# Patient Record
Sex: FEMALE | Race: WHITE | Employment: OTHER | URBAN - METROPOLITAN AREA
[De-identification: names, ages, dates, MRNs, and addresses within clinical notes are randomized per-mention and may not be internally consistent; named-entity substitution may affect disease eponyms.]

---

## 2017-03-22 ENCOUNTER — OFFICE VISIT (OUTPATIENT)
Dept: CARDIOLOGY CLINIC | Age: 77
End: 2017-03-22

## 2017-03-22 ENCOUNTER — PROCEDURE VISIT (OUTPATIENT)
Dept: CARDIOLOGY CLINIC | Age: 77
End: 2017-03-22

## 2017-03-22 VITALS
DIASTOLIC BLOOD PRESSURE: 64 MMHG | OXYGEN SATURATION: 97 % | HEIGHT: 68 IN | HEART RATE: 78 BPM | SYSTOLIC BLOOD PRESSURE: 122 MMHG | WEIGHT: 161 LBS | BODY MASS INDEX: 24.4 KG/M2

## 2017-03-22 DIAGNOSIS — I49.01 VENTRICULAR FIBRILLATION (HCC): ICD-10-CM

## 2017-03-22 DIAGNOSIS — I25.5 ISCHEMIC CARDIOMYOPATHY: Primary | ICD-10-CM

## 2017-03-22 DIAGNOSIS — I10 ESSENTIAL HYPERTENSION: ICD-10-CM

## 2017-03-22 DIAGNOSIS — I42.9 CARDIOMYOPATHY (HCC): ICD-10-CM

## 2017-03-22 DIAGNOSIS — Z95.810 AUTOMATIC IMPLANTABLE CARDIOVERTER-DEFIBRILLATOR IN SITU: ICD-10-CM

## 2017-03-22 DIAGNOSIS — Z95.810 AUTOMATIC IMPLANTABLE CARDIOVERTER-DEFIBRILLATOR IN SITU: Primary | ICD-10-CM

## 2017-03-22 PROCEDURE — 99214 OFFICE O/P EST MOD 30 MIN: CPT | Performed by: INTERNAL MEDICINE

## 2017-03-22 PROCEDURE — 93282 PRGRMG EVAL IMPLANTABLE DFB: CPT | Performed by: INTERNAL MEDICINE

## 2017-04-27 PROBLEM — E11.9 DM2 (DIABETES MELLITUS, TYPE 2) (HCC): Status: ACTIVE | Noted: 2017-04-27

## 2017-04-27 PROBLEM — N39.0 UTI (URINARY TRACT INFECTION): Status: ACTIVE | Noted: 2017-04-27

## 2017-04-27 PROBLEM — N28.89 RIGHT RENAL MASS: Status: ACTIVE | Noted: 2017-04-27

## 2017-06-27 ENCOUNTER — NURSE ONLY (OUTPATIENT)
Dept: CARDIOLOGY CLINIC | Age: 77
End: 2017-06-27

## 2017-06-27 DIAGNOSIS — I46.9 CARDIAC ARREST (HCC): ICD-10-CM

## 2017-06-27 DIAGNOSIS — Z95.810 AUTOMATIC IMPLANTABLE CARDIOVERTER-DEFIBRILLATOR IN SITU: ICD-10-CM

## 2017-06-27 PROCEDURE — 93296 REM INTERROG EVL PM/IDS: CPT | Performed by: INTERNAL MEDICINE

## 2017-06-27 PROCEDURE — 93295 DEV INTERROG REMOTE 1/2/MLT: CPT | Performed by: INTERNAL MEDICINE

## 2017-07-25 ENCOUNTER — OFFICE VISIT (OUTPATIENT)
Dept: CARDIOLOGY CLINIC | Age: 77
End: 2017-07-25

## 2017-07-25 VITALS
HEART RATE: 76 BPM | BODY MASS INDEX: 23.34 KG/M2 | OXYGEN SATURATION: 97 % | SYSTOLIC BLOOD PRESSURE: 106 MMHG | HEIGHT: 68 IN | DIASTOLIC BLOOD PRESSURE: 56 MMHG | WEIGHT: 154 LBS

## 2017-07-25 DIAGNOSIS — I42.9 CARDIOMYOPATHY (HCC): ICD-10-CM

## 2017-07-25 DIAGNOSIS — Z86.74 HX OF CARDIAC ARREST: ICD-10-CM

## 2017-07-25 DIAGNOSIS — Z95.810 AUTOMATIC IMPLANTABLE CARDIOVERTER-DEFIBRILLATOR IN SITU: ICD-10-CM

## 2017-07-25 DIAGNOSIS — Z01.810 PREOP CARDIOVASCULAR EXAM: Primary | ICD-10-CM

## 2017-07-25 PROCEDURE — 93000 ELECTROCARDIOGRAM COMPLETE: CPT | Performed by: INTERNAL MEDICINE

## 2017-07-25 PROCEDURE — 99214 OFFICE O/P EST MOD 30 MIN: CPT | Performed by: INTERNAL MEDICINE

## 2017-09-12 PROBLEM — N13.30 HYDRONEPHROSIS: Status: ACTIVE | Noted: 2017-06-07

## 2017-09-12 PROBLEM — C65.9 MALIGNANT NEOPLASM OF RENAL PELVIS (HCC): Status: ACTIVE | Noted: 2017-06-27

## 2017-09-12 PROBLEM — F32.9 DEPRESSION, REACTIVE: Status: ACTIVE | Noted: 2017-03-09

## 2017-09-12 PROBLEM — Z71.89 ADVANCE DIRECTIVE DISCUSSED WITH PATIENT: Status: ACTIVE | Noted: 2017-09-12

## 2017-09-12 PROBLEM — Z51.5 ENCOUNTER FOR PALLIATIVE CARE: Status: ACTIVE | Noted: 2017-09-12

## 2017-09-12 PROBLEM — F41.8 MIXED ANXIETY DEPRESSIVE DISORDER: Status: ACTIVE | Noted: 2017-09-12

## 2017-09-12 PROBLEM — K90.9 IRON MALABSORPTION: Status: ACTIVE | Noted: 2017-09-12

## 2017-09-12 PROBLEM — L90.5 SCAR OF SKIN: Status: ACTIVE | Noted: 2017-09-12

## 2017-09-12 PROBLEM — Z93.2 STATUS POST ILEOSTOMY (HCC): Status: ACTIVE | Noted: 2017-09-12

## 2017-09-12 PROBLEM — I50.23 ACUTE ON CHRONIC SYSTOLIC HEART FAILURE (HCC): Status: ACTIVE | Noted: 2017-08-28

## 2017-09-12 PROBLEM — M25.50 ARTHRALGIA: Status: ACTIVE | Noted: 2017-09-12

## 2017-09-12 PROBLEM — M15.9 GENERALIZED OA: Status: ACTIVE | Noted: 2017-09-12

## 2017-09-12 PROBLEM — F06.31 MOOD DISORDER WITH DEPRESSIVE FEATURES DUE TO GENERAL MEDICAL CONDITION: Status: ACTIVE | Noted: 2017-08-15

## 2017-09-12 PROBLEM — E83.42 HYPOMAGNESEMIA: Status: ACTIVE | Noted: 2017-08-29

## 2017-09-12 PROBLEM — E87.6 DECREASED POTASSIUM IN THE BLOOD: Status: ACTIVE | Noted: 2017-08-30

## 2017-09-12 PROBLEM — E46 PROTEIN-CALORIE MALNUTRITION (HCC): Status: ACTIVE | Noted: 2017-08-17

## 2017-09-12 PROBLEM — N17.9 ACUTE-ON-CHRONIC RENAL FAILURE (HCC): Status: ACTIVE | Noted: 2017-08-11

## 2017-09-12 PROBLEM — D50.9 IRON DEFICIENCY ANEMIA: Status: ACTIVE | Noted: 2017-09-12

## 2017-09-12 PROBLEM — N18.9 ACUTE-ON-CHRONIC RENAL FAILURE (HCC): Status: ACTIVE | Noted: 2017-08-11

## 2017-10-27 PROBLEM — E87.5 HYPERKALEMIA: Status: ACTIVE | Noted: 2017-10-27

## 2017-11-14 ENCOUNTER — TELEPHONE (OUTPATIENT)
Dept: CARDIOLOGY CLINIC | Age: 77
End: 2017-11-14

## 2017-11-22 ENCOUNTER — PROCEDURE VISIT (OUTPATIENT)
Dept: CARDIOLOGY CLINIC | Age: 77
End: 2017-11-22

## 2017-11-22 ENCOUNTER — OFFICE VISIT (OUTPATIENT)
Dept: CARDIOLOGY CLINIC | Age: 77
End: 2017-11-22

## 2017-11-22 VITALS
OXYGEN SATURATION: 98 % | HEIGHT: 68 IN | DIASTOLIC BLOOD PRESSURE: 62 MMHG | WEIGHT: 140 LBS | SYSTOLIC BLOOD PRESSURE: 134 MMHG | BODY MASS INDEX: 21.22 KG/M2

## 2017-11-22 DIAGNOSIS — I42.9 CARDIOMYOPATHY, UNSPECIFIED TYPE (HCC): ICD-10-CM

## 2017-11-22 DIAGNOSIS — I47.20 VENTRICULAR TACHYCARDIA: Primary | ICD-10-CM

## 2017-11-22 DIAGNOSIS — R00.2 PALPITATIONS: ICD-10-CM

## 2017-11-22 DIAGNOSIS — Z95.810 ICD (IMPLANTABLE CARDIOVERTER-DEFIBRILLATOR) IN PLACE: ICD-10-CM

## 2017-11-22 DIAGNOSIS — Z95.810 AUTOMATIC IMPLANTABLE CARDIOVERTER-DEFIBRILLATOR IN SITU: ICD-10-CM

## 2017-11-22 DIAGNOSIS — I47.20 VENTRICULAR TACHYCARDIA (HCC): ICD-10-CM

## 2017-11-22 DIAGNOSIS — I50.23 ACUTE ON CHRONIC SYSTOLIC HEART FAILURE (HCC): ICD-10-CM

## 2017-11-22 DIAGNOSIS — I50.23 ACUTE ON CHRONIC SYSTOLIC CONGESTIVE HEART FAILURE (HCC): ICD-10-CM

## 2017-11-22 LAB
ALBUMIN SERPL-MCNC: 4.4 G/DL (ref 3.4–5)
ALP BLD-CCNC: 59 U/L (ref 40–129)
ALT SERPL-CCNC: 7 U/L (ref 10–40)
ANION GAP SERPL CALCULATED.3IONS-SCNC: 18 MMOL/L (ref 3–16)
AST SERPL-CCNC: 13 U/L (ref 15–37)
BILIRUB SERPL-MCNC: 0.3 MG/DL (ref 0–1)
BILIRUBIN DIRECT: <0.2 MG/DL (ref 0–0.3)
BILIRUBIN, INDIRECT: ABNORMAL MG/DL (ref 0–1)
BUN BLDV-MCNC: 35 MG/DL (ref 7–20)
CALCIUM SERPL-MCNC: 10.1 MG/DL (ref 8.3–10.6)
CHLORIDE BLD-SCNC: 103 MMOL/L (ref 99–110)
CO2: 22 MMOL/L (ref 21–32)
CREAT SERPL-MCNC: 1.7 MG/DL (ref 0.6–1.2)
GFR AFRICAN AMERICAN: 35
GFR NON-AFRICAN AMERICAN: 29
GLUCOSE BLD-MCNC: 143 MG/DL (ref 70–99)
PHOSPHORUS: 4 MG/DL (ref 2.5–4.9)
POTASSIUM SERPL-SCNC: 5.8 MMOL/L (ref 3.5–5.1)
SODIUM BLD-SCNC: 143 MMOL/L (ref 136–145)
TOTAL PROTEIN: 7.5 G/DL (ref 6.4–8.2)
TSH SERPL DL<=0.05 MIU/L-ACNC: 0.6 UIU/ML (ref 0.27–4.2)

## 2017-11-22 PROCEDURE — 93290 INTERROG DEV EVAL ICPMS IP: CPT | Performed by: INTERNAL MEDICINE

## 2017-11-22 PROCEDURE — 93282 PRGRMG EVAL IMPLANTABLE DFB: CPT | Performed by: INTERNAL MEDICINE

## 2017-11-22 PROCEDURE — 99214 OFFICE O/P EST MOD 30 MIN: CPT | Performed by: NURSE PRACTITIONER

## 2017-11-22 RX ORDER — AMIODARONE HYDROCHLORIDE 200 MG/1
200 TABLET ORAL 2 TIMES DAILY
Qty: 60 TABLET | Refills: 3 | Status: SHIPPED | OUTPATIENT
Start: 2017-11-22 | End: 2017-12-04 | Stop reason: SDUPTHER

## 2017-11-22 NOTE — PROGRESS NOTES
[address]   Electrophysiology   Date: 11/22/2017  CC:    Chief Complaint   Patient presents with    Coronary Artery Disease     HTN,HLD/follow up     I had the privilege of visiting Nancy Foley in the office. She presents today for follow up for hx of VF arrest with subsequent ICD placement. She reports overall she has been feeling well. No cardiac complaints. Patient has had recent extensive bladder and renal surgery and feels like she has never fully \"bounced back\" post surgery. 76 y.o. female with a past medical history significant for CAD, CMP prior PCI to LAD and RCA. She was hospitalized 3/26-4/5 after suffering vfib cardiac arrest. Bystanders initiated CPR and when the EMS arrived she was in VFIB and subsequently shocked multiple times. Echo revealed EF 25%. LHC showed patent stents. Underwent hypothermic protocol and has had a significant neurological recovery. An ICD was placed on 4/1/16 for secondary prevention. HPI: Nancy Foley is a 76 y.o. Past Medical History:   Diagnosis Date    Anxiety     CAD (coronary artery disease)     [facility]     Cardiac arrest ([facility] [address] [facility] 3/27/2016    Chronic kidney disease     cancer of kidney and bladder    Diabetes mellitus (HCC)     IDDM    GERD (gastroesophageal reflux disease)     Hip pain, chronic     HYPERCHOLESTERAEMIA     Hypertension     Laceration of head 3/25/2016    fall with cardiac arrest    MI, old     Presence of combination internal cardiac defibrillator (ICD) and pacemaker     Staph infection     PT. STATES SHE HAS HAD SEVERAL BOILS WITH STAPH LAST ONE 20 YEARS AGO. Past Surgical History:   Procedure Laterality Date    ANGIOPLASTY      x3  2009    BLADDER REMOVAL      BLADDER SUSPENSION      CYSTOSCOPY  5/28/2013    with dilitation    HYSTERECTOMY      KIDNEY REMOVAL      PACEMAKER INSERTION         Allergies:   Allergies   Allergen Reactions    Flagyl [Metronidazole]      Took with Cipro and it made her vomit    Hydrocodone-Acetaminophen Other (See Comments)     Makes patient feel weird, dizzy, nauseous, and sleepy       Medication:  Current Outpatient Prescriptions   Medication Sig Dispense Refill    amiodarone (CORDARONE) 200 MG tablet Take 1 tablet by mouth 2 times daily 60 tablet 3    hydrocortisone (ANUSOL-HC) 2.5 % rectal cream Place rectally 2 times daily. 1 Tube 1    insulin glargine (LANTUS) 100 UNIT/ML injection vial Inject 25 Units into the skin nightly 1 vial 3    insulin lispro (HUMALOG) 100 UNIT/ML injection vial Inject 0-6 Units into the skin 3 times daily (with meals) 1 vial 3    carvedilol (COREG) 6.25 MG tablet Take 3.125 mg by mouth 2 times daily       lisinopril (PRINIVIL;ZESTRIL) 10 MG tablet Take 10 mg by mouth nightly       polyethylene glycol (MIRALAX) packet Take 17 g by mouth daily as needed for Constipation      ALPRAZolam (XANAX) 0.25 MG tablet Take 0.25 mg by mouth nightly as needed for Sleep      rosuvastatin (CRESTOR) 10 MG tablet Take 10 mg by mouth daily      Coenzyme Q10 (CO Q 10) 100 MG CAPS Take 1 capsule by mouth daily       oxyCODONE-acetaminophen (PERCOCET) 5-325 MG per tablet Take 1 tablet by mouth every 8 hours as needed for Pain      aspirin 81 MG EC tablet Take 1 tablet by mouth daily 30 tablet 3    ferrous sulfate 325 (65 FE) MG tablet Take 1 tablet by mouth on Monday, Wednesday, Friday      fluticasone (FLONASE) 50 MCG/ACT nasal spray 1 spray by Nasal route as needed for Rhinitis      therapeutic multivitamin-minerals (THERAGRAN-M) tablet Take 1 tablet by mouth daily. Centrum silver       vitamin D (CHOLECALCIFEROL) 1000 UNIT TABS tablet Take 1,000 Units by mouth daily. No current facility-administered medications for this visit. Social History:  Reviewed. reports that she has never smoked. She has never used smokeless tobacco. She reports that she does not drink alcohol or use drugs. Family History:  Reviewed. ventricular contractions)     Aspiration pneumonia of both lower lobes due to gastric secretions (HCC)     Acute anoxic encephalopathy (HCC)     Cardiac arrest (HCC)     Lactic acidosis     CATARINA (acute kidney injury) ([address]     High anion gap metabolic acidosis     Cardiomyopathy ([address]     Degenerative arthritis of hip 11/12/2015    Awareness of heartbeats 03/16/2015    Pain in shoulder 02/27/2015    Breathlessness on exertion 12/16/2014    Other specified counseling 07/28/2014    Anemia, normocytic normochromic 05/02/2014    Mixed stress and urge urinary incontinence 05/02/2014    Malignant neoplasm of urinary bladder ([address] 04/10/2014    Encounter for preprocedural cardiovascular examination 04/04/2014    Actinic keratosis 03/01/2013    Chronic coronary artery disease 02/13/2013    Tinnitus of vascular origin 11/09/2012    Carotid artery disease ([address] 08/10/2012    Hyperlipidemia 08/09/2012    Presence of coronary angioplasty implant and graft 08/09/2012    Anxiety, generalized 02/29/2012    Herpes zoster with nervous system complication 01/92/6126    Arterial disease ([address] 02/11/2011    Chronic ischemic heart disease 10/20/2009    Acute coronary syndrome ([address] 10/12/2009    Chest pain 10/11/2009    Acid reflux 10/11/2009    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 ICD in situ   Device interrogated and functioning appropriately  2. Hx of VF arrest/VT    ICD in situ   Frequent PVC and short runs of VT on device   On Coreg and Amiodarone BID    Unable to uptitrate beta-blocker therapy    Check renal panel   3. Cardiomyopathy   EF 40-45% on LHC   On beta-blocker, ACE    No spironolactone secondary to renal fx      4.  CHF   Mildly hypervolemic on exam    Check renal panel    If stable start low dose diuretic    Fluid restrictions         Thank you for allowing me to participate in the care of Deven Nuñez. Further evaluation will be based upon the patient's clinical course and testing results. All questions and concerns were addressed to the patient/family. Alternatives to my treatment were discussed. Jannette Parisi, [address]  Electrophysiology   11/27/2017  3:14 PM      Patient has atrial fibrillation: No       Patient is on anticoagulation therapy:  No     Patient has CAD:  No        Patient instructed on life style modifications   Tobacco use was discussed with the patient and patient educated on the negative effects. I have asked the patient to not utilize these agents.       FASTING LIPID PANEL:  Lab Results   Component Value Date    HDL 45 01/29/2012    LDLDIRECT 148 07/08/2010    LDLCALC 72 01/29/2012    TRIG 220 01/29/2012

## 2017-11-27 ENCOUNTER — TELEPHONE (OUTPATIENT)
Dept: CARDIOLOGY CLINIC | Age: 77
End: 2017-11-27

## 2017-11-27 NOTE — TELEPHONE ENCOUNTER
Emely Hayes CNP reviewed patient's BMP. K+ elevated at 6.1. Instructed patient to stop Lisinopril and proceed to the nearest ED for repeat BMP. Patient verbalized understanding and will got to [facility] ED.

## 2017-11-27 NOTE — TELEPHONE ENCOUNTER
Left message for patient  Her potassium from renal panel last week is elevated  She needs to stop her ACE and needs a repeat renal panel ASAP.     Please call patient back to confirm she received message

## 2017-11-28 DIAGNOSIS — E87.5 HYPERKALEMIA: ICD-10-CM

## 2017-11-28 DIAGNOSIS — E87.5 HYPERKALEMIA: Primary | ICD-10-CM

## 2017-11-28 LAB
ANION GAP SERPL CALCULATED.3IONS-SCNC: 10 MMOL/L (ref 3–16)
BUN BLDV-MCNC: 37 MG/DL (ref 7–20)
CALCIUM SERPL-MCNC: 10 MG/DL (ref 8.3–10.6)
CHLORIDE BLD-SCNC: 107 MMOL/L (ref 99–110)
CO2: 22 MMOL/L (ref 21–32)
CREAT SERPL-MCNC: 1.7 MG/DL (ref 0.6–1.2)
GFR AFRICAN AMERICAN: 35
GFR NON-AFRICAN AMERICAN: 29
GLUCOSE BLD-MCNC: 131 MG/DL (ref 70–99)
POTASSIUM SERPL-SCNC: 5.7 MMOL/L (ref 3.5–5.1)
SODIUM BLD-SCNC: 139 MMOL/L (ref 136–145)

## 2017-11-28 NOTE — TELEPHONE ENCOUNTER
Daysi Love with patient and she did present to [facility] ED for repeat labs. K 5.2  They instructed her to continue to hold lisinopril, work on a low K diet and repeat blood work today. I placed order for stat repeat blood work. She will be able to come over this afternoon.

## 2017-12-04 ENCOUNTER — TELEPHONE (OUTPATIENT)
Dept: CARDIOLOGY CLINIC | Age: 77
End: 2017-12-04

## 2017-12-04 DIAGNOSIS — R53.83 FATIGUE, UNSPECIFIED TYPE: Primary | ICD-10-CM

## 2017-12-04 RX ORDER — AMIODARONE HYDROCHLORIDE 200 MG/1
200 TABLET ORAL DAILY
Qty: 60 TABLET | Refills: 3
Start: 2017-12-04 | End: 2018-05-07 | Stop reason: SDUPTHER

## 2017-12-04 NOTE — TELEPHONE ENCOUNTER
Tried calling patient , phone did not , pt's last OV 11/22/17, pt was in the ED 11/27/17 latest BMP 11/28/17 , also had previous labs cbc and cmp 11/27/17 while in ED, most recent EKG showed some PVC's.  Please review and advise

## 2017-12-06 NOTE — TELEPHONE ENCOUNTER
Spoke with patient , per Yulia's instructions on 12/4/17, take Amiodarone 200 mg daily , pt expressed understanding. However, she would like to know if she should take Mirtazapine with the rest of her meds.  Please review and advise

## 2017-12-06 NOTE — TELEPHONE ENCOUNTER
Will need to use cautiously with her hx of VF/VT   Repeat ECG in one week after starting to assess Qtc and monitor for prolongation

## 2017-12-06 NOTE — TELEPHONE ENCOUNTER
[facility] per HIPAA, pt was not aware of what is going on and patient wasn't available

## 2017-12-06 NOTE — TELEPHONE ENCOUNTER
Perfecto Wells is calling stating she saw her PCP doctor and they put her on an anti depression medication named Mirtazapine and is it okay to take with all her other heart medications she is taking? ?     Ashley Rosenthal needs clarification on exactly how she is to be taking her Amiodarone 200 mg.    Perfecto Wells has another doctors appt today and if she does not answer her phone please leave a message @ [phone]

## 2017-12-13 ENCOUNTER — NURSE ONLY (OUTPATIENT)
Dept: CARDIOLOGY CLINIC | Age: 77
End: 2017-12-13

## 2017-12-13 DIAGNOSIS — I49.01 VENTRICULAR FIBRILLATION (HCC): Primary | ICD-10-CM

## 2017-12-13 DIAGNOSIS — I49.3 PVC'S (PREMATURE VENTRICULAR CONTRACTIONS): ICD-10-CM

## 2017-12-13 PROCEDURE — 93000 ELECTROCARDIOGRAM COMPLETE: CPT | Performed by: NURSE PRACTITIONER

## 2017-12-13 NOTE — PROGRESS NOTES
Lisa Espino NP was notified of pt's EKG today and QTC. Per Jean Vogt EKG is fine and she is okay to leave.

## 2017-12-19 ENCOUNTER — OFFICE VISIT (OUTPATIENT)
Dept: CARDIOLOGY CLINIC | Age: 77
End: 2017-12-19

## 2017-12-19 ENCOUNTER — PROCEDURE VISIT (OUTPATIENT)
Dept: CARDIOLOGY CLINIC | Age: 77
End: 2017-12-19

## 2017-12-19 VITALS
SYSTOLIC BLOOD PRESSURE: 92 MMHG | WEIGHT: 138.6 LBS | DIASTOLIC BLOOD PRESSURE: 42 MMHG | OXYGEN SATURATION: 98 % | HEART RATE: 60 BPM | BODY MASS INDEX: 21.01 KG/M2 | HEIGHT: 68 IN

## 2017-12-19 DIAGNOSIS — I50.22 CHRONIC SYSTOLIC HEART FAILURE (HCC): ICD-10-CM

## 2017-12-19 DIAGNOSIS — Z95.810 AUTOMATIC IMPLANTABLE CARDIOVERTER-DEFIBRILLATOR IN SITU: Primary | ICD-10-CM

## 2017-12-19 DIAGNOSIS — I50.23 ACUTE ON CHRONIC SYSTOLIC HEART FAILURE (HCC): ICD-10-CM

## 2017-12-19 DIAGNOSIS — I47.20 VENTRICULAR TACHYCARDIA: ICD-10-CM

## 2017-12-19 DIAGNOSIS — E87.5 HYPERKALEMIA: ICD-10-CM

## 2017-12-19 DIAGNOSIS — I42.9 CARDIOMYOPATHY, UNSPECIFIED TYPE (HCC): ICD-10-CM

## 2017-12-19 DIAGNOSIS — I49.01 VF (VENTRICULAR FIBRILLATION) (HCC): ICD-10-CM

## 2017-12-19 DIAGNOSIS — I49.01 VENTRICULAR FIBRILLATION (HCC): ICD-10-CM

## 2017-12-19 PROCEDURE — 93282 PRGRMG EVAL IMPLANTABLE DFB: CPT | Performed by: INTERNAL MEDICINE

## 2017-12-19 PROCEDURE — 93290 INTERROG DEV EVAL ICPMS IP: CPT | Performed by: NURSE PRACTITIONER

## 2017-12-19 PROCEDURE — 99214 OFFICE O/P EST MOD 30 MIN: CPT | Performed by: NURSE PRACTITIONER

## 2017-12-19 NOTE — PROGRESS NOTES
[address]   Electrophysiology   Date: 11/22/2017  CC:    Chief Complaint   Patient presents with    Cardiomyopathy     routine cardiac evaluation and PM check     I had the privilege of visiting Kiko Smith in the office. She presents today for follow up for hx of VF arrest with subsequent ICD placement. Patient reports poor energy level. At last OV patient had bloodwork which showed elevated K levels and worsening creatinine. She has a hx of removal of right kidney. She also c/o alopecia. She states she has very little energy however this is unchanged. 76 y.o. female with a past medical history significant for CAD, CMP prior PCI to LAD and RCA. She was hospitalized 3/26-4/5 after suffering vfib cardiac arrest. Bystanders initiated CPR and when the EMS arrived she was in VFIB and subsequently shocked multiple times. Echo revealed EF 25%. LHC showed patent stents. Underwent hypothermic protocol and has had a significant neurological recovery. An ICD was placed on 4/1/16 for secondary prevention. HPI: Kiko Smith is a 76 y.o. Past Medical History:   Diagnosis Date    Anxiety     CAD (coronary artery disease)     [facility] [address]     Cardiac arrest ([facility] [address] 3/27/2016    Chronic kidney disease     cancer of kidney and bladder    Diabetes mellitus (HCC)     IDDM    GERD (gastroesophageal reflux disease)     Hip pain, chronic     HYPERCHOLESTERAEMIA     Hypertension     Laceration of head 3/25/2016    fall with cardiac arrest    MI, old     Presence of combination internal cardiac defibrillator (ICD) and pacemaker     Staph infection     PT. STATES SHE HAS HAD SEVERAL BOILS WITH STAPH LAST ONE 20 YEARS AGO.         Past Surgical History:   Procedure Laterality Date    ANGIOPLASTY      x3  2009    BLADDER REMOVAL      BLADDER SUSPENSION      CYSTOSCOPY  5/28/2013    with dilitation    HYSTERECTOMY      KIDNEY REMOVAL      PACEMAKER INSERTION History:  Reviewed. reports that she has never smoked. She has never used smokeless tobacco. She reports that she does not drink alcohol or use drugs. Family History:  Reviewed. family history includes Cancer in her father; Diabetes in her mother; Heart Disease in her father. Review of System:    · General ROS: negative for chills, fever, change in weight   · Psychological ROS: negative for  anxiety or depression  · Ophthalmic ROS: negative for  eye pain or loss of vision  · ENT ROS: negative for  headaches, sore throat   · Allergy and Immunology ROS: negative for  hives  · Hematological and Lymphatic ROS: negative for  bleeding problems, blood clots, bruising or jaundice  · Endocrine ROS: negative for  skin changes, temperature intolerance or unexpected weight changes  · Respiratory ROS: negative for  cough, sputum, wheezing, shortness of breath   · Cardiovascular ROS: Per HPI. · Gastrointestinal ROS: negative for abdominal pain, diarrhea, nausea/vomiting, bleeding   · Musculoskeletal ROS: negative for  joint swelling, pain    · Neurological ROS: negative for  confusion, numbness/tingling, seizures, weakness  · Dermatological ROS: negative for rash, changes in skin color, lesions     Physical Examination:  Ht 5' 8\" (1.727 m)   Wt 138 lb 9.6 oz (62.9 kg)   BMI 21.07 kg/m²     · Constitutional: Oriented. No distress. · Head: Normocephalic and atraumatic. · Mouth/Throat: Oropharynx is clear and moist.   · Eyes: Conjunctivae normal. EOM are normal.   · Neck: Normal range of motion. Neck supple. No rigidity. No JVD present. · Cardiovascular: Normal rate, regular rhythm, S1&S2 and intact distal pulses. · Pulmonary/Chest: Bilateral respiratory sounds. No wheezes. No rhonchi. · Abdominal: Soft. Bowel sounds present. No distension, No tenderness. · Musculoskeletal: No tenderness. No edema    · Neurological: Alert and oriented.  Cranial nerve appears intact, No Gross deficit   · Skin: Skin is warm and dry. No rash noted. · Psychiatric: Has a normal mood, affect and behavior       Labs:    Echo: 3/26/16  Normal left ventricular size and wall thickness. Severely decreased left  ventricular systolic function with an estimated ejection fraction of 20-25%  Global hypokinesis. Mild MR/TR     Cath: 3/30/16  No obstructive coronary artery disease. Previously stented sites are widely patent in the mid LAD and osteal RCA. Improved LV function with EF of about 40% to 45% with posterior basal hypokinesis. Elevated left heart pressures.     Device:   12/19/17  Device interrogated and functioning appropriately  No additional episodes of VT      Assessment:   Patient Active Problem List    Diagnosis Date Noted    Hyperkalemia 10/27/2017     Priority: Low    Advance directive discussed with patient 09/12/2017     Priority: Low    Arthralgia 09/12/2017     Priority: Low    Mixed anxiety depressive disorder 09/12/2017     Priority: Low    Generalized OA 09/12/2017     Priority: Low    Status post ileostomy ([facility] [address] 09/12/2017     Priority: Low    Iron deficiency anemia 09/12/2017     Priority: Low    Iron malabsorption 09/12/2017     Priority: Low    Scar of skin 09/12/2017     Priority: Low    Encounter for palliative care 09/12/2017     Priority: Low    Decreased potassium in the blood 08/30/2017     Priority: Low    Hypomagnesemia 08/29/2017     Priority: Low    Acute on chronic systolic heart failure ([address] 08/28/2017     Priority: Low    Protein-calorie malnutrition ([address] 08/17/2017     Priority: Low    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 I have asked the patient to not utilize these agents.       FASTING LIPID PANEL:  Lab Results   Component Value Date    HDL 45 01/29/2012    LDLDIRECT 148 07/08/2010    LDLCALC 72 01/29/2012    TRIG 220 01/29/2012

## 2017-12-19 NOTE — PROGRESS NOTES
Patient comes in for programming evaluation for her defibrillator. All sensing and pacing parameters are within normal range. No changes need to be made at this time. Please see interrogation for more detail. No new arrhythmias. Thoracic impedance trend stable. Patient will see Christi today. Pt c/o of device migrating down in her chest. She states she feels pulling near and under the incision. She has recently lost 36 lbs-this was verbalized to Christi. Patient will follow up in 3 months in office or remotely. See PACEART report under Cardiology tab.

## 2018-01-16 ENCOUNTER — HOSPITAL ENCOUNTER (OUTPATIENT)
Dept: CT IMAGING | Age: 78
Discharge: HOME | End: 2018-01-16
Attending: INTERNAL MEDICINE | Admitting: INTERNAL MEDICINE

## 2018-01-16 DIAGNOSIS — C66.1 MALIGNANT NEOPLASM OF RIGHT URETER (HCC): ICD-10-CM

## 2018-01-18 ENCOUNTER — TELEPHONE (OUTPATIENT)
Dept: CARDIOLOGY CLINIC | Age: 78
End: 2018-01-18

## 2018-01-19 NOTE — TELEPHONE ENCOUNTER
Would level the Amiodarone alone at this time  The headache and constipation could be from multiple sources and if she is just now starting with it likely not from the Amio    I remember talking to her about stopping Amiodarone however with her hx of VF/VT not recommended.  At that time she was in agreement

## 2018-02-13 ENCOUNTER — TELEPHONE (OUTPATIENT)
Dept: CARDIOLOGY CLINIC | Age: 78
End: 2018-02-13

## 2018-02-13 NOTE — TELEPHONE ENCOUNTER
[employer] stopped into the office to drop off a note to Dr. Myra Álvarez. The note states:    \"Dr. Myra Álvarez, My left ear has been pounding with heart beat for years. It is getting real bad. I have been off of plavix since heart attack April 25, 2016 and I don't think I have had a carotid artery check since then. Had been on plavix since stents in 2009 and Carotid test twice a year (dr. Navarro). Just want to know if I should have test? Just let nurse call- don't want to bother you. \"    You can reach [employer] at #[phone]

## 2018-03-21 ENCOUNTER — TELEPHONE (OUTPATIENT)
Dept: CARDIOLOGY CLINIC | Age: 78
End: 2018-03-21

## 2018-03-21 DIAGNOSIS — E87.5 HYPERKALEMIA: Primary | ICD-10-CM

## 2018-03-21 NOTE — TELEPHONE ENCOUNTER
Everardo Vance called in stating that she is just not feeling very well recently. She has tingling in her feet/legs, dizziness, headaches, her balance is off, her hands are pulsing like her heart, and her left ear is bothering her. She isn't sure if this could be from one of the heart medications she is taking or what.     Everardo Vance can be reached at [phone]

## 2018-03-22 DIAGNOSIS — I13.10 HYPERTENSIVE HEART AND CHRONIC KIDNEY DISEASE STAGE 3 (HCC): ICD-10-CM

## 2018-03-22 DIAGNOSIS — N18.30 HYPERTENSIVE HEART AND CHRONIC KIDNEY DISEASE STAGE 3 (HCC): ICD-10-CM

## 2018-03-22 DIAGNOSIS — E87.5 HYPERKALEMIA: ICD-10-CM

## 2018-03-22 DIAGNOSIS — I10 ESSENTIAL HYPERTENSION: ICD-10-CM

## 2018-03-22 DIAGNOSIS — I25.10 CHRONIC CORONARY ARTERY DISEASE: ICD-10-CM

## 2018-03-22 DIAGNOSIS — I10 ESSENTIAL HYPERTENSION: Primary | ICD-10-CM

## 2018-03-22 LAB
ALBUMIN SERPL-MCNC: 4.4 G/DL (ref 3.4–5)
ANION GAP SERPL CALCULATED.3IONS-SCNC: 12 MMOL/L (ref 3–16)
BUN BLDV-MCNC: 42 MG/DL (ref 7–20)
CALCIUM SERPL-MCNC: 9.2 MG/DL (ref 8.3–10.6)
CHLORIDE BLD-SCNC: 106 MMOL/L (ref 99–110)
CO2: 22 MMOL/L (ref 21–32)
CREAT SERPL-MCNC: 1.7 MG/DL (ref 0.6–1.2)
GFR AFRICAN AMERICAN: 35
GFR NON-AFRICAN AMERICAN: 29
GLUCOSE BLD-MCNC: 194 MG/DL (ref 70–99)
PHOSPHORUS: 4.1 MG/DL (ref 2.5–4.9)
POTASSIUM SERPL-SCNC: 5.3 MMOL/L (ref 3.5–5.1)
SODIUM BLD-SCNC: 140 MMOL/L (ref 136–145)

## 2018-03-26 ENCOUNTER — PROCEDURE VISIT (OUTPATIENT)
Dept: CARDIOLOGY CLINIC | Age: 78
End: 2018-03-26

## 2018-03-26 ENCOUNTER — OFFICE VISIT (OUTPATIENT)
Dept: CARDIOLOGY CLINIC | Age: 78
End: 2018-03-26

## 2018-03-26 VITALS
HEIGHT: 68 IN | DIASTOLIC BLOOD PRESSURE: 50 MMHG | WEIGHT: 139 LBS | SYSTOLIC BLOOD PRESSURE: 110 MMHG | BODY MASS INDEX: 21.07 KG/M2 | OXYGEN SATURATION: 98 % | HEART RATE: 68 BPM

## 2018-03-26 DIAGNOSIS — E87.5 HYPERKALEMIA: Primary | ICD-10-CM

## 2018-03-26 DIAGNOSIS — I25.5 ISCHEMIC CARDIOMYOPATHY: ICD-10-CM

## 2018-03-26 DIAGNOSIS — I50.23 ACUTE ON CHRONIC SYSTOLIC HEART FAILURE (HCC): ICD-10-CM

## 2018-03-26 DIAGNOSIS — E87.5 HYPERKALEMIA: ICD-10-CM

## 2018-03-26 DIAGNOSIS — I42.0 DILATED CARDIOMYOPATHY (HCC): ICD-10-CM

## 2018-03-26 DIAGNOSIS — I49.01 VENTRICULAR FIBRILLATION (HCC): ICD-10-CM

## 2018-03-26 DIAGNOSIS — Z95.810 AUTOMATIC IMPLANTABLE CARDIOVERTER-DEFIBRILLATOR IN SITU: ICD-10-CM

## 2018-03-26 LAB
ALBUMIN SERPL-MCNC: 4.7 G/DL (ref 3.4–5)
ANION GAP SERPL CALCULATED.3IONS-SCNC: 18 MMOL/L (ref 3–16)
BUN BLDV-MCNC: 46 MG/DL (ref 7–20)
CALCIUM SERPL-MCNC: 9.4 MG/DL (ref 8.3–10.6)
CHLORIDE BLD-SCNC: 104 MMOL/L (ref 99–110)
CO2: 18 MMOL/L (ref 21–32)
CREAT SERPL-MCNC: 1.9 MG/DL (ref 0.6–1.2)
GFR AFRICAN AMERICAN: 31
GFR NON-AFRICAN AMERICAN: 25
GLUCOSE BLD-MCNC: 175 MG/DL (ref 70–99)
MAGNESIUM: 1.8 MG/DL (ref 1.8–2.4)
PHOSPHORUS: 3 MG/DL (ref 2.5–4.9)
POTASSIUM SERPL-SCNC: 5.2 MMOL/L (ref 3.5–5.1)
SODIUM BLD-SCNC: 140 MMOL/L (ref 136–145)

## 2018-03-26 PROCEDURE — 93290 INTERROG DEV EVAL ICPMS IP: CPT | Performed by: INTERNAL MEDICINE

## 2018-03-26 PROCEDURE — 99214 OFFICE O/P EST MOD 30 MIN: CPT | Performed by: NURSE PRACTITIONER

## 2018-03-26 PROCEDURE — 93282 PRGRMG EVAL IMPLANTABLE DFB: CPT | Performed by: INTERNAL MEDICINE

## 2018-03-26 NOTE — PROGRESS NOTES
[address]   Electrophysiology   Date: 11/22/2017  CC:    Chief Complaint   Patient presents with    Follow-Up from Hospital     patient was at the ED for severe diarrhea and severe bleeding    Other     AICD, cardiomyopathy    Tingling     feet and legs    Other     pt still has ongoing diarrhea but bleeding has stopped     I had the privilege of visiting Lenka Rodrigues in the office. She presents today for follow up. Patient with hx of VF arrest with subsequent ICD placement. Patient with hx of renal pelvis and urinary bladder    Patient reports she has had severe diarrhea for the past several days. She also reports bloody discharge. She has been to the ED and no acute issue found. She reports she has been feeling well from a CV standpoint however she is not eating much and is not able to keep much in her system. Afebrile     a past medical history significant for CAD, CMP prior PCI to LAD and RCA. She was hospitalized 3/26-4/5 after suffering vfib cardiac arrest. Bystanders initiated CPR and when the EMS arrived she was in VFIB and subsequently shocked multiple times. Echo revealed EF 25%. LHC showed patent stents. Underwent hypothermic protocol and has had a significant neurological recovery. An ICD was placed on 4/1/16 for secondary prevention. HPI: Lenka Rodrigues is a [de-identified] y.o.      Past Medical History:   Diagnosis Date    Anxiety     [facility]rial [facility] ([facility] [address]     CAD (coronary artery disease)     Cancer (HCC)     bladder right kidney     Cardiac arrest ([facility] [address] 3/27/2016    Carotid artery stenosis     CHF (congestive heart failure) (HCC)     Chronic kidney disease     cancer of kidney and bladder    Depression     Diabetes mellitus (HCC)     IDDM    GERD (gastroesophageal reflux disease)     Hip pain, chronic     HYPERCHOLESTERAEMIA     Hypertension     Irritable bowel syndrome     Laceration of head 3/25/2016    fall with cardiac arrest    MI, old    Renetta Wolf pulses. · Pulmonary/Chest: Bilateral respiratory sounds. No wheezes. No rhonchi. · Abdominal: Soft. Bowel sounds present. No distension, No tenderness. · Musculoskeletal: No tenderness. No edema    · Neurological: Alert and oriented. Cranial nerve appears intact, No Gross deficit   · Skin: Skin is warm and dry. No rash noted. · Psychiatric: Has a normal mood, affect and behavior       Labs:    Echo: 3/26/16  Normal left ventricular size and wall thickness. Severely decreased left  ventricular systolic function with an estimated ejection fraction of 20-25%  Global hypokinesis. Mild MR/TR     Cath: 3/30/16  No obstructive coronary artery disease. Previously stented sites are widely patent in the mid LAD and osteal RCA. Improved LV function with EF of about 40% to 45% with posterior basal hypokinesis. Elevated left heart pressures.     Device:   3/26/18  Device interrogated and functioning appropriately  No additional episodes of VT      Assessment:   Patient Active Problem List    Diagnosis Date Noted    Hyperkalemia 10/27/2017     Priority: Low    Advance directive discussed with patient 09/12/2017     Priority: Low    Arthralgia 09/12/2017     Priority: Low    Mixed anxiety depressive disorder 09/12/2017     Priority: Low    Generalized OA 09/12/2017     Priority: Low    Status post ileostomy ([facility] [address] 09/12/2017     Priority: Low    Iron deficiency anemia 09/12/2017     Priority: Low    Iron malabsorption 09/12/2017     Priority: Low    Scar of skin 09/12/2017     Priority: Low    Encounter for palliative care 09/12/2017     Priority: Low    Decreased potassium in the blood 08/30/2017     Priority: Low    Hypomagnesemia 08/29/2017     Priority: Low    Acute on chronic systolic heart failure ([address] 08/28/2017     Priority: Low    Protein-calorie malnutrition ([facility] [address] 08/17/2017     Priority: Low    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

## 2018-04-09 RX ORDER — AMIODARONE HYDROCHLORIDE 200 MG/1
200 TABLET ORAL DAILY
Qty: 30 TABLET | Refills: 3 | Status: SHIPPED | OUTPATIENT
Start: 2018-04-09 | End: 2018-08-06 | Stop reason: SDUPTHER

## 2018-04-19 ENCOUNTER — HOSPITAL ENCOUNTER (OUTPATIENT)
Dept: CT IMAGING | Age: 78
Discharge: HOME | End: 2018-04-19
Attending: INTERNAL MEDICINE | Admitting: INTERNAL MEDICINE

## 2018-04-19 DIAGNOSIS — C66.1 MALIGNANT NEOPLASM OF RIGHT URETER (HCC): ICD-10-CM

## 2018-04-19 DIAGNOSIS — C67.9 MALIGNANT NEOPLASM OF URINARY BLADDER, UNSPECIFIED SITE (HCC): ICD-10-CM

## 2018-05-07 PROBLEM — N12 PYELONEPHRITIS: Status: ACTIVE | Noted: 2018-05-07

## 2018-05-29 PROBLEM — N10 ACUTE PYELONEPHRITIS: Status: ACTIVE | Noted: 2018-05-29

## 2018-05-29 PROBLEM — N18.30 CHRONIC KIDNEY DISEASE, STAGE III (MODERATE) (HCC): Status: ACTIVE | Noted: 2018-05-29

## 2018-06-26 ENCOUNTER — PROCEDURE VISIT (OUTPATIENT)
Dept: CARDIOLOGY CLINIC | Age: 78
End: 2018-06-26

## 2018-06-26 ENCOUNTER — OFFICE VISIT (OUTPATIENT)
Dept: CARDIOLOGY CLINIC | Age: 78
End: 2018-06-26

## 2018-06-26 VITALS
WEIGHT: 144 LBS | HEIGHT: 68 IN | BODY MASS INDEX: 21.82 KG/M2 | HEART RATE: 52 BPM | SYSTOLIC BLOOD PRESSURE: 158 MMHG | DIASTOLIC BLOOD PRESSURE: 64 MMHG | OXYGEN SATURATION: 98 %

## 2018-06-26 DIAGNOSIS — I47.20 VENTRICULAR TACHYCARDIA: ICD-10-CM

## 2018-06-26 DIAGNOSIS — Z95.810 AUTOMATIC IMPLANTABLE CARDIOVERTER-DEFIBRILLATOR IN SITU: ICD-10-CM

## 2018-06-26 DIAGNOSIS — I50.22 CHRONIC SYSTOLIC HEART FAILURE (HCC): ICD-10-CM

## 2018-06-26 DIAGNOSIS — I25.5 ISCHEMIC CARDIOMYOPATHY: ICD-10-CM

## 2018-06-26 DIAGNOSIS — I49.01 VENTRICULAR FIBRILLATION (HCC): ICD-10-CM

## 2018-06-26 DIAGNOSIS — I10 ESSENTIAL HYPERTENSION: ICD-10-CM

## 2018-06-26 DIAGNOSIS — I50.23 ACUTE ON CHRONIC SYSTOLIC HEART FAILURE (HCC): ICD-10-CM

## 2018-06-26 DIAGNOSIS — I65.23 BILATERAL CAROTID ARTERY STENOSIS: Primary | ICD-10-CM

## 2018-06-26 PROCEDURE — 93282 PRGRMG EVAL IMPLANTABLE DFB: CPT | Performed by: INTERNAL MEDICINE

## 2018-06-26 PROCEDURE — 99214 OFFICE O/P EST MOD 30 MIN: CPT | Performed by: INTERNAL MEDICINE

## 2018-06-26 PROCEDURE — 93290 INTERROG DEV EVAL ICPMS IP: CPT | Performed by: INTERNAL MEDICINE

## 2018-06-26 RX ORDER — AMLODIPINE BESYLATE 5 MG/1
5 TABLET ORAL DAILY
Qty: 90 TABLET | Refills: 2 | Status: SHIPPED | OUTPATIENT
Start: 2018-06-26 | End: 2019-02-20 | Stop reason: SDUPTHER

## 2018-06-29 ENCOUNTER — HOSPITAL ENCOUNTER (OUTPATIENT)
Dept: VASCULAR LAB | Age: 78
Discharge: HOME | End: 2018-06-29
Attending: INTERNAL MEDICINE | Admitting: INTERNAL MEDICINE

## 2018-06-29 DIAGNOSIS — I65.23 OCCLUSION AND STENOSIS OF BILATERAL CAROTID ARTERIES: ICD-10-CM

## 2018-06-29 DIAGNOSIS — I65.23 BILATERAL CAROTID ARTERY STENOSIS: ICD-10-CM

## 2018-07-31 ENCOUNTER — HOSPITAL ENCOUNTER (OUTPATIENT)
Dept: WOUND CARE | Age: 78
Discharge: HOME | End: 2018-07-31
Attending: NURSE PRACTITIONER | Admitting: NURSE PRACTITIONER

## 2018-07-31 VITALS
SYSTOLIC BLOOD PRESSURE: 135 MMHG | DIASTOLIC BLOOD PRESSURE: 70 MMHG | TEMPERATURE: 97.8 F | BODY MASS INDEX: 21.92 KG/M2 | RESPIRATION RATE: 18 BRPM | HEART RATE: 54 BPM | HEIGHT: 68 IN | WEIGHT: 144.62 LBS

## 2018-07-31 DIAGNOSIS — Z71.89 ENCOUNTER FOR OSTOMY CARE EDUCATION: ICD-10-CM

## 2018-07-31 PROCEDURE — 99203 OFFICE O/P NEW LOW 30 MIN: CPT | Performed by: NURSE PRACTITIONER

## 2018-07-31 RX ORDER — OXYCODONE HYDROCHLORIDE AND ACETAMINOPHEN 5; 325 MG/1; MG/1
1 TABLET ORAL EVERY 4 HOURS PRN
COMMUNITY
End: 2019-07-08

## 2018-08-01 PROBLEM — Z93.6 STATUS POST ILEAL CONDUIT (HCC): Status: ACTIVE | Noted: 2017-09-12

## 2018-08-01 PROBLEM — Z71.89 ENCOUNTER FOR OSTOMY CARE EDUCATION: Status: ACTIVE | Noted: 2018-08-01

## 2018-08-01 NOTE — PROGRESS NOTES
daily       aspirin 81 MG EC tablet Take 1 tablet by mouth daily 30 tablet 3    ferrous sulfate 325 (65 FE) MG tablet Take 1 tablet by mouth on Monday, Wednesday, Friday      therapeutic multivitamin-minerals (THERAGRAN-M) tablet Take 1 tablet by mouth daily. Centrum silver       vitamin D (CHOLECALCIFEROL) 1000 UNIT TABS tablet Take 1,000 Units by mouth daily       ALPRAZolam (XANAX) 0.25 MG tablet Take 0.25 mg by mouth nightly as needed for Sleep.  fluticasone (FLONASE) 50 MCG/ACT nasal spray 1 spray by Nasal route as needed for Rhinitis       No current facility-administered medications on file prior to encounter. REVIEW OF SYSTEMS    Pertinent items are noted in HPI. Objective    /70   Pulse 54   Temp 97.8 °F (36.6 °C)   Resp 18   Ht 5' 8\" (1.727 m)   Wt 144 lb 10 oz (65.6 kg)   BMI 21.99 kg/m²     Wt Readings from Last 3 Encounters:   07/31/18 144 lb 10 oz (65.6 kg)   06/26/18 144 lb (65.3 kg)   05/29/18 140 lb 3.4 oz (63.6 kg)       PHYSICAL EXAM    General Appearance: alert and oriented to person, place and time, well-developed and well-nourished, in no acute distress  Skin: warm and dry, no rash or erythema  Head: normocephalic and atraumatic  Eyes: pupils equal, round, and reactive to light  Pulmonary/Chest: clear to auscultation bilaterally- no wheezes, rales or rhonchi, normal air movement, no respiratory distress  Cardiovascular: normal rate, regular rhythm, normal S1 and S2 and no carotid bruits  Extremities: no cyanosis and no clubbing  Musculoskeletal: normal range of motion, no joint swelling, deformity or tenderness  Neurologic: gait and coordination normal and speech normal      Ostomy Type: ileal conduit    Stoma Assessment: Stoma measures 7/8\" x 1\", stoma red, moist, nicely protruding with an area of discoloration right side of stoma.     Peristomal Skin Assessment: Intact, noting a fine rash under and outside of wafer perimeter, no breaks in Instructed pt that once fungal rash gone can stop using powder. 3.  Reviewed diet and fluid intake including adding cranberry juice or pills to increase acidity of urine which can decrease bacteria growth. Also will have pt wear wafer for 4 days and change outer pouch every other day to decrease bacterial growth in pouch. 4.  Discussed with pt and  about insurance coverage for ostomy products. Pt has both Medicare primary and secondary insurance which should cover pouches and supplies 100%. Instructed pt to have daughter get history of [employer] for past year and then call Medicare to see if they can reimburse. 5.  Reviewed how to order supplies through Health system and will reinforce with follow-up visit. Follow up: one week    Please see attached Discharge Instructions    Written patient dismissal instructions given to patient and signed by patient or POA. Discharge Instructions             [address]  [facility] [facility] [place] [id number],  [address]   [phone]      Name: Sondra Thompson  : 1940   AGE: [de-identified] y.o. MRN: [id number]  Today's Date: 2018    Ostomy skin care  Cleanse skin prior to applying a new pouch/wafer with:  [x] Water          Topical Treatments to skin around stoma:  Do not apply lotions, creams, or ointments to wound bed unless directed. [x] Apply antifungal powder to irritated skin surrounding stoma, seal in with barrier film; and repeat x2      Pouch/Wafer Application     [x] Change your pouch every 4 days or when leaking. [x] Appliance: 2 piece [place]    [] Product [facility]:[id number]  [] Other: Accessories:   [x] SFSSW:____[id number]________________________________    Application of Pouch  [x] Gather supplies needed to change pouch and wafer. Wafer every 4 days. Change pouch   Every 2 days. [x] Assemble new pouch system before removing old one.   [x] Using the measuring guide or pattern, trace size of

## 2018-08-06 DIAGNOSIS — Z79.899 ENCOUNTER FOR MONITORING AMIODARONE THERAPY: Primary | ICD-10-CM

## 2018-08-06 DIAGNOSIS — Z51.81 ENCOUNTER FOR MONITORING AMIODARONE THERAPY: Primary | ICD-10-CM

## 2018-08-06 RX ORDER — AMIODARONE HYDROCHLORIDE 200 MG/1
TABLET ORAL
Qty: 30 TABLET | Refills: 2 | Status: SHIPPED | OUTPATIENT
Start: 2018-08-06 | End: 2018-12-31 | Stop reason: SDUPTHER

## 2018-08-06 NOTE — TELEPHONE ENCOUNTER
Last O/V:  6/26/18  Next O/V:  X    Last Labs:  TSH:  12/4/17    I called and spoke with the patient and advised her she is due for Thyroid lab work. She stated she would have this done soon. Orders placed.

## 2018-08-07 ENCOUNTER — HOSPITAL ENCOUNTER (OUTPATIENT)
Dept: WOUND CARE | Age: 78
Discharge: HOME | End: 2018-08-07
Attending: NURSE PRACTITIONER | Admitting: NURSE PRACTITIONER

## 2018-08-07 DIAGNOSIS — Z51.81 ENCOUNTER FOR MONITORING AMIODARONE THERAPY: ICD-10-CM

## 2018-08-07 DIAGNOSIS — Z71.89 ENCOUNTER FOR OSTOMY CARE EDUCATION: Primary | ICD-10-CM

## 2018-08-07 DIAGNOSIS — Z79.899 ENCOUNTER FOR MONITORING AMIODARONE THERAPY: ICD-10-CM

## 2018-08-07 LAB
T4 FREE: 1.7 NG/DL (ref 0.9–1.8)
TSH REFLEX: 1.59 UIU/ML (ref 0.27–4.2)

## 2018-08-07 PROCEDURE — 99213 OFFICE O/P EST LOW 20 MIN: CPT | Performed by: NURSE PRACTITIONER

## 2018-08-08 NOTE — PROGRESS NOTES
[facility] Outpatient Ostomy Consult Note      NAME:  Ligia Mcmahan  MEDICAL RECORD NUMBER:  [id number]  AGE: [de-identified] y.o. GENDER:  female  :  1940  TODAY'S DATE:  2018    Subjective       Chief Complaint   Patient presents with    Wound Check     f/u ostomy/stoma eval         HISTORY of PRESENT ILLNESS HPI  Ligia Mcmahan is a [de-identified] y.o. female New patient referred by Ellen Wilkins, inpatient [facility] who presents today for ostomy/stoma follow-up. Pt has been having a lot of issues at home with her 's health, and her daughter who orders her supplies just had ankle surgery but is back home now. She has only left kidney remaining and in the past few months she has been hospitalized twice for UTI's. Pt has been compliant with pouch changes and is getting 4 day wear time from wafer but forgot that she needs to change the pouch every other day. Has not yet followed up about ordering supplies or looking into reimbursement for out of pocket expenses from 2017. Pouching/Skin Issues: bruise on stoma  Current Pouching System: Two piece [place] urostomy pouch  History of Ostomy:2017/Surgeon:  Dr. Michael Spencer.   Surgery at [facility]  Wound/Ulcer Pain Timing/Severity: none  Quality of pain: N/A  Severity:  0 / 10   Modifying Factors: None  Associated Signs/Symptoms: none    PAST MEDICAL HISTORY        Diagnosis Date    Anxiety     Atrial fibrillation (HCC)     CAD (coronary artery disease)     Cancer (HCC)     bladder right kidney     Cardiac arrest ([facility] [address] 3/27/2016    Carotid artery stenosis     CHF (congestive heart failure) (HCC)     Chronic kidney disease     cancer of kidney and bladder    Depression     Diabetes mellitus (HCC)     IDDM    GERD (gastroesophageal reflux disease)     Hip pain, chronic     HYPERCHOLESTERAEMIA     Hypertension     Irritable bowel syndrome     Laceration of head 3/25/2016    fall with cardiac arrest    MI, old     Neuropathy     Osteoarthritis     Presence of combination internal cardiac defibrillator (ICD) and pacemaker     Staph infection     PT. STATES SHE HAS HAD SEVERAL BOILS WITH STAPH LAST ONE 20 YEARS AGO.  Type 2 diabetes mellitus without complication ([address]     Urinary incontinence        PAST SURGICAL HISTORY    Past Surgical History:   Procedure Laterality Date    ANGIOPLASTY      x3  2009    BLADDER REMOVAL      BLADDER SUSPENSION      CYSTOSCOPY  5/28/2013    with dilitation    HYSTERECTOMY      KIDNEY REMOVAL      PACEMAKER INSERTION         FAMILY HISTORY    Family History   Problem Relation Age of Onset    Diabetes Mother     Heart Disease Father     Cancer Father        SOCIAL HISTORY    Social History   Substance Use Topics    Smoking status: Never Smoker    Smokeless tobacco: Never Used    Alcohol use No       ALLERGIES    Allergies   Allergen Reactions    Flagyl [Metronidazole]      Took with Cipro and it made her vomit    Venlafaxine Nausea Only    Hydrocodone-Acetaminophen Other (See Comments)     Makes patient feel weird, dizzy, nauseous, and sleepy. Patient states she is not allergic to this       MEDICATIONS    Current Outpatient Prescriptions on File Prior to Encounter   Medication Sig Dispense Refill    amiodarone (CORDARONE) 200 MG tablet TAKE ONE TABLET BY MOUTH DAILY 30 tablet 2    oxyCODONE-acetaminophen (PERCOCET) 5-325 MG per tablet Take 1 tablet by mouth every 4 hours as needed for Pain. [place] Seamen amLODIPine (NORVASC) 5 MG tablet Take 1 tablet by mouth daily 90 tablet 2    sodium bicarbonate 650 MG tablet Take 325 mg by mouth 2 times daily      ALPRAZolam (XANAX) 0.25 MG tablet Take 0.25 mg by mouth nightly as needed for Sleep.       insulin glargine (LANTUS) 100 UNIT/ML injection vial Inject 25 Units into the skin nightly (Patient taking differently: Inject 22 Units into the skin nightly ) 1 vial 3    carvedilol (COREG) 6.25 MG tablet Take 6.25 mg by mouth 2 times daily       rosuvastatin (CRESTOR) 10 MG tablet Take 10 mg by mouth every evening       Coenzyme Q10 (CO Q 10) 100 MG CAPS Take 1 capsule by mouth daily       aspirin 81 MG EC tablet Take 1 tablet by mouth daily 30 tablet 3    ferrous sulfate 325 (65 FE) MG tablet Take 1 tablet by mouth on Monday, Wednesday, Friday      fluticasone (FLONASE) 50 MCG/ACT nasal spray 1 spray by Nasal route as needed for Rhinitis      therapeutic multivitamin-minerals (THERAGRAN-M) tablet Take 1 tablet by mouth daily. Centrum silver       vitamin D (CHOLECALCIFEROL) 1000 UNIT TABS tablet Take 1,000 Units by mouth daily        No current facility-administered medications on file prior to encounter. REVIEW OF SYSTEMS    Pertinent items are noted in HPI. Objective    There were no vitals taken for this visit. Wt Readings from Last 3 Encounters:   07/31/18 144 lb 10 oz (65.6 kg)   06/26/18 144 lb (65.3 kg)   05/29/18 140 lb 3.4 oz (63.6 kg)       PHYSICAL EXAM    General Appearance: alert and oriented to person, place and time, well-developed and well-nourished, in no acute distress  Skin: warm and dry, no rash or erythema  Head: normocephalic and atraumatic  Eyes: pupils equal, round, and reactive to light  Pulmonary/Chest:  normal air movement, no respiratory distress  Cardiovascular: normal rate and regular rhythm    Ostomy Type: ileal conduit    Stoma Assessment: Red, moist and pink without bruising    Peristomal Skin Assessment:clear with only a few flat lesions remaining. No burning or pain        Urostomy RLQ (Active)   Number of days: 330       Urostomy Ileal conduit RUQ (Active)   Number of days: 286       Assessment     Pt is overwhelmed with issues regarding hr 's health and her daughters' surgery. But manaing her ostomy well without issues, some forgetfulness about directions but generally doing well. Plan     Plan for Ostomy Care:    Pt education per provider related to:  1.   Ordering of supplies, reviewed redness or irritation.      [x] Apply wafer/pouch system over stoma and press down firmly starting around stoma and working outward. [x] Remove control backing paper tape border if applicable and press to skin. [x] Attach new pouch to wafer. [x] Secure bottom of pouch.        Emptying Pouch  [x] Empty pouch when 1/3 full of urinel. Clean bottom edge with damp toilet paper or bathroom wipe and close pouch.        Other:  [x] Shower  Pat pouch dry and Use hair dryer on cool setting to dry back of pouch and border        [x] Other: See additional instructions ___DRINK CRANBERRY JUICE  OR TAKE TABLETS TO INCREASE ACID. _ORDER SUPPLIES THROUGH [employer]___________________________________      Contact Ostomy Care Nurse Practitioner  [x] leaking issues and skin irritation      RETURN IN 1 WEEK FOR A RE-CHECK.        Provider Signature/Date/Time:_____________________________________________     RIZWAN Peterson, MSN, RN, [facility]  [phone]          Electronically signed by RIZWAN Moulton - CNP on 8/8/2018 at 1:53 PM

## 2018-08-21 ENCOUNTER — HOSPITAL ENCOUNTER (OUTPATIENT)
Dept: WOUND CARE | Age: 78
Discharge: HOME | End: 2018-08-21
Attending: NURSE PRACTITIONER | Admitting: NURSE PRACTITIONER

## 2018-08-21 VITALS
SYSTOLIC BLOOD PRESSURE: 137 MMHG | TEMPERATURE: 97.7 F | HEART RATE: 65 BPM | DIASTOLIC BLOOD PRESSURE: 67 MMHG | RESPIRATION RATE: 18 BRPM

## 2018-08-21 DIAGNOSIS — Z71.89 ENCOUNTER FOR OSTOMY CARE EDUCATION: Primary | ICD-10-CM

## 2018-08-21 PROCEDURE — 99214 OFFICE O/P EST MOD 30 MIN: CPT | Performed by: NURSE PRACTITIONER

## 2018-08-21 NOTE — PROGRESS NOTES
[facility] Outpatient Ostomy Consult Note      NAME:  Lorena Art  MEDICAL RECORD NUMBER:  [id number]  AGE: [de-identified] y.o. GENDER:  female  :  1940  TODAY'S DATE:  2018    Subjective       Chief Complaint   Patient presents with    Other     Follow Up Ostomy Visit with Butch Cogan         HISTORY of PRESENT ILLNESS HPI  Ej Espitia is a [de-identified] y.o. female new patient referred by Alice Gibbons, inpatient [facility] presents today for ostomy/stoma follow-up. Pt has been having a lot of issues at home with her 's health, and her daughter who orders her supplies just had ankle surgery but is back home now. She has only left kidney remaining and in the past few months she has been hospitalized twice for UTI's. Pt states has been changing the whole system every two days instead of recommended 4 days for entire system and outer pouch to be changed every 2 days. Is now getting her supplies through [place] without difficulty.   Her daughter has not yet followed up  looking into reimbursement for out of pocket expenses from 2017.    Pouching/Skin Issues: bruise on stoma  Current Pouching System: Two piece [place] urostomy pouch  History of Ostomy: 2017/Surgeon: Dr. Layla Pérez.  Surgery at [facility]  Wound/Ulcer Pain Timing/Severity: none  Quality of pain: N/A  Severity:  0 / 10   Modifying Factors: None  Associated Signs/Symptoms: none  PAST MEDICAL HISTORY        Diagnosis Date    Anxiety     Atrial fibrillation (HCC)     CAD (coronary artery disease)     Cancer (HCC)     bladder right kidney     Cardiac arrest ([facility] [address] 3/27/2016    Carotid artery stenosis     CHF (congestive heart failure) (HCC)     Chronic kidney disease     cancer of kidney and bladder    Depression     Diabetes mellitus (HCC)     IDDM    GERD (gastroesophageal reflux disease)     Hip pain, chronic     HYPERCHOLESTERAEMIA     Hypertension     Irritable bowel syndrome     Laceration of head 3/25/2016 fall with cardiac arrest    MI, old     Neuropathy     Osteoarthritis     Presence of combination internal cardiac defibrillator (ICD) and pacemaker     Staph infection     PT. STATES SHE HAS HAD SEVERAL BOILS WITH STAPH LAST ONE 20 YEARS AGO.  Type 2 diabetes mellitus without complication ([address]     Urinary incontinence        PAST SURGICAL HISTORY    Past Surgical History:   Procedure Laterality Date    ANGIOPLASTY      x3  2009    BLADDER REMOVAL      BLADDER SUSPENSION      CYSTOSCOPY  5/28/2013    with dilitation    HYSTERECTOMY      KIDNEY REMOVAL      PACEMAKER INSERTION         FAMILY HISTORY    Family History   Problem Relation Age of Onset    Diabetes Mother     Heart Disease Father     Cancer Father        SOCIAL HISTORY    Social History   Substance Use Topics    Smoking status: Never Smoker    Smokeless tobacco: Never Used    Alcohol use No       ALLERGIES    Allergies   Allergen Reactions    Flagyl [Metronidazole]      Took with Cipro and it made her vomit    Venlafaxine Nausea Only    Hydrocodone-Acetaminophen Other (See Comments)     Makes patient feel weird, dizzy, nauseous, and sleepy. Patient states she is not allergic to this       MEDICATIONS    Current Outpatient Prescriptions on File Prior to Encounter   Medication Sig Dispense Refill    amiodarone (CORDARONE) 200 MG tablet TAKE ONE TABLET BY MOUTH DAILY 30 tablet 2    oxyCODONE-acetaminophen (PERCOCET) 5-325 MG per tablet Take 1 tablet by mouth every 4 hours as needed for Pain. Loly Glatter amLODIPine (NORVASC) 5 MG tablet Take 1 tablet by mouth daily 90 tablet 2    sodium bicarbonate 650 MG tablet Take 325 mg by mouth 2 times daily      ALPRAZolam (XANAX) 0.25 MG tablet Take 0.25 mg by mouth nightly as needed for Sleep.       insulin glargine (LANTUS) 100 UNIT/ML injection vial Inject 25 Units into the skin nightly (Patient taking differently: Inject 22 Units into the skin nightly ) 1 vial 3    carvedilol (COREG) 6.25 MG tablet Take 6.25 mg by mouth 2 times daily       rosuvastatin (CRESTOR) 10 MG tablet Take 10 mg by mouth every evening       Coenzyme Q10 (CO Q 10) 100 MG CAPS Take 1 capsule by mouth daily       aspirin 81 MG EC tablet Take 1 tablet by mouth daily 30 tablet 3    ferrous sulfate 325 (65 FE) MG tablet Take 1 tablet by mouth on Monday, Wednesday, Friday      fluticasone (FLONASE) 50 MCG/ACT nasal spray 1 spray by Nasal route as needed for Rhinitis      therapeutic multivitamin-minerals (THERAGRAN-M) tablet Take 1 tablet by mouth daily. Centrum silver       vitamin D (CHOLECALCIFEROL) 1000 UNIT TABS tablet Take 1,000 Units by mouth daily        No current facility-administered medications on file prior to encounter. REVIEW OF SYSTEMS    Pertinent items are noted in HPI. Objective    /67   Pulse 65   Temp 97.7 °F (36.5 °C) (Oral)   Resp 18     Wt Readings from Last 3 Encounters:   07/31/18 144 lb 10 oz (65.6 kg)   06/26/18 144 lb (65.3 kg)   05/29/18 140 lb 3.4 oz (63.6 kg)       PHYSICAL EXAM    General Appearance: alert and oriented to person, place and time, well-developed and well-nourished, in no acute distress  Skin: warm and dry, no rash or erythema  Head: normocephalic and atraumatic  Eyes: pupils equal, round, and reactive to light  Pulmonary/Chest: normal air movement, no respiratory distress  Cardiovascular: normal rate and regular rhythm    Ostomy Type: ileal conduit    Stoma Assessment: Stoma red, moist, protruding, oval in shape - 7/8\" x 1 1/8\". Still lightly purple bruised area right edge of stoma.     Peristomal Skin Assessment: Clear, intact with only scattered red, flat lesions from irritation      LABS       CBC:   Lab Results   Component Value Date    WBC 4.3 07/17/2018    HGB 11.3 07/17/2018    HCT 34.0 07/17/2018    MCV 96.4 07/17/2018     07/17/2018     BMP:   Lab Results   Component Value Date     07/17/2018    K 5.1 07/17/2018    K 4.9 05/30/2018

## 2018-09-04 ENCOUNTER — HOSPITAL ENCOUNTER (OUTPATIENT)
Dept: WOUND CARE | Age: 78
Discharge: HOME | End: 2018-09-04
Attending: NURSE PRACTITIONER | Admitting: NURSE PRACTITIONER

## 2018-09-04 VITALS
BODY MASS INDEX: 21.99 KG/M2 | SYSTOLIC BLOOD PRESSURE: 147 MMHG | HEART RATE: 55 BPM | TEMPERATURE: 98.1 F | WEIGHT: 145.06 LBS | RESPIRATION RATE: 20 BRPM | DIASTOLIC BLOOD PRESSURE: 69 MMHG | HEIGHT: 68 IN

## 2018-09-04 DIAGNOSIS — Z71.89 ENCOUNTER FOR OSTOMY CARE EDUCATION: Primary | ICD-10-CM

## 2018-09-04 PROCEDURE — 99212 OFFICE O/P EST SF 10 MIN: CPT | Performed by: NURSE PRACTITIONER

## 2018-09-04 NOTE — PROGRESS NOTES
STATES SHE HAS HAD SEVERAL BOILS WITH STAPH LAST ONE 20 YEARS AGO.  Type 2 diabetes mellitus without complication ([address]     Urinary incontinence        PAST SURGICAL HISTORY    Past Surgical History:   Procedure Laterality Date    ANGIOPLASTY      x3  2009    BLADDER REMOVAL      BLADDER SUSPENSION      CYSTOSCOPY  5/28/2013    with dilitation    HYSTERECTOMY      KIDNEY REMOVAL      PACEMAKER INSERTION         FAMILY HISTORY    Family History   Problem Relation Age of Onset    Diabetes Mother     Heart Disease Father     Cancer Father        SOCIAL HISTORY    Social History   Substance Use Topics    Smoking status: Never Smoker    Smokeless tobacco: Never Used    Alcohol use No       ALLERGIES    Allergies   Allergen Reactions    Flagyl [Metronidazole]      Took with Cipro and it made her vomit    Venlafaxine Nausea Only    Hydrocodone-Acetaminophen Other (See Comments)     Makes patient feel weird, dizzy, nauseous, and sleepy. Patient states she is not allergic to this       MEDICATIONS    Current Outpatient Prescriptions on File Prior to Encounter   Medication Sig Dispense Refill    amiodarone (CORDARONE) 200 MG tablet TAKE ONE TABLET BY MOUTH DAILY 30 tablet 2    oxyCODONE-acetaminophen (PERCOCET) 5-325 MG per tablet Take 1 tablet by mouth every 4 hours as needed for Pain. Conny Reges amLODIPine (NORVASC) 5 MG tablet Take 1 tablet by mouth daily 90 tablet 2    sodium bicarbonate 650 MG tablet Take 325 mg by mouth 2 times daily      ALPRAZolam (XANAX) 0.25 MG tablet Take 0.25 mg by mouth nightly as needed for Sleep.       insulin glargine (LANTUS) 100 UNIT/ML injection vial Inject 25 Units into the skin nightly (Patient taking differently: Inject 22 Units into the skin nightly ) 1 vial 3    carvedilol (COREG) 6.25 MG tablet Take 6.25 mg by mouth 2 times daily       rosuvastatin (CRESTOR) 10 MG tablet Take 10 mg by mouth every evening       Coenzyme Q10 (CO Q 10) 100 MG CAPS Take 1 capsule

## 2018-09-26 PROBLEM — N39.0 UTI (URINARY TRACT INFECTION): Status: RESOLVED | Noted: 2017-04-27 | Resolved: 2018-09-26

## 2018-10-02 ENCOUNTER — NURSE ONLY (OUTPATIENT)
Dept: CARDIOLOGY CLINIC | Age: 78
End: 2018-10-02
Payer: MEDICARE

## 2018-10-02 DIAGNOSIS — I50.22 CHRONIC SYSTOLIC HEART FAILURE (HCC): ICD-10-CM

## 2018-10-02 DIAGNOSIS — Z95.810 AUTOMATIC IMPLANTABLE CARDIOVERTER-DEFIBRILLATOR IN SITU: Primary | ICD-10-CM

## 2018-10-02 DIAGNOSIS — I46.9 CARDIAC ARREST (HCC): ICD-10-CM

## 2018-10-02 PROCEDURE — 93295 DEV INTERROG REMOTE 1/2/MLT: CPT | Performed by: INTERNAL MEDICINE

## 2018-10-02 PROCEDURE — 93296 REM INTERROG EVL PM/IDS: CPT | Performed by: INTERNAL MEDICINE

## 2018-10-02 PROCEDURE — 93297 REM INTERROG DEV EVAL ICPMS: CPT | Performed by: INTERNAL MEDICINE

## 2019-01-03 RX ORDER — AMIODARONE HYDROCHLORIDE 200 MG/1
TABLET ORAL
Qty: 30 TABLET | Refills: 5 | Status: SHIPPED | OUTPATIENT
Start: 2019-01-03 | End: 2019-06-12

## 2019-01-09 ENCOUNTER — TELEPHONE (OUTPATIENT)
Dept: CARDIOLOGY CLINIC | Age: 79
End: 2019-01-09

## 2019-01-15 ENCOUNTER — NURSE ONLY (OUTPATIENT)
Dept: CARDIOLOGY CLINIC | Age: 79
End: 2019-01-15
Payer: MEDICARE

## 2019-01-15 DIAGNOSIS — I50.23 ACUTE ON CHRONIC SYSTOLIC HEART FAILURE (HCC): ICD-10-CM

## 2019-01-15 DIAGNOSIS — I25.5 ISCHEMIC CARDIOMYOPATHY: ICD-10-CM

## 2019-01-15 DIAGNOSIS — Z95.810 AUTOMATIC IMPLANTABLE CARDIOVERTER-DEFIBRILLATOR IN SITU: ICD-10-CM

## 2019-01-15 PROCEDURE — 93295 DEV INTERROG REMOTE 1/2/MLT: CPT | Performed by: INTERNAL MEDICINE

## 2019-01-15 PROCEDURE — 93296 REM INTERROG EVL PM/IDS: CPT | Performed by: INTERNAL MEDICINE

## 2019-01-15 PROCEDURE — 93297 REM INTERROG DEV EVAL ICPMS: CPT | Performed by: INTERNAL MEDICINE

## 2019-02-20 RX ORDER — AMLODIPINE BESYLATE 5 MG/1
5 TABLET ORAL DAILY
Qty: 90 TABLET | Refills: 3 | Status: ON HOLD | OUTPATIENT
Start: 2019-02-20 | End: 2019-07-09 | Stop reason: SDUPTHER

## 2019-03-12 ENCOUNTER — OFFICE VISIT (OUTPATIENT)
Dept: CARDIOLOGY CLINIC | Age: 79
End: 2019-03-12
Payer: MEDICARE

## 2019-03-12 VITALS
WEIGHT: 145 LBS | OXYGEN SATURATION: 97 % | DIASTOLIC BLOOD PRESSURE: 62 MMHG | HEART RATE: 56 BPM | SYSTOLIC BLOOD PRESSURE: 136 MMHG | HEIGHT: 68 IN | BODY MASS INDEX: 21.98 KG/M2

## 2019-03-12 DIAGNOSIS — I25.10 CORONARY ARTERY DISEASE INVOLVING NATIVE CORONARY ARTERY OF NATIVE HEART WITHOUT ANGINA PECTORIS: ICD-10-CM

## 2019-03-12 DIAGNOSIS — I10 ESSENTIAL HYPERTENSION: Primary | ICD-10-CM

## 2019-03-12 DIAGNOSIS — R42 DIZZINESS: ICD-10-CM

## 2019-03-12 DIAGNOSIS — I65.29 STENOSIS OF CAROTID ARTERY, UNSPECIFIED LATERALITY: ICD-10-CM

## 2019-03-12 DIAGNOSIS — R42 LIGHTHEADED: ICD-10-CM

## 2019-03-12 DIAGNOSIS — E78.2 MIXED HYPERLIPIDEMIA: ICD-10-CM

## 2019-03-12 DIAGNOSIS — I50.22 CHRONIC SYSTOLIC CONGESTIVE HEART FAILURE (HCC): ICD-10-CM

## 2019-03-12 DIAGNOSIS — M25.552 PAIN OF LEFT HIP JOINT: ICD-10-CM

## 2019-03-12 PROCEDURE — 93000 ELECTROCARDIOGRAM COMPLETE: CPT | Performed by: INTERNAL MEDICINE

## 2019-03-12 PROCEDURE — 99214 OFFICE O/P EST MOD 30 MIN: CPT | Performed by: INTERNAL MEDICINE

## 2019-03-25 ENCOUNTER — HOSPITAL ENCOUNTER (OUTPATIENT)
Dept: NON INVASIVE DIAGNOSTICS | Age: 79
Discharge: HOME | End: 2019-03-25
Payer: MEDICARE

## 2019-03-25 DIAGNOSIS — I50.22 CHRONIC SYSTOLIC CONGESTIVE HEART FAILURE (HCC): ICD-10-CM

## 2019-03-25 DIAGNOSIS — R42 LIGHTHEADED: ICD-10-CM

## 2019-03-25 DIAGNOSIS — R42 DIZZINESS: ICD-10-CM

## 2019-03-25 LAB
LV EF: 53 %
LVEF MODALITY: NORMAL

## 2019-03-25 PROCEDURE — 93306 TTE W/DOPPLER COMPLETE: CPT

## 2019-03-27 ENCOUNTER — HOSPITAL ENCOUNTER (OUTPATIENT)
Dept: CT IMAGING | Age: 79
Discharge: HOME | End: 2019-03-27
Payer: MEDICARE

## 2019-03-27 DIAGNOSIS — C66.1 MALIGNANT NEOPLASM OF RIGHT URETER (HCC): ICD-10-CM

## 2019-03-27 PROCEDURE — 74176 CT ABD & PELVIS W/O CONTRAST: CPT

## 2019-04-10 ENCOUNTER — OFFICE VISIT (OUTPATIENT)
Dept: ORTHOPEDIC SURGERY | Age: 79
End: 2019-04-10
Payer: MEDICARE

## 2019-04-10 VITALS
WEIGHT: 145 LBS | SYSTOLIC BLOOD PRESSURE: 145 MMHG | HEART RATE: 55 BPM | DIASTOLIC BLOOD PRESSURE: 59 MMHG | BODY MASS INDEX: 21.98 KG/M2 | TEMPERATURE: 98.5 F | HEIGHT: 68 IN

## 2019-04-10 DIAGNOSIS — M16.12 ARTHRITIS OF LEFT HIP: Primary | ICD-10-CM

## 2019-04-10 PROCEDURE — 99203 OFFICE O/P NEW LOW 30 MIN: CPT | Performed by: PHYSICIAN ASSISTANT

## 2019-04-11 ENCOUNTER — TELEPHONE (OUTPATIENT)
Dept: CARDIOLOGY CLINIC | Age: 79
End: 2019-04-11

## 2019-04-11 PROBLEM — M16.12 ARTHRITIS OF LEFT HIP: Status: ACTIVE | Noted: 2019-04-11

## 2019-04-11 NOTE — TELEPHONE ENCOUNTER
Patient would like to know if it is ok for her to have a hip replacement. Pt does not know who will do the surgery or when, this is a future thought but wanted Dr. Courtney Reed opinion. You can reach the pt at [phone].

## 2019-04-11 NOTE — PROGRESS NOTES
nourished, developed and in no acute distress. BP (!) 145/59   Pulse 55   Temp 98.5 °F (36.9 °C) (Temporal)   Ht 5' 8\" (1.727 m)   Wt 145 lb (65.8 kg)   BMI 22.05 kg/m²      HIP EXAM:  Examination of the left hip shows: There is no deformity. There is not erythema. There is marked pain with internal and external rotation. There is marked pain with flexion and extension. There is marked pain with active SLR. ROM diminished range of motion. Trochanteric region is not tender to palpation. Sacral Iliac is not tender to palpation. There is marked pain with weight bearing. HIP EXAM:  Examination of the right hip shows: There is no deformity. There is not erythema. There is no pain with internal and external rotation. There is no pain with flexion and extension. There is no pain with active SLR. ROM full range of motion. Trochanteric region is not tender to palpation. Sacral Iliac is not tender to palpation. There is no pain with weight bearing. NEUROLOGICAL EXAM:  Examination of the lower extremities are intact with sensation to light touch. Motor testing 4+ o 5/5 in all major motor groups including hip abductors, hip adductors,  quadriceps, hamstring, dorsi flexors and EHL testing. Gait is antalgic. Negative Muniz's Sign. SLR negative. VASCULAR EXAM:  Examination of the lower extremities shows intact perfusion to all extremities. No cyanosis. Digits are warm to touch, capillary refill is less than 2 seconds. mild edema noted. SKIN:  Examination of the lower extremity skin reveals: The skin to be intact without lacerations or abrasions. No significant erythema. No rashes or skin lesions. X Rays: performed in the office today:   AP Pelvis, AP and Frog Leg Lateral  Left Hip:    The alignment is anatomic. There are radiographic findings to suggest severe degenerative changes of the left hip grade 4 osteoarthritic changes noted.  There are no radiographic findings to suggest fracture or dislocation. Assessment:       ICD-10-CM    1. Arthritis of left hip M16.12 XR HIP 2-3 VW W PELVIS LEFT        Plan:     Is an 33-year-old female who has severe osteoarthritis of the left hip. She has significant disability surrounding her left hip arthroplasty. The natural history of the patient's diagnosis as well as the treatment options were discussed in full and questions were answered. Risks and benefits of the treatment options also reviewed in detail. Possible Weight loss, activity modification, home exercise therapy program, dietary changes have been discussed as a means to help control the symptoms. Step approach discussed for treatment of hip arthritis including the use of cortisone injections as well as surgical procedures such as arthroplasty. She is interested in a surgical option at this time as the left hip arthritis is affecting her ADLs. She is going to discuss surgical option, left total hip arthroplasty with her cardiologist, Dr. Lianna Watkins as well as her primary care physician. Do to the severity of the arthritis, failed conservative treatments including Tylenol and difficulties with ADL's, joint arthoplasty is now indicated. I did spend 30 minutes in the office today with greater than 50% of this time reviewing diagnostic tests,  discussion concerning their diagnosis and treatment options both surgical and non surgical. The standard recovery time, need for probable physical therapy post op, risks and benefits of the proposed surgical procedure- left total hip arthroplasty. Risks of the proposed procedure were discussed including but not limited to , infection, decreased range of motion, continued pain, instability, fracture, dislocation, neurovascular injury, postop cognitive disorder, leg leg discrepancy, DVT, pulmonary embolism and need for further surgical procedures.     If she is deemed a nonsurgical candidate then we'll consider left hip intra-articular injection. Call or return to clinic prn if these symptoms worsen or fail to improve as anticipated.

## 2019-04-11 NOTE — TELEPHONE ENCOUNTER
Called patient and she said that she saw Orthopedist (She did not remember name) yesterday. Patient knows to discuss with them and request they send a form for cardiac clearance when appropriate. Last OV on 3/12/19. Assessment/Plan:   CAD  S. Continue Amiodarone,BB,statin, and Asa.      Chronic Systolic Heart Failure  EF 20-25% post arrest improved to 40-45% on LHC. Continue BB. She is currently not on ACE inhibitor/ARB therapy due to chronic kidney disease and hyperkalemia Will repeat echocardiogram prior to next visit. Consider discontinuing her Norvasc if her (ejection fraction is less than 40%     Hypertension  BP is stable today. Will continue current medications. Will check BMP soon.      Carotid stenosis  Last carotid doppler 6/29/18 is unchanged from previous 2016.      Hyperlipidemia  Last lipid profile 8/2017 was WNL. Will continue Crestor 10 mg daily. Will recheck lipid panel.      ICD  Implanted 4/1/16. She does report that she does get lightheaded and dizzy at times. She is complaining of hair loss. This could be due to Amiodarone therapy. Her last TSH 8/7/18 was WN L. Last interrogation on 1/15/19 showed normal functioning device. Thoracic impedance trend stable. I will repeat TSH and also obtain pulmonary function tests since she is on chronic amiodarone therapy      Hip pain  She is complaining of left hip pain today. I have suggested she see Dr Anjum Martin.      Chronic kidney disease  - she is being followed by nephrology on a regular basis.  Will have a renal profile with the next blood draw     Follow up in 3 months.

## 2019-04-16 ENCOUNTER — NURSE ONLY (OUTPATIENT)
Dept: CARDIOLOGY CLINIC | Age: 79
End: 2019-04-16
Payer: MEDICARE

## 2019-04-16 DIAGNOSIS — I25.5 ISCHEMIC CARDIOMYOPATHY: ICD-10-CM

## 2019-04-16 DIAGNOSIS — Z95.810 AUTOMATIC IMPLANTABLE CARDIOVERTER-DEFIBRILLATOR IN SITU: Primary | ICD-10-CM

## 2019-04-16 DIAGNOSIS — I46.9 CARDIAC ARREST (HCC): ICD-10-CM

## 2019-04-16 DIAGNOSIS — I50.23 ACUTE ON CHRONIC SYSTOLIC HEART FAILURE (HCC): ICD-10-CM

## 2019-04-16 PROCEDURE — 93295 DEV INTERROG REMOTE 1/2/MLT: CPT | Performed by: INTERNAL MEDICINE

## 2019-04-16 PROCEDURE — 93297 REM INTERROG DEV EVAL ICPMS: CPT | Performed by: INTERNAL MEDICINE

## 2019-04-16 PROCEDURE — 93296 REM INTERROG EVL PM/IDS: CPT | Performed by: INTERNAL MEDICINE

## 2019-04-16 NOTE — LETTER
[address] [facility] [phone]  [address] [facility] [phone]    Pacemaker/Defibrillator Clinic          04/19/19        Princess Mckeon  [address] [place] [id number]        Dear Princess Mckeon    This letter is to inform you that we received the transmission from your monitor at home that checks your pacemaker and/or defibrillator, or implanted heart monitor. The next date your monitor will automatically transmit will be 9/17/19. Your device and monitor are wireless and most transmit cellularly, but please periodically check your monitor is still plugged in to the electrical outlet. If you still use the telephone land line to send please ensure the connection to the phone rebeca is secure. This will help to ensure successful automatic transmissions in the future. Also, the monitor needs to be close to you while sleeping at night. Please be aware that the remote device transmission sites are periodically monitored only during regular business hours during which simultaneous in-office device clinics are being run. If your transmission requires attention, we will contact you as soon as possible. Thank you.             Annie [address]

## 2019-04-19 ENCOUNTER — TELEPHONE (OUTPATIENT)
Dept: CARDIOLOGY CLINIC | Age: 79
End: 2019-04-19

## 2019-04-19 NOTE — PROGRESS NOTES
Remote/Carelink interrogation shows normal device function. No new episodes/arrhythmias recorded. Thoracic impedance indicates a slight increase in fluids.      - msg sent to [facility] MA/RN pool    Dr. Kyara Durand to review interrogation. See interrogation/Paceart report for further details.

## 2019-04-19 NOTE — TELEPHONE ENCOUNTER
Remote/Carelink interrogation shows normal device function. Thoracic impedance indicates a possible increase in fluids since mid-March. Please contact pt for further evaluation. Thank you.

## 2019-05-02 NOTE — TELEPHONE ENCOUNTER
Patient called in this afternoon stating she is having swelling in her feet and she wanted to let the office know. You can reach this patient at [phone].

## 2019-05-02 NOTE — TELEPHONE ENCOUNTER
Called patient. Swelling in feet only. No weight. No SOB. No energy, which is nothing new. No CP or palpitations.   in 2019. Patient has been sitting with feet down every day for at least the last week in garage going thru all of his stuff. She states he worked on [employer] and there is a lot in there. Her son has been helping her. I requested that she keep her feet elevated when sitting down. Pace herself and not to be in the garage if it is either too hot, cold or humid. Also to watch her sodium intake. Patient verbalized and confirmed understanding.

## 2019-06-07 DIAGNOSIS — E78.2 MIXED HYPERLIPIDEMIA: ICD-10-CM

## 2019-06-07 DIAGNOSIS — I10 ESSENTIAL HYPERTENSION: ICD-10-CM

## 2019-06-07 LAB
ANION GAP SERPL CALCULATED.3IONS-SCNC: 15 MMOL/L (ref 3–16)
BUN BLDV-MCNC: 29 MG/DL (ref 7–20)
CALCIUM SERPL-MCNC: 9.8 MG/DL (ref 8.3–10.6)
CHLORIDE BLD-SCNC: 108 MMOL/L (ref 99–110)
CHOLESTEROL, FASTING: 137 MG/DL (ref 0–199)
CO2: 18 MMOL/L (ref 21–32)
CREAT SERPL-MCNC: 1.6 MG/DL (ref 0.6–1.2)
GFR AFRICAN AMERICAN: 37
GFR NON-AFRICAN AMERICAN: 31
GLUCOSE BLD-MCNC: 126 MG/DL (ref 70–99)
HDLC SERPL-MCNC: 55 MG/DL (ref 40–60)
LDL CHOLESTEROL CALCULATED: 59 MG/DL
POTASSIUM SERPL-SCNC: 4.6 MMOL/L (ref 3.5–5.1)
SODIUM BLD-SCNC: 141 MMOL/L (ref 136–145)
TRIGLYCERIDE, FASTING: 117 MG/DL (ref 0–150)
VLDLC SERPL CALC-MCNC: 23 MG/DL

## 2019-06-10 NOTE — PROGRESS NOTES
[facility]      Cardiology Consult    Nga   1940    Diane 10, 2019        CC: \"I still have tingling all up my legs. \"     HPI:  The patient is 78 y.o. female with a past medical history significant for CAD, cardiomyopathy, prior PCI to LAD and RCA. She was hospitalized 3/26/16-4/5/16 after suffering Vfib cardiac arrest. Bystanders initiated CPR and when the EMS arrived she was in Vfib and subsequently shocked multiple times. Echo revealed EF 25%. [facility] showed patient stents. Underwent hypothermia protocol and has had a significant neurological recovery. An ICD was placed on 4/1/16 for secondary prevention. Today, he is here for routine follow up for his CAD. She states that she has been having tingling in her feet when she stands up. She does report that she has been losing her hair lately. Patient denies exertional chest pain/pressure, dyspnea at rest, BENTLEY, PND, orthopnea, palpitations, lightheadedness, weight changes, changes in LE edema, and syncope. Past Medical History:   Diagnosis Date    Anxiety     Atrial fibrillation ([facility] [address]     CAD (coronary artery disease)     Cancer (HCC)     bladder right kidney     Cardiac arrest ([facility] [address] 3/27/2016    Carotid artery stenosis     CHF (congestive heart failure) (HCC)     Chronic kidney disease     cancer of kidney and bladder    Depression     Diabetes mellitus (HCC)     IDDM    GERD (gastroesophageal reflux disease)     Hip pain, chronic     HYPERCHOLESTERAEMIA     Hypertension     Irritable bowel syndrome     Laceration of head 3/25/2016    fall with cardiac arrest    MI, old     Neuropathy     Osteoarthritis     Presence of combination internal cardiac defibrillator (ICD) and pacemaker     Staph infection     PT. STATES SHE HAS HAD SEVERAL BOILS WITH STAPH LAST ONE 20 YEARS AGO.     Type 2 diabetes mellitus without complication (HCC)     Urinary incontinence      Past Surgical History:   Procedure Laterality Date    ANGIOPLASTY      x3  2009    BLADDER REMOVAL      BLADDER SUSPENSION      CYSTOSCOPY  5/28/2013    with dilitation    HYSTERECTOMY      KIDNEY REMOVAL      PACEMAKER INSERTION       Family History   Problem Relation Age of Onset    Diabetes Mother     Heart Disease Father     Cancer Father      Social History     Tobacco Use    Smoking status: Never Smoker    Smokeless tobacco: Never Used   Substance Use Topics    Alcohol use: No    Drug use: No       Allergies   Allergen Reactions    Flagyl [Metronidazole]      Took with Cipro and it made her vomit    Venlafaxine Nausea Only    Hydrocodone-Acetaminophen Other (See Comments)     Makes patient feel weird, dizzy, nauseous, and sleepy. Patient states she is not allergic to this     Current Outpatient Medications   Medication Sig Dispense Refill    amLODIPine (NORVASC) 5 MG tablet Take 1 tablet by mouth daily 90 tablet 3    amiodarone (CORDARONE) 200 MG tablet TAKE ONE TABLET BY MOUTH DAILY 30 tablet 5    oxyCODONE-acetaminophen (PERCOCET) 5-325 MG per tablet Take 1 tablet by mouth every 4 hours as needed for Pain. Sharyon Labrador sodium bicarbonate 650 MG tablet Take 325 mg by mouth 2 times daily      ALPRAZolam (XANAX) 0.25 MG tablet Take 0.125 mg by mouth nightly as needed for Sleep.        insulin glargine (LANTUS) 100 UNIT/ML injection vial Inject 25 Units into the skin nightly (Patient taking differently: Inject 20 Units into the skin nightly ) 1 vial 3    carvedilol (COREG) 6.25 MG tablet Take 6.25 mg by mouth 2 times daily       rosuvastatin (CRESTOR) 10 MG tablet Take 10 mg by mouth every evening       Coenzyme Q10 (CO Q 10) 100 MG CAPS Take 1 capsule by mouth daily       aspirin 81 MG EC tablet Take 1 tablet by mouth daily 30 tablet 3    ferrous sulfate 325 (65 FE) MG tablet Take 1 tablet by mouth on Monday, Wednesday, Friday      fluticasone (FLONASE) 50 MCG/ACT nasal spray 1 spray by Nasal route as needed for Rhinitis      therapeutic multivitamin-minerals (THERAGRAN-M) tablet Take 1 tablet by mouth daily. Centrum silver       vitamin D (CHOLECALCIFEROL) 1000 UNIT TABS tablet Take 1,000 Units by mouth daily        No current facility-administered medications for this visit. Review of Systems:  · Constitutional: no unanticipated weight loss. There's been no change in energy level, sleep pattern, or activity level. No fevers, chills. · Eyes: No visual changes or diplopia. No scleral icterus. · ENT: No Headaches, hearing loss or vertigo. No mouth sores or sore throat. · Cardiovascular: as reviewed in HPI  · Respiratory: No cough or wheezing, no sputum production. No hematemesis. · Gastrointestinal: No abdominal pain, appetite loss, blood in stools. No change in bowel or bladder habits. · Genitourinary: No dysuria, trouble voiding, or hematuria. · Musculoskeletal:  No gait disturbance, no joint complaints. · Integumentary: No rash or pruritis. · Neurological: No headache, diplopia, change in muscle strength, numbness or tingling. · Psychiatric: No anxiety or depression. · Endocrine: No temperature intolerance. No excessive thirst, fluid intake, or urination. No tremor. · Hematologic/Lymphatic: No abnormal bruising or bleeding, blood clots or swollen lymph nodes. · Allergic/Immunologic: No nasal congestion or hives. Physical Exam:   There were no vitals taken for this visit. Wt Readings from Last 3 Encounters:   04/10/19 145 lb (65.8 kg)   03/12/19 145 lb (65.8 kg)   09/04/18 145 lb 1 oz (65.8 kg)     Constitutional: She is oriented to person, place, and time. She appears well-developed and well-nourished. In no acute distress. Head: Normocephalic and atraumatic. Pupils equal and round. Neck: Neck supple. No JVP or carotid bruit appreciated. No mass and no thyromegaly present. No lymphadenopathy present. Cardiovascular: Normal rate. Normal heart sounds. Exam reveals no gallop and no friction rub.  No murmur heard.  Pulmonary/Chest: Effort normal and breath sounds normal. No respiratory distress. She has no wheezes, rhonchi or rales. Abdominal: Soft, non-tender. Bowel sounds are normal. She exhibits no organomegaly, mass or bruit. Extremities: No edema, cyanosis, or clubbing. Pulses are 2+ radial/dorsalis pedis/posterior tibial/carotid bilaterally. Neurological: No gross cranial nerve deficit. Coordination normal.   Skin: Skin is warm and dry. There is no rash or diaphoresis. Psychiatric: She has a normal mood and affect. Her speech is normal and behavior is normal.     Lab Review:   FLP:    Lab Results   Component Value Date    TRIG 220 01/29/2012    HDL 55 06/07/2019    HDL 45 01/29/2012    LDLCALC 59 06/07/2019    LDLDIRECT 148 07/08/2010    LABVLDL 23 06/07/2019     BUN/Creatinine:    Lab Results   Component Value Date    BUN 29 06/07/2019    CREATININE 1.6 06/07/2019       EKG Interpretation: 3/12/19 Sinus  Bradycardia Left bundle branch block    Image Review:   ECHO 3/26/16  Normal left ventricular size and wall thickness. Severely decreased left  ventricular systolic function with an estimated ejection fraction of 20-25%  Global hypokinesis. Mild MR/TR    Cardiac Cath 3/26/16  CORONARY ANATOMY:  1. The left main is free of disease. 2.  The LAD has a long stent in the mid segment which is widely patent. The  rest of the vessels are free of disease. 3.  The left circumflex artery has 4 to 5 obtuse marginal branches which are  free of disease. 4.  The right coronary artery is the dominant vessel and osteal stent which  is widely patent. The PD and the posterior lateral branches are also free of  disease. CONCLUSION:    1. No obstructive coronary artery disease. 2.  Previously stented sites are widely patent in the mid LAD and osteal RCA. 3.  Improved LV function with EF of about 40% to 45% with posterior basal  hypokinesis. 4.  Elevated left heart pressures.     Carotid Doppler 6/29/18  The right internal carotid artery appears to have a <50% diameter reducing    stenosis based on velocity criteria.    The right vertebral artery demonstrates normal antegrade flow.    The left internal carotid artery appears to have a 50-79% diameter reducing    stenosis based on velocity criteria.    The left vertebral artery demonstrates normal antegrade flow. Suboptimal image quality.   Left ventricular cavity size is normal.   There is mild concentric left ventricular hypertrophy.   Ejection fraction is visually estimated to be 50-55%.   Indeterminate diastolic function.   Mitral valve leaflets appear mildly thickened.   Mild mitral regurgitation.   The left atrium is mildly dilated.   Trivial aortic regurgitation is present.   Mild tricuspid regurgitation.   Pacemaker / ICD lead is visualized in the right atrium.   The right atrium is normal in size.     Assessment/Plan:     CAD  She denies any symptoms of angina today. Continue BB,statin, and Asa. Chronic Systolic Heart Failure  EF 20-25% post arrest improved to 40-45% on [facility] repeat echocardiogram 3/19 showed normal LVEF at 50-55%. Continue BB. She is currently not on ACE inhibitor/ARB therapy due to chronic kidney disease and hyperkalemia     Hypertension  BP is stable today. Will continue current medications. Will check BMP soon. Carotid stenosis  Last carotid doppler 6/29/18 is unchanged from previous 2016. Hyperlipidemia  Last lipid profile 6/2019 was WNL. Will continue Crestor 10 mg daily. ICD  Implanted 4/1/16. She is complaining of hair loss. This could be due to Amiodarone therapy. I will decrease Amiodarone to 100 mg daily. . She had device checked today. Her last TSH 8/7/18 was WN L. Thoracic impedance trend stable. Hip pain  She is complaining of left hip pain today. She is okay to proceed with a hip surgery if needed. Chronic kidney disease  She is being followed by nephrology on a regular basis. Follow up in 3 months. Thank you very much for allowing me to participate in the care of your patient. Please do not hesitate to contact me if you have any questions. This note was scribed in the presence of Dr Josselyn Marques, by Saintclair Pollock RN  Physician Attestation:  The scribes documentation has been prepared under my direction and personally reviewed by me in its entirety. I confirm that the note above accurately reflects all work, treatment, procedures, and medical decision making performed by me.     Sincerely,  Yaya Wilson MD      [facility] [address]  Ph: [phone]  Fax: [phone]

## 2019-06-12 ENCOUNTER — OFFICE VISIT (OUTPATIENT)
Dept: CARDIOLOGY CLINIC | Age: 79
End: 2019-06-12
Payer: MEDICARE

## 2019-06-12 ENCOUNTER — NURSE ONLY (OUTPATIENT)
Dept: CARDIOLOGY CLINIC | Age: 79
End: 2019-06-12
Payer: MEDICARE

## 2019-06-12 VITALS
BODY MASS INDEX: 22.28 KG/M2 | HEIGHT: 68 IN | WEIGHT: 147 LBS | DIASTOLIC BLOOD PRESSURE: 68 MMHG | HEART RATE: 62 BPM | OXYGEN SATURATION: 98 % | SYSTOLIC BLOOD PRESSURE: 134 MMHG

## 2019-06-12 DIAGNOSIS — I50.22 CHRONIC SYSTOLIC CONGESTIVE HEART FAILURE (HCC): ICD-10-CM

## 2019-06-12 DIAGNOSIS — I25.10 CORONARY ARTERY DISEASE INVOLVING NATIVE CORONARY ARTERY OF NATIVE HEART WITHOUT ANGINA PECTORIS: Primary | ICD-10-CM

## 2019-06-12 DIAGNOSIS — E78.2 MIXED HYPERLIPIDEMIA: ICD-10-CM

## 2019-06-12 DIAGNOSIS — N18.9 CHRONIC KIDNEY DISEASE, UNSPECIFIED CKD STAGE: ICD-10-CM

## 2019-06-12 DIAGNOSIS — I50.23 ACUTE ON CHRONIC SYSTOLIC HEART FAILURE (HCC): ICD-10-CM

## 2019-06-12 DIAGNOSIS — Z95.810 AUTOMATIC IMPLANTABLE CARDIOVERTER-DEFIBRILLATOR IN SITU: ICD-10-CM

## 2019-06-12 DIAGNOSIS — I25.5 ISCHEMIC CARDIOMYOPATHY: ICD-10-CM

## 2019-06-12 DIAGNOSIS — I65.29 STENOSIS OF CAROTID ARTERY, UNSPECIFIED LATERALITY: ICD-10-CM

## 2019-06-12 DIAGNOSIS — I10 ESSENTIAL HYPERTENSION: ICD-10-CM

## 2019-06-12 PROCEDURE — 93282 PRGRMG EVAL IMPLANTABLE DFB: CPT | Performed by: INTERNAL MEDICINE

## 2019-06-12 PROCEDURE — 99214 OFFICE O/P EST MOD 30 MIN: CPT | Performed by: INTERNAL MEDICINE

## 2019-06-12 PROCEDURE — 93290 INTERROG DEV EVAL ICPMS IP: CPT | Performed by: INTERNAL MEDICINE

## 2019-06-12 RX ORDER — AMIODARONE HYDROCHLORIDE 200 MG/1
100 TABLET ORAL DAILY
Qty: 30 TABLET | Refills: 5 | Status: SHIPPED
Start: 2019-06-12 | End: 2020-03-11 | Stop reason: ALTCHOICE

## 2019-06-12 NOTE — PROGRESS NOTES
Patient comes in for programming evaluation for her ICD. The device is functioning within normal parameters. No changes need to be made at this time. No new episodes/arrhythmias recorded. Thoracic impedance is stable at this time. Dr. Evonnie Siemens to review interrogation. See interrogation/Paceart report for further details. Patient is to see Dr. Harley Venegas in office today also. Patient will follow up in 3 months in office or remotely.

## 2019-06-12 NOTE — PATIENT INSTRUCTIONS
Patient Education        Heart-Healthy Diet: Care Instructions  Your Care Instructions    A heart-healthy diet has lots of vegetables, fruits, nuts, beans, and whole grains, and is low in salt. It limits foods that are high in saturated fat, such as meats, cheeses, and fried foods. It may be hard to change your diet, but even small changes can lower your risk of heart attack and heart disease. Follow-up care is a key part of your treatment and safety. Be sure to make and go to all appointments, and call your doctor if you are having problems. It's also a good idea to know your test results and keep a list of the medicines you take. How can you care for yourself at home? Watch your portions  · Learn what a serving is. A \"serving\" and a \"portion\" are not always the same thing. Make sure that you are not eating larger portions than are recommended. For example, a serving of pasta is ½ cup. A serving size of meat is 2 to 3 ounces. A 3-ounce serving is about the size of a deck of cards. Measure serving sizes until you are good at [place]" them. Keep in mind that restaurants often serve portions that are 2 or 3 times the size of one serving. · To keep your energy level up and keep you from feeling hungry, eat often but in smaller portions. · Eat only the number of calories you need to stay at a healthy weight. If you need to lose weight, eat fewer calories than your body burns (through exercise and other physical activity). Eat more fruits and vegetables  · Eat a variety of fruit and vegetables every day. Dark green, deep orange, red, or yellow fruits and vegetables are especially good for you. Examples include spinach, carrots, peaches, and berries. · Keep carrots, celery, and other veggies handy for snacks. Buy fruit that is in season and store it where you can see it so that you will be tempted to eat it. · Cook dishes that have a lot of veggies in them, such as stir-fries and soups.   Limit saturated and [employer]board, and be sure to contact your doctor if:    · You would like help planning heart-healthy meals. Where can you learn more? Go to [email or web address] org and sign in to your [employer] account. Enter V137 in the [employer] box to learn more about \"Heart-Healthy Diet: Care Instructions. \"     If you do not have an account, please click on the \"Sign Up Now\" link. Current as of: July 22, 2018  Content Version: 12.0  © 0639-0755 Healthwise, Incorporated. Care instructions adapted under license by [facility] ([facility]). If you have questions about a medical condition or this instruction, always ask your healthcare professional. [address] any warranty or liability for your use of this information.

## 2019-07-08 ENCOUNTER — APPOINTMENT (OUTPATIENT)
Dept: CT IMAGING | Age: 79
End: 2019-07-08
Payer: MEDICARE

## 2019-07-08 ENCOUNTER — HOSPITAL ENCOUNTER (OUTPATIENT)
Age: 79
Setting detail: OBSERVATION
Discharge: HOME | End: 2019-07-09
Attending: EMERGENCY MEDICINE | Admitting: HOSPITALIST
Payer: MEDICARE

## 2019-07-08 ENCOUNTER — APPOINTMENT (OUTPATIENT)
Dept: GENERAL RADIOLOGY | Age: 79
End: 2019-07-08
Payer: MEDICARE

## 2019-07-08 DIAGNOSIS — R55 SYNCOPE AND COLLAPSE: Primary | ICD-10-CM

## 2019-07-08 LAB
A/G RATIO: 1.4 (ref 1.1–2.2)
ALBUMIN SERPL-MCNC: 4 G/DL (ref 3.4–5)
ALP BLD-CCNC: 50 U/L (ref 40–129)
ALT SERPL-CCNC: 30 U/L (ref 10–40)
ANION GAP SERPL CALCULATED.3IONS-SCNC: 12 MMOL/L (ref 3–16)
AST SERPL-CCNC: 24 U/L (ref 15–37)
BASOPHILS ABSOLUTE: 0 K/UL (ref 0–0.2)
BASOPHILS RELATIVE PERCENT: 0.3 %
BILIRUB SERPL-MCNC: 0.4 MG/DL (ref 0–1)
BUN BLDV-MCNC: 28 MG/DL (ref 7–20)
CALCIUM SERPL-MCNC: 9.3 MG/DL (ref 8.3–10.6)
CHLORIDE BLD-SCNC: 109 MMOL/L (ref 99–110)
CO2: 18 MMOL/L (ref 21–32)
CREAT SERPL-MCNC: 1.5 MG/DL (ref 0.6–1.2)
EKG ATRIAL RATE: 68 BPM
EKG DIAGNOSIS: NORMAL
EKG P AXIS: 67 DEGREES
EKG P-R INTERVAL: 168 MS
EKG Q-T INTERVAL: 440 MS
EKG QRS DURATION: 130 MS
EKG QTC CALCULATION (BAZETT): 467 MS
EKG R AXIS: 76 DEGREES
EKG T AXIS: 70 DEGREES
EKG VENTRICULAR RATE: 68 BPM
EOSINOPHILS ABSOLUTE: 0 K/UL (ref 0–0.6)
EOSINOPHILS RELATIVE PERCENT: 0.9 %
ESTIMATED AVERAGE GLUCOSE: 151.3 MG/DL
GFR AFRICAN AMERICAN: 40
GFR NON-AFRICAN AMERICAN: 33
GLOBULIN: 2.9 G/DL
GLUCOSE BLD-MCNC: 118 MG/DL (ref 70–99)
GLUCOSE BLD-MCNC: 129 MG/DL (ref 70–99)
GLUCOSE BLD-MCNC: 170 MG/DL (ref 70–99)
GLUCOSE BLD-MCNC: 89 MG/DL (ref 70–99)
HBA1C MFR BLD: 6.9 %
HCT VFR BLD CALC: 37.5 % (ref 36–48)
HEMOGLOBIN: 12.4 G/DL (ref 12–16)
LYMPHOCYTES ABSOLUTE: 0.4 K/UL (ref 1–5.1)
LYMPHOCYTES RELATIVE PERCENT: 6.6 %
MCH RBC QN AUTO: 32.7 PG (ref 26–34)
MCHC RBC AUTO-ENTMCNC: 33.2 G/DL (ref 31–36)
MCV RBC AUTO: 98.7 FL (ref 80–100)
MONOCYTES ABSOLUTE: 0.4 K/UL (ref 0–1.3)
MONOCYTES RELATIVE PERCENT: 7.8 %
NEUTROPHILS ABSOLUTE: 4.6 K/UL (ref 1.7–7.7)
NEUTROPHILS RELATIVE PERCENT: 84.4 %
PDW BLD-RTO: 13.4 % (ref 12.4–15.4)
PERFORMED ON: ABNORMAL
PERFORMED ON: ABNORMAL
PERFORMED ON: NORMAL
PLATELET # BLD: 179 K/UL (ref 135–450)
PMV BLD AUTO: 7.3 FL (ref 5–10.5)
POTASSIUM SERPL-SCNC: 4.3 MMOL/L (ref 3.5–5.1)
RBC # BLD: 3.8 M/UL (ref 4–5.2)
SODIUM BLD-SCNC: 139 MMOL/L (ref 136–145)
TOTAL PROTEIN: 6.9 G/DL (ref 6.4–8.2)
TROPONIN: <0.01 NG/ML
TROPONIN: <0.01 NG/ML
WBC # BLD: 5.5 K/UL (ref 4–11)

## 2019-07-08 PROCEDURE — 71045 X-RAY EXAM CHEST 1 VIEW: CPT

## 2019-07-08 PROCEDURE — 6370000000 HC RX 637 (ALT 250 FOR IP): Performed by: HOSPITALIST

## 2019-07-08 PROCEDURE — 84484 ASSAY OF TROPONIN QUANT: CPT

## 2019-07-08 PROCEDURE — 80053 COMPREHEN METABOLIC PANEL: CPT

## 2019-07-08 PROCEDURE — 73502 X-RAY EXAM HIP UNI 2-3 VIEWS: CPT

## 2019-07-08 PROCEDURE — 83036 HEMOGLOBIN GLYCOSYLATED A1C: CPT

## 2019-07-08 PROCEDURE — 2580000003 HC RX 258: Performed by: HOSPITALIST

## 2019-07-08 PROCEDURE — 93010 ELECTROCARDIOGRAM REPORT: CPT | Performed by: INTERNAL MEDICINE

## 2019-07-08 PROCEDURE — G0378 HOSPITAL OBSERVATION PER HR: HCPCS

## 2019-07-08 PROCEDURE — 99285 EMERGENCY DEPT VISIT HI MDM: CPT

## 2019-07-08 PROCEDURE — 72131 CT LUMBAR SPINE W/O DYE: CPT

## 2019-07-08 PROCEDURE — 36415 COLL VENOUS BLD VENIPUNCTURE: CPT

## 2019-07-08 PROCEDURE — 85025 COMPLETE CBC W/AUTO DIFF WBC: CPT

## 2019-07-08 PROCEDURE — 70450 CT HEAD/BRAIN W/O DYE: CPT

## 2019-07-08 PROCEDURE — 93005 ELECTROCARDIOGRAM TRACING: CPT | Performed by: EMERGENCY MEDICINE

## 2019-07-08 RX ORDER — INSULIN GLARGINE 100 [IU]/ML
18 INJECTION, SOLUTION SUBCUTANEOUS NIGHTLY
COMMUNITY

## 2019-07-08 RX ORDER — ROSUVASTATIN CALCIUM 10 MG/1
10 TABLET, COATED ORAL NIGHTLY
Status: DISCONTINUED | OUTPATIENT
Start: 2019-07-08 | End: 2019-07-09 | Stop reason: HOSPADM

## 2019-07-08 RX ORDER — FLUTICASONE PROPIONATE 50 MCG
1 SPRAY, SUSPENSION (ML) NASAL PRN
Status: DISCONTINUED | OUTPATIENT
Start: 2019-07-08 | End: 2019-07-09 | Stop reason: HOSPADM

## 2019-07-08 RX ORDER — ONDANSETRON 2 MG/ML
4 INJECTION INTRAMUSCULAR; INTRAVENOUS EVERY 6 HOURS PRN
Status: DISCONTINUED | OUTPATIENT
Start: 2019-07-08 | End: 2019-07-09 | Stop reason: HOSPADM

## 2019-07-08 RX ORDER — DEXTROSE MONOHYDRATE 25 G/50ML
12.5 INJECTION, SOLUTION INTRAVENOUS PRN
Status: DISCONTINUED | OUTPATIENT
Start: 2019-07-08 | End: 2019-07-09 | Stop reason: HOSPADM

## 2019-07-08 RX ORDER — NICOTINE POLACRILEX 4 MG
15 LOZENGE BUCCAL PRN
Status: DISCONTINUED | OUTPATIENT
Start: 2019-07-08 | End: 2019-07-09 | Stop reason: HOSPADM

## 2019-07-08 RX ORDER — CARVEDILOL 6.25 MG/1
6.25 TABLET ORAL 2 TIMES DAILY WITH MEALS
Status: DISCONTINUED | OUTPATIENT
Start: 2019-07-08 | End: 2019-07-09 | Stop reason: HOSPADM

## 2019-07-08 RX ORDER — AMIODARONE HYDROCHLORIDE 200 MG/1
100 TABLET ORAL DAILY
Status: DISCONTINUED | OUTPATIENT
Start: 2019-07-08 | End: 2019-07-09 | Stop reason: HOSPADM

## 2019-07-08 RX ORDER — ASPIRIN 81 MG/1
81 TABLET ORAL DAILY
Status: DISCONTINUED | OUTPATIENT
Start: 2019-07-08 | End: 2019-07-09 | Stop reason: HOSPADM

## 2019-07-08 RX ORDER — INSULIN GLARGINE 100 [IU]/ML
20 INJECTION, SOLUTION SUBCUTANEOUS NIGHTLY
Status: DISCONTINUED | OUTPATIENT
Start: 2019-07-08 | End: 2019-07-08

## 2019-07-08 RX ORDER — SODIUM CHLORIDE 0.9 % (FLUSH) 0.9 %
10 SYRINGE (ML) INJECTION PRN
Status: DISCONTINUED | OUTPATIENT
Start: 2019-07-08 | End: 2019-07-09 | Stop reason: HOSPADM

## 2019-07-08 RX ORDER — ACETAMINOPHEN 500 MG
1000 TABLET ORAL DAILY PRN
COMMUNITY

## 2019-07-08 RX ORDER — ALPRAZOLAM 0.25 MG/1
0.12 TABLET ORAL NIGHTLY PRN
Status: DISCONTINUED | OUTPATIENT
Start: 2019-07-08 | End: 2019-07-09 | Stop reason: HOSPADM

## 2019-07-08 RX ORDER — DEXTROSE MONOHYDRATE 50 MG/ML
100 INJECTION, SOLUTION INTRAVENOUS PRN
Status: DISCONTINUED | OUTPATIENT
Start: 2019-07-08 | End: 2019-07-09 | Stop reason: HOSPADM

## 2019-07-08 RX ORDER — SODIUM BICARBONATE 650 MG/1
650 TABLET ORAL 2 TIMES DAILY
Status: DISCONTINUED | OUTPATIENT
Start: 2019-07-08 | End: 2019-07-09 | Stop reason: HOSPADM

## 2019-07-08 RX ORDER — SODIUM CHLORIDE 0.9 % (FLUSH) 0.9 %
10 SYRINGE (ML) INJECTION EVERY 12 HOURS SCHEDULED
Status: DISCONTINUED | OUTPATIENT
Start: 2019-07-08 | End: 2019-07-09 | Stop reason: HOSPADM

## 2019-07-08 RX ADMIN — CARVEDILOL 6.25 MG: 6.25 TABLET, FILM COATED ORAL at 18:01

## 2019-07-08 RX ADMIN — Medication 10 ML: at 21:23

## 2019-07-08 RX ADMIN — ROSUVASTATIN CALCIUM 10 MG: 10 TABLET, FILM COATED ORAL at 21:22

## 2019-07-08 RX ADMIN — ALPRAZOLAM 0.12 MG: 0.25 TABLET ORAL at 22:06

## 2019-07-08 RX ADMIN — SODIUM BICARBONATE 650 MG: 650 TABLET ORAL at 21:23

## 2019-07-08 RX ADMIN — ASPIRIN 81 MG: 81 TABLET, COATED ORAL at 13:24

## 2019-07-08 RX ADMIN — VITAMIN D, TAB 1000IU (100/BT) 1000 UNITS: 25 TAB at 21:22

## 2019-07-08 RX ADMIN — AMIODARONE HYDROCHLORIDE 100 MG: 200 TABLET ORAL at 13:24

## 2019-07-08 ASSESSMENT — PAIN SCALES - GENERAL
PAINLEVEL_OUTOF10: 2
PAINLEVEL_OUTOF10: 4
PAINLEVEL_OUTOF10: 3
PAINLEVEL_OUTOF10: 2
PAINLEVEL_OUTOF10: 2
PAINLEVEL_OUTOF10: 0

## 2019-07-08 ASSESSMENT — PAIN DESCRIPTION - PROGRESSION: CLINICAL_PROGRESSION: NOT CHANGED

## 2019-07-08 ASSESSMENT — PAIN DESCRIPTION - FREQUENCY: FREQUENCY: CONTINUOUS

## 2019-07-08 ASSESSMENT — PAIN - FUNCTIONAL ASSESSMENT: PAIN_FUNCTIONAL_ASSESSMENT: ACTIVITIES ARE NOT PREVENTED

## 2019-07-08 ASSESSMENT — PAIN DESCRIPTION - LOCATION: LOCATION: BACK

## 2019-07-08 ASSESSMENT — PAIN DESCRIPTION - ORIENTATION: ORIENTATION: LOWER

## 2019-07-08 ASSESSMENT — PAIN DESCRIPTION - PAIN TYPE: TYPE: ACUTE PAIN

## 2019-07-08 ASSESSMENT — PAIN DESCRIPTION - DESCRIPTORS: DESCRIPTORS: ACHING

## 2019-07-08 ASSESSMENT — PAIN DESCRIPTION - ONSET: ONSET: SUDDEN

## 2019-07-08 NOTE — H&P
PHART 7.387 03/27/2016    JAV8CSI 37.0 03/27/2016    PO2ART 102.0 03/27/2016    AVQ3ALR 22.9 03/27/2016           Active Hospital Problems    Diagnosis Date Noted    Syncope and collapse [R55] 07/08/2019         PHYSICIANS CERTIFICATION:    I certify that Consuelo Gee is expected to be hospitalized for less than 2 midnights based on the following assessment and plan:      ASSESSMENT/PLAN:    Syncope: After going to the bathroom. Vasovagal versus orthostatic hypotension. Check orthostatic vitals. Trend troponin. We will ask the nurse to call to interrogate her ICD  Continue telemetry    Type 2 diabetes mellitus  Continue Lantus and sliding scale    History of coronary artery disease  Continue with beta-blocker, statin, and aspirin    Chronic systolic heart failure:  Improved with normal ejection fraction in March 2019  Not on ACE or arms due to kidney disease and hyperkalemia  Look compensated    Hypertension  Hold amlodipine. Blood pressure is soft       Chronic kidney disease stage III  Creatinine is at baseline  Avoid nephrotoxic medications  Patient has one kidney    History of kidney cancer, and bladder cancer  Patient has urostomy bag    DVT Prophylaxis: Full   Diet: DIET CARDIAC;  Code Status: Full Code      Dispo -   Pending clinical improvement. Sakina Villar MD    Thank you [facility] for the opportunity to be involved in this patient's care.

## 2019-07-08 NOTE — ED PROVIDER NOTES
[facility]  eMERGENCY dEPARTMENT eNCOUnter      Pt Name: Jeannette Kitchen  MRN: [id number]  [place] 1940  Date of evaluation: 7/8/2019  Provider: Tripp Talavera MD    [address] [place] [address]       Chief Complaint   Patient presents with    Fall     Pt to ED via [email or web address] [facility] d/t fall. Pt states she was in her kitchen she got dizzy, she thinks she passed out and then she fell. Pt unsure if she hit her head. Denies any blood thinners. C/o lower back pain.  Loss of Consciousness         CRITICAL CARE TIME   Total Critical Care time was 0 minutes, excluding separately reportable procedures. There was a high probability of clinically significant/life threatening deterioration in the patient's condition which required my urgent intervention. HISTORY OF PRESENT ILLNESS  (Location/Symptom, Timing/Onset, Context/Setting, Quality, Duration, Modifying Factors, Severity.)   Jeannette Kitchen is a 78 y.o. female who presents to the emergency department since the emergency department via life squad after \"falling in my kitchen\". The patient admits that she felt dizzy/lightheaded. She states she passed out. She states she does not think she was out for more than a minute or 2. She woke up lying on the floor. She has some chronic hip and low back pain, but she states the pain seems somewhat worse. She does not know if she hit her head. She has no neck pain. No upper back pain. The patient does have a history of heart disease, she has a defibrillator, she does not think the defibrillator discharged. No previous history of syncopal episodes. No headache. No chest pain or shortness of breath. No abdominal pain. Nursing Notes were reviewed and I agree. REVIEW OF SYSTEMS    (2-9 systems for level 4, 10 or more for level 5)     General: No fever chills. HEENT: No head or facial injury. Cardiovascular: No chest pain or palpitation.   Pulmonary: No shortness of (*)     GFR  40 (*)     All other components within normal limits    Narrative:     Performed at:  [facility]  [address] [place], [place] [address]   Phone [phone]   TROPONIN    Narrative:     Performed at:  [facility]  [address] [place], [place] [address]   Phone [phone]       All other labs were within normal range or not returned as of this dictation. EMERGENCY DEPARTMENT COURSE and DIFFERENTIAL DIAGNOSIS/MDM:   Vitals:    Vitals:    07/08/19 0931 07/08/19 0947 07/08/19 1002 07/08/19 1017   BP: (!) 154/51 (!) 150/41 110/78 (!) 115/55   Pulse: 70 71 70 71   Resp: 20 18 17 26   Temp:       TempSrc:       SpO2: 99% 97% 97% 99%   Weight:       Height: This patient remembers being dizzy and then woke up on the floor. She had some type of syncopal episode. It was not witnessed. She does have a history of heart disease and has a defibrillator. She does not remember the defibrillator discharging. She did have a recent change in her amiodarone dose, they decreased it by half. She is been asymptomatic here. She has no acute intracranial injury. She had some chronic back and hip pain which was worse after the fall. There is no evidence of acute fracture on CT scan of the lumbar spine or x-ray of the left hip. Her hemoglobin is stable. Her electrolytes show no significant abnormalities. She is not dehydrated. The etiology of her syncopal episode is not clear at this point in time. She will need to be admitted for observation, monitoring. She may need to have her AICD interrogated. The hospitalist will be consulted for admission. CONSULTS:  IP CONSULT TO HOSPITALIST    PROCEDURES:  None    FINAL IMPRESSION      1. Syncope and collapse          DISPOSITION/PLAN   DISPOSITION Decision To Admit 07/08/2019 10:40:47 AM      PATIENT REFERRED TO:  No follow-up provider specified.     DISCHARGE

## 2019-07-08 NOTE — ED NOTES
Bed: B-08  Expected date:   Expected time:   Means of arrival: [address] [facility]  Comments:  79F Dizzy, fall     Cleo Ahmadi RN  07/08/19 2883

## 2019-07-08 NOTE — ED NOTES
ED SBAR report provider to [facility]. Patient to be transported to Room **4105 via stretcher by transport tech. Patient transported with bedside cardiac monitor and with IV medications infusing. IV site clean, dry, and intact. MEWS score and pain assessed and documented. Updated patient and family on plan of care.      Ada Ramirez RN  07/08/19 2490

## 2019-07-09 ENCOUNTER — TELEPHONE (OUTPATIENT)
Dept: CARDIOLOGY CLINIC | Age: 79
End: 2019-07-09

## 2019-07-09 VITALS
TEMPERATURE: 98 F | HEIGHT: 68 IN | DIASTOLIC BLOOD PRESSURE: 56 MMHG | OXYGEN SATURATION: 95 % | SYSTOLIC BLOOD PRESSURE: 135 MMHG | HEART RATE: 53 BPM | RESPIRATION RATE: 17 BRPM | BODY MASS INDEX: 21.72 KG/M2 | WEIGHT: 143.3 LBS

## 2019-07-09 PROBLEM — R55 SYNCOPE AND COLLAPSE: Status: RESOLVED | Noted: 2019-07-08 | Resolved: 2019-07-09

## 2019-07-09 LAB
ANION GAP SERPL CALCULATED.3IONS-SCNC: 10 MMOL/L (ref 3–16)
BASOPHILS ABSOLUTE: 0 K/UL (ref 0–0.2)
BASOPHILS RELATIVE PERCENT: 0.4 %
BUN BLDV-MCNC: 26 MG/DL (ref 7–20)
CALCIUM SERPL-MCNC: 9.6 MG/DL (ref 8.3–10.6)
CHLORIDE BLD-SCNC: 107 MMOL/L (ref 99–110)
CO2: 23 MMOL/L (ref 21–32)
CREAT SERPL-MCNC: 1.5 MG/DL (ref 0.6–1.2)
EOSINOPHILS ABSOLUTE: 0.1 K/UL (ref 0–0.6)
EOSINOPHILS RELATIVE PERCENT: 1.7 %
GFR AFRICAN AMERICAN: 40
GFR NON-AFRICAN AMERICAN: 33
GLUCOSE BLD-MCNC: 133 MG/DL (ref 70–99)
GLUCOSE BLD-MCNC: 140 MG/DL (ref 70–99)
GLUCOSE BLD-MCNC: 199 MG/DL (ref 70–99)
HCT VFR BLD CALC: 35.5 % (ref 36–48)
HEMOGLOBIN: 11.8 G/DL (ref 12–16)
LYMPHOCYTES ABSOLUTE: 0.7 K/UL (ref 1–5.1)
LYMPHOCYTES RELATIVE PERCENT: 17.1 %
MCH RBC QN AUTO: 31.8 PG (ref 26–34)
MCHC RBC AUTO-ENTMCNC: 33.1 G/DL (ref 31–36)
MCV RBC AUTO: 95.8 FL (ref 80–100)
MONOCYTES ABSOLUTE: 0.6 K/UL (ref 0–1.3)
MONOCYTES RELATIVE PERCENT: 13.4 %
NEUTROPHILS ABSOLUTE: 2.9 K/UL (ref 1.7–7.7)
NEUTROPHILS RELATIVE PERCENT: 67.4 %
PDW BLD-RTO: 13.4 % (ref 12.4–15.4)
PERFORMED ON: ABNORMAL
PERFORMED ON: ABNORMAL
PLATELET # BLD: 181 K/UL (ref 135–450)
PMV BLD AUTO: 7.6 FL (ref 5–10.5)
POTASSIUM REFLEX MAGNESIUM: 4.5 MMOL/L (ref 3.5–5.1)
RBC # BLD: 3.71 M/UL (ref 4–5.2)
SODIUM BLD-SCNC: 140 MMOL/L (ref 136–145)
TROPONIN: <0.01 NG/ML
TROPONIN: <0.01 NG/ML
WBC # BLD: 4.4 K/UL (ref 4–11)

## 2019-07-09 PROCEDURE — 97161 PT EVAL LOW COMPLEX 20 MIN: CPT

## 2019-07-09 PROCEDURE — 6360000002 HC RX W HCPCS: Performed by: HOSPITALIST

## 2019-07-09 PROCEDURE — 97116 GAIT TRAINING THERAPY: CPT

## 2019-07-09 PROCEDURE — 85025 COMPLETE CBC W/AUTO DIFF WBC: CPT

## 2019-07-09 PROCEDURE — 97530 THERAPEUTIC ACTIVITIES: CPT

## 2019-07-09 PROCEDURE — 84484 ASSAY OF TROPONIN QUANT: CPT

## 2019-07-09 PROCEDURE — 2580000003 HC RX 258: Performed by: HOSPITALIST

## 2019-07-09 PROCEDURE — 96372 THER/PROPH/DIAG INJ SC/IM: CPT

## 2019-07-09 PROCEDURE — 97165 OT EVAL LOW COMPLEX 30 MIN: CPT

## 2019-07-09 PROCEDURE — G0378 HOSPITAL OBSERVATION PER HR: HCPCS

## 2019-07-09 PROCEDURE — 94760 N-INVAS EAR/PLS OXIMETRY 1: CPT

## 2019-07-09 PROCEDURE — 80048 BASIC METABOLIC PNL TOTAL CA: CPT

## 2019-07-09 PROCEDURE — 36415 COLL VENOUS BLD VENIPUNCTURE: CPT

## 2019-07-09 PROCEDURE — 6370000000 HC RX 637 (ALT 250 FOR IP): Performed by: HOSPITALIST

## 2019-07-09 RX ORDER — ACETAMINOPHEN 325 MG/1
650 TABLET ORAL EVERY 4 HOURS PRN
Status: DISCONTINUED | OUTPATIENT
Start: 2019-07-09 | End: 2019-07-09 | Stop reason: HOSPADM

## 2019-07-09 RX ORDER — AMLODIPINE BESYLATE 5 MG/1
5 TABLET ORAL DAILY
Qty: 90 TABLET | Refills: 3 | Status: ON HOLD
Start: 2019-07-19 | End: 2019-11-06

## 2019-07-09 RX ADMIN — AMIODARONE HYDROCHLORIDE 100 MG: 200 TABLET ORAL at 08:32

## 2019-07-09 RX ADMIN — Medication 10 ML: at 08:33

## 2019-07-09 RX ADMIN — ACETAMINOPHEN 650 MG: 325 TABLET ORAL at 10:39

## 2019-07-09 RX ADMIN — CARVEDILOL 6.25 MG: 6.25 TABLET, FILM COATED ORAL at 08:32

## 2019-07-09 RX ADMIN — VITAMIN D, TAB 1000IU (100/BT) 1000 UNITS: 25 TAB at 08:32

## 2019-07-09 RX ADMIN — SODIUM BICARBONATE 650 MG: 650 TABLET ORAL at 08:32

## 2019-07-09 RX ADMIN — INSULIN LISPRO 1 UNITS: 100 INJECTION, SOLUTION INTRAVENOUS; SUBCUTANEOUS at 08:33

## 2019-07-09 RX ADMIN — ASPIRIN 81 MG: 81 TABLET, COATED ORAL at 08:32

## 2019-07-09 RX ADMIN — ENOXAPARIN SODIUM 30 MG: 30 INJECTION SUBCUTANEOUS at 08:39

## 2019-07-09 ASSESSMENT — PAIN SCALES - GENERAL
PAINLEVEL_OUTOF10: 2
PAINLEVEL_OUTOF10: 4
PAINLEVEL_OUTOF10: 6

## 2019-07-09 ASSESSMENT — PAIN DESCRIPTION - PAIN TYPE: TYPE: INTRACTABLE PAIN

## 2019-07-09 ASSESSMENT — PAIN DESCRIPTION - ORIENTATION: ORIENTATION: LEFT

## 2019-07-09 ASSESSMENT — PAIN DESCRIPTION - PROGRESSION
CLINICAL_PROGRESSION: NOT CHANGED

## 2019-07-09 ASSESSMENT — PAIN DESCRIPTION - ONSET: ONSET: ON-GOING

## 2019-07-09 ASSESSMENT — PAIN DESCRIPTION - DESCRIPTORS: DESCRIPTORS: THROBBING

## 2019-07-09 ASSESSMENT — PAIN DESCRIPTION - FREQUENCY: FREQUENCY: CONTINUOUS

## 2019-07-09 ASSESSMENT — PAIN DESCRIPTION - LOCATION: LOCATION: HIP

## 2019-07-09 ASSESSMENT — PAIN - FUNCTIONAL ASSESSMENT: PAIN_FUNCTIONAL_ASSESSMENT: PREVENTS OR INTERFERES SOME ACTIVE ACTIVITIES AND ADLS

## 2019-07-09 NOTE — PLAN OF CARE
Problem: Falls - Risk of:  Goal: Will remain free from falls  Description  Will remain free from falls  7/9/2019 0241 by Pierre Saldaña RN  Outcome: Ongoing  Note:   Patient free from falls this shift. Fall precautions in place at all times. Bed in lowest position with two side rails up and wheels locked. Call light within reach. Patient able and agreeable to contact for safety appropriately.     7/8/2019 1309 by Brandon Clark RN  Outcome: Ongoing  Goal: Absence of physical injury  Description  Absence of physical injury  7/9/2019 0241 by Pierre Saldaña RN  Outcome: Ongoing  7/8/2019 1309 by Brandon Clark RN  Outcome: Ongoing     Problem: Pain:  Goal: Pain level will decrease  Description  Pain level will decrease  7/9/2019 0241 by Pierre Saldaña RN  Outcome: Ongoing  7/8/2019 1309 by Brandon Clark RN  Outcome: Ongoing  Goal: Control of acute pain  Description  Control of acute pain  7/9/2019 0241 by Pierre Saldaña RN  Outcome: Ongoing  7/8/2019 1309 by Brandon Clark RN  Outcome: Ongoing  Goal: Control of chronic pain  Description  Control of chronic pain  7/9/2019 0241 by Pierre Saldaña RN  Outcome: Ongoing  7/8/2019 1309 by Brandon Clark RN  Outcome: Ongoing

## 2019-07-09 NOTE — PROGRESS NOTES
Clinical Pharmacy Note  Renal Dose Adjustment    Kait Sesay is receiving Lovenox. This medication is renally eliminated. Based on the patient's Estimated Creatinine Clearance: 30 mL/min (A) (based on SCr of 1.5 mg/dL (H)). and urine output, the dose has been adjusted to 30mg per protocol. Pharmacy will continue to monitor and adjust dose as needed for changes in renal function.
Occupational Therapy   Occupational Therapy Initial Assessment and Discharge  Date: 2019   Patient Name: Consuelo Gee  MRN: [id number]     : 1940    Date of Service: 2019    Discharge Recommendations:  Home with assist PRN  OT Equipment Recommendations  Equipment Needed: No     Consuelo Gee scored a 24/24 on the AM-PAC ADL Inpatient form. At this time, no further OT is recommended upon discharge due to pt functioning at baseline. Recommend patient returns to prior setting with prior services. Assessment   Assessment: Pt presents to be functioning at/near baseline, and does not require any additional acute OT. Pt completing ADLs and fxl mobility at modified independent level, and safe to return home with assist prn. Pt in agreement she is functioning at baseline, denies need for OT services. No acute OT goals identified, will discharge. Prognosis: Good  Decision Making: Low Complexity  History: see above  Exam: decreased sensation, decreased vision with h/o cataracts, decreased fxl activity tolerance d/t L hip pain, otherwise WFL performancee factors  Assistance / Modification: anticipate overall modified independent ADLs  Patient Education: Pt educated on the role of OT  Barriers to Learning: none  No Skilled OT: Independent with functional mobility; Independent with ADL's;At baseline function; Safe to return home; No OT goals identified  REQUIRES OT FOLLOW UP: No  Activity Tolerance  Activity Tolerance: Patient Tolerated treatment well  Safety Devices  Safety Devices in place: Yes  Type of devices: Call light within reach; Chair alarm in place; Left in chair;Nurse notified;Gait belt; All fall risk precautions in place(ASP[facility] Recio notified)           Patient Diagnosis(es): The encounter diagnosis was Syncope and collapse.     has a past medical history of Anxiety, Atrial fibrillation ([address] CAD (coronary artery disease), Cancer [facility]), Cardiac arrest [facility]), Carotid artery stenosis, CHF
Order received for pacemaker interrogation. Placed call to [employer]. Will continue to monitor.
on Main level with bedroom/bathroom, Performs ADL's on one level(full bath on main level. 9 CLARENCE to basement with R descending handrail)  Home Access: Stairs to enter without rails  Entrance Stairs - Number of Steps: 1  Bathroom Shower/Tub: Walk-in shower, Shower chair without back, Tub/Shower unit(walk-in shower basement, small tub shower on main level.  Pt uses tub shower)  Bathroom Toilet: Standard(sink near by)  Walker Electric: Shower chair, Grab bars around toilet, Grab bars in shower, Hand-held shower  Bathroom Accessibility: Walker accessible(with walker sideways)  Home Equipment: Rolling walker, Cane, Alert Button  ADL Assistance: Independent  Homemaking Assistance: Independent  Ambulation Assistance: Independent(with walker most of the time)  Transfer Assistance: Independent  Active : Yes  Mode of Transportation: [facility]  Occupation: Retired  Type of occupation:   Leisure & Hobbies: gardening, crafts  Cognition   Cognition  Overall Cognitive Status: WFL    Objective  AROM RLE (degrees)  RLE AROM: WFL  AROM LLE (degrees)  LLE General AROM: limited hip flexion and knee extension due to discomfort  Strength RLE  Strength RLE: WFL  Strength LLE  Comment: hip flexion and knee extension limited die to discomfort     Sensation  Overall Sensation Status: (the pt reports numbness and tingling in B hands and her legs possibly due to heart pill vs diabetes (per pt report))  Bed mobility  Supine to Sit: Independent(HOB flat, no rail)  Sit to Supine: Unable to assess(pt seated in recliner at end of session)  Scooting: Independent(to scoot to EOB, back in chair)  Transfers  Sit to Stand: Modified independent  Stand to sit: Modified independent  Ambulation  Ambulation?: Yes  Ambulation 1  Surface: level tile  Device: Rolling Walker  Assistance: Modified Independent  Quality of Gait: decreased step length on the L due to hip pain; no LOB, good management of the walker  Gait Deviations: Slow May;Decreased

## 2019-07-09 NOTE — DISCHARGE SUMMARY
Troponin    Collection Time: 07/08/19  5:09 PM   Result Value Ref Range    Troponin <0.01 <0.01 ng/mL   POCT Glucose    Collection Time: 07/08/19  8:39 PM   Result Value Ref Range    POC Glucose 170 (H) 70 - 99 mg/dl    Performed on ACCU-CHEK    Troponin    Collection Time: 07/09/19 12:24 AM   Result Value Ref Range    Troponin <0.01 <0.01 ng/mL   Basic Metabolic Panel w/ Reflex to MG    Collection Time: 07/09/19  6:18 AM   Result Value Ref Range    Sodium 140 136 - 145 mmol/L    Potassium reflex Magnesium 4.5 3.5 - 5.1 mmol/L    Chloride 107 99 - 110 mmol/L    CO2 23 21 - 32 mmol/L    Anion Gap 10 3 - 16    Glucose 133 (H) 70 - 99 mg/dL    BUN 26 (H) 7 - 20 mg/dL    CREATININE 1.5 (H) 0.6 - 1.2 mg/dL    GFR Non- 33 (A) >60    GFR  40 (A) >60    Calcium 9.6 8.3 - 10.6 mg/dL   CBC auto differential    Collection Time: 07/09/19  6:18 AM   Result Value Ref Range    WBC 4.4 4.0 - 11.0 K/uL    RBC 3.71 (L) 4.00 - 5.20 M/uL    Hemoglobin 11.8 (L) 12.0 - 16.0 g/dL    Hematocrit 35.5 (L) 36.0 - 48.0 %    MCV 95.8 80.0 - 100.0 fL    MCH 31.8 26.0 - 34.0 pg    MCHC 33.1 31.0 - 36.0 g/dL    RDW 13.4 12.4 - 15.4 %    Platelets 893 536 - 134 K/uL    MPV 7.6 5.0 - 10.5 fL    Neutrophils % 67.4 %    Lymphocytes % 17.1 %    Monocytes % 13.4 %    Eosinophils % 1.7 %    Basophils % 0.4 %    Neutrophils # 2.9 1.7 - 7.7 K/uL    Lymphocytes # 0.7 (L) 1.0 - 5.1 K/uL    Monocytes # 0.6 0.0 - 1.3 K/uL    Eosinophils # 0.1 0.0 - 0.6 K/uL    Basophils # 0.0 0.0 - 0.2 K/uL   POCT Glucose    Collection Time: 07/09/19  7:47 AM   Result Value Ref Range    POC Glucose 140 (H) 70 - 99 mg/dl    Performed on ACCU-CHEK    Troponin    Collection Time: 07/09/19  8:28 AM   Result Value Ref Range    Troponin <0.01 <0.01 ng/mL   POCT Glucose    Collection Time: 07/09/19 11:55 AM   Result Value Ref Range    POC Glucose 199 (H) 70 - 99 mg/dl    Performed on ACCU-CHEK          Follow up: with [facility]    Note that over 30 minutes was spent in preparing discharge papers, discussing discharge with patient, medication review, etc.    Thank you Linevaehcristina Kessler for the opportunity to be involved in this patient's care. If you have any questions or concerns please feel free to contact me at [phone].     Electronically signed by Juan Spencer MD on 7/9/2019 at 2:16 PM

## 2019-07-09 NOTE — TELEPHONE ENCOUNTER
Spoke with Delicia Currie and requested that she sent me a manual remote transmission. I walked her though how to send manually and she said she'd get it sent today.

## 2019-07-09 NOTE — CARE COORDINATION
INITIAL CASE MANAGEMENT ASSESSMENT    Reviewed chart, met with patient to assess possible discharge needs. Explained Case Management role/services. Living Situation: Patient lives alone in a house with 1 CLAR[address]. ADLs: Independent     DME: walker    PT/OT Recs:  PT Discharge Recommendations:  Home independently   Darlene Arevalo scored a 23/24 on the AM-PAC short mobility form. At this time, no further PT is recommended upon discharge. Geovanna Allen scored a 24/24 on the AM-PAC ADL Inpatient form. At this time, no further OT is recommended upon discharge due to pt functioning at baseline. Active Services: none     Transportation: Active /Son will transport to home     Medications: [employer]/affordable    PCP: Sachin Ramirez      HD/PD: n/a    PLAN/COMMENTS: Patient plans to return to home with family support. SW/CM provided contact information for patient or family to call with any questions. SW/CM will follow and assist as needed. Electronically signed by Kvng Duran RN on 7/9/2019 at 12:32 PM

## 2019-07-15 NOTE — TELEPHONE ENCOUNTER
I called pt today to f/u since we never received a remote transmission and she states that she ended up going to the hospital on 7/8.

## 2019-07-17 ENCOUNTER — OFFICE VISIT (OUTPATIENT)
Dept: ORTHOPEDIC SURGERY | Age: 79
End: 2019-07-17
Payer: MEDICARE

## 2019-07-17 VITALS
DIASTOLIC BLOOD PRESSURE: 67 MMHG | BODY MASS INDEX: 21.98 KG/M2 | SYSTOLIC BLOOD PRESSURE: 163 MMHG | WEIGHT: 145 LBS | TEMPERATURE: 98.1 F | HEIGHT: 68 IN | HEART RATE: 62 BPM

## 2019-07-17 DIAGNOSIS — M16.12 ARTHRITIS OF LEFT HIP: Primary | ICD-10-CM

## 2019-07-17 PROCEDURE — 99212 OFFICE O/P EST SF 10 MIN: CPT | Performed by: PHYSICIAN ASSISTANT

## 2019-07-17 NOTE — PROGRESS NOTES
range of motion. Trochanteric region is not tender to palpation. Sacral Iliac is not tender to palpation. There is marked pain with weight bearing. X Rays: not performed in the office today:     Diagnosis:        ICD-10-CM    1. Arthritis of left hip M16.12         Assessment/ Plan:      Severe left hip arthritis. Weight loss, activity modification, home exercise therapy program, NSAID'S, dietary changes have been discussed as a means to help control the symptoms. Surgical option, arthroplasty discussed. I have recommended intra articular injection of the left hip for both diagnostic and therapeutic purpose. This will be performed under ultrasound guidance to be sure the injection is indeed intra articular. This will be performed by Dr. Lizzie Lazar and scheduled in the near future. The risks and benefits were discussed in detail today. All questions were answered. She verbally consents to the procedure of cortisone hip injection(s). The natural history of the patient's diagnosis as well as the treatment options were discussed in full and questions were answered. Risks and benefits of the treatment options also reviewed in detail. Follow Up: 6 weeks with Dr Yaya Gage  Call or return to clinic prn if these symptoms worsen or fail to improve as anticipated.

## 2019-07-30 ENCOUNTER — OFFICE VISIT (OUTPATIENT)
Dept: ORTHOPEDIC SURGERY | Age: 79
End: 2019-07-30
Payer: MEDICARE

## 2019-07-30 VITALS
SYSTOLIC BLOOD PRESSURE: 156 MMHG | HEIGHT: 68 IN | WEIGHT: 145 LBS | DIASTOLIC BLOOD PRESSURE: 60 MMHG | BODY MASS INDEX: 21.98 KG/M2 | HEART RATE: 72 BPM

## 2019-07-30 DIAGNOSIS — M16.12 ARTHRITIS OF LEFT HIP: Primary | ICD-10-CM

## 2019-07-30 PROCEDURE — 20611 DRAIN/INJ JOINT/BURSA W/US: CPT | Performed by: ORTHOPAEDIC SURGERY

## 2019-07-30 RX ORDER — METHYLPREDNISOLONE ACETATE 40 MG/ML
80 INJECTION, SUSPENSION INTRA-ARTICULAR; INTRALESIONAL; INTRAMUSCULAR; SOFT TISSUE ONCE
Status: COMPLETED | OUTPATIENT
Start: 2019-07-30 | End: 2019-07-30

## 2019-07-30 RX ADMIN — METHYLPREDNISOLONE ACETATE 80 MG: 40 INJECTION, SUSPENSION INTRA-ARTICULAR; INTRALESIONAL; INTRAMUSCULAR; SOFT TISSUE at 10:11

## 2019-07-30 NOTE — PATIENT INSTRUCTIONS
Impression:  left hip osteoarthritis. Plan:  1. Injection with cortisone was recommended into  left   hip joint using ultrasound visualization. She understands the risks and benefits of the injection and describes no potential allergies. Time out was performed to verify correct person, correct procedure, and correct site. 2. Ultrasound visualization was first performed utilizing the [facility] Ultrasound unit using an 5/2 MHz Curved Probe. finding the femoral neck head junction. Next the femoral artery and vein were visualized with ultrasound medial to the femoral head. The location of the needle insertion was determined well lateral to the neurovascular structures and the site was anesthetized with 3 mL of 1% Lidocaine. The site was then prepped with ChloraPrep. A sterile cover was placed over the transducer head and the femoral head neck junction once again visualized. A 20-gauge spinal needle was then introduced under ultrasound control to the head neck junction. Once the needle was intracapsular the hip joint was injected with 2 mL  of 1% Lidocaine mixed with 2 ml of 40 mg Depo Medrol. Franci Tejeda tolerated the procedure well and  did report > 50% relief of  hip pain within 5 minutes of the injection. 3.  She is return to [facility] care.     Enriqeu Londono MD  7/30/2019

## 2019-08-30 ENCOUNTER — TELEPHONE (OUTPATIENT)
Dept: ORTHOPEDIC SURGERY | Age: 79
End: 2019-08-30

## 2019-09-05 ENCOUNTER — OFFICE VISIT (OUTPATIENT)
Dept: ORTHOPEDIC SURGERY | Age: 79
End: 2019-09-05
Payer: MEDICARE

## 2019-09-05 VITALS
SYSTOLIC BLOOD PRESSURE: 180 MMHG | BODY MASS INDEX: 21.98 KG/M2 | HEART RATE: 77 BPM | RESPIRATION RATE: 16 BRPM | WEIGHT: 145 LBS | HEIGHT: 68 IN | DIASTOLIC BLOOD PRESSURE: 87 MMHG | TEMPERATURE: 98.5 F

## 2019-09-05 DIAGNOSIS — M16.12 ARTHRITIS OF LEFT HIP: Primary | ICD-10-CM

## 2019-09-05 DIAGNOSIS — M16.12 ARTHRITIS OF LEFT HIP: ICD-10-CM

## 2019-09-05 LAB
ALBUMIN SERPL-MCNC: 4.9 G/DL (ref 3.4–5)
ANION GAP SERPL CALCULATED.3IONS-SCNC: 15 MMOL/L (ref 3–16)
APTT: 26.7 SEC (ref 26–36)
BACTERIA: ABNORMAL /HPF
BASOPHILS ABSOLUTE: 0.1 K/UL (ref 0–0.2)
BASOPHILS RELATIVE PERCENT: 0.9 %
BILIRUBIN URINE: NEGATIVE
BLOOD, URINE: NEGATIVE
BUN BLDV-MCNC: 22 MG/DL (ref 7–20)
CALCIUM SERPL-MCNC: 9.9 MG/DL (ref 8.3–10.6)
CHLORIDE BLD-SCNC: 104 MMOL/L (ref 99–110)
CLARITY: ABNORMAL
CO2: 26 MMOL/L (ref 21–32)
COLOR: YELLOW
CREAT SERPL-MCNC: 1.6 MG/DL (ref 0.6–1.2)
EOSINOPHILS ABSOLUTE: 0.1 K/UL (ref 0–0.6)
EOSINOPHILS RELATIVE PERCENT: 0.9 %
EPITHELIAL CELLS, UA: 0 /HPF (ref 0–5)
ESTIMATED AVERAGE GLUCOSE: 151.3 MG/DL
GFR AFRICAN AMERICAN: 37
GFR NON-AFRICAN AMERICAN: 31
GLUCOSE BLD-MCNC: 166 MG/DL (ref 70–99)
GLUCOSE URINE: NEGATIVE MG/DL
HBA1C MFR BLD: 6.9 %
HCT VFR BLD CALC: 40.2 % (ref 36–48)
HEMOGLOBIN: 13.3 G/DL (ref 12–16)
HYALINE CASTS: 4 /LPF (ref 0–8)
INR BLD: 0.93 (ref 0.86–1.14)
KETONES, URINE: NEGATIVE MG/DL
LEUKOCYTE ESTERASE, URINE: ABNORMAL
LYMPHOCYTES ABSOLUTE: 0.6 K/UL (ref 1–5.1)
LYMPHOCYTES RELATIVE PERCENT: 8.7 %
MCH RBC QN AUTO: 32.2 PG (ref 26–34)
MCHC RBC AUTO-ENTMCNC: 33 G/DL (ref 31–36)
MCV RBC AUTO: 97.5 FL (ref 80–100)
MICROSCOPIC EXAMINATION: YES
MONOCYTES ABSOLUTE: 0.5 K/UL (ref 0–1.3)
MONOCYTES RELATIVE PERCENT: 7.4 %
NEUTROPHILS ABSOLUTE: 5.7 K/UL (ref 1.7–7.7)
NEUTROPHILS RELATIVE PERCENT: 82.1 %
NITRITE, URINE: NEGATIVE
PDW BLD-RTO: 13.5 % (ref 12.4–15.4)
PH UA: 7.5 (ref 5–8)
PLATELET # BLD: 231 K/UL (ref 135–450)
PMV BLD AUTO: 7.8 FL (ref 5–10.5)
POTASSIUM SERPL-SCNC: 4.7 MMOL/L (ref 3.5–5.1)
PROTEIN UA: 30 MG/DL
PROTHROMBIN TIME: 10.6 SEC (ref 9.8–13)
RBC # BLD: 4.13 M/UL (ref 4–5.2)
RBC UA: 2 /HPF (ref 0–4)
SODIUM BLD-SCNC: 145 MMOL/L (ref 136–145)
SPECIFIC GRAVITY UA: 1.01 (ref 1–1.03)
TRANSFERRIN: 249 MG/DL (ref 200–360)
URINE REFLEX TO CULTURE: YES
URINE TYPE: ABNORMAL
UROBILINOGEN, URINE: 0.2 E.U./DL
VITAMIN D 25-HYDROXY: 51.2 NG/ML
WBC # BLD: 7 K/UL (ref 4–11)
WBC UA: 25 /HPF (ref 0–5)

## 2019-09-05 PROCEDURE — 99214 OFFICE O/P EST MOD 30 MIN: CPT | Performed by: ORTHOPAEDIC SURGERY

## 2019-09-05 NOTE — LETTER
ELMAG:   _________                     NEEDED:  __________                   JET DATE:   10-22      SURG DATE:   11-6

## 2019-09-06 LAB — URINE CULTURE, ROUTINE: NORMAL

## 2019-09-10 NOTE — PROGRESS NOTES
heard.  Pulmonary/Chest: Effort normal and breath sounds normal. No respiratory distress. She has no wheezes, rhonchi or rales. Abdominal: Soft, non-tender. Bowel sounds are normal. She exhibits no organomegaly, mass or bruit. Extremities: No edema, cyanosis, or clubbing. Pulses are 2+ radial/dorsalis pedis/posterior tibial/carotid bilaterally. Neurological: No gross cranial nerve deficit. Coordination normal.   Skin: Skin is warm and dry. There is no rash or diaphoresis. Psychiatric: She has a normal mood and affect. Her speech is normal and behavior is normal.     Lab Review:   FLP:    Lab Results   Component Value Date    TRIG 220 01/29/2012    HDL 55 06/07/2019    HDL 45 01/29/2012    LDLCALC 59 06/07/2019    LDLDIRECT 148 07/08/2010    LABVLDL 23 06/07/2019     BUN/Creatinine:    Lab Results   Component Value Date    BUN 22 09/05/2019    CREATININE 1.6 09/05/2019       EKG Interpretation: 3/12/19 Sinus  Bradycardia Left bundle branch block    Image Review:   ECHO 3/26/16  Normal left ventricular size and wall thickness. Severely decreased left  ventricular systolic function with an estimated ejection fraction of 20-25%  Global hypokinesis. Mild MR/TR    Cardiac Cath 3/26/16  CORONARY ANATOMY:  1. The left main is free of disease. 2.  The LAD has a long stent in the mid segment which is widely patent. The  rest of the vessels are free of disease. 3.  The left circumflex artery has 4 to 5 obtuse marginal branches which are  free of disease. 4.  The right coronary artery is the dominant vessel and osteal stent which  is widely patent. The PD and the posterior lateral branches are also free of  disease. CONCLUSION:    1. No obstructive coronary artery disease. 2.  Previously stented sites are widely patent in the mid LAD and osteal RCA. 3.  Improved LV function with EF of about 40% to 45% with posterior basal  hypokinesis. 4.  Elevated left heart pressures.     Carotid Doppler 6/29/18  The right internal carotid artery appears to have a <50% diameter reducing    stenosis based on velocity criteria.    The right vertebral artery demonstrates normal antegrade flow.    The left internal carotid artery appears to have a 50-79% diameter reducing    stenosis based on velocity criteria.    The left vertebral artery demonstrates normal antegrade flow. ECHO 3/25/19  Suboptimal image quality. Left ventricular cavity size is normal.  There is mild concentric left ventricular hypertrophy. Ejection fraction is visually estimated to be 50-55%. Indeterminate diastolic function. Mitral valve leaflets appear mildly thickened. Mild mitral regurgitation. The left atrium is mildly dilated. Trivial aortic regurgitation is present. Mild tricuspid regurgitation. Pacemaker / ICD lead is visualized in the right atrium. The right atrium is normal in size.     Assessment/Plan:   Preoperative cardiac assessment  She is planning to get left hip replacement on 11/6/19 with Dr Yasmeen Rizzo. She denies CP, pressure, tightness, edema, SOB, heart racing, palpitations, lightheaded, dizziness, PND or orthopnea. I have cleared her for her upcoming surgery from cardiac standpoint  CAD  She denies any symptoms of angina today. Continue BB,statin, and Asa. Chronic Systolic Heart Failure  EF 20-25% post arrest improved to 40-45% on [facility] repeat echocardiogram 3/19 showed normal LVEF at 50-55%. Continue BB. She is currently not on ACE inhibitor/ARB therapy due to chronic kidney disease and hyperkalemia. Hypertension  BP is stable today. Will continue current medications. Carotid stenosis  Last carotid doppler 6/29/18 is unchanged from previous 2016. Hyperlipidemia  Last lipid profile 6/2019 was WNL. Will continue Crestor 10 mg daily. ICD  Implanted 4/1/16. She is complaining of hair loss. This could be due to Amiodarone therapy. I will decrease Amiodarone to 100 mg daily. . She had device interrogation on 6/2019.

## 2019-09-11 ENCOUNTER — OFFICE VISIT (OUTPATIENT)
Dept: CARDIOLOGY CLINIC | Age: 79
End: 2019-09-11
Payer: MEDICARE

## 2019-09-11 VITALS
BODY MASS INDEX: 22.55 KG/M2 | SYSTOLIC BLOOD PRESSURE: 130 MMHG | HEIGHT: 68 IN | HEART RATE: 64 BPM | WEIGHT: 148.8 LBS | OXYGEN SATURATION: 96 % | DIASTOLIC BLOOD PRESSURE: 70 MMHG

## 2019-09-11 DIAGNOSIS — I50.23 ACUTE ON CHRONIC SYSTOLIC HEART FAILURE (HCC): ICD-10-CM

## 2019-09-11 DIAGNOSIS — I10 ESSENTIAL HYPERTENSION: ICD-10-CM

## 2019-09-11 DIAGNOSIS — Z95.810 AUTOMATIC IMPLANTABLE CARDIOVERTER-DEFIBRILLATOR IN SITU: ICD-10-CM

## 2019-09-11 DIAGNOSIS — E78.2 MIXED HYPERLIPIDEMIA: ICD-10-CM

## 2019-09-11 DIAGNOSIS — I25.10 CORONARY ARTERY DISEASE INVOLVING NATIVE CORONARY ARTERY OF NATIVE HEART WITHOUT ANGINA PECTORIS: Primary | ICD-10-CM

## 2019-09-11 DIAGNOSIS — N18.9 CHRONIC KIDNEY DISEASE, UNSPECIFIED CKD STAGE: ICD-10-CM

## 2019-09-11 DIAGNOSIS — I65.29 STENOSIS OF CAROTID ARTERY, UNSPECIFIED LATERALITY: ICD-10-CM

## 2019-09-11 DIAGNOSIS — I48.0 PAF (PAROXYSMAL ATRIAL FIBRILLATION) (HCC): ICD-10-CM

## 2019-09-11 PROCEDURE — 93000 ELECTROCARDIOGRAM COMPLETE: CPT | Performed by: INTERNAL MEDICINE

## 2019-09-11 PROCEDURE — 99214 OFFICE O/P EST MOD 30 MIN: CPT | Performed by: INTERNAL MEDICINE

## 2019-09-11 NOTE — PATIENT INSTRUCTIONS
[facility], and be sure to contact your doctor if:    · You would like help planning heart-healthy meals. Where can you learn more? Go to [email or web address] and sign in to your [employer] account. Enter V137 in the RuiYi box to learn more about \"Heart-Healthy Diet: Care Instructions. \"     If you do not have an account, please click on the \"Sign Up Now\" link. Current as of: July 22, 2018  Content Version: 12.1  © 2268-9585 Healthwise, Incorporated. Care instructions adapted under license by [facility] ([facility]). If you have questions about a medical condition or this instruction, always ask your healthcare professional. [address] any warranty or liability for your use of this information.

## 2019-10-17 ENCOUNTER — PREP FOR PROCEDURE (OUTPATIENT)
Dept: ORTHOPEDIC SURGERY | Age: 79
End: 2019-10-17

## 2019-10-17 DIAGNOSIS — M16.12 ARTHRITIS OF LEFT HIP: Primary | ICD-10-CM

## 2019-10-21 ENCOUNTER — PREP FOR PROCEDURE (OUTPATIENT)
Dept: ORTHOPEDICS UNIT | Age: 79
End: 2019-10-21

## 2019-10-22 ENCOUNTER — HOSPITAL ENCOUNTER (OUTPATIENT)
Dept: PREADMISSION TESTING | Age: 79
Discharge: HOME | End: 2019-10-26
Payer: MEDICARE

## 2019-10-22 DIAGNOSIS — I48.0 PAF (PAROXYSMAL ATRIAL FIBRILLATION) (HCC): ICD-10-CM

## 2019-10-22 DIAGNOSIS — M16.12 ARTHRITIS OF LEFT HIP: ICD-10-CM

## 2019-10-22 LAB
ABO/RH: NORMAL
ALBUMIN SERPL-MCNC: 4.4 G/DL (ref 3.4–5)
ANION GAP SERPL CALCULATED.3IONS-SCNC: 19 MMOL/L (ref 3–16)
ANTIBODY SCREEN: NORMAL
APTT: 26.2 SEC (ref 26–36)
BACTERIA: ABNORMAL /HPF
BASOPHILS ABSOLUTE: 0 K/UL (ref 0–0.2)
BASOPHILS RELATIVE PERCENT: 0.8 %
BILIRUBIN URINE: NEGATIVE
BLOOD, URINE: ABNORMAL
BUN BLDV-MCNC: 27 MG/DL (ref 7–20)
CALCIUM SERPL-MCNC: 9.3 MG/DL (ref 8.3–10.6)
CHLORIDE BLD-SCNC: 104 MMOL/L (ref 99–110)
CLARITY: ABNORMAL
CO2: 19 MMOL/L (ref 21–32)
COLOR: YELLOW
CREAT SERPL-MCNC: 1.7 MG/DL (ref 0.6–1.2)
EOSINOPHILS ABSOLUTE: 0.1 K/UL (ref 0–0.6)
EOSINOPHILS RELATIVE PERCENT: 1.6 %
EPITHELIAL CELLS, UA: 1 /HPF (ref 0–5)
ESTIMATED AVERAGE GLUCOSE: 151.3 MG/DL
GFR AFRICAN AMERICAN: 35
GFR NON-AFRICAN AMERICAN: 29
GLUCOSE BLD-MCNC: 157 MG/DL (ref 70–99)
GLUCOSE URINE: NEGATIVE MG/DL
HBA1C MFR BLD: 6.9 %
HCT VFR BLD CALC: 37.1 % (ref 36–48)
HEMOGLOBIN: 12.5 G/DL (ref 12–16)
HYALINE CASTS: 5 /LPF (ref 0–8)
INR BLD: 0.92 (ref 0.86–1.14)
KETONES, URINE: NEGATIVE MG/DL
LEUKOCYTE ESTERASE, URINE: ABNORMAL
LYMPHOCYTES ABSOLUTE: 0.7 K/UL (ref 1–5.1)
LYMPHOCYTES RELATIVE PERCENT: 13.5 %
MCH RBC QN AUTO: 32.2 PG (ref 26–34)
MCHC RBC AUTO-ENTMCNC: 33.7 G/DL (ref 31–36)
MCV RBC AUTO: 95.5 FL (ref 80–100)
MICROSCOPIC EXAMINATION: YES
MONOCYTES ABSOLUTE: 0.4 K/UL (ref 0–1.3)
MONOCYTES RELATIVE PERCENT: 7.1 %
NEUTROPHILS ABSOLUTE: 3.8 K/UL (ref 1.7–7.7)
NEUTROPHILS RELATIVE PERCENT: 77 %
NITRITE, URINE: NEGATIVE
PDW BLD-RTO: 13.2 % (ref 12.4–15.4)
PH UA: 8.5 (ref 5–8)
PLATELET # BLD: 172 K/UL (ref 135–450)
PMV BLD AUTO: 9.2 FL (ref 5–10.5)
POTASSIUM SERPL-SCNC: 4.7 MMOL/L (ref 3.5–5.1)
PREALBUMIN: 31.4 MG/DL (ref 20–40)
PROTEIN UA: ABNORMAL MG/DL
PROTHROMBIN TIME: 10.5 SEC (ref 9.8–13)
RBC # BLD: 3.88 M/UL (ref 4–5.2)
RBC UA: 3 /HPF (ref 0–4)
SEDIMENTATION RATE, ERYTHROCYTE: 20 MM/HR (ref 0–30)
SODIUM BLD-SCNC: 142 MMOL/L (ref 136–145)
SPECIFIC GRAVITY UA: 1.01 (ref 1–1.03)
TSH REFLEX: 0.95 UIU/ML (ref 0.27–4.2)
URINE REFLEX TO CULTURE: YES
URINE TYPE: ABNORMAL
UROBILINOGEN, URINE: 0.2 E.U./DL
VITAMIN D 25-HYDROXY: 54.2 NG/ML
WBC # BLD: 4.9 K/UL (ref 4–11)
WBC UA: 28 /HPF (ref 0–5)

## 2019-10-22 PROCEDURE — 86901 BLOOD TYPING SEROLOGIC RH(D): CPT

## 2019-10-22 PROCEDURE — 87641 MR-STAPH DNA AMP PROBE: CPT

## 2019-10-22 PROCEDURE — 84443 ASSAY THYROID STIM HORMONE: CPT

## 2019-10-22 PROCEDURE — 82306 VITAMIN D 25 HYDROXY: CPT

## 2019-10-22 PROCEDURE — 86850 RBC ANTIBODY SCREEN: CPT

## 2019-10-22 PROCEDURE — 80048 BASIC METABOLIC PNL TOTAL CA: CPT

## 2019-10-22 PROCEDURE — 87086 URINE CULTURE/COLONY COUNT: CPT

## 2019-10-22 PROCEDURE — 85610 PROTHROMBIN TIME: CPT

## 2019-10-22 PROCEDURE — 85025 COMPLETE CBC W/AUTO DIFF WBC: CPT

## 2019-10-22 PROCEDURE — 84134 ASSAY OF PREALBUMIN: CPT

## 2019-10-22 PROCEDURE — 85730 THROMBOPLASTIN TIME PARTIAL: CPT

## 2019-10-22 PROCEDURE — 85652 RBC SED RATE AUTOMATED: CPT

## 2019-10-22 PROCEDURE — 83036 HEMOGLOBIN GLYCOSYLATED A1C: CPT

## 2019-10-22 PROCEDURE — 86900 BLOOD TYPING SEROLOGIC ABO: CPT

## 2019-10-22 PROCEDURE — 81001 URINALYSIS AUTO W/SCOPE: CPT

## 2019-10-22 PROCEDURE — 82040 ASSAY OF SERUM ALBUMIN: CPT

## 2019-10-23 LAB
MRSA SCREEN RT-PCR: NORMAL
URINE CULTURE, ROUTINE: NORMAL

## 2019-10-23 RX ORDER — OXYCODONE HYDROCHLORIDE 5 MG/1
10 TABLET ORAL ONCE
Status: CANCELLED | OUTPATIENT
Start: 2019-10-23 | End: 2019-10-23

## 2019-10-24 ENCOUNTER — TELEPHONE (OUTPATIENT)
Dept: ORTHOPEDICS UNIT | Age: 79
End: 2019-10-24

## 2019-10-31 ENCOUNTER — OFFICE VISIT (OUTPATIENT)
Dept: ORTHOPEDIC SURGERY | Age: 79
End: 2019-10-31

## 2019-10-31 DIAGNOSIS — M16.12 ARTHRITIS OF LEFT HIP: Primary | ICD-10-CM

## 2019-10-31 PROCEDURE — 99999 PR OFFICE/OUTPT VISIT,PROCEDURE ONLY: CPT | Performed by: ORTHOPAEDIC SURGERY

## 2019-11-05 ENCOUNTER — ANESTHESIA EVENT (OUTPATIENT)
Dept: OPERATING ROOM | Age: 79
DRG: 470 | End: 2019-11-05
Payer: MEDICARE

## 2019-11-06 ENCOUNTER — APPOINTMENT (OUTPATIENT)
Dept: GENERAL RADIOLOGY | Age: 79
DRG: 470 | End: 2019-11-06
Attending: ORTHOPAEDIC SURGERY
Payer: MEDICARE

## 2019-11-06 ENCOUNTER — ANESTHESIA (OUTPATIENT)
Dept: OPERATING ROOM | Age: 79
DRG: 470 | End: 2019-11-06
Payer: MEDICARE

## 2019-11-06 ENCOUNTER — HOSPITAL ENCOUNTER (INPATIENT)
Age: 79
LOS: 1 days | Discharge: HOME | DRG: 470 | End: 2019-11-07
Attending: ORTHOPAEDIC SURGERY | Admitting: ORTHOPAEDIC SURGERY
Payer: MEDICARE

## 2019-11-06 VITALS
RESPIRATION RATE: 20 BRPM | TEMPERATURE: 97.2 F | DIASTOLIC BLOOD PRESSURE: 63 MMHG | OXYGEN SATURATION: 100 % | SYSTOLIC BLOOD PRESSURE: 138 MMHG

## 2019-11-06 DIAGNOSIS — M16.12 ARTHRITIS OF LEFT HIP: ICD-10-CM

## 2019-11-06 LAB
ABO/RH: NORMAL
ANTIBODY SCREEN: NORMAL
GLUCOSE BLD-MCNC: 131 MG/DL (ref 70–99)
GLUCOSE BLD-MCNC: 154 MG/DL (ref 70–99)
GLUCOSE BLD-MCNC: 284 MG/DL (ref 70–99)
PERFORMED ON: ABNORMAL

## 2019-11-06 PROCEDURE — 0SRB01A REPLACEMENT OF LEFT HIP JOINT WITH METAL SYNTHETIC SUBSTITUTE, UNCEMENTED, OPEN APPROACH: ICD-10-PCS | Performed by: ORTHOPAEDIC SURGERY

## 2019-11-06 PROCEDURE — 2580000003 HC RX 258: Performed by: ORTHOPAEDIC SURGERY

## 2019-11-06 PROCEDURE — 6360000002 HC RX W HCPCS: Performed by: ORTHOPAEDIC SURGERY

## 2019-11-06 PROCEDURE — 2580000003 HC RX 258: Performed by: ANESTHESIOLOGY

## 2019-11-06 PROCEDURE — 6360000002 HC RX W HCPCS: Performed by: NURSE ANESTHETIST, CERTIFIED REGISTERED

## 2019-11-06 PROCEDURE — 97530 THERAPEUTIC ACTIVITIES: CPT

## 2019-11-06 PROCEDURE — 88311 DECALCIFY TISSUE: CPT

## 2019-11-06 PROCEDURE — 36415 COLL VENOUS BLD VENIPUNCTURE: CPT

## 2019-11-06 PROCEDURE — 97535 SELF CARE MNGMENT TRAINING: CPT

## 2019-11-06 PROCEDURE — 7100000000 HC PACU RECOVERY - FIRST 15 MIN: Performed by: ORTHOPAEDIC SURGERY

## 2019-11-06 PROCEDURE — 73502 X-RAY EXAM HIP UNI 2-3 VIEWS: CPT

## 2019-11-06 PROCEDURE — 94761 N-INVAS EAR/PLS OXIMETRY MLT: CPT

## 2019-11-06 PROCEDURE — 3700000000 HC ANESTHESIA ATTENDED CARE: Performed by: ORTHOPAEDIC SURGERY

## 2019-11-06 PROCEDURE — 73501 X-RAY EXAM HIP UNI 1 VIEW: CPT

## 2019-11-06 PROCEDURE — 86850 RBC ANTIBODY SCREEN: CPT

## 2019-11-06 PROCEDURE — 6370000000 HC RX 637 (ALT 250 FOR IP): Performed by: ORTHOPAEDIC SURGERY

## 2019-11-06 PROCEDURE — 2700000000 HC OXYGEN THERAPY PER DAY

## 2019-11-06 PROCEDURE — 97116 GAIT TRAINING THERAPY: CPT

## 2019-11-06 PROCEDURE — 2709999900 HC NON-CHARGEABLE SUPPLY: Performed by: ORTHOPAEDIC SURGERY

## 2019-11-06 PROCEDURE — 2500000003 HC RX 250 WO HCPCS: Performed by: ORTHOPAEDIC SURGERY

## 2019-11-06 PROCEDURE — 3600000005 HC SURGERY LEVEL 5 BASE: Performed by: ORTHOPAEDIC SURGERY

## 2019-11-06 PROCEDURE — 6360000002 HC RX W HCPCS: Performed by: ANESTHESIOLOGY

## 2019-11-06 PROCEDURE — C1776 JOINT DEVICE (IMPLANTABLE): HCPCS | Performed by: ORTHOPAEDIC SURGERY

## 2019-11-06 PROCEDURE — 3700000001 HC ADD 15 MINUTES (ANESTHESIA): Performed by: ORTHOPAEDIC SURGERY

## 2019-11-06 PROCEDURE — 97166 OT EVAL MOD COMPLEX 45 MIN: CPT

## 2019-11-06 PROCEDURE — 2060000000 HC ICU INTERMEDIATE R&B

## 2019-11-06 PROCEDURE — 88304 TISSUE EXAM BY PATHOLOGIST: CPT

## 2019-11-06 PROCEDURE — 2720000010 HC SURG SUPPLY STERILE: Performed by: ORTHOPAEDIC SURGERY

## 2019-11-06 PROCEDURE — 94150 VITAL CAPACITY TEST: CPT

## 2019-11-06 PROCEDURE — 83036 HEMOGLOBIN GLYCOSYLATED A1C: CPT

## 2019-11-06 PROCEDURE — 97162 PT EVAL MOD COMPLEX 30 MIN: CPT

## 2019-11-06 PROCEDURE — 86900 BLOOD TYPING SEROLOGIC ABO: CPT

## 2019-11-06 PROCEDURE — 86901 BLOOD TYPING SEROLOGIC RH(D): CPT

## 2019-11-06 PROCEDURE — 2500000003 HC RX 250 WO HCPCS: Performed by: NURSE ANESTHETIST, CERTIFIED REGISTERED

## 2019-11-06 PROCEDURE — 2500000003 HC RX 250 WO HCPCS: Performed by: ANESTHESIOLOGY

## 2019-11-06 PROCEDURE — 7100000001 HC PACU RECOVERY - ADDTL 15 MIN: Performed by: ORTHOPAEDIC SURGERY

## 2019-11-06 PROCEDURE — 3600000015 HC SURGERY LEVEL 5 ADDTL 15MIN: Performed by: ORTHOPAEDIC SURGERY

## 2019-11-06 DEVICE — HEAD FEM DIA36MM +1.5MM OFFSET 12/14 TAPR HIP MTL M-SPEC: Type: IMPLANTABLE DEVICE | Site: HIP | Status: FUNCTIONAL

## 2019-11-06 DEVICE — CUP ACET DIA52MM HIP GRIPTION PRI CEMENTLESS FIX SECT SER: Type: IMPLANTABLE DEVICE | Site: HIP | Status: FUNCTIONAL

## 2019-11-06 DEVICE — LINER ACET OD52MM ID36MM +4MM OFFSET HIP POLYETH MTL ON: Type: IMPLANTABLE DEVICE | Site: HIP | Status: FUNCTIONAL

## 2019-11-06 DEVICE — SCREW BNE L25MM DIA6.5MM CANC HIP S STL GRIPTION FULL THRD: Type: IMPLANTABLE DEVICE | Site: HIP | Status: FUNCTIONAL

## 2019-11-06 DEVICE — STEM FEM SZ 5 L105MM 12/14 TAPR HI OFFSET HIP DUOFIX CLLRD: Type: IMPLANTABLE DEVICE | Site: HIP | Status: FUNCTIONAL

## 2019-11-06 RX ORDER — OXYCODONE HYDROCHLORIDE 10 MG/1
10 TABLET ORAL EVERY 4 HOURS PRN
Status: DISCONTINUED | OUTPATIENT
Start: 2019-11-06 | End: 2019-11-07 | Stop reason: HOSPADM

## 2019-11-06 RX ORDER — SODIUM CHLORIDE 0.9 % (FLUSH) 0.9 %
10 SYRINGE (ML) INJECTION EVERY 12 HOURS SCHEDULED
Status: DISCONTINUED | OUTPATIENT
Start: 2019-11-06 | End: 2019-11-07 | Stop reason: HOSPADM

## 2019-11-06 RX ORDER — CARVEDILOL 6.25 MG/1
6.25 TABLET ORAL 2 TIMES DAILY
Status: DISCONTINUED | OUTPATIENT
Start: 2019-11-06 | End: 2019-11-07 | Stop reason: HOSPADM

## 2019-11-06 RX ORDER — SODIUM CHLORIDE 0.9 % (FLUSH) 0.9 %
10 SYRINGE (ML) INJECTION PRN
Status: DISCONTINUED | OUTPATIENT
Start: 2019-11-06 | End: 2019-11-06 | Stop reason: HOSPADM

## 2019-11-06 RX ORDER — SODIUM BICARBONATE 650 MG/1
650 TABLET ORAL 2 TIMES DAILY
Status: DISCONTINUED | OUTPATIENT
Start: 2019-11-06 | End: 2019-11-07 | Stop reason: HOSPADM

## 2019-11-06 RX ORDER — LIDOCAINE HYDROCHLORIDE 20 MG/ML
INJECTION, SOLUTION EPIDURAL; INFILTRATION; INTRACAUDAL; PERINEURAL PRN
Status: DISCONTINUED | OUTPATIENT
Start: 2019-11-06 | End: 2019-11-06 | Stop reason: SDUPTHER

## 2019-11-06 RX ORDER — AMIODARONE HYDROCHLORIDE 200 MG/1
100 TABLET ORAL DAILY
Status: DISCONTINUED | OUTPATIENT
Start: 2019-11-07 | End: 2019-11-07 | Stop reason: HOSPADM

## 2019-11-06 RX ORDER — INSULIN GLARGINE 100 [IU]/ML
0.25 INJECTION, SOLUTION SUBCUTANEOUS NIGHTLY
Status: DISCONTINUED | OUTPATIENT
Start: 2019-11-06 | End: 2019-11-06

## 2019-11-06 RX ORDER — SODIUM CHLORIDE 0.9 % (FLUSH) 0.9 %
10 SYRINGE (ML) INJECTION EVERY 12 HOURS SCHEDULED
Status: DISCONTINUED | OUTPATIENT
Start: 2019-11-06 | End: 2019-11-06 | Stop reason: HOSPADM

## 2019-11-06 RX ORDER — PROPOFOL 10 MG/ML
INJECTION, EMULSION INTRAVENOUS PRN
Status: DISCONTINUED | OUTPATIENT
Start: 2019-11-06 | End: 2019-11-06 | Stop reason: SDUPTHER

## 2019-11-06 RX ORDER — FENTANYL CITRATE 50 UG/ML
25 INJECTION, SOLUTION INTRAMUSCULAR; INTRAVENOUS EVERY 5 MIN PRN
Status: DISCONTINUED | OUTPATIENT
Start: 2019-11-06 | End: 2019-11-06 | Stop reason: HOSPADM

## 2019-11-06 RX ORDER — ALPRAZOLAM 0.25 MG/1
0.12 TABLET ORAL NIGHTLY PRN
Status: DISCONTINUED | OUTPATIENT
Start: 2019-11-06 | End: 2019-11-07 | Stop reason: HOSPADM

## 2019-11-06 RX ORDER — KETAMINE HCL IN NACL, ISO-OSM 100MG/10ML
SYRINGE (ML) INJECTION PRN
Status: DISCONTINUED | OUTPATIENT
Start: 2019-11-06 | End: 2019-11-06 | Stop reason: SDUPTHER

## 2019-11-06 RX ORDER — VITAMIN B COMPLEX
1000 TABLET ORAL DAILY
Status: DISCONTINUED | OUTPATIENT
Start: 2019-11-07 | End: 2019-11-07 | Stop reason: HOSPADM

## 2019-11-06 RX ORDER — ONDANSETRON 2 MG/ML
4 INJECTION INTRAMUSCULAR; INTRAVENOUS EVERY 6 HOURS PRN
Status: DISCONTINUED | OUTPATIENT
Start: 2019-11-06 | End: 2019-11-07 | Stop reason: HOSPADM

## 2019-11-06 RX ORDER — MIDAZOLAM HYDROCHLORIDE 1 MG/ML
INJECTION INTRAMUSCULAR; INTRAVENOUS PRN
Status: DISCONTINUED | OUTPATIENT
Start: 2019-11-06 | End: 2019-11-06 | Stop reason: SDUPTHER

## 2019-11-06 RX ORDER — DEXTROSE MONOHYDRATE 25 G/50ML
12.5 INJECTION, SOLUTION INTRAVENOUS PRN
Status: DISCONTINUED | OUTPATIENT
Start: 2019-11-06 | End: 2019-11-07 | Stop reason: HOSPADM

## 2019-11-06 RX ORDER — OXYCODONE HYDROCHLORIDE 5 MG/1
5 TABLET ORAL EVERY 4 HOURS PRN
Status: DISCONTINUED | OUTPATIENT
Start: 2019-11-06 | End: 2019-11-07 | Stop reason: HOSPADM

## 2019-11-06 RX ORDER — DEXTROSE MONOHYDRATE 50 MG/ML
100 INJECTION, SOLUTION INTRAVENOUS PRN
Status: DISCONTINUED | OUTPATIENT
Start: 2019-11-06 | End: 2019-11-07 | Stop reason: HOSPADM

## 2019-11-06 RX ORDER — INSULIN GLARGINE 100 [IU]/ML
20 INJECTION, SOLUTION SUBCUTANEOUS NIGHTLY
Status: DISCONTINUED | OUTPATIENT
Start: 2019-11-06 | End: 2019-11-07 | Stop reason: HOSPADM

## 2019-11-06 RX ORDER — SODIUM CHLORIDE 9 MG/ML
INJECTION, SOLUTION INTRAVENOUS CONTINUOUS
Status: DISCONTINUED | OUTPATIENT
Start: 2019-11-06 | End: 2019-11-06

## 2019-11-06 RX ORDER — DOCUSATE SODIUM 100 MG/1
100 CAPSULE, LIQUID FILLED ORAL 2 TIMES DAILY
Status: DISCONTINUED | OUTPATIENT
Start: 2019-11-06 | End: 2019-11-07 | Stop reason: HOSPADM

## 2019-11-06 RX ORDER — GLYCOPYRROLATE 0.2 MG/ML
INJECTION INTRAMUSCULAR; INTRAVENOUS PRN
Status: DISCONTINUED | OUTPATIENT
Start: 2019-11-06 | End: 2019-11-06 | Stop reason: SDUPTHER

## 2019-11-06 RX ORDER — AMLODIPINE BESYLATE 5 MG/1
TABLET ORAL
Qty: 72 TABLET | Refills: 2 | Status: SHIPPED | OUTPATIENT
Start: 2019-11-06 | End: 2019-11-07

## 2019-11-06 RX ORDER — FERROUS SULFATE TAB EC 324 MG (65 MG FE EQUIVALENT) 324 (65 FE) MG
324 TABLET DELAYED RESPONSE ORAL
Status: DISCONTINUED | OUTPATIENT
Start: 2019-11-07 | End: 2019-11-07 | Stop reason: HOSPADM

## 2019-11-06 RX ORDER — OXYCODONE HYDROCHLORIDE 5 MG/1
5-10 TABLET ORAL EVERY 4 HOURS PRN
Qty: 40 TABLET | Refills: 0 | Status: SHIPPED | OUTPATIENT
Start: 2019-11-06 | End: 2019-11-13

## 2019-11-06 RX ORDER — ONDANSETRON 2 MG/ML
4 INJECTION INTRAMUSCULAR; INTRAVENOUS
Status: DISCONTINUED | OUTPATIENT
Start: 2019-11-06 | End: 2019-11-06 | Stop reason: HOSPADM

## 2019-11-06 RX ORDER — MORPHINE SULFATE 2 MG/ML
2 INJECTION, SOLUTION INTRAMUSCULAR; INTRAVENOUS
Status: DISCONTINUED | OUTPATIENT
Start: 2019-11-06 | End: 2019-11-07 | Stop reason: HOSPADM

## 2019-11-06 RX ORDER — OXYCODONE HYDROCHLORIDE AND ACETAMINOPHEN 5; 325 MG/1; MG/1
1 TABLET ORAL PRN
Status: DISCONTINUED | OUTPATIENT
Start: 2019-11-06 | End: 2019-11-06 | Stop reason: HOSPADM

## 2019-11-06 RX ORDER — SODIUM CHLORIDE 450 MG/100ML
INJECTION, SOLUTION INTRAVENOUS CONTINUOUS
Status: DISCONTINUED | OUTPATIENT
Start: 2019-11-06 | End: 2019-11-07 | Stop reason: HOSPADM

## 2019-11-06 RX ORDER — SODIUM CHLORIDE 0.9 % (FLUSH) 0.9 %
10 SYRINGE (ML) INJECTION PRN
Status: DISCONTINUED | OUTPATIENT
Start: 2019-11-06 | End: 2019-11-07 | Stop reason: HOSPADM

## 2019-11-06 RX ORDER — LABETALOL HYDROCHLORIDE 5 MG/ML
10 INJECTION, SOLUTION INTRAVENOUS ONCE
Status: COMPLETED | OUTPATIENT
Start: 2019-11-06 | End: 2019-11-06

## 2019-11-06 RX ORDER — PHENYLEPHRINE HCL IN 0.9% NACL 1 MG/10 ML
SYRINGE (ML) INTRAVENOUS PRN
Status: DISCONTINUED | OUTPATIENT
Start: 2019-11-06 | End: 2019-11-06 | Stop reason: SDUPTHER

## 2019-11-06 RX ORDER — OXYCODONE HYDROCHLORIDE AND ACETAMINOPHEN 5; 325 MG/1; MG/1
2 TABLET ORAL PRN
Status: DISCONTINUED | OUTPATIENT
Start: 2019-11-06 | End: 2019-11-06 | Stop reason: HOSPADM

## 2019-11-06 RX ORDER — M-VIT,TX,IRON,MINS/CALC/FOLIC 27MG-0.4MG
1 TABLET ORAL DAILY
Status: DISCONTINUED | OUTPATIENT
Start: 2019-11-07 | End: 2019-11-07 | Stop reason: HOSPADM

## 2019-11-06 RX ORDER — FENTANYL CITRATE 50 UG/ML
INJECTION, SOLUTION INTRAMUSCULAR; INTRAVENOUS PRN
Status: DISCONTINUED | OUTPATIENT
Start: 2019-11-06 | End: 2019-11-06 | Stop reason: SDUPTHER

## 2019-11-06 RX ORDER — AMLODIPINE BESYLATE 5 MG/1
5 TABLET ORAL DAILY
Status: DISCONTINUED | OUTPATIENT
Start: 2019-11-07 | End: 2019-11-07

## 2019-11-06 RX ORDER — FLUTICASONE PROPIONATE 50 MCG
1 SPRAY, SUSPENSION (ML) NASAL PRN
Status: DISCONTINUED | OUTPATIENT
Start: 2019-11-06 | End: 2019-11-07 | Stop reason: HOSPADM

## 2019-11-06 RX ORDER — OXYCODONE HYDROCHLORIDE 5 MG/1
10 TABLET ORAL ONCE
Status: COMPLETED | OUTPATIENT
Start: 2019-11-06 | End: 2019-11-06

## 2019-11-06 RX ORDER — ASPIRIN 81 MG/1
81 TABLET ORAL DAILY
Qty: 30 TABLET | Refills: 3 | Status: SHIPPED | OUTPATIENT
Start: 2019-11-06

## 2019-11-06 RX ORDER — SENNA AND DOCUSATE SODIUM 50; 8.6 MG/1; MG/1
1 TABLET, FILM COATED ORAL 2 TIMES DAILY
Status: DISCONTINUED | OUTPATIENT
Start: 2019-11-06 | End: 2019-11-07 | Stop reason: HOSPADM

## 2019-11-06 RX ORDER — NICOTINE POLACRILEX 4 MG
15 LOZENGE BUCCAL PRN
Status: DISCONTINUED | OUTPATIENT
Start: 2019-11-06 | End: 2019-11-07 | Stop reason: HOSPADM

## 2019-11-06 RX ORDER — ACETAMINOPHEN 325 MG/1
650 TABLET ORAL EVERY 6 HOURS
Status: DISCONTINUED | OUTPATIENT
Start: 2019-11-06 | End: 2019-11-07 | Stop reason: HOSPADM

## 2019-11-06 RX ORDER — ROCURONIUM BROMIDE 10 MG/ML
INJECTION, SOLUTION INTRAVENOUS PRN
Status: DISCONTINUED | OUTPATIENT
Start: 2019-11-06 | End: 2019-11-06 | Stop reason: SDUPTHER

## 2019-11-06 RX ORDER — ROSUVASTATIN CALCIUM 10 MG/1
10 TABLET, COATED ORAL EVERY EVENING
Status: DISCONTINUED | OUTPATIENT
Start: 2019-11-06 | End: 2019-11-07 | Stop reason: HOSPADM

## 2019-11-06 RX ADMIN — DOCUSATE SODIUM 100 MG: 100 CAPSULE, LIQUID FILLED ORAL at 20:39

## 2019-11-06 RX ADMIN — Medication 100 MCG: at 10:20

## 2019-11-06 RX ADMIN — SODIUM CHLORIDE: 4.5 INJECTION, SOLUTION INTRAVENOUS at 14:05

## 2019-11-06 RX ADMIN — SENNOSIDES AND DOCUSATE SODIUM 1 TABLET: 8.6; 5 TABLET ORAL at 14:16

## 2019-11-06 RX ADMIN — ACETAMINOPHEN 650 MG: 325 TABLET ORAL at 14:16

## 2019-11-06 RX ADMIN — Medication 100 MCG: at 11:18

## 2019-11-06 RX ADMIN — OXYCODONE HYDROCHLORIDE 10 MG: 10 TABLET ORAL at 14:17

## 2019-11-06 RX ADMIN — OXYCODONE HYDROCHLORIDE 10 MG: 5 TABLET ORAL at 09:28

## 2019-11-06 RX ADMIN — LABETALOL HYDROCHLORIDE 10 MG: 5 INJECTION INTRAVENOUS at 12:31

## 2019-11-06 RX ADMIN — PROPOFOL 50 MG: 10 INJECTION, EMULSION INTRAVENOUS at 10:36

## 2019-11-06 RX ADMIN — DOCUSATE SODIUM 100 MG: 100 CAPSULE, LIQUID FILLED ORAL at 14:16

## 2019-11-06 RX ADMIN — GLYCOPYRROLATE 0.2 MG: 0.2 INJECTION, SOLUTION INTRAMUSCULAR; INTRAVENOUS at 11:29

## 2019-11-06 RX ADMIN — SODIUM CHLORIDE, PRESERVATIVE FREE 10 ML: 5 INJECTION INTRAVENOUS at 20:41

## 2019-11-06 RX ADMIN — SENNOSIDES AND DOCUSATE SODIUM 1 TABLET: 8.6; 5 TABLET ORAL at 20:39

## 2019-11-06 RX ADMIN — INSULIN GLARGINE 20 UNITS: 100 INJECTION, SOLUTION SUBCUTANEOUS at 23:00

## 2019-11-06 RX ADMIN — Medication 100 MCG: at 11:04

## 2019-11-06 RX ADMIN — SODIUM CHLORIDE: 9 INJECTION, SOLUTION INTRAVENOUS at 09:30

## 2019-11-06 RX ADMIN — ROSUVASTATIN CALCIUM 10 MG: 10 TABLET, FILM COATED ORAL at 20:39

## 2019-11-06 RX ADMIN — PROPOFOL 100 MG: 10 INJECTION, EMULSION INTRAVENOUS at 10:06

## 2019-11-06 RX ADMIN — ACETAMINOPHEN 650 MG: 325 TABLET ORAL at 20:39

## 2019-11-06 RX ADMIN — FENTANYL CITRATE 100 MCG: 50 INJECTION INTRAMUSCULAR; INTRAVENOUS at 10:30

## 2019-11-06 RX ADMIN — PROPOFOL 50 MG: 10 INJECTION, EMULSION INTRAVENOUS at 10:29

## 2019-11-06 RX ADMIN — ROCURONIUM BROMIDE 40 MG: 10 INJECTION INTRAVENOUS at 10:06

## 2019-11-06 RX ADMIN — LIDOCAINE HYDROCHLORIDE 100 MG: 20 INJECTION, SOLUTION EPIDURAL; INFILTRATION; INTRACAUDAL; PERINEURAL at 10:06

## 2019-11-06 RX ADMIN — MIDAZOLAM 2 MG: 1 INJECTION INTRAMUSCULAR; INTRAVENOUS at 10:06

## 2019-11-06 RX ADMIN — Medication 2 G: at 09:45

## 2019-11-06 RX ADMIN — Medication 2 G: at 18:54

## 2019-11-06 RX ADMIN — SODIUM BICARBONATE 650 MG: 650 TABLET ORAL at 20:39

## 2019-11-06 RX ADMIN — SODIUM CHLORIDE: 9 INJECTION, SOLUTION INTRAVENOUS at 11:30

## 2019-11-06 RX ADMIN — SODIUM CHLORIDE: 4.5 INJECTION, SOLUTION INTRAVENOUS at 20:41

## 2019-11-06 RX ADMIN — Medication 100 MCG: at 10:44

## 2019-11-06 RX ADMIN — HYDROMORPHONE HYDROCHLORIDE 0.5 MG: 1 INJECTION, SOLUTION INTRAMUSCULAR; INTRAVENOUS; SUBCUTANEOUS at 11:54

## 2019-11-06 RX ADMIN — GLYCOPYRROLATE 0.2 MG: 0.2 INJECTION, SOLUTION INTRAMUSCULAR; INTRAVENOUS at 11:32

## 2019-11-06 RX ADMIN — CARVEDILOL 6.25 MG: 6.25 TABLET, FILM COATED ORAL at 20:39

## 2019-11-06 RX ADMIN — Medication 100 MCG: at 11:01

## 2019-11-06 RX ADMIN — Medication 30 MG: at 10:06

## 2019-11-06 RX ADMIN — HYDROMORPHONE HYDROCHLORIDE 0.5 MG: 1 INJECTION, SOLUTION INTRAMUSCULAR; INTRAVENOUS; SUBCUTANEOUS at 12:28

## 2019-11-06 ASSESSMENT — PAIN DESCRIPTION - LOCATION
LOCATION: HIP

## 2019-11-06 ASSESSMENT — PULMONARY FUNCTION TESTS
PIF_VALUE: 13
PIF_VALUE: 14
PIF_VALUE: 13
PIF_VALUE: 14
PIF_VALUE: 14
PIF_VALUE: 30
PIF_VALUE: 12
PIF_VALUE: 21
PIF_VALUE: 1
PIF_VALUE: 12
PIF_VALUE: 12
PIF_VALUE: 14
PIF_VALUE: 15
PIF_VALUE: 14
PIF_VALUE: 24
PIF_VALUE: 29
PIF_VALUE: 14
PIF_VALUE: 39
PIF_VALUE: 14
PIF_VALUE: 11
PIF_VALUE: 12
PIF_VALUE: 28
PIF_VALUE: 26
PIF_VALUE: 11
PIF_VALUE: 12
PIF_VALUE: 13
PIF_VALUE: 0
PIF_VALUE: 13
PIF_VALUE: 12
PIF_VALUE: 13
PIF_VALUE: 13
PIF_VALUE: 4
PIF_VALUE: 12
PIF_VALUE: 15
PIF_VALUE: 2
PIF_VALUE: 0
PIF_VALUE: 28
PIF_VALUE: 26
PIF_VALUE: 0
PIF_VALUE: 2
PIF_VALUE: 12
PIF_VALUE: 24
PIF_VALUE: 15
PIF_VALUE: 13
PIF_VALUE: 14
PIF_VALUE: 18
PIF_VALUE: 12
PIF_VALUE: 13
PIF_VALUE: 26
PIF_VALUE: 14
PIF_VALUE: 22
PIF_VALUE: 13
PIF_VALUE: 14
PIF_VALUE: 12
PIF_VALUE: 12
PIF_VALUE: 1
PIF_VALUE: 27
PIF_VALUE: 22
PIF_VALUE: 12
PIF_VALUE: 19
PIF_VALUE: 23
PIF_VALUE: 18
PIF_VALUE: 14
PIF_VALUE: 2
PIF_VALUE: 14
PIF_VALUE: 14
PIF_VALUE: 0
PIF_VALUE: 14
PIF_VALUE: 12
PIF_VALUE: 24
PIF_VALUE: 12
PIF_VALUE: 14
PIF_VALUE: 13
PIF_VALUE: 13
PIF_VALUE: 14
PIF_VALUE: 20
PIF_VALUE: 14
PIF_VALUE: 12
PIF_VALUE: 12
PIF_VALUE: 13
PIF_VALUE: 14
PIF_VALUE: 13
PIF_VALUE: 0
PIF_VALUE: 22
PIF_VALUE: 11
PIF_VALUE: 22
PIF_VALUE: 12
PIF_VALUE: 14
PIF_VALUE: 19
PIF_VALUE: 0
PIF_VALUE: 14
PIF_VALUE: 14
PIF_VALUE: 12
PIF_VALUE: 13
PIF_VALUE: 15
PIF_VALUE: 8
PIF_VALUE: 12
PIF_VALUE: 25
PIF_VALUE: 12

## 2019-11-06 ASSESSMENT — PAIN DESCRIPTION - FREQUENCY
FREQUENCY: CONTINUOUS

## 2019-11-06 ASSESSMENT — PAIN DESCRIPTION - ORIENTATION
ORIENTATION: LEFT

## 2019-11-06 ASSESSMENT — PAIN DESCRIPTION - DESCRIPTORS
DESCRIPTORS: ACHING;CONSTANT

## 2019-11-06 ASSESSMENT — PAIN DESCRIPTION - ONSET
ONSET: ON-GOING

## 2019-11-06 ASSESSMENT — PAIN SCALES - GENERAL
PAINLEVEL_OUTOF10: 7
PAINLEVEL_OUTOF10: 5
PAINLEVEL_OUTOF10: 7
PAINLEVEL_OUTOF10: 0
PAINLEVEL_OUTOF10: 0
PAINLEVEL_OUTOF10: 7
PAINLEVEL_OUTOF10: 5

## 2019-11-06 ASSESSMENT — PAIN DESCRIPTION - PAIN TYPE
TYPE: ACUTE PAIN;SURGICAL PAIN
TYPE: SURGICAL PAIN
TYPE: SURGICAL PAIN
TYPE: ACUTE PAIN;SURGICAL PAIN

## 2019-11-06 ASSESSMENT — PAIN DESCRIPTION - PROGRESSION
CLINICAL_PROGRESSION: NOT CHANGED
CLINICAL_PROGRESSION: GRADUALLY IMPROVING
CLINICAL_PROGRESSION: NOT CHANGED
CLINICAL_PROGRESSION: NOT CHANGED

## 2019-11-06 ASSESSMENT — PAIN - FUNCTIONAL ASSESSMENT
PAIN_FUNCTIONAL_ASSESSMENT: PREVENTS OR INTERFERES SOME ACTIVE ACTIVITIES AND ADLS
PAIN_FUNCTIONAL_ASSESSMENT: 0-10
PAIN_FUNCTIONAL_ASSESSMENT: ACTIVITIES ARE NOT PREVENTED
PAIN_FUNCTIONAL_ASSESSMENT: PREVENTS OR INTERFERES SOME ACTIVE ACTIVITIES AND ADLS

## 2019-11-06 ASSESSMENT — ENCOUNTER SYMPTOMS: SHORTNESS OF BREATH: 1

## 2019-11-07 VITALS
BODY MASS INDEX: 23.46 KG/M2 | HEIGHT: 68 IN | RESPIRATION RATE: 16 BRPM | HEART RATE: 71 BPM | SYSTOLIC BLOOD PRESSURE: 151 MMHG | OXYGEN SATURATION: 97 % | TEMPERATURE: 98.6 F | DIASTOLIC BLOOD PRESSURE: 61 MMHG | WEIGHT: 154.76 LBS

## 2019-11-07 LAB
ESTIMATED AVERAGE GLUCOSE: 148.5 MG/DL
GLUCOSE BLD-MCNC: 141 MG/DL (ref 70–99)
GLUCOSE BLD-MCNC: 240 MG/DL (ref 70–99)
HBA1C MFR BLD: 6.8 %
HCT VFR BLD CALC: 33.3 % (ref 36–48)
HEMOGLOBIN: 11.2 G/DL (ref 12–16)
PERFORMED ON: ABNORMAL
PERFORMED ON: ABNORMAL

## 2019-11-07 PROCEDURE — 36415 COLL VENOUS BLD VENIPUNCTURE: CPT

## 2019-11-07 PROCEDURE — 2580000003 HC RX 258: Performed by: ORTHOPAEDIC SURGERY

## 2019-11-07 PROCEDURE — 97535 SELF CARE MNGMENT TRAINING: CPT

## 2019-11-07 PROCEDURE — 97530 THERAPEUTIC ACTIVITIES: CPT

## 2019-11-07 PROCEDURE — 6370000000 HC RX 637 (ALT 250 FOR IP): Performed by: ORTHOPAEDIC SURGERY

## 2019-11-07 PROCEDURE — 85014 HEMATOCRIT: CPT

## 2019-11-07 PROCEDURE — 6360000002 HC RX W HCPCS: Performed by: ORTHOPAEDIC SURGERY

## 2019-11-07 PROCEDURE — 85018 HEMOGLOBIN: CPT

## 2019-11-07 PROCEDURE — 97110 THERAPEUTIC EXERCISES: CPT

## 2019-11-07 RX ADMIN — FERROUS SULFATE TAB EC 324 MG (65 MG FE EQUIVALENT) 324 MG: 324 (65 FE) TABLET DELAYED RESPONSE at 09:09

## 2019-11-07 RX ADMIN — ENOXAPARIN SODIUM 30 MG: 30 INJECTION SUBCUTANEOUS at 09:09

## 2019-11-07 RX ADMIN — ONDANSETRON 4 MG: 2 INJECTION INTRAMUSCULAR; INTRAVENOUS at 10:50

## 2019-11-07 RX ADMIN — AMIODARONE HYDROCHLORIDE 100 MG: 200 TABLET ORAL at 09:09

## 2019-11-07 RX ADMIN — CARVEDILOL 6.25 MG: 6.25 TABLET, FILM COATED ORAL at 09:09

## 2019-11-07 RX ADMIN — Medication 2 G: at 02:26

## 2019-11-07 RX ADMIN — DOCUSATE SODIUM 100 MG: 100 CAPSULE, LIQUID FILLED ORAL at 09:10

## 2019-11-07 RX ADMIN — SODIUM CHLORIDE: 4.5 INJECTION, SOLUTION INTRAVENOUS at 07:52

## 2019-11-07 RX ADMIN — SODIUM BICARBONATE 650 MG: 650 TABLET ORAL at 09:10

## 2019-11-07 RX ADMIN — SENNOSIDES AND DOCUSATE SODIUM 1 TABLET: 8.6; 5 TABLET ORAL at 09:09

## 2019-11-07 RX ADMIN — ACETAMINOPHEN 650 MG: 325 TABLET ORAL at 02:26

## 2019-11-07 RX ADMIN — ACETAMINOPHEN 650 MG: 325 TABLET ORAL at 16:10

## 2019-11-07 RX ADMIN — OXYCODONE HYDROCHLORIDE 10 MG: 10 TABLET ORAL at 09:09

## 2019-11-07 RX ADMIN — MULTIPLE VITAMINS W/ MINERALS TAB 1 TABLET: TAB at 09:10

## 2019-11-07 RX ADMIN — MELATONIN 1000 UNITS: at 09:10

## 2019-11-07 RX ADMIN — OXYCODONE HYDROCHLORIDE 10 MG: 10 TABLET ORAL at 02:26

## 2019-11-07 RX ADMIN — ACETAMINOPHEN 650 MG: 325 TABLET ORAL at 09:09

## 2019-11-07 ASSESSMENT — PAIN DESCRIPTION - ONSET
ONSET: ON-GOING

## 2019-11-07 ASSESSMENT — PAIN DESCRIPTION - PROGRESSION
CLINICAL_PROGRESSION: GRADUALLY WORSENING
CLINICAL_PROGRESSION: GRADUALLY WORSENING
CLINICAL_PROGRESSION: GRADUALLY IMPROVING

## 2019-11-07 ASSESSMENT — PAIN DESCRIPTION - ORIENTATION
ORIENTATION: LEFT

## 2019-11-07 ASSESSMENT — PAIN - FUNCTIONAL ASSESSMENT
PAIN_FUNCTIONAL_ASSESSMENT: PREVENTS OR INTERFERES SOME ACTIVE ACTIVITIES AND ADLS

## 2019-11-07 ASSESSMENT — PAIN SCALES - GENERAL
PAINLEVEL_OUTOF10: 0
PAINLEVEL_OUTOF10: 0
PAINLEVEL_OUTOF10: 9
PAINLEVEL_OUTOF10: 8
PAINLEVEL_OUTOF10: 3

## 2019-11-07 ASSESSMENT — PAIN DESCRIPTION - FREQUENCY
FREQUENCY: CONTINUOUS

## 2019-11-07 ASSESSMENT — PAIN DESCRIPTION - DESCRIPTORS
DESCRIPTORS: ACHING

## 2019-11-07 ASSESSMENT — PAIN DESCRIPTION - LOCATION
LOCATION: HIP

## 2019-11-07 ASSESSMENT — PAIN DESCRIPTION - PAIN TYPE
TYPE: ACUTE PAIN

## 2019-11-12 ENCOUNTER — TELEPHONE (OUTPATIENT)
Dept: ORTHOPEDICS UNIT | Age: 79
End: 2019-11-12

## 2019-11-18 ENCOUNTER — OFFICE VISIT (OUTPATIENT)
Dept: ORTHOPEDIC SURGERY | Age: 79
End: 2019-11-18

## 2019-11-18 VITALS — TEMPERATURE: 98.1 F | HEIGHT: 68 IN | BODY MASS INDEX: 23.46 KG/M2 | WEIGHT: 154.76 LBS

## 2019-11-18 DIAGNOSIS — Z96.642 HX OF TOTAL HIP ARTHROPLASTY, LEFT: ICD-10-CM

## 2019-11-18 DIAGNOSIS — Z96.642 HX OF TOTAL HIP ARTHROPLASTY, LEFT: Primary | ICD-10-CM

## 2019-11-18 PROCEDURE — 99024 POSTOP FOLLOW-UP VISIT: CPT | Performed by: PHYSICIAN ASSISTANT

## 2019-11-20 ENCOUNTER — TELEPHONE (OUTPATIENT)
Dept: ORTHOPEDIC SURGERY | Age: 79
End: 2019-11-20

## 2019-12-04 ENCOUNTER — TELEPHONE (OUTPATIENT)
Dept: CARDIOLOGY CLINIC | Age: 79
End: 2019-12-04

## 2019-12-27 ENCOUNTER — HOSPITAL ENCOUNTER (OUTPATIENT)
Dept: CT IMAGING | Age: 79
Discharge: HOME | End: 2019-12-27
Payer: MEDICARE

## 2019-12-27 DIAGNOSIS — C66.1 MALIGNANT NEOPLASM OF RIGHT URETER (HCC): ICD-10-CM

## 2019-12-27 LAB
GFR AFRICAN AMERICAN: 33
GFR NON-AFRICAN AMERICAN: 27
PERFORMED ON: ABNORMAL
POC CREATININE: 1.8 MG/DL (ref 0.6–1.2)
POC SAMPLE TYPE: ABNORMAL

## 2019-12-27 PROCEDURE — 6360000004 HC RX CONTRAST MEDICATION: Performed by: PREVENTIVE MEDICINE

## 2019-12-27 PROCEDURE — 74176 CT ABD & PELVIS W/O CONTRAST: CPT

## 2019-12-27 PROCEDURE — 82565 ASSAY OF CREATININE: CPT

## 2019-12-27 RX ADMIN — IOHEXOL 50 ML: 240 INJECTION, SOLUTION INTRATHECAL; INTRAVASCULAR; INTRAVENOUS; ORAL at 13:41

## 2019-12-27 RX ADMIN — IOPAMIDOL 75 ML: 755 INJECTION, SOLUTION INTRAVENOUS at 13:41

## 2020-01-07 ENCOUNTER — NURSE ONLY (OUTPATIENT)
Dept: CARDIOLOGY CLINIC | Age: 80
End: 2020-01-07
Payer: MEDICARE

## 2020-01-07 ENCOUNTER — TELEPHONE (OUTPATIENT)
Dept: CARDIOLOGY CLINIC | Age: 80
End: 2020-01-07

## 2020-01-07 PROCEDURE — 93295 DEV INTERROG REMOTE 1/2/MLT: CPT | Performed by: INTERNAL MEDICINE

## 2020-01-07 PROCEDURE — 93296 REM INTERROG EVL PM/IDS: CPT | Performed by: INTERNAL MEDICINE

## 2020-01-07 PROCEDURE — 93297 REM INTERROG DEV EVAL ICPMS: CPT | Performed by: INTERNAL MEDICINE

## 2020-01-07 NOTE — PROGRESS NOTES
Remote/[employer] ICD interrogation shows normal device function. Thoracic impedance indicates an increase in fluids since end of November. - will send msg to [facility] RN/MA  No new episodes/arrhythmias recorded. Dr. Lindsey Avalos to review interrogation. See interrogation/Paceart report for further details.

## 2020-01-07 NOTE — LETTER
[facility] [phone]  [address] [phone]    Pacemaker/Defibrillator Clinic          01/07/20        Kimberly Hunt  [address] [place] [id number]        Dear Kimberly Hunt    This letter is to inform you that we received the transmission from your monitor at home that checks your implanted heart device. The next date your monitor will automatically transmit will be 4/9/20. If your report needs attention we will notify you. Your device and monitor are wireless and most transmit cellularly, but please periodically check your monitor is still plugged in to the electrical outlet. If you still use the telephone land line to send please ensure the connection to the phone rebeca is secure. This will help to ensure successful automatic transmissions in the future. Also, the monitor needs to be close to you while sleeping at night. Please be aware that the remote device transmission sites are periodically monitored only during regular business hours during which simultaneous in-office device clinics are being run. If your transmission requires attention, we will contact you as soon as possible. Thank you.             [facility]

## 2020-01-15 ENCOUNTER — APPOINTMENT (OUTPATIENT)
Dept: CT IMAGING | Age: 80
DRG: 690 | End: 2020-01-15
Payer: MEDICARE

## 2020-01-15 ENCOUNTER — APPOINTMENT (OUTPATIENT)
Dept: GENERAL RADIOLOGY | Age: 80
DRG: 690 | End: 2020-01-15
Payer: MEDICARE

## 2020-01-15 ENCOUNTER — HOSPITAL ENCOUNTER (INPATIENT)
Age: 80
LOS: 2 days | Discharge: HOME | DRG: 690 | End: 2020-01-17
Attending: EMERGENCY MEDICINE | Admitting: HOSPITALIST
Payer: MEDICARE

## 2020-01-15 ENCOUNTER — APPOINTMENT (OUTPATIENT)
Dept: INTERVENTIONAL RADIOLOGY/VASCULAR | Age: 80
DRG: 690 | End: 2020-01-15
Payer: MEDICARE

## 2020-01-15 LAB
ALBUMIN SERPL-MCNC: 4.3 G/DL (ref 3.4–5)
ALP BLD-CCNC: 67 U/L (ref 40–129)
ALT SERPL-CCNC: 12 U/L (ref 10–40)
ANION GAP SERPL CALCULATED.3IONS-SCNC: 13 MMOL/L (ref 3–16)
AST SERPL-CCNC: 19 U/L (ref 15–37)
BACTERIA: ABNORMAL /HPF
BASOPHILS ABSOLUTE: 0 K/UL (ref 0–0.2)
BASOPHILS RELATIVE PERCENT: 0.5 %
BILIRUB SERPL-MCNC: 0.3 MG/DL (ref 0–1)
BILIRUBIN DIRECT: <0.2 MG/DL (ref 0–0.3)
BILIRUBIN URINE: NEGATIVE
BILIRUBIN, INDIRECT: NORMAL MG/DL (ref 0–1)
BLOOD, URINE: NEGATIVE
BUN BLDV-MCNC: 27 MG/DL (ref 7–20)
CALCIUM SERPL-MCNC: 9.4 MG/DL (ref 8.3–10.6)
CHLORIDE BLD-SCNC: 107 MMOL/L (ref 99–110)
CLARITY: ABNORMAL
CO2: 22 MMOL/L (ref 21–32)
COLOR: YELLOW
CREAT SERPL-MCNC: 1.5 MG/DL (ref 0.6–1.2)
CRYSTALS, UA: ABNORMAL /HPF
EKG ATRIAL RATE: 65 BPM
EKG DIAGNOSIS: NORMAL
EKG P AXIS: 50 DEGREES
EKG P-R INTERVAL: 158 MS
EKG Q-T INTERVAL: 438 MS
EKG QRS DURATION: 122 MS
EKG QTC CALCULATION (BAZETT): 455 MS
EKG R AXIS: 5 DEGREES
EKG T AXIS: 35 DEGREES
EKG VENTRICULAR RATE: 65 BPM
EOSINOPHILS ABSOLUTE: 0.1 K/UL (ref 0–0.6)
EOSINOPHILS RELATIVE PERCENT: 1.5 %
EPITHELIAL CELLS, UA: 4 /HPF (ref 0–5)
GFR AFRICAN AMERICAN: 40
GFR NON-AFRICAN AMERICAN: 33
GLUCOSE BLD-MCNC: 129 MG/DL (ref 70–99)
GLUCOSE BLD-MCNC: 186 MG/DL (ref 70–99)
GLUCOSE BLD-MCNC: 79 MG/DL (ref 70–99)
GLUCOSE BLD-MCNC: 99 MG/DL (ref 70–99)
GLUCOSE BLD-MCNC: 99 MG/DL (ref 70–99)
GLUCOSE URINE: NEGATIVE MG/DL
HCT VFR BLD CALC: 35.4 % (ref 36–48)
HEMOGLOBIN: 11.9 G/DL (ref 12–16)
HYALINE CASTS: 4 /LPF (ref 0–8)
INR BLD: 0.97 (ref 0.86–1.14)
KETONES, URINE: NEGATIVE MG/DL
LACTIC ACID: 1.3 MMOL/L (ref 0.4–2)
LEUKOCYTE ESTERASE, URINE: ABNORMAL
LIPASE: 41 U/L (ref 13–60)
LYMPHOCYTES ABSOLUTE: 0.8 K/UL (ref 1–5.1)
LYMPHOCYTES RELATIVE PERCENT: 14.7 %
MCH RBC QN AUTO: 32.1 PG (ref 26–34)
MCHC RBC AUTO-ENTMCNC: 33.5 G/DL (ref 31–36)
MCV RBC AUTO: 95.7 FL (ref 80–100)
MICROSCOPIC EXAMINATION: YES
MONOCYTES ABSOLUTE: 0.4 K/UL (ref 0–1.3)
MONOCYTES RELATIVE PERCENT: 7.6 %
NEUTROPHILS ABSOLUTE: 4 K/UL (ref 1.7–7.7)
NEUTROPHILS RELATIVE PERCENT: 75.7 %
NITRITE, URINE: POSITIVE
PDW BLD-RTO: 13.9 % (ref 12.4–15.4)
PERFORMED ON: ABNORMAL
PERFORMED ON: NORMAL
PH UA: 8 (ref 5–8)
PLATELET # BLD: 190 K/UL (ref 135–450)
PMV BLD AUTO: 8 FL (ref 5–10.5)
POTASSIUM REFLEX MAGNESIUM: 4.7 MMOL/L (ref 3.5–5.1)
PRO-BNP: 502 PG/ML (ref 0–449)
PROTEIN UA: ABNORMAL MG/DL
PROTHROMBIN TIME: 11.2 SEC (ref 10–13.2)
RAPID INFLUENZA  B AGN: NEGATIVE
RAPID INFLUENZA A AGN: NEGATIVE
RBC # BLD: 3.7 M/UL (ref 4–5.2)
RBC UA: 1 /HPF (ref 0–4)
SODIUM BLD-SCNC: 142 MMOL/L (ref 136–145)
SPECIFIC GRAVITY UA: 1.01 (ref 1–1.03)
TOTAL PROTEIN: 7.1 G/DL (ref 6.4–8.2)
TROPONIN: <0.01 NG/ML
URINE REFLEX TO CULTURE: YES
URINE TYPE: ABNORMAL
UROBILINOGEN, URINE: 0.2 E.U./DL
WBC # BLD: 5.2 K/UL (ref 4–11)
WBC UA: 15 /HPF (ref 0–5)

## 2020-01-15 PROCEDURE — 6360000002 HC RX W HCPCS: Performed by: EMERGENCY MEDICINE

## 2020-01-15 PROCEDURE — 6370000000 HC RX 637 (ALT 250 FOR IP): Performed by: EMERGENCY MEDICINE

## 2020-01-15 PROCEDURE — 2709999900 IR GUIDED NEPHROSTOGRAM/URETEROGRAM EXISTING ACCESS

## 2020-01-15 PROCEDURE — 93010 ELECTROCARDIOGRAM REPORT: CPT | Performed by: INTERNAL MEDICINE

## 2020-01-15 PROCEDURE — 2580000003 HC RX 258: Performed by: EMERGENCY MEDICINE

## 2020-01-15 PROCEDURE — 6360000002 HC RX W HCPCS: Performed by: HOSPITALIST

## 2020-01-15 PROCEDURE — 6370000000 HC RX 637 (ALT 250 FOR IP): Performed by: HOSPITALIST

## 2020-01-15 PROCEDURE — 87086 URINE CULTURE/COLONY COUNT: CPT

## 2020-01-15 PROCEDURE — 99285 EMERGENCY DEPT VISIT HI MDM: CPT

## 2020-01-15 PROCEDURE — 93005 ELECTROCARDIOGRAM TRACING: CPT | Performed by: EMERGENCY MEDICINE

## 2020-01-15 PROCEDURE — 2580000003 HC RX 258: Performed by: HOSPITALIST

## 2020-01-15 PROCEDURE — 74425 UROGRAPHY ANTEGRADE RS&I: CPT

## 2020-01-15 PROCEDURE — 85025 COMPLETE CBC W/AUTO DIFF WBC: CPT

## 2020-01-15 PROCEDURE — 83605 ASSAY OF LACTIC ACID: CPT

## 2020-01-15 PROCEDURE — 1200000000 HC SEMI PRIVATE

## 2020-01-15 PROCEDURE — 83880 ASSAY OF NATRIURETIC PEPTIDE: CPT

## 2020-01-15 PROCEDURE — 70450 CT HEAD/BRAIN W/O DYE: CPT

## 2020-01-15 PROCEDURE — 87804 INFLUENZA ASSAY W/OPTIC: CPT

## 2020-01-15 PROCEDURE — 36415 COLL VENOUS BLD VENIPUNCTURE: CPT

## 2020-01-15 PROCEDURE — 74176 CT ABD & PELVIS W/O CONTRAST: CPT

## 2020-01-15 PROCEDURE — 71046 X-RAY EXAM CHEST 2 VIEWS: CPT

## 2020-01-15 PROCEDURE — 6360000004 HC RX CONTRAST MEDICATION: Performed by: UROLOGY

## 2020-01-15 PROCEDURE — 50690 INJECTION FOR URETER X-RAY: CPT

## 2020-01-15 PROCEDURE — 85610 PROTHROMBIN TIME: CPT

## 2020-01-15 PROCEDURE — 80048 BASIC METABOLIC PNL TOTAL CA: CPT

## 2020-01-15 PROCEDURE — 83690 ASSAY OF LIPASE: CPT

## 2020-01-15 PROCEDURE — 84484 ASSAY OF TROPONIN QUANT: CPT

## 2020-01-15 PROCEDURE — BT1GYZZ FLUOROSCOPY OF ILEAL LOOP, URETERS AND KIDNEYS USING OTHER CONTRAST: ICD-10-PCS | Performed by: UROLOGY

## 2020-01-15 PROCEDURE — 81001 URINALYSIS AUTO W/SCOPE: CPT

## 2020-01-15 PROCEDURE — 87077 CULTURE AEROBIC IDENTIFY: CPT

## 2020-01-15 PROCEDURE — 96365 THER/PROPH/DIAG IV INF INIT: CPT

## 2020-01-15 PROCEDURE — 87186 SC STD MICRODIL/AGAR DIL: CPT

## 2020-01-15 PROCEDURE — 80076 HEPATIC FUNCTION PANEL: CPT

## 2020-01-15 RX ORDER — ALPRAZOLAM 0.25 MG/1
0.12 TABLET ORAL NIGHTLY PRN
Status: CANCELLED | OUTPATIENT
Start: 2020-01-15

## 2020-01-15 RX ORDER — OXYCODONE HYDROCHLORIDE 5 MG/1
5 CAPSULE ORAL EVERY 4 HOURS PRN
COMMUNITY
End: 2020-07-01 | Stop reason: ALTCHOICE

## 2020-01-15 RX ORDER — SODIUM CHLORIDE 0.9 % (FLUSH) 0.9 %
10 SYRINGE (ML) INJECTION PRN
Status: DISCONTINUED | OUTPATIENT
Start: 2020-01-15 | End: 2020-01-17 | Stop reason: HOSPADM

## 2020-01-15 RX ORDER — ROSUVASTATIN CALCIUM 10 MG/1
10 TABLET, COATED ORAL NIGHTLY
Status: DISCONTINUED | OUTPATIENT
Start: 2020-01-15 | End: 2020-01-17 | Stop reason: HOSPADM

## 2020-01-15 RX ORDER — 0.9 % SODIUM CHLORIDE 0.9 %
500 INTRAVENOUS SOLUTION INTRAVENOUS ONCE
Status: COMPLETED | OUTPATIENT
Start: 2020-01-15 | End: 2020-01-15

## 2020-01-15 RX ORDER — VITAMIN B COMPLEX
1000 TABLET ORAL DAILY
Status: DISCONTINUED | OUTPATIENT
Start: 2020-01-15 | End: 2020-01-17 | Stop reason: HOSPADM

## 2020-01-15 RX ORDER — M-VIT,TX,IRON,MINS/CALC/FOLIC 27MG-0.4MG
1 TABLET ORAL DAILY
Status: DISCONTINUED | OUTPATIENT
Start: 2020-01-15 | End: 2020-01-17 | Stop reason: HOSPADM

## 2020-01-15 RX ORDER — DEXTROSE MONOHYDRATE 25 G/50ML
12.5 INJECTION, SOLUTION INTRAVENOUS PRN
Status: DISCONTINUED | OUTPATIENT
Start: 2020-01-15 | End: 2020-01-17 | Stop reason: HOSPADM

## 2020-01-15 RX ORDER — BUPIVACAINE HYDROCHLORIDE 5 MG/ML
30 INJECTION, SOLUTION PERINEURAL ONCE
Status: DISCONTINUED | OUTPATIENT
Start: 2020-01-15 | End: 2020-01-15

## 2020-01-15 RX ORDER — FAMOTIDINE 20 MG/1
20 TABLET, FILM COATED ORAL DAILY
Status: DISCONTINUED | OUTPATIENT
Start: 2020-01-15 | End: 2020-01-17 | Stop reason: HOSPADM

## 2020-01-15 RX ORDER — POTASSIUM CHLORIDE 7.45 MG/ML
10 INJECTION INTRAVENOUS PRN
Status: DISCONTINUED | OUTPATIENT
Start: 2020-01-15 | End: 2020-01-17 | Stop reason: HOSPADM

## 2020-01-15 RX ORDER — SODIUM CHLORIDE 9 MG/ML
INJECTION, SOLUTION INTRAVENOUS CONTINUOUS
Status: DISCONTINUED | OUTPATIENT
Start: 2020-01-15 | End: 2020-01-17 | Stop reason: HOSPADM

## 2020-01-15 RX ORDER — ASPIRIN 81 MG/1
81 TABLET ORAL DAILY
Status: DISCONTINUED | OUTPATIENT
Start: 2020-01-15 | End: 2020-01-17 | Stop reason: HOSPADM

## 2020-01-15 RX ORDER — POTASSIUM CHLORIDE 20 MEQ/1
40 TABLET, EXTENDED RELEASE ORAL PRN
Status: DISCONTINUED | OUTPATIENT
Start: 2020-01-15 | End: 2020-01-17 | Stop reason: HOSPADM

## 2020-01-15 RX ORDER — ALPRAZOLAM 0.25 MG/1
0.25 TABLET ORAL NIGHTLY PRN
Status: DISCONTINUED | OUTPATIENT
Start: 2020-01-15 | End: 2020-01-17 | Stop reason: HOSPADM

## 2020-01-15 RX ORDER — FAMOTIDINE 20 MG/1
20 TABLET, FILM COATED ORAL 2 TIMES DAILY
Status: DISCONTINUED | OUTPATIENT
Start: 2020-01-15 | End: 2020-01-15 | Stop reason: DRUGHIGH

## 2020-01-15 RX ORDER — CARVEDILOL 6.25 MG/1
6.25 TABLET ORAL ONCE
Status: COMPLETED | OUTPATIENT
Start: 2020-01-15 | End: 2020-01-15

## 2020-01-15 RX ORDER — CARVEDILOL 6.25 MG/1
6.25 TABLET ORAL 2 TIMES DAILY WITH MEALS
Status: DISCONTINUED | OUTPATIENT
Start: 2020-01-15 | End: 2020-01-17 | Stop reason: HOSPADM

## 2020-01-15 RX ORDER — SODIUM CHLORIDE 0.9 % (FLUSH) 0.9 %
10 SYRINGE (ML) INJECTION EVERY 12 HOURS SCHEDULED
Status: DISCONTINUED | OUTPATIENT
Start: 2020-01-15 | End: 2020-01-17 | Stop reason: HOSPADM

## 2020-01-15 RX ORDER — SODIUM BICARBONATE 650 MG/1
650 TABLET ORAL 2 TIMES DAILY
Status: DISCONTINUED | OUTPATIENT
Start: 2020-01-15 | End: 2020-01-17 | Stop reason: HOSPADM

## 2020-01-15 RX ORDER — IODIXANOL 320 MG/ML
100 INJECTION, SOLUTION INTRAVASCULAR
Status: COMPLETED | OUTPATIENT
Start: 2020-01-15 | End: 2020-01-15

## 2020-01-15 RX ORDER — ONDANSETRON 2 MG/ML
4 INJECTION INTRAMUSCULAR; INTRAVENOUS EVERY 6 HOURS PRN
Status: DISCONTINUED | OUTPATIENT
Start: 2020-01-15 | End: 2020-01-17 | Stop reason: HOSPADM

## 2020-01-15 RX ORDER — INSULIN GLARGINE 100 [IU]/ML
20 INJECTION, SOLUTION SUBCUTANEOUS NIGHTLY
Status: DISCONTINUED | OUTPATIENT
Start: 2020-01-15 | End: 2020-01-16

## 2020-01-15 RX ORDER — FERROUS SULFATE TAB EC 324 MG (65 MG FE EQUIVALENT) 324 (65 FE) MG
324 TABLET DELAYED RESPONSE ORAL 2 TIMES DAILY WITH MEALS
Status: DISCONTINUED | OUTPATIENT
Start: 2020-01-15 | End: 2020-01-17 | Stop reason: HOSPADM

## 2020-01-15 RX ORDER — ACETAMINOPHEN 500 MG
1000 TABLET ORAL DAILY PRN
Status: DISCONTINUED | OUTPATIENT
Start: 2020-01-15 | End: 2020-01-16

## 2020-01-15 RX ORDER — DEXTROSE MONOHYDRATE 50 MG/ML
100 INJECTION, SOLUTION INTRAVENOUS PRN
Status: DISCONTINUED | OUTPATIENT
Start: 2020-01-15 | End: 2020-01-17 | Stop reason: HOSPADM

## 2020-01-15 RX ORDER — FLUTICASONE PROPIONATE 50 MCG
1 SPRAY, SUSPENSION (ML) NASAL PRN
Status: DISCONTINUED | OUTPATIENT
Start: 2020-01-15 | End: 2020-01-17 | Stop reason: HOSPADM

## 2020-01-15 RX ORDER — AMIODARONE HYDROCHLORIDE 200 MG/1
100 TABLET ORAL DAILY
Status: DISCONTINUED | OUTPATIENT
Start: 2020-01-16 | End: 2020-01-17 | Stop reason: HOSPADM

## 2020-01-15 RX ORDER — NICOTINE POLACRILEX 4 MG
15 LOZENGE BUCCAL PRN
Status: DISCONTINUED | OUTPATIENT
Start: 2020-01-15 | End: 2020-01-17 | Stop reason: HOSPADM

## 2020-01-15 RX ADMIN — VITAMIN D, TAB 1000IU (100/BT) 1000 UNITS: 25 TAB at 15:42

## 2020-01-15 RX ADMIN — INSULIN GLARGINE 20 UNITS: 100 INJECTION, SOLUTION SUBCUTANEOUS at 23:29

## 2020-01-15 RX ADMIN — ACETAMINOPHEN 1000 MG: 325 TABLET ORAL at 15:42

## 2020-01-15 RX ADMIN — SODIUM CHLORIDE: 9 INJECTION, SOLUTION INTRAVENOUS at 15:43

## 2020-01-15 RX ADMIN — ENOXAPARIN SODIUM 40 MG: 40 INJECTION SUBCUTANEOUS at 22:31

## 2020-01-15 RX ADMIN — SODIUM CHLORIDE 500 ML: 9 INJECTION, SOLUTION INTRAVENOUS at 10:33

## 2020-01-15 RX ADMIN — CARVEDILOL 6.25 MG: 6.25 TABLET, FILM COATED ORAL at 15:42

## 2020-01-15 RX ADMIN — INSULIN LISPRO 2 UNITS: 100 INJECTION, SOLUTION INTRAVENOUS; SUBCUTANEOUS at 18:27

## 2020-01-15 RX ADMIN — CEFTRIAXONE 1 G: 1 INJECTION, POWDER, FOR SOLUTION INTRAMUSCULAR; INTRAVENOUS at 10:33

## 2020-01-15 RX ADMIN — FAMOTIDINE 20 MG: 20 TABLET, FILM COATED ORAL at 15:42

## 2020-01-15 RX ADMIN — FERROUS SULFATE TAB EC 324 MG (65 MG FE EQUIVALENT) 324 MG: 324 (65 FE) TABLET DELAYED RESPONSE at 17:59

## 2020-01-15 RX ADMIN — SODIUM BICARBONATE 650 MG: 650 TABLET ORAL at 22:31

## 2020-01-15 RX ADMIN — ASPIRIN 81 MG: 81 TABLET, COATED ORAL at 15:43

## 2020-01-15 RX ADMIN — ALPRAZOLAM 0.25 MG: 0.25 TABLET ORAL at 22:31

## 2020-01-15 RX ADMIN — MULTIPLE VITAMINS W/ MINERALS TAB 1 TABLET: TAB at 15:42

## 2020-01-15 RX ADMIN — ROSUVASTATIN CALCIUM 10 MG: 10 TABLET, FILM COATED ORAL at 22:31

## 2020-01-15 RX ADMIN — IODIXANOL 25 ML: 320 INJECTION, SOLUTION INTRAVASCULAR at 13:43

## 2020-01-15 RX ADMIN — CARVEDILOL 6.25 MG: 6.25 TABLET, FILM COATED ORAL at 10:31

## 2020-01-15 ASSESSMENT — ENCOUNTER SYMPTOMS
EYE PAIN: 0
PHOTOPHOBIA: 0
ABDOMINAL DISTENTION: 0
CHOKING: 0
STRIDOR: 0
VOMITING: 1
SHORTNESS OF BREATH: 0
WHEEZING: 0
APNEA: 0
EYE REDNESS: 0
CHEST TIGHTNESS: 0
ABDOMINAL PAIN: 1
EYE ITCHING: 0
COUGH: 1
NAUSEA: 1
BACK PAIN: 1
EYE DISCHARGE: 0

## 2020-01-15 ASSESSMENT — PAIN DESCRIPTION - LOCATION: LOCATION: BACK

## 2020-01-15 ASSESSMENT — PAIN SCALES - GENERAL
PAINLEVEL_OUTOF10: 0
PAINLEVEL_OUTOF10: 7
PAINLEVEL_OUTOF10: 0
PAINLEVEL_OUTOF10: 4

## 2020-01-15 ASSESSMENT — PAIN DESCRIPTION - FREQUENCY: FREQUENCY: CONTINUOUS

## 2020-01-15 ASSESSMENT — PAIN DESCRIPTION - PAIN TYPE: TYPE: ACUTE PAIN

## 2020-01-15 ASSESSMENT — PAIN - FUNCTIONAL ASSESSMENT: PAIN_FUNCTIONAL_ASSESSMENT: ACTIVITIES ARE NOT PREVENTED

## 2020-01-15 ASSESSMENT — PAIN DESCRIPTION - ONSET: ONSET: ON-GOING

## 2020-01-15 ASSESSMENT — PAIN DESCRIPTION - ORIENTATION: ORIENTATION: LEFT

## 2020-01-15 ASSESSMENT — PAIN DESCRIPTION - DESCRIPTORS: DESCRIPTORS: ACHING

## 2020-01-15 ASSESSMENT — PAIN DESCRIPTION - PROGRESSION: CLINICAL_PROGRESSION: NOT CHANGED

## 2020-01-15 NOTE — ED TRIAGE NOTES
Pt report that she woke up and \"just didn't feel right\" complains of tingling all over and high BP. Pt also complains of left low back pain.

## 2020-01-15 NOTE — H&P
Hospitalist  History and Physical    Patient:  Gale Curling  MRN: [id number]  PCP: Melida De León    CHIEF COMPLAINT:  Generalized aches and pains      HISTORY OF PRESENT ILLNESS:   The patient Gale Curling is a 78 y. o.female with medical history significant for anxiety depression, coronary artery disease, atrial fibrillation, history of cardiac arrest, carotid artery stenosis, CHF, chronic kidney disease,diabetes mellitus,  Patient has a history of cystectomy right nephrectomy and ileostomy  Patient presented to the emergency room with generalized aches and pains and generalized weakness. Patient complains of lower back pain she also complains of cough and congestion. Urology evaluated the patient and rule out obstruction after patient underwent a loopogram.  Patient was noted to have hydronephrosis which is likely due to reflux. Past Medical History:        Diagnosis Date    Anxiety     Atrial fibrillation ([address]     CAD (coronary artery disease)     Cancer (HCC)     bladder right kidney     Cardiac arrest ([facility] 3/27/2016    Carotid artery stenosis     CHF (congestive heart failure) (HCC)     Chronic kidney disease     cancer of kidney and bladder    Depression     Diabetes mellitus (HCC)     IDDM    GERD (gastroesophageal reflux disease)     Hip pain, chronic     HYPERCHOLESTERAEMIA     Hypertension     Irritable bowel syndrome     Laceration of head 3/25/2016    fall with cardiac arrest    MI, old     Neuropathy     Osteoarthritis     Presence of combination internal cardiac defibrillator (ICD) and pacemaker     Staph infection     PT. STATES SHE HAS HAD SEVERAL BOILS WITH STAPH LAST ONE 20 YEARS AGO.     Type 2 diabetes mellitus without complication ([address]     Urinary incontinence        Past Surgical History:        Procedure Laterality Date    ANGIOPLASTY      x3  2009    BLADDER REMOVAL      BLADDER SUSPENSION      CYSTOSCOPY  5/28/2013    with dilitation    HYSTERECTOMY      KIDNEY REMOVAL      right    PACEMAKER INSERTION      TOTAL HIP ARTHROPLASTY Left 11/6/2019    LEFT ANTERIOR TOTAL HIP REPLACEMENT WITH C-ARM performed by Sadaf Velazquez MD at [place] [id number]       Medications Prior to Admission:    Prior to Admission medications    Medication Sig Start Date End Date Taking? Authorizing Provider   oxyCODONE 5 MG capsule Take 5 mg by mouth every 4 hours as needed for Pain. Yes Historical Provider, MD   aspirin 81 MG EC tablet Take 1 tablet by mouth daily HOLD for 30 days after hip surgery. See Eliquis order 11/6/19  Yes Pily Chase APRN - CNP   acetaminophen (TYLENOL) 500 MG tablet Take 1,000 mg by mouth daily as needed for Pain    Yes Historical Provider, MD   insulin glargine (LANTUS) 100 UNIT/ML injection vial Inject 18 Units into the skin nightly    Yes Historical Provider, MD   amiodarone (CORDARONE) 200 MG tablet Take 0.5 tablets by mouth daily 6/12/19  Yes Chang Divers, MD   sodium bicarbonate 650 MG tablet Take 650 mg by mouth 2 times daily    Yes Historical Provider, MD   ALPRAZolam Lynette Ministerio) 0.25 MG tablet Take 0.25 mg by mouth nightly as needed for Sleep. Yes Historical Provider, MD   carvedilol (COREG) 6.25 MG tablet Take 6.25 mg by mouth 2 times daily  10/2/17  Yes Historical Provider, MD   rosuvastatin (CRESTOR) 10 MG tablet Take 10 mg by mouth every evening    Yes Historical Provider, MD   Coenzyme Q10 (CO Q 10) 100 MG CAPS Take 1 capsule by mouth daily    Yes Historical Provider, MD   ferrous sulfate 325 (65 FE) MG tablet Take 1 tablet by mouth on Monday, Wednesday, Friday   Yes Historical Provider, MD   fluticasone (FLONASE) 50 MCG/ACT nasal spray 1 spray by Nasal route as needed for Rhinitis   Yes Historical Provider, MD   therapeutic multivitamin-minerals (THERAGRAN-M) tablet Take 1 tablet by mouth daily.  Centrum silver    Yes Historical Provider, MD   vitamin D (CHOLECALCIFEROL) 1000 UNIT TABS tablet Take 1,000 Units by mouth daily    Yes Historical Provider, MD       Allergies:  Amlodipine besylate; Calcium channel blockers; Citalopram hydrobromide; Flagyl [metronidazole]; Losartan; Pneumococcal vaccine; Simvastatin; Valsartan-hydrochlorothiazide; Venlafaxine; Hydrocodone-acetaminophen; and Nitrofurantoin      Social History:   TOBACCO:   reports that she has never smoked. She has never used smokeless tobacco.  ETOH:   reports no history of alcohol use. Family History:       Problem Relation Age of Onset    Diabetes Mother     Heart Disease Father     Cancer Father            REVIEW OF SYSTEMS:     patients reported symptoms are in BOLD all other symptoms are negative. CONSTITUTIONAL:      fatigue, fever, chills or night sweats, recent weight gain, recent wt loss, insomnia,  General weakness, poor appetite, muscle aches and pains    HEAD: headache, dizziness    EYES:      blurriness,  double vision, dryness,  discharge, irritation,diplopia    EARS:      hearing loss, vertigo, ear discharge,  Earache. Ringing in the ears. NOSE:      Rhinorrhea, sneezing, epistaxis. Discharge, sinusitis,     MOUTH/THROAT:         sore throat, mouth ulcers, Hoarseness    RESPIRATORY:        Shortness of breath, wheezing,  cough, sputum, hemoptysis, obstructive sleep apnea,    CARDIOVASCULAR :      chest pain, palpitations, dyspnea on exercise, Lower extrimity edema (swelling),     GASTROINTESTINAL:       Dysphagia, Poor appetite,  Nausea, Vomiting, diarrhea, heartburn, abdominal pain. Blood in the stools, hematemesis. Pain with swallowing, constipation    GENITOURINARY:       Urinary frequency, hesitancy,  urgency, Dysuria, hematuria,  Urinary Incontinence. Urinary Retention. GYNECOLOGICAL: vaginal bleeding , vaginal discharge, menopause    MUSCULOSKELETAL:       joint swelling or stiffness, joint pain, muscle pain, balance problems, low back pain. NEUROLOGICAL:      Gait problems. Tremor. Dizziness.  Pain and paresthesias, weakness in extremities. Seizures, memory loss    PSYCHLOGICAL:        Anxiety, depression    SKIN :      Rashes ulcers, skin color changes, easy bruisability, lymphadenopathy      Physical Exam:      Vitals: BP (!) 183/78   Pulse 77   Temp 97.6 °F (36.4 °C) (Oral)   Resp 18   Ht 5' 8\" (1.727 m)   Wt 146 lb (66.2 kg)   SpO2 99%   BMI 22.20 kg/m²     Gen:          Alert and oriented x3  Eyes: PERRL. No sclera icterus. No conjunctival injection. ENT: No discharge. Pharynx clear. External appearance of ears and nose normal.  Neck: Trachea midline. No obvious mass. Resp: No accessory muscle use. No crackles. No wheezes. No rhonchi. CV: Regular rate. Regular rhythm. No murmur or rub. No edema. GI: abdomen is soft right-sided ileostomy bag is in place  Skin: Warm, dry, normal texture and turgor. Lymph: No cervical LAD. No supraclavicular LAD. M/S: / Ext. No cyanosis. No clubbing. No joint deformity. Neuro: Moves all four extremities. CN 2-12 tested, no deficits noted. Peripheral pulses and capillary refill is intact. CBC:   Recent Labs     01/15/20  0938   WBC 5.2   HGB 11.9*        BMP:    Recent Labs     01/15/20  0938      K 4.7      CO2 22   BUN 27*   CREATININE 1.5*   GLUCOSE 129*     Hepatic:   Recent Labs     01/15/20  0938   AST 19   ALT 12   BILITOT 0.3   ALKPHOS 67     Troponin:   Recent Labs     01/15/20  0938   TROPONINI <0.01     BNP: No results for input(s): BNP in the last 72 hours. INR:   Recent Labs     01/15/20  1118   INR 0.97     Lab Results   Component Value Date    LABA1C 6.8 11/06/2019           No results for input(s): CKTOTAL in the last 72 hours.   -----------------------------------------------------------------    Retrograde loopogram  No stricture or filling defect noted      Assessment / Plan     Urinary tract infection  Started patient on ceftriaxone      Coronary artery disease  Continue on aspirin and beta-blocker and statin      Type 2 diabetes mellitus E11.9  Continue Lantus insulin  prandial insulin. Insulin sliding scale        DVT and GI prophylaxis      Full Code      Roma Spatz M.D    This note was transcribed using [id number] [employer]. Please disregard any translational errors.

## 2020-01-15 NOTE — ED PROVIDER NOTES
[facility]  eMERGENCY dEPARTMENT eNCOUnter      Pt Name: Ney Viera  MRN: [id number]  [place] 1940  Date of evaluation: 1/15/2020  Provider: Juan Cortes MD    CHIEF COMPLAINT     Body aches     HISTORY OF PRESENT ILLNESS    Ney Viera is a 78 y.o. female who presents to the emergency department with high blood pressure and body aches. Patient states she has been feeling \"bad\" for the last few days. Took her BP this AM and it was \"high\" so decided to come to the ER. + for muscle aches. Also, endorses cough, congestion, and lower abdominal pain and lower back pain. States everything hurts. Denies chest pain or SOB. No leg swelling. No other associated symptoms. No trauma. No saddle numbness or loss of bowel or bladder function . Nursing Notes were reviewed. REVIEW OF SYSTEMS       Review of Systems   Constitutional: Positive for activity change, chills and fatigue. Negative for appetite change, diaphoresis, fever and unexpected weight change. HENT: Positive for congestion. Negative for dental problem, drooling and ear discharge. Eyes: Negative for photophobia, pain, discharge, redness and itching. Respiratory: Positive for cough. Negative for apnea, choking, chest tightness, shortness of breath, wheezing and stridor. Cardiovascular: Negative for chest pain and leg swelling. Gastrointestinal: Positive for abdominal pain, nausea and vomiting. Negative for abdominal distention. Endocrine: Negative for polyphagia and polyuria. Genitourinary: Positive for flank pain. Negative for vaginal bleeding, vaginal discharge and vaginal pain. Musculoskeletal: Positive for back pain and myalgias. Negative for arthralgias. Neurological: Negative for dizziness, facial asymmetry and headaches. Hematological: Negative for adenopathy. Does not bruise/bleed easily.    Psychiatric/Behavioral: Negative for agitation, behavioral problems, confusion, decreased concentration, dysphoric mood, hallucinations, self-injury, sleep disturbance and suicidal ideas. The patient is not nervous/anxious and is not hyperactive. Except as noted above the remainder of the review of systems was reviewed and negative. PAST MEDICAL HISTORY     Past Medical History:   Diagnosis Date    Anxiety     Atrial fibrillation ([facility]     CAD (coronary artery disease)     Cancer ([facility]     bladder right kidney     Cardiac arrest ([facility] 3/27/2016    Carotid artery stenosis     CHF (congestive heart failure) (HCC)     Chronic kidney disease     cancer of kidney and bladder    Depression     Diabetes mellitus (HCC)     IDDM    GERD (gastroesophageal reflux disease)     Hip pain, chronic     HYPERCHOLESTERAEMIA     Hypertension     Irritable bowel syndrome     Laceration of head 3/25/2016    fall with cardiac arrest    MI, old     Neuropathy     Osteoarthritis     Presence of combination internal cardiac defibrillator (ICD) and pacemaker     Staph infection     PT. STATES SHE HAS HAD SEVERAL BOILS WITH STAPH LAST ONE 20 YEARS AGO.  Type 2 diabetes mellitus without complication ([facility]     Urinary incontinence        SURGICAL HISTORY       Past Surgical History:   Procedure Laterality Date    ANGIOPLASTY      x3  2009    BLADDER REMOVAL      BLADDER SUSPENSION      CYSTOSCOPY  5/28/2013    with dilitation    HYSTERECTOMY      KIDNEY REMOVAL      right    PACEMAKER INSERTION      TOTAL HIP ARTHROPLASTY Left 11/6/2019    LEFT ANTERIOR TOTAL HIP REPLACEMENT WITH C-ARM performed by Schuyler Xie MD at [facility]       Previous Medications    ACETAMINOPHEN (TYLENOL) 500 MG TABLET    Take 1,000 mg by mouth daily as needed for Pain     ALPRAZOLAM (XANAX) 0.25 MG TABLET    Take 0.125 mg by mouth nightly as needed for Sleep.      AMIODARONE (CORDARONE) 200 MG TABLET    Take 0.5 tablets by mouth daily    APIXABAN (ELIQUIS) 2.5 MG TABS TABLET    Take 1 tablet by mouth 2 times daily Begin day after discharge from hospital and continue for 30 total days    ASPIRIN 81 MG EC TABLET    Take 1 tablet by mouth daily HOLD for 30 days after hip surgery. See Eliquis order    CARVEDILOL (COREG) 6.25 MG TABLET    Take 6.25 mg by mouth 2 times daily     COENZYME Q10 (CO Q 10) 100 MG CAPS    Take 1 capsule by mouth daily     FERROUS SULFATE 325 (65 FE) MG TABLET    Take 1 tablet by mouth on Monday, Wednesday, Friday    FLUTICASONE (FLONASE) 50 MCG/ACT NASAL SPRAY    1 spray by Nasal route as needed for Rhinitis    INSULIN GLARGINE (LANTUS) 100 UNIT/ML INJECTION VIAL    Inject 20 Units into the skin nightly    ROSUVASTATIN (CRESTOR) 10 MG TABLET    Take 10 mg by mouth every evening     SODIUM BICARBONATE 650 MG TABLET    Take 650 mg by mouth 2 times daily     THERAPEUTIC MULTIVITAMIN-MINERALS (THERAGRAN-M) TABLET    Take 1 tablet by mouth daily. Centrum silver     VITAMIN D (CHOLECALCIFEROL) 1000 UNIT TABS TABLET    Take 1,000 Units by mouth daily        ALLERGIES     Amlodipine besylate; Calcium channel blockers; Citalopram hydrobromide; Flagyl [metronidazole]; Losartan; Pneumococcal vaccine; Simvastatin; Valsartan-hydrochlorothiazide; Venlafaxine; Hydrocodone-acetaminophen; and Nitrofurantoin    FAMILY HISTORY        Family History   Problem Relation Age of Onset    Diabetes Mother     Heart Disease Father     Cancer Father        SOCIAL HISTORY       Social History     Socioeconomic History    Marital status:       Spouse name: Not on file    Number of children: Not on file    Years of education: Not on file    Highest education level: Not on file   Occupational History    Not on file   Social Needs    Financial resource strain: Not on file    Food insecurity:     Worry: Not on file     Inability: Not on file    Transportation needs:     Medical: Not on file     Non-medical: Not on file   Tobacco Use    Smoking status: Never Smoker    Smokeless tobacco: Performed at:  [facility]  [address]   Phone [phone]   BASIC METABOLIC PANEL W/ REFLEX TO MG FOR LOW K - Abnormal; Notable for the following components:    Glucose 129 (*)     BUN 27 (*)     CREATININE 1.5 (*)     GFR Non- 33 (*)     GFR  40 (*)     All other components within normal limits    Narrative:     Performed at:  [facility]  [address] [address]   Phone [phone]   BRAIN NATRIURETIC PEPTIDE - Abnormal; Notable for the following components:    Pro- (*)     All other components within normal limits    Narrative:     Performed at:  [facility]  [address]   Phone [phone]   URINE RT REFLEX TO CULTURE - Abnormal; Notable for the following components:    Clarity, UA CLOUDY (*)     Protein, UA TRACE (*)     Nitrite, Urine POSITIVE (*)     Leukocyte Esterase, Urine SMALL (*)     All other components within normal limits    Narrative:     Performed at:  [facility]  [address]   Phone [phone]   MICROSCOPIC URINALYSIS - Abnormal; Notable for the following components:    Bacteria, UA 2+ (*)     Crystals Few Cystine (*)     WBC, UA 15 (*)     All other components within normal limits    Narrative:     Performed at:  [facility]  [address]   Phone [phone]   RAPID INFLUENZA A/B ANTIGENS    Narrative:     Performed at:  [facility]  [address]   Phone [phone]   URINE CULTURE   TROPONIN    Narrative:     Performed at:  [address] [employer]   Phone [phone]   HEPATIC FUNCTION PANEL    Narrative: Performed at:  [facility]  [address] [facility] [address]   Phone [phone]   LIPASE    Narrative:     Performed at:  [facility]  [address] [facility] [address]   Phone [phone]       All other labs were withinnormal range or not returned as of this dictation. EMERGENCY DEPARTMENT COURSE and DIFFERENTIAL DIAGNOSIS/MDM:     78 yr old hx bladder and kidney CA x right kidney removal and bladder removal with ileoconduit presents with muscle aches and lower back pain found to have evidence of infected urine and mild hydronephrosis and hydroureter on CT. Rocephin started. Urology Angeles Hebert contacted. Will get Loopogram as stone suspected. Kidney function seems to be around baseline. Admission to hospitalist.       CRITICAL CARE TIME   Total Critical Caretime was 0 minutes, excluding separately reportable procedures. There was a high probability of clinically significant/life threatening deterioration in the patient's condition which required my urgent intervention. PROCEDURES:  Unlessotherwise noted below, none    FINAL IMPRESSION      1. Acute cystitis without hematuria    2.  Other hydronephrosis          DISPOSITION/PLAN   DISPOSITION      Admission    (Please note that portions ofthis note were completed with a voice recognition program.  Efforts were made to edit the dictations but occasionally words are mis-transcribed.)    Kenyatta Saleh MD(electronically signed)  Attending Emergency Physician          Kenyatta Saleh MD  01/15/20 0314

## 2020-01-15 NOTE — PLAN OF CARE
Problem: Falls - Risk of:  Goal: Will remain free from falls  Description  Will remain free from falls  Outcome: Ongoing     Problem: Falls - Risk of:  Goal: Absence of physical injury  Description  Absence of physical injury  Outcome: Ongoing     Problem: Safety:  Goal: Free from accidental physical injury  Description  Free from accidental physical injury  Outcome: Ongoing     Problem: Safety:  Goal: Free from intentional harm  Description  Free from intentional harm  Outcome: Ongoing     Problem: Pain:  Goal: Patient's pain/discomfort is manageable  Description  Patient's pain/discomfort is manageable  Outcome: Ongoing     Problem: Skin Integrity:  Goal: Skin integrity will stabilize  Description  Skin integrity will stabilize  Outcome: Ongoing

## 2020-01-15 NOTE — CONSULTS
there is mild left-sided hydronephrosis and   hydroureter, new from prior. Marval Britain left ureter is dilated to the level of the   ileo conduit.  No obstructing stone seen.       Scattered colonic diverticula. There is mild wall thickening of the   descending colon, likely due to the partially contracted state of the colon   in the absence of clinical signs of colitis       Cholelithiasis     Impression/Plan:   - 79y.o. female with generalized fatigue, body aches. Consulted for left hydronephrosis  - Will get loopogram today to evaluate if there is an area of obstruction/kidney stone.   - Rocephin received in ED for possible UTI.      Duncan Luis, APRN - CNP 1/60/160549:55 PM

## 2020-01-16 LAB
ANION GAP SERPL CALCULATED.3IONS-SCNC: 13 MMOL/L (ref 3–16)
BUN BLDV-MCNC: 22 MG/DL (ref 7–20)
CALCIUM SERPL-MCNC: 8.9 MG/DL (ref 8.3–10.6)
CHLORIDE BLD-SCNC: 109 MMOL/L (ref 99–110)
CO2: 23 MMOL/L (ref 21–32)
CREAT SERPL-MCNC: 1.4 MG/DL (ref 0.6–1.2)
GFR AFRICAN AMERICAN: 44
GFR NON-AFRICAN AMERICAN: 36
GLUCOSE BLD-MCNC: 108 MG/DL (ref 70–99)
GLUCOSE BLD-MCNC: 126 MG/DL (ref 70–99)
GLUCOSE BLD-MCNC: 127 MG/DL (ref 70–99)
GLUCOSE BLD-MCNC: 68 MG/DL (ref 70–99)
GLUCOSE BLD-MCNC: 80 MG/DL (ref 70–99)
GLUCOSE BLD-MCNC: 93 MG/DL (ref 70–99)
HCT VFR BLD CALC: 34.4 % (ref 36–48)
HEMOGLOBIN: 11.5 G/DL (ref 12–16)
MCH RBC QN AUTO: 32.2 PG (ref 26–34)
MCHC RBC AUTO-ENTMCNC: 33.6 G/DL (ref 31–36)
MCV RBC AUTO: 96 FL (ref 80–100)
ORGANISM: ABNORMAL
PDW BLD-RTO: 13.3 % (ref 12.4–15.4)
PERFORMED ON: ABNORMAL
PERFORMED ON: NORMAL
PERFORMED ON: NORMAL
PLATELET # BLD: 184 K/UL (ref 135–450)
PMV BLD AUTO: 8.1 FL (ref 5–10.5)
POTASSIUM REFLEX MAGNESIUM: 4.3 MMOL/L (ref 3.5–5.1)
RBC # BLD: 3.58 M/UL (ref 4–5.2)
SODIUM BLD-SCNC: 145 MMOL/L (ref 136–145)
URINE CULTURE, ROUTINE: ABNORMAL
WBC # BLD: 4.4 K/UL (ref 4–11)

## 2020-01-16 PROCEDURE — 36415 COLL VENOUS BLD VENIPUNCTURE: CPT

## 2020-01-16 PROCEDURE — 6360000002 HC RX W HCPCS: Performed by: HOSPITALIST

## 2020-01-16 PROCEDURE — 80048 BASIC METABOLIC PNL TOTAL CA: CPT

## 2020-01-16 PROCEDURE — 2580000003 HC RX 258: Performed by: HOSPITALIST

## 2020-01-16 PROCEDURE — 1200000000 HC SEMI PRIVATE

## 2020-01-16 PROCEDURE — 93005 ELECTROCARDIOGRAM TRACING: CPT

## 2020-01-16 PROCEDURE — 85027 COMPLETE CBC AUTOMATED: CPT

## 2020-01-16 PROCEDURE — 6370000000 HC RX 637 (ALT 250 FOR IP): Performed by: HOSPITALIST

## 2020-01-16 PROCEDURE — 94760 N-INVAS EAR/PLS OXIMETRY 1: CPT

## 2020-01-16 RX ORDER — ACETAMINOPHEN 500 MG
1000 TABLET ORAL EVERY 6 HOURS PRN
Status: DISCONTINUED | OUTPATIENT
Start: 2020-01-16 | End: 2020-01-17 | Stop reason: HOSPADM

## 2020-01-16 RX ORDER — INSULIN GLARGINE 100 [IU]/ML
15 INJECTION, SOLUTION SUBCUTANEOUS NIGHTLY
Status: DISCONTINUED | OUTPATIENT
Start: 2020-01-16 | End: 2020-01-17 | Stop reason: HOSPADM

## 2020-01-16 RX ORDER — OXYCODONE HYDROCHLORIDE AND ACETAMINOPHEN 5; 325 MG/1; MG/1
1 TABLET ORAL EVERY 4 HOURS PRN
Status: DISCONTINUED | OUTPATIENT
Start: 2020-01-16 | End: 2020-01-17 | Stop reason: HOSPADM

## 2020-01-16 RX ORDER — HYDRALAZINE HYDROCHLORIDE 20 MG/ML
10 INJECTION INTRAMUSCULAR; INTRAVENOUS EVERY 4 HOURS PRN
Status: DISCONTINUED | OUTPATIENT
Start: 2020-01-16 | End: 2020-01-17 | Stop reason: HOSPADM

## 2020-01-16 RX ADMIN — AMIODARONE HYDROCHLORIDE 100 MG: 200 TABLET ORAL at 09:54

## 2020-01-16 RX ADMIN — HYDRALAZINE HYDROCHLORIDE 10 MG: 20 INJECTION INTRAMUSCULAR; INTRAVENOUS at 17:26

## 2020-01-16 RX ADMIN — CEFTRIAXONE 1 G: 1 INJECTION, POWDER, FOR SOLUTION INTRAMUSCULAR; INTRAVENOUS at 09:59

## 2020-01-16 RX ADMIN — ROSUVASTATIN CALCIUM 10 MG: 10 TABLET, FILM COATED ORAL at 21:40

## 2020-01-16 RX ADMIN — FLUTICASONE PROPIONATE 1 SPRAY: 50 SPRAY, METERED NASAL at 21:40

## 2020-01-16 RX ADMIN — FERROUS SULFATE TAB EC 324 MG (65 MG FE EQUIVALENT) 324 MG: 324 (65 FE) TABLET DELAYED RESPONSE at 16:31

## 2020-01-16 RX ADMIN — ALPRAZOLAM 0.25 MG: 0.25 TABLET ORAL at 21:40

## 2020-01-16 RX ADMIN — FAMOTIDINE 20 MG: 20 TABLET, FILM COATED ORAL at 09:53

## 2020-01-16 RX ADMIN — ENOXAPARIN SODIUM 40 MG: 40 INJECTION SUBCUTANEOUS at 21:40

## 2020-01-16 RX ADMIN — INSULIN GLARGINE 15 UNITS: 100 INJECTION, SOLUTION SUBCUTANEOUS at 21:42

## 2020-01-16 RX ADMIN — HYDRALAZINE HYDROCHLORIDE 10 MG: 20 INJECTION INTRAMUSCULAR; INTRAVENOUS at 21:40

## 2020-01-16 RX ADMIN — MULTIPLE VITAMINS W/ MINERALS TAB 1 TABLET: TAB at 09:55

## 2020-01-16 RX ADMIN — FERROUS SULFATE TAB EC 324 MG (65 MG FE EQUIVALENT) 324 MG: 324 (65 FE) TABLET DELAYED RESPONSE at 09:55

## 2020-01-16 RX ADMIN — ACETAMINOPHEN 1000 MG: 325 TABLET ORAL at 09:53

## 2020-01-16 RX ADMIN — CARVEDILOL 6.25 MG: 6.25 TABLET, FILM COATED ORAL at 16:31

## 2020-01-16 RX ADMIN — SODIUM CHLORIDE: 9 INJECTION, SOLUTION INTRAVENOUS at 21:40

## 2020-01-16 RX ADMIN — SODIUM BICARBONATE 650 MG: 650 TABLET ORAL at 09:55

## 2020-01-16 RX ADMIN — ACETAMINOPHEN 1000 MG: 500 TABLET ORAL at 17:25

## 2020-01-16 RX ADMIN — CARVEDILOL 6.25 MG: 6.25 TABLET, FILM COATED ORAL at 09:53

## 2020-01-16 RX ADMIN — VITAMIN D, TAB 1000IU (100/BT) 1000 UNITS: 25 TAB at 09:53

## 2020-01-16 RX ADMIN — ASPIRIN 81 MG: 81 TABLET, COATED ORAL at 09:53

## 2020-01-16 RX ADMIN — SODIUM BICARBONATE 650 MG: 650 TABLET ORAL at 21:40

## 2020-01-16 RX ADMIN — SODIUM CHLORIDE: 9 INJECTION, SOLUTION INTRAVENOUS at 13:49

## 2020-01-16 ASSESSMENT — PAIN SCALES - GENERAL
PAINLEVEL_OUTOF10: 6
PAINLEVEL_OUTOF10: 0
PAINLEVEL_OUTOF10: 4
PAINLEVEL_OUTOF10: 5
PAINLEVEL_OUTOF10: 0

## 2020-01-16 ASSESSMENT — PAIN DESCRIPTION - FREQUENCY
FREQUENCY: INTERMITTENT
FREQUENCY: INTERMITTENT

## 2020-01-16 ASSESSMENT — PAIN DESCRIPTION - LOCATION
LOCATION: HEAD
LOCATION: CHEST;BACK;HEAD;NECK

## 2020-01-16 ASSESSMENT — PAIN DESCRIPTION - ONSET
ONSET: ON-GOING
ONSET: SUDDEN

## 2020-01-16 ASSESSMENT — PAIN DESCRIPTION - PAIN TYPE
TYPE: ACUTE PAIN
TYPE: ACUTE PAIN

## 2020-01-16 ASSESSMENT — PAIN DESCRIPTION - ORIENTATION
ORIENTATION: ANTERIOR
ORIENTATION: LEFT;LOWER;ANTERIOR

## 2020-01-16 ASSESSMENT — PAIN DESCRIPTION - DESCRIPTORS
DESCRIPTORS: HEADACHE
DESCRIPTORS: HEADACHE;ACHING;DISCOMFORT

## 2020-01-16 NOTE — PROGRESS NOTES
Hospitalist Progress Note  1/16/2020 7:55 AM    PCP: Chase Kessler    [phone]     Date of Admission: 1/15/2020                                                                                                                     HOSPITAL COURSE    Patient demographics:  The patient  Nidia Leon is a 78 y.o. female     Significant past medical history:   Patient Active Problem List   Diagnosis    Aspiration pneumonia of both lower lobes due to gastric secretions (HCC)    Acute anoxic encephalopathy ([address]    Cardiac arrest ([address]    Lactic acidosis    CATARINA (acute kidney injury) ([address]    High anion gap metabolic acidosis    Cardiomyopathy ([address]    PVC's (premature ventricular contractions)    NSTEMI (non-ST elevated myocardial infarction) ([address]    Ventricular fibrillation ([address]    Ischemic cardiomyopathy    Essential hypertension    Automatic implantable cardioverter-defibrillator in situ    Right renal mass    DM2 (diabetes mellitus, type 2) ([address]    Acute coronary syndrome ([address]    Actinic keratosis    Acute-on-chronic renal failure ([address]    Advance directive discussed with patient    Arthralgia    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 ([address]    Chronic coronary artery disease    Carotid artery disease ([address]    Arterial disease ([address]    Chest pain    Chronic ischemic heart disease    Chronic systolic heart failure (HCC)    Breathlessness on exertion    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    Mixed anxiety depressive disorder    Anxiety, generalized    Gastro-esophageal reflux disease without esophagitis    Generalized OA    Acid reflux    Other specified counseling    Herpes zoster with nervous system complication    Hydronephrosis    Hyperlipidemia    Decreased potassium in the blood    Hypomagnesemia    Ventricular tachycardia (HCC)    Status post ileal conduit (HCC)    Iron deficiency anemia    Iron implantable cardioverter-defibrillator in situ    Right renal mass    DM2 (diabetes mellitus, type 2) (HCC)    Acute [facility]    Actinic keratosis    Acute-on-chronic renal failure ([facility]    Advance directive discussed with patient    Arthralgia    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)    Chronic coronary artery disease    Carotid artery disease (HCC)    Arterial disease (HCC)    Chest pain    Chronic ischemic heart disease    Chronic systolic heart failure (HCC)    Breathlessness on exertion    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    Mixed anxiety depressive disorder    Anxiety, generalized    Gastro-esophageal reflux disease without esophagitis    Generalized OA    Acid reflux    Other specified counseling    Herpes zoster with nervous system complication    Hydronephrosis    Hyperlipidemia    Decreased potassium in the blood    Hypomagnesemia    Ventricular tachycardia (HCC)    Status post ileal conduit (HCC)    Iron deficiency anemia    Iron malabsorption    Malignant neoplasm of renal pelvis (HCC)    Malignant neoplasm of urinary bladder (HCC)    Combined fat and carbohydrate induced hyperlipemia    Anemia, normocytic normochromic    Degenerative arthritis of hip    Scar of skin    Encounter for palliative care    Awareness of heartbeats    Protein-calorie malnutrition ([facility]    Tinnitus of vascular origin    Pain in shoulder    Presence of coronary angioplasty implant and graft    Depression, reactive    Acute on chronic systolic heart failure (HCC)    Mixed stress and urge urinary incontinence    Hyperkalemia    Pyelonephritis    Acute pyelonephritis    Chronic kidney disease, stage III (moderate) ([address]    Encounter for ostomy care education    Arthritis of left hip       Allergies  Amlodipine besylate; Calcium channel blockers; Citalopram hydrobromide; Flagyl [metronidazole];  Losartan;

## 2020-01-16 NOTE — PROGRESS NOTES
Pt Name: Matthew Watts  Medical Record Number: [id number]  Date of Birth 1940   Today's Date: 1/16/2020      Subjective:  Awake in bed, body aches improved, only c/o headache this morning. No acute events. ROS: Constitutional: No fever    Vitals:  Vitals:    01/15/20 1824 01/15/20 2013 01/16/20 0057 01/16/20 0533   BP: (!) 173/69 (!) 162/72 (!) 154/63 (!) 165/67   Pulse: 66 61 61 62   Resp: 18 18 18 18   Temp: 98.5 °F (36.9 °C) 98.1 °F (36.7 °C) 97.8 °F (36.6 °C) 97.7 °F (36.5 °C)   TempSrc: Oral Oral Oral Oral   SpO2: 95% 98% 98% 97%   Weight:    151 lb 7.3 oz (68.7 kg)   Height:         I/O last 3 completed shifts:   In: 550 [IV Piggyback:550]  Out: -     Exam:  General: Awake, oriented, no acute distress  Respiratory: Nonlabored breathing  Abdomen: Soft, non-tender, non-distended, no masses  : Ileal conduit intact with clear/yellow output  Skin: Skin color, texture, turgor normal, no rashes or lesions  Neurologic: no gross deficits    CURRENT MEDICATIONS   Scheduled Meds:   amiodarone  100 mg Oral Daily    aspirin  81 mg Oral Daily    carvedilol  6.25 mg Oral BID WC    ferrous sulfate  324 mg Oral BID WC    insulin glargine  20 Units Subcutaneous Nightly    rosuvastatin  10 mg Oral Nightly    sodium bicarbonate  650 mg Oral BID    therapeutic multivitamin-minerals  1 tablet Oral Daily    Vitamin D  1,000 Units Oral Daily    sodium chloride flush  10 mL Intravenous 2 times per day    enoxaparin  40 mg Subcutaneous Nightly    cefTRIAXone (ROCEPHIN) IV  1 g Intravenous Daily    famotidine  20 mg Oral Daily    insulin lispro  0-12 Units Subcutaneous TID WC    insulin lispro  0-6 Units Subcutaneous Nightly     Continuous Infusions:   sodium chloride 100 mL/hr at 01/15/20 1543    dextrose       PRN Meds:.acetaminophen, fluticasone, sodium chloride flush, potassium chloride **OR** potassium alternative oral replacement **OR** potassium chloride, magnesium hydroxide, ondansetron, ALPRAZolam, glucose, dextrose, glucagon (rDNA), dextrose    LABS     Recent Labs     01/15/20  0938 01/15/20  1118 01/16/20  0537   WBC 5.2  --  4.4   HGB 11.9*  --  11.5*   HCT 35.4*  --  34.4*     --  184     --  145   K 4.7  --  4.3     --  109   CO2 22  --  23   BUN 27*  --  22*   CREATININE 1.5*  --  1.4*   CALCIUM 9.4  --  8.9   INR  --  0.97  --    AST 19  --   --    ALT 12  --   --    BILITOT 0.3  --   --    BILIDIR <0.2  --   --        ASSESSMENT   1. Hospital day # 1   2. 79y.o. female with generalized fatigue, body aches. Consulted for left hydronephrosis  3. Loopogram without obstruction - no need for intervention   PLAN   1. Urine culture- E. Coli- on Rocephin    Will sign off, call with questions.      Sarah Walters, RIZWAN - CNP 1/16/2020 8:07 AM

## 2020-01-16 NOTE — CARE COORDINATION
[facility]  Hypoglycemia Event and Prevention Plan      NAME: Salas Logan  MEDICAL RECORD NUMBER:  [id number]  AGE: 78 y.o. GENDER: female  : 1940  EPISODE DATE:  2020     Data     Recent Labs     01/15/20  1324 01/15/20  1818 01/15/20  2151 01/15/20  2218 20  0216 20  0739   POCGLU 99 186* 79 99 93 80     Results for Zachery Garcia (MRN [id number]) as of 2020 09:22   Ref. Range 2020 05:37   Glucose Latest Ref Range: 70 - 99 mg/dL 68 (L)         Medications  Scheduled Medications:   amiodarone  100 mg Oral Daily    aspirin  81 mg Oral Daily    carvedilol  6.25 mg Oral BID WC    ferrous sulfate  324 mg Oral BID WC    insulin glargine  20 Units Subcutaneous Nightly    rosuvastatin  10 mg Oral Nightly    sodium bicarbonate  650 mg Oral BID    therapeutic multivitamin-minerals  1 tablet Oral Daily    Vitamin D  1,000 Units Oral Daily    sodium chloride flush  10 mL Intravenous 2 times per day    enoxaparin  40 mg Subcutaneous Nightly    cefTRIAXone (ROCEPHIN) IV  1 g Intravenous Daily    famotidine  20 mg Oral Daily    insulin lispro  0-12 Units Subcutaneous TID WC    insulin lispro  0-6 Units Subcutaneous Nightly       Diet  Current diet/supplement order: DIET GENERAL;     Recorded PO:   last meal in flowsheets      Action        Physician Notified of event: Yes   Morris Castro MD    Recommend reducing Lantus dose.       Responce     Achieved POCT Blood Glucose greater than 70 mg/dl: Yes      Medication plan updated: Yes        Electronically signed by Dexter Perez RN on 2020 at 9:25 AM

## 2020-01-17 VITALS
DIASTOLIC BLOOD PRESSURE: 63 MMHG | HEART RATE: 76 BPM | TEMPERATURE: 98.3 F | BODY MASS INDEX: 23.12 KG/M2 | HEIGHT: 68 IN | WEIGHT: 152.56 LBS | OXYGEN SATURATION: 97 % | SYSTOLIC BLOOD PRESSURE: 136 MMHG | RESPIRATION RATE: 16 BRPM

## 2020-01-17 LAB
ANION GAP SERPL CALCULATED.3IONS-SCNC: 14 MMOL/L (ref 3–16)
BUN BLDV-MCNC: 21 MG/DL (ref 7–20)
CALCIUM SERPL-MCNC: 9 MG/DL (ref 8.3–10.6)
CHLORIDE BLD-SCNC: 110 MMOL/L (ref 99–110)
CO2: 19 MMOL/L (ref 21–32)
CREAT SERPL-MCNC: 1.3 MG/DL (ref 0.6–1.2)
EKG ATRIAL RATE: 74 BPM
EKG DIAGNOSIS: NORMAL
EKG P AXIS: 71 DEGREES
EKG P-R INTERVAL: 168 MS
EKG Q-T INTERVAL: 430 MS
EKG QRS DURATION: 118 MS
EKG QTC CALCULATION (BAZETT): 477 MS
EKG R AXIS: 54 DEGREES
EKG T AXIS: 61 DEGREES
EKG VENTRICULAR RATE: 74 BPM
GFR AFRICAN AMERICAN: 47
GFR NON-AFRICAN AMERICAN: 39
GLUCOSE BLD-MCNC: 205 MG/DL (ref 70–99)
GLUCOSE BLD-MCNC: 72 MG/DL (ref 70–99)
GLUCOSE BLD-MCNC: 85 MG/DL (ref 70–99)
HCT VFR BLD CALC: 37.5 % (ref 36–48)
HEMOGLOBIN: 12.5 G/DL (ref 12–16)
MCH RBC QN AUTO: 31.7 PG (ref 26–34)
MCHC RBC AUTO-ENTMCNC: 33.3 G/DL (ref 31–36)
MCV RBC AUTO: 95.4 FL (ref 80–100)
PDW BLD-RTO: 13.6 % (ref 12.4–15.4)
PERFORMED ON: ABNORMAL
PERFORMED ON: NORMAL
PLATELET # BLD: 206 K/UL (ref 135–450)
PMV BLD AUTO: 7.8 FL (ref 5–10.5)
POTASSIUM SERPL-SCNC: 4 MMOL/L (ref 3.5–5.1)
RBC # BLD: 3.93 M/UL (ref 4–5.2)
SODIUM BLD-SCNC: 143 MMOL/L (ref 136–145)
WBC # BLD: 5.8 K/UL (ref 4–11)

## 2020-01-17 PROCEDURE — 36415 COLL VENOUS BLD VENIPUNCTURE: CPT

## 2020-01-17 PROCEDURE — 85027 COMPLETE CBC AUTOMATED: CPT

## 2020-01-17 PROCEDURE — 80048 BASIC METABOLIC PNL TOTAL CA: CPT

## 2020-01-17 PROCEDURE — 6370000000 HC RX 637 (ALT 250 FOR IP): Performed by: HOSPITALIST

## 2020-01-17 PROCEDURE — 93010 ELECTROCARDIOGRAM REPORT: CPT | Performed by: INTERNAL MEDICINE

## 2020-01-17 PROCEDURE — 6360000002 HC RX W HCPCS: Performed by: HOSPITALIST

## 2020-01-17 PROCEDURE — 2580000003 HC RX 258: Performed by: HOSPITALIST

## 2020-01-17 RX ORDER — CIPROFLOXACIN 500 MG/1
500 TABLET, FILM COATED ORAL 2 TIMES DAILY
Qty: 10 TABLET | Refills: 0 | Status: SHIPPED | OUTPATIENT
Start: 2020-01-17 | End: 2020-01-22

## 2020-01-17 RX ADMIN — CEFTRIAXONE 1 G: 1 INJECTION, POWDER, FOR SOLUTION INTRAMUSCULAR; INTRAVENOUS at 08:45

## 2020-01-17 RX ADMIN — SODIUM CHLORIDE: 9 INJECTION, SOLUTION INTRAVENOUS at 12:00

## 2020-01-17 RX ADMIN — SODIUM BICARBONATE 650 MG: 650 TABLET ORAL at 08:44

## 2020-01-17 RX ADMIN — ASPIRIN 81 MG: 81 TABLET, COATED ORAL at 08:45

## 2020-01-17 RX ADMIN — ACETAMINOPHEN 1000 MG: 500 TABLET ORAL at 00:43

## 2020-01-17 RX ADMIN — FERROUS SULFATE TAB EC 324 MG (65 MG FE EQUIVALENT) 324 MG: 324 (65 FE) TABLET DELAYED RESPONSE at 08:44

## 2020-01-17 RX ADMIN — AMIODARONE HYDROCHLORIDE 100 MG: 200 TABLET ORAL at 08:44

## 2020-01-17 RX ADMIN — FAMOTIDINE 20 MG: 20 TABLET, FILM COATED ORAL at 08:44

## 2020-01-17 RX ADMIN — CARVEDILOL 6.25 MG: 6.25 TABLET, FILM COATED ORAL at 08:44

## 2020-01-17 RX ADMIN — VITAMIN D, TAB 1000IU (100/BT) 1000 UNITS: 25 TAB at 08:45

## 2020-01-17 RX ADMIN — MULTIPLE VITAMINS W/ MINERALS TAB 1 TABLET: TAB at 08:44

## 2020-01-17 RX ADMIN — SODIUM CHLORIDE, PRESERVATIVE FREE 10 ML: 5 INJECTION INTRAVENOUS at 08:48

## 2020-01-17 ASSESSMENT — PAIN SCALES - GENERAL
PAINLEVEL_OUTOF10: 0
PAINLEVEL_OUTOF10: 0
PAINLEVEL_OUTOF10: 6

## 2020-01-17 NOTE — PROGRESS NOTES
Patient apologized to this nurse, stated that she was scared and afraid to die alone. Patient stated that she often reads about her medication and side effects and was scared to take both xanax and percocet. Patient stated that her  recently passed away in February and passed away next to her and usually when either of them would come to the hospital, the other one was with them so they were not alone. Reassured patient that she is ok and that she has a telemetry monitor on and if anything were to happen, it would go directly to my phone and/or Kim Asif would notify me of changes. Patient tearful crying and apologizing multiple times. Suggested to patient to turn off lights, take off her glasses and prop her feet up and try to relax and bring her BP down, that she has her BP elevated from being all worked up which is not helping her headache.

## 2020-01-17 NOTE — PROGRESS NOTES
Orders to d/c patient. IV removed- pt tolerated well. AVS information reviewed and signed. Pt acknowledged understanding of information on AVS including: Activity, Diet, New medications, Continued medications, medications to stop taking, follow up appointments, and education on diagnosis. Pt being D/C'd to home with son.

## 2020-01-17 NOTE — PLAN OF CARE
Problem: Falls - Risk of:  Goal: Will remain free from falls  Description  Will remain free from falls  1/17/2020 1243 by Ken Soto RN  Outcome: Ongoing  Pt will remain free from falls by using appropriate safety awareness/judgement and alert the RN/PCA when help is needed. Problem: Falls - Risk of:  Goal: Absence of physical injury  Description  Absence of physical injury  1/17/2020 1243 by Ken Soto RN  Outcome: Ongoing  Pt will be free from injury by using appropriate safety awareness and judgement. Pt will call for help when needed. RN will continue to do safety checks and ask if pt needs help to the bathroom in advance to prevent falls. Problem: Falls - Risk of:  Goal: Absence of physical injury  Description  Absence of physical injury  1/17/2020 1243 by Ken Soto RN  Outcome: Ongoing  Pt will remain free from falls by using appropriate safety awareness/judgement and alert the RN/PCA when help is needed. Problem: Safety:  Goal: Free from accidental physical injury  Description  Free from accidental physical injury  1/17/2020 1243 by Ken Soto RN  Outcome: Ongoing  Pt will be free from injury by using appropriate safety awareness and judgement. Pt will call for help when needed. RN will continue to do safety checks and ask if pt needs help to the bathroom in advance to prevent falls. Problem: Safety:  Goal: Free from intentional harm  Description  Free from intentional harm  1/17/2020 1243 by Ken Soto RN  Outcome: Ongoing  Pt will be free from injury by using appropriate safety awareness and judgement. Pt will call for help when needed. RN will continue to do safety checks and ask if pt needs help to the bathroom in advance to prevent falls.            Problem: Pain:  Goal: Patient's pain/discomfort is manageable  Description  Patient's pain/discomfort is manageable  1/17/2020 1243 by Ken Soto RN  Outcome: Ongoing  Will continue to use the pain scale (0-10) to measure pt's pain and monitor their needs. Will assess patients pain with assessments and interactions. Pt will notify RN of any changes in pain and where. Will continue to perform therapeutic comfort measures and give pain medications as prescribed/needed. Problem: Skin Integrity:  Goal: Skin integrity will stabilize  Description  Skin integrity will stabilize  1/17/2020 1243 by Jemima May RN  Outcome: Ongoing  Pt will continue daily activities as tolerated and alert RN to any pain and skin changes. RN will continue to assess skin condition, note and changes, and take preventative measures to prevent skin breakdown such as movement, foam/silicone dressings on bony prominences, and making sure pt has adequate nutrition. Problem: OXYGENATION/RESPIRATORY FUNCTION  Goal: Patient will maintain patent airway  Outcome: Ongoing     Problem: OXYGENATION/RESPIRATORY FUNCTION  Goal: Patient will achieve/maintain normal respiratory rate/effort  Description  Respiratory rate and effort will be within normal limits for the patient  Outcome: Ongoing     Problem: HEMODYNAMIC STATUS  Goal: Patient has stable vital signs and fluid balance  Outcome: Ongoing     Problem: FLUID AND ELECTROLYTE IMBALANCE  Goal: Fluid and electrolyte balance are achieved/maintained  Outcome: Ongoing     Problem: ACTIVITY INTOLERANCE/IMPAIRED MOBILITY  Goal: Mobility/activity is maintained at optimum level for patient  Outcome: Ongoing  Pt will continue to assist with personal hygiene and needs as tolerated. Pt will state when they needs help and RN/PCA will continue to help perform personal hygiene. RN will assess skin care needs.

## 2020-01-20 NOTE — DISCHARGE SUMMARY
Hospital Medicine Discharge Summary      Patient ID: Sachin Agustin [id number]     Patient's PCP: Torie Rhodes    Admit Date: 1/15/2020     Discharge Date: 1/17/2020      Admitting Physician: Rodney Estrella MD    Discharge Physician: Allyson Glaser MD     Discharge Diagnoses: Active Hospital Problems    Diagnosis Date Noted    Hydronephrosis [N13.30] 06/07/2017         The patient was seen and examined on the day of discharge and this discharge summary is in conjunction with any daily progress note from day of discharge.     Hospital Course:         Patient demographics:  The patient  Deanna Brunner is a 78 y.o. female      Significant past medical history:       Patient Active Problem List   Diagnosis    Aspiration pneumonia of both lower lobes due to gastric secretions (HCC)    Acute anoxic encephalopathy ([address]    Cardiac arrest ([address]    Lactic acidosis    CATARINA (acute kidney injury) ([address]    High anion gap metabolic acidosis    Cardiomyopathy ([address]    PVC's (premature ventricular contractions)    NSTEMI (non-ST elevated myocardial infarction) ([address]    Ventricular fibrillation ([address]    Ischemic cardiomyopathy    Essential hypertension    Automatic implantable cardioverter-defibrillator in situ    Right renal mass    DM2 (diabetes mellitus, type 2) ([address]    Acute coronary syndrome ([address]    Actinic keratosis    Acute-on-chronic renal failure ([address]    Advance directive discussed with patient    Arthralgia    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 ([address]    Chronic coronary artery disease    Carotid artery disease ([address]    Arterial disease ([address]    Chest pain    Chronic ischemic heart disease    Chronic systolic heart failure (HCC)    Breathlessness on exertion    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    Mixed anxiety depressive disorder    Anxiety, generalized    Gastro-esophageal reflux disease without esophagitis    Generalized OA    Acid reflux    Other specified counseling    Herpes zoster with nervous system complication    Hydronephrosis    Hyperlipidemia    Decreased potassium in the blood    Hypomagnesemia    Ventricular tachycardia (HCC)    Status post ileal conduit (HCC)    Iron deficiency anemia    Iron malabsorption    Malignant neoplasm of renal pelvis (HCC)    Malignant neoplasm of urinary bladder (HCC)    Combined fat and carbohydrate induced hyperlipemia    Anemia, normocytic normochromic    Degenerative arthritis of hip    Scar of skin    Encounter for palliative care    Awareness of heartbeats    Protein-calorie malnutrition [facility]    Tinnitus of vascular origin    Pain in shoulder    Presence of coronary angioplasty implant and graft    Depression, reactive    Acute on chronic systolic heart failure ([facility])    Mixed stress and urge urinary incontinence    Hyperkalemia    Pyelonephritis    Acute pyelonephritis    Chronic kidney disease, stage III (moderate) ([facility])    Encounter for ostomy care education    Arthritis of left hip            Presenting symptoms:  Generalized aches and pains     Diagnostic workup:  CT CHEST ABDOMEN PELVIS WO CONTRAST   CT Head WO Contrast               CONSULTS DURING ADMISSION :   IP CONSULT TO UROLOGY        Patient was diagnosed with:  Urinary tract infection  Coronary artery disease     Treatment while inpatient:  Patient presented to the emergency room with generalized aches and pains and generalized weakness. Patient was diagnosed with urinary tract infection and uncontrolled hypertension. Patient's antihypertensive medications were adjusted. Urine culture was positive for E. Coli which is sensitive to ceftriaxone and ciprofloxacin. Patient will be discharged home on ciprofloxacin. Patient was also evaluated by urology.   Patient has a history of cystectomy right nephrectomy and ileostomy  Patient underwent a loopogram which did not show any obstruction .                        Discharge Condition:  stable      Discharged to:  Home      Activity:   as tolerated:     Follow Up: Follow-up with PCP in 1-2 weeks                  Labs: For convenience and continuity at follow-up the following most recent labs are provided:      CBC:   Lab Results   Component Value Date    WBC 5.8 01/17/2020    HGB 12.5 01/17/2020    HCT 37.5 01/17/2020     01/17/2020       RENAL:   Lab Results   Component Value Date     01/17/2020    K 4.0 01/17/2020    K 4.3 01/16/2020     01/17/2020    CO2 19 01/17/2020    BUN 21 01/17/2020    CREATININE 1.3 01/17/2020           Discharge Medications:    [facility]   Home Medication Instructions BIM:[id number]    Printed on:01/20/20 1315   Medication Information                      acetaminophen (TYLENOL) 500 MG tablet  Take 1,000 mg by mouth daily as needed for Pain              ALPRAZolam (XANAX) 0.25 MG tablet  Take 0.25 mg by mouth nightly as needed for Sleep.              amiodarone (CORDARONE) 200 MG tablet  Take 0.5 tablets by mouth daily             aspirin 81 MG EC tablet  Take 1 tablet by mouth daily HOLD for 30 days after hip surgery. See Eliquis order             carvedilol (COREG) 6.25 MG tablet  Take 6.25 mg by mouth 2 times daily              ciprofloxacin (CIPRO) 500 MG tablet  Take 1 tablet by mouth 2 times daily for 5 days             Coenzyme Q10 (CO Q 10) 100 MG CAPS  Take 1 capsule by mouth daily              ferrous sulfate 325 (65 FE) MG tablet  Take 1 tablet by mouth on Monday, Wednesday, Friday             fluticasone (FLONASE) 50 MCG/ACT nasal spray  1 spray by Nasal route as needed for Rhinitis             insulin glargine (LANTUS) 100 UNIT/ML injection vial  Inject 18 Units into the skin nightly              oxyCODONE 5 MG capsule  Take 5 mg by mouth every 4 hours as needed for Pain.              rosuvastatin (CRESTOR) 10 MG tablet  Take 10 mg by mouth every evening

## 2020-02-05 ENCOUNTER — APPOINTMENT (OUTPATIENT)
Dept: GENERAL RADIOLOGY | Age: 80
End: 2020-02-05
Payer: MEDICARE

## 2020-02-05 ENCOUNTER — HOSPITAL ENCOUNTER (EMERGENCY)
Age: 80
Discharge: HOME | End: 2020-02-06
Attending: EMERGENCY MEDICINE
Payer: MEDICARE

## 2020-02-05 VITALS
DIASTOLIC BLOOD PRESSURE: 63 MMHG | TEMPERATURE: 98.7 F | OXYGEN SATURATION: 97 % | RESPIRATION RATE: 12 BRPM | BODY MASS INDEX: 23.05 KG/M2 | WEIGHT: 152.12 LBS | SYSTOLIC BLOOD PRESSURE: 143 MMHG | HEIGHT: 68 IN | HEART RATE: 63 BPM

## 2020-02-05 LAB
ANION GAP SERPL CALCULATED.3IONS-SCNC: 10 MMOL/L (ref 3–16)
BASOPHILS ABSOLUTE: 0 K/UL (ref 0–0.2)
BASOPHILS RELATIVE PERCENT: 0.6 %
BILIRUBIN URINE: NEGATIVE
BLOOD, URINE: NEGATIVE
BUN BLDV-MCNC: 31 MG/DL (ref 7–20)
CALCIUM SERPL-MCNC: 9.6 MG/DL (ref 8.3–10.6)
CHLORIDE BLD-SCNC: 105 MMOL/L (ref 99–110)
CLARITY: ABNORMAL
CO2: 26 MMOL/L (ref 21–32)
COLOR: YELLOW
CREAT SERPL-MCNC: 1.6 MG/DL (ref 0.6–1.2)
EOSINOPHILS ABSOLUTE: 0.1 K/UL (ref 0–0.6)
EOSINOPHILS RELATIVE PERCENT: 2 %
EPITHELIAL CELLS, UA: 2 /HPF (ref 0–5)
GFR AFRICAN AMERICAN: 37
GFR NON-AFRICAN AMERICAN: 31
GLUCOSE BLD-MCNC: 127 MG/DL (ref 70–99)
GLUCOSE URINE: NEGATIVE MG/DL
HCT VFR BLD CALC: 33.6 % (ref 36–48)
HEMOGLOBIN: 11.4 G/DL (ref 12–16)
HYALINE CASTS: 2 /LPF (ref 0–8)
KETONES, URINE: NEGATIVE MG/DL
LEUKOCYTE ESTERASE, URINE: NEGATIVE
LYMPHOCYTES ABSOLUTE: 1 K/UL (ref 1–5.1)
LYMPHOCYTES RELATIVE PERCENT: 24.9 %
MCH RBC QN AUTO: 32.1 PG (ref 26–34)
MCHC RBC AUTO-ENTMCNC: 33.9 G/DL (ref 31–36)
MCV RBC AUTO: 94.8 FL (ref 80–100)
MICROSCOPIC EXAMINATION: YES
MONOCYTES ABSOLUTE: 0.6 K/UL (ref 0–1.3)
MONOCYTES RELATIVE PERCENT: 13.2 %
NEUTROPHILS ABSOLUTE: 2.5 K/UL (ref 1.7–7.7)
NEUTROPHILS RELATIVE PERCENT: 59.3 %
NITRITE, URINE: NEGATIVE
PDW BLD-RTO: 13.4 % (ref 12.4–15.4)
PH UA: 7 (ref 5–8)
PLATELET # BLD: 183 K/UL (ref 135–450)
PMV BLD AUTO: 8.2 FL (ref 5–10.5)
POTASSIUM REFLEX MAGNESIUM: 4.7 MMOL/L (ref 3.5–5.1)
PROTEIN UA: NEGATIVE MG/DL
RBC # BLD: 3.55 M/UL (ref 4–5.2)
RBC UA: 0 /HPF (ref 0–4)
SODIUM BLD-SCNC: 141 MMOL/L (ref 136–145)
SPECIFIC GRAVITY UA: 1.01 (ref 1–1.03)
TROPONIN: <0.01 NG/ML
URINE REFLEX TO CULTURE: YES
URINE TYPE: ABNORMAL
UROBILINOGEN, URINE: 0.2 E.U./DL
WBC # BLD: 4.2 K/UL (ref 4–11)
WBC UA: 9 /HPF (ref 0–5)

## 2020-02-05 PROCEDURE — 85025 COMPLETE CBC W/AUTO DIFF WBC: CPT

## 2020-02-05 PROCEDURE — 93005 ELECTROCARDIOGRAM TRACING: CPT

## 2020-02-05 PROCEDURE — 87086 URINE CULTURE/COLONY COUNT: CPT

## 2020-02-05 PROCEDURE — 87077 CULTURE AEROBIC IDENTIFY: CPT

## 2020-02-05 PROCEDURE — 71046 X-RAY EXAM CHEST 2 VIEWS: CPT

## 2020-02-05 PROCEDURE — 84484 ASSAY OF TROPONIN QUANT: CPT

## 2020-02-05 PROCEDURE — 81001 URINALYSIS AUTO W/SCOPE: CPT

## 2020-02-05 PROCEDURE — 80048 BASIC METABOLIC PNL TOTAL CA: CPT

## 2020-02-05 PROCEDURE — 87186 SC STD MICRODIL/AGAR DIL: CPT

## 2020-02-05 PROCEDURE — 6370000000 HC RX 637 (ALT 250 FOR IP): Performed by: EMERGENCY MEDICINE

## 2020-02-05 PROCEDURE — 99284 EMERGENCY DEPT VISIT MOD MDM: CPT

## 2020-02-05 RX ORDER — CARVEDILOL 6.25 MG/1
6.25 TABLET ORAL 2 TIMES DAILY WITH MEALS
Status: DISCONTINUED | OUTPATIENT
Start: 2020-02-06 | End: 2020-02-05

## 2020-02-05 RX ORDER — CARVEDILOL 6.25 MG/1
6.25 TABLET ORAL ONCE
Status: COMPLETED | OUTPATIENT
Start: 2020-02-05 | End: 2020-02-05

## 2020-02-05 RX ADMIN — CARVEDILOL 6.25 MG: 6.25 TABLET, FILM COATED ORAL at 22:55

## 2020-02-05 ASSESSMENT — PAIN DESCRIPTION - DESCRIPTORS: DESCRIPTORS: TINGLING

## 2020-02-05 ASSESSMENT — PAIN SCALES - GENERAL: PAINLEVEL_OUTOF10: 8

## 2020-02-05 ASSESSMENT — PAIN DESCRIPTION - PAIN TYPE: TYPE: ACUTE PAIN

## 2020-02-05 ASSESSMENT — PAIN DESCRIPTION - LOCATION: LOCATION: NECK;SHOULDER

## 2020-02-05 ASSESSMENT — PAIN DESCRIPTION - ORIENTATION: ORIENTATION: LEFT

## 2020-02-06 LAB
EKG ATRIAL RATE: 63 BPM
EKG DIAGNOSIS: NORMAL
EKG P AXIS: 53 DEGREES
EKG P-R INTERVAL: 168 MS
EKG Q-T INTERVAL: 442 MS
EKG QRS DURATION: 128 MS
EKG QTC CALCULATION (BAZETT): 452 MS
EKG R AXIS: -3 DEGREES
EKG T AXIS: 37 DEGREES
EKG VENTRICULAR RATE: 63 BPM

## 2020-02-07 LAB
ORGANISM: ABNORMAL
URINE CULTURE, ROUTINE: ABNORMAL

## 2020-02-08 ENCOUNTER — HOSPITAL ENCOUNTER (EMERGENCY)
Age: 80
Discharge: HOME | End: 2020-02-09
Attending: EMERGENCY MEDICINE
Payer: MEDICARE

## 2020-02-08 VITALS
TEMPERATURE: 98 F | RESPIRATION RATE: 27 BRPM | HEIGHT: 68 IN | OXYGEN SATURATION: 97 % | SYSTOLIC BLOOD PRESSURE: 179 MMHG | BODY MASS INDEX: 22.92 KG/M2 | DIASTOLIC BLOOD PRESSURE: 53 MMHG | WEIGHT: 151.24 LBS | HEART RATE: 64 BPM

## 2020-02-08 LAB
A/G RATIO: 1.5 (ref 1.1–2.2)
ALBUMIN SERPL-MCNC: 4.1 G/DL (ref 3.4–5)
ALP BLD-CCNC: 56 U/L (ref 40–129)
ALT SERPL-CCNC: 18 U/L (ref 10–40)
ANION GAP SERPL CALCULATED.3IONS-SCNC: 14 MMOL/L (ref 3–16)
AST SERPL-CCNC: 30 U/L (ref 15–37)
BASOPHILS ABSOLUTE: 0 K/UL (ref 0–0.2)
BASOPHILS RELATIVE PERCENT: 0.6 %
BILIRUB SERPL-MCNC: <0.2 MG/DL (ref 0–1)
BUN BLDV-MCNC: 34 MG/DL (ref 7–20)
CALCIUM SERPL-MCNC: 9.3 MG/DL (ref 8.3–10.6)
CHLORIDE BLD-SCNC: 104 MMOL/L (ref 99–110)
CO2: 23 MMOL/L (ref 21–32)
CREAT SERPL-MCNC: 1.6 MG/DL (ref 0.6–1.2)
EOSINOPHILS ABSOLUTE: 0.1 K/UL (ref 0–0.6)
EOSINOPHILS RELATIVE PERCENT: 2.1 %
GFR AFRICAN AMERICAN: 37
GFR NON-AFRICAN AMERICAN: 31
GLOBULIN: 2.7 G/DL
GLUCOSE BLD-MCNC: 144 MG/DL (ref 70–99)
HCT VFR BLD CALC: 34.5 % (ref 36–48)
HEMOGLOBIN: 11.5 G/DL (ref 12–16)
LYMPHOCYTES ABSOLUTE: 1.2 K/UL (ref 1–5.1)
LYMPHOCYTES RELATIVE PERCENT: 25.5 %
MCH RBC QN AUTO: 31.9 PG (ref 26–34)
MCHC RBC AUTO-ENTMCNC: 33.5 G/DL (ref 31–36)
MCV RBC AUTO: 95.2 FL (ref 80–100)
MONOCYTES ABSOLUTE: 0.6 K/UL (ref 0–1.3)
MONOCYTES RELATIVE PERCENT: 11.6 %
NEUTROPHILS ABSOLUTE: 2.8 K/UL (ref 1.7–7.7)
NEUTROPHILS RELATIVE PERCENT: 60.2 %
PDW BLD-RTO: 13.7 % (ref 12.4–15.4)
PLATELET # BLD: 183 K/UL (ref 135–450)
PMV BLD AUTO: 8.1 FL (ref 5–10.5)
POTASSIUM REFLEX MAGNESIUM: 4.3 MMOL/L (ref 3.5–5.1)
RBC # BLD: 3.62 M/UL (ref 4–5.2)
SODIUM BLD-SCNC: 141 MMOL/L (ref 136–145)
TOTAL PROTEIN: 6.8 G/DL (ref 6.4–8.2)
TROPONIN: <0.01 NG/ML
WBC # BLD: 4.7 K/UL (ref 4–11)

## 2020-02-08 PROCEDURE — 84484 ASSAY OF TROPONIN QUANT: CPT

## 2020-02-08 PROCEDURE — 36415 COLL VENOUS BLD VENIPUNCTURE: CPT

## 2020-02-08 PROCEDURE — 99283 EMERGENCY DEPT VISIT LOW MDM: CPT

## 2020-02-08 PROCEDURE — 85025 COMPLETE CBC W/AUTO DIFF WBC: CPT

## 2020-02-08 PROCEDURE — 80053 COMPREHEN METABOLIC PANEL: CPT

## 2020-02-08 PROCEDURE — 93005 ELECTROCARDIOGRAM TRACING: CPT | Performed by: EMERGENCY MEDICINE

## 2020-02-08 ASSESSMENT — PAIN DESCRIPTION - PAIN TYPE: TYPE: ACUTE PAIN

## 2020-02-08 ASSESSMENT — ENCOUNTER SYMPTOMS
ABDOMINAL PAIN: 0
PHOTOPHOBIA: 0
VOMITING: 0
COLOR CHANGE: 0
NAUSEA: 0
SHORTNESS OF BREATH: 0
TROUBLE SWALLOWING: 0
COUGH: 0

## 2020-02-08 ASSESSMENT — PAIN DESCRIPTION - DESCRIPTORS: DESCRIPTORS: STABBING

## 2020-02-08 ASSESSMENT — PAIN DESCRIPTION - ORIENTATION: ORIENTATION: LEFT

## 2020-02-08 ASSESSMENT — PAIN DESCRIPTION - FREQUENCY: FREQUENCY: INTERMITTENT

## 2020-02-08 ASSESSMENT — PAIN DESCRIPTION - LOCATION: LOCATION: ARM

## 2020-02-08 ASSESSMENT — PAIN SCALES - GENERAL: PAINLEVEL_OUTOF10: 6

## 2020-02-08 NOTE — ED PROVIDER NOTES
Spoke with Jenny in IR.  Procedure to be canceled and pt to be admitted.  [facility] notified by IR.  Waiting for admit orders.   Historical Med      amiodarone (CORDARONE) 200 MG tablet Take 0.5 tablets by mouth daily, Disp-30 tablet, R-5Normal      sodium bicarbonate 650 MG tablet Take 650 mg by mouth 2 times daily Historical Med      ALPRAZolam (XANAX) 0.25 MG tablet Take 0.25 mg by mouth nightly as needed for Sleep. Historical Med      carvedilol (COREG) 6.25 MG tablet Take 6.25 mg by mouth 2 times daily Historical Med      rosuvastatin (CRESTOR) 10 MG tablet Take 10 mg by mouth every evening Historical Med      Coenzyme Q10 (CO Q 10) 100 MG CAPS Take 1 capsule by mouth daily Historical Med      ferrous sulfate 325 (65 FE) MG tablet Take 1 tablet by mouth on Monday, Wednesday, FridayHistorical Med      fluticasone (FLONASE) 50 MCG/ACT nasal spray 1 spray by Nasal route as needed for Rhinitis      therapeutic multivitamin-minerals (THERAGRAN-M) tablet Take 1 tablet by mouth daily. Centrum silver       vitamin D (CHOLECALCIFEROL) 1000 UNIT TABS tablet Take 1,000 Units by mouth daily Historical Med             ALLERGIES     Amlodipine besylate; Calcium channel blockers; Citalopram hydrobromide; Flagyl [metronidazole]; Losartan; Pneumococcal vaccine; Simvastatin; Valsartan-hydrochlorothiazide; Venlafaxine; Hydrocodone-acetaminophen; and Nitrofurantoin    FAMILY HISTORY       Family History   Problem Relation Age of Onset    Diabetes Mother     Heart Disease Father     Cancer Father           SOCIAL HISTORY       Social History     Socioeconomic History    Marital status:       Spouse name: Not on file    Number of children: Not on file    Years of education: Not on file    Highest education level: Not on file   Occupational History    Not on file   Social Needs    Financial resource strain: Not on file    Food insecurity:     Worry: Not on file     Inability: Not on file    Transportation needs:     Medical: Not on file     Non-medical: Not on file   Tobacco Use    Smoking status: Never Smoker    Smokeless tobacco: Never Used   Substance and Sexual Activity    Alcohol use: No    Drug use: No    Sexual activity: Not Currently   Lifestyle    Physical activity:     Days per week: Not on file     Minutes per session: Not on file    Stress: Not on file   Relationships    Social connections:     Talks on phone: Not on file     Gets together: Not on file     Attends Anabaptist service: Not on file     Active member of club or organization: Not on file     Attends meetings of clubs or organizations: Not on file     Relationship status: Not on file    Intimate partner violence:     Fear of current or ex partner: Not on file     Emotionally abused: Not on file     Physically abused: Not on file     Forced sexual activity: Not on file   Other Topics Concern    Not on file   Social History Narrative    Not on file       SCREENINGS      @AQDF([id number])@      PHYSICAL EXAM    (up to 7 for level 4, 8 or more for level 5)     ED Triage Vitals [02/05/20 2125]   BP Temp Temp Source Pulse Resp SpO2 Height Weight   (!) 197/107 98.7 °F (37.1 °C) Oral 65 18 100 % 5' 8\" (1.727 m) 152 lb 1.9 oz (69 kg)       Physical Exam  Constitutional:       Appearance: She is well-developed. HENT:      Head: Normocephalic and atraumatic. Mouth/Throat:      Mouth: Mucous membranes are moist.      Pharynx: Oropharynx is clear. Eyes:      Extraocular Movements: Extraocular movements intact. Conjunctiva/sclera: Conjunctivae normal.      Pupils: Pupils are equal, round, and reactive to light. Neck:      Musculoskeletal: Normal range of motion. Trachea: No tracheal deviation. Cardiovascular:      Rate and Rhythm: Normal rate and regular rhythm. Heart sounds: Normal heart sounds. Pulmonary:      Effort: Pulmonary effort is normal.      Breath sounds: Normal breath sounds. Abdominal:      General: There is no distension. Palpations: Abdomen is soft. Tenderness: There is no abdominal tenderness.    Musculoskeletal: Normal range of motion. Skin:     General: Skin is warm and dry. Capillary Refill: Capillary refill takes less than 2 seconds. Neurological:      General: No focal deficit present. Mental Status: She is alert and oriented to person, place, and time. Cranial Nerves: No cranial nerve deficit. Sensory: No sensory deficit. Motor: No weakness. Coordination: Coordination normal.      Gait: Gait normal.         DIAGNOSTIC RESULTS     EKG: All EKG's are interpreted by the Emergency Department Physician who either signs or Co-signsthis chart in the absence of a cardiologist.    EKG shows a sinus rhythm with a ventricular of 63 bpm.  Patient is a prolonged [place] interval.  QTc interval within acceptable range. Patient has normal axis. There are no significant ST elevations or depressions EKG is nondiagnostic for ACS. Paired EKG from 1/15/2020 there are nonspecific ST changes to the anterior lateral leads.      RADIOLOGY:   Non-plain filmimages such as CT, Ultrasound and MRI are read by the radiologist. Plain radiographic images are visualized and preliminarily interpreted by the emergency physician with the below findings:      Interpretation per the Radiologist below, if available at the time ofthis note:    XR CHEST STANDARD (2 VW)   Final Result   No evidence of acute cardiopulmonary disease               ED BEDSIDE ULTRASOUND:   Performed by ED Physician - none    LABS:  Labs Reviewed   URINE CULTURE - Abnormal; Notable for the following components:       Result Value    Organism Enterococcus gallinarum (*)     All other components within normal limits    Narrative:     ORDER#: [id number]                          ORDERED BY: Valdo Draper  SOURCE: Urine Clean Catch                  COLLECTED:  02/05/20 23:41  ANTIBIOTICS AT [place].:                      RECEIVED :  02/05/20 23:41  Performed at:  [address] [address]   Phone [phone] CBC WITH AUTO DIFFERENTIAL - Abnormal; Notable for the following components:    RBC 3.55 (*)     Hemoglobin 11.4 (*)     Hematocrit 33.6 (*)     All other components within normal limits    Narrative:     Performed at:  [facility]  [address]"[facility] [place] [address]   Phone [phone]   BASIC METABOLIC PANEL W/ REFLEX TO MG FOR LOW K - Abnormal; Notable for the following components:    Glucose 127 (*)     BUN 31 (*)     CREATININE 1.6 (*)     GFR Non- 31 (*)     GFR  37 (*)     All other components within normal limits    Narrative:     Performed at:  [facility]  [address]"[facility] [place] [address]   Phone [phone]   URINE RT REFLEX TO CULTURE - Abnormal; Notable for the following components:    Clarity, UA CLOUDY (*)     All other components within normal limits    Narrative:     Performed at:  [facility]  [address] [place] [address]   Phone [phone]   MICROSCOPIC URINALYSIS - Abnormal; Notable for the following components:    WBC, UA 9 (*)     All other components within normal limits    Narrative:     Performed at:  [facility]  [address]"[facility] [place] [address]   Phone [phone]   TROPONIN    Narrative:     Performed at:  [facility]  [address] [place] [address] "[employer]"[facility] [place] [address]   Phone [phone]       All other labs were within normal range or not returned as of this dictation.     EMERGENCY DEPARTMENT COURSE and DIFFERENTIAL DIAGNOSIS/MDM:   Vitals:    Vitals:    02/05/20 2216 02/05/20 2217 02/05/20 2218 02/05/20 2331   BP:  (!) 158/53  (!) 143/63   Pulse: 59 60 59 63   Resp: 14 13 11 12   Temp:       TempSrc:       SpO2:  97%  97%   Weight:       Height:           MDM  Number of Diagnoses or Management Options  Hypertension, unspecified type:   Diagnosis management comments: 78-year-old female presents the emergency room for evaluation of elevated blood pressure. Initially patient was hypertensive in the emergency department but blood pressure improved without intervention. Patient was given on oral dose of her home medications as well. Patient states that she did feel anxious when she did check her blood pressure and then seeing the elevated blood pressure made her more anxious. Laboratory work-up obtained from triage did not show any emergent normalities. Urine did show pyuria without leukocyte esterase nitrates or bacteria. Patient currently being treated for UTI.  chest x-ray did not demonstrate cardiopulmonary disease. As the patient is currently symptom-free, normotensive and there are no emergent laboratory findings believe she is safe for discharge home. REASSESSMENT      Results were discussed with the patient and why it was of the opinion that the patient was suitable for discharge. Patient is amenable to discharge home. Return indications discussed with the patient. Patient demonstrates understanding of when to return for reevaluation for persistent or worsening symptoms. CRITICAL CARE TIME   Total Critical Care time was 0 minutes, excluding separatelyreportable procedures. There was a high probability ofclinically significant/life threatening deterioration in the patient's condition which required my urgent intervention. CONSULTS:  None    PROCEDURES:  Unless otherwise noted below, none     Procedures    FINAL IMPRESSION      1.  Hypertension, unspecified type          DISPOSITION/PLAN   DISPOSITION Decision To Discharge 02/05/2020 11:41:27 PM      PATIENT REFERREDTO:  Maury Gutierrez    Schedule an appointment as soon as possible for a visit   As needed      DISCHARGEMEDICATIONS:  Discharge Medication List as of 2/5/2020 11:49 PM             (Please note that portions of this note were completed with a voice recognition program.  Efforts were made to edit the dictations but occasionally words are mis-transcribed.)    Tariq Luo MD (electronically signed)  Attending Emergency Physician          Tariq Luo MD  02/08/20 [id number]

## 2020-02-09 LAB
EKG ATRIAL RATE: 66 BPM
EKG DIAGNOSIS: NORMAL
EKG P AXIS: 47 DEGREES
EKG P-R INTERVAL: 164 MS
EKG Q-T INTERVAL: 440 MS
EKG QRS DURATION: 122 MS
EKG QTC CALCULATION (BAZETT): 461 MS
EKG R AXIS: -6 DEGREES
EKG T AXIS: 45 DEGREES
EKG VENTRICULAR RATE: 66 BPM

## 2020-02-09 PROCEDURE — 93010 ELECTROCARDIOGRAM REPORT: CPT | Performed by: INTERNAL MEDICINE

## 2020-02-09 NOTE — ED PROVIDER NOTES
with dilitation    HYSTERECTOMY      KIDNEY REMOVAL      right    PACEMAKER INSERTION      TOTAL HIP ARTHROPLASTY Left 11/6/2019    LEFT ANTERIOR TOTAL HIP REPLACEMENT WITH C-ARM performed by Lalo Sin MD at [facility]    Current Outpatient Rx   Medication Sig Dispense Refill    oxyCODONE 5 MG capsule Take 5 mg by mouth every 4 hours as needed for Pain.  aspirin 81 MG EC tablet Take 1 tablet by mouth daily HOLD for 30 days after hip surgery. See Eliquis order 30 tablet 3    acetaminophen (TYLENOL) 500 MG tablet Take 1,000 mg by mouth daily as needed for Pain       insulin glargine (LANTUS) 100 UNIT/ML injection vial Inject 18 Units into the skin nightly       amiodarone (CORDARONE) 200 MG tablet Take 0.5 tablets by mouth daily 30 tablet 5    sodium bicarbonate 650 MG tablet Take 650 mg by mouth 2 times daily       ALPRAZolam (XANAX) 0.25 MG tablet Take 0.25 mg by mouth nightly as needed for Sleep.  carvedilol (COREG) 6.25 MG tablet Take 6.25 mg by mouth 2 times daily       rosuvastatin (CRESTOR) 10 MG tablet Take 10 mg by mouth every evening       Coenzyme Q10 (CO Q 10) 100 MG CAPS Take 1 capsule by mouth daily       ferrous sulfate 325 (65 FE) MG tablet Take 1 tablet by mouth on Monday, Wednesday, Friday      fluticasone (FLONASE) 50 MCG/ACT nasal spray 1 spray by Nasal route as needed for Rhinitis      therapeutic multivitamin-minerals (THERAGRAN-M) tablet Take 1 tablet by mouth daily.  Centrum silver       vitamin D (CHOLECALCIFEROL) 1000 UNIT TABS tablet Take 1,000 Units by mouth daily          ALLERGIES    Allergies   Allergen Reactions    Amlodipine Besylate      Other reaction(s): Unknown    Calcium Channel Blockers      Other reaction(s): Unknown    Citalopram Hydrobromide      Other reaction(s): Unknown    Flagyl [Metronidazole]      Took with Cipro and it made her vomit    Losartan      Muscle aches  Muscle aches      Pneumococcal Vaccine Swelling     Swelling at injection site    Simvastatin      myalgias  ?  Valsartan-Hydrochlorothiazide      Does not remember    Venlafaxine Nausea Only    Hydrocodone-Acetaminophen Other (See Comments)     Makes patient feel weird, dizzy, nauseous, and sleepy. Patient states she is not allergic to this    Nitrofurantoin Nausea And Vomiting and Other (See Comments)     Other reaction(s): Dizziness, GI Upset  Unsure if Macrobid caused it  Unsure if Macrobid caused it         FAMILY HISTORY    Family History   Problem Relation Age of Onset    Diabetes Mother     Heart Disease Father     Cancer Father        SOCIAL HISTORY    Social History     Socioeconomic History    Marital status:       Spouse name: None    Number of children: None    Years of education: None    Highest education level: None   Occupational History    None   Social Needs    Financial resource strain: None    Food insecurity:     Worry: None     Inability: None    Transportation needs:     Medical: None     Non-medical: None   Tobacco Use    Smoking status: Never Smoker    Smokeless tobacco: Never Used   Substance and Sexual Activity    Alcohol use: No    Drug use: No    Sexual activity: Not Currently   Lifestyle    Physical activity:     Days per week: None     Minutes per session: None    Stress: None   Relationships    Social connections:     Talks on phone: None     Gets together: None     Attends [employer] service: None     Active member of club or organization: None     Attends meetings of clubs or organizations: None     Relationship status: None    Intimate partner violence:     Fear of current or ex partner: None     Emotionally abused: None     Physically abused: None     Forced sexual activity: None   Other Topics Concern    None   Social History Narrative    None       REVIEW OF SYSTEMS    Constitutional:  Denies fever, chills, weight loss or weakness   Eyes:  Denies photophobia or discharge   HENT: Denies sore throat or ear pain   Respiratory:  Denies cough or shortness of breath   Cardiovascular:  Denies chest pain, palpitations or swelling   GI:  Denies abdominal pain, nausea, vomiting, or diarrhea   Musculoskeletal:  Denies back pain   Skin:  Denies rash   Neurologic:  Denies headache, focal weakness or sensory changes   Endocrine:  Denies polyuria or polydypsia   Lymphatic:  Denies swollen glands   Psychiatric:  Denies depression, suicidal ideation or homicidal ideation   All systems negative except as marked. PHYSICAL EXAM    VITAL SIGNS: BP (!) 216/73   Pulse 68   Temp 98 °F (36.7 °C) (Oral)   Resp 18   Ht 5' 8\" (1.727 m)   Wt 151 lb 3.8 oz (68.6 kg)   SpO2 97%   BMI 23.00 kg/m²    Constitutional:  Well developed, Well nourished, No acute distress, Non-toxic appearance. HENT:  Normocephalic, Atraumatic, Bilateral external ears normal, Oropharynx moist, No oral exudates, Nose normal. Neck- Normal range of motion, No tenderness, Supple, No stridor. Eyes:  PERRL, EOMI, Conjunctiva normal, No discharge. Respiratory:  Normal breath sounds, No respiratory distress, No wheezing, No chest tenderness. Cardiovascular:  Normal heart rate, Normal rhythm, No murmurs, No rubs, No gallops. GI:  Bowel sounds normal, Soft, No tenderness, No masses, No pulsatile masses. Musculoskeletal:  Intact distal pulses, No edema, No tenderness, No cyanosis, No clubbing. Good range of motion in all major joints. No tenderness to palpation or major deformities noted. Back- No tenderness. Integument:  Warm, Dry, No erythema, No rash. Lymphatic:  No lymphadenopathy noted. Neurologic:  Alert & oriented x 3, Normal motor function, Normal sensory function, No focal deficits noted. Psychiatric:  Affect normal, Judgment normal, Mood normal.     EKG        RADIOLOGY        PROCEDURES        ED COURSE & MEDICAL DECISION MAKING    Pertinent Labs & Imaging studies reviewed.  (See chart for details)  I will find any hard neurologic findings on physical examination. She had already taken her hydralazine so I just let that work and then repeated her pressure. She is completely asymptomatic. I put out a call to her primary care physician because she has been leaving phone messages for him over the past few days and I want him to know that she was being seen in the emergency department and wanted some guidance. As of this dictation, I never received a call back for the primary care physician. I went back and reevaluated the patient and her blood pressure was 745 systolic and she continued to be asymptomatic. I did not repeat the work-up from 3 nights ago as it all seemed pretty normal.  She does not have sign of endorgan damage on examination and I think the she is just particularly anxious about her blood pressure which I feel is increasing as well. I am encouraging her to talk to her primary care physician about its control. It seems as though she did a good job taking the hydralazine tonight but she did not give it time to work before presenting to the emergency department. I sat down and spoke to her at length about measuring her blood pressure at home and expectations. She seemed pleased with the plan and is discharged home. FINAL IMPRESSION    1.  Uncontrolled hypertension             Nikita Castañeda MD  02/08/20 [id number]

## 2020-02-13 ENCOUNTER — TELEPHONE (OUTPATIENT)
Dept: CARDIOLOGY CLINIC | Age: 80
End: 2020-02-13

## 2020-02-13 NOTE — TELEPHONE ENCOUNTER
Pt called this morning stating she has been in the hospital 2 times in the past 2 weeks on 2/5 and 2/8. She is concerned and would like to speak to someone about this.     Please call Caro Maloney back at [phone]

## 2020-03-04 ENCOUNTER — TELEPHONE (OUTPATIENT)
Dept: CARDIOLOGY CLINIC | Age: 80
End: 2020-03-04

## 2020-03-04 ENCOUNTER — NURSE ONLY (OUTPATIENT)
Dept: CARDIOLOGY CLINIC | Age: 80
End: 2020-03-04

## 2020-03-04 VITALS — DIASTOLIC BLOOD PRESSURE: 86 MMHG | SYSTOLIC BLOOD PRESSURE: 204 MMHG | HEART RATE: 63 BPM

## 2020-03-04 NOTE — TELEPHONE ENCOUNTER
Patient states she has a headaches, weakness and ear pounding sensation. She went to see her PCP already and he gave her hydralazine 10 mg PRN > 160/100, 140/90. Patient states she has been using them if the readings were high. BP does not go down after taking the medications. No other medication changes.     Today 184/80 57 pulse  180/84 60 pulse  Yesterday 200/75 61 pulse  187/88 62 pulse  187/75 59 pulse

## 2020-03-04 NOTE — TELEPHONE ENCOUNTER
Patient stopped in for manual BP check as instructed. Brings in home monitor and were checked against one another.

## 2020-03-11 ENCOUNTER — OFFICE VISIT (OUTPATIENT)
Dept: CARDIOLOGY CLINIC | Age: 80
End: 2020-03-11
Payer: MEDICARE

## 2020-03-11 ENCOUNTER — TELEPHONE (OUTPATIENT)
Dept: CARDIOLOGY CLINIC | Age: 80
End: 2020-03-11

## 2020-03-11 VITALS
OXYGEN SATURATION: 97 % | SYSTOLIC BLOOD PRESSURE: 176 MMHG | HEIGHT: 68 IN | DIASTOLIC BLOOD PRESSURE: 78 MMHG | BODY MASS INDEX: 22.73 KG/M2 | HEART RATE: 56 BPM | WEIGHT: 150 LBS

## 2020-03-11 PROCEDURE — 99214 OFFICE O/P EST MOD 30 MIN: CPT | Performed by: INTERNAL MEDICINE

## 2020-03-11 RX ORDER — HYDRALAZINE HYDROCHLORIDE 25 MG/1
25 TABLET, FILM COATED ORAL 3 TIMES DAILY
Qty: 90 TABLET | Refills: 3 | Status: SHIPPED | OUTPATIENT
Start: 2020-03-11 | End: 2020-08-05

## 2020-03-11 RX ORDER — CARVEDILOL 12.5 MG/1
12.5 TABLET ORAL 2 TIMES DAILY
Qty: 60 TABLET | Refills: 3 | Status: SHIPPED | OUTPATIENT
Start: 2020-03-11 | End: 2020-08-20

## 2020-03-11 RX ORDER — AMLODIPINE BESYLATE 5 MG/1
5 TABLET ORAL DAILY
Qty: 90 TABLET | Refills: 1 | Status: SHIPPED | OUTPATIENT
Start: 2020-03-11 | End: 2020-07-01

## 2020-03-11 NOTE — PROGRESS NOTES
[facility]      Cardiology Consult    Mercedes Malave  1940 March 11, 2020        CC: \"I am terrible. \"     HPI:  The patient is 78 y.o. female with a past medical history significant for CAD, cardiomyopathy, prior PCI to LAD and RCA. She was hospitalized 3/26/16-4/5/16 after suffering Vfib cardiac arrest. Bystanders initiated CPR and when the EMS arrived she was in Vfib and subsequently shocked multiple times. Echo revealed EF 25%. [facility] showed patient stents. Underwent hypothermia protocol and has had a significant neurological recovery. An ICD was placed on 4/1/16 for secondary prevention. Today, she is here for follow up for hypertension. She says that she feels a pounding in her head. She does have shortness of breath if she goes up stairs. She does see nephrology on a regular basis. She is concerned about all of her medical problems especially high blood pressure. Patient reports compliance to her medications. Past Medical History:   Diagnosis Date    Anxiety     Atrial fibrillation ([facility]     CAD (coronary artery disease)     Cancer (HCC)     bladder right kidney     Cardiac arrest ([facility] 3/27/2016    Carotid artery stenosis     CHF (congestive heart failure) (HCC)     Chronic kidney disease     cancer of kidney and bladder    Depression     Diabetes mellitus (HCC)     IDDM    GERD (gastroesophageal reflux disease)     Hip pain, chronic     HYPERCHOLESTERAEMIA     Hypertension     Irritable bowel syndrome     Laceration of head 3/25/2016    fall with cardiac arrest    MI, old     Neuropathy     Osteoarthritis     Presence of combination internal cardiac defibrillator (ICD) and pacemaker     Staph infection     PT. STATES SHE HAS HAD SEVERAL BOILS WITH STAPH LAST ONE 20 YEARS AGO.     Type 2 diabetes mellitus without complication ([facility]     Urinary incontinence      Past Surgical History:   Procedure Laterality Date    ANGIOPLASTY      x3  2009    BLADDER REMOVAL  BLADDER SUSPENSION      CYSTOSCOPY  5/28/2013    with dilitation    HYSTERECTOMY      KIDNEY REMOVAL      right    PACEMAKER INSERTION      TOTAL HIP ARTHROPLASTY Left 11/6/2019    LEFT ANTERIOR TOTAL HIP REPLACEMENT WITH C-ARM performed by Lalo Sin MD at [facility] History   Problem Relation Age of Onset    Diabetes Mother     Heart Disease Father     Cancer Father      Social History     Tobacco Use    Smoking status: Never Smoker    Smokeless tobacco: Never Used   Substance Use Topics    Alcohol use: No    Drug use: No       Allergies   Allergen Reactions    Amlodipine Besylate      Other reaction(s): Unknown    Calcium Channel Blockers      Other reaction(s): Unknown    Citalopram Hydrobromide      Other reaction(s): Unknown    Flagyl [Metronidazole]      Took with Cipro and it made her vomit    Losartan      Muscle aches  Muscle aches      Pneumococcal Vaccine Swelling     Swelling at injection site    Simvastatin      myalgias  ?  Valsartan-Hydrochlorothiazide      Does not remember    Venlafaxine Nausea Only    Hydrocodone-Acetaminophen Other (See Comments)     Makes patient feel weird, dizzy, nauseous, and sleepy. Patient states she is not allergic to this    Nitrofurantoin Nausea And Vomiting and Other (See Comments)     Other reaction(s): Dizziness, GI Upset  Unsure if Macrobid caused it  Unsure if Macrobid caused it       Current Outpatient Medications   Medication Sig Dispense Refill    oxyCODONE 5 MG capsule Take 5 mg by mouth every 4 hours as needed for Pain.  aspirin 81 MG EC tablet Take 1 tablet by mouth daily HOLD for 30 days after hip surgery.  See Eliquis order 30 tablet 3    acetaminophen (TYLENOL) 500 MG tablet Take 1,000 mg by mouth daily as needed for Pain       insulin glargine (LANTUS) 100 UNIT/ML injection vial Inject 18 Units into the skin nightly       amiodarone (CORDARONE) 200 MG tablet Take 0.5 tablets by mouth daily 30 tablet 5  sodium bicarbonate 650 MG tablet Take 650 mg by mouth 2 times daily       ALPRAZolam (XANAX) 0.25 MG tablet Take 0.25 mg by mouth nightly as needed for Sleep.  carvedilol (COREG) 6.25 MG tablet Take 6.25 mg by mouth 2 times daily       rosuvastatin (CRESTOR) 10 MG tablet Take 10 mg by mouth every evening       Coenzyme Q10 (CO Q 10) 100 MG CAPS Take 1 capsule by mouth daily       ferrous sulfate 325 (65 FE) MG tablet Take 1 tablet by mouth on Monday, Wednesday, Friday      fluticasone (FLONASE) 50 MCG/ACT nasal spray 1 spray by Nasal route as needed for Rhinitis      therapeutic multivitamin-minerals (THERAGRAN-M) tablet Take 1 tablet by mouth daily. Centrum silver       vitamin D (CHOLECALCIFEROL) 1000 UNIT TABS tablet Take 1,000 Units by mouth daily        No current facility-administered medications for this visit. Review of Systems:  · Constitutional: no unanticipated weight loss. There's been no change in energy level, sleep pattern, or activity level. No fevers, chills. · Eyes: No visual changes or diplopia. No scleral icterus. · ENT: No Headaches, hearing loss or vertigo. No mouth sores or sore throat. · Cardiovascular: as reviewed in HPI  · Respiratory: No cough or wheezing, no sputum production. No hematemesis. · Gastrointestinal: No abdominal pain, appetite loss, blood in stools. No change in bowel or bladder habits. · Genitourinary: No dysuria, trouble voiding, or hematuria. · Musculoskeletal:  No gait disturbance, no joint complaints. · Integumentary: No rash or pruritis. · Neurological: No headache, diplopia, change in muscle strength, numbness or tingling. · Psychiatric: No anxiety or depression. · Endocrine: No temperature intolerance. No excessive thirst, fluid intake, or urination. No tremor. · Hematologic/Lymphatic: No abnormal bruising or bleeding, blood clots or swollen lymph nodes. · Allergic/Immunologic: No nasal congestion or hives.     Physical Exam: BP (!) 176/78 Comment: recheck  Pulse 56   Ht 5' 8\" (1.727 m)   Wt 150 lb (68 kg) Comment: with shoes  SpO2 97%   BMI 22.81 kg/m²   Wt Readings from Last 3 Encounters:   03/11/20 150 lb (68 kg)   02/08/20 151 lb 3.8 oz (68.6 kg)   02/05/20 152 lb 1.9 oz (69 kg)     Constitutional: She is oriented to person, place, and time. She appears well-developed and well-nourished. In no acute distress. Head: Normocephalic and atraumatic. Pupils equal and round. Neck: Neck supple. No JVP or carotid bruit appreciated. No mass and no thyromegaly present. No lymphadenopathy present. Cardiovascular: Normal rate. Normal heart sounds. Exam reveals no gallop and no friction rub. No murmur heard. Pulmonary/Chest: Effort normal and breath sounds normal. No respiratory distress. She has no wheezes, rhonchi or rales. Abdominal: Soft, non-tender. Bowel sounds are normal. She exhibits no organomegaly, mass or bruit. Extremities: No edema, cyanosis, or clubbing. Pulses are 2+ radial/dorsalis pedis/posterior tibial/carotid bilaterally. Neurological: No gross cranial nerve deficit. Coordination normal.   Skin: Skin is warm and dry. There is no rash or diaphoresis. Psychiatric: She has a normal mood and affect. Her speech is normal and behavior is normal.     Lab Review:   FLP:    Lab Results   Component Value Date    TRIG 220 01/29/2012    HDL 55 06/07/2019    HDL 45 01/29/2012    LDLCALC 59 06/07/2019    LDLDIRECT 148 07/08/2010    LABVLDL 23 06/07/2019     BUN/Creatinine:    Lab Results   Component Value Date    BUN 33 02/10/2020    CREATININE 1.5 02/10/2020       EKG Interpretation: 3/12/19 Sinus  Bradycardia Left bundle branch block    Image Review:   ECHO 3/26/16  Normal left ventricular size and wall thickness. Severely decreased left  ventricular systolic function with an estimated ejection fraction of 20-25%  Global hypokinesis. Mild MR/TR    Cardiac Cath 3/26/16  CORONARY ANATOMY:  1.   The left main is free of disease. 2.  The LAD has a long stent in the mid segment which is widely patent. The  rest of the vessels are free of disease. 3.  The left circumflex artery has 4 to 5 obtuse marginal branches which are  free of disease. 4.  The right coronary artery is the dominant vessel and osteal stent which  is widely patent. The PD and the posterior lateral branches are also free of  disease. CONCLUSION:    1. No obstructive coronary artery disease. 2.  Previously stented sites are widely patent in the mid LAD and osteal RCA. 3.  Improved LV function with EF of about 40% to 45% with posterior basal  hypokinesis. 4.  Elevated left heart pressures. Carotid Doppler 6/29/18  The right internal carotid artery appears to have a <50% diameter reducing    stenosis based on velocity criteria.    The right vertebral artery demonstrates normal antegrade flow.    The left internal carotid artery appears to have a 50-79% diameter reducing    stenosis based on velocity criteria.    The left vertebral artery demonstrates normal antegrade flow. ECHO 3/25/19  Suboptimal image quality. Left ventricular cavity size is normal.  There is mild concentric left ventricular hypertrophy. Ejection fraction is visually estimated to be 50-55%. Indeterminate diastolic function. Mitral valve leaflets appear mildly thickened. Mild mitral regurgitation. The left atrium is mildly dilated. Trivial aortic regurgitation is present. Mild tricuspid regurgitation. Pacemaker / ICD lead is visualized in the right atrium. The right atrium is normal in size.     Carotid doppler 2/5/2020  Impression:       o < 50% stenosis of the right internal carotid artery based on velocity       criteria.     o 50-69% stenosis of the left internal carotid artery based on velocity       criteria.     o There is antegrade flow demonstrated in the right and left vertebral       arteries. Assessment/Plan:     CAD  She denies any symptoms of angina. Continue BB,statin, and Asa. Chronic Systolic Heart Failure  She denies any dyspnea on exertion orthopnea or PND she appears compensated on exam. EF 20-25% post arrest improved to 40-45% on [facility] repeat echocardiogram 3/19 showed normal LVEF at 50-55%. Continue BB. She is currently not on ACE inhibitor/ARB therapy due to chronic kidney disease and hyperkalemia. Even though her ICD shows reduced thoracic impedance, I will continue to follow her clinically    Hypertension  Blood pressure is elevated today. Will increase her Coreg to 12.5 mg BID and will start Norvasc 5 mg daily. I have asked her to space her medications throughout the day and adhere to a low sodium diet. Carotid stenosis  Last carotid doppler 2/5/2020 is unchanged from previous 2016. Hyperlipidemia  Last lipid profile 6/2019 was WNL. Will continue Crestor 10 mg daily. ICD  Implanted 4/1/16. She had device interrogation today showed no significant cardiac arrhythmias but did indicate increased thoracic impedance. I will stop her Amiodarone. I have asked her to call the office if she feels her heart racing. Chronic kidney disease  She is being followed by nephrology on a regular basis. Follow up in 3 months. Thank you very much for allowing me to participate in the care of your patient. Please do not hesitate to contact me if you have any questions.     This note was scribed in the presence of Dr Ayse Mabry, by Micah Benitez RN    Sincerely,  Kemal Blanco MD      [address], [address] [place] [address]   [place] [address]  Ph: [phone]  Fax: [phone]

## 2020-03-12 NOTE — TELEPHONE ENCOUNTER
Returned call to patient. She said in her book at home she said that she was taken off amlodipine in 2006 for swelling in her feet. Patient does not remember how long she took amlodipine or how much swelling she had in her feet. She did not start amlodipine yet. She wants to know what to take to help with her blood pressure.

## 2020-03-13 ENCOUNTER — TELEPHONE (OUTPATIENT)
Dept: CARDIOLOGY CLINIC | Age: 80
End: 2020-03-13

## 2020-03-13 NOTE — TELEPHONE ENCOUNTER
141/69/52 yesterday afternoon  127/49/68 yesterday afternoon  188/74/71 before bed yesterday   175/71/66 this morning  141/62/52 1015a  She states she has been \"vibrating\" and her feet are swelling. PT denies chest pains, tingling, numbing, left arm is aching every now and than but not a lot. No fever. Denies dizzyness but cant walk straight. Please advise.

## 2020-04-17 ENCOUNTER — TELEPHONE (OUTPATIENT)
Dept: CARDIOLOGY CLINIC | Age: 80
End: 2020-04-17

## 2020-05-18 ENCOUNTER — TELEPHONE (OUTPATIENT)
Dept: CARDIOLOGY CLINIC | Age: 80
End: 2020-05-18

## 2020-05-20 NOTE — TELEPHONE ENCOUNTER
Pt called office this morning. Pt states she woke at 2:30am feeling numb and weak. Pt called EMT, they checked her over, her BP was ok, and asked her if she wanted to go to the ER. Pt declined saying she does not want to go unless she needs to. She says her ankles are stiff and a little swollen, her hands \"throb\", and she has loss of appetite. Her BP was 120/58 HR 75.    Please review and advise

## 2020-06-09 ENCOUNTER — HOSPITAL ENCOUNTER (EMERGENCY)
Age: 80
Discharge: HOME | End: 2020-06-09
Payer: MEDICARE

## 2020-06-09 ENCOUNTER — APPOINTMENT (OUTPATIENT)
Dept: CT IMAGING | Age: 80
End: 2020-06-09
Payer: MEDICARE

## 2020-06-09 ENCOUNTER — APPOINTMENT (OUTPATIENT)
Dept: GENERAL RADIOLOGY | Age: 80
End: 2020-06-09
Payer: MEDICARE

## 2020-06-09 VITALS
WEIGHT: 152.34 LBS | RESPIRATION RATE: 15 BRPM | HEART RATE: 64 BPM | HEIGHT: 68 IN | SYSTOLIC BLOOD PRESSURE: 139 MMHG | TEMPERATURE: 97.9 F | DIASTOLIC BLOOD PRESSURE: 52 MMHG | OXYGEN SATURATION: 99 % | BODY MASS INDEX: 23.09 KG/M2

## 2020-06-09 LAB
A/G RATIO: 1.3 (ref 1.1–2.2)
ALBUMIN SERPL-MCNC: 4 G/DL (ref 3.4–5)
ALP BLD-CCNC: 52 U/L (ref 40–129)
ALT SERPL-CCNC: 10 U/L (ref 10–40)
AMORPHOUS: ABNORMAL /HPF
ANION GAP SERPL CALCULATED.3IONS-SCNC: 11 MMOL/L (ref 3–16)
AST SERPL-CCNC: 14 U/L (ref 15–37)
BACTERIA: ABNORMAL /HPF
BASOPHILS ABSOLUTE: 0 K/UL (ref 0–0.2)
BASOPHILS RELATIVE PERCENT: 0.4 %
BILIRUB SERPL-MCNC: 0.4 MG/DL (ref 0–1)
BILIRUBIN URINE: NEGATIVE
BLOOD, URINE: NEGATIVE
BUN BLDV-MCNC: 45 MG/DL (ref 7–20)
CALCIUM SERPL-MCNC: 9.2 MG/DL (ref 8.3–10.6)
CHLORIDE BLD-SCNC: 101 MMOL/L (ref 99–110)
CLARITY: ABNORMAL
CO2: 25 MMOL/L (ref 21–32)
COLOR: YELLOW
COMMENT UA: ABNORMAL
CREAT SERPL-MCNC: 2 MG/DL (ref 0.6–1.2)
CRYSTALS, UA: ABNORMAL /HPF
EKG ATRIAL RATE: 67 BPM
EKG DIAGNOSIS: NORMAL
EKG P AXIS: 32 DEGREES
EKG P-R INTERVAL: 144 MS
EKG Q-T INTERVAL: 426 MS
EKG QRS DURATION: 108 MS
EKG QTC CALCULATION (BAZETT): 450 MS
EKG R AXIS: -7 DEGREES
EKG T AXIS: 20 DEGREES
EKG VENTRICULAR RATE: 67 BPM
EOSINOPHILS ABSOLUTE: 0.1 K/UL (ref 0–0.6)
EOSINOPHILS RELATIVE PERCENT: 1.2 %
EPITHELIAL CELLS, UA: 1 /HPF (ref 0–5)
GFR AFRICAN AMERICAN: 29
GFR NON-AFRICAN AMERICAN: 24
GLOBULIN: 3 G/DL
GLUCOSE BLD-MCNC: 205 MG/DL (ref 70–99)
GLUCOSE URINE: NEGATIVE MG/DL
HCT VFR BLD CALC: 33.4 % (ref 36–48)
HEMOGLOBIN: 11.2 G/DL (ref 12–16)
KETONES, URINE: NEGATIVE MG/DL
LEUKOCYTE ESTERASE, URINE: ABNORMAL
LIPASE: 35 U/L (ref 13–60)
LYMPHOCYTES ABSOLUTE: 0.5 K/UL (ref 1–5.1)
LYMPHOCYTES RELATIVE PERCENT: 8.7 %
MCH RBC QN AUTO: 32.1 PG (ref 26–34)
MCHC RBC AUTO-ENTMCNC: 33.4 G/DL (ref 31–36)
MCV RBC AUTO: 96 FL (ref 80–100)
MICROSCOPIC EXAMINATION: YES
MONOCYTES ABSOLUTE: 0.8 K/UL (ref 0–1.3)
MONOCYTES RELATIVE PERCENT: 13.4 %
NEUTROPHILS ABSOLUTE: 4.5 K/UL (ref 1.7–7.7)
NEUTROPHILS RELATIVE PERCENT: 76.3 %
NITRITE, URINE: NEGATIVE
PDW BLD-RTO: 12.5 % (ref 12.4–15.4)
PH UA: 8 (ref 5–8)
PLATELET # BLD: 189 K/UL (ref 135–450)
PMV BLD AUTO: 8.5 FL (ref 5–10.5)
POTASSIUM REFLEX MAGNESIUM: 4.9 MMOL/L (ref 3.5–5.1)
PROTEIN UA: 30 MG/DL
RBC # BLD: 3.47 M/UL (ref 4–5.2)
RBC UA: 1 /HPF (ref 0–4)
SODIUM BLD-SCNC: 137 MMOL/L (ref 136–145)
SPECIFIC GRAVITY UA: 1.01 (ref 1–1.03)
TOTAL PROTEIN: 7 G/DL (ref 6.4–8.2)
TROPONIN: <0.01 NG/ML
URINE TYPE: ABNORMAL
UROBILINOGEN, URINE: 0.2 E.U./DL
WBC # BLD: 5.9 K/UL (ref 4–11)
WBC UA: 52 /HPF (ref 0–5)

## 2020-06-09 PROCEDURE — 99285 EMERGENCY DEPT VISIT HI MDM: CPT

## 2020-06-09 PROCEDURE — 81001 URINALYSIS AUTO W/SCOPE: CPT

## 2020-06-09 PROCEDURE — 80053 COMPREHEN METABOLIC PANEL: CPT

## 2020-06-09 PROCEDURE — 6360000002 HC RX W HCPCS: Performed by: NURSE PRACTITIONER

## 2020-06-09 PROCEDURE — 2580000003 HC RX 258: Performed by: NURSE PRACTITIONER

## 2020-06-09 PROCEDURE — 84484 ASSAY OF TROPONIN QUANT: CPT

## 2020-06-09 PROCEDURE — 6370000000 HC RX 637 (ALT 250 FOR IP): Performed by: NURSE PRACTITIONER

## 2020-06-09 PROCEDURE — 83690 ASSAY OF LIPASE: CPT

## 2020-06-09 PROCEDURE — 96365 THER/PROPH/DIAG IV INF INIT: CPT

## 2020-06-09 PROCEDURE — 85025 COMPLETE CBC W/AUTO DIFF WBC: CPT

## 2020-06-09 PROCEDURE — 87086 URINE CULTURE/COLONY COUNT: CPT

## 2020-06-09 PROCEDURE — 70450 CT HEAD/BRAIN W/O DYE: CPT

## 2020-06-09 PROCEDURE — 93010 ELECTROCARDIOGRAM REPORT: CPT | Performed by: INTERNAL MEDICINE

## 2020-06-09 PROCEDURE — 93005 ELECTROCARDIOGRAM TRACING: CPT | Performed by: NURSE PRACTITIONER

## 2020-06-09 PROCEDURE — 71046 X-RAY EXAM CHEST 2 VIEWS: CPT

## 2020-06-09 RX ORDER — 0.9 % SODIUM CHLORIDE 0.9 %
1000 INTRAVENOUS SOLUTION INTRAVENOUS ONCE
Status: COMPLETED | OUTPATIENT
Start: 2020-06-09 | End: 2020-06-09

## 2020-06-09 RX ORDER — ACETAMINOPHEN 500 MG
1000 TABLET ORAL ONCE
Status: COMPLETED | OUTPATIENT
Start: 2020-06-09 | End: 2020-06-09

## 2020-06-09 RX ORDER — CIPROFLOXACIN 500 MG/1
500 TABLET, FILM COATED ORAL 2 TIMES DAILY
Qty: 14 TABLET | Refills: 0 | Status: SHIPPED | OUTPATIENT
Start: 2020-06-09 | End: 2020-06-16

## 2020-06-09 RX ADMIN — SODIUM CHLORIDE 1000 ML: 9 INJECTION, SOLUTION INTRAVENOUS at 11:53

## 2020-06-09 RX ADMIN — CEFTRIAXONE 1 G: 1 INJECTION, POWDER, FOR SOLUTION INTRAMUSCULAR; INTRAVENOUS at 11:53

## 2020-06-09 RX ADMIN — ACETAMINOPHEN 1000 MG: 500 TABLET, FILM COATED ORAL at 10:29

## 2020-06-09 ASSESSMENT — PAIN SCALES - GENERAL
PAINLEVEL_OUTOF10: 4
PAINLEVEL_OUTOF10: 2

## 2020-06-09 ASSESSMENT — PAIN DESCRIPTION - PROGRESSION: CLINICAL_PROGRESSION: NOT CHANGED

## 2020-06-09 ASSESSMENT — PAIN DESCRIPTION - PAIN TYPE: TYPE: ACUTE PAIN

## 2020-06-09 ASSESSMENT — ENCOUNTER SYMPTOMS
NAUSEA: 1
DIARRHEA: 0
ABDOMINAL PAIN: 0
COLOR CHANGE: 0
WHEEZING: 0
BACK PAIN: 0
COUGH: 0
VOMITING: 0
SHORTNESS OF BREATH: 0
PHOTOPHOBIA: 0

## 2020-06-09 ASSESSMENT — PAIN DESCRIPTION - ORIENTATION: ORIENTATION: MID

## 2020-06-09 ASSESSMENT — PAIN DESCRIPTION - DESCRIPTORS: DESCRIPTORS: ACHING

## 2020-06-09 ASSESSMENT — PAIN DESCRIPTION - FREQUENCY: FREQUENCY: CONTINUOUS

## 2020-06-09 ASSESSMENT — PAIN DESCRIPTION - LOCATION
LOCATION: HEAD
LOCATION: HEAD

## 2020-06-09 ASSESSMENT — PAIN - FUNCTIONAL ASSESSMENT: PAIN_FUNCTIONAL_ASSESSMENT: PREVENTS OR INTERFERES SOME ACTIVE ACTIVITIES AND ADLS

## 2020-06-09 ASSESSMENT — PAIN DESCRIPTION - ONSET: ONSET: ON-GOING

## 2020-06-09 NOTE — ED PROVIDER NOTES
EKG: Sinus rhythm, rate of 67, PVCs, low voltage QRS, age-indeterminate inferior infarct, age indeterminate anterior infarct. Rhythm strip shows a sinus rhythm with a rate of 67, SD interval of 144 ms, QRS of 108 ms with unifocal PVCs, no other ectopy as interpreted by me. This is compared to an EKG dated 2/8/2020, no significant change other than the unifocal PVC.       Kali Constantino MD  06/09/20 2350

## 2020-06-09 NOTE — ED PROVIDER NOTES
why she had the slight headache. She denies taking any medicine for symptoms, she is on Lantus insulin. She does have a history of atrial fibrillation and has a pacemaker defibrillator. She denies any additional complaints, no additional aggravating relieving factors. The patient presents awake, alert and in no acute respiratory distress or toxic appearance. PastMedical/Surgical History:      Diagnosis Date    Anxiety     [facility] ([facility]     CAD (coronary artery disease)     Cancer (HCC)     bladder right kidney     Cardiac arrest ([facility] 3/27/2016    Carotid artery stenosis     CHF (congestive heart failure) (HCC)     Chronic kidney disease     cancer of kidney and bladder    Depression     Diabetes mellitus (HCC)     IDDM    GERD (gastroesophageal reflux disease)     Hip pain, chronic     HYPERCHOLESTERAEMIA     Hypertension     Irritable bowel syndrome     Laceration of head 3/25/2016    fall with cardiac arrest    MI, old     Neuropathy     Osteoarthritis     Presence of combination internal cardiac defibrillator (ICD) and pacemaker     Staph infection     PT. STATES SHE HAS HAD SEVERAL BOILS WITH STAPH LAST ONE 20 YEARS AGO.  Type 2 diabetes mellitus without complication ([facility]     Urinary incontinence          Procedure Laterality Date    ANGIOPLASTY      x3  2009    BLADDER REMOVAL      BLADDER SUSPENSION      CYSTOSCOPY  5/28/2013    with dilitation    HYSTERECTOMY      KIDNEY REMOVAL      right    PACEMAKER INSERTION      TOTAL HIP ARTHROPLASTY Left 11/6/2019    LEFT ANTERIOR TOTAL HIP REPLACEMENT WITH C-ARM performed by Ariane Marshall MD at [place] [address]       Medications:  Previous Medications    ACETAMINOPHEN (TYLENOL) 500 MG TABLET    Take 1,000 mg by mouth daily as needed for Pain     ALPRAZOLAM (XANAX) 0.25 MG TABLET    Take 0.25 mg by mouth nightly as needed for Sleep.      AMLODIPINE (NORVASC) 5 MG TABLET    Take 1 tablet by mouth daily    ASPIRIN 81 MG EC TABLET    Take 1 tablet by mouth daily HOLD for 30 days after hip surgery. See Eliquis order    CARVEDILOL (COREG) 12.5 MG TABLET    Take 1 tablet by mouth 2 times daily    COENZYME Q10 (CO Q 10) 100 MG CAPS    Take 1 capsule by mouth daily     FERROUS SULFATE 325 (65 FE) MG TABLET    Take 1 tablet by mouth on Monday, Wednesday, Friday    FLUTICASONE (FLONASE) 50 MCG/ACT NASAL SPRAY    1 spray by Nasal route as needed for Rhinitis    HYDRALAZINE (APRESOLINE) 25 MG TABLET    Take 1 tablet by mouth 3 times daily    INSULIN GLARGINE (LANTUS) 100 UNIT/ML INJECTION VIAL    Inject 18 Units into the skin nightly     OXYCODONE 5 MG CAPSULE    Take 5 mg by mouth every 4 hours as needed for Pain. ROSUVASTATIN (CRESTOR) 10 MG TABLET    Take 10 mg by mouth every evening     SODIUM BICARBONATE 650 MG TABLET    Take 650 mg by mouth 2 times daily     THERAPEUTIC MULTIVITAMIN-MINERALS (THERAGRAN-M) TABLET    Take 1 tablet by mouth daily. Centrum silver     VITAMIN D (CHOLECALCIFEROL) 1000 UNIT TABS TABLET    Take 1,000 Units by mouth daily          Review of Systems:  Review of Systems   Constitutional: Negative for chills and fever. HENT: Negative for congestion. Eyes: Negative for photophobia and visual disturbance. Respiratory: Negative for cough, shortness of breath and wheezing. Cardiovascular: Negative for chest pain. Gastrointestinal: Positive for nausea. Negative for abdominal pain, diarrhea and vomiting. She has a history of bladder cancer, has a urostomy bag, she denies any abdominal pain vomiting or diarrhea but has felt nauseous since her blood sugar has been elevated. Genitourinary: Negative for frequency and hematuria. Patient has history of bladder cancer, has a urostomy bag intact   Musculoskeletal: Negative for back pain. Skin: Negative for color change. Neurological: Positive for headaches. Negative for weakness and numbness.         She complains of a headache on the top of her time.      GCS: GCS eye subscore is 4. GCS verbal subscore is 5. GCS motor subscore is 6. Cranial Nerves: Cranial nerves are intact. Sensory: Sensation is intact. Motor: Motor function is intact. Comments: Patient is awake, alert following commands correctly, neurologically intact no focal deficits. No numbness tingling or paresthesias. No facial asymmetry. Tongue is midline.    Psychiatric:         Behavior: Behavior normal.         MEDICAL DECISION MAKING    Vitals:    Vitals:    06/09/20 1041 06/09/20 1149 06/09/20 1203 06/09/20 1217   BP:  108/60 (!) 128/46 (!) 139/52   Pulse:  60 61 64   Resp:  16 11 15   Temp:  97.9 °F (36.6 °C)     TempSrc:  Oral     SpO2:  98% 99% 99%   Weight: 152 lb 5.4 oz (69.1 kg)      Height: 5' 8\" (1.727 m)          LABS:  Labs Reviewed   CBC WITH AUTO DIFFERENTIAL - Abnormal; Notable for the following components:       Result Value    RBC 3.47 (*)     Hemoglobin 11.2 (*)     Hematocrit 33.4 (*)     Lymphocytes Absolute 0.5 (*)     All other components within normal limits    Narrative:     Performed at:  [facility]  [address] [facility] [place] [address]   Phone [phone]   COMPREHENSIVE METABOLIC PANEL W/ REFLEX TO MG FOR LOW K - Abnormal; Notable for the following components:    Glucose 205 (*)     BUN 45 (*)     CREATININE 2.0 (*)     GFR Non- 24 (*)     GFR  29 (*)     AST 14 (*)     All other components within normal limits    Narrative:     Performed at:  [facility]  [address], [place] [address]   Phone [phone]   URINALYSIS - Abnormal; Notable for the following components:    Clarity, UA CLOUDY (*)     Protein, UA 30 (*)     Leukocyte Esterase, Urine LARGE (*)     All other components within normal limits    Narrative:     Performed at:  [facility]  [address] [place] [address] [facility] [place] [address]   Phone [phone]   MICROSCOPIC URINALYSIS - Abnormal; Notable for the following components:    Bacteria, UA 1+ (*)     Crystals, UA Few Triple Phos (*)     WBC, UA 52 (*)     All other components within normal limits    Narrative:     Performed at:  [facility]  [address] [place], [place] [address]   Phone [phone]   CULTURE, URINE   LIPASE    Narrative:     Performed at:  [facility]  [address] [place], [place] [address]   Phone [phone]   TROPONIN    Narrative:     Performed at:  [facility]  [address] [place], [place] [address]   Phone [phone] of labs reviewed and werenegative at this time or not returned at the time of this note. RADIOLOGY:   Non-plain film images such as CT, Ultrasound and MRI are read by the radiologist. Alda CELAYA APRN - CNP have directly visualized the radiologic plain film image(s) with the below findings:        Interpretation per the Radiologist below, if available at the time of this note:    XR CHEST STANDARD (2 VW)   Final Result   No evidence of acute cardiopulmonary disease. CT HEAD WO CONTRAST   Final Result   No acute intracranial abnormality. MEDICAL DECISION MAKING / ED COURSE:      PROCEDURES:   Procedures    None    Patient was given:  Medications   0.9 % sodium chloride bolus (1,000 mLs Intravenous New Bag 6/9/20 1153)   acetaminophen (TYLENOL) tablet 1,000 mg (1,000 mg Oral Given 6/9/20 1029)   cefTRIAXone (ROCEPHIN) 1 g in sterile water 10 mL IV syringe (0 g Intravenous Stopped 6/9/20 1158)       Patient complains of waking up at 1:00 this morning sweating, checked her blood sugar and it was 300, she went to get up and started to feel dizzy. She does have a slight headache on the top of her head, denies worst headache of her life, no blurred or lost vision.   She is also had some nausea but no vomiting, diarrhea or abdominal pain. She does have a history of atrial fibrillation, pacemaker defibrillator, bladder cancer with a urostomy bag. After evaluation and examination the patient IV access, blood work, chest x-ray, EKG, Tylenol for the headache and a CT scan of the head were ordered. CBC shows no significant sepsis or anemia requiring transfusion. Metabolic panel shows no significant electrolyte disturbances however her creatinine is 2, GFR of 24 and BUN of 45, I did trend her labs and this appears to be consistent in the past however her creatinine is never been above 2. I did give her fluids. Liver functions are normal.  Urinalysis shows large amount of leukoesterase, bacteria, I ordered her Rocephin. Troponin is negative. Lipase is normal.  EKG shows sinus rhythm rate of 67 bpm, no acute ST elevation, please see Dr. Gregory Mcmanus EKG interpretation note. Chest x-ray shows no acute cardiopulmonary findings. CT the head shows no acute intracranial abnormality. However because of the patient's creatinine level and UTI I wanted to speak with her family physician on call to discuss plan of care. At 1238 I spoke with Dr. Gutierrez Bailey, her PCP on call we discussed the patient's case at length, he agreed that the patient could be managed out patient with her nephrologist as her kidney function has been getting worse. After conversation we agreed she could go home. At her chart it appears that her last urine culture was positive for enterococcus which was sensitive to Cipro, I will treat accordingly. Therefore, it was agreed the patient will go home, shared medical decision was made to the patient, her PCP and myself and we agreed she could be discharged home with outpatient follow-up. Patient was discharged home with prescription for Cipro, educated call her PCP today make an appointment to be seen later this week, return to the ER immediately for worsening symptoms. The patient tolerated their visit well.

## 2020-06-10 ENCOUNTER — CARE COORDINATION (OUTPATIENT)
Dept: CARE COORDINATION | Age: 80
End: 2020-06-10

## 2020-06-10 LAB — URINE CULTURE, ROUTINE: NORMAL

## 2020-06-23 ENCOUNTER — CARE COORDINATION (OUTPATIENT)
Dept: CARE COORDINATION | Age: 80
End: 2020-06-23

## 2020-06-23 NOTE — CARE COORDINATION
or if you are sick. For more information on steps you can take to protect yourself, see CDC's How to Protect Yourself    Pt verbalized understanding of above and agreement with the following  Plan:  Pt will continue following w/PCP, nephrologist & urologist as planned. This encounter concludes further planned outreach for the current Care Transition episode.

## 2020-06-28 ENCOUNTER — APPOINTMENT (OUTPATIENT)
Dept: GENERAL RADIOLOGY | Age: 80
End: 2020-06-28
Payer: MEDICARE

## 2020-06-28 ENCOUNTER — HOSPITAL ENCOUNTER (EMERGENCY)
Age: 80
Discharge: HOME | End: 2020-06-28
Attending: EMERGENCY MEDICINE
Payer: MEDICARE

## 2020-06-28 VITALS
HEART RATE: 76 BPM | TEMPERATURE: 98.2 F | WEIGHT: 158.73 LBS | HEIGHT: 68 IN | RESPIRATION RATE: 12 BRPM | OXYGEN SATURATION: 99 % | DIASTOLIC BLOOD PRESSURE: 81 MMHG | BODY MASS INDEX: 24.06 KG/M2 | SYSTOLIC BLOOD PRESSURE: 168 MMHG

## 2020-06-28 LAB
A/G RATIO: 1.5 (ref 1.1–2.2)
ALBUMIN SERPL-MCNC: 4.1 G/DL (ref 3.4–5)
ALP BLD-CCNC: 52 U/L (ref 40–129)
ALT SERPL-CCNC: 11 U/L (ref 10–40)
ANION GAP SERPL CALCULATED.3IONS-SCNC: 19 MMOL/L (ref 3–16)
AST SERPL-CCNC: 20 U/L (ref 15–37)
BASOPHILS ABSOLUTE: 0.1 K/UL (ref 0–0.2)
BASOPHILS RELATIVE PERCENT: 0.9 %
BILIRUB SERPL-MCNC: 0.3 MG/DL (ref 0–1)
BILIRUBIN URINE: NEGATIVE
BLOOD, URINE: NEGATIVE
BUN BLDV-MCNC: 34 MG/DL (ref 7–20)
CALCIUM SERPL-MCNC: 9.1 MG/DL (ref 8.3–10.6)
CHLORIDE BLD-SCNC: 105 MMOL/L (ref 99–110)
CLARITY: CLEAR
CO2: 16 MMOL/L (ref 21–32)
COLOR: YELLOW
CREAT SERPL-MCNC: 1.7 MG/DL (ref 0.6–1.2)
EOSINOPHILS ABSOLUTE: 0.2 K/UL (ref 0–0.6)
EOSINOPHILS RELATIVE PERCENT: 2.7 %
GFR AFRICAN AMERICAN: 35
GFR NON-AFRICAN AMERICAN: 29
GLOBULIN: 2.7 G/DL
GLUCOSE BLD-MCNC: 104 MG/DL (ref 70–99)
GLUCOSE URINE: NEGATIVE MG/DL
HCT VFR BLD CALC: 35.1 % (ref 36–48)
HEMOGLOBIN: 11.7 G/DL (ref 12–16)
KETONES, URINE: NEGATIVE MG/DL
LEUKOCYTE ESTERASE, URINE: NEGATIVE
LYMPHOCYTES ABSOLUTE: 1.2 K/UL (ref 1–5.1)
LYMPHOCYTES RELATIVE PERCENT: 18 %
MCH RBC QN AUTO: 32.4 PG (ref 26–34)
MCHC RBC AUTO-ENTMCNC: 33.3 G/DL (ref 31–36)
MCV RBC AUTO: 97.4 FL (ref 80–100)
MICROSCOPIC EXAMINATION: NORMAL
MONOCYTES ABSOLUTE: 0.6 K/UL (ref 0–1.3)
MONOCYTES RELATIVE PERCENT: 9.5 %
NEUTROPHILS ABSOLUTE: 4.6 K/UL (ref 1.7–7.7)
NEUTROPHILS RELATIVE PERCENT: 68.9 %
NITRITE, URINE: NEGATIVE
PDW BLD-RTO: 12.7 % (ref 12.4–15.4)
PH UA: 7 (ref 5–8)
PLATELET # BLD: 148 K/UL (ref 135–450)
PLATELET SLIDE REVIEW: ABNORMAL
PMV BLD AUTO: 8.5 FL (ref 5–10.5)
POTASSIUM REFLEX MAGNESIUM: 4.5 MMOL/L (ref 3.5–5.1)
PRO-BNP: 735 PG/ML (ref 0–449)
PROTEIN UA: NEGATIVE MG/DL
RBC # BLD: 3.6 M/UL (ref 4–5.2)
SLIDE REVIEW: ABNORMAL
SODIUM BLD-SCNC: 140 MMOL/L (ref 136–145)
SPECIFIC GRAVITY UA: 1.01 (ref 1–1.03)
TOTAL PROTEIN: 6.8 G/DL (ref 6.4–8.2)
TROPONIN: <0.01 NG/ML
URINE REFLEX TO CULTURE: NORMAL
URINE TYPE: NORMAL
UROBILINOGEN, URINE: 0.2 E.U./DL
WBC # BLD: 6.8 K/UL (ref 4–11)

## 2020-06-28 PROCEDURE — 84484 ASSAY OF TROPONIN QUANT: CPT

## 2020-06-28 PROCEDURE — 85025 COMPLETE CBC W/AUTO DIFF WBC: CPT

## 2020-06-28 PROCEDURE — 99284 EMERGENCY DEPT VISIT MOD MDM: CPT

## 2020-06-28 PROCEDURE — 83880 ASSAY OF NATRIURETIC PEPTIDE: CPT

## 2020-06-28 PROCEDURE — 81003 URINALYSIS AUTO W/O SCOPE: CPT

## 2020-06-28 PROCEDURE — 93005 ELECTROCARDIOGRAM TRACING: CPT | Performed by: EMERGENCY MEDICINE

## 2020-06-28 PROCEDURE — 71045 X-RAY EXAM CHEST 1 VIEW: CPT

## 2020-06-28 PROCEDURE — 80053 COMPREHEN METABOLIC PANEL: CPT

## 2020-06-28 ASSESSMENT — PAIN SCALES - GENERAL
PAINLEVEL_OUTOF10: 0
PAINLEVEL_OUTOF10: 0

## 2020-06-28 NOTE — ED NOTES
Introduced self to patient. Patient resting in the bed with side rails up, call light within reach. No other needs at this time, updated on plan of care.        Randy Cohn RN  06/28/20 1228

## 2020-06-28 NOTE — ED PROVIDER NOTES
eMERGENCY dEPARTMENT eNCOUnter      CHIEF COMPLAINT    Chief Complaint   Patient presents with    Other     reports woke up legs vibrating, recently had UTI, intermittent headache. HPI    Marika Peters is a 78 y.o. female who presents with a complaint that her legs were tingling and that her head was pounding as well. She states that the tingling in her legs radiates all the way up her body to the top of her head. She states that now that she is here, her head is no longer hurting and that she still has some tingling in her legs. She says that she has a history of diabetes and peripheral neuropathy and this feels similar. She denies any swelling there is no redness. No chest pain or shortness of breath. No exacerbating or relieving factors. She does have history of having one kidney. She states that she has had high potassium in the past as well. PAST MEDICAL HISTORY    Past Medical History:   Diagnosis Date    Anxiety     Atrial fibrillation ([address]     CAD (coronary artery disease)     Cancer ([facility]     bladder right kidney     Cardiac arrest ([facility] [address] 3/27/2016    Carotid artery stenosis     CHF (congestive heart failure) (HCC)     Chronic kidney disease     cancer of kidney and bladder    Depression     Diabetes mellitus (HCC)     IDDM    GERD (gastroesophageal reflux disease)     Hip pain, chronic     HYPERCHOLESTERAEMIA     Hypertension     Irritable bowel syndrome     Laceration of head 3/25/2016    fall with cardiac arrest    MI, old     Neuropathy     Osteoarthritis     Presence of combination internal cardiac defibrillator (ICD) and pacemaker     Staph infection     PT. STATES SHE HAS HAD SEVERAL BOILS WITH STAPH LAST ONE 20 YEARS AGO.     Type 2 diabetes mellitus without complication ([facility] [facility]     Urinary incontinence        SURGICAL HISTORY    Past Surgical History:   Procedure Laterality Date    ANGIOPLASTY      x3  2009    BLADDER REMOVAL      BLADDER SUSPENSION      Took with Cipro and it made her vomit    Losartan      Muscle aches  Muscle aches      Pneumococcal Vaccine Swelling     Swelling at injection site    Simvastatin      myalgias  ?  Valsartan-Hydrochlorothiazide      Does not remember    Venlafaxine Nausea Only    Hydrocodone-Acetaminophen Other (See Comments)     Makes patient feel weird, dizzy, nauseous, and sleepy. Patient states she is not allergic to this    Nitrofurantoin Nausea And Vomiting and Other (See Comments)     Other reaction(s): Dizziness, GI Upset  Unsure if Macrobid caused it  Unsure if Macrobid caused it         FAMILY HISTORY    Family History   Problem Relation Age of Onset    Diabetes Mother     Heart Disease Father     Cancer Father        SOCIAL HISTORY    Social History     Socioeconomic History    Marital status:       Spouse name: None    Number of children: None    Years of education: None    Highest education level: None   Occupational History    None   Social Needs    Financial resource strain: None    Food insecurity     Worry: None     Inability: None    Transportation needs     Medical: None     Non-medical: None   Tobacco Use    Smoking status: Never Smoker    Smokeless tobacco: Never Used   Substance and Sexual Activity    Alcohol use: No    Drug use: No    Sexual activity: Not Currently   Lifestyle    Physical activity     Days per week: None     Minutes per session: None    Stress: None   Relationships    Social connections     Talks on phone: None     Gets together: None     Attends [employer] service: None     Active member of club or organization: None     Attends meetings of clubs or organizations: None     Relationship status: None    Intimate partner violence     Fear of current or ex partner: None     Emotionally abused: None     Physically abused: None     Forced sexual activity: None   Other Topics Concern    None   Social History Narrative    None       REVIEW OF SYSTEMS

## 2020-06-29 LAB
EKG ATRIAL RATE: 75 BPM
EKG DIAGNOSIS: NORMAL
EKG P AXIS: 45 DEGREES
EKG P-R INTERVAL: 160 MS
EKG Q-T INTERVAL: 408 MS
EKG QRS DURATION: 114 MS
EKG QTC CALCULATION (BAZETT): 455 MS
EKG R AXIS: 0 DEGREES
EKG T AXIS: 68 DEGREES
EKG VENTRICULAR RATE: 75 BPM

## 2020-06-29 PROCEDURE — 93010 ELECTROCARDIOGRAM REPORT: CPT | Performed by: INTERNAL MEDICINE

## 2020-06-30 NOTE — PROGRESS NOTES
Pulse 68   Temp 98.8 °F (37.1 °C)   Ht 5' 8\" (1.727 m)   Wt 152 lb 12.8 oz (69.3 kg)   SpO2 96%   BMI 23.23 kg/m²   Wt Readings from Last 3 Encounters:   07/01/20 152 lb 12.8 oz (69.3 kg)   06/28/20 158 lb 11.7 oz (72 kg)   06/09/20 152 lb 5.4 oz (69.1 kg)       Physical Exam:  GEN: Appears well, no acute distress  SKIN: Pink, warm, dry. Nails without clubbing. HEENT: PERRLA. Normocephalic, atraumatic. Neck supple. No adenopathy. LUNG: AP diameter normal. Clear bilateral. No wheeze, rales, or ronchi. Respiratory effort normal.  HEART: S1S2 A/R. No JVD. No carotid bruit. No murmur, rub or gallop. ABD: Soft, nontender. +BS X 4 quads. No hepatomegaly. EXT: Radial and pedal pulses 2+ and symmetric. Without varicosities. No edema. MUSCSKEL: Good ROM X4 extremities. No deformity. NEURO: A/O X3. Calm and cooperative. Past Medical History:   has a past medical history of Anxiety, Atrial fibrillation ([address] CAD (coronary artery disease), Cancer ([address] Cardiac arrest ([address] Carotid artery stenosis, CHF (congestive heart failure) ([address] Chronic kidney disease, Depression, Diabetes mellitus ([address] GERD (gastroesophageal reflux disease), Hip pain, chronic, HYPERCHOLESTERAEMIA, Hypertension, Irritable bowel syndrome, Laceration of head, MI, old, Neuropathy, Osteoarthritis, Presence of combination internal cardiac defibrillator (ICD) and pacemaker, Staph infection, Type 2 diabetes mellitus without complication ([address] and Urinary incontinence. Surgical History:   has a past surgical history that includes angioplasty; Hysterectomy; bladder suspension; Cystoscopy (5/28/2013); Pacemaker insertion; Kidney removal; Bladder removal; Total hip arthroplasty (Left, 11/6/2019); and Skin cancer excision. Social History:   reports that she has never smoked. She has never used smokeless tobacco. She reports that she does not drink alcohol or use drugs.    Family History:   Family History   Problem Relation Age of Onset    Nitrofurantoin       LABS: Results reviewed with patient today. CBC:   Lab Results   Component Value Date    WBC 6.8 06/28/2020    WBC 5.9 06/09/2020    WBC 4.9 02/10/2020    RBC 3.60 06/28/2020    RBC 3.47 06/09/2020    RBC 3.75 02/10/2020    HGB 11.7 06/28/2020    HGB 11.2 06/09/2020    HGB 11.9 02/10/2020    HCT 35.1 06/28/2020    HCT 33.4 06/09/2020    HCT 35.9 02/10/2020    MCV 97.4 06/28/2020    MCV 96.0 06/09/2020    MCV 95.8 02/10/2020    RDW 12.7 06/28/2020    RDW 12.5 06/09/2020    RDW 13.8 02/10/2020     06/28/2020     06/09/2020     02/10/2020     BMP:  Lab Results   Component Value Date     06/28/2020     06/09/2020     02/10/2020    K 4.5 06/28/2020    K 4.9 06/09/2020    K 4.9 02/10/2020    K 4.3 02/08/2020     06/28/2020     06/09/2020     02/10/2020    CO2 16 06/28/2020    CO2 25 06/09/2020    CO2 19 02/10/2020    PHOS 4.7 02/10/2020    PHOS 4.1 10/24/2019    PHOS 3.9 06/20/2019    BUN 34 06/28/2020    BUN 45 06/09/2020    BUN 33 02/10/2020    CREATININE 1.7 06/28/2020    CREATININE 2.0 06/09/2020    CREATININE 1.5 02/10/2020     BNP:   Lab Results   Component Value Date    PROBNP 735 06/28/2020    PROBNP 502 01/15/2020    PROBNP 407 03/28/2018     Iron Studies:    Lab Results   Component Value Date    TIBC 275 02/10/2020    TIBC 247 02/11/2019    TIBC 252 07/17/2018    FERRITIN 319.7 02/10/2020    FERRITIN 579.0 02/11/2019    FERRITIN 425.2 07/17/2018     GLUCOSE:   No results for input(s): GLUCOSE in the last 72 hours.   LIVER PROFILE:   Lab Results   Component Value Date    AST 20 06/28/2020    ALT 11 06/28/2020    LIPASE 35.0 06/09/2020    AMYLASE 48 06/17/2010    LABALBU 4.1 06/28/2020    BILIDIR <0.2 01/15/2020    BILITOT 0.3 06/28/2020    ALKPHOS 52 06/28/2020     PT/INR:   Lab Results   Component Value Date    PROTIME 11.2 01/15/2020    INR 0.97 01/15/2020     Cardiac Enzymes:  Lab Results   Component Value Date    CKMB <0.2 06/06/2010    TROPONINI <0.01 06/28/2020     FASTING LIPID PANEL:  Lab Results   Component Value Date    CHOL 161 01/29/2012    HDL 55 06/07/2019    HDL 45 01/29/2012    LDLCALC 59 06/07/2019    TRIG 220 01/29/2012       Cardiac Imaging: Reports reviewed with patient today. EKG:    ECHO:   ECHO 3/26/16  Normal left ventricular size and wall thickness. Severely decreased left  ventricular systolic function with an estimated ejection fraction of 20-25%  Global hypokinesis. Mild MR/TR    STRESS:    CATH:  Cardiac Cath 3/26/16  CORONARY ANATOMY:  1.  The left main is free of disease. 2.  The LAD has a long stent in the mid segment which is widely patent.  The  rest of the vessels are free of disease. 3.  The left circumflex artery has 4 to 5 obtuse marginal branches which are  free of disease. 4.  The right coronary artery is the dominant vessel and osteal stent which  is widely patent.  The PD and the posterior lateral branches are also free of  disease.     CONCLUSION:    1.  No obstructive coronary artery disease. 2.  Previously stented sites are widely patent in the mid LAD and osteal RCA. 3.  Improved LV function with EF of about 40% to 45% with posterior basal  hypokinesis. 4.  Elevated left heart pressures    Carotid Doppler 6/29/18  The right internal carotid artery appears to have a <50% diameter reducing    stenosis based on velocity criteria.    The right vertebral artery demonstrates normal antegrade flow.    The left internal carotid artery appears to have a 50-79% diameter reducing    stenosis based on velocity criteria.    The left vertebral artery demonstrates normal antegrade flow.      ECHO 3/25/19  Suboptimal image quality. Left ventricular cavity size is normal.  There is mild concentric left ventricular hypertrophy. Ejection fraction is visually estimated to be 50-55%. Indeterminate diastolic function. Mitral valve leaflets appear mildly thickened. Mild mitral regurgitation.   The left atrium is mildly dilated. Trivial aortic regurgitation is present. Mild tricuspid regurgitation. Pacemaker / ICD lead is visualized in the right atrium. The right atrium is normal in size.     Carotid doppler 2/5/2020  Impression:       o < 50% stenosis of the right internal carotid artery based on velocity       criteria.     o 50-69% stenosis of the left internal carotid artery based on velocity       criteria.     o There is antegrade flow demonstrated in the right and left vertebral       arteries.         Assessment/Plan:      1.) Chronic systolic heart failure 54-92% improved to 50-55%: Stable. Continue current GDMT. NYHA Class II  Diuretic: none  Beta Blocker: coreg  ACEi/ARB/ARNI: none  Aldosterone Antagonist:  Cardiac Rehab: Not indicated for EF>35%  2gm Na diet, daily weight, 64 oz fluid restriction  Avoid NSAIDS  ICD: Not indicated for EF>35%     2.) CAD s/p PCI to LAD and RCA: Stable without evidence of angina/ischemia. Cont GDMT. DAPT: aspirin  Beta Blocker: none  ACEi/ARB: none  Anti anginal: none  Lipid management/high intensity statin: crestor  Risk factor management: high blood pressure, high cholesterol, Diabetes, smoking, obesity, family hx  Lifestyle modification: Heart healthy diet, regular exercise, weight loss, smoking cessation, stress reduction    3.) Hypertension: norvasc, hydralazine  Goal BP <130/80. Net,  Non pharmacologic interventions include:   -weight loss  -heart healthy low sodium and low fat diet that consist of mostly fruits, vegetables and grains (Dash diet)  -limited amount of alcohol (no more than 1 drink/day for women, 2 drinks/day for men)  -regular physical activity  -no smoking  -stress reduction    Instructions:   1. Medications: Continue current medications  2. Labs:none  3. Follow up: 6 months  4. Daily weight: Call for increase 3 lbs/day or 5 lbs/week. 5. 2 gm sodium diet:  6. Fluid Restriction: 64 oz.       I appreciate the opportunity of cooperating in the care of this individual.    LINDA Marcelino, NATALIE, 7/1/2020,3:21 PM

## 2020-07-01 ENCOUNTER — OFFICE VISIT (OUTPATIENT)
Dept: CARDIOLOGY CLINIC | Age: 80
End: 2020-07-01
Payer: MEDICARE

## 2020-07-01 ENCOUNTER — NURSE ONLY (OUTPATIENT)
Dept: CARDIOLOGY CLINIC | Age: 80
End: 2020-07-01
Payer: MEDICARE

## 2020-07-01 VITALS
OXYGEN SATURATION: 96 % | WEIGHT: 152.8 LBS | DIASTOLIC BLOOD PRESSURE: 62 MMHG | TEMPERATURE: 98.8 F | HEART RATE: 68 BPM | HEIGHT: 68 IN | SYSTOLIC BLOOD PRESSURE: 120 MMHG | BODY MASS INDEX: 23.16 KG/M2

## 2020-07-01 PROCEDURE — 93290 INTERROG DEV EVAL ICPMS IP: CPT | Performed by: NURSE PRACTITIONER

## 2020-07-01 PROCEDURE — 99214 OFFICE O/P EST MOD 30 MIN: CPT | Performed by: NURSE PRACTITIONER

## 2020-07-01 PROCEDURE — 93282 PRGRMG EVAL IMPLANTABLE DFB: CPT | Performed by: INTERNAL MEDICINE

## 2020-07-01 NOTE — PROGRESS NOTES
Pt seen in clinic today for cardiac device interrogation. Their device is a  MDT 1 chamber ICD. Based on threshold, impedance, and intrinsic sensing tests run today, the device appears to be functioning normally. Remaining battery life is 8y4m   0.01%      Episodes: none    Thoracic impedance is normal.      Pt was informed of findings today and general questions have been answered with regard to device. Home monitoring hardware is transmitting on schedule. Pt to see WILLIAM Park in clinic today following their device check.

## 2020-07-01 NOTE — PATIENT INSTRUCTIONS
Instructions:   1. Medications: Continue current medications  2. Labs:none  3. Follow up: 6 months  4. Daily weight: Call for increase 3 lbs/day or 5 lbs/week. 5. 2 gm sodium diet:  6. Fluid Restriction: 64 oz.

## 2020-07-29 ENCOUNTER — APPOINTMENT (OUTPATIENT)
Dept: GENERAL RADIOLOGY | Age: 80
End: 2020-07-29
Payer: MEDICARE

## 2020-07-29 ENCOUNTER — HOSPITAL ENCOUNTER (OUTPATIENT)
Age: 80
Setting detail: OBSERVATION
Discharge: HOME | End: 2020-07-30
Attending: EMERGENCY MEDICINE | Admitting: HOSPITALIST
Payer: MEDICARE

## 2020-07-29 LAB
A/G RATIO: 1.5 (ref 1.1–2.2)
ALBUMIN SERPL-MCNC: 4.2 G/DL (ref 3.4–5)
ALP BLD-CCNC: 47 U/L (ref 40–129)
ALT SERPL-CCNC: 8 U/L (ref 10–40)
ANION GAP SERPL CALCULATED.3IONS-SCNC: 14 MMOL/L (ref 3–16)
AST SERPL-CCNC: 15 U/L (ref 15–37)
BASOPHILS ABSOLUTE: 0 K/UL (ref 0–0.2)
BASOPHILS RELATIVE PERCENT: 0.7 %
BILIRUB SERPL-MCNC: 0.4 MG/DL (ref 0–1)
BUN BLDV-MCNC: 27 MG/DL (ref 7–20)
CALCIUM SERPL-MCNC: 9.4 MG/DL (ref 8.3–10.6)
CHLORIDE BLD-SCNC: 105 MMOL/L (ref 99–110)
CO2: 23 MMOL/L (ref 21–32)
CREAT SERPL-MCNC: 1.5 MG/DL (ref 0.6–1.2)
EKG ATRIAL RATE: 72 BPM
EKG DIAGNOSIS: NORMAL
EKG P AXIS: 57 DEGREES
EKG P-R INTERVAL: 146 MS
EKG Q-T INTERVAL: 416 MS
EKG QRS DURATION: 110 MS
EKG QTC CALCULATION (BAZETT): 455 MS
EKG R AXIS: 18 DEGREES
EKG T AXIS: 65 DEGREES
EKG VENTRICULAR RATE: 72 BPM
EOSINOPHILS ABSOLUTE: 0.1 K/UL (ref 0–0.6)
EOSINOPHILS RELATIVE PERCENT: 1.5 %
GFR AFRICAN AMERICAN: 40
GFR NON-AFRICAN AMERICAN: 33
GLOBULIN: 2.8 G/DL
GLUCOSE BLD-MCNC: 139 MG/DL (ref 70–99)
GLUCOSE BLD-MCNC: 163 MG/DL (ref 70–99)
GLUCOSE BLD-MCNC: 206 MG/DL (ref 70–99)
HCT VFR BLD CALC: 36.7 % (ref 36–48)
HEMOGLOBIN: 12.2 G/DL (ref 12–16)
LYMPHOCYTES ABSOLUTE: 0.8 K/UL (ref 1–5.1)
LYMPHOCYTES RELATIVE PERCENT: 15.1 %
MCH RBC QN AUTO: 31.8 PG (ref 26–34)
MCHC RBC AUTO-ENTMCNC: 33.2 G/DL (ref 31–36)
MCV RBC AUTO: 95.8 FL (ref 80–100)
MONOCYTES ABSOLUTE: 0.5 K/UL (ref 0–1.3)
MONOCYTES RELATIVE PERCENT: 8.7 %
NEUTROPHILS ABSOLUTE: 4 K/UL (ref 1.7–7.7)
NEUTROPHILS RELATIVE PERCENT: 74 %
PDW BLD-RTO: 13.1 % (ref 12.4–15.4)
PERFORMED ON: ABNORMAL
PERFORMED ON: ABNORMAL
PLATELET # BLD: 204 K/UL (ref 135–450)
PMV BLD AUTO: 7.9 FL (ref 5–10.5)
POTASSIUM REFLEX MAGNESIUM: 4.4 MMOL/L (ref 3.5–5.1)
PRO-BNP: 2855 PG/ML (ref 0–449)
RBC # BLD: 3.83 M/UL (ref 4–5.2)
SODIUM BLD-SCNC: 142 MMOL/L (ref 136–145)
TOTAL PROTEIN: 7 G/DL (ref 6.4–8.2)
TROPONIN: <0.01 NG/ML
WBC # BLD: 5.4 K/UL (ref 4–11)

## 2020-07-29 PROCEDURE — 6370000000 HC RX 637 (ALT 250 FOR IP): Performed by: EMERGENCY MEDICINE

## 2020-07-29 PROCEDURE — G0378 HOSPITAL OBSERVATION PER HR: HCPCS

## 2020-07-29 PROCEDURE — 36415 COLL VENOUS BLD VENIPUNCTURE: CPT

## 2020-07-29 PROCEDURE — 83880 ASSAY OF NATRIURETIC PEPTIDE: CPT

## 2020-07-29 PROCEDURE — 6360000002 HC RX W HCPCS: Performed by: HOSPITALIST

## 2020-07-29 PROCEDURE — 71046 X-RAY EXAM CHEST 2 VIEWS: CPT

## 2020-07-29 PROCEDURE — 99285 EMERGENCY DEPT VISIT HI MDM: CPT

## 2020-07-29 PROCEDURE — 6370000000 HC RX 637 (ALT 250 FOR IP): Performed by: HOSPITALIST

## 2020-07-29 PROCEDURE — 96372 THER/PROPH/DIAG INJ SC/IM: CPT

## 2020-07-29 PROCEDURE — 2580000003 HC RX 258: Performed by: HOSPITALIST

## 2020-07-29 PROCEDURE — 80053 COMPREHEN METABOLIC PANEL: CPT

## 2020-07-29 PROCEDURE — 84484 ASSAY OF TROPONIN QUANT: CPT

## 2020-07-29 PROCEDURE — 93010 ELECTROCARDIOGRAM REPORT: CPT | Performed by: INTERNAL MEDICINE

## 2020-07-29 PROCEDURE — 85025 COMPLETE CBC W/AUTO DIFF WBC: CPT

## 2020-07-29 PROCEDURE — 93005 ELECTROCARDIOGRAM TRACING: CPT | Performed by: EMERGENCY MEDICINE

## 2020-07-29 RX ORDER — FAMOTIDINE 20 MG/1
20 TABLET, FILM COATED ORAL 2 TIMES DAILY
Status: DISCONTINUED | OUTPATIENT
Start: 2020-07-29 | End: 2020-07-29 | Stop reason: DRUGHIGH

## 2020-07-29 RX ORDER — POLYETHYLENE GLYCOL 3350 17 G/17G
17 POWDER, FOR SOLUTION ORAL DAILY PRN
Status: DISCONTINUED | OUTPATIENT
Start: 2020-07-29 | End: 2020-07-30 | Stop reason: HOSPADM

## 2020-07-29 RX ORDER — VITAMIN B COMPLEX
1000 TABLET ORAL DAILY
Status: DISCONTINUED | OUTPATIENT
Start: 2020-07-30 | End: 2020-07-30 | Stop reason: HOSPADM

## 2020-07-29 RX ORDER — DEXTROSE MONOHYDRATE 25 G/50ML
12.5 INJECTION, SOLUTION INTRAVENOUS PRN
Status: DISCONTINUED | OUTPATIENT
Start: 2020-07-29 | End: 2020-07-30 | Stop reason: HOSPADM

## 2020-07-29 RX ORDER — ONDANSETRON 2 MG/ML
4 INJECTION INTRAMUSCULAR; INTRAVENOUS EVERY 6 HOURS PRN
Status: DISCONTINUED | OUTPATIENT
Start: 2020-07-29 | End: 2020-07-30 | Stop reason: HOSPADM

## 2020-07-29 RX ORDER — DEXTROSE MONOHYDRATE 50 MG/ML
100 INJECTION, SOLUTION INTRAVENOUS PRN
Status: DISCONTINUED | OUTPATIENT
Start: 2020-07-29 | End: 2020-07-30 | Stop reason: HOSPADM

## 2020-07-29 RX ORDER — ACETAMINOPHEN 650 MG/1
650 SUPPOSITORY RECTAL EVERY 6 HOURS PRN
Status: DISCONTINUED | OUTPATIENT
Start: 2020-07-29 | End: 2020-07-30 | Stop reason: HOSPADM

## 2020-07-29 RX ORDER — SODIUM CHLORIDE 0.9 % (FLUSH) 0.9 %
10 SYRINGE (ML) INJECTION EVERY 12 HOURS SCHEDULED
Status: DISCONTINUED | OUTPATIENT
Start: 2020-07-29 | End: 2020-07-30 | Stop reason: HOSPADM

## 2020-07-29 RX ORDER — ASPIRIN 81 MG/1
324 TABLET, CHEWABLE ORAL ONCE
Status: COMPLETED | OUTPATIENT
Start: 2020-07-29 | End: 2020-07-29

## 2020-07-29 RX ORDER — ASPIRIN 81 MG/1
81 TABLET ORAL DAILY
Status: DISCONTINUED | OUTPATIENT
Start: 2020-07-30 | End: 2020-07-30 | Stop reason: HOSPADM

## 2020-07-29 RX ORDER — FLUTICASONE PROPIONATE 50 MCG
1 SPRAY, SUSPENSION (ML) NASAL PRN
Status: DISCONTINUED | OUTPATIENT
Start: 2020-07-29 | End: 2020-07-30 | Stop reason: HOSPADM

## 2020-07-29 RX ORDER — FAMOTIDINE 20 MG/1
20 TABLET, FILM COATED ORAL DAILY
Status: DISCONTINUED | OUTPATIENT
Start: 2020-07-29 | End: 2020-07-30 | Stop reason: HOSPADM

## 2020-07-29 RX ORDER — ATORVASTATIN CALCIUM 20 MG/1
20 TABLET, FILM COATED ORAL NIGHTLY
Status: DISCONTINUED | OUTPATIENT
Start: 2020-07-29 | End: 2020-07-29

## 2020-07-29 RX ORDER — SODIUM CHLORIDE 0.9 % (FLUSH) 0.9 %
10 SYRINGE (ML) INJECTION PRN
Status: DISCONTINUED | OUTPATIENT
Start: 2020-07-29 | End: 2020-07-30 | Stop reason: HOSPADM

## 2020-07-29 RX ORDER — M-VIT,TX,IRON,MINS/CALC/FOLIC 27MG-0.4MG
1 TABLET ORAL DAILY
Status: DISCONTINUED | OUTPATIENT
Start: 2020-07-30 | End: 2020-07-30 | Stop reason: HOSPADM

## 2020-07-29 RX ORDER — ALPRAZOLAM 0.25 MG/1
0.25 TABLET ORAL NIGHTLY PRN
Status: DISCONTINUED | OUTPATIENT
Start: 2020-07-29 | End: 2020-07-30 | Stop reason: HOSPADM

## 2020-07-29 RX ORDER — ACETAMINOPHEN 325 MG/1
650 TABLET ORAL EVERY 6 HOURS PRN
Status: DISCONTINUED | OUTPATIENT
Start: 2020-07-29 | End: 2020-07-30 | Stop reason: HOSPADM

## 2020-07-29 RX ORDER — PROMETHAZINE HYDROCHLORIDE 25 MG/1
12.5 TABLET ORAL EVERY 6 HOURS PRN
Status: DISCONTINUED | OUTPATIENT
Start: 2020-07-29 | End: 2020-07-30 | Stop reason: HOSPADM

## 2020-07-29 RX ORDER — SODIUM CHLORIDE 9 MG/ML
INJECTION, SOLUTION INTRAVENOUS CONTINUOUS
Status: DISCONTINUED | OUTPATIENT
Start: 2020-07-29 | End: 2020-07-29

## 2020-07-29 RX ORDER — ROSUVASTATIN CALCIUM 10 MG/1
10 TABLET, COATED ORAL EVERY EVENING
Status: DISCONTINUED | OUTPATIENT
Start: 2020-07-29 | End: 2020-07-30 | Stop reason: HOSPADM

## 2020-07-29 RX ORDER — ACETAMINOPHEN 500 MG
1000 TABLET ORAL DAILY PRN
Status: DISCONTINUED | OUTPATIENT
Start: 2020-07-29 | End: 2020-07-30 | Stop reason: HOSPADM

## 2020-07-29 RX ORDER — ASPIRIN 81 MG/1
324 TABLET, CHEWABLE ORAL ONCE
Status: DISCONTINUED | OUTPATIENT
Start: 2020-07-29 | End: 2020-07-29

## 2020-07-29 RX ORDER — NICOTINE POLACRILEX 4 MG
15 LOZENGE BUCCAL PRN
Status: DISCONTINUED | OUTPATIENT
Start: 2020-07-29 | End: 2020-07-30 | Stop reason: HOSPADM

## 2020-07-29 RX ORDER — HYDRALAZINE HYDROCHLORIDE 25 MG/1
25 TABLET, FILM COATED ORAL 3 TIMES DAILY
Status: DISCONTINUED | OUTPATIENT
Start: 2020-07-29 | End: 2020-07-30 | Stop reason: HOSPADM

## 2020-07-29 RX ORDER — CARVEDILOL 12.5 MG/1
12.5 TABLET ORAL 2 TIMES DAILY
Status: DISCONTINUED | OUTPATIENT
Start: 2020-07-29 | End: 2020-07-30 | Stop reason: HOSPADM

## 2020-07-29 RX ORDER — NITROGLYCERIN 0.4 MG/1
0.4 TABLET SUBLINGUAL EVERY 5 MIN PRN
Status: DISCONTINUED | OUTPATIENT
Start: 2020-07-29 | End: 2020-07-30 | Stop reason: HOSPADM

## 2020-07-29 RX ORDER — INSULIN GLARGINE 100 [IU]/ML
18 INJECTION, SOLUTION SUBCUTANEOUS NIGHTLY
Status: DISCONTINUED | OUTPATIENT
Start: 2020-07-29 | End: 2020-07-30 | Stop reason: HOSPADM

## 2020-07-29 RX ORDER — SODIUM BICARBONATE 650 MG/1
650 TABLET ORAL 2 TIMES DAILY
Status: DISCONTINUED | OUTPATIENT
Start: 2020-07-29 | End: 2020-07-30 | Stop reason: HOSPADM

## 2020-07-29 RX ADMIN — HYDRALAZINE HYDROCHLORIDE 25 MG: 25 TABLET, FILM COATED ORAL at 22:15

## 2020-07-29 RX ADMIN — ASPIRIN 81 MG 324 MG: 81 TABLET ORAL at 09:45

## 2020-07-29 RX ADMIN — ENOXAPARIN SODIUM 30 MG: 30 INJECTION SUBCUTANEOUS at 22:16

## 2020-07-29 RX ADMIN — FAMOTIDINE 20 MG: 20 TABLET ORAL at 16:17

## 2020-07-29 RX ADMIN — CARVEDILOL 12.5 MG: 12.5 TABLET, FILM COATED ORAL at 22:15

## 2020-07-29 RX ADMIN — SODIUM BICARBONATE 650 MG: 650 TABLET ORAL at 22:16

## 2020-07-29 RX ADMIN — HYDRALAZINE HYDROCHLORIDE 25 MG: 25 TABLET, FILM COATED ORAL at 16:17

## 2020-07-29 RX ADMIN — ROSUVASTATIN CALCIUM 10 MG: 10 TABLET, FILM COATED ORAL at 22:15

## 2020-07-29 RX ADMIN — SODIUM CHLORIDE, PRESERVATIVE FREE 10 ML: 5 INJECTION INTRAVENOUS at 22:15

## 2020-07-29 RX ADMIN — INSULIN GLARGINE 18 UNITS: 100 INJECTION, SOLUTION SUBCUTANEOUS at 22:16

## 2020-07-29 ASSESSMENT — PAIN DESCRIPTION - ORIENTATION
ORIENTATION: LEFT
ORIENTATION: LEFT

## 2020-07-29 ASSESSMENT — PAIN SCALES - GENERAL
PAINLEVEL_OUTOF10: 5
PAINLEVEL_OUTOF10: 2
PAINLEVEL_OUTOF10: 2

## 2020-07-29 ASSESSMENT — PAIN DESCRIPTION - DESCRIPTORS
DESCRIPTORS: PINS AND NEEDLES
DESCRIPTORS: PINS AND NEEDLES

## 2020-07-29 ASSESSMENT — PAIN DESCRIPTION - PROGRESSION: CLINICAL_PROGRESSION: NOT CHANGED

## 2020-07-29 ASSESSMENT — PAIN DESCRIPTION - FREQUENCY
FREQUENCY: CONTINUOUS
FREQUENCY: CONTINUOUS

## 2020-07-29 ASSESSMENT — PAIN DESCRIPTION - LOCATION
LOCATION: ARM
LOCATION: ARM

## 2020-07-29 ASSESSMENT — PAIN DESCRIPTION - PAIN TYPE
TYPE: ACUTE PAIN
TYPE: ACUTE PAIN

## 2020-07-29 ASSESSMENT — PAIN DESCRIPTION - ONSET
ONSET: ON-GOING
ONSET: ON-GOING

## 2020-07-29 ASSESSMENT — PAIN - FUNCTIONAL ASSESSMENT: PAIN_FUNCTIONAL_ASSESSMENT: ACTIVITIES ARE NOT PREVENTED

## 2020-07-29 NOTE — CARE COORDINATION
INITIAL CASE MANAGEMENT ASSESSMENT    Reviewed chart, met with patient to assess possible discharge needs. Explained Case Management role/services. Living Situation: confirmed address, lives alone. Has one step to enter    ADLs: independent     DME: has a cane and walker from previous joint replacement, doesn't use    PT/OT Recs: up ad amelia in her room     Active Services: none, she is interested in COA services. States that she often feels afraid at home alone since her   last year     Transportation: she drives     Medications: confirmed [employer] medicare as health coverage, rx are obtained at [facility]    PCP: confirmed Cherry Maloney      HD/PD: n/a    PLAN/COMMENTS: only has a home phone so will wait until she dc's to do COA referral    SW/CM provided contact information for patient or family to call with any questions. SW/CM will follow and assist as needed.   Electronically signed by Sandy Waddell RN on 2020 at 3:20 PM

## 2020-07-29 NOTE — PROGRESS NOTES
Clinical Pharmacy Note  Renal Dose Adjustment    Raissa Carcamo is receiving the following renally eliminated medications: Enoxaparin, Famotidine. Based on the patient's estimated creatinine clearance of Estimated Creatinine Clearance: 29 mL/min (A) (based on SCr of 1.5 mg/dL (H)). and urine output, the doses have been adjusted. Pharmacy will continue to monitor and adjust dose as needed for changes in renal function.       Taylor Cobian 7/29/2020 3:22 PM

## 2020-07-29 NOTE — H&P
Hospitalist  History and Physical    Patient:  Katrin Alvarez  MRN: [id number]  PCP: Henrietta Castro MD    CHIEF COMPLAINT: Left shoulder pain      HISTORY OF PRESENT ILLNESS:   The patient Katrin Alvarez is a 78 y. o.female with medical history significant for atrial fibrillation, coronary artery disease,  Patient has history of cardiac arrest, cardiac pacemaker in place, carotid artery stenosis CHF chronic kidney disease, with solitary kidney [id number] anxiety depression diabetes mellitus hypertension and hyperlipidemia  Patient presented to the emergency room with a left shoulder pain that radiates to the left side of the neck and down the arm. Patient also describes tingling sensation in the chest and all over her body  Patient denies chest pain shortness of breath or palpitation        Past Medical History:        Diagnosis Date    Anxiety     Atrial fibrillation ([address]     CAD (coronary artery disease)     Cancer (HCC)     bladder right kidney     Cardiac arrest ([facility] 3/27/2016    Carotid artery stenosis     CHF (congestive heart failure) (HCC)     Chronic kidney disease     cancer of kidney and bladder    Depression     Diabetes mellitus (HCC)     IDDM    GERD (gastroesophageal reflux disease)     Hip pain, chronic     HYPERCHOLESTERAEMIA     Hypertension     Irritable bowel syndrome     Laceration of head 3/25/2016    fall with cardiac arrest    MI, old     Neuropathy     Osteoarthritis     Presence of combination internal cardiac defibrillator (ICD) and pacemaker     Staph infection     PT. STATES SHE HAS HAD SEVERAL BOILS WITH STAPH LAST ONE 20 YEARS AGO.     Type 2 diabetes mellitus without complication ([address]     Urinary incontinence        Past Surgical History:        Procedure Laterality Date    ANGIOPLASTY      x3  2009    BLADDER REMOVAL      BLADDER SUSPENSION      CYSTOSCOPY  5/28/2013    with dilitation    HYSTERECTOMY      KIDNEY REMOVAL      right    PACEMAKER INSERTION      SKIN CANCER EXCISION      TOTAL HIP ARTHROPLASTY Left 11/6/2019    LEFT ANTERIOR TOTAL HIP REPLACEMENT WITH C-ARM performed by Ok Sutton MD at [facility] [place] [id number]       Medications Prior to Admission:    Prior to Admission medications    Medication Sig Start Date End Date Taking? Authorizing Provider   hydrALAZINE (APRESOLINE) 25 MG tablet Take 1 tablet by mouth 3 times daily 3/11/20  Yes Marta Ziegler MD   carvedilol (COREG) 12.5 MG tablet Take 1 tablet by mouth 2 times daily 3/11/20  Yes Marta Ziegler MD   aspirin 81 MG EC tablet Take 1 tablet by mouth daily HOLD for 30 days after hip surgery. See Eliquis order 11/6/19  Yes RIZWAN Tabor CNP   acetaminophen (TYLENOL) 500 MG tablet Take 1,000 mg by mouth daily as needed for Pain    Yes Historical Provider, MD   insulin glargine (LANTUS) 100 UNIT/ML injection vial Inject 18 Units into the skin nightly    Yes Historical Provider, MD   sodium bicarbonate 650 MG tablet Take 650 mg by mouth 2 times daily    Yes Historical Provider, MD   ALPRAZolam (XANAX) 0.25 MG tablet Take 0.25 mg by mouth nightly as needed for Sleep. Yes Historical Provider, MD   rosuvastatin (CRESTOR) 10 MG tablet Take 10 mg by mouth every evening    Yes Historical Provider, MD   Coenzyme Q10 (CO Q 10) 100 MG CAPS Take 1 capsule by mouth daily    Yes Historical Provider, MD   ferrous sulfate 325 (65 FE) MG tablet Take 1 tablet by mouth on Monday, Wednesday, Friday   Yes Historical Provider, MD   fluticasone (FLONASE) 50 MCG/ACT nasal spray 1 spray by Nasal route as needed for Rhinitis   Yes Historical Provider, MD   therapeutic multivitamin-minerals (THERAGRAN-M) tablet Take 1 tablet by mouth daily.  Centrum silver    Yes Historical Provider, MD   vitamin D (CHOLECALCIFEROL) 1000 UNIT TABS tablet Take 1,000 Units by mouth daily    Yes Historical Provider, MD       Allergies:  Amlodipine besylate; Calcium channel blockers; Citalopram hydrobromide; Flagyl [metronidazole]; Losartan; Pneumococcal vaccine; Simvastatin; Valsartan-hydrochlorothiazide; Venlafaxine; Hydrocodone-acetaminophen; and Nitrofurantoin      Social History:   TOBACCO:   reports that she has never smoked. She has never used smokeless tobacco.  ETOH:   reports no history of alcohol use. Family History:       Problem Relation Age of Onset    Diabetes Mother     Heart Disease Father     Cancer Father            REVIEW OF SYSTEMS:     patients reported symptoms are in BOLD all other symptoms are negative. CONSTITUTIONAL:      fatigue, fever, chills or night sweats, recent weight gain, recent wt loss, insomnia,  General weakness, poor appetite, muscle aches and pains    HEAD: headache, dizziness    EYES:      blurriness,  double vision, dryness,  discharge, irritation,diplopia    EARS:      hearing loss, vertigo, ear discharge,  Earache. Ringing in the ears. NOSE:      Rhinorrhea, sneezing, epistaxis. Discharge, sinusitis,     MOUTH/THROAT:         sore throat, mouth ulcers, Hoarseness    RESPIRATORY:        Shortness of breath, wheezing,  cough, sputum, hemoptysis, obstructive sleep apnea,    CARDIOVASCULAR :      chest pain, palpitations, dyspnea on exercise, Lower extrimity edema (swelling),     GASTROINTESTINAL:       Dysphagia, Poor appetite,  Nausea, Vomiting, diarrhea, heartburn, abdominal pain. Blood in the stools, hematemesis. Pain with swallowing, constipation    GENITOURINARY:       Urinary frequency, hesitancy,  urgency, Dysuria, hematuria,  Urinary Incontinence. Urinary Retention. GYNECOLOGICAL: vaginal bleeding , vaginal discharge, menopause    MUSCULOSKELETAL: Left shoulder and arm pain      joint swelling or stiffness, joint pain, muscle pain, balance problems, low back pain. NEUROLOGICAL:      Gait problems. Tremor. Dizziness. Pain and paresthesias, weakness in extremities.  Seizures, memory loss    PSYCHLOGICAL:        Anxiety, depression    SKIN :      Rashes ulcers, skin color changes, easy bruisability, lymphadenopathy      Physical Exam:      Vitals: BP (!) 139/98   Pulse 68   Temp 97 °F (36.1 °C) (Oral)   Resp 17   SpO2 99%   Breastfeeding No     Gen:          Alert and oriented x 3  Eyes: PERRL. No sclera icterus. No conjunctival injection. ENT: No discharge. Pharynx clear. External appearance of ears and nose normal.  Neck: Trachea midline. No obvious mass. Resp: No accessory muscle use. No crackles. No wheezes. No rhonchi. CV: Regular rate. Regular rhythm. No murmur or rub. No edema. GI: Non-tender. Non-distended. No hernia. Skin: Warm, dry, normal texture and turgor. Lymph: No cervical LAD. No supraclavicular LAD. M/S: / Ext. Left shoulder tenderness, complain of pain on range of motion testing  Neuro: Moves all four extremities. CN 2-12 tested, no deficits noted. Peripheral pulses and capillary refill is intact. CBC:   Recent Labs     07/29/20  0856   WBC 5.4   HGB 12.2        BMP:    Recent Labs     07/29/20  0856      K 4.4      CO2 23   BUN 27*   CREATININE 1.5*   GLUCOSE 139*     Hepatic:   Recent Labs     07/29/20  0856   AST 15   ALT 8*   BILITOT 0.4   ALKPHOS 47     Troponin:   Recent Labs     07/29/20  0856   TROPONINI <0.01     BNP: No results for input(s): BNP in the last 72 hours. INR: No results for input(s): INR in the last 72 hours. Lab Results   Component Value Date    LABA1C 6.7 02/10/2020           No results for input(s): CKTOTAL in the last 72 hours. -----------------------------------------------------------------  XR CHEST (2 VW)   Chronic pulmonary change with left basilar atelectasis. Ekg  Sinus rhythm with frequent Premature ventricular complexes  Otherwise normal    Echo  Ejection fraction is visually estimated to be 50-55%.    Indeterminate diastolic [id number].(9/69/2630)    Assessment / Plan     Chest pain, unspecified R07.9  Atypical chest pain  Admit patient to telemetry  Trend cardiac

## 2020-07-29 NOTE — ED PROVIDER NOTES
[address] [facility]      Pt Name: Michelle Hayes  MRN: [id number]  [place] 1940  Date of evaluation: 7/29/2020  Provider: Aggie Blanca MD    CHIEF COMPLAINT       Chief Complaint   Patient presents with    Other     pins and needles sensation to whole body, increased pain to left arm         HISTORY OF PRESENT ILLNESS   (Location/Symptom, Timing/Onset,Context/Setting, Quality, Duration, Modifying Factors, Severity)  Note limiting factors. Michelle Hayes is a 78 y.o. female who presents to the emergency department complaining of \"vibrations\" throughout her entire body, as well as left shoulder pain. Patient reports that symptoms started around 7 PM last night, she states that she was unable to sleep all night secondary to this. She describes the shoulder pain as a deep ache which radiates into her left neck and down her left arm. She reports a \"twinge\" in the center of her chest, but no significant chest pain. She denies shortness of breath or recent cough. She denies any focal numbness or weakness but reports that she is felt somewhat off balance with walking and lightheaded with standing for the past few days. She denies any dizziness currently. She states she has had similar symptoms in the past, and no diagnosis for the vibrations that she feels has been reached, it was thought to be possibly secondary to anxiety. No other complaints at the time of my evaluation. NursingNotes were reviewed. REVIEW OF SYSTEMS    (2-9 systems for level 4, 10 or more for level 5)       Constitutional: No fever or chills. Eye: No visual disturbances. No eye pain. Ear/Nose/Mouth/Throat: No nasal congestion. No sore throat. Respiratory: No cough, No shortness of breath, No sputum production. Cardiovascular: Mild midsternal chest pain. No palpitations. Gastrointestinal: No abdominal pain. No nausea or vomiting  Genitourinary: No dysuria.  No hematuria. Hematology/Lymphatics: No bleeding or bruising tendency. Immunologic: No malaise. No swollen glands. Musculoskeletal: No back pain. Left shoulder pain. Integumentary: No rash. No abrasions. Neurologic: No headache. No focal numbness or weakness. PAST MEDICAL HISTORY     Past Medical History:   Diagnosis Date    Anxiety     Atrial fibrillation ([facility]     CAD (coronary artery disease)     Cancer ([facility]     bladder right kidney     Cardiac arrest ([facility] 3/27/2016    Carotid artery stenosis     CHF (congestive heart failure) (HCC)     Chronic kidney disease     cancer of kidney and bladder    Depression     Diabetes mellitus (HCC)     IDDM    GERD (gastroesophageal reflux disease)     Hip pain, chronic     HYPERCHOLESTERAEMIA     Hypertension     Irritable bowel syndrome     Laceration of head 3/25/2016    fall with cardiac arrest    MI, old     Neuropathy     Osteoarthritis     Presence of combination internal cardiac defibrillator (ICD) and pacemaker     Staph infection     PT. STATES SHE HAS HAD SEVERAL BOILS WITH STAPH LAST ONE 20 YEARS AGO.     Type 2 diabetes mellitus without complication ([facility]     Urinary incontinence          SURGICALHISTORY       Past Surgical History:   Procedure Laterality Date    ANGIOPLASTY      x3  2009    BLADDER REMOVAL      BLADDER SUSPENSION      CYSTOSCOPY  5/28/2013    with dilitation    HYSTERECTOMY      KIDNEY REMOVAL      right    PACEMAKER INSERTION      SKIN CANCER EXCISION      TOTAL HIP ARTHROPLASTY Left 11/6/2019    LEFT ANTERIOR TOTAL HIP REPLACEMENT WITH C-ARM performed by Jag Bentley MD at [facility]       Current Discharge Medication List      CONTINUE these medications which have NOT CHANGED    Details   hydrALAZINE (APRESOLINE) 25 MG tablet Take 1 tablet by mouth 3 times daily  Qty: 90 tablet, Refills: 3      carvedilol (COREG) 12.5 MG tablet Take 1 tablet by mouth 2 times daily  Qty: 60 tablet, Refills: 3      aspirin 81 MG EC tablet Take 1 tablet by mouth daily HOLD for 30 days after hip surgery. See Eliquis order  Qty: 30 tablet, Refills: 3      acetaminophen (TYLENOL) 500 MG tablet Take 1,000 mg by mouth daily as needed for Pain       insulin glargine (LANTUS) 100 UNIT/ML injection vial Inject 18 Units into the skin nightly       sodium bicarbonate 650 MG tablet Take 650 mg by mouth 2 times daily       ALPRAZolam (XANAX) 0.25 MG tablet Take 0.25 mg by mouth nightly as needed for Sleep. rosuvastatin (CRESTOR) 10 MG tablet Take 10 mg by mouth every evening       Coenzyme Q10 (CO Q 10) 100 MG CAPS Take 1 capsule by mouth daily       ferrous sulfate 325 (65 FE) MG tablet Take 1 tablet by mouth on Monday, Wednesday, Friday      fluticasone (FLONASE) 50 MCG/ACT nasal spray 1 spray by Nasal route as needed for Rhinitis      therapeutic multivitamin-minerals (THERAGRAN-M) tablet Take 1 tablet by mouth daily. Centrum silver       vitamin D (CHOLECALCIFEROL) 1000 UNIT TABS tablet Take 1,000 Units by mouth daily              ALLERGIES     Amlodipine besylate; Calcium channel blockers; Citalopram hydrobromide; Flagyl [metronidazole]; Losartan; Pneumococcal vaccine; Simvastatin; Valsartan-hydrochlorothiazide; Venlafaxine; Hydrocodone-acetaminophen; and Nitrofurantoin    FAMILY HISTORY       Family History   Problem Relation Age of Onset    Diabetes Mother     Heart Disease Father     Cancer Father           SOCIAL HISTORY       Social History     Socioeconomic History    Marital status:       Spouse name: None    Number of children: None    Years of education: None    Highest education level: None   Occupational History    None   Social Needs    Financial resource strain: None    Food insecurity     Worry: None     Inability: None    Transportation needs     Medical: None     Non-medical: None   Tobacco Use    Smoking status: Never Smoker    Smokeless tobacco: Never Used   Substance and Sexual Activity    Alcohol use: No    Drug use: No    Sexual activity: Not Currently   Lifestyle    Physical activity     Days per week: None     Minutes per session: None    Stress: None   Relationships    Social connections     Talks on phone: None     Gets together: None     Attends [employer] service: None     Active member of club or organization: None     Attends meetings of clubs or organizations: None     Relationship status: None    Intimate partner violence     Fear of current or ex partner: None     Emotionally abused: None     Physically abused: None     Forced sexual activity: None   Other Topics Concern    None   Social History Narrative    None       SCREENINGS             PHYSICAL EXAM    (up to 7 for level 4, 8 or more for level 5)     ED Triage Vitals [07/29/20 0815]   BP Temp Temp Source Pulse Resp SpO2 Height Weight   (!) 168/70 97 °F (36.1 °C) Oral 78 14 99 % -- --       General: Alert and oriented, No acute distress. Eye: Normal conjunctiva. Pupils equal and reactive. HENT: Oral mucosa is moist.  Respiratory: Lungs are clear to auscultation, Respirations are non-labored. Cardiovascular: Normal rate, Regular rhythm. Gastrointestinal: Soft, Non-tender, Non-distended. Musculoskeletal: No swelling, deformity, or tenderness over the left shoulder, no significant pain with range of motion. Integumentary: Warm, Dry. Neurologic: Alert, Oriented, No focal defects. Normal cranial nerve exam and speech. Motor and sensation intact in all extremities. Psychiatric: Cooperative.     DIAGNOSTIC RESULTS     EKG: All EKG's are interpreted by the Emergency Department Physician who either signs or Co-signsthis chart in the absence of a cardiologist.    Per my interpretation:    Electrocardiogram (ECG) 7/29/2020  RATE: normal  RHYTHM: normal sinus with frequent PVCs  AXIS: normal  INTERVALS: normal  ST-T WAVE CHANGES: No evidence of ST segment elevation or T-wave inversion  Prior for comparison 6/28/2020    RADIOLOGY:   Non-plain filmimages such as CT, Ultrasound and MRI are read by the radiologist. Plain radiographic images are visualized and preliminarily interpreted by the emergency physician with the below findings:      Interpretation per the Radiologist below, if available at the time ofthis note:    XR CHEST (2 VW)   Final Result   Chronic pulmonary change with left basilar atelectasis. ED BEDSIDE ULTRASOUND:   Performed by ED Physician - none    LABS:  Labs Reviewed   CBC WITH AUTO DIFFERENTIAL - Abnormal; Notable for the following components:       Result Value    RBC 3.83 (*)     Lymphocytes Absolute 0.8 (*)     All other components within normal limits    Narrative:     Performed at:  [place] [address], [facility] [place] [address]   Phone [phone]   COMPREHENSIVE METABOLIC PANEL W/ REFLEX TO MG FOR LOW K - Abnormal; Notable for the following components:    Glucose 139 (*)     BUN 27 (*)     CREATININE 1.5 (*)     GFR Non- 33 (*)     GFR  40 (*)     ALT 8 (*)     All other components within normal limits    Narrative:     Performed at:  [facility]  [address], [place] [employer] [place] [address]   Phone [phone]   TROPONIN    Narrative:     Performed at:  [place] [address] [facility] [place] [address]   Phone [phone]   TROPONIN   TROPONIN   BRAIN NATRIURETIC PEPTIDE       All other labs were within normal range or not returned as of this dictation. EMERGENCY DEPARTMENT COURSE and DIFFERENTIAL DIAGNOSIS/MDM:   Vitals:    Vitals:    07/29/20 1230 07/29/20 1245 07/29/20 1300 07/29/20 1315   BP: (!) 134/91 (!) 130/55 (!) 162/97 (!) 139/98   Pulse: 69 75 73 68   Resp: 14 16 19 17   Temp:       TempSrc:       SpO2:  98%  99%         Medical decision making:     This is an 17-year-old female with history of diabetes, CAD, prior stents and ICD who presents for evaluation of chest pain and left shoulder pain rating to the left arm. On exam here she is well-appearing, afebrile, with normal vital signs. Physical exam is unremarkable. EKG shows no evidence of acute ischemia, however 3 and 24 mg of aspirin is ordered. Initial troponin is negative. BMP and CBC are unremarkable, creatinine is 1.5 which is patient's baseline. Chest x-ray shows chronic pulmonary changes as well as a left basilar atelectasis, but no acute findings. HEART score is 5. Patient reports she was supposed to have a outpatient stress test several months ago but this was canceled due to the [place] pandemic. Therefore, recommended admission for serial troponins and probable stress test and patient is agreeable. Admitted in stable condition. CRITICAL CARE TIME   Total Critical Care time was 0 minutes, excluding separately reportable procedures. There was a high probability of clinically significant/life threatening deterioration in the patient's condition which required my urgent intervention. CONSULTS:  None    PROCEDURES:  Unless otherwise noted below, none         FINAL IMPRESSION      1. Chest pain, unspecified type          DISPOSITION/PLAN   DISPOSITION Admitted 07/29/2020 11:36:44 AM      PATIENT REFERRED TO:  Shirley Jean MD  [address] [facility] [address]  [phone]            DISCHARGE MEDICATIONS:  Current Discharge Medication List             (Please note that portions of this note were completed with a voice recognition program.Efforts were made to edit the dictations but occasionally words are mis-transcribed.)    Jose Tavarez MD (electronically signed)  Attending Emergency Physician        Jose Tavarez MD  07/29/20

## 2020-07-29 NOTE — ED NOTES
Report to admission RN, patient to be transported to room 4254 via transport.   Patient alert and oriented at time of transport, updated and agreeable to admission plan     Maeve Alfaro RN  07/29/20 4037

## 2020-07-29 NOTE — ED NOTES
Patient presents to the ED via EMS for pins and needles sensation to body and left arm pain. Patient denies chest pain, shortness of breath, specific sided weakness. No facial droop or slurred speech noted. Patient alert and oriented, respirations even and easy. Patient to room 17 on arrival, placed into gown, continuous telemetry and pulse oximetry.      Rosalinda Gage RN  07/29/20 6819

## 2020-07-29 NOTE — PROGRESS NOTES
4 Eyes Skin Assessment     The patient is being assess for  Admission    I agree that 2 RN's have performed a thorough Head to Toe Skin Assessment on the patient. ALL assessment sites listed below have been assessed. Areas assessed by both nurses: yes  [x]   Head, Face, and Ears   [x]   Shoulders, Back, and Chest  [x]   Arms, Elbows, and Hands   [x]   Coccyx, Sacrum, and IschIum  [x]   Legs, Feet, and Heels        Does the Patient have Skin Breakdown?   No         Mark Prevention initiated:  Yes   Wound Care Orders initiated:  NA      [facility] nurse consulted for Pressure Injury (Stage 3,4, Unstageable, DTI, NWPT, and Complex wounds), New and Established Ostomies:  NA      Nurse 1 eSignature: Electronically signed by Gaviota Shah RN on 7/29/20 at 3:10 PM EDT    **SHARE this note so that the co-signing nurse is able to place an eSignature**    Nurse 2 eSignature: Electronically signed by Massiel Brandt RN on 7/29/20 at 5:30 PM EDT

## 2020-07-29 NOTE — PRE-PROCEDURE INSTRUCTIONS
Prep for stress test on 7/30/2020. No caffeine or decaffeinated products after 20:00 this evening. Hold solid food after 05:00 on 7/30/2020; may have water as desired / ordered.   Electronically signed by Christine Keenan RN on 7/29/2020 at 4:22 PM

## 2020-07-29 NOTE — PROGRESS NOTES
Department of Pharmacy  Herbal Policy    [facility] recognizes that herbal/dietary supplements are used by the public and health care professionals. Herbal/dietary supplements fall under the definition of \"approaches to medical diagnosis and therapy that have not been developed by use of generally accepted methods of validating their effectiveness. \"  [facility] further recognizes that these products are not regulated by the Food and Drug Administration (FDA) as medications but instead viewed as dietary supplements. The Department of Pharmacy does not provide herbal/dietary supplements for use. Herbal/dietary supplements are not included on the formulary due to lack of safety and efficacy data. Due to limited and conflicting literature on herbal/dietary supplements, pharmacy is not able to review herbal/natural products for drug-natural product interactions or disease-natural product interactions. Due to the lack of safety and efficacy data, [facility] believes the risk of adverse medication events outweighs the benefit of allowing the patient to use their home supply of herbal/natural products. Per hospital policy, the following herbal/dietary supplement(s) will be held during this hospitalization: CoEnzyme Q. If there are any questions or concerns related to the use of herbal/dietary supplements, please do not hesitate to contact the pharmacy.

## 2020-07-29 NOTE — PROGRESS NOTES
Pt arrived to room 4254 from the ER. Pt A/O x4. Vitals are stable. Pt steady on her feet. Bed alarm kept off. Oriented Pt to room and call light. Call light within reach. Bed in lowest position with wheels locked. Tele monitor placed on Pt. Pt denies any chest pain. Pt just states pins and needles sensation to L arm on a scale of 2/10. Will monitor.

## 2020-07-29 NOTE — ED NOTES
Bed: A-17  Expected date: 7/29/20  Expected time: 8:05 AM  Means of arrival: [address] [facility]  Comments:  Hypertension, arm pain      Marilee Dumont RN  07/29/20 8430

## 2020-07-30 VITALS
TEMPERATURE: 97.9 F | HEART RATE: 66 BPM | HEIGHT: 68 IN | OXYGEN SATURATION: 96 % | BODY MASS INDEX: 23.52 KG/M2 | DIASTOLIC BLOOD PRESSURE: 62 MMHG | SYSTOLIC BLOOD PRESSURE: 109 MMHG | RESPIRATION RATE: 16 BRPM | WEIGHT: 155.2 LBS

## 2020-07-30 LAB
ANION GAP SERPL CALCULATED.3IONS-SCNC: 15 MMOL/L (ref 3–16)
BUN BLDV-MCNC: 29 MG/DL (ref 7–20)
CALCIUM SERPL-MCNC: 9.3 MG/DL (ref 8.3–10.6)
CHLORIDE BLD-SCNC: 104 MMOL/L (ref 99–110)
CHOLESTEROL, TOTAL: 152 MG/DL (ref 0–199)
CO2: 23 MMOL/L (ref 21–32)
CREAT SERPL-MCNC: 1.6 MG/DL (ref 0.6–1.2)
D DIMER: 275 NG/ML DDU (ref 0–229)
EKG ATRIAL RATE: 64 BPM
EKG DIAGNOSIS: NORMAL
EKG P AXIS: 47 DEGREES
EKG P-R INTERVAL: 140 MS
EKG Q-T INTERVAL: 440 MS
EKG QRS DURATION: 108 MS
EKG QTC CALCULATION (BAZETT): 488 MS
EKG R AXIS: -3 DEGREES
EKG T AXIS: 52 DEGREES
EKG VENTRICULAR RATE: 74 BPM
GFR AFRICAN AMERICAN: 37
GFR NON-AFRICAN AMERICAN: 31
GLUCOSE BLD-MCNC: 156 MG/DL (ref 70–99)
GLUCOSE BLD-MCNC: 158 MG/DL (ref 70–99)
GLUCOSE BLD-MCNC: 89 MG/DL (ref 70–99)
HCT VFR BLD CALC: 37.2 % (ref 36–48)
HDLC SERPL-MCNC: 51 MG/DL (ref 40–60)
HEMOGLOBIN: 12.5 G/DL (ref 12–16)
LDL CHOLESTEROL CALCULATED: 68 MG/DL
LV EF: 50 %
LV EF: 53 %
LVEF MODALITY: NORMAL
LVEF MODALITY: NORMAL
MCH RBC QN AUTO: 31.9 PG (ref 26–34)
MCHC RBC AUTO-ENTMCNC: 33.5 G/DL (ref 31–36)
MCV RBC AUTO: 95.4 FL (ref 80–100)
PDW BLD-RTO: 13 % (ref 12.4–15.4)
PERFORMED ON: ABNORMAL
PERFORMED ON: ABNORMAL
PLATELET # BLD: 205 K/UL (ref 135–450)
PMV BLD AUTO: 7.9 FL (ref 5–10.5)
POTASSIUM REFLEX MAGNESIUM: 4.4 MMOL/L (ref 3.5–5.1)
RBC # BLD: 3.9 M/UL (ref 4–5.2)
SODIUM BLD-SCNC: 142 MMOL/L (ref 136–145)
TRIGL SERPL-MCNC: 163 MG/DL (ref 0–150)
TSH REFLEX FT4: 1.65 UIU/ML (ref 0.27–4.2)
VLDLC SERPL CALC-MCNC: 33 MG/DL
WBC # BLD: 4.9 K/UL (ref 4–11)

## 2020-07-30 PROCEDURE — 84443 ASSAY THYROID STIM HORMONE: CPT

## 2020-07-30 PROCEDURE — 85379 FIBRIN DEGRADATION QUANT: CPT

## 2020-07-30 PROCEDURE — 3430000000 HC RX DIAGNOSTIC RADIOPHARMACEUTICAL: Performed by: HOSPITALIST

## 2020-07-30 PROCEDURE — 2580000003 HC RX 258: Performed by: HOSPITALIST

## 2020-07-30 PROCEDURE — G0378 HOSPITAL OBSERVATION PER HR: HCPCS

## 2020-07-30 PROCEDURE — 3430000000 HC RX DIAGNOSTIC RADIOPHARMACEUTICAL: Performed by: INTERNAL MEDICINE

## 2020-07-30 PROCEDURE — 80061 LIPID PANEL: CPT

## 2020-07-30 PROCEDURE — 93010 ELECTROCARDIOGRAM REPORT: CPT | Performed by: INTERNAL MEDICINE

## 2020-07-30 PROCEDURE — 6370000000 HC RX 637 (ALT 250 FOR IP): Performed by: HOSPITALIST

## 2020-07-30 PROCEDURE — 93306 TTE W/DOPPLER COMPLETE: CPT

## 2020-07-30 PROCEDURE — 6360000002 HC RX W HCPCS: Performed by: FAMILY MEDICINE

## 2020-07-30 PROCEDURE — A9502 TC99M TETROFOSMIN: HCPCS | Performed by: INTERNAL MEDICINE

## 2020-07-30 PROCEDURE — 85027 COMPLETE CBC AUTOMATED: CPT

## 2020-07-30 PROCEDURE — 36415 COLL VENOUS BLD VENIPUNCTURE: CPT

## 2020-07-30 PROCEDURE — 99215 OFFICE O/P EST HI 40 MIN: CPT | Performed by: INTERNAL MEDICINE

## 2020-07-30 PROCEDURE — 78452 HT MUSCLE IMAGE SPECT MULT: CPT

## 2020-07-30 PROCEDURE — 93017 CV STRESS TEST TRACING ONLY: CPT

## 2020-07-30 PROCEDURE — 93005 ELECTROCARDIOGRAM TRACING: CPT | Performed by: HOSPITALIST

## 2020-07-30 PROCEDURE — 80048 BASIC METABOLIC PNL TOTAL CA: CPT

## 2020-07-30 PROCEDURE — 83036 HEMOGLOBIN GLYCOSYLATED A1C: CPT

## 2020-07-30 PROCEDURE — A9502 TC99M TETROFOSMIN: HCPCS | Performed by: HOSPITALIST

## 2020-07-30 RX ADMIN — VITAMIN D, TAB 1000IU (100/BT) 1000 UNITS: 25 TAB at 10:33

## 2020-07-30 RX ADMIN — HYDRALAZINE HYDROCHLORIDE 25 MG: 25 TABLET, FILM COATED ORAL at 10:33

## 2020-07-30 RX ADMIN — HYDRALAZINE HYDROCHLORIDE 25 MG: 25 TABLET, FILM COATED ORAL at 15:32

## 2020-07-30 RX ADMIN — TETROFOSMIN 30 MILLICURIE: 1.38 INJECTION, POWDER, LYOPHILIZED, FOR SOLUTION INTRAVENOUS at 08:39

## 2020-07-30 RX ADMIN — SODIUM CHLORIDE, PRESERVATIVE FREE 10 ML: 5 INJECTION INTRAVENOUS at 10:46

## 2020-07-30 RX ADMIN — MULTIPLE VITAMINS W/ MINERALS TAB 1 TABLET: TAB at 10:33

## 2020-07-30 RX ADMIN — FAMOTIDINE 20 MG: 20 TABLET ORAL at 10:33

## 2020-07-30 RX ADMIN — ASPIRIN 81 MG: 81 TABLET, COATED ORAL at 10:33

## 2020-07-30 RX ADMIN — REGADENOSON 0.4 MG: 0.08 INJECTION, SOLUTION INTRAVENOUS at 08:34

## 2020-07-30 RX ADMIN — CARVEDILOL 12.5 MG: 12.5 TABLET, FILM COATED ORAL at 10:33

## 2020-07-30 RX ADMIN — SODIUM BICARBONATE 650 MG: 650 TABLET ORAL at 10:33

## 2020-07-30 RX ADMIN — TETROFOSMIN 10 MILLICURIE: 1.38 INJECTION, POWDER, LYOPHILIZED, FOR SOLUTION INTRAVENOUS at 07:33

## 2020-07-30 NOTE — DISCHARGE INSTR - DIET

## 2020-07-30 NOTE — PROGRESS NOTES
Hospitalist Progress Note      PCP: Denisse Mccarty MD    Date of Admission: 7/29/2020    Chief Complaint: Pins/Needling to Whole Body, Increased L. 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 Pain started about 7pm previous night and kept her up all night. Shoulder pain described as deep ache radiating into left neck and down arm. Vibrations are reported strongest in LEs and make it difficult for ambulation. Reports \"twinge\" in center of chest but denies additional chest pain, SOB, or recent cough. Endorses feeling lightheaded and off balance with ambulation last few days but denies any focal weakness, numbness or dizziness. Pt reports the vibrations have happened before without a diagnosis but believes it might be due to anxiety. Anxiety started about 1 yr ago with death of , does not like to be home alone at night. In ED, EKG revealed no acute ischemia, initial troponin  (-)  and 324mg ASA was given. Pts creatinine was 1.5 which is baseline. Pt reports stress test was scheduled for months ago but cancelled due to COVID-19. HEART score of 5. Pt was admitted for serial troponin and probable stress test.     Pt was D/Markie from ER on 6/28/2020 for similar symptoms pt noted were similar to her peripheral neuropathy symptoms. Subjective:     Pt has stress test this morning, noting it made vibration sensation worse. Pt denied dx of peripheral neurpathy during interview this AM, but chart review shows endorsement of peripheral neuropathy from 6/28/2020 and 6/9/2020 ER visit with similar symptoms.  Pt     Medications: Reviewed    Infusion Medications    dextrose       Scheduled Medications    sodium chloride flush  10 mL Intravenous 2 times per day    aspirin  81 mg Oral Daily    carvedilol  12.5 mg Oral BID    hydrALAZINE  25 mg Oral TID    insulin glargine  18 Units Subcutaneous Nightly    rosuvastatin  10 mg Oral QPM    sodium bicarbonate  650 mg Oral BID    therapeutic multivitamin-minerals  1 tablet Oral Daily    Vitamin D  1,000 Units Oral Daily    famotidine  20 mg Oral Daily    enoxaparin  30 mg Subcutaneous Nightly     PRN Meds: sodium chloride flush, acetaminophen **OR** acetaminophen, polyethylene glycol, promethazine **OR** ondansetron, nitroGLYCERIN, acetaminophen, ALPRAZolam, fluticasone, glucose, dextrose, glucagon (rDNA), dextrose    No intake or output data in the 24 hours ending 07/30/20 1446    Exam:    /69   Pulse 90   Temp 98.2 °F (36.8 °C) (Oral)   Resp 16   Ht 5' 8\" (1.727 m)   Wt 155 lb 3.3 oz (70.4 kg)   SpO2 97%   Breastfeeding No   BMI 23.60 kg/m²     General appearance: Pt is seated in chair in no apparent distress, appears stated age and cooperative but appears slightly anxious/quick to respond  HEENT: Pupils equal, round, and reactive to light. Conjunctivae/corneas clear. Neck: Supple, with full range of motion. No jugular venous distention. Trachea midline. Respiratory:  Normal respiratory effort. Clear to auscultation, bilaterally without Rales/Wheezes/Rhonchi. Cardiovascular: Regular rate and rhythm with normal S1/S2 with discernable murmur  Abdomen: Soft abdomen without tenderness, distention, guarding, or rebounding. Normal bowel sounds. Musculoskeletal: No clubbing, cyanosis or edema bilaterally. Full range of motion without deformity. Skin: Skin color, texture, turgor normal.  No rashes or lesions. Neurologic:  Neurovascularly intact without any focal sensory/motor deficits.  Cranial nerves: II-XII intact, grossly non-focal.   Psychiatric: Alert and oriented, creatinine 1.5    HTN  - Continue home medication - norvasc and hydralazine    HLD  - Continue statin therapy    Diabetes Mellitus  - 2/11/2020 A1c 6.7  - Continue lantus, pt does not use humalog  - Denies visit to podiatry despite neuropathy symptoms in legs/feet, will refer outpatient      Carotid Artery Stenosis  - 2/5/2020 Doppler - <50% stenosis of R. ICA and 50-69% L. ICA based on velocity crtieria    Anxiety/Depression  - continue home medication    DVT Prophylaxis: Lovenox  Diet: DIET CARDIAC; Low Sodium (2 GM);  Daily Fluid Restriction: 2000 ml; No Caffeine  Code Status: Full Code      Dispo - Observation    RIZWAN Mcpherson - CNP

## 2020-07-30 NOTE — PROGRESS NOTES
Dr. Beth West covering for Dr. Tonya Wei called this nurse and said that Dr. Tonya Wei said it was ok for her to be discharged with D-dimer being 275.  Related this message to [facility] NP.

## 2020-07-30 NOTE — DISCHARGE SUMMARY
noted were similar to her peripheral neuropathy symptoms. Pt has stress test this morning, noting it made vibration sensation worse. Pt denied dx of peripheral neurpathy during interview this AM, but chart review shows endorsement of peripheral neuropathy from 6/28/2020 and 6/9/2020 ER visit with similar symptoms. Chest Pain  - Atypical Chest Pain - vibration  - Phx of cardiac arrest, stents, and ICD  - Troponin trend (-)  - 7/30/2020 Cardiac Stress Test  - Continue ASA, BB, and Statin  - Pain medication PRN  Cariology Consulted- d-dimer 275 ok to dc     CAD   - S/P PCI to LAD and RCA  - S/P ICD after CM, SHF, VF cardiac arrest 2016  - 3/25/2019 Echo -  EF 50-55%, LV wall hypertrophy, indeterminate diastolic dysfunction, mitral valve leaflets appear mildly thickened, mild mitral and tricuspid regurg, left atrium mildly dilated, trivial aortic regurg, Pacemaker/ICD visualized     Chronic Heart Failure - Chronic, Stable  - EF improved 50-55% per 3/25/2019 Echo  - continue home medication - Coreg  - Cardiac diet  - Avoid NSAIDS     CKD  - Baseline creatinine 1.5     HTN  - Continue home medication - norvasc and hydralazine     HLD  - Continue statin therapy     Diabetes Mellitus  - 2/11/2020 A1c 6.7  - Continue lantus, pt does not use humalog  - Denies visit to podiatry despite neuropathy symptoms in legs/feet, will refer outpatient        Carotid Artery Stenosis  - 2/5/2020 Doppler - <50% stenosis of R. ICA and 50-69% L. ICA based on velocity crtieria     Anxiety/Depression  - continue home medication     Exam:     /62   Pulse 66   Temp 97.9 °F (36.6 °C) (Oral)   Resp 16   Ht 5' 8\" (1.727 m)   Wt 155 lb 3.3 oz (70.4 kg)   SpO2 96%   Breastfeeding No   BMI 23.60 kg/m²     General appearance: No apparent distress, appears stated age and cooperative. HEENT: Pupils equal, round, and reactive to light. Conjunctivae/corneas clear. Neck: Supple, with full range of motion. No jugular venous distention. Trachea midline. Respiratory:  Normal respiratory effort. Clear to auscultation, bilaterally without Rales/Wheezes/Rhonchi. Cardiovascular: Regular rate and rhythm with normal S1/S2 without murmurs, rubs or gallops. Abdomen: Soft, non-tender, non-distended with normal bowel sounds. Musculoskeletal: No clubbing, cyanosis or edema bilaterally. Full range of motion without deformity. Skin: Skin color, texture, turgor normal.  No rashes or lesions. Neurologic:  Neurovascularly intact without any focal sensory/motor deficits. Cranial nerves: II-XII intact, grossly non-focal.  Psychiatric: Alert and oriented, thought content appropriate, normal insight      Consults:     IP CONSULT TO CARDIOLOGY    Significant Diagnostic Studies:     NM MYOCARDIAL SPECT REST EXERCISE OR RX   Final Result      XR CHEST (2 VW)   Final Result   Chronic pulmonary change with left basilar atelectasis. Disposition:  home     Condition at Discharge: Stable    Discharge Instructions/Follow-up:  See dr Jaci Chatman in 2 weeks    Code Status:  Full Code     Activity: activity as tolerated    Diet: cardiac diet      Labs:  For convenience and continuity at follow-up the following most recent labs are provided:      CBC:    Lab Results   Component Value Date    WBC 4.9 07/30/2020    HGB 12.5 07/30/2020    HCT 37.2 07/30/2020     07/30/2020       Renal:    Lab Results   Component Value Date     07/30/2020    K 4.4 07/30/2020     07/30/2020    CO2 23 07/30/2020    BUN 29 07/30/2020    CREATININE 1.6 07/30/2020    CALCIUM 9.3 07/30/2020    PHOS 4.7 02/10/2020       Discharge Medications:     Current Discharge Medication List           Details   hydrALAZINE (APRESOLINE) 25 MG tablet Take 1 tablet by mouth 3 times daily  Qty: 90 tablet, Refills: 3      carvedilol (COREG) 12.5 MG tablet Take 1 tablet by mouth 2 times daily  Qty: 60 tablet, Refills: 3      aspirin 81 MG EC tablet Take 1 tablet by mouth daily HOLD for 30 days after hip surgery. See Eliquis order  Qty: 30 tablet, Refills: 3      acetaminophen (TYLENOL) 500 MG tablet Take 1,000 mg by mouth daily as needed for Pain       insulin glargine (LANTUS) 100 UNIT/ML injection vial Inject 18 Units into the skin nightly       sodium bicarbonate 650 MG tablet Take 650 mg by mouth 2 times daily       ALPRAZolam (XANAX) 0.25 MG tablet Take 0.25 mg by mouth nightly as needed for Sleep. rosuvastatin (CRESTOR) 10 MG tablet Take 10 mg by mouth every evening       Coenzyme Q10 (CO Q 10) 100 MG CAPS Take 1 capsule by mouth daily       ferrous sulfate 325 (65 FE) MG tablet Take 1 tablet by mouth on Monday, Wednesday, Friday      fluticasone (FLONASE) 50 MCG/ACT nasal spray 1 spray by Nasal route as needed for Rhinitis      therapeutic multivitamin-minerals (THERAGRAN-M) tablet Take 1 tablet by mouth daily. Centrum silver       vitamin D (CHOLECALCIFEROL) 1000 UNIT TABS tablet Take 1,000 Units by mouth daily              Future Appointments   Date Time Provider Chelsi Marmolejo   10/28/2020  8:55 AM SCHEDULE, [facility] REMOTE TRANSMISSION [facility]   12/28/2020  2:00 PM RIZWAN Camejo - [facility]       Time Spent on discharge is more than 45 minutes in the examination, evaluation, counseling and review of medications and discharge plan. Signed:    RIZWAN Hoffman CNP   7/30/2020      Thank you Gato Jefferson MD for the opportunity to be involved in this patient's care. If you have any questions or concerns please feel free to contact me at [phone].

## 2020-07-30 NOTE — PROGRESS NOTES
Hospitalist Progress Note      PCP: González Montero MD    Date of Admission: 7/29/2020    Chief Complaint: Pins/Needling to Whole Body, Increased L. Arm Pain    Hospital Course:     Lorri Kahn is an 79 y/o female with PMH of CAD s/p PCI to LAD and RCA, CM, SHF, VF cardiac arrest 2016 s/p ICD for secondary prevention, carotid artery sentosis, CHF, CKD with solitary kidney, HTN, HLD, DM, and anxiety/depression that presented to [facility] ER via EMS on 7/29/2020 with chief concern of \"vibrations\" throughout entire body described as machine/motor like with left shoulder pain. Pain started about 7pm previous night and kept her up all night. Shoulder pain described as deep ache radiating into left neck and down arm. Vibrations are reported strongest in LEs and make it difficult for ambulation. Reports \"twinge\" in center of chest but denies additional chest pain, SOB, or recent cough. Endorses feeling lightheaded and off balance with ambulation last few days but denies any focal weakness, numbness or dizziness. Pt reports the vibrations have happened before without a diagnosis but believes it might be due to anxiety. Anxiety started about 1 yr ago with death of , does not like to be home alone at night. In ED, EKG revealed no acute ischemia, initial troponin  (-)  and 324mg ASA was given. Pts creatinine was 1.5 which is baseline. Pt reports stress test was scheduled for months ago but cancelled due to COVID-19. HEART score of 5. Pt was admitted for serial troponin and probable stress test.     Pt was D/Markie from ER on 6/28/2020 for similar symptoms pt noted were similar to her peripheral neuropathy symptoms. Subjective:     Pt has stress test this morning, noting it made vibration sensation worse. Pt denied dx of peripheral neurpathy during interview this AM, but chart review shows endorsement of peripheral neuropathy from 6/28/2020 and 6/9/2020 ER visit with similar symptoms.  Pt     Medications: non-focal.   Psychiatric: Alert and oriented, thought content appropriate, normal insight. Pt is slightly anxious and quick to respond  Capillary Refill: Brisk,< 3 seconds   Peripheral Pulses: +2 palpable, equal bilaterally       Labs:   Recent Labs     07/29/20  0856 07/30/20  0722   WBC 5.4 4.9   HGB 12.2 12.5   HCT 36.7 37.2    205     Recent Labs     07/29/20  0856 07/30/20  0722    142   K 4.4 4.4    104   CO2 23 23   BUN 27* 29*   CREATININE 1.5* 1.6*   CALCIUM 9.4 9.3     Recent Labs     07/29/20  0856   AST 15   ALT 8*   BILITOT 0.4   ALKPHOS 47     No results for input(s): INR in the last 72 hours. Recent Labs     07/29/20  0856 07/29/20  1517 07/29/20  2044   TROPONINI <0.01 <0.01 <0.01       Urinalysis:      Lab Results   Component Value Date    NITRU Negative 06/28/2020    WBCUA 52 06/09/2020    BACTERIA 1+ 06/09/2020    RBCUA 1 06/09/2020    BLOODU Negative 06/28/2020    SPECGRAV 1.007 06/28/2020    GLUCOSEU Negative 06/28/2020    GLUCOSEU NEGATIVE 12/23/2011       Radiology:  XR CHEST (2 VW)   Final Result   Chronic pulmonary change with left basilar atelectasis.          NM MYOCARDIAL SPECT REST EXERCISE OR RX    (Results Pending)           Assessment/Plan:    Active Hospital Problems    Diagnosis Date Noted    Chest pain [R07.9] 10/11/2009     Chest Pain  - Atypical Chest Pain - vibration  - Phx of cardiac arrest, stents, and ICD  - Troponin trend (-)  - 7/30/2020 Cardiac Stress Test  - Continue ASA, BB, and Statin  - Pain medication PRN    CAD   - S/P PCI to LAD and RCA  - S/P ICD after CM, SHF, VF cardiac arrest 2016  - 3/25/2019 Echo -  EF 50-55%, LV wall hypertrophy, indeterminate diastolic dysfunction, mitral valve leaflets appear mildly thickened, mild mitral and tricuspid regurg, left atrium mildly dilated, trivial aortic regurg, Pacemaker/ICD visualized    Chronic Heart Failure - Chronic, Stable  - EF improved 50-55% per 3/25/2019 Echo  - continue home medication - Coreg  - Cardiac diet  - Avoid NSAIDS    CKD  - Baseline creatinine 1.5    HTN  - Continue home medication - norvasc and hydralazine    HLD  - Continue statin therapy    Diabetes Mellitus  - 2/11/2020 A1c 6.7  - Continue lantus, pt does not use humalog  - Denies visit to podiatry despite neuropathy symptoms in legs/feet, will refer outpatient      Carotid Artery Stenosis  - 2/5/2020 Doppler - <50% stenosis of R. ICA and 50-69% L. ICA based on velocity crtieria    Anxiety/Depression  - continue home medication    DVT Prophylaxis: Lovenox  Diet: DIET CARDIAC; Low Sodium (2 GM);  Daily Fluid Restriction: 2000 ml; No Caffeine  Code Status: Full Code      Dispo - Observation    Kayli Jacobs

## 2020-07-30 NOTE — PLAN OF CARE
Problem: Pain:  Goal: Pain level will decrease  Description: Pain level will decrease  Outcome: Ongoing     Problem: Safety:  Goal: Free from accidental physical injury  Description: Free from accidental physical injury  Outcome: Ongoing     Problem: Daily Care:  Goal: Daily care needs are met  Description: Daily care needs are met  Outcome: Ongoing     Problem: FLUID AND ELECTROLYTE IMBALANCE  Goal: Fluid and electrolyte balance are achieved/maintained  Outcome: Ongoing

## 2020-07-30 NOTE — CONSULTS
[address]                                  CONSULTATION    PATIENT NAME: Hai Harmon                     :        1940  MED REC NO:   [id number]                          ROOM:       4254  ACCOUNT NO:   [de-identified]                           ADMIT DATE: 2020  PROVIDER:     Rebeca Narvaez MD    CARDIOLOGY CONSULTATION    CONSULT DATE:  2020    HISTORY OF PRESENT ILLNESS:  This is an 56-year-old female known to me  from before with a history of coronary artery disease, status post LAD  and RCA PCI, hypertension, heart failure, status post ICD, carotid  stenosis, and chronic kidney disease with solitary kidney presented to  the hospital with symptoms of vibration all across her body and she had  some left arm pain and some left upper chest pain. She was admitted to  the hospital for further evaluation. Her troponin and EKGs have been  negative and she has undergone a nuclear stress test this morning. She  denies having similar symptoms with physical exertion. She said her  blood pressure was significantly elevated at home. She has no fever,  chill or rigors. No cough or sputum production. She did complain of  some burning in her lower extremities. She had no syncopal spell at  home. REVIEW OF SYSTEMS:  Please see HPI. All other systems are reviewed and  they are negative. PAST MEDICAL HISTORY:  1. History of coronary artery disease. She is status post stents in  the LAD and the RCA. 2.  History of cardiomyopathy and cardiac arrest status post ICD. 3.  History of anxiety. 4.  History of hyperlipidemia. 5.  Hypertension. 6.  Diabetes mellitus. 7.  Osteoarthritis. 8.  History of _____. 9.  History of urinary incontinence. PAST SURGICAL HISTORY:  As mentioned,  1. She had stents in her LAD and RCA. 2.  Status post ICD. 3.  Bladder removal.  4.  Cystoscopy.   5. Hysterectomy. 6.  Total hip replacement on the left side. SOCIAL HISTORY:  Denies any smoking or alcohol abuse. She drinks about  two cups of coffee a day. FAMILY HISTORY:  Negative for any early, premature atherosclerosis. Father had a heart disease and mother had diabetes. MEDICINES AND ALLERGIES:  Have been reviewed. PHYSICAL EXAMINATION:  VITAL SIGNS:  Pulse is 66 and regular, blood pressure 109/62,  respirations are 16, oxygen saturation 96%. CONSTITUTIONAL:  She is alert and oriented. HEENT EXAMINATION:  Neck is supple. No JVD. No thyromegaly. No  carotid bruits appreciated. Eyes show no pale conjunctivae or icterus. CARDIAC EXAMINATION:  Reveals normal S1 and S2. I could not appreciate  any gallop, murmur, or rub. LUNGS:  Largely clear to auscultation and palpation. ABDOMEN:  Soft, nontender. Bowel sounds are present. EXTREMITIES:  Show no edema. NEUROLOGICAL EXAMINATION:  Alert, oriented. Cranial nerves II through  XII intact. No focal deficits. SKIN:  Shows no rashes. LABORATORY DATA:  Sodium 142, potassium 4.4, chloride 104, bicarb 23,  BUN is 29, creatinine 1.6. ProBNP is elevated at 2385. Cholesterol  158, LDL is 68, triglycerides 163. White count 4.9, hemoglobin 12.5,  hematocrit is 37.2. Chest x-ray is unremarkable. EKG shows no acute changes. The nuclear  stress test shows no reversible ischemia. IMPRESSION:  1. This is an 78-year-old female who has presented to the hospital with  atypical arm pain. Her pain appears atypical for angina and her nuclear  stress test again showed no evidence of reversible ischemia. 2.  Hypertension. Blood pressure is better currently. 3.  Coronary artery disease, status post remote PCI. RECOMMENDATIONS:  1. We will space the patient's blood pressure medications to have  better _____ blood pressure control. 2.  The patient can walk around in the hallway to make sure she has no  further symptoms.   The patient can be discharged. 3.  We will obtain a D-dimer since the patient's BNP is significantly  elevated to make sure there is no evidence of PE though clinical  suspicion of PE is quite low. I appreciate the opportunity to participate in the care of this pleasant  but anxious female.         Romana Bias, MD    D: 07/30/2020 15:40:41       T: 07/30/2020 18:43:29     [email or web address]  Job#: [id number]     Doc#: [id number]    CC:

## 2020-07-31 LAB
ESTIMATED AVERAGE GLUCOSE: 154.2 MG/DL
HBA1C MFR BLD: 7 %

## 2020-08-05 RX ORDER — HYDRALAZINE HYDROCHLORIDE 25 MG/1
TABLET, FILM COATED ORAL
Qty: 42 TABLET | Refills: 2 | Status: SHIPPED | OUTPATIENT
Start: 2020-08-05 | End: 2020-09-23

## 2020-08-17 NOTE — PROGRESS NOTES
23.60 kg/m²   Wt Readings from Last 2 Encounters:   08/20/20 155 lb 3.2 oz (70.4 kg)   07/30/20 155 lb 3.3 oz (70.4 kg)     Constitutional: She is oriented to person, place, and time. She appears well-developed and well-nourished. In no acute distress. HEENT: Normocephalic and atraumatic. Sclerae anicteric. No xanthelasmas. EOM's intact. Neck: Supple. No JVD present. Carotids without bruits. No  thyromegaly present. No lymphadenopathy present. Cardiovascular: RRR, normal S1 and S2; soft systolic murmur   Pulmonary/Chest: Effort normal.  Lungs clear to auscultation. Chest wall nontender  Abdominal: soft, nontender, nondistended. + bowel sounds; no hepatomegaly   Extremities: No edema, cyanosis, or clubbing. Pulses are 2+ radial/DP/PT  bilaterally. Cap refill brisk. Neurological: No focal deficit. Skin: Skin is warm and dry. Psychiatric: She has a normal mood and affect. Her speech is normal and behavior is normal.     Lab Review:   Lab Results   Component Value Date    TRIG 163 07/30/2020    HDL 51 07/30/2020    HDL 45 01/29/2012    LDLCALC 68 07/30/2020    LDLDIRECT 148 07/08/2010    LABVLDL 33 07/30/2020     Lab Results   Component Value Date     08/19/2020    K 5.3 08/19/2020    K 4.4 07/30/2020     08/19/2020    CO2 26 08/19/2020    BUN 32 08/19/2020    CREATININE 1.7 08/19/2020    GLUCOSE 220 08/19/2020    CALCIUM 9.3 08/19/2020      Lab Results   Component Value Date    WBC 4.7 08/19/2020    HGB 12.0 08/19/2020    HCT 35.8 (L) 08/19/2020    MCV 96.9 08/19/2020     08/19/2020 7/30/2020 Lexiscan-Myoview:  Normal myocardial perfusion study.     Normal LV size and systolic function. 7/30/2020 Echo:   Left ventricular cavity size is mildly dilated. There is asymmetric hypertrophy of the basal septum. Ejection fraction is visually estimated to be 50-55%. There is mild diffuse hypokinesis. Diastolic filling parameters suggest grade II diastolic dysfunction.    Yumiko Oddi / ICD wire is visualized in the right ventricle. The right ventricle is mildly enlarged. Right ventricular systolic function is normal.   Mild to moderate mitral regurgitation is present. 02/2020 Carotid US: [facility]  <03% KARLOS  58-38% LICA      4/52/5674 Carotid US:      The right internal carotid artery appears to have a <50% diameter reducing     stenosis based on velocity criteria.     The right vertebral artery demonstrates normal antegrade flow.     The left internal carotid artery appears to have a 50-79% diameter reducing     stenosis based on velocity criteria.     The left vertebral artery demonstrates normal antegrade flow. 3/2016 Cardiac cath:  CORONARY ANATOMY:  1. The left main is free of disease. 2.  The LAD has a long stent in the mid segment which is widely patent. The rest of the vessels are free of disease. 3.  The left circumflex artery has 4 to 5 obtuse marginal branches which are free of disease. 4.  The right coronary artery is the dominant vessel and osteal stent which is widely patent. The PD and the posterior lateral branches are also free of disease. CONCLUSION:    1. No obstructive coronary artery disease. 2.  Previously stented sites are widely patent in the mid LAD and osteal RCA. 3.  Improved LV function with EF of about 40% to 45% with posterior basal hypokinesis. 4.  Elevated left heart pressures. Assessment:    1. Coronary artery disease involving native coronary artery of native heart without angina pectoris  -prior stents to mid LAD and ostial RCA  -7/30/2020: Lexiscan-Myoview: negative for ischemia  -no recurrent angina  -continue ASA, carvedilol, statin    2. Chronic systolic congestive heart failure ([address]  -compensated  -initially with decreased LVEF; last LVEF 55% on echo in 7/2020  -continue carvedilol, hydralazine    3.  ICD (implantable cardioverter-defibrillator), single, in situ  -followed by EP here  -last device check on 7/1/2020 and functioning normally    4. Bilateral carotid artery stenosis  -last carotid US in 2/2020 with bilateral stenosis  -continue ASA and statin    5. Neck pain  -musculoskeletal  -atypical for angina  -worse with movement and position change      Plan:  Continue ASA, carvedilol, Hydralazine, statin  Emphasized and discussed low-fat/low sodium diet, monitoring of daily weights, fluid restriction, worsening signs and symptoms of heart failure and when to call, and the importance of regular exercise and activity. Labs from 8/2020 reviewed  Approximately 25-30 minutes spent with patient and > 50% of time spent on pt education, answering questions and coordinating care. Follow up with Dr. Suleman Hood in 6 months or sooner if needed    Return in about 6 months (around 2/20/2021) for with Dr. Suleman Hood or sooner if needed. Thanks for allowing me to participate in the care of this patient.       NELLY Gutierrez  [facility], [address] [place] [address] [address] [place] [address], [address] [place] [address]  Office: [phone]  Fax: [phone]      Electronically signed by RIZWAN Landeros CNP on 8/20/2020 at 5:01 PM

## 2020-08-20 ENCOUNTER — OFFICE VISIT (OUTPATIENT)
Dept: CARDIOLOGY CLINIC | Age: 80
End: 2020-08-20
Payer: MEDICARE

## 2020-08-20 VITALS
HEART RATE: 71 BPM | BODY MASS INDEX: 23.52 KG/M2 | HEIGHT: 68 IN | DIASTOLIC BLOOD PRESSURE: 78 MMHG | TEMPERATURE: 98.5 F | WEIGHT: 155.2 LBS | OXYGEN SATURATION: 95 % | SYSTOLIC BLOOD PRESSURE: 138 MMHG

## 2020-08-20 PROCEDURE — 99214 OFFICE O/P EST MOD 30 MIN: CPT | Performed by: NURSE PRACTITIONER

## 2020-08-20 RX ORDER — CARVEDILOL 12.5 MG/1
TABLET ORAL
Qty: 90 TABLET | Refills: 3 | Status: SHIPPED | OUTPATIENT
Start: 2020-08-20 | End: 2021-02-08

## 2020-09-04 RX ORDER — AMLODIPINE BESYLATE 5 MG/1
TABLET ORAL
Qty: 90 TABLET | Refills: 0 | OUTPATIENT
Start: 2020-09-04

## 2020-09-04 NOTE — TELEPHONE ENCOUNTER
Last OV 8/20/20. Next OV not scheduled. BMP 8/19/20. Attempted to call patient to confirm whether or not she is taking amlodipine. Amlodipine is listed on her allergic list.    Called patient. She said she thinks that is the medication that makes her lose her hair. I will decline refill.

## 2020-09-08 RX ORDER — AMLODIPINE BESYLATE 5 MG/1
TABLET ORAL
Qty: 90 TABLET | Refills: 0 | OUTPATIENT
Start: 2020-09-08

## 2020-09-24 RX ORDER — HYDRALAZINE HYDROCHLORIDE 25 MG/1
TABLET, FILM COATED ORAL
Qty: 90 TABLET | Refills: 12 | Status: SHIPPED | OUTPATIENT
Start: 2020-09-24 | End: 2021-05-03

## 2020-10-22 ENCOUNTER — HOSPITAL ENCOUNTER (OUTPATIENT)
Dept: CT IMAGING | Age: 80
Discharge: HOME | End: 2020-10-22
Payer: MEDICARE

## 2020-10-22 PROCEDURE — 74176 CT ABD & PELVIS W/O CONTRAST: CPT

## 2020-10-28 ENCOUNTER — NURSE ONLY (OUTPATIENT)
Dept: CARDIOLOGY CLINIC | Age: 80
End: 2020-10-28
Payer: MEDICARE

## 2020-10-28 PROCEDURE — 93295 DEV INTERROG REMOTE 1/2/MLT: CPT | Performed by: INTERNAL MEDICINE

## 2020-10-28 PROCEDURE — 93296 REM INTERROG EVL PM/IDS: CPT | Performed by: INTERNAL MEDICINE

## 2020-10-28 PROCEDURE — 93297 REM INTERROG DEV EVAL ICPMS: CPT | Performed by: INTERNAL MEDICINE

## 2020-10-28 NOTE — LETTER
[address] [facility] [phone] [address] [facility],[address] Floor [phone]    Pacemaker/Defibrillator Clinic          10/28/20        Manisha Rocha  [address] [place] [id number]        Dear Manisha Rocha    This letter is to inform you that we received the transmission from your monitor at home that checks your implanted heart device. The next date your monitor will automatically transmit will be 2-1-21. If your report needs attention we will notify you. Your device and monitor are wireless and most transmit cellularly, but please periodically check your monitor is still plugged in to the electrical outlet. If you still use the telephone land line to send please ensure the connection to the phone rebeca is secure. This will help to ensure successful automatic transmissions in the future. Also, the monitor needs to be close to you while sleeping at night. Please be aware that the remote device transmission sites are periodically monitored only during regular business hours during which simultaneous in-office device clinics are being run. If your transmission requires attention, we will contact you as soon as possible. Thank you.             Annie 79

## 2020-11-07 ENCOUNTER — APPOINTMENT (OUTPATIENT)
Dept: GENERAL RADIOLOGY | Age: 80
End: 2020-11-07
Payer: MEDICARE

## 2020-11-07 ENCOUNTER — APPOINTMENT (OUTPATIENT)
Dept: CT IMAGING | Age: 80
End: 2020-11-07
Payer: MEDICARE

## 2020-11-07 ENCOUNTER — HOSPITAL ENCOUNTER (EMERGENCY)
Age: 80
Discharge: HOME | End: 2020-11-07
Attending: EMERGENCY MEDICINE
Payer: MEDICARE

## 2020-11-07 VITALS
HEIGHT: 68 IN | TEMPERATURE: 99 F | WEIGHT: 155 LBS | HEART RATE: 75 BPM | RESPIRATION RATE: 12 BRPM | OXYGEN SATURATION: 100 % | DIASTOLIC BLOOD PRESSURE: 98 MMHG | BODY MASS INDEX: 23.49 KG/M2 | SYSTOLIC BLOOD PRESSURE: 153 MMHG

## 2020-11-07 LAB
A/G RATIO: 1.8 (ref 1.1–2.2)
ALBUMIN SERPL-MCNC: 4.4 G/DL (ref 3.4–5)
ALP BLD-CCNC: 49 U/L (ref 40–129)
ALT SERPL-CCNC: 10 U/L (ref 10–40)
ANION GAP SERPL CALCULATED.3IONS-SCNC: 14 MMOL/L (ref 3–16)
AST SERPL-CCNC: 15 U/L (ref 15–37)
BASOPHILS ABSOLUTE: 0 K/UL (ref 0–0.2)
BASOPHILS RELATIVE PERCENT: 0.4 %
BILIRUB SERPL-MCNC: 0.4 MG/DL (ref 0–1)
BUN BLDV-MCNC: 32 MG/DL (ref 7–20)
CALCIUM SERPL-MCNC: 9.4 MG/DL (ref 8.3–10.6)
CHLORIDE BLD-SCNC: 105 MMOL/L (ref 99–110)
CO2: 21 MMOL/L (ref 21–32)
CREAT SERPL-MCNC: 1.5 MG/DL (ref 0.6–1.2)
EOSINOPHILS ABSOLUTE: 0.1 K/UL (ref 0–0.6)
EOSINOPHILS RELATIVE PERCENT: 1.1 %
GFR AFRICAN AMERICAN: 40
GFR NON-AFRICAN AMERICAN: 33
GLOBULIN: 2.5 G/DL
GLUCOSE BLD-MCNC: 165 MG/DL (ref 70–99)
HCT VFR BLD CALC: 37.5 % (ref 36–48)
HEMOGLOBIN: 12.7 G/DL (ref 12–16)
LYMPHOCYTES ABSOLUTE: 0.8 K/UL (ref 1–5.1)
LYMPHOCYTES RELATIVE PERCENT: 14.6 %
MCH RBC QN AUTO: 32 PG (ref 26–34)
MCHC RBC AUTO-ENTMCNC: 33.7 G/DL (ref 31–36)
MCV RBC AUTO: 95 FL (ref 80–100)
MONOCYTES ABSOLUTE: 0.4 K/UL (ref 0–1.3)
MONOCYTES RELATIVE PERCENT: 7.1 %
NEUTROPHILS ABSOLUTE: 4.3 K/UL (ref 1.7–7.7)
NEUTROPHILS RELATIVE PERCENT: 76.8 %
PDW BLD-RTO: 12.4 % (ref 12.4–15.4)
PLATELET # BLD: 177 K/UL (ref 135–450)
PMV BLD AUTO: 8.1 FL (ref 5–10.5)
POTASSIUM SERPL-SCNC: 4.8 MMOL/L (ref 3.5–5.1)
RBC # BLD: 3.95 M/UL (ref 4–5.2)
SODIUM BLD-SCNC: 140 MMOL/L (ref 136–145)
TOTAL PROTEIN: 6.9 G/DL (ref 6.4–8.2)
TROPONIN: <0.01 NG/ML
WBC # BLD: 5.5 K/UL (ref 4–11)

## 2020-11-07 PROCEDURE — 71046 X-RAY EXAM CHEST 2 VIEWS: CPT

## 2020-11-07 PROCEDURE — 84484 ASSAY OF TROPONIN QUANT: CPT

## 2020-11-07 PROCEDURE — 96374 THER/PROPH/DIAG INJ IV PUSH: CPT

## 2020-11-07 PROCEDURE — 2500000003 HC RX 250 WO HCPCS: Performed by: EMERGENCY MEDICINE

## 2020-11-07 PROCEDURE — 80053 COMPREHEN METABOLIC PANEL: CPT

## 2020-11-07 PROCEDURE — 99284 EMERGENCY DEPT VISIT MOD MDM: CPT

## 2020-11-07 PROCEDURE — 70450 CT HEAD/BRAIN W/O DYE: CPT

## 2020-11-07 PROCEDURE — 6360000004 HC RX CONTRAST MEDICATION: Performed by: NURSE PRACTITIONER

## 2020-11-07 PROCEDURE — 93005 ELECTROCARDIOGRAM TRACING: CPT | Performed by: NURSE PRACTITIONER

## 2020-11-07 PROCEDURE — 85025 COMPLETE CBC W/AUTO DIFF WBC: CPT

## 2020-11-07 PROCEDURE — 70498 CT ANGIOGRAPHY NECK: CPT

## 2020-11-07 RX ORDER — LABETALOL HYDROCHLORIDE 5 MG/ML
5 INJECTION, SOLUTION INTRAVENOUS ONCE
Status: COMPLETED | OUTPATIENT
Start: 2020-11-07 | End: 2020-11-07

## 2020-11-07 RX ADMIN — LABETALOL HYDROCHLORIDE 5 MG: 5 INJECTION INTRAVENOUS at 12:13

## 2020-11-07 RX ADMIN — IOPAMIDOL 75 ML: 755 INJECTION, SOLUTION INTRAVENOUS at 10:06

## 2020-11-07 ASSESSMENT — PAIN DESCRIPTION - LOCATION
LOCATION: HEAD

## 2020-11-07 ASSESSMENT — PAIN DESCRIPTION - PROGRESSION
CLINICAL_PROGRESSION: NOT CHANGED
CLINICAL_PROGRESSION: GRADUALLY IMPROVING

## 2020-11-07 ASSESSMENT — PAIN SCALES - GENERAL
PAINLEVEL_OUTOF10: 7
PAINLEVEL_OUTOF10: 5
PAINLEVEL_OUTOF10: 5
PAINLEVEL_OUTOF10: 7

## 2020-11-07 ASSESSMENT — PAIN - FUNCTIONAL ASSESSMENT
PAIN_FUNCTIONAL_ASSESSMENT: ACTIVITIES ARE NOT PREVENTED
PAIN_FUNCTIONAL_ASSESSMENT: ACTIVITIES ARE NOT PREVENTED
PAIN_FUNCTIONAL_ASSESSMENT: 0-10
PAIN_FUNCTIONAL_ASSESSMENT: ACTIVITIES ARE NOT PREVENTED

## 2020-11-07 ASSESSMENT — PAIN DESCRIPTION - ONSET
ONSET: ON-GOING

## 2020-11-07 ASSESSMENT — PAIN DESCRIPTION - FREQUENCY
FREQUENCY: CONTINUOUS

## 2020-11-07 ASSESSMENT — PAIN DESCRIPTION - PAIN TYPE
TYPE: ACUTE PAIN

## 2020-11-07 ASSESSMENT — PAIN DESCRIPTION - ORIENTATION
ORIENTATION: ANTERIOR;UPPER
ORIENTATION: ANTERIOR
ORIENTATION: ANTERIOR

## 2020-11-07 NOTE — ED NOTES
Bed: B-34  Expected date: 11/7/20  Expected time: 8:59 AM  Means of arrival: [facility]  Comments:  80F HTN was 295 systolic, headache, took BP meds this morning now 184/80     Kami Mi RN  11/07/20 2000

## 2020-11-07 NOTE — ED PROVIDER NOTES
[address] [address]  Dept: [phone]  Loc: [address] [facility] COMPLAINT    Chief Complaint   Patient presents with    Headache     r/t HTN, describes as pounding with ear fullness as well       HPI    Rashida Parker is a 78 y.o. female who presents the emergency department via EMS with complaints of elevated blood pressure and left-sided headache and left ear fullness. The patient states that she has had left-sided head pain in which she can hear every heartbeat in her left ear and she has had this for \"years and years. \"  She states after she got up this morning she noticed that it was back so she checked her blood pressure and it was 200/199. She called EMS. On arrival she is sitting up and talkative. She is a little bit anxious but she denies lightheadedness, dizziness, shortness of breath or chest pain. She does have a history of hypertension. She is on a couple of different hypertensive medications and she did take the ones that she is supposed to take this morning. But there is one but she cannot recall the name of that she takes at bedtime. He had pain that she is describing has been gradual in onset and she has had this intermittently for years. It is not changed in any way. This is not the worst headache of her life. There is no thunderclap or abrupt association. She denies photophobia, nausea or vomiting. REVIEW OF SYSTEMS    Neurologic: see HPI, no LOC, no neck stiffness, no dizziness, no double vision  Cardiac: No Chest Pain, No syncope  Respiratory: No cough or difficulty breathing  GI: No Bloody Stool or Diarrhea  : No Dysuria or Hematuria  General: No Fever  All other systems reviewed and are negative.     PAST MEDICAL & SURGICAL HISTORY    Past Medical History:   Diagnosis Date    Anxiety     Atrial fibrillation ([facility] [address]     CAD (coronary artery disease)     Cancer ([facility] [address]     bladder right kidney     Cardiac arrest ([facility] [address] 3/27/2016    Carotid artery stenosis     CHF (congestive heart failure) (HCC)     Chronic kidney disease     cancer of kidney and bladder    Depression     Diabetes mellitus (HCC)     IDDM    GERD (gastroesophageal reflux disease)     Hip pain, chronic     HYPERCHOLESTERAEMIA     Hypertension     Irritable bowel syndrome     Laceration of head 3/25/2016    fall with cardiac arrest    MI, old     Neuropathy     Osteoarthritis     Presence of combination internal cardiac defibrillator (ICD) and pacemaker     Staph infection     PT. STATES SHE HAS HAD SEVERAL BOILS WITH STAPH LAST ONE 20 YEARS AGO.  Type 2 diabetes mellitus without complication ([facility] [address]     Urinary incontinence      Past Surgical History:   Procedure Laterality Date    ANGIOPLASTY      x3  2009    BLADDER REMOVAL      BLADDER SUSPENSION      CYSTOSCOPY  5/28/2013    with dilitation    HYSTERECTOMY      KIDNEY REMOVAL      right    PACEMAKER INSERTION      SKIN CANCER EXCISION      TOTAL HIP ARTHROPLASTY Left 11/6/2019    LEFT ANTERIOR TOTAL HIP REPLACEMENT WITH C-ARM performed by Danial Renteria MD at [facility]    Current Outpatient Rx   Medication Sig Dispense Refill    hydrALAZINE (APRESOLINE) 25 MG tablet TAKE ONE TABLET BY MOUTH THREE TIMES A DAY 90 tablet 12    carvedilol (COREG) 12.5 MG tablet TAKE ONE TABLET BY MOUTH TWICE A DAY 90 tablet 3    aspirin 81 MG EC tablet Take 1 tablet by mouth daily HOLD for 30 days after hip surgery. See Eliquis order 30 tablet 3    acetaminophen (TYLENOL) 500 MG tablet Take 1,000 mg by mouth daily as needed for Pain       insulin glargine (LANTUS) 100 UNIT/ML injection vial Inject 18 Units into the skin nightly       sodium bicarbonate 650 MG tablet Take 650 mg by mouth 2 times daily       ALPRAZolam (XANAX) 0.25 MG tablet Take 0.25 mg by mouth nightly as needed for Sleep.  rosuvastatin (CRESTOR) 10 MG tablet Take 10 mg by mouth every evening       Coenzyme Q10 (CO Q 10) 100 MG CAPS Take 1 capsule by mouth daily       ferrous sulfate 325 (65 FE) MG tablet Take 1 tablet by mouth on Monday, Wednesday, Friday      fluticasone (FLONASE) 50 MCG/ACT nasal spray 1 spray by Nasal route as needed for Rhinitis      therapeutic multivitamin-minerals (THERAGRAN-M) tablet Take 1 tablet by mouth daily. Centrum silver       vitamin D (CHOLECALCIFEROL) 1000 UNIT TABS tablet Take 1,000 Units by mouth daily          ALLERGIES    Allergies   Allergen Reactions    Amlodipine Besylate      Other reaction(s): Unknown    Calcium Channel Blockers      Other reaction(s): Unknown    Citalopram Hydrobromide      Other reaction(s): Unknown    Flagyl [Metronidazole]      Took with Cipro and it made her vomit    Losartan      Muscle aches  Muscle aches      Pneumococcal Vaccine Swelling     Swelling at injection site    Simvastatin      myalgias  ?  Valsartan-Hydrochlorothiazide      Does not remember    Venlafaxine Nausea Only    Hydrocodone-Acetaminophen Other (See Comments)     Makes patient feel weird, dizzy, nauseous, and sleepy. Patient states she is not allergic to this    Nitrofurantoin Nausea And Vomiting and Other (See Comments)     Other reaction(s): Dizziness, GI Upset  Unsure if Macrobid caused it  Unsure if Macrobid caused it         SOCIAL & FAMILY HISTORY    Social History     Socioeconomic History    Marital status:       Spouse name: Not on file    Number of children: Not on file    Years of education: Not on file    Highest education level: Not on file   Occupational History    Not on file   Social Needs    Financial resource strain: Not on file    Food insecurity     Worry: Not on file     Inability: Not on file    Transportation needs     Medical: Not on file     Non-medical: Not on file   Tobacco Use    Smoking status: Never Smoker    Smokeless tobacco: Never Used   Substance and Sexual Activity    Alcohol use: No    Drug use: No    Sexual activity: Not Currently   Lifestyle    Physical activity     Days per week: Not on file     Minutes per session: Not on file    Stress: Not on file   Relationships    Social connections     Talks on phone: Not on file     Gets together: Not on file     Attends Gnosticism service: Not on file     Active member of club or organization: Not on file     Attends meetings of clubs or organizations: Not on file     Relationship status: Not on file    Intimate partner violence     Fear of current or ex partner: Not on file     Emotionally abused: Not on file     Physically abused: Not on file     Forced sexual activity: Not on file   Other Topics Concern    Not on file   Social History Narrative    Not on file     Family History   Problem Relation Age of Onset    Diabetes Mother     Heart Disease Father     Cancer Father        PHYSICAL EXAM    VITAL SIGNS: BP (!) 153/98   Pulse 75   Temp 99 °F (37.2 °C) (Oral)   Resp 12   Ht 5' 8\" (1.727 m)   Wt 155 lb (70.3 kg)   SpO2 100%   BMI 23.57 kg/m²    Constitutional:  Well developed, well nourished, no acute distress   Eyes: Pupils equally round and react to light, extraocular movement are intact  Vascular: No temporal artery tenderness, Radial and DP pulses 2+ and equal bilaterally  HENT:  Atraumatic, moist mucus membranes, airway patent  Neck: supple, no JVD, no posterior tenderness, no nuchal rigidity  Respiratory:  Lungs clear to auscultation bilaterally, no retractions   Cardiovascular: Regular rhythm and rate, S1-S2. No murmur. GI:  Soft, nontender, nondistended abdomen without rebound or guarding. Normal active bowel sounds throughout auscultation. Ostomy bag and intact.   Ostomy stoma pink and moist.  Musculoskeletal:  No edema, no acute deformities  Integument:  Well hydrated, no petechiae   Neurologic:  Awake, alert, oriented x4, no aphasia, no slurred speech, CN II-XII intact, normal finger to nose test bilaterally, 5/5 strength in all 4 extremities, patellar reflexes 2+ and equal bilaterally  Psych: Pleasant affect, no hallucinations    RADIOLOGY    CT HEAD WO CONTRAST   Final Result   Atrophy and mild small vessel ischemic disease. Suspected meningiomas along the tentorium, similar to prior. CTA HEAD NECK W CONTRAST   Preliminary Result   75% stenosis in the proximal left internal carotid artery. Otherwise, no acute abnormality or flow-limiting stenosis in the remainder of   the major arteries of the head and neck. 2.7 mm saccular aneurysm at the lateral aspect of the proximal cavernous   segment of the right internal carotid artery. 1.7 mm prominent infundibulum versus saccular aneurysm at the expected origin   of the left posterior communicating artery         XR CHEST (2 VW)   Final Result   Clear lungs. No acute abnormality. Slight to mild bullous changes. No   significant change from the prior study.            Labs Reviewed   CBC WITH AUTO DIFFERENTIAL - Abnormal; Notable for the following components:       Result Value    RBC 3.95 (*)     Lymphocytes Absolute 0.8 (*)     All other components within normal limits    Narrative:     Performed at:  [address] [facility] [address]   Phone [phone]   COMPREHENSIVE METABOLIC PANEL - Abnormal; Notable for the following components:    Glucose 165 (*)     BUN 32 (*)     CREATININE 1.5 (*)     GFR Non- 33 (*)     GFR  40 (*)     All other components within normal limits    Narrative:     Performed at:  [facility]  [address] [address]   Phone [phone]   TROPONIN    Narrative:     Performed at:  [address] [facility] [address]   Phone [phone]     EKG    12 Lead EKG reviewed by myself and interpreted by my attending physician Dr. Vazquez Lindo.  Ventricular rate 83 bpm, WI interval 160 ms, QRS duration 110 ms,  ms  There is no ST elevation or depression noted. Interpretation is a sinus rhythm with PVCs. Today's tracing was compared to the one on July 30, 2020 with no significant changes noted. ED COURSE & MEDICAL DECISION MAKING    See chart for details of medications given during the ED stay. Reevaluation: after medications, the patients symptoms are much improved. Vitals:    11/07/20 1236 11/07/20 1255 11/07/20 1302 11/07/20 1311   BP: (!) 152/89 (!) 133/101 (!) 153/98 (!) 153/98   Pulse: 78 82 73 75   Resp: 21 22 12 12   Temp:       TempSrc:       SpO2: 98% 99% 97% 100%   Weight:    155 lb (70.3 kg)   Height:    5' 8\" (1.727 m)     Medications   iopamidol (ISOVUE-370) 76 % injection 75 mL (75 mLs Intravenous Given 11/7/20 1006)   labetalol (NORMODYNE;TRANDATE) injection 5 mg (5 mg Intravenous Given 11/7/20 1213)     This patient was seen evaluated by myself and my attending physician Dr. Vazquez Lindo.  Differential diagnosis includes but is not limited to hypertensive urgency versus emergency, subarachnoid hemorrhage, mass, migraine, vertebral artery dissection, stroke, other. She is nontoxic in appearance. She is afebrile. She is coming in with a blood pressure initially of 177/81. She took a couple of her hypertensive medications this morning but not all of them because some of them she takes at night. She has had this ear throbbing left-sided head \"whooshing\"/pain ongoing for years. She became concerned about it today when she checked her blood pressure and reported home blood pressure of 200/199. She denies lightheadedness or dizziness. She has no chest pain, blurred vision, double vision or seeing spots. She denies shortness of breath. She is little anxious on arrival.    We did work her up and her CBC reveals no concerns.   Her CMP electrolytes are unremarkable. She has a glucose of 165 with a BUN of 32 and a creatinine of 1.5 with a GFR of 33. I did send her for CT head without and a CTA of the head and neck with based on her complaints. We did not give her IV fluids and we encouraged her to drink fluids which she is doing in the emergency department. She has a troponin of less than 0.01. Imaging as interpreted by radiology shows atrophy and mild small vessel ischemic disease. Suspected meningiomas along the tentorium similar to prior. She has a 75% stenosis in the proximal left internal carotid artery. Otherwise no acute abnormality or flow-limiting stenosis in the remainder of the major arteries of the head and neck. 2.7 millimeters saccular aneurysm at the lateral aspect of the proximal cavernous segment of the right internal carotid artery. 1.7 mm prominent infundibulum versus a saccular aneurysm at the expected origin of the left posterior communicating artery. Findings were discussed with the patient and she will follow up with her cardiologist Dr. Carlos Chawla for this. Patient was ultimately given a low dose of labetalol and her blood pressure did come down to 153/98. She was ambulating in her room and she did not feel lightheaded or dizzy so we feel that it is safe at this time to discharge her back into the care of her PCP and Dr. Carlos Chawla. She was instructed to always return to the emergency department for worsening symptoms and to never missed her hypertensive medications. The patient verbalized understanding of the discharge instructions. FINAL IMPRESSION    1. Hypertension, unspecified type    2. Chronic nonintractable headache, unspecified headache type    3.  Aneurysm (arteriovenous) of coronary vessels        PLAN  Discharge with outpatient follow-up (see EMR)      (Please note that this note was completed with a voice recognition program.  Every attempt was made to edit the dictations, but inevitably there remain words that are mis-transcribed.)       Musa Williamson, APRN - CNP  11/07/20 4857

## 2020-11-07 NOTE — ED PROVIDER NOTES
I independently evaluated and obtained a history and physical on Susie Villegas. All diagnostic, treatment, and disposition assistants were made to myself in conjunction the advanced practice provider. For further details of this patient's emergency department encounter, please see the advanced practice provider's documentation. History: Patient is a 75-year-old pleasant female presents with elevated blood pressure and left-sided headache and fullness. Patient states she has been having issues with this pulsating in her left head for a while. But her blood pressure not elevated. Her doctors been trying to adjust her blood pressure medication. There has been no head injury no fever. She took her blood pressure today and it was elevated at 200 will 99 and she called 911. Patient on arrival was very talkative and in no distress. No blurry vision. No fever no neck pain. Patient seen in conjunction with GILBERTO. EKG interpretation    Sinus rhythm ventricular of 83 premature ventricular complexes noted    Physician Exam: Patient is a pleasant 75-year-old white female no distress vital signs mild elevated hypertension at about 160/98. Patient states she did take her blood pressure medication. Lungs were clear neck is supple fundi exam was normal extraocular movements are intact.   Patient is moving all extremities nothing focal neurological exam.    Labs Reviewed   CBC WITH AUTO DIFFERENTIAL - Abnormal; Notable for the following components:       Result Value    RBC 3.95 (*)     Lymphocytes Absolute 0.8 (*)     All other components within normal limits    Narrative:     Performed at:  [address] [address]   Phone [phone]   COMPREHENSIVE METABOLIC PANEL - Abnormal; Notable for the following components:    Glucose 165 (*)     BUN 32 (*)     CREATININE 1.5 (*)     GFR Non- 33 (*)     GFR  40 (*) All other components within normal limits    Narrative:     Performed at:  [facility]  [address] [place] [facility] [address]   Phone [phone]   TROPONIN    Narrative:     Performed at:  [facility]  [address] [facility] [address]   Phone [phone]     [address]   Final Result   Atrophy and mild small vessel ischemic disease. Suspected meningiomas along the tentorium, similar to prior. CTA HEAD NECK W CONTRAST   Preliminary Result   75% stenosis in the proximal left internal carotid artery. Otherwise, no acute abnormality or flow-limiting stenosis in the remainder of   the major arteries of the head and neck. 2.7 mm saccular aneurysm at the lateral aspect of the proximal cavernous   segment of the right internal carotid artery. 1.7 mm prominent infundibulum versus saccular aneurysm at the expected origin   of the left posterior communicating artery         XR CHEST (2 VW)   Final Result   Clear lungs. No acute abnormality. Slight to mild bullous changes. No   significant change from the prior study. Patient given labetalol her CT scan that showed bilateral internal carotid type of aneurysm. I after discussion with patient about these findings we elected not to do any surgical intervention at this time medical treatment only patient given labetalol IV blood pressure is coming down the heart rate has come down. Patient will follow up with her cardiologist and family doctor if further issues at this time. It was more of a incidental finding. Patient discharged in good condition.           Kenyetta Gordon MD  11/07/20 1461

## 2020-11-08 LAB
EKG ATRIAL RATE: 83 BPM
EKG DIAGNOSIS: NORMAL
EKG P AXIS: 35 DEGREES
EKG P-R INTERVAL: 160 MS
EKG Q-T INTERVAL: 374 MS
EKG QRS DURATION: 110 MS
EKG QTC CALCULATION (BAZETT): 439 MS
EKG R AXIS: -10 DEGREES
EKG T AXIS: 45 DEGREES
EKG VENTRICULAR RATE: 83 BPM

## 2020-11-08 PROCEDURE — 93010 ELECTROCARDIOGRAM REPORT: CPT | Performed by: INTERNAL MEDICINE

## 2020-11-10 NOTE — PROGRESS NOTES
[facility]  Cardiology Progress Note    Marnie Porter  1940    November 10, 2020        CC: \"I had headache and really high blood pressure and went to the ED. \"    HPI:  The patient is 78 y.o. female with a past medical history significant for CAD, cardiomyopathy/systolic heart failure with prior PCI to LAD and RCA. She was hospitalized 3/26/16-4/5/16 after suffering Vfib cardiac arrest. Bystanders initiated CPR and when the EMS arrived she was in Vfib and subsequently shocked multiple times. Echo revealed EF 25%. LHC showed patient stents. Underwent hypothermia protocol and has had a significant neurological recovery. An ICD was placed on 4/1/16 for secondary prevention. Today, she went to the ED on 11/7/20 due to headache and extremely high BP. She underwent further workup, given a dose of labetalol with improvement in her BP. She also states she had CTA neck to check her carotid arteries, CXR wnl. She denies any BENTLEY, SOB at rest. She ahs been working out in the yard. She states that her BP has been normal since she has been home. She reports medical therapy compliance and feels she is tolerating. Patient denies exertional chest pain/pressure, dyspnea at rest, BENTLEY, PND, orthopnea, palpitations, lightheadedness, weight changes, changes in LE edema, and syncope.       Past Medical History:   Diagnosis Date    Anxiety     Atrial fibrillation ([facility]     CAD (coronary artery disease)     Cancer (HCC)     bladder right kidney     Cardiac arrest ([facility] [address] 3/27/2016    Carotid artery stenosis     CHF (congestive heart failure) (HCC)     Chronic kidney disease     cancer of kidney and bladder    Depression     Diabetes mellitus (HCC)     IDDM    GERD (gastroesophageal reflux disease)     Hip pain, chronic     HYPERCHOLESTERAEMIA     Hypertension     Irritable bowel syndrome     Laceration of head 3/25/2016    fall with cardiac arrest    MI, old     Neuropathy     Osteoarthritis     Presence of combination internal cardiac defibrillator (ICD) and pacemaker     Staph infection     PT. STATES SHE HAS HAD SEVERAL BOILS WITH STAPH LAST ONE 20 YEARS AGO.  Type 2 diabetes mellitus without complication ([address]     Urinary incontinence      Past Surgical History:   Procedure Laterality Date    ANGIOPLASTY      x3  2009    BLADDER REMOVAL      BLADDER SUSPENSION      CYSTOSCOPY  5/28/2013    with dilitation    HYSTERECTOMY      KIDNEY REMOVAL      right    PACEMAKER INSERTION      SKIN CANCER EXCISION      TOTAL HIP ARTHROPLASTY Left 11/6/2019    LEFT ANTERIOR TOTAL HIP REPLACEMENT WITH C-ARM performed by Danial Renteria MD at [facility] History   Problem Relation Age of Onset    Diabetes Mother     Heart Disease Father     Cancer Father      Social History     Tobacco Use    Smoking status: Never Smoker    Smokeless tobacco: Never Used   Substance Use Topics    Alcohol use: No    Drug use: No       Allergies   Allergen Reactions    Amlodipine Besylate      Other reaction(s): Unknown    Calcium Channel Blockers      Other reaction(s): Unknown    Citalopram Hydrobromide      Other reaction(s): Unknown    Flagyl [Metronidazole]      Took with Cipro and it made her vomit    Losartan      Muscle aches  Muscle aches      Pneumococcal Vaccine Swelling     Swelling at injection site    Simvastatin      myalgias  ?  Valsartan-Hydrochlorothiazide      Does not remember    Venlafaxine Nausea Only    Hydrocodone-Acetaminophen Other (See Comments)     Makes patient feel weird, dizzy, nauseous, and sleepy.   Patient states she is not allergic to this    Nitrofurantoin Nausea And Vomiting and Other (See Comments)     Other reaction(s): Dizziness, GI Upset  Unsure if Macrobid caused it  Unsure if Macrobid caused it       Current Outpatient Medications   Medication Sig Dispense Refill    hydrALAZINE (APRESOLINE) 25 MG tablet TAKE ONE TABLET BY MOUTH THREE TIMES A DAY 90 numbness or tingling. · Psychiatric: No anxiety or depression. · Endocrine: No temperature intolerance. No excessive thirst, fluid intake, or urination. No tremor. · Hematologic/Lymphatic: No abnormal bruising or bleeding, blood clots or swollen lymph nodes. · Allergic/Immunologic: No nasal congestion or hives. Physical Exam:   /66   Pulse 64   Temp 99.5 °F (37.5 °C)   Ht 5' 8\" (1.727 m)   Wt 155 lb (70.3 kg) Comment: with shoes  SpO2 98%   BMI 23.57 kg/m²   Wt Readings from Last 3 Encounters:   11/07/20 155 lb (70.3 kg)   08/20/20 155 lb 3.2 oz (70.4 kg)   07/30/20 155 lb 3.3 oz (70.4 kg)     Constitutional: She is oriented to person, place, and time. She appears well-developed and well-nourished. In no acute distress. Head: Normocephalic and atraumatic. Pupils equal and round. Neck: Neck supple. No JVP or carotid bruit appreciated. No mass and no thyromegaly present. No lymphadenopathy present. Cardiovascular: Normal rate. Normal heart sounds. Exam reveals no gallop and no friction rub. No murmur heard. Pulmonary/Chest: Effort normal and breath sounds normal. No respiratory distress. She has no wheezes, rhonchi or rales. Abdominal: Soft, non-tender. Bowel sounds are normal. She exhibits no organomegaly, mass or bruit. Extremities: No edema, cyanosis, or clubbing. Pulses are 2+ radial/dorsalis pedis/posterior tibial/carotid bilaterally. Neurological: No gross cranial nerve deficit. Coordination normal.   Skin: Skin is warm and dry. There is no rash or diaphoresis. Psychiatric: She has a normal mood and affect.  Her speech is normal and behavior is normal.     Lab Review:   Lab Results   Component Value Date    TRIG 163 07/30/2020    HDL 51 07/30/2020    HDL 45 01/29/2012    LDLCALC 68 07/30/2020    LDLDIRECT 148 07/08/2010    LABVLDL 33 07/30/2020      Lab Results   Component Value Date    BUN 32 11/07/2020    CREATININE 1.5 11/07/2020       EKG Interpretation:   3/12/19 Sinus Bradycardia Left bundle branch block  11/10/20: not completed today     Image Review:     ECHO 3/26/16  Normal left ventricular size and wall thickness. Severely decreased left  ventricular systolic function with an estimated ejection fraction of 20-25%  Global hypokinesis. Mild MR/TR    Cardiac Cath 3/26/16  CORONARY ANATOMY:  1. The left main is free of disease. 2.  The LAD has a long stent in the mid segment which is widely patent. The  rest of the vessels are free of disease. 3.  The left circumflex artery has 4 to 5 obtuse marginal branches which are  free of disease. 4.  The right coronary artery is the dominant vessel and osteal stent which  is widely patent. The PD and the posterior lateral branches are also free of  disease. CONCLUSION:    1. No obstructive coronary artery disease. 2.  Previously stented sites are widely patent in the mid LAD and osteal RCA. 3.  Improved LV function with EF of about 40% to 45% with posterior basal  hypokinesis. 4.  Elevated left heart pressures. Carotid Doppler 6/29/18  The right internal carotid artery appears to have a <50% diameter reducing    stenosis based on velocity criteria.    The right vertebral artery demonstrates normal antegrade flow.    The left internal carotid artery appears to have a 50-79% diameter reducing    stenosis based on velocity criteria.    The left vertebral artery demonstrates normal antegrade flow. ECHO 3/25/19  Suboptimal image quality. Left ventricular cavity size is normal.  There is mild concentric left ventricular hypertrophy. Ejection fraction is visually estimated to be 50-55%. Indeterminate diastolic function. Mitral valve leaflets appear mildly thickened. Mild mitral regurgitation. The left atrium is mildly dilated. Trivial aortic regurgitation is present. Mild tricuspid regurgitation. Pacemaker / ICD lead is visualized in the right atrium.   The right atrium is normal in size.     Carotid doppler 2/5/2020  Impression:     o < 50% stenosis of the right internal carotid artery based on velocity       criteria.     o 50-69% stenosis of the left internal carotid artery based on velocity       criteria.     o There is antegrade flow demonstrated in the right and left vertebral       arteries. Stress study:7/30/20  Normal myocardial perfusion study.     Normal LV size and systolic function.          Breast and diaphragmatic attenuation present.     Non-diagnostic EKG response due to failure to reach target heart rate.     Frequent uniform premature ventricular contractions with infusion.     Overall findings represent a low risk study.           Echo:7/30/20  Left ventricular cavity size is mildly dilated. There is asymmetric hypertrophy of the basal septum. Ejection fraction is visually estimated to be 50-55%. There is mild diffuse hypokinesis. Diastolic filling parameters suggest grade II diastolic dysfunction. Pacer / ICD wire is visualized in the right ventricle. The right ventricle is mildly enlarged. Right ventricular systolic function is normal.   Mild to moderate mitral regurgitation is present. Assessment/Plan:     Hypertension, essential   Blood pressure is controlled today today on Coreg, Hydralazine but she went to the ED on 11/7/20 for elevated BP. They gave her single dose of Labetalol with improvement. They did not adjust her medical therapy at this time. I have asked her to space her medications throughout the day and adhere to a low sodium diet as she reports high sodium diet. Continue walking program.     Carotid stenosis, bilateral   Last carotid doppler 2/5/2020 is unchanged from previous 2016. CTA head/neck 11/7/20 revealing 22% proximal LICA. I will discuss with my partner Dr. Royce Prater to see if she qualifies for a diagnostic carotid and cerebral angiography to further assess severity. We will call her with a plan. CAD  She denies any symptoms of angina today . Continue BB,statin, and Asa. Stress negative completed 7/2020. Chronic Systolic Heart Failure  She denies any dyspnea on exertion orthopnea or PND she appears compensated on exam. EF 20-25% post arrest improved to 40-45% on [facility] repeat echocardiogram 3/19 showed normal LVEF at 50-55%. Repeat echo 7/30/20 50-55% with grade II diastolic dysfunction. Continue BB. She is currently not on ACE inhibitor/ARB therapy due to chronic kidney disease and hyperkalemia. Even though her ICD shows reduced thoracic impedance, I will continue to follow her clinically     Hyperlipidemia, unspecified   Last lipid profile 7/30/20 was WNL except . Will continue Crestor 10 mg daily. ICD  Implanted 4/1/16 and is managed per our EP team/device clinic. Amiodarone has been formerly discontinued. She had device interrogation 10/28/20 showed normal sensing and pacing function. optivol wnl. Chronic kidney disease  She is being followed by nephrology on a regular basis. DM  She is on Lantus and is managed per her PCP. Her last lipid profile 7/30/20 wnl except . I would like to start her on Jardiance 10 mg daily but her creatinine clearance will not allow. We then discussed Vascepa 2 G BID. She is agreeable and will start PA process. We will call with plan moving forward after I further discuss her carotid disease with Dr. Angella Slater. Instructed to obtain flu vaccine this season and annually. Thank you very much for allowing me to participate in the care of your patient. Please do not hesitate to contact me if you have any questions. Sincerely,  Nga Campbell MD      [address] [place] [address]  Ph: [phone]  Fax: [phone]    This note was scribed in the presence of Dr. Cristobal Gongora MD by Martinez Buchanan, RN.     Physician Attestation:  The scribes documentation has been prepared under my direction and personally reviewed by me in its entirety. I confirm that the note above accurately reflects all work, treatment, procedures, and medical decision making performed by me.

## 2020-11-11 ENCOUNTER — OFFICE VISIT (OUTPATIENT)
Dept: CARDIOLOGY CLINIC | Age: 80
End: 2020-11-11
Payer: MEDICARE

## 2020-11-11 VITALS
DIASTOLIC BLOOD PRESSURE: 66 MMHG | BODY MASS INDEX: 23.49 KG/M2 | TEMPERATURE: 99.5 F | OXYGEN SATURATION: 98 % | WEIGHT: 155 LBS | HEART RATE: 64 BPM | HEIGHT: 68 IN | SYSTOLIC BLOOD PRESSURE: 136 MMHG

## 2020-11-11 PROCEDURE — 99214 OFFICE O/P EST MOD 30 MIN: CPT | Performed by: INTERNAL MEDICINE

## 2020-11-11 RX ORDER — ICOSAPENT ETHYL 1000 MG/1
2 CAPSULE ORAL 2 TIMES DAILY
Qty: 60 CAPSULE | Refills: 6 | Status: SHIPPED
Start: 2020-11-11 | End: 2021-02-25

## 2020-11-11 NOTE — PATIENT INSTRUCTIONS
Patient Education        Learning About Low-Sodium Foods  What foods are low in sodium? The foods you eat contain nutrients, such as vitamins and minerals. Sodium is a nutrient. Your body needs the right amount to stay healthy and work as it should. You can use the list below to help you make choices about which foods to eat. Fruits  · Fresh, frozen, canned, or dried fruit  Vegetables  · Fresh or frozen vegetables, with no added salt  · Canned vegetables, low-sodium or with no added salt  Grains  · Bagels without salted tops  · Cereal with no added salt  · Corn tortillas  · Crackers with no added salt  · Oatmeal, cooked without salt  · Popcorn with no salt  · Pasta and noodles, cooked without salt  · Rice, cooked without salt  · Unsalted pretzels  Dairy and dairy alternatives  · Butter, unsalted  · Cream cheese  · Ice cream  · Milk  · Soy milk  Meats and other protein foods  · Beans and peas, canned with no salt  · Eggs  · Fresh fish (not smoked)  · Fresh meats (not smoked or cured)  · Nuts and nut butter, prepared without salt  · Poultry, not packaged with sodium solution  · Tofu, unseasoned  · Tuna, canned without salt  Seasonings  · Garlic  · Herbs and spices  · Lemon juice  · Mustard  · Olive oil  · Salt-free seasoning mixes  · Vinegar  Work with your doctor to find out how much of this nutrient you need. Depending on your health, you may need more or less of it in your diet. Where can you learn more? Go to https://EndorphinpejaymieHopster TV.healthDiversion. org and sign in to your [employer] account. Enter W451 in the [facility] box to learn more about \"Learning About Low-Sodium Foods. \"     If you do not have an account, please click on the \"Sign Up Now\" link. Current as of: August 22, 2019               Content Version: 12.6  © 5640-8937 [employer]. Care instructions adapted under license by [facility] ([facility]).  If you have questions about a medical condition or this instruction, always ask your healthcare professional. [address] any warranty or liability for your use of this information. Patient Education        Heart-Healthy Diet: Care Instructions  Your Care Instructions     A heart-healthy diet has lots of vegetables, fruits, nuts, beans, and whole grains, and is low in salt. It limits foods that are high in saturated fat, such as meats, cheeses, and fried foods. It may be hard to change your diet, but even small changes can lower your risk of heart attack and heart disease. Follow-up care is a key part of your treatment and safety. Be sure to make and go to all appointments, and call your doctor if you are having problems. It's also a good idea to know your test results and keep a list of the medicines you take. How can you care for yourself at home? Watch your portions  · Learn what a serving is. A \"serving\" and a \"portion\" are not always the same thing. Make sure that you are not eating larger portions than are recommended. For example, a serving of pasta is ½ cup. A serving size of meat is 2 to 3 ounces. A 3-ounce serving is about the size of a deck of cards. Measure serving sizes until you are good at [place]" them. Keep in mind that restaurants often serve portions that are 2 or 3 times the size of one serving. · To keep your energy level up and keep you from feeling hungry, eat often but in smaller portions. · Eat only the number of calories you need to stay at a healthy weight. If you need to lose weight, eat fewer calories than your body burns (through exercise and other physical activity). Eat more fruits and vegetables  · Eat a variety of fruit and vegetables every day. Dark green, deep orange, red, or yellow fruits and vegetables are especially good for you. Examples include spinach, carrots, peaches, and berries. · Keep carrots, celery, and other veggies handy for snacks.  Buy fruit that is in season and store it where you can see it so that you in to your [employer] account. Enter V137 in the [facility] box to learn more about \"Heart-Healthy Diet: Care Instructions. \"     If you do not have an account, please click on the \"Sign Up Now\" link. Current as of: August 22, 2019               Content Version: 12.6  © 9997-3402 [employer]. Care instructions adapted under license by [facility] ([facility]). If you have questions about a medical condition or this instruction, always ask your healthcare professional. [address] any warranty or liability for your use of this information. Patient Education        Walking for Exercise: Care Instructions  Your Care Instructions     Walking is one of the easiest ways to get the exercise you need for good health. A brisk, 30-minute walk each day can help you feel better and have more energy. It can help you lower your risk of disease. Walking can help you keep your bones strong and your heart healthy. Check with your doctor before you start a walking plan if you have heart problems, other health issues, or you have not been active in a long time. Follow your doctor's instructions for safe levels of exercise. Follow-up care is a key part of your treatment and safety. Be sure to make and go to all appointments, and call your doctor if you are having problems. It's also a good idea to know your test results and keep a list of the medicines you take. How can you care for yourself at home? Getting started  · Start slowly and set a short-term goal. For example, walk for 5 or 10 minutes every day. · Bit by bit, increase the amount you walk every day. Try for at least 30 minutes on most days of the week. You also may want to swim, bike, or do other activities. · If finding enough time is a problem, it is fine to be active in blocks of 10 minutes or more throughout your day and week.   · To get the heart-healthy benefits of walking, you need to walk briskly enough to increase your heart rate and breathing, but not so fast that you cannot talk comfortably. · Wear comfortable shoes that fit well and provide good support for your feet and ankles. Staying with your plan  · After you've made walking a habit, set a longer-term goal. You may want to set a goal of walking briskly for longer or walking farther. Experts say to do 2½ hours of moderate activity a week. A faster heartbeat is what defines moderate-level activity. · To stay motivated, walk with friends, coworkers, or pets. · Use a phone marc or pedometer to track your steps each day. Set a goal to increase your steps. Once you get there, set a higher goal. Aim for 10,000 steps a day. · If the weather keeps you from walking outside, go for walks at the mall with a friend. Local schools and churches may have indoor gyms where you can walk. Fitting a walk into your workday  · Park several blocks away from work, or get off the bus a few stops early. · Use the stairs instead of the elevator, at least for a few floors. · Suggest holding meetings with colleagues during a walk inside or outside the building. · Use the restroom that is the farthest from your desk or workstation. · Use your morning and afternoon breaks to take quick 15-minute walks. Staying safe  · Know your surroundings. Walk in a well-lighted, safe place. If it is dark, walk with a partner. Wear light-colored clothing. If you can, buy a vest or jacket that reflects light. · Carry a cell phone for emergencies. · Drink plenty of water. Take a water bottle with you when you walk. This is very important if it is hot out. · Be careful not to slip on wet or icy ground. You can buy \"grippers\" for your shoes to help keep you from slipping. · Pay attention to your walking surface. Use sidewalks and paths. · If you have breathing problems like asthma or COPD, ask your doctor when it is safe for you to walk outdoors.  Cold, dry air, smog, pollen, or other things in the air could cause breathing problems. Where can you learn more? Go to https://chpepiceweb.healthHealth Plan One. org and sign in to your [employer] account. Enter R159 in the [employer] box to learn more about \"Walking for Exercise: Care Instructions. \"     If you do not have an account, please click on the \"Sign Up Now\" link. Current as of: January 16, 2020               Content Version: 12.6  © 2006-2020 [employer]. Care instructions adapted under license by [facility] ([facility]). If you have questions about a medical condition or this instruction, always ask your healthcare professional. [address] any warranty or liability for your use of this information.

## 2020-11-17 NOTE — TELEPHONE ENCOUNTER
Patient is coming in for BP check. Render Note In Bullet Format When Appropriate: No Post-Care Instructions: I reviewed with the patient in detail post-care instructions. Patient is to wear sunprotection, and avoid picking at any of the treated lesions. Pt may apply Vaseline to crusted or scabbing areas. Render Post-Care Instructions In Note?: yes Detail Level: Simple Duration Of Freeze Thaw-Cycle (Seconds): 0 Consent: The patient's consent was obtained including but not limited to risks of crusting, scabbing, blistering, scarring, darker or lighter pigmentary change, recurrence, incomplete removal and infection. Number Of Freeze-Thaw Cycles: 1 freeze-thaw cycle

## 2020-11-19 ENCOUNTER — TELEPHONE (OUTPATIENT)
Dept: CARDIOLOGY CLINIC | Age: 80
End: 2020-11-19

## 2020-11-19 NOTE — TELEPHONE ENCOUNTER
She can stop the Vascepa to see if she feels better without it. She should call her PCP if not feeling better.

## 2020-11-19 NOTE — TELEPHONE ENCOUNTER
Mely Sports called in this morning stating that she has not been feeling well since yesterday 11/19. She states she has a headache, stomach ache, and her legs are tightening up. Denies chest pain, SOB, or swelling. States she started the vascepa on 11/16 and wants to know if the medication is causing her symptoms.        Mely Sports can be reached at [phone]

## 2021-02-01 ENCOUNTER — NURSE ONLY (OUTPATIENT)
Dept: CARDIOLOGY CLINIC | Age: 81
End: 2021-02-01
Payer: MEDICARE

## 2021-02-01 DIAGNOSIS — I25.5 ISCHEMIC CARDIOMYOPATHY: ICD-10-CM

## 2021-02-01 DIAGNOSIS — I50.23 ACUTE ON CHRONIC SYSTOLIC HEART FAILURE (HCC): ICD-10-CM

## 2021-02-01 DIAGNOSIS — Z95.810 AUTOMATIC IMPLANTABLE CARDIOVERTER-DEFIBRILLATOR IN SITU: ICD-10-CM

## 2021-02-01 DIAGNOSIS — I46.9 CARDIAC ARREST (HCC): ICD-10-CM

## 2021-02-01 PROCEDURE — 93295 DEV INTERROG REMOTE 1/2/MLT: CPT | Performed by: INTERNAL MEDICINE

## 2021-02-01 PROCEDURE — 93296 REM INTERROG EVL PM/IDS: CPT | Performed by: INTERNAL MEDICINE

## 2021-02-01 PROCEDURE — 93297 REM INTERROG DEV EVAL ICPMS: CPT | Performed by: INTERNAL MEDICINE

## 2021-02-01 NOTE — LETTER
[address] [facility] [phone] [address] [facility],[address] [phone]    Pacemaker/Defibrillator Clinic          02/01/21        Bhavna Mccallum  [address] [place] [id number]        Dear Bhavna Mccallum    This letter is to inform you that we received the transmission from your monitor at home that checks your implanted heart device. The next date your monitor will automatically transmit will be 5-3-21. If your report needs attention we will notify you. Your device and monitor are wireless and most transmit cellularly, but please periodically check your monitor is still plugged in to the electrical outlet. If you still use the telephone land line to send please ensure the connection to the phone rebeca is secure. This will help to ensure successful automatic transmissions in the future. Also, the monitor needs to be close to you while sleeping at night. Please be aware that the remote device transmission sites are periodically monitored only during regular business hours during which simultaneous in-office device clinics are being run. If your transmission requires attention, we will contact you as soon as possible. Thank you.             Annie 79

## 2021-02-08 RX ORDER — CARVEDILOL 12.5 MG/1
TABLET ORAL
Qty: 180 TABLET | Refills: 2 | Status: SHIPPED | OUTPATIENT
Start: 2021-02-08

## 2021-02-24 NOTE — PROGRESS NOTES
[address]  Cardiology Progress Note    Cathy Goetz  1940 February 25, 2021      CC: \"I have good days and bad days. \"     HPI:  The patient is 78 y.o. female with a past medical history significant for CAD, cardiomyopathy/systolic heart failure with prior PCI to LAD and RCA. She was hospitalized 3/26/16-4/5/16 after suffering Vfib cardiac arrest. Bystanders initiated CPR and when the EMS arrived she was in Vfib and subsequently shocked multiple times. Echo revealed EF 25%. [facility] showed patient stents. Underwent hypothermia protocol and has had a significant neurological recovery. An ICD was placed on 4/1/16 for secondary prevention. We referred her to Dr. Ryan Olvera for carotid stenosis but never followed up and now managed per Dr. Rodolfo Ashley. Today, she offer that she \"never feels good. \" she does not offer anything specific. She offers that she has no new cardiac symptoms. She reports that her PCP sent her to a vascular surgeon at [address] [place] [address] Dr. Asya Bauman with repeat carotid duplex 1/15/21. She also reports that she had nausea and constipation on Vascepa. She reports medical therapy compliance and tolerating otherwise. Patient denies exertional chest pain/pressure, dyspnea at rest, BENTLEY, PND, orthopnea, palpitations, lightheadedness, weight changes, changes in LE edema, and syncope.       Past Medical History:   Diagnosis Date    Anxiety     Atrial fibrillation ([facility]     CAD (coronary artery disease)     Cancer (HCC)     bladder right kidney     Cardiac arrest ([facility] 3/27/2016    Carotid artery stenosis     CHF (congestive heart failure) (HCC)     Chronic kidney disease     cancer of kidney and bladder    Depression     Diabetes mellitus (HCC)     IDDM    GERD (gastroesophageal reflux disease)     Hip pain, chronic     HYPERCHOLESTERAEMIA     Hypertension     Irritable bowel syndrome     Laceration of head 3/25/2016    fall with cardiac arrest    MI, old     Neuropathy     Osteoarthritis  Presence of combination internal cardiac defibrillator (ICD) and pacemaker     Staph infection     PT. STATES SHE HAS HAD SEVERAL BOILS WITH STAPH LAST ONE 20 YEARS AGO.  Type 2 diabetes mellitus without complication ([address]     Urinary incontinence      Past Surgical History:   Procedure Laterality Date    ANGIOPLASTY      x3  2009    BLADDER REMOVAL      BLADDER SUSPENSION      CYSTOSCOPY  5/28/2013    with dilitation    HYSTERECTOMY      KIDNEY REMOVAL      right    PACEMAKER INSERTION      SKIN CANCER EXCISION      TOTAL HIP ARTHROPLASTY Left 11/6/2019    LEFT ANTERIOR TOTAL HIP REPLACEMENT WITH C-ARM performed by Pako Redd MD at [facility] History   Problem Relation Age of Onset    Diabetes Mother     Heart Disease Father     Cancer Father      Social History     Tobacco Use    Smoking status: Never Smoker    Smokeless tobacco: Never Used   Substance Use Topics    Alcohol use: No    Drug use: No       Allergies   Allergen Reactions    Amlodipine Besylate      Other reaction(s): Unknown    Calcium Channel Blockers      Other reaction(s): Unknown    Citalopram Hydrobromide      Other reaction(s): Unknown    Flagyl [Metronidazole]      Took with Cipro and it made her vomit    Losartan      Muscle aches  Muscle aches      Pneumococcal Vaccine Swelling     Swelling at injection site    Simvastatin      myalgias  ?  Valsartan-Hydrochlorothiazide      Does not remember    Venlafaxine Nausea Only    Hydrocodone-Acetaminophen Other (See Comments)     Makes patient feel weird, dizzy, nauseous, and sleepy.   Patient states she is not allergic to this    Nitrofurantoin Nausea And Vomiting and Other (See Comments)     Other reaction(s): Dizziness, GI Upset  Unsure if Macrobid caused it  Unsure if Macrobid caused it       Current Outpatient Medications   Medication Sig Dispense Refill    carvedilol (COREG) 12.5 MG tablet TAKE ONE TABLET BY MOUTH TWICE A  tablet 2  hydrALAZINE (APRESOLINE) 25 MG tablet TAKE ONE TABLET BY MOUTH THREE TIMES A DAY 90 tablet 12    aspirin 81 MG EC tablet Take 1 tablet by mouth daily HOLD for 30 days after hip surgery. See Eliquis order 30 tablet 3    acetaminophen (TYLENOL) 500 MG tablet Take 1,000 mg by mouth daily as needed for Pain       insulin glargine (LANTUS) 100 UNIT/ML injection vial Inject 18 Units into the skin nightly       sodium bicarbonate 650 MG tablet Take 650 mg by mouth 2 times daily       ALPRAZolam (XANAX) 0.25 MG tablet Take 0.25 mg by mouth nightly as needed for Sleep (patient taking during the day to calm herself down).  rosuvastatin (CRESTOR) 10 MG tablet Take 10 mg by mouth every evening       Coenzyme Q10 (CO Q 10) 100 MG CAPS Take 1 capsule by mouth daily       ferrous sulfate 325 (65 FE) MG tablet Take 1 tablet by mouth on Monday, Wednesday, Friday      fluticasone (FLONASE) 50 MCG/ACT nasal spray 1 spray by Nasal route as needed for Rhinitis      therapeutic multivitamin-minerals (THERAGRAN-M) tablet Take 1 tablet by mouth daily. Centrum silver       vitamin D (CHOLECALCIFEROL) 1000 UNIT TABS tablet Take 1,000 Units by mouth daily        No current facility-administered medications for this visit. Review of Systems:  · Constitutional: no unanticipated weight loss. There's been no change in energy level, sleep pattern, or activity level. No fevers, chills. · Eyes: No visual changes or diplopia. No scleral icterus. · ENT: No Headaches, hearing loss or vertigo. No mouth sores or sore throat. · Cardiovascular: as reviewed in HPI  · Respiratory: No cough or wheezing, no sputum production. No hematemesis. · Gastrointestinal: No abdominal pain, appetite loss, blood in stools. No change in bowel or bladder habits. · Genitourinary: No dysuria, trouble voiding, or hematuria. · Musculoskeletal:  No gait disturbance, no joint complaints. · Integumentary: No rash or pruritis.   · Neurological: No headache, diplopia, change in muscle strength, numbness or tingling. · Psychiatric: No anxiety or depression. · Endocrine: No temperature intolerance. No excessive thirst, fluid intake, or urination. No tremor. · Hematologic/Lymphatic: No abnormal bruising or bleeding, blood clots or swollen lymph nodes. · Allergic/Immunologic: No nasal congestion or hives. Physical Exam:   /62   Pulse 62   Temp 96.8 °F (36 °C)   Ht 5' 8\" (1.727 m)   Wt 155 lb (70.3 kg) Comment: with shoes  SpO2 97%   BMI 23.57 kg/m²   Wt Readings from Last 3 Encounters:   02/25/21 155 lb (70.3 kg)   11/11/20 155 lb (70.3 kg)   11/07/20 155 lb (70.3 kg)     Constitutional: She is oriented to person, place, and time. She appears well-developed and well-nourished. In no acute distress. Head: Normocephalic and atraumatic. Pupils equal and round. Neck: Neck supple. No JVP or carotid bruit appreciated. No mass and no thyromegaly present. No lymphadenopathy present. Cardiovascular: Normal rate. Normal heart sounds. Exam reveals no gallop and no friction rub. No murmur heard. Pulmonary/Chest: Effort normal and breath sounds normal. No respiratory distress. She has no wheezes, rhonchi or rales. Abdominal: Soft, non-tender. Bowel sounds are normal. She exhibits no organomegaly, mass or bruit. Extremities: No edema, cyanosis, or clubbing. Pulses are 2+ radial/dorsalis pedis/posterior tibial/carotid bilaterally. Neurological: No gross cranial nerve deficit. Coordination normal.   Skin: Skin is warm and dry. There is no rash or diaphoresis. Psychiatric: She has a normal mood and affect.  Her speech is normal and behavior is normal.     Lab Review:   Lab Results   Component Value Date    TRIG 163 07/30/2020    HDL 51 07/30/2020    HDL 45 01/29/2012    LDLCALC 68 07/30/2020    LDLDIRECT 148 07/08/2010    LABVLDL 33 07/30/2020      Lab Results   Component Value Date    BUN 37 01/07/2021    CREATININE 1.6 01/07/2021       EKG Interpretation:   3/12/19 Sinus  Bradycardia Left bundle branch block  2/25/21: device interrogation today     Image Review:     ECHO 3/26/16  Normal left ventricular size and wall thickness. Severely decreased left  ventricular systolic function with an estimated ejection fraction of 20-25%  Global hypokinesis. Mild MR/TR    Cardiac Cath 3/26/16  CORONARY ANATOMY:  1. The left main is free of disease. 2.  The LAD has a long stent in the mid segment which is widely patent. The  rest of the vessels are free of disease. 3.  The left circumflex artery has 4 to 5 obtuse marginal branches which are  free of disease. 4.  The right coronary artery is the dominant vessel and osteal stent which  is widely patent. The PD and the posterior lateral branches are also free of  disease. CONCLUSION:    1. No obstructive coronary artery disease. 2.  Previously stented sites are widely patent in the mid LAD and osteal RCA. 3.  Improved LV function with EF of about 40% to 45% with posterior basal  hypokinesis. 4.  Elevated left heart pressures. Carotid Doppler 6/29/18  The right internal carotid artery appears to have a <50% diameter reducing    stenosis based on velocity criteria.    The right vertebral artery demonstrates normal antegrade flow.    The left internal carotid artery appears to have a 50-79% diameter reducing    stenosis based on velocity criteria.    The left vertebral artery demonstrates normal antegrade flow. ECHO 3/25/19  Suboptimal image quality. Left ventricular cavity size is normal.  There is mild concentric left ventricular hypertrophy. Ejection fraction is visually estimated to be 50-55%. Indeterminate diastolic function. Mitral valve leaflets appear mildly thickened. Mild mitral regurgitation. The left atrium is mildly dilated. Trivial aortic regurgitation is present. Mild tricuspid regurgitation. Pacemaker / ICD lead is visualized in the right atrium.   The right atrium is normal in size.     Carotid doppler 2/5/2020  Impression:     o < 50% stenosis of the right internal carotid artery based on velocity       criteria.     o 50-69% stenosis of the left internal carotid artery based on velocity       criteria.     o There is antegrade flow demonstrated in the right and left vertebral       arteries. Stress study:7/30/20  Normal myocardial perfusion study.     Normal LV size and systolic function.          Breast and diaphragmatic attenuation present.     Non-diagnostic EKG response due to failure to reach target heart rate.     Frequent uniform premature ventricular contractions with infusion.     Overall findings represent a low risk study.           Echo:7/30/20  Left ventricular cavity size is mildly dilated. There is asymmetric hypertrophy of the basal septum. Ejection fraction is visually estimated to be 50-55%. There is mild diffuse hypokinesis. Diastolic filling parameters suggest grade II diastolic dysfunction. Pacer / ICD wire is visualized in the right ventricle. The right ventricle is mildly enlarged. Right ventricular systolic function is normal.   Mild to moderate mitral regurgitation is present. Carotid duplex: 1/15/21 [facility]   Impression:       o 50-69% stenosis of the right internal carotid artery based on velocity       criteria.     o 50-69% stenosis of the left internal carotid artery based on velocity       criteria.     o There is antegrade flow demonstrated in the right and left vertebral       arteries. Assessment/Plan:     Hypertension, essential   Blood pressure is controlled today today on Coreg, Hydralazine. I have asked her to space her medications throughout the day and adhere to a low sodium diet as she reports high sodium diet. Continue walking program.     CAD  She denies any symptoms of angina today and remains stable. Continue BB,statin, and Asa. Stress negative completed 7/2020.     Chronic Systolic Heart Failure  EF 20-25% post arrest improved to 40-45% on [facility] repeat echocardiogram 3/19 showed normal LVEF at 50-55%. Repeat echo 7/30/20 50-55% with grade II diastolic dysfunction. Today, she denies any dyspnea on exertion orthopnea or PND she appears compensated on exam.  She is currently not on ACE inhibitor/ARB,ARNI and Aldactone therapy due to chronic kidney disease and hyperkalemia. Even though her ICD interrogation today was wnl. Hyperlipidemia, unspecified   Last lipid profile 7/30/20 was WNL except . Will continue Crestor 10 mg daily. She did not tolerate Vascepa. Will repeat lipid/liver profile prior to next visit. ICD  Implanted 4/1/16 and is managed per our EP team/device clinic. Amiodarone has been formerly discontinued. She had device interrogation 10/28/20 showed normal sensing and pacing function. optivol wnl. Carotid stenosis, bilateral   Last carotid doppler 2/5/2020 is unchanged from previous 2016. CTA head/neck 11/7/20 revealing 24% proximal LICA. We had referred her to Dr. Dg Dutton but she is now managed per Dr. Bert Sosa. Repeat carotid duplex 1/15/21 50-69% bilateral.     Chronic kidney disease  She is being followed by nephrology-Dr. Dereck Sotelo a regular basis. DM  She is on Lantus and is managed per her PCP. Her last lipid profile 7/30/20 wnl except . I would like to start her on Jardiance 10 mg daily but her creatinine clearance will not allow. We initiated her on Vascepa 2 G BID but she did not tolerate with nausea and consitpation. We will see her back in follow up in 6 months     Thank you very much for allowing me to participate in the care of your patient. Please do not hesitate to contact me if you have any questions.     Sincerely,  Creed MD Annie Ferrera [address] [place] [address], [address] [place] [address]  Ph: [phone]  Fax: [phone]    This note was scribed in the presence of Dr. Yasmine Vargas MD by Glenice Buerger Schmutte, RN. Physician Attestation:  The scribes documentation has been prepared under my direction and personally reviewed by me in its entirety. I confirm that the note above accurately reflects all work, treatment, procedures, and medical decision making performed by me.

## 2021-02-25 ENCOUNTER — OFFICE VISIT (OUTPATIENT)
Dept: CARDIOLOGY CLINIC | Age: 81
End: 2021-02-25
Payer: MEDICARE

## 2021-02-25 ENCOUNTER — NURSE ONLY (OUTPATIENT)
Dept: CARDIOLOGY CLINIC | Age: 81
End: 2021-02-25

## 2021-02-25 VITALS
OXYGEN SATURATION: 97 % | WEIGHT: 155 LBS | TEMPERATURE: 96.8 F | HEART RATE: 62 BPM | BODY MASS INDEX: 23.49 KG/M2 | SYSTOLIC BLOOD PRESSURE: 132 MMHG | HEIGHT: 68 IN | DIASTOLIC BLOOD PRESSURE: 62 MMHG

## 2021-02-25 DIAGNOSIS — I25.10 CORONARY ARTERY DISEASE INVOLVING NATIVE CORONARY ARTERY OF NATIVE HEART WITHOUT ANGINA PECTORIS: ICD-10-CM

## 2021-02-25 DIAGNOSIS — I50.22 CHRONIC SYSTOLIC HEART FAILURE (HCC): ICD-10-CM

## 2021-02-25 DIAGNOSIS — I25.5 ISCHEMIC CARDIOMYOPATHY: ICD-10-CM

## 2021-02-25 DIAGNOSIS — I65.23 BILATERAL CAROTID ARTERY STENOSIS: ICD-10-CM

## 2021-02-25 DIAGNOSIS — I50.23 ACUTE ON CHRONIC SYSTOLIC HEART FAILURE (HCC): ICD-10-CM

## 2021-02-25 DIAGNOSIS — E78.5 HYPERLIPIDEMIA, UNSPECIFIED HYPERLIPIDEMIA TYPE: ICD-10-CM

## 2021-02-25 DIAGNOSIS — Z95.810 ICD (IMPLANTABLE CARDIOVERTER-DEFIBRILLATOR) IN PLACE: ICD-10-CM

## 2021-02-25 DIAGNOSIS — Z95.810 AUTOMATIC IMPLANTABLE CARDIOVERTER-DEFIBRILLATOR IN SITU: ICD-10-CM

## 2021-02-25 DIAGNOSIS — I10 ESSENTIAL HYPERTENSION: Primary | ICD-10-CM

## 2021-02-25 PROCEDURE — 99214 OFFICE O/P EST MOD 30 MIN: CPT | Performed by: INTERNAL MEDICINE

## 2021-02-25 NOTE — PROGRESS NOTES
Pt seen in clinic today for cardiac device interrogation. Their device is a  MDT 1 chamber ICD. Based on threshold, impedance, and intrinsic sensing tests run today, the device appears to be functioning normally. Remaining battery life is 7y1m   0.01%      Episodes: none    Thoracic impedance is stable     Pt was informed of findings today and general questions have been answered with regard to device. Home monitoring hardware is transmitting on schedule. Pt to see Dr. Adelia Connell in clinic today following their device check.

## 2021-03-03 DIAGNOSIS — I25.10 CORONARY ARTERY DISEASE INVOLVING NATIVE CORONARY ARTERY OF NATIVE HEART WITHOUT ANGINA PECTORIS: ICD-10-CM

## 2021-03-03 DIAGNOSIS — I65.23 BILATERAL CAROTID ARTERY STENOSIS: ICD-10-CM

## 2021-03-03 DIAGNOSIS — E78.5 HYPERLIPIDEMIA, UNSPECIFIED HYPERLIPIDEMIA TYPE: ICD-10-CM

## 2021-03-03 LAB
ALBUMIN SERPL-MCNC: 4.4 G/DL (ref 3.4–5)
ALP BLD-CCNC: 46 U/L (ref 40–129)
ALT SERPL-CCNC: 7 U/L (ref 10–40)
AST SERPL-CCNC: 11 U/L (ref 15–37)
BILIRUB SERPL-MCNC: 0.4 MG/DL (ref 0–1)
BILIRUBIN DIRECT: <0.2 MG/DL (ref 0–0.3)
BILIRUBIN, INDIRECT: ABNORMAL MG/DL (ref 0–1)
CHOLESTEROL, FASTING: 143 MG/DL (ref 0–199)
HDLC SERPL-MCNC: 43 MG/DL (ref 40–60)
LDL CHOLESTEROL CALCULATED: 68 MG/DL
TOTAL PROTEIN: 7 G/DL (ref 6.4–8.2)
TRIGLYCERIDE, FASTING: 159 MG/DL (ref 0–150)
VLDLC SERPL CALC-MCNC: 32 MG/DL

## 2021-03-12 ENCOUNTER — TELEPHONE (OUTPATIENT)
Dept: CARDIOLOGY CLINIC | Age: 81
End: 2021-03-12

## 2021-03-12 NOTE — TELEPHONE ENCOUNTER
Called and spoke with patient advised of lipid and hepatic panel results. Advised triglycerides are elevated. Instructed to decrease intake of processed sugar and eating heart healthy diet. Continue  Rosuvastatin.

## 2021-03-12 NOTE — TELEPHONE ENCOUNTER
TEST/LAB RESULTS      WHICH RESULTS ARE YOU CALLING ABOUT:  Hepatic Function Panel  Lipid    WHEN WAS THIS DONE:  3/3/21    WHERE DID YOU HAVE THIS DONE:  [facility]    CALL BACK NUMBER:    [phone]

## 2021-03-15 ENCOUNTER — HOSPITAL ENCOUNTER (EMERGENCY)
Age: 81
Discharge: HOME | End: 2021-03-15
Payer: MEDICARE

## 2021-03-15 ENCOUNTER — APPOINTMENT (OUTPATIENT)
Dept: GENERAL RADIOLOGY | Age: 81
End: 2021-03-15
Payer: MEDICARE

## 2021-03-15 VITALS
TEMPERATURE: 98.9 F | SYSTOLIC BLOOD PRESSURE: 172 MMHG | OXYGEN SATURATION: 97 % | DIASTOLIC BLOOD PRESSURE: 68 MMHG | RESPIRATION RATE: 16 BRPM | HEART RATE: 69 BPM

## 2021-03-15 DIAGNOSIS — M54.31 SCIATICA OF RIGHT SIDE: Primary | ICD-10-CM

## 2021-03-15 DIAGNOSIS — R52 PAIN: ICD-10-CM

## 2021-03-15 DIAGNOSIS — M16.11 OSTEOARTHRITIS OF RIGHT HIP, UNSPECIFIED OSTEOARTHRITIS TYPE: ICD-10-CM

## 2021-03-15 DIAGNOSIS — M51.36 LUMBAR DEGENERATIVE DISC DISEASE: ICD-10-CM

## 2021-03-15 PROCEDURE — 99284 EMERGENCY DEPT VISIT MOD MDM: CPT

## 2021-03-15 PROCEDURE — 73502 X-RAY EXAM HIP UNI 2-3 VIEWS: CPT

## 2021-03-15 PROCEDURE — 6370000000 HC RX 637 (ALT 250 FOR IP): Performed by: PHYSICIAN ASSISTANT

## 2021-03-15 PROCEDURE — 72100 X-RAY EXAM L-S SPINE 2/3 VWS: CPT

## 2021-03-15 PROCEDURE — 72110 X-RAY EXAM L-2 SPINE 4/>VWS: CPT

## 2021-03-15 RX ORDER — PREDNISONE 20 MG/1
60 TABLET ORAL ONCE
Status: COMPLETED | OUTPATIENT
Start: 2021-03-15 | End: 2021-03-15

## 2021-03-15 RX ORDER — PREDNISONE 20 MG/1
40 TABLET ORAL DAILY
Qty: 8 TABLET | Refills: 0 | Status: SHIPPED | OUTPATIENT
Start: 2021-03-15 | End: 2021-03-19

## 2021-03-15 RX ORDER — ACETAMINOPHEN 500 MG
1000 TABLET ORAL ONCE
Status: COMPLETED | OUTPATIENT
Start: 2021-03-15 | End: 2021-03-15

## 2021-03-15 RX ADMIN — ACETAMINOPHEN 1000 MG: 500 TABLET ORAL at 12:33

## 2021-03-15 RX ADMIN — PREDNISONE 60 MG: 20 TABLET ORAL at 12:33

## 2021-03-15 ASSESSMENT — PAIN DESCRIPTION - ONSET: ONSET: PROGRESSIVE

## 2021-03-15 ASSESSMENT — ENCOUNTER SYMPTOMS
BACK PAIN: 1
VOMITING: 0
ABDOMINAL PAIN: 0
NAUSEA: 0
SHORTNESS OF BREATH: 0

## 2021-03-15 ASSESSMENT — PAIN SCALES - GENERAL
PAINLEVEL_OUTOF10: 9
PAINLEVEL_OUTOF10: 9
PAINLEVEL_OUTOF10: 5

## 2021-03-15 ASSESSMENT — PAIN DESCRIPTION - FREQUENCY: FREQUENCY: CONTINUOUS

## 2021-03-15 ASSESSMENT — PAIN DESCRIPTION - PAIN TYPE: TYPE: ACUTE PAIN

## 2021-03-15 NOTE — ED NOTES

## 2021-03-15 NOTE — CARE COORDINATION
PAUL consult:  PAUL met with patient and discussed that patient is lonely during covid and her family is busy with their lives, patient misses her  who passed away in 2019. Provided information on [employer] and information on [employer] and [employer].

## 2021-03-15 NOTE — ED NOTES
Patient up and able to ambulate with rolling walker she brought from home without difficulty      Awais Skelton, ASPEN  03/15/21 [id number]

## 2021-03-15 NOTE — ED NOTES
Discharge and education instructions reviewed. Patient verbalized understanding, teach-back successful. Patient denied questions at this time. No acute distress noted. Patient instructed to follow-up as noted - return to emergency department if symptoms worsen. Patient verbalized understanding. Discharged per EDMD with discharge instructions.           Rosemary Perkins RN  03/15/21 [id number]

## 2021-03-15 NOTE — ED PROVIDER NOTES
[address] [facility]        Pt Name: Russell Elizabeth  MRN: [id number]  [place] 1940  Date of evaluation: 3/15/2021  Provider: NIKKI Castro    This patient was not seen and evaluated by the attending physician No att. providers found. CHIEF COMPLAINT       Chief Complaint   Patient presents with    Leg Pain     NKI. pain for a couple weeks and worsening. right leg. states unable to ambulate now due to pain. HISTORY OF PRESENT ILLNESS  (Location/Symptom, Timing/Onset, Context/Setting, Quality,Duration, Modifying Factors, Severity.)   Russell Elizabeth is a 78 y.o. female who presents tot emergency department for right hip pain and low back pain that has been going on for the past few days. Reports pain radiates down her right leg. She has chronically numbness in both lower legs that she has been told could be related to her diabetes or anxiety. She then reports her children got her Facebook recently because she \"has nothing to do\". She was reading on there about Multiple Myeloma and reviewed the list of symptoms and reports she has all of them so is now concerned about Multiple Myeloma. She has been using a walker the past few days. Denies injuries. Denies fever chills chest pain shortness of breath. Denies bowel incontinence, saddle anesthesia, or IVDU. She has had her bladder removed so urinary retention or incontinence not an applicable question to ask. She takes ASA 81 mg. Denies nausea vomiting abdominal pain. She is tearful. Reports she lives alone which she says \"is the problem\". Has a cat.  passed in 2019. Nursing Notes were reviewed and I agree. REVIEW OF SYSTEMS    (2-9 systems for level 4, 10 or more for level 5)     Review of Systems   Constitutional: Negative for chills and fever. Respiratory: Negative for shortness of breath. Cardiovascular: Negative for chest pain. Gastrointestinal: Negative for abdominal pain, nausea and vomiting. Musculoskeletal: Positive for back pain. Neurological:        Negative for bowel incontinence, saddle anesthesia     Except as noted above the remainder of the review of systems was reviewed and negative. PAST MEDICAL HISTORY         Diagnosis Date    Anxiety     [facility] fibrillation ([facility]     CAD (coronary artery disease)     Cancer (HCC)     bladder right kidney     Cardiac arrest ([facility] 3/27/2016    Carotid artery stenosis     CHF (congestive heart failure) (HCC)     Chronic kidney disease     cancer of kidney and bladder    Depression     Diabetes mellitus (HCC)     IDDM    GERD (gastroesophageal reflux disease)     Hip pain, chronic     HYPERCHOLESTERAEMIA     Hypertension     Irritable bowel syndrome     Laceration of head 3/25/2016    fall with cardiac arrest    MI, old     Neuropathy     Osteoarthritis     Presence of combination internal cardiac defibrillator (ICD) and pacemaker     Staph infection     PT. STATES SHE HAS HAD SEVERAL BOILS WITH STAPH LAST ONE 20 YEARS AGO.     Type 2 diabetes mellitus without complication ([facility]     Urinary incontinence        SURGICAL HISTORY           Procedure Laterality Date    ANGIOPLASTY      x3  2009    BLADDER REMOVAL      BLADDER SUSPENSION      CYSTOSCOPY  5/28/2013    with dilitation    HYSTERECTOMY      KIDNEY REMOVAL      right    PACEMAKER INSERTION      SKIN CANCER EXCISION      TOTAL HIP ARTHROPLASTY Left 11/6/2019    LEFT ANTERIOR TOTAL HIP REPLACEMENT WITH C-ARM performed by Alonzo Bardales MD at [address]       Discharge Medication List as of 3/15/2021  3:47 PM      CONTINUE these medications which have NOT CHANGED    Details   carvedilol (COREG) 12.5 MG tablet TAKE ONE TABLET BY MOUTH TWICE A DAY, Disp-180 tablet, R-2Normal      hydrALAZINE (APRESOLINE) 25 MG tablet TAKE ONE TABLET BY MOUTH THREE TIMES A DAY, Disp-90 tablet,R-12Normal      aspirin 81 MG EC tablet Take 1 tablet by mouth daily HOLD for 30 days after hip surgery. See Eliquis order, Disp-30 tablet, R-3Print      acetaminophen (TYLENOL) 500 MG tablet Take 1,000 mg by mouth daily as needed for Pain Historical Med      insulin glargine (LANTUS) 100 UNIT/ML injection vial Inject 18 Units into the skin nightly Historical Med      sodium bicarbonate 650 MG tablet Take 650 mg by mouth 2 times daily Historical Med      ALPRAZolam (XANAX) 0.25 MG tablet Take 0.25 mg by mouth nightly as needed for Sleep (patient taking during the day to calm herself down). Historical Med      rosuvastatin (CRESTOR) 10 MG tablet Take 10 mg by mouth every evening Historical Med      Coenzyme Q10 (CO Q 10) 100 MG CAPS Take 1 capsule by mouth daily Historical Med      ferrous sulfate 325 (65 FE) MG tablet Take 1 tablet by mouth on Monday, Wednesday, FridayHistorical Med      fluticasone (FLONASE) 50 MCG/ACT nasal spray 1 spray by Nasal route as needed for Rhinitis      therapeutic multivitamin-minerals (THERAGRAN-M) tablet Take 1 tablet by mouth daily. Centrum silver       vitamin D (CHOLECALCIFEROL) 1000 UNIT TABS tablet Take 1,000 Units by mouth daily Historical Med             ALLERGIES     Amlodipine besylate, Calcium channel blockers, Citalopram hydrobromide, Flagyl [metronidazole], Losartan, Pneumococcal vaccine, Simvastatin, Valsartan-hydrochlorothiazide, Venlafaxine, Hydrocodone-acetaminophen, and Nitrofurantoin    FAMILY HISTORY           Problem Relation Age of Onset    Diabetes Mother     Heart Disease Father     Cancer Father      Family Status   Relation Name Status    Mother      Father          SOCIAL HISTORY      reports that she has never smoked. She has never used smokeless tobacco. She reports that she does not drink alcohol or use drugs.     PHYSICAL EXAM    (up to 7 for level 4, 8 or more for level 5)     ED Triage Vitals [03/15/21 1102]   BP Temp Temp Source Pulse Resp SpO2 Height Weight   (!) 154/72 98.9 °F (37.2 °C) Temporal 89 18 97 % -- --       Physical Exam  Constitutional:       General: She is not in acute distress. Appearance: Normal appearance. She is not ill-appearing, toxic-appearing or diaphoretic. HENT:      Head: Normocephalic and atraumatic. Neck:      Musculoskeletal: Normal range of motion and neck supple. Cardiovascular:      Rate and Rhythm: Normal rate and regular rhythm. Heart sounds: Normal heart sounds. Pulmonary:      Effort: Pulmonary effort is normal. No respiratory distress. Breath sounds: Normal breath sounds. Abdominal:      General: There is no distension. Palpations: Abdomen is soft. There is no mass. Tenderness: There is no abdominal tenderness. There is no guarding or rebound. Hernia: No hernia is present. Musculoskeletal:      Comments: No TTP thoracic or lumbar midline. Mild TTP of the right SI area. No rash    Not TTP of the right hip with no erythema edema or ecchymosis. Knee nontender. Compartments of the leg are soft. DP pulse 2+    Able to scoot self back in bed. Able to lift right leg in air   Able to lift pelvis off the bed   Skin:     General: Skin is warm. Neurological:      Mental Status: She is alert. Comments: DP pulses 2+ bilaterally  Achilles DTRs 2+ bilaterally  Chronic numbness in bilateral feet  5/5 strength inversion eversion plantar flexion dorsiflexion bilateral feet    Psychiatric:         Behavior: Behavior normal.         Thought Content:  Thought content normal.         Judgment: Judgment normal.      Comments: Anxious, flat affect         DIFFERENTIAL DIAGNOSIS   Cauda equina, spinal stenosis, strain, disk herniation, epidural abscess, pylonephritis,Kidney stone, epidural hematoma      DIAGNOSTIC RESULTS       RADIOLOGY:   Non-plain film images such as CT, Ultrasound and MRI are read by the radiologist.Plain radiographic images are visualized and preliminarily interpreted by NIKKI Tucker with the below findings:      Interpretation per theRadiologist below, if available at the time of this note:    XR HIP RIGHT (2-3 VIEWS)   Final Result   No acute osseous abnormality of the pelvis or right hip. Moderate   osteoarthritic changes of the right hip with progression. XR LUMBAR SPINE (MIN 4 VIEWS)   Final Result   Lumbar degenerative disease. No acute bony abnormality               LABS:  No results found for this visit on 03/15/21. All other labs were within normal range or not returned as of thisdictation. EMERGENCYDEPARTMENT COURSE and DIFFERENTIAL DIAGNOSIS/MDM:   Vitals:    Vitals:    03/15/21 1330 03/15/21 1345 03/15/21 1445 03/15/21 1500   BP: (!) 147/92 (!) 146/56 (!) 185/54 (!) 172/68   Pulse: 82 76 71 69   Resp: 19 14 14 16   Temp:       TempSrc:       SpO2: 98% 97% 99% 97%       Patient was nontoxic, well appearing, afebrile with normal vital signs with the exception fo HTN. Saturating well on room air. No red flag signs on exam.  Low suspicion for cauda equina or cord compression. Do not believe emergent MRI is indicated. X-ray of the lumbar spine shows degenerative disc disease. No acute bony abnormality. X-ray right hip shows no acute osseous or normality the pelvis or right hip. Moderate osteoarthritic changes of the right hip with progression. I do suspect she may also has some sciatica. Her diabetes is well controlled and reports glucose is usually 130s in the morning. She Lantus only at night. Was given prednisone here and Tylenol. I am trying to avoid narcotics due to her age and concern for falls. She lives alone. She was tearful and has a flat affect. I suspected she may be a little lonely at home. I consulted our social work Vivian who gave her information on [place] on Aging and also found a painting class for seniors in patient's neighborhood since patient likes to patient.   Upon reevaluation, patient is sitting in bed in no distress. She seems happier and mood is more upbeat. Believe she is appropriate for outpatient management. Will dc with prednisone. Discussed to call PCP for sugars greater than 300. FU with PCP in next few days for reeval and return for worsening. She agreed and understood. I estimate there is LOW risk for ABDOMINAL AORTIC ANEURYSM, CAUDA EQUINA SYNDROME, EPIDURAL MASS LESION, OR CORD COMPRESSION, thus I consider the discharge disposition reasonable. PROCEDURES:  None    FINAL IMPRESSION      1. Sciatica of right side    2. Osteoarthritis of right hip, unspecified osteoarthritis type    3. Lumbar degenerative disc disease          DISPOSITION/PLAN   DISPOSITIONDecision To Discharge 03/15/2021 03:50:14 PM      PATIENT REFERRED TO:  Norris Allen MD  [address] [facility] [address]   [facility] [address]  [phone]    Schedule an appointment as soon as possible for a visit in 2 days  for reevaluation    Neftali Mares MD  [place] [address]  [phone]    Schedule an appointment as soon as possible for a visit   for reevaluation    [facility] Emergency Department  [address] [place] [address]  [phone]    As needed, If symptoms worsen, for worsening pain, or if numbness tingling loss of bowel or bladder control or unable to urinate or for any other concerns      DISCHARGE MEDICATIONS:  Discharge Medication List as of 3/15/2021  3:47 PM      START taking these medications    Details   predniSONE (DELTASONE) 20 MG tablet Take 2 tablets by mouth daily for 4 days, Disp-8 tablet, R-0Normal             (Please note that portions of this note were completed with a voice recognition program.  Efforts were [facility] edit the dictations but occasionally words are mis-transcribed.)    Jannette Tyler, [address] [place] [address]  03/15/21 2023

## 2021-03-22 ENCOUNTER — APPOINTMENT (OUTPATIENT)
Dept: CT IMAGING | Age: 81
End: 2021-03-22
Payer: MEDICARE

## 2021-03-22 ENCOUNTER — HOSPITAL ENCOUNTER (EMERGENCY)
Age: 81
Discharge: HOME | End: 2021-03-22
Attending: EMERGENCY MEDICINE
Payer: MEDICARE

## 2021-03-22 VITALS
WEIGHT: 154.1 LBS | HEIGHT: 68 IN | OXYGEN SATURATION: 99 % | TEMPERATURE: 98.4 F | SYSTOLIC BLOOD PRESSURE: 159 MMHG | DIASTOLIC BLOOD PRESSURE: 111 MMHG | HEART RATE: 82 BPM | BODY MASS INDEX: 23.36 KG/M2 | RESPIRATION RATE: 16 BRPM

## 2021-03-22 DIAGNOSIS — M54.31 SCIATICA OF RIGHT SIDE: Primary | ICD-10-CM

## 2021-03-22 DIAGNOSIS — M25.551 RIGHT HIP PAIN: ICD-10-CM

## 2021-03-22 DIAGNOSIS — M47.816 OSTEOARTHRITIS OF LUMBAR SPINE, UNSPECIFIED SPINAL OSTEOARTHRITIS COMPLICATION STATUS: ICD-10-CM

## 2021-03-22 PROCEDURE — 72131 CT LUMBAR SPINE W/O DYE: CPT

## 2021-03-22 PROCEDURE — 99284 EMERGENCY DEPT VISIT MOD MDM: CPT

## 2021-03-22 PROCEDURE — 6370000000 HC RX 637 (ALT 250 FOR IP): Performed by: PHYSICIAN ASSISTANT

## 2021-03-22 RX ORDER — HYDROCODONE BITARTRATE AND ACETAMINOPHEN 5; 325 MG/1; MG/1
1 TABLET ORAL ONCE
Status: COMPLETED | OUTPATIENT
Start: 2021-03-22 | End: 2021-03-22

## 2021-03-22 RX ORDER — HYDROCODONE BITARTRATE AND ACETAMINOPHEN 5; 325 MG/1; MG/1
1 TABLET ORAL EVERY 6 HOURS PRN
Qty: 10 TABLET | Refills: 0 | Status: SHIPPED | OUTPATIENT
Start: 2021-03-22 | End: 2021-03-25

## 2021-03-22 RX ORDER — LIDOCAINE 4 G/G
1 PATCH TOPICAL DAILY
Status: DISCONTINUED | OUTPATIENT
Start: 2021-03-22 | End: 2021-03-22 | Stop reason: HOSPADM

## 2021-03-22 RX ADMIN — HYDROCODONE BITARTRATE AND ACETAMINOPHEN 1 TABLET: 5; 325 TABLET ORAL at 08:55

## 2021-03-22 ASSESSMENT — PAIN DESCRIPTION - ORIENTATION: ORIENTATION: RIGHT

## 2021-03-22 ASSESSMENT — PAIN SCALES - GENERAL: PAINLEVEL_OUTOF10: 0

## 2021-03-22 ASSESSMENT — PAIN DESCRIPTION - LOCATION: LOCATION: HIP

## 2021-03-22 ASSESSMENT — PAIN DESCRIPTION - FREQUENCY: FREQUENCY: CONTINUOUS

## 2021-03-22 ASSESSMENT — ENCOUNTER SYMPTOMS
ABDOMINAL PAIN: 0
BACK PAIN: 1
VOMITING: 0
SHORTNESS OF BREATH: 0
NAUSEA: 0

## 2021-03-22 ASSESSMENT — PAIN DESCRIPTION - DESCRIPTORS: DESCRIPTORS: SHARP;SHOOTING

## 2021-03-22 ASSESSMENT — PAIN DESCRIPTION - ONSET: ONSET: PROGRESSIVE

## 2021-03-22 NOTE — ED PROVIDER NOTES
Attending Supervisory Note/Shared Visit   I have personally performed a face to face diagnostic evaluation on this patient. I have reviewed the mid-levels findings and agree. History and Exam by me shows alert elderly female no acute distress. Initially it had some pain in her right leg just below her knee. She subsequently developed pain in her right buttock and inguinal area radiating down her leg to the same area. She was seen here on 3/15/2021 for the same. An x-ray of her lumbar spine and right hip done at that time. She was put on a course of prednisone with possibly some minimal improvement. Is worse today. She states she has numbness and tingling in her legs from her neuropathy. This is not new. She states it does feel like her right leg gives out on her sometimes. No incontinence of urine or stool. Heart: Regular rate and rhythm. No murmurs or gallops noted. Lungs: Breath sounds equal bilaterally and clear. Men: Soft, nondistended, nontender. Ureterostomy in the right abdomen. Musculoskeletal: Positive straight leg raise on the right. Full range of motion of her hips knees ankles. No appreciable muscle weakness. 2+ left patella, 1+ right patellar tendon reflex. 1+ symmetrical Achilles tendon reflexes. Metrical sensation to touch. CT lumbar spine: Moderate degenerative changes with disc bulge, disc and ligamentum flavum calcification with hypertrophy resulting in overall moderate canal stenosis greatest at L2-3 and L3-4. A consult was placed with neurosurgery. They will come down to evaluate the patient and we will determine treatment options at that time. Patient will be discharged home on short-term pain control with follow-up with neurosurgery as well as orthopedics to evaluate and see if her hip is the potential source of the pain versus her possible lumbar radiculopathy.     (Please note that portions of this note were completed with a voice recognition program. Efforts were made to edit the dictations but occasionally words are mis-transcribed.)    Thais Keller MD  Attending Emergency Physician        Juancarlos Bell MD  03/22/21 7753

## 2021-03-22 NOTE — PROGRESS NOTES
[address] Day: 1  Diagnosis: rt hip, groin > and lateral leg pain  BP (!) 154/53   Pulse 82   Temp 98.4 °F (36.9 °C) (Oral)   Resp 16   Ht 5' 8\" (1.727 m)   Wt 154 lb 1.6 oz (69.9 kg)   SpO2 98%   BMI 23.43 kg/m²     Patient was seen in the ED at the request of NIKKI Farrell. Patient was brought her by EMS for worsening rt hip and leg pain. This is the second ER visit in a week for the same issue. Patient has a pacemaker. Seen at rest in bed. In no distress. Moves to her side/repositions herself with ease, but has right buttock and groin pain, rt lateral pain and tenderness to palpation. She has also experienced tightness in the rt lateral lower leg and tightness in the rt anterior ankle, and left groin pain. Groin pain with straight leg raise. Numbness in both feet (diabetic). No saddle anesthesia. Has been using a walker at home. Voiding w/o difficulty. Normally physically active and was looking forward to working in her garden. She is a . She was started on a prednisone taper at her last visit. Pain controlled on current medications. received Norco 5/325 mg at 9 AM. Lidoderm patch on the rt buttock. Neurological Exam:   Motor and sensory exam intact. IP limited by groin pain with straight leg raise. Imaging Studies:  CT lumbar spine  Moderate degenerative changes with disc bulge, disc and ligamentum flavum   calcification with hypertrophy, resulting in overall moderate canal stenosis   greatest at L2-3 and L3-4. Xray rt hip   No acute osseous abnormality of the pelvis or right hip.  Moderate   osteoarthritic changes of the right hip with progression. No new labs    A/P:  Patient has no acute findings. Films were reviewed by Dr. Waleska Cole. Will see her in follow up in the office. Referred to pain management to help isolate the source of her pain, spine vs rt hip degeneration. Hx of left hip replacement in 2019.   Noreen Healy RN

## 2021-03-22 NOTE — ED NOTES
Bed: B-10  Expected date: 3/22/21  Expected time: 7:16 AM  Means of arrival: [address] [facility]  Comments:  81F hip pain/ [address] M Stuart Botello RN  03/22/21 9389

## 2021-03-22 NOTE — ED PROVIDER NOTES
[address] [facility]        Pt Name: Moi Cox  MRN: [id number]  [place] 1940  Date of evaluation: 3/22/2021  Provider: NIKKI Marquez    This patient was seen and evaluated by the attending physician Dr. Carmine Lpoez. CHIEF COMPLAINT       Chief Complaint   Patient presents with    Hip Pain     nki, same problem from Monday, PCP appt tomorrow         HISTORY OF PRESENT ILLNESS  (Location/Symptom, Timing/Onset, Context/Setting, Quality,Duration, Modifying Factors, Severity.)   Moi Cox is a 78 y.o. female who presents tot emergency department for continued right hip pain and low back pain. I saw patient 1 week ago for the same complaints. Concern for sciatica so was started on prednisone and continue Tylenol. She reports Tylenol does help a bit over the past week. Pain is been constant. However pain worsened overnight. No injuries. Pain is located in the right lower back and radiates down the lateral aspect of the right hip and down the leg. She denies fever, chills, chest pain, shortness of breath, nausea, vomiting, abdominal pain. Denies bowel incontinence. She has an ileal conduit so questions regarding urinary incontinence or retention are not applicable. She denies saddle anesthesia. She reports chronic numbness and tingling in her legs but nothing new. Denies IV drug use. She takes aspirin 81 mg. No other anticoagulants. Has been walking with a walker at home. Made an appointment with PCP for follow up and it is tomorrow. Nursing Notes were reviewed and I agree. REVIEW OF SYSTEMS    (2-9 systems for level 4, 10 or more for level 5)     Review of Systems   Constitutional: Negative for fever. Respiratory: Negative for shortness of breath. Cardiovascular: Negative for chest pain. Gastrointestinal: Negative for abdominal pain, nausea and vomiting.    Musculoskeletal: Positive for arthralgias and back pain. Neurological:        Negative for bowel incontinence, saddle anesthesia, IV drug use, new numbness or tingling     Except as noted above the remainder of the review of systems was reviewed and negative. PAST MEDICAL HISTORY         Diagnosis Date    Anxiety     Atrial fibrillation ([facility]     CAD (coronary artery disease)     Cancer (HCC)     bladder right kidney     Cardiac arrest ([facility] 3/27/2016    Carotid artery stenosis     CHF (congestive heart failure) (HCC)     Chronic kidney disease     cancer of kidney and bladder    Depression     Diabetes mellitus (HCC)     IDDM    GERD (gastroesophageal reflux disease)     Hip pain, chronic     HYPERCHOLESTERAEMIA     Hypertension     Irritable bowel syndrome     Laceration of head 3/25/2016    fall with cardiac arrest    MI, old     Neuropathy     Osteoarthritis     Presence of combination internal cardiac defibrillator (ICD) and pacemaker     Staph infection     PT. STATES SHE HAS HAD SEVERAL BOILS WITH STAPH LAST ONE 20 YEARS AGO.     Type 2 diabetes mellitus without complication ([facility]     Urinary incontinence        SURGICAL HISTORY           Procedure Laterality Date    ANGIOPLASTY      x3  2009    BLADDER REMOVAL      BLADDER SUSPENSION      CYSTOSCOPY  5/28/2013    with dilitation    HYSTERECTOMY      KIDNEY REMOVAL      right    PACEMAKER INSERTION      SKIN CANCER EXCISION      TOTAL HIP ARTHROPLASTY Left 11/6/2019    LEFT ANTERIOR TOTAL HIP REPLACEMENT WITH C-ARM performed by Mau Montoya MD at [address] [facility]       Discharge Medication List as of 3/22/2021 12:08 PM      CONTINUE these medications which have NOT CHANGED    Details   carvedilol (COREG) 12.5 MG tablet TAKE ONE TABLET BY MOUTH TWICE A DAY, Disp-180 tablet, R-2Normal      hydrALAZINE (APRESOLINE) 25 MG tablet TAKE ONE TABLET BY MOUTH THREE TIMES A DAY, Disp-90 tablet,R-12Normal      aspirin 81 MG EC tablet Take 1 tablet by mouth 82 16 97 % 5' 8\" (1.727 m) 154 lb 1.6 oz (69.9 kg)       Physical Exam  Constitutional:       General: She is not in acute distress. Appearance: Normal appearance. She is not ill-appearing, toxic-appearing or diaphoretic. HENT:      Head: Normocephalic and atraumatic. Neck:      Musculoskeletal: Normal range of motion and neck supple. Cardiovascular:      Rate and Rhythm: Normal rate and regular rhythm. Heart sounds: Normal heart sounds. Pulmonary:      Effort: Pulmonary effort is normal. No respiratory distress. Breath sounds: Normal breath sounds. Abdominal:      General: There is no distension. Palpations: Abdomen is soft. There is no mass. Tenderness: There is no abdominal tenderness. There is no guarding or rebound. Hernia: No hernia is present. Comments: Ostomy with clear, yellow urine in bag   Musculoskeletal: Normal range of motion. Comments: No TTP thoracic or lumbar midline. Mild TTP of the right SI area. No rash    Mild TTP of the lateral aspect of the right hip with no erythema edema or ecchymosis. Thigh and knee are nontender. Pedal pulse 2+. Compartments of the leg are soft. Skin:     General: Skin is warm. Neurological:      Mental Status: She is alert. Comments: Pedal pulses 2+ bilaterally  Achilles DTRs 2+ bilaterally  5/5 strength inversion eversion plantar flexion dorsiflexion bilateral feet  Normal sensation in all 3 dermatomes of the feet bilaterally  Able to lift pelvis off bed and move self up in bed. Psychiatric:         Mood and Affect: Mood normal.         Behavior: Behavior normal.         Thought Content:  Thought content normal.         Judgment: Judgment normal.         DIFFERENTIAL DIAGNOSIS   Cauda equina, spinal stenosis, strain, disk herniation, epidural abscess, pylonephritis,Kidney stone, epidural hematoma      DIAGNOSTIC RESULTS       RADIOLOGY:   Non-plain film images such as CT, Ultrasound and MRI are read by the also recommended referral to Dr. Vishal Webb for injections. In addition, the hip OA could be source of pain so recommended FU regarding that. Upon reevaluation, patient is sitting up in stretcher in no distress. Appears well. Has ambulated in the department with walker without difficulty. She tolerated Norco well. Will send home with a few. Cautioned about sedation with it. Instructed to FU with both Dr. Jasmin Llanes and Dr. Vishal Webb in next few days for reeval.  Also instructed to FU with ortho doctor for the hip OA. She already has a ortho doctor she sees. Return for worsening. She agreed and understood. I estimate there is LOW risk for ABDOMINAL AORTIC ANEURYSM, CAUDA EQUINA SYNDROME, EPIDURAL MASS LESION, OR CORD COMPRESSION, thus I consider the discharge disposition reasonable. PROCEDURES:  None    FINAL IMPRESSION      1. Sciatica of right side    2. Right hip pain    3. Osteoarthritis of lumbar spine, unspecified spinal osteoarthritis complication status          DISPOSITION/PLAN   DISPOSITIONDecision To Discharge 03/22/2021 11:59:24 AM      PATIENT REFERRED TO:  MD Jake Mireles. Belinda Stanford [address]  [phone]    Schedule an appointment as soon as possible for a visit in 2 days  for reevaluation    Migel Cary, [address] [place] [address]  [phone]    Schedule an appointment as soon as possible for a visit in 2 days  for reevaluation    [facility] Emergency Department  [address] [facility]  [phone]    As needed, If symptoms worsen      DISCHARGE MEDICATIONS:  Discharge Medication List as of 3/22/2021 12:08 PM      START taking these medications    Details   HYDROcodone-acetaminophen (NORCO) 5-325 MG per tablet Take 1 tablet by mouth every 6 hours as needed for Pain for up to 3 days. , Disp-10 tablet, R-0Normal             (Please note that portions of this note were completed with a voice recognition program.  Efforts were [facility] edit the dictations but occasionally words are mis-transcribed.)    Anabel Wolff, [facility] [place] [place], [place]  03/22/21 1958

## 2021-04-17 ENCOUNTER — HOSPITAL ENCOUNTER (EMERGENCY)
Age: 81
Discharge: HOME | End: 2021-04-17
Attending: EMERGENCY MEDICINE
Payer: MEDICARE

## 2021-04-17 ENCOUNTER — APPOINTMENT (OUTPATIENT)
Dept: GENERAL RADIOLOGY | Age: 81
End: 2021-04-17
Payer: MEDICARE

## 2021-04-17 VITALS
RESPIRATION RATE: 24 BRPM | HEART RATE: 99 BPM | TEMPERATURE: 98.5 F | OXYGEN SATURATION: 98 % | BODY MASS INDEX: 23.57 KG/M2 | WEIGHT: 154.98 LBS | DIASTOLIC BLOOD PRESSURE: 79 MMHG | SYSTOLIC BLOOD PRESSURE: 159 MMHG

## 2021-04-17 DIAGNOSIS — Z79.899 MEDICATION MANAGEMENT: ICD-10-CM

## 2021-04-17 DIAGNOSIS — R53.83 OTHER FATIGUE: Primary | ICD-10-CM

## 2021-04-17 DIAGNOSIS — I10 ELEVATED BLOOD PRESSURE READING IN OFFICE WITH DIAGNOSIS OF HYPERTENSION: ICD-10-CM

## 2021-04-17 LAB
A/G RATIO: 1.5 (ref 1.1–2.2)
ALBUMIN SERPL-MCNC: 4.3 G/DL (ref 3.4–5)
ALP BLD-CCNC: 52 U/L (ref 40–129)
ALT SERPL-CCNC: 7 U/L (ref 10–40)
ANION GAP SERPL CALCULATED.3IONS-SCNC: 11 MMOL/L (ref 3–16)
AST SERPL-CCNC: 11 U/L (ref 15–37)
BASOPHILS ABSOLUTE: 0 K/UL (ref 0–0.2)
BASOPHILS RELATIVE PERCENT: 0.2 %
BILIRUB SERPL-MCNC: 0.4 MG/DL (ref 0–1)
BILIRUBIN URINE: NEGATIVE
BLOOD, URINE: ABNORMAL
BUN BLDV-MCNC: 30 MG/DL (ref 7–20)
CALCIUM SERPL-MCNC: 9.7 MG/DL (ref 8.3–10.6)
CHLORIDE BLD-SCNC: 105 MMOL/L (ref 99–110)
CLARITY: CLEAR
CO2: 24 MMOL/L (ref 21–32)
COLOR: YELLOW
CREAT SERPL-MCNC: 1.5 MG/DL (ref 0.6–1.2)
EKG ATRIAL RATE: 78 BPM
EKG DIAGNOSIS: NORMAL
EKG P AXIS: 39 DEGREES
EKG P-R INTERVAL: 150 MS
EKG Q-T INTERVAL: 404 MS
EKG QRS DURATION: 106 MS
EKG QTC CALCULATION (BAZETT): 460 MS
EKG R AXIS: 6 DEGREES
EKG T AXIS: 24 DEGREES
EKG VENTRICULAR RATE: 78 BPM
EOSINOPHILS ABSOLUTE: 0 K/UL (ref 0–0.6)
EOSINOPHILS RELATIVE PERCENT: 0.6 %
EPITHELIAL CELLS, UA: 2 /HPF (ref 0–5)
GFR AFRICAN AMERICAN: 40
GFR NON-AFRICAN AMERICAN: 33
GLOBULIN: 2.9 G/DL
GLUCOSE BLD-MCNC: 160 MG/DL (ref 70–99)
GLUCOSE BLD-MCNC: 169 MG/DL (ref 70–99)
GLUCOSE URINE: NEGATIVE MG/DL
HCT VFR BLD CALC: 37.6 % (ref 36–48)
HEMOGLOBIN: 12.7 G/DL (ref 12–16)
HYALINE CASTS: 3 /LPF (ref 0–8)
KETONES, URINE: NEGATIVE MG/DL
LEUKOCYTE ESTERASE, URINE: NEGATIVE
LYMPHOCYTES ABSOLUTE: 0.8 K/UL (ref 1–5.1)
LYMPHOCYTES RELATIVE PERCENT: 11 %
MCH RBC QN AUTO: 32.2 PG (ref 26–34)
MCHC RBC AUTO-ENTMCNC: 33.9 G/DL (ref 31–36)
MCV RBC AUTO: 95.1 FL (ref 80–100)
MICROSCOPIC EXAMINATION: YES
MONOCYTES ABSOLUTE: 0.4 K/UL (ref 0–1.3)
MONOCYTES RELATIVE PERCENT: 5.8 %
NEUTROPHILS ABSOLUTE: 6.2 K/UL (ref 1.7–7.7)
NEUTROPHILS RELATIVE PERCENT: 82.4 %
NITRITE, URINE: NEGATIVE
PDW BLD-RTO: 12.6 % (ref 12.4–15.4)
PERFORMED ON: ABNORMAL
PH UA: 7.5 (ref 5–8)
PLATELET # BLD: 182 K/UL (ref 135–450)
PMV BLD AUTO: 7.8 FL (ref 5–10.5)
POTASSIUM REFLEX MAGNESIUM: 4.3 MMOL/L (ref 3.5–5.1)
PRO-BNP: 832 PG/ML (ref 0–449)
PROTEIN UA: NEGATIVE MG/DL
RBC # BLD: 3.95 M/UL (ref 4–5.2)
RBC UA: 1 /HPF (ref 0–4)
SODIUM BLD-SCNC: 140 MMOL/L (ref 136–145)
SPECIFIC GRAVITY UA: 1.01 (ref 1–1.03)
TOTAL PROTEIN: 7.2 G/DL (ref 6.4–8.2)
TROPONIN: <0.01 NG/ML
TROPONIN: <0.01 NG/ML
URINE REFLEX TO CULTURE: ABNORMAL
URINE TYPE: ABNORMAL
UROBILINOGEN, URINE: 0.2 E.U./DL
WBC # BLD: 7.6 K/UL (ref 4–11)
WBC UA: 7 /HPF (ref 0–5)

## 2021-04-17 PROCEDURE — 81001 URINALYSIS AUTO W/SCOPE: CPT

## 2021-04-17 PROCEDURE — 93010 ELECTROCARDIOGRAM REPORT: CPT | Performed by: INTERNAL MEDICINE

## 2021-04-17 PROCEDURE — 99284 EMERGENCY DEPT VISIT MOD MDM: CPT

## 2021-04-17 PROCEDURE — 85025 COMPLETE CBC W/AUTO DIFF WBC: CPT

## 2021-04-17 PROCEDURE — 71046 X-RAY EXAM CHEST 2 VIEWS: CPT

## 2021-04-17 PROCEDURE — 83880 ASSAY OF NATRIURETIC PEPTIDE: CPT

## 2021-04-17 PROCEDURE — 84484 ASSAY OF TROPONIN QUANT: CPT

## 2021-04-17 PROCEDURE — 36415 COLL VENOUS BLD VENIPUNCTURE: CPT

## 2021-04-17 PROCEDURE — 80053 COMPREHEN METABOLIC PANEL: CPT

## 2021-04-17 PROCEDURE — 93005 ELECTROCARDIOGRAM TRACING: CPT | Performed by: PHYSICIAN ASSISTANT

## 2021-04-17 RX ORDER — AMITRIPTYLINE HYDROCHLORIDE 10 MG/1
10 TABLET, FILM COATED ORAL NIGHTLY
Qty: 30 TABLET | Refills: 0 | Status: SHIPPED | OUTPATIENT
Start: 2021-04-17 | End: 2021-08-25 | Stop reason: ALTCHOICE

## 2021-04-17 ASSESSMENT — ENCOUNTER SYMPTOMS
EYE PAIN: 0
BACK PAIN: 1
COUGH: 0
CONSTIPATION: 0

## 2021-04-17 ASSESSMENT — PAIN DESCRIPTION - DESCRIPTORS
DESCRIPTORS: ACHING
DESCRIPTORS: ACHING

## 2021-04-17 ASSESSMENT — PAIN DESCRIPTION - PAIN TYPE: TYPE: ACUTE PAIN

## 2021-04-17 ASSESSMENT — PAIN SCALES - GENERAL: PAINLEVEL_OUTOF10: 4

## 2021-04-17 NOTE — ED PROVIDER NOTES
Aside from what is stated above denies any other symptoms or modifying factors. Nursing Notes reviewed. REVIEW OF SYSTEMS  (2-9 systems for level 4, 10 or more for level 5)   Review of Systems   Constitutional: Negative for appetite change. HENT: Negative for congestion. Eyes: Negative for pain. Respiratory: Negative for cough. Cardiovascular: Negative for leg swelling. Gastrointestinal: Negative for constipation. Genitourinary: Negative for flank pain. Musculoskeletal: Positive for back pain (Right lower) and gait problem (Due to pain in right lower back radiating down leg). Skin: Negative for rash. Neurological: Negative for dizziness. PAST MEDICAL HISTORY     Past Medical History:   Diagnosis Date    Anxiety     Atrial fibrillation ([address]     CAD (coronary artery disease)     Cancer ([facility]     bladder right kidney     Cardiac arrest ([facility] 3/27/2016    Carotid artery stenosis     CHF (congestive heart failure) (HCC)     Chronic kidney disease     cancer of kidney and bladder    Depression     Diabetes mellitus (HCC)     IDDM    GERD (gastroesophageal reflux disease)     Hip pain, chronic     HYPERCHOLESTERAEMIA     Hypertension     Irritable bowel syndrome     Laceration of head 3/25/2016    fall with cardiac arrest    MI, old     Neuropathy     Osteoarthritis     Presence of combination internal cardiac defibrillator (ICD) and pacemaker     Staph infection     PT. STATES SHE HAS HAD SEVERAL BOILS WITH STAPH LAST ONE 20 YEARS AGO.     Type 2 diabetes mellitus without complication ([facility]     Urinary incontinence        SURGICALHISTORY       Past Surgical History:   Procedure Laterality Date    ANGIOPLASTY      x3  2009    BLADDER REMOVAL      BLADDER SUSPENSION      CYSTOSCOPY  5/28/2013    with dilitation    HYSTERECTOMY      KIDNEY REMOVAL      right    PACEMAKER INSERTION      SKIN CANCER EXCISION      TOTAL HIP ARTHROPLASTY Left 11/6/2019    LEFT ANTERIOR TOTAL HIP REPLACEMENT WITH C-ARM performed by Toya Rachel MD at [place] [address]       Previous Medications    ACETAMINOPHEN (TYLENOL) 500 MG TABLET    Take 1,000 mg by mouth daily as needed for Pain     ALPRAZOLAM (XANAX) 0.25 MG TABLET    Take 0.25 mg by mouth nightly as needed for Sleep (patient taking during the day to calm herself down). ASPIRIN 81 MG EC TABLET    Take 1 tablet by mouth daily HOLD for 30 days after hip surgery. See Eliquis order    CARVEDILOL (COREG) 12.5 MG TABLET    TAKE ONE TABLET BY MOUTH TWICE A DAY    COENZYME Q10 (CO Q 10) 100 MG CAPS    Take 1 capsule by mouth daily     FERROUS SULFATE 325 (65 FE) MG TABLET    Take 1 tablet by mouth on Monday, Wednesday, Friday    FLUTICASONE (FLONASE) 50 MCG/ACT NASAL SPRAY    1 spray by Nasal route as needed for Rhinitis    HYDRALAZINE (APRESOLINE) 25 MG TABLET    TAKE ONE TABLET BY MOUTH THREE TIMES A DAY    INSULIN GLARGINE (LANTUS) 100 UNIT/ML INJECTION VIAL    Inject 18 Units into the skin nightly     ROSUVASTATIN (CRESTOR) 10 MG TABLET    Take 10 mg by mouth every evening     SODIUM BICARBONATE 650 MG TABLET    Take 650 mg by mouth 2 times daily     THERAPEUTIC MULTIVITAMIN-MINERALS (THERAGRAN-M) TABLET    Take 1 tablet by mouth daily. Centrum silver     VITAMIN D (CHOLECALCIFEROL) 1000 UNIT TABS TABLET    Take 1,000 Units by mouth daily       ALLERGIES     Amlodipine besylate, Calcium channel blockers, Citalopram hydrobromide, Flagyl [metronidazole], Losartan, Pneumococcal vaccine, Simvastatin, Valsartan-hydrochlorothiazide, Venlafaxine, Hydrocodone-acetaminophen, and Nitrofurantoin  FAMILY HISTORY       Family History   Problem Relation Age of Onset    Diabetes Mother     Heart Disease Father     Cancer Father      SOCIAL HISTORY       Social History     Socioeconomic History    Marital status:       Spouse name: None    Number of children: None    Years of education: None    Highest education level: None   Occupational History    None   Social Needs    Financial resource strain: None    Food insecurity     Worry: None     Inability: None    Transportation needs     Medical: None     Non-medical: None   Tobacco Use    Smoking status: Never Smoker    Smokeless tobacco: Never Used   Substance and Sexual Activity    Alcohol use: No    Drug use: No    Sexual activity: Not Currently   Lifestyle    Physical activity     Days per week: None     Minutes per session: None    Stress: None   Relationships    Social connections     Talks on phone: None     Gets together: None     Attends [employer] service: None     Active member of club or organization: None     Attends meetings of clubs or organizations: None     Relationship status: None    Intimate partner violence     Fear of current or ex partner: None     Emotionally abused: None     Physically abused: None     Forced sexual activity: None   Other Topics Concern    None   Social History Narrative    None     SCREENINGS         PHYSICAL EXAM  (up to 7 for level 4, 8 or more for level 5)   INITIAL VITALS: BP: (!) 158/101, Temp: 98.5 °F (36.9 °C), Pulse: 89, Resp: 15, SpO2: 98 %   Physical Exam  Vitals signs reviewed. Constitutional:       General: She is not in acute distress. Appearance: She is not ill-appearing or toxic-appearing. HENT:      Head: Normocephalic and atraumatic. Right Ear: External ear normal.      Left Ear: External ear normal.      Nose: Nose normal.   Eyes:      General: No scleral icterus. Right eye: No discharge. Left eye: No discharge. Extraocular Movements: Extraocular movements intact. Conjunctiva/sclera: Conjunctivae normal.      Pupils: Pupils are equal, round, and reactive to light. Neck:      Musculoskeletal: Normal range of motion. Trachea: No tracheal deviation. Cardiovascular:      Rate and Rhythm: Normal rate.    Pulmonary:      Effort: Pulmonary effort is normal. No CONSULTS:  IP CONSULT TO PRIMARY CARE PROVIDER    INITIAL VITALS: BP: (!) 158/101, Temp: 98.5 °F (36.9 °C), Pulse: 89, Resp: 15, SpO2: 98 %   RECENT VITALS:  BP: (!) 151/107,Temp: 98.5 °F (36.9 °C), Pulse: 84, Resp: 23, SpO2: 98 %     Angelica Mayes is a 78 y.o. female who presents to the emergency department secondary to multiple concerns. On arrival she is awake, alert, oriented. Vitals notable for elevation blood pressure 158/101 otherwise hemodynamically stable and within normal limits. On exam she appears moderately frail though not systemically ill or toxic. Her physical exam shows no signs of fluid overload, no peripheral edema, lungs are clear, abdomen is benign. She answers questions appropriately though does seem to have a lot of difficulties regarding help at home and currently lives alone. Given the vagueness of her symptoms she did have a cardiac work-up done, A peripheral IV was placed, labs were ordered along with EKG, chest x-ray, and interrogation of her pacemaker. EKG without signs of acute ischemic change. Chest x-ray notable for mild cardiomegaly and otherwise stable right perihilar bulky calcified lymph nodes related to chronic granulomatous disease. Labs with a creatinine of 1.5 which is similar to her baseline,  though within normal limits for age, troponin negative x2. Pacemaker interrogation unremarkable. Review of medical record shows she had an echo done last year which showed ejection fraction 50 to 55% with a grade 2 diastolic dysfunction. Spoke with PCP, agree with follow up with ortho, recommendation for follow up PCP home PT/OT, coty Anne. Discussed this with patient who was in agreement with plan. We also discussed at length how she would know when it was no longer safe for her to be living home alone. I do have concerns regarding her medication management as well as ability to follow-up outpatient with specialists and physical therapy.   She states she previously was in a rehab facility after she hurt her hip and she enjoyed her time there. We discussed potential of assisted living and she expressed concern regarding being able to bring her cat with her, I did let her know there are assisted living's which allow for pets. She expressed understanding of importance of following up with primary care as well as return precautions prior to discharge. FINAL IMPRESSION      1. Other fatigue    2. Medication management    3. Elevated blood pressure reading in office with diagnosis of hypertension        DISPOSITION/PLAN   DISPOSITION        PATIENT REFERRED TO:  Jl Hinkle MD  [address] [facility] [address]   [facility]. [facility] [address]  [phone]    Schedule an appointment as soon as possible for a visit   For follow up appointment to set up physical/occupational therapy    [place] [phone]  Schedule an appointment as soon as possible for a visit   For follow up appointment with orthopaedics      DISCHARGE MEDICATIONS:  New Prescriptions    AMITRIPTYLINE (ELAVIL) 10 MG TABLET    Take 1 tablet by mouth nightly            (Please note that portions of this note were completed with a voice recognition program. Efforts were made to edit the dictations but occasionally words are mis-transcribed.)    Kb Romero MD (electronically signed)  Attending Emergency Physician        Kb Romero MD  04/17/21 [id number]

## 2021-04-17 NOTE — ED TRIAGE NOTES
Patient came in from home via EMS complaining of weakness x1 day. States yesterday her right leg felt like it was giving out and then woke up today drenched in sweat. Denies known fever. A&O x4.

## 2021-05-03 RX ORDER — HYDRALAZINE HYDROCHLORIDE 25 MG/1
TABLET, FILM COATED ORAL
Qty: 90 TABLET | Refills: 11 | Status: SHIPPED | OUTPATIENT
Start: 2021-05-03

## 2021-05-03 NOTE — TELEPHONE ENCOUNTER
Last OV: 02/25/2021  Next OV: 08/25/2021  Labs: bnp, cmp, cbc 04/17/2021  Last EKG (if needed):04/17/2021

## 2021-05-10 ENCOUNTER — NURSE ONLY (OUTPATIENT)
Dept: CARDIOLOGY CLINIC | Age: 81
End: 2021-05-10
Payer: MEDICARE

## 2021-05-10 DIAGNOSIS — I46.9 CARDIAC ARREST (HCC): ICD-10-CM

## 2021-05-10 DIAGNOSIS — I50.23 ACUTE ON CHRONIC SYSTOLIC HEART FAILURE (HCC): ICD-10-CM

## 2021-05-10 DIAGNOSIS — Z95.810 AUTOMATIC IMPLANTABLE CARDIOVERTER-DEFIBRILLATOR IN SITU: ICD-10-CM

## 2021-05-10 DIAGNOSIS — I25.5 ISCHEMIC CARDIOMYOPATHY: ICD-10-CM

## 2021-05-10 NOTE — PROGRESS NOTES
We received remote transmission from patient's monitor at home. Transmission shows normal sensing and pacing function. EP physician will review. See interrogation under cardiology tab in the [address] [facility] [address] field for more details. Optivol is within normal range.

## 2021-05-18 PROCEDURE — 93296 REM INTERROG EVL PM/IDS: CPT | Performed by: INTERNAL MEDICINE

## 2021-05-18 PROCEDURE — 93297 REM INTERROG DEV EVAL ICPMS: CPT | Performed by: INTERNAL MEDICINE

## 2021-05-18 PROCEDURE — 93295 DEV INTERROG REMOTE 1/2/MLT: CPT | Performed by: INTERNAL MEDICINE

## 2021-07-11 ENCOUNTER — APPOINTMENT (OUTPATIENT)
Dept: GENERAL RADIOLOGY | Age: 81
End: 2021-07-11
Payer: MEDICARE

## 2021-07-11 ENCOUNTER — APPOINTMENT (OUTPATIENT)
Dept: CT IMAGING | Age: 81
End: 2021-07-11
Payer: MEDICARE

## 2021-07-11 ENCOUNTER — HOSPITAL ENCOUNTER (EMERGENCY)
Age: 81
Discharge: HOME | End: 2021-07-11
Attending: STUDENT IN AN ORGANIZED HEALTH CARE EDUCATION/TRAINING PROGRAM
Payer: MEDICARE

## 2021-07-11 VITALS
HEIGHT: 68 IN | DIASTOLIC BLOOD PRESSURE: 57 MMHG | BODY MASS INDEX: 22.13 KG/M2 | SYSTOLIC BLOOD PRESSURE: 146 MMHG | WEIGHT: 146 LBS | HEART RATE: 74 BPM | OXYGEN SATURATION: 95 % | TEMPERATURE: 99.8 F | RESPIRATION RATE: 18 BRPM

## 2021-07-11 DIAGNOSIS — K80.81 BILIARY CALCULUS OF OTHER SITE WITH OBSTRUCTION: ICD-10-CM

## 2021-07-11 DIAGNOSIS — K57.90 DIVERTICULOSIS: ICD-10-CM

## 2021-07-11 DIAGNOSIS — N39.0 URINARY TRACT INFECTION WITHOUT HEMATURIA, SITE UNSPECIFIED: Primary | ICD-10-CM

## 2021-07-11 DIAGNOSIS — R63.0 POOR APPETITE: ICD-10-CM

## 2021-07-11 LAB
A/G RATIO: 1.1 (ref 1.1–2.2)
ALBUMIN SERPL-MCNC: 3.6 G/DL (ref 3.4–5)
ALP BLD-CCNC: 50 U/L (ref 40–129)
ALT SERPL-CCNC: 7 U/L (ref 10–40)
ANION GAP SERPL CALCULATED.3IONS-SCNC: 13 MMOL/L (ref 3–16)
AST SERPL-CCNC: 11 U/L (ref 15–37)
BACTERIA: ABNORMAL /HPF
BASOPHILS ABSOLUTE: 0 K/UL (ref 0–0.2)
BASOPHILS RELATIVE PERCENT: 0.2 %
BILIRUB SERPL-MCNC: 0.4 MG/DL (ref 0–1)
BILIRUBIN URINE: NEGATIVE
BLOOD, URINE: NEGATIVE
BUN BLDV-MCNC: 34 MG/DL (ref 7–20)
CALCIUM SERPL-MCNC: 8.9 MG/DL (ref 8.3–10.6)
CHLORIDE BLD-SCNC: 101 MMOL/L (ref 99–110)
CLARITY: ABNORMAL
CO2: 21 MMOL/L (ref 21–32)
COLOR: YELLOW
CREAT SERPL-MCNC: 1.7 MG/DL (ref 0.6–1.2)
EOSINOPHILS ABSOLUTE: 0 K/UL (ref 0–0.6)
EOSINOPHILS RELATIVE PERCENT: 0.1 %
EPITHELIAL CELLS, UA: 0 /HPF (ref 0–5)
GFR AFRICAN AMERICAN: 35
GFR NON-AFRICAN AMERICAN: 29
GLOBULIN: 3.4 G/DL
GLUCOSE BLD-MCNC: 133 MG/DL (ref 70–99)
GLUCOSE URINE: NEGATIVE MG/DL
HCT VFR BLD CALC: 33.1 % (ref 36–48)
HEMOGLOBIN: 10.9 G/DL (ref 12–16)
HYALINE CASTS: 2 /LPF (ref 0–8)
KETONES, URINE: NEGATIVE MG/DL
LEUKOCYTE ESTERASE, URINE: ABNORMAL
LIPASE: 30 U/L (ref 13–60)
LYMPHOCYTES ABSOLUTE: 0.6 K/UL (ref 1–5.1)
LYMPHOCYTES RELATIVE PERCENT: 5.5 %
MCH RBC QN AUTO: 31.1 PG (ref 26–34)
MCHC RBC AUTO-ENTMCNC: 32.8 G/DL (ref 31–36)
MCV RBC AUTO: 94.8 FL (ref 80–100)
MICROSCOPIC EXAMINATION: YES
MONOCYTES ABSOLUTE: 1.2 K/UL (ref 0–1.3)
MONOCYTES RELATIVE PERCENT: 10.6 %
NEUTROPHILS ABSOLUTE: 9.7 K/UL (ref 1.7–7.7)
NEUTROPHILS RELATIVE PERCENT: 83.6 %
NITRITE, URINE: NEGATIVE
PDW BLD-RTO: 12.3 % (ref 12.4–15.4)
PH UA: 7 (ref 5–8)
PLATELET # BLD: 274 K/UL (ref 135–450)
PMV BLD AUTO: 7.4 FL (ref 5–10.5)
POTASSIUM SERPL-SCNC: 4.7 MMOL/L (ref 3.5–5.1)
PRO-BNP: 2881 PG/ML (ref 0–449)
PROTEIN UA: ABNORMAL MG/DL
RBC # BLD: 3.49 M/UL (ref 4–5.2)
RBC UA: 2 /HPF (ref 0–4)
SODIUM BLD-SCNC: 135 MMOL/L (ref 136–145)
SPECIFIC GRAVITY UA: 1.01 (ref 1–1.03)
TOTAL PROTEIN: 7 G/DL (ref 6.4–8.2)
TROPONIN: 0.01 NG/ML
URINE REFLEX TO CULTURE: YES
URINE TYPE: ABNORMAL
UROBILINOGEN, URINE: 0.2 E.U./DL
WBC # BLD: 11.5 K/UL (ref 4–11)
WBC UA: 90 /HPF (ref 0–5)

## 2021-07-11 PROCEDURE — 2580000003 HC RX 258: Performed by: PHYSICIAN ASSISTANT

## 2021-07-11 PROCEDURE — 84484 ASSAY OF TROPONIN QUANT: CPT

## 2021-07-11 PROCEDURE — 96374 THER/PROPH/DIAG INJ IV PUSH: CPT

## 2021-07-11 PROCEDURE — 93005 ELECTROCARDIOGRAM TRACING: CPT | Performed by: STUDENT IN AN ORGANIZED HEALTH CARE EDUCATION/TRAINING PROGRAM

## 2021-07-11 PROCEDURE — 87086 URINE CULTURE/COLONY COUNT: CPT

## 2021-07-11 PROCEDURE — 83880 ASSAY OF NATRIURETIC PEPTIDE: CPT

## 2021-07-11 PROCEDURE — 6360000002 HC RX W HCPCS: Performed by: PHYSICIAN ASSISTANT

## 2021-07-11 PROCEDURE — 74176 CT ABD & PELVIS W/O CONTRAST: CPT

## 2021-07-11 PROCEDURE — 81001 URINALYSIS AUTO W/SCOPE: CPT

## 2021-07-11 PROCEDURE — 85025 COMPLETE CBC W/AUTO DIFF WBC: CPT

## 2021-07-11 PROCEDURE — 83690 ASSAY OF LIPASE: CPT

## 2021-07-11 PROCEDURE — 71045 X-RAY EXAM CHEST 1 VIEW: CPT

## 2021-07-11 PROCEDURE — 99283 EMERGENCY DEPT VISIT LOW MDM: CPT

## 2021-07-11 PROCEDURE — 80053 COMPREHEN METABOLIC PANEL: CPT

## 2021-07-11 RX ORDER — CEFUROXIME AXETIL 250 MG/1
250 TABLET ORAL 2 TIMES DAILY
Qty: 20 TABLET | Refills: 0 | Status: SHIPPED | OUTPATIENT
Start: 2021-07-11 | End: 2021-07-21

## 2021-07-11 RX ORDER — ONDANSETRON 4 MG/1
4-8 TABLET, ORALLY DISINTEGRATING ORAL EVERY 12 HOURS PRN
Qty: 12 TABLET | Refills: 0 | Status: SHIPPED | OUTPATIENT
Start: 2021-07-11

## 2021-07-11 RX ORDER — CEFUROXIME AXETIL 250 MG/1
250 TABLET ORAL 2 TIMES DAILY
Qty: 20 TABLET | Refills: 0 | Status: SHIPPED | OUTPATIENT
Start: 2021-07-11 | End: 2021-07-11 | Stop reason: SDUPTHER

## 2021-07-11 RX ORDER — 0.9 % SODIUM CHLORIDE 0.9 %
1000 INTRAVENOUS SOLUTION INTRAVENOUS ONCE
Status: COMPLETED | OUTPATIENT
Start: 2021-07-11 | End: 2021-07-11

## 2021-07-11 RX ORDER — ONDANSETRON 4 MG/1
4-8 TABLET, ORALLY DISINTEGRATING ORAL EVERY 12 HOURS PRN
Qty: 12 TABLET | Refills: 0 | Status: SHIPPED | OUTPATIENT
Start: 2021-07-11 | End: 2021-07-11 | Stop reason: SDUPTHER

## 2021-07-11 RX ORDER — ONDANSETRON 2 MG/ML
4 INJECTION INTRAMUSCULAR; INTRAVENOUS ONCE
Status: COMPLETED | OUTPATIENT
Start: 2021-07-11 | End: 2021-07-11

## 2021-07-11 RX ADMIN — SODIUM CHLORIDE 1000 ML: 9 INJECTION, SOLUTION INTRAVENOUS at 11:18

## 2021-07-11 RX ADMIN — ONDANSETRON 4 MG: 2 INJECTION INTRAMUSCULAR; INTRAVENOUS at 11:19

## 2021-07-11 ASSESSMENT — ENCOUNTER SYMPTOMS
NAUSEA: 1
ABDOMINAL DISTENTION: 0
RECTAL PAIN: 0
CONSTIPATION: 0
ABDOMINAL PAIN: 1
COUGH: 0
STRIDOR: 0
DIARRHEA: 0
ANAL BLEEDING: 0
BACK PAIN: 0
VOMITING: 0
SHORTNESS OF BREATH: 0
COLOR CHANGE: 0
BLOOD IN STOOL: 0
WHEEZING: 0

## 2021-07-11 ASSESSMENT — PAIN SCALES - GENERAL: PAINLEVEL_OUTOF10: 5

## 2021-07-11 ASSESSMENT — PAIN DESCRIPTION - PAIN TYPE: TYPE: CHRONIC PAIN

## 2021-07-11 ASSESSMENT — PAIN DESCRIPTION - LOCATION: LOCATION: ABDOMEN

## 2021-07-11 NOTE — ED NOTES
Bed: 23  Expected date:   Expected time:   Means of arrival:   Comments:  Medic 86-abd pain     Arely Costello RN  07/11/21 [id number]

## 2021-07-11 NOTE — ED PROVIDER NOTES
I independently performed a history and physical on [address] All diagnostic, treatment, and disposition decisions were made by myself in conjunction with the advanced practice provider. Briefly, this is a 78 y.o. female here for diffuse abdominal pain, poor PO intake from [facility] home. No syncope or chest pain. Hx of UTIs, feels like she has one today. Symptoms have been present for several weeks. No associated fever or emesis. On exam pt is resting comfortably  Cardiac RRR, no murmur  Lungs clear bilaterally, no increased work of breathing  Abdomen soft nontender  No calf edema or tenderness  Neuro no drift in extremities       EKG  The Ekg interpreted by me in the absence of a cardiologist shows. normal sinus rhythm with a rate of 75  Axis is   Normal  QTc is  normal  Intervals and Durations are unremarkable. No specific ST-T wave changes appreciated. No evidence of acute ischemia. No significant change from prior EKG dated 4/17/21    XR CHEST PORTABLE   Final Result   No acute process. CT ABDOMEN PELVIS WO CONTRAST Additional Contrast? None   Final Result   1. Cholelithiasis. 2. Diverticulosis. 3. Small stable fat containing right lower quadrant parastomal hernia.            Labs Reviewed   CBC WITH AUTO DIFFERENTIAL - Abnormal; Notable for the following components:       Result Value    WBC 11.5 (*)     RBC 3.49 (*)     Hemoglobin 10.9 (*)     Hematocrit 33.1 (*)     RDW 12.3 (*)     Neutrophils Absolute 9.7 (*)     Lymphocytes Absolute 0.6 (*)     All other components within normal limits    Narrative:     Performed at:  [facility] [address] [place] [place]   Phone [phone]   COMPREHENSIVE METABOLIC PANEL - Abnormal; Notable for the following components:    Sodium 135 (*)     Glucose 133 (*)     BUN 34 (*)     CREATININE 1.7 (*)     GFR Non- 29 (*)     GFR  35 (*)     ALT 7 elevation or CATAR[place]. CT abdomen pelvis is showing diverticulosis without diverticulitis. No intra-abdominal abscess. Cholelithiasis without signs of cholecystitis. No bowel obstruction or perforation. Stable parastomal hernia also noted. She was informed of incidental findings today. Work-up consistent with UTI. Pan-sensitive of E. coli from prior culture. She should return here for any new or worsening pain, fever or inability to keep down fluids. Otherwise stable for PCP follow-up in 1 week. She is agreeable to this. Patient Referrals:  Akira Berman MD  [address] [facility]    In 3 days      [facility] Emergency Department  [address] [facility]  [phone]    If symptoms worsen      Discharge Medications:  Current Discharge Medication List      START taking these medications    Details   cefUROXime (CEFTIN) 250 MG tablet Take 1 tablet by mouth 2 times daily for 10 days  Qty: 20 tablet, Refills: 0      ondansetron (ZOFRAN ODT) 4 MG disintegrating tablet Take 1-2 tablets by mouth every 12 hours as needed for Nausea May Sub regular tablet (non-ODT) if insurance does not cover ODT. Qty: 12 tablet, Refills: 0             FINAL IMPRESSION  1. Urinary tract infection without hematuria, site unspecified    2. Diverticulosis    3. Biliary calculus of other site with obstruction    4. Poor appetite        Blood pressure (!) 146/57, pulse 74, temperature 99.8 °F (37.7 °C), temperature source Oral, resp. rate 18, height 5' 8\" (1.727 m), weight 146 lb (66.2 kg), SpO2 95 %, not currently breastfeeding.      For further details of [address] [facility] emergency department encounter, please see documentation by advanced practice provider        Glen Miramontes MD  07/11/21 1342

## 2021-07-11 NOTE — ED NOTES
Report called back to Narcisa Dudley at [facility].   D/c instructions and treatment discussed     Dc Nation RN  07/11/21 0777

## 2021-07-11 NOTE — ED PROVIDER NOTES
[facility]        Pt Name: Jimmy Vincent  MRN: [id number]  [place] 1940  Date of evaluation: 7/11/2021  Provider: Ria Hong PA-C  PCP: Andrew Mcfarlane MD  Note Started: 10:57 AM EDT        I have seen and evaluated this patient with my supervising physician pM Penn MD.    [address] [place] [address]       Chief Complaint   Patient presents with    Abdominal Pain     pt brought in by [facility] from [facility] c/o chronic abd pain, hx of bladder and kidney CA. States this morning she has been feeling sick to her stomach       HISTORY OF PRESENT ILLNESS   (Location, Timing/Onset, Context/Setting, Quality, Duration, Modifying Factors, Severity, Associated Signs and Symptoms)  Note limiting factors. Chief Complaint: Nausea, poor appetite, weight loss    Jimmy Vincent is a 78 y.o. female who presents to the emergency department stating that since the end of June, when she was placed in nursing home [facility], she states that she has had poor appetite. She has lost approximately 10 pounds since she started living there. She reports intermittent nausea. She reports chronic low abdominal discomfort ever since her urostomy procedure. She has history of bladder cancer and right kidney cancer. She has had right-sided nephrectomy. She states that nothing tastes good. Nursing Notes were all reviewed and agreed with or any disagreements were addressed in the HPI. REVIEW OF SYSTEMS    (2-9 systems for level 4, 10 or more for level 5)     Review of Systems   Constitutional: Positive for appetite change, fatigue and unexpected weight change. Negative for chills and fever. HENT: Negative. Eyes: Negative for visual disturbance. Respiratory: Negative for cough, shortness of breath, wheezing and stridor. Cardiovascular: Negative for chest pain, palpitations and leg swelling.    Gastrointestinal: Positive for abdominal pain and nausea. Negative for abdominal distention, anal bleeding, blood in stool, constipation, diarrhea, rectal pain and vomiting. Endocrine: Negative. Genitourinary: Negative for dysuria, flank pain, frequency, pelvic pain, urgency, vaginal bleeding and vaginal discharge. Musculoskeletal: Negative for back pain, neck pain and neck stiffness. Skin: Negative for color change, pallor, rash and wound. Neurological: Negative for dizziness, tremors, seizures, syncope, facial asymmetry, speech difficulty, weakness, light-headedness, numbness and headaches. Psychiatric/Behavioral: Negative for confusion. All other systems reviewed and are negative. Positives and Pertinent negatives as per HPI. Except as noted above in the ROS, all other systems were reviewed and negative. PAST MEDICAL HISTORY     Past Medical History:   Diagnosis Date    Anxiety     Atrial fibrillation ([facility]     CAD (coronary artery disease)     Cancer ([facility]     bladder right kidney     Cardiac arrest ([facility] 3/27/2016    Carotid artery stenosis     CHF (congestive heart failure) (HCC)     Chronic kidney disease     cancer of kidney and bladder    Depression     Diabetes mellitus (HCC)     IDDM    GERD (gastroesophageal reflux disease)     Hip pain, chronic     HYPERCHOLESTERAEMIA     Hypertension     Irritable bowel syndrome     Laceration of head 3/25/2016    fall with cardiac arrest    MI, old     Neuropathy     Osteoarthritis     Presence of combination internal cardiac defibrillator (ICD) and pacemaker     Staph infection     PT. STATES SHE HAS HAD SEVERAL BOILS WITH STAPH LAST ONE 20 YEARS AGO.     Type 2 diabetes mellitus without complication ([facility]     Urinary incontinence          SURGICAL HISTORY     Past Surgical History:   Procedure Laterality Date    ANGIOPLASTY      x3  2009    BLADDER REMOVAL      BLADDER SUSPENSION      CYSTOSCOPY  5/28/2013    with dilitation    HYSTERECTOMY  KIDNEY REMOVAL      right    PACEMAKER INSERTION      SKIN CANCER EXCISION      TOTAL HIP ARTHROPLASTY Left 11/6/2019    LEFT ANTERIOR TOTAL HIP REPLACEMENT WITH C-ARM performed by Karen Harman MD at [address]       Previous Medications    ACETAMINOPHEN (TYLENOL) 500 MG TABLET    Take 1,000 mg by mouth daily as needed for Pain     ALPRAZOLAM (XANAX) 0.25 MG TABLET    Take 0.25 mg by mouth nightly as needed for Sleep (patient taking during the day to calm herself down). AMITRIPTYLINE (ELAVIL) 10 MG TABLET    Take 1 tablet by mouth nightly    ASPIRIN 81 MG EC TABLET    Take 1 tablet by mouth daily HOLD for 30 days after hip surgery. See Eliquis order    CARVEDILOL (COREG) 12.5 MG TABLET    TAKE ONE TABLET BY MOUTH TWICE A DAY    COENZYME Q10 (CO Q 10) 100 MG CAPS    Take 1 capsule by mouth daily     FERROUS SULFATE 325 (65 FE) MG TABLET    Take 1 tablet by mouth on Monday, Wednesday, Friday    FLUTICASONE (FLONASE) 50 MCG/ACT NASAL SPRAY    1 spray by Nasal route as needed for Rhinitis    HYDRALAZINE (APRESOLINE) 25 MG TABLET    TAKE ONE TABLET BY MOUTH THREE TIMES A DAY    INSULIN GLARGINE (LANTUS) 100 UNIT/ML INJECTION VIAL    Inject 18 Units into the skin nightly     ROSUVASTATIN (CRESTOR) 10 MG TABLET    Take 10 mg by mouth every evening     SODIUM BICARBONATE 650 MG TABLET    Take 650 mg by mouth 2 times daily     THERAPEUTIC MULTIVITAMIN-MINERALS (THERAGRAN-M) TABLET    Take 1 tablet by mouth daily.  Centrum silver     VITAMIN D (CHOLECALCIFEROL) 1000 UNIT TABS TABLET    Take 1,000 Units by mouth daily          ALLERGIES     Amlodipine besylate, Calcium channel blockers, Citalopram hydrobromide, Flagyl [metronidazole], Losartan, Pneumococcal vaccine, Simvastatin, Valsartan-hydrochlorothiazide, Venlafaxine, Hydrocodone-acetaminophen, and Nitrofurantoin    FAMILYHISTORY       Family History   Problem Relation Age of Onset    Diabetes Mother     Heart Disease Father    José Manuel [place] Cancer Father           SOCIAL HISTORY       Social History     Tobacco Use    Smoking status: Never Smoker    Smokeless tobacco: Never Used   Vaping Use    Vaping Use: Never used   Substance Use Topics    Alcohol use: No    Drug use: No       SCREENINGS             PHYSICAL EXAM    (up to 7 for level 4, 8 or more for level 5)     ED Triage Vitals [07/11/21 1045]   BP Temp Temp Source Pulse Resp SpO2 Height Weight   (!) 134/102 99.8 °F (37.7 °C) Oral 74 18 97 % 5' 8\" (1.727 m) 146 lb (66.2 kg)       Physical Exam  Vitals and nursing note reviewed. Constitutional:       Appearance: Normal appearance. She is not toxic-appearing or diaphoretic. HENT:      Head: Normocephalic and atraumatic. Right Ear: External ear normal.      Left Ear: External ear normal.      Nose: Nose normal.      Mouth/Throat:      Mouth: Mucous membranes are dry. Pharynx: Oropharynx is clear. Eyes:      General:         Right eye: No discharge. Left eye: No discharge. Extraocular Movements: Extraocular movements intact. Conjunctiva/sclera: Conjunctivae normal.      Pupils: Pupils are equal, round, and reactive to light. Cardiovascular:      Rate and Rhythm: Normal rate. Pulmonary:      Effort: Pulmonary effort is normal.      Breath sounds: Normal breath sounds. Abdominal:      General: Bowel sounds are normal. There is no distension. Palpations: Abdomen is soft. Tenderness: There is no abdominal tenderness. There is no right CVA tenderness or left CVA tenderness. Comments: Urostomy present. Site appears clean with a good seal.   Musculoskeletal:         General: Normal range of motion. Cervical back: Normal range of motion. Skin:     General: Skin is warm and dry. Capillary Refill: Capillary refill takes less than 2 seconds. Coloration: Skin is not jaundiced or pale. Findings: No bruising, erythema, lesion or rash.    Neurological:      General: No focal deficit present. Mental Status: She is alert and oriented to person, place, and time.    Psychiatric:         Mood and Affect: Mood normal.         Behavior: Behavior normal.         DIAGNOSTIC RESULTS   LABS:    Labs Reviewed   CBC WITH AUTO DIFFERENTIAL - Abnormal; Notable for the following components:       Result Value    WBC 11.5 (*)     RBC 3.49 (*)     Hemoglobin 10.9 (*)     Hematocrit 33.1 (*)     RDW 12.3 (*)     Neutrophils Absolute 9.7 (*)     Lymphocytes Absolute 0.6 (*)     All other components within normal limits    Narrative:     Performed at:  [facility] [address]   Phone [phone]   COMPREHENSIVE METABOLIC PANEL - Abnormal; Notable for the following components:    Sodium 135 (*)     Glucose 133 (*)     BUN 34 (*)     CREATININE 1.7 (*)     GFR Non- 29 (*)     GFR  35 (*)     ALT 7 (*)     AST 11 (*)     All other components within normal limits    Narrative:     Performed at:  [facility] [address]   Phone [phone]   URINE RT REFLEX TO CULTURE - Abnormal; Notable for the following components:    Clarity, UA CLOUDY (*)     Protein, UA TRACE (*)     Leukocyte Esterase, Urine LARGE (*)     All other components within normal limits    Narrative:     Performed at:  [facility] [address]   [address] 21  - Abnormal; Notable for the following components:    Pro-BNP 2,881 (*)     All other components within normal limits    Narrative:     Performed at:  [facility] [address] [place]   Phone [phone]   MICROSCOPIC URINALYSIS - Abnormal; Notable for the following components:    Bacteria, UA 2+ (*)     WBC, UA 90 (*)     All other components within normal limits    Narrative:     Performed at:  [facility]  [address] [facility],  [place], [address]   Phone [phone]   CULTURE, URINE   LIPASE    Narrative:     Performed at:  [facility]  [address] [facility],  [place], [address]   Phone [phone]   TROPONIN    Narrative:     Performed at:  [facility]  [address] [facility]  [place], [address]   Phone [phone]       When ordered only abnormal lab results are displayed. All other labs were within normal range or not returned as of this dictation. EKG: When ordered, EKG's are interpreted by the Emergency Department Physician in the absence of a cardiologist.  Please see their note for interpretation of EKG. RADIOLOGY:   Non-plain film images such as CT, Ultrasound and MRI are read by the radiologist. Plain radiographic images are visualized and preliminarily interpreted by the ED Provider with the below findings:        Interpretation per the Radiologist below, if available at the time of this note:    XR CHEST PORTABLE   Final Result   No acute process. CT ABDOMEN PELVIS WO CONTRAST Additional Contrast? None   Final Result   1. Cholelithiasis. 2. Diverticulosis. 3. Small stable fat containing right lower quadrant parastomal hernia.                PROCEDURES   Unless otherwise noted below, none     Procedures    CRITICAL CARE TIME   N/A    CONSULTS:  None      EMERGENCY DEPARTMENT COURSE and DIFFERENTIAL DIAGNOSIS/MDM:   Vitals:    Vitals:    07/11/21 1045 07/11/21 1230   BP: (!) 134/102 (!) 146/57   Pulse: 74    Resp: 18    Temp: 99.8 °F (37.7 °C)    TempSrc: Oral    SpO2: 97% 95%   Weight: 146 lb (66.2 kg)    Height: 5' 8\" (1.727 m)        Patient was given the following medications:  Medications   ondansetron (ZOFRAN) injection 4 mg (4 mg Intravenous Given 7/11/21 1119)   0.9 % sodium chloride bolus (0 mLs Intravenous Stopped 7/11/21 1323)         This patient presents to the emergency department complaining of occasional nausea and poor appetite for several weeks. She has chronic low abdominal discomfort. Abdomen is soft and nontender in all 4 quadrants without pulsatile mass or CVA tenderness. CT scan shows some incidental findings including gallstones, diverticulosis and hernia. Otherwise, unremarkable. Urinalysis suggest infection. Blood work is stable at this time. I do not suspect sepsis and we do feel that this patient is stable for discharge. She received IV fluids and antiemetic and is feeling better. Will be sent home with antibiotic to treat UTI and antiemetic as needed. My suspicion is low for acute surgical abdomen, obstruction, perforation, abscess, mesenteric ischemia, AAA, dissection, cholecystitis, cholangitis, pancreatitis, appendicitis, C. diff colitis, diverticulitis, volvulus, incarcerated hernia, necrotizing fasciitis, TOA, ovarian torsion, PID, ectopic pregnancy, misty michael Lamberto syndrome,  incarcerated hernia, Marga gangrene, pyelonephritis, perinephric abscess, kidney stone, urosepsis, fistula , intussusception, ACS, PE, myocarditis, pericarditis, endocarditis, acute pulmonary edema, pleural effusion, pericardial effusion, cardiac tamponade, cardiomyopathy, CHF exacerbation, thoracic aortic dissection, esophageal rupture, other life-threatening arrhythmia, hypertensive urgency or emergency, hemothorax, pulmonary contusion, subcutaneous emphysema, flail chest, pneumo mediastinum, rib fracture or other concerning pathology. FINAL IMPRESSION      1. Urinary tract infection without hematuria, site unspecified    2. Diverticulosis    3. Biliary calculus of other site with obstruction    4. Poor appetite          DISPOSITION/PLAN   DISPOSITION Decision To Discharge 07/11/2021 01:30:06 PM      PATIENT REFERRED TO:  Juni Alvarenga MD  [address] [facility] [address]  [phone]    In 3 days      [facility] [address] [facility]  [phone]    If symptoms worsen      DISCHARGE MEDICATIONS:  New Prescriptions    CEFUROXIME (CEFTIN) 250 MG TABLET    Take 1 tablet by mouth 2 times daily for 10 days    ONDANSETRON (ZOFRAN ODT) 4 MG DISINTEGRATING TABLET    Take 1-2 tablets by mouth every 12 hours as needed for Nausea May Sub regular tablet (non-ODT) if insurance does not cover ODT.        DISCONTINUED MEDICATIONS:  Discontinued Medications    No medications on file              (Please note that portions of this note were completed with a voice recognition program.  Efforts were made to edit the dictations but occasionally words are mis-transcribed.)    Delroy Ibrahim PA-C (electronically signed)           Delroy Ibrahim PA-C  07/11/21 8227

## 2021-07-12 LAB
EKG ATRIAL RATE: 75 BPM
EKG DIAGNOSIS: NORMAL
EKG P AXIS: 52 DEGREES
EKG P-R INTERVAL: 140 MS
EKG Q-T INTERVAL: 398 MS
EKG QRS DURATION: 110 MS
EKG QTC CALCULATION (BAZETT): 444 MS
EKG R AXIS: 5 DEGREES
EKG T AXIS: 44 DEGREES
EKG VENTRICULAR RATE: 75 BPM
URINE CULTURE, ROUTINE: NORMAL

## 2021-07-12 PROCEDURE — 93010 ELECTROCARDIOGRAM REPORT: CPT | Performed by: INTERNAL MEDICINE

## 2021-08-17 ENCOUNTER — APPOINTMENT (OUTPATIENT)
Dept: CT IMAGING | Age: 81
End: 2021-08-17
Payer: MEDICARE

## 2021-08-17 ENCOUNTER — APPOINTMENT (OUTPATIENT)
Dept: GENERAL RADIOLOGY | Age: 81
End: 2021-08-17
Payer: MEDICARE

## 2021-08-17 ENCOUNTER — HOSPITAL ENCOUNTER (EMERGENCY)
Age: 81
Discharge: HOME | End: 2021-08-17
Attending: EMERGENCY MEDICINE
Payer: MEDICARE

## 2021-08-17 VITALS
RESPIRATION RATE: 16 BRPM | OXYGEN SATURATION: 98 % | TEMPERATURE: 98.2 F | BODY MASS INDEX: 21.82 KG/M2 | WEIGHT: 144 LBS | HEIGHT: 68 IN | HEART RATE: 65 BPM | SYSTOLIC BLOOD PRESSURE: 149 MMHG | DIASTOLIC BLOOD PRESSURE: 75 MMHG

## 2021-08-17 DIAGNOSIS — N30.00 ACUTE CYSTITIS WITHOUT HEMATURIA: Primary | ICD-10-CM

## 2021-08-17 LAB
A/G RATIO: 0.9 (ref 1.1–2.2)
ALBUMIN SERPL-MCNC: 3.3 G/DL (ref 3.4–5)
ALP BLD-CCNC: 61 U/L (ref 40–129)
ALT SERPL-CCNC: 24 U/L (ref 10–40)
ANION GAP SERPL CALCULATED.3IONS-SCNC: 13 MMOL/L (ref 3–16)
AST SERPL-CCNC: 13 U/L (ref 15–37)
BACTERIA: ABNORMAL /HPF
BASOPHILS ABSOLUTE: 0 K/UL (ref 0–0.2)
BASOPHILS RELATIVE PERCENT: 0.3 %
BILIRUB SERPL-MCNC: 0.3 MG/DL (ref 0–1)
BILIRUBIN URINE: NEGATIVE
BLOOD, URINE: ABNORMAL
BUN BLDV-MCNC: 29 MG/DL (ref 7–20)
CALCIUM SERPL-MCNC: 9.6 MG/DL (ref 8.3–10.6)
CHLORIDE BLD-SCNC: 104 MMOL/L (ref 99–110)
CLARITY: ABNORMAL
CO2: 23 MMOL/L (ref 21–32)
COLOR: YELLOW
COMMENT UA: ABNORMAL
CREAT SERPL-MCNC: 1.5 MG/DL (ref 0.6–1.2)
EKG ATRIAL RATE: 71 BPM
EKG DIAGNOSIS: NORMAL
EKG P AXIS: 57 DEGREES
EKG P-R INTERVAL: 144 MS
EKG Q-T INTERVAL: 414 MS
EKG QRS DURATION: 110 MS
EKG QTC CALCULATION (BAZETT): 449 MS
EKG R AXIS: 30 DEGREES
EKG T AXIS: 59 DEGREES
EKG VENTRICULAR RATE: 71 BPM
EOSINOPHILS ABSOLUTE: 0.1 K/UL (ref 0–0.6)
EOSINOPHILS RELATIVE PERCENT: 0.7 %
EPITHELIAL CELLS, UA: 2 /HPF (ref 0–5)
GFR AFRICAN AMERICAN: 40
GFR NON-AFRICAN AMERICAN: 33
GLOBULIN: 3.8 G/DL
GLUCOSE BLD-MCNC: 104 MG/DL (ref 70–99)
GLUCOSE URINE: NEGATIVE MG/DL
HCT VFR BLD CALC: 31.8 % (ref 36–48)
HEMOGLOBIN: 10.7 G/DL (ref 12–16)
HYALINE CASTS: 2 /LPF (ref 0–8)
KETONES, URINE: NEGATIVE MG/DL
LEUKOCYTE ESTERASE, URINE: ABNORMAL
LYMPHOCYTES ABSOLUTE: 0.6 K/UL (ref 1–5.1)
LYMPHOCYTES RELATIVE PERCENT: 6.6 %
MCH RBC QN AUTO: 31 PG (ref 26–34)
MCHC RBC AUTO-ENTMCNC: 33.5 G/DL (ref 31–36)
MCV RBC AUTO: 92.6 FL (ref 80–100)
MICROSCOPIC EXAMINATION: YES
MONOCYTES ABSOLUTE: 0.7 K/UL (ref 0–1.3)
MONOCYTES RELATIVE PERCENT: 8.5 %
NEUTROPHILS ABSOLUTE: 7.2 K/UL (ref 1.7–7.7)
NEUTROPHILS RELATIVE PERCENT: 83.9 %
NITRITE, URINE: POSITIVE
PDW BLD-RTO: 12.9 % (ref 12.4–15.4)
PH UA: 7 (ref 5–8)
PLATELET # BLD: 369 K/UL (ref 135–450)
PMV BLD AUTO: 7.2 FL (ref 5–10.5)
POTASSIUM REFLEX MAGNESIUM: 4.2 MMOL/L (ref 3.5–5.1)
PROTEIN UA: NEGATIVE MG/DL
RBC # BLD: 3.44 M/UL (ref 4–5.2)
RBC UA: 1 /HPF (ref 0–4)
SODIUM BLD-SCNC: 140 MMOL/L (ref 136–145)
SPECIFIC GRAVITY UA: 1.01 (ref 1–1.03)
TOTAL PROTEIN: 7.1 G/DL (ref 6.4–8.2)
URINE REFLEX TO CULTURE: YES
URINE TYPE: ABNORMAL
UROBILINOGEN, URINE: 0.2 E.U./DL
WBC # BLD: 8.6 K/UL (ref 4–11)
WBC UA: 15 /HPF (ref 0–5)

## 2021-08-17 PROCEDURE — 81001 URINALYSIS AUTO W/SCOPE: CPT

## 2021-08-17 PROCEDURE — 85025 COMPLETE CBC W/AUTO DIFF WBC: CPT

## 2021-08-17 PROCEDURE — 93010 ELECTROCARDIOGRAM REPORT: CPT | Performed by: INTERNAL MEDICINE

## 2021-08-17 PROCEDURE — 80053 COMPREHEN METABOLIC PANEL: CPT

## 2021-08-17 PROCEDURE — 93005 ELECTROCARDIOGRAM TRACING: CPT | Performed by: EMERGENCY MEDICINE

## 2021-08-17 PROCEDURE — 74176 CT ABD & PELVIS W/O CONTRAST: CPT

## 2021-08-17 PROCEDURE — 87086 URINE CULTURE/COLONY COUNT: CPT

## 2021-08-17 PROCEDURE — 71045 X-RAY EXAM CHEST 1 VIEW: CPT

## 2021-08-17 PROCEDURE — 6370000000 HC RX 637 (ALT 250 FOR IP): Performed by: EMERGENCY MEDICINE

## 2021-08-17 PROCEDURE — 99283 EMERGENCY DEPT VISIT LOW MDM: CPT

## 2021-08-17 RX ORDER — CEFUROXIME AXETIL 250 MG/1
500 TABLET ORAL ONCE
Status: COMPLETED | OUTPATIENT
Start: 2021-08-17 | End: 2021-08-17

## 2021-08-17 RX ORDER — CEFUROXIME AXETIL 500 MG/1
500 TABLET ORAL 2 TIMES DAILY
Qty: 14 TABLET | Refills: 0 | Status: SHIPPED | OUTPATIENT
Start: 2021-08-17 | End: 2021-08-24

## 2021-08-17 RX ORDER — DULOXETIN HYDROCHLORIDE 30 MG/1
30 CAPSULE, DELAYED RELEASE ORAL DAILY
COMMUNITY
End: 2021-08-25 | Stop reason: ALTCHOICE

## 2021-08-17 RX ADMIN — CEFUROXIME AXETIL 500 MG: 250 TABLET, FILM COATED ORAL at 12:38

## 2021-08-17 NOTE — ED PROVIDER NOTES
[address] [facility]  EMERGENCY DEPARTMENTENCOUNTER      Pt Name: Karlie Bunch  MRN: [id number]  [place] 1940  Date ofevaluation: 8/17/2021  Provider: Jess Cuevas MD    CHIEF COMPLAINT       Chief Complaint   Patient presents with    Illness     Pt. in [facility] with report of general illness X 2 weeks c/o weak, nauseas and not able to eat. HPI    HISTORY OF PRESENT ILLNESS   (Location/Symptom, Timing/Onset,Context/Setting, Quality, Duration, Modifying Factors, Severity)  Note limiting factors. Karlie Bunch is a 78 y.o. female who presents to the emergency department with multiple complaints. This is a 78-year-old female who presents with some nausea, occasional abdominal pain weakness and not eating for the last several weeks. She denies any fevers or chills. She denies a cough or sputum production. NursingNotes were reviewed. Review of Systems    REVIEW OF SYSTEMS    (2-9 systems for level 4, 10 or more for level 5)     Review of Systems   Constitutional: Negative for fever. As above. HENT: Negative for rhinorrhea and sore throat. Eyes: Negative for redness. Respiratory: Negative for shortness of breath. Cardiovascular: Negative for chest pain. Gastrointestinal: Positive for  occasional abdominal pain. Occasional nausea. .  Genitourinary: Negative for flank pain. Neurological: Negative for headaches. Hematological: Negative for adenopathy. Psychiatric/Behavioral: Negative for confusion. Except as noted above the remainder of the review of systems was reviewed and negative.        PAST MEDICAL HISTORY     Past Medical History:   Diagnosis Date    Anxiety     Atrial fibrillation ([facility]     CAD (coronary artery disease)     Cancer ([facility]     bladder right kidney     Cardiac arrest ([facility] 3/27/2016    Carotid artery stenosis     CHF (congestive heart failure) (HCC)     Chronic kidney disease     cancer of kidney and bladder    Depression     Diabetes mellitus (HCC)     IDDM    GERD (gastroesophageal reflux disease)     Hip pain, chronic     HYPERCHOLESTERAEMIA     Hypertension     Irritable bowel syndrome     Laceration of head 3/25/2016    fall with cardiac arrest    MI, old     Neuropathy     Osteoarthritis     Presence of combination internal cardiac defibrillator (ICD) and pacemaker     Staph infection     PT. STATES SHE HAS HAD SEVERAL BOILS WITH STAPH LAST ONE 20 YEARS AGO.  Type 2 diabetes mellitus without complication ([address]     Urinary incontinence          SURGICALHISTORY       Past Surgical History:   Procedure Laterality Date    ANGIOPLASTY      x3  2009    BLADDER REMOVAL      BLADDER SUSPENSION      CYSTOSCOPY  5/28/2013    with dilitation    HYSTERECTOMY      KIDNEY REMOVAL      right    PACEMAKER INSERTION      SKIN CANCER EXCISION      TOTAL HIP ARTHROPLASTY Left 11/6/2019    LEFT ANTERIOR TOTAL HIP REPLACEMENT WITH C-ARM performed by Taina Gunn MD at [facility]       Previous Medications    ACETAMINOPHEN (TYLENOL) 500 MG TABLET    Take 1,000 mg by mouth daily as needed for Pain     ALPRAZOLAM (XANAX) 0.25 MG TABLET    Take 0.25 mg by mouth nightly as needed for Sleep (patient taking during the day to calm herself down). AMITRIPTYLINE (ELAVIL) 10 MG TABLET    Take 1 tablet by mouth nightly    ASPIRIN 81 MG EC TABLET    Take 1 tablet by mouth daily HOLD for 30 days after hip surgery.  See Eliquis order    CARVEDILOL (COREG) 12.5 MG TABLET    TAKE ONE TABLET BY MOUTH TWICE A DAY    COENZYME Q10 (CO Q 10) 100 MG CAPS    Take 1 capsule by mouth daily     DULOXETINE (CYMBALTA) 30 MG EXTENDED RELEASE CAPSULE    Take 30 mg by mouth daily    FERROUS SULFATE 325 (65 FE) MG TABLET    Take 1 tablet by mouth on Monday, Wednesday, Friday    FLUTICASONE (FLONASE) 50 MCG/ACT NASAL SPRAY    1 spray by Nasal route as needed for Rhinitis    HYDRALAZINE (APRESOLINE) 25 MG TABLET    TAKE ONE TABLET BY MOUTH THREE TIMES A DAY    INSULIN GLARGINE (LANTUS) 100 UNIT/ML INJECTION VIAL    Inject 18 Units into the skin nightly     ONDANSETRON (ZOFRAN ODT) 4 MG DISINTEGRATING TABLET    Take 1-2 tablets by mouth every 12 hours as needed for Nausea May Sub regular tablet (non-ODT) if insurance does not cover ODT. ROSUVASTATIN (CRESTOR) 10 MG TABLET    Take 10 mg by mouth every evening     SODIUM BICARBONATE 650 MG TABLET    Take 650 mg by mouth 2 times daily     THERAPEUTIC MULTIVITAMIN-MINERALS (THERAGRAN-M) TABLET    Take 1 tablet by mouth daily. Centrum silver     VITAMIN D (CHOLECALCIFEROL) 1000 UNIT TABS TABLET    Take 1,000 Units by mouth daily        ALLERGIES     Amlodipine besylate, Calcium channel blockers, Citalopram hydrobromide, Flagyl [metronidazole], Losartan, Pneumococcal vaccine, Simvastatin, Valsartan-hydrochlorothiazide, Venlafaxine, Hydrocodone-acetaminophen, and Nitrofurantoin    FAMILY HISTORY       Family History   Problem Relation Age of Onset    Diabetes Mother     Heart Disease Father     Cancer Father           SOCIAL HISTORY       Social History     Socioeconomic History    Marital status:      Spouse name: None    Number of children: None    Years of education: None    Highest education level: None   Occupational History    None   Tobacco Use    Smoking status: Never Smoker    Smokeless tobacco: Never Used   Vaping Use    Vaping Use: Never used   Substance and Sexual Activity    Alcohol use: No    Drug use: No    Sexual activity: Not Currently   Other Topics Concern    None   Social History Narrative    None     Social Determinants of Health     Financial Resource Strain:     Difficulty of Paying Living Expenses:    Food Insecurity:     Worried About Running Out of Food in the Last Year:     Ran Out of Food in the Last Year:    Transportation Needs:     Lack of Transportation (Medical):      Lack of Transportation filmimages such as CT, Ultrasound and MRI are read by the radiologist. Plain radiographic images are visualized and preliminarily interpreted by the emergency physician with the below findings:    See below    Interpretation per the Radiologist below, if available at the time ofthis note: All incidental findings were discussed with the patient. CT ABDOMEN PELVIS WO CONTRAST Additional Contrast? None   Final Result   No acute intra-abdominal or intrapelvic abnormality on noncontrast exam.      Status post right nephrectomy and cystectomy with a left ileal conduit with   ostomy in the right lower abdomen. Stable appearance of a fat containing   peristomal hernia. Chronic mild left perinephric stranding and mild hydronephrosis and   hydroureter extending to the ileal conduit, similar in appearance to priors. Cholelithiasis. Diverticulosis. XR CHEST PORTABLE   Final Result   No acute process.                ED BEDSIDE ULTRASOUND:   Performed by ED Physician - none    LABS:  Labs Reviewed   CBC WITH AUTO DIFFERENTIAL - Abnormal; Notable for the following components:       Result Value    RBC 3.44 (*)     Hemoglobin 10.7 (*)     Hematocrit 31.8 (*)     Lymphocytes Absolute 0.6 (*)     All other components within normal limits    Narrative:     Performed at:  [facility] [address], [place], [address] [employer]   Phone [phone]   COMPREHENSIVE METABOLIC PANEL W/ REFLEX TO MG FOR LOW K - Abnormal; Notable for the following components:    Glucose 104 (*)     BUN 29 (*)     CREATININE 1.5 (*)     GFR Non- 33 (*)     GFR  40 (*)     Albumin 3.3 (*)     Albumin/Globulin Ratio 0.9 (*)     AST 13 (*)     All other components within normal limits    Narrative:     Performed at:  [facility]  [address] [facility], [facility] [employer]   Phone [phone]   URINE RT REFLEX TO CULTURE - Abnormal; Notable for the following components:    Clarity, UA CLOUDY (*)     Blood, Urine SMALL (*)     Nitrite, Urine POSITIVE (*)     Leukocyte Esterase, Urine MODERATE (*)     All other components within normal limits    Narrative:     Performed at:  [facility]  [address] [facility]  [place], [facility]   Phone [phone]   MICROSCOPIC URINALYSIS - Abnormal; Notable for the following components:    Bacteria, UA 3+ (*)     WBC, UA 15 (*)     All other components within normal limits    Narrative:     Performed at:  [facility]  [address] [facility]  [place], [address] [facility]   Phone [phone]   CULTURE, URINE       All other labs were within normal range or not returned as of this dictation. EMERGENCY DEPARTMENT COURSE and DIFFERENTIAL DIAGNOSIS/MDM:   Vitals:    Vitals:    08/17/21 0834   BP: (!) 149/75   Pulse: 65   Resp: 16   Temp: 98.2 °F (36.8 °C)   TempSrc: Oral   SpO2: 98%   Weight: 144 lb (65.3 kg)   Height: 5' 8\" (1.727 m)           MDM    Patient has remained stable throughout her hospital course. Her work-up was unremarkable including a CT of the abdomen pelvis. Her work-up appeared to show a urinary tract infection. There are no other acute intra-abdominal findings noted. On reexamination, the patient does not have an acute abdomen. My differential include acute cystitis versus depression versus viral syndrome. The patient be started on Ceftin for a probable urinary tract infection and was given appropriate follow-up and instructed to return if worse. The patient feels comfortable with this approach. REASSESSMENT              CONSULTS:  None    PROCEDURES:  Unless otherwise noted below, none     Procedures    FINAL IMPRESSION      1. Acute cystitis without hematuria          DISPOSITION/PLAN   DISPOSITION Decision To Discharge 08/17/2021 12:11:13 PM      PATIENT REFERREDTO:  Nigel Hairston MD  [address]   [address] [place] [address]

## 2021-08-18 LAB — URINE CULTURE, ROUTINE: NORMAL

## 2021-08-23 NOTE — PROGRESS NOTES
[facility]  Cardiology Progress Note    Rosina Harrington  1940 August 25, 2021      CC: \"I have no heart symptoms. \"     HPI:  The patient is 78 y.o. female with a past medical history significant for CAD, cardiomyopathy/systolic heart failure with prior PCI to LAD and RCA. She was hospitalized 3/26/16-4/5/16 after suffering Vfib cardiac arrest. Bystanders initiated CPR and when the EMS arrived she was in Vfib and subsequently shocked multiple times. Echo revealed EF 25%. [facility] showed patient stents. Underwent hypothermia protocol and has had a significant neurological recovery. An ICD was placed on 4/1/16 for secondary prevention. We referred her to Dr. Yohan Valle for carotid stenosis but never followed up and now managed per Dr. Marycruz Godoy.      vascular surgeon at [address] [place] [address] Dr. Letty Bullard with repeat carotid duplex 1/15/21. She also reports that she had nausea and constipation on Vascepa. Today, recent UTI 7/2021 followed by acute cystitis without hematuria 8/2021. She denies any cardiac sounding complaints. She is using a walker for aid. She feels the occasional palpitations. Her last device check 5/2021 transmission shows normal sensing and pacing function with normal Optivol range. Patient denies exertional chest pain/pressure, dyspnea at rest, BENTLEY, PND, orthopnea, palpitations, lightheadedness, weight changes, changes in LE edema, and syncope. She reports medical therapy compliance and continues to tolerate. She is now living in assisted living.      Past Medical History:   Diagnosis Date    Anxiety     Atrial fibrillation ([facility]     CAD (coronary artery disease)     Cancer (HCC)     bladder right kidney     Cardiac arrest ([facility] 3/27/2016    Carotid artery stenosis     CHF (congestive heart failure) (HCC)     Chronic kidney disease     cancer of kidney and bladder    Depression     Diabetes mellitus (HCC)     IDDM    GERD (gastroesophageal reflux disease)     Hip pain, chronic     HYPERCHOLESTERAEMIA     Hypertension     Irritable bowel syndrome     Laceration of head 3/25/2016    fall with cardiac arrest    MI, old     Neuropathy     Osteoarthritis     Presence of combination internal cardiac defibrillator (ICD) and pacemaker     Staph infection     PT. STATES SHE HAS HAD SEVERAL BOILS WITH STAPH LAST ONE 20 YEARS AGO.  Type 2 diabetes mellitus without complication ([facility] [address]     Urinary incontinence      Past Surgical History:   Procedure Laterality Date    ANGIOPLASTY      x3  2009    BLADDER REMOVAL      BLADDER SUSPENSION      CYSTOSCOPY  5/28/2013    with dilitation    HYSTERECTOMY      KIDNEY REMOVAL      right    PACEMAKER INSERTION      SKIN CANCER EXCISION      TOTAL HIP ARTHROPLASTY Left 11/6/2019    LEFT ANTERIOR TOTAL HIP REPLACEMENT WITH C-ARM performed by Taina Gunn MD at [facility] History   Problem Relation Age of Onset    Diabetes Mother     Heart Disease Father     Cancer Father      Social History     Tobacco Use    Smoking status: Never Smoker    Smokeless tobacco: Never Used   Vaping Use    Vaping Use: Never used   Substance Use Topics    Alcohol use: No    Drug use: No       Allergies   Allergen Reactions    Amlodipine Besylate      Other reaction(s): Unknown    Calcium Channel Blockers      Other reaction(s): Unknown    Citalopram Hydrobromide      Other reaction(s): Unknown    Flagyl [Metronidazole]      Took with Cipro and it made her vomit    Losartan      Muscle aches  Muscle aches      Pneumococcal Vaccine Swelling     Swelling at injection site    Simvastatin      myalgias  ?  Valsartan-Hydrochlorothiazide      Does not remember    Venlafaxine Nausea Only    Hydrocodone-Acetaminophen Other (See Comments)     Makes patient feel weird, dizzy, nauseous, and sleepy.   Patient states she is not allergic to this    Nitrofurantoin Nausea And Vomiting and Other (See Comments)     Other reaction(s): Dizziness, GI Upset  Unsure if Macrobid caused it  Unsure if Macrobid caused it       Current Outpatient Medications   Medication Sig Dispense Refill    ondansetron (ZOFRAN ODT) 4 MG disintegrating tablet Take 1-2 tablets by mouth every 12 hours as needed for Nausea May Sub regular tablet (non-ODT) if insurance does not cover ODT. 12 tablet 0    hydrALAZINE (APRESOLINE) 25 MG tablet TAKE ONE TABLET BY MOUTH THREE TIMES A DAY 90 tablet 11    carvedilol (COREG) 12.5 MG tablet TAKE ONE TABLET BY MOUTH TWICE A  tablet 2    aspirin 81 MG EC tablet Take 1 tablet by mouth daily HOLD for 30 days after hip surgery. See Eliquis order 30 tablet 3    acetaminophen (TYLENOL) 500 MG tablet Take 1,000 mg by mouth daily as needed for Pain       insulin glargine (LANTUS) 100 UNIT/ML injection vial Inject 18 Units into the skin nightly       sodium bicarbonate 650 MG tablet Take 650 mg by mouth 2 times daily       ALPRAZolam (XANAX) 0.25 MG tablet Take 0.25 mg by mouth nightly as needed for Sleep (patient taking during the day to calm herself down).  rosuvastatin (CRESTOR) 10 MG tablet Take 10 mg by mouth every evening       Coenzyme Q10 (CO Q 10) 100 MG CAPS Take 1 capsule by mouth daily       fluticasone (FLONASE) 50 MCG/ACT nasal spray 1 spray by Nasal route as needed for Rhinitis      therapeutic multivitamin-minerals (THERAGRAN-M) tablet Take 1 tablet by mouth daily. Centrum silver       vitamin D (CHOLECALCIFEROL) 1000 UNIT TABS tablet Take 1,000 Units by mouth daily       ferrous sulfate 325 (65 FE) MG tablet Take 1 tablet by mouth on Monday, Wednesday, Friday (Patient not taking: Reported on 8/25/2021)       No current facility-administered medications for this visit. Review of Systems:  · Constitutional: no unanticipated weight loss. There's been no change in energy level, sleep pattern, or activity level. No fevers, chills. · Eyes: No visual changes or diplopia. No scleral icterus.   · ENT: No Headaches, hearing loss or vertigo. No mouth sores or sore throat. · Cardiovascular: as reviewed in HPI  · Respiratory: No cough or wheezing, no sputum production. No hematemesis. · Gastrointestinal: No abdominal pain, appetite loss, blood in stools. No change in bowel or bladder habits. · Genitourinary: No dysuria, trouble voiding, or hematuria. · Musculoskeletal:  No gait disturbance, no joint complaints. · Integumentary: No rash or pruritis. · Neurological: No headache, diplopia, change in muscle strength, numbness or tingling. · Psychiatric: No anxiety or depression. · Endocrine: No temperature intolerance. No excessive thirst, fluid intake, or urination. No tremor. · Hematologic/Lymphatic: No abnormal bruising or bleeding, blood clots or swollen lymph nodes. · Allergic/Immunologic: No nasal congestion or hives. Physical Exam:   /74   Pulse 62   Ht 5' 8\" (1.727 m)   Wt 144 lb (65.3 kg)   SpO2 97%   BMI 21.90 kg/m²   Wt Readings from Last 3 Encounters:   08/25/21 144 lb (65.3 kg)   08/17/21 144 lb (65.3 kg)   07/11/21 146 lb (66.2 kg)     Constitutional: She is oriented to person, place, and time. She appears well-developed and well-nourished. In no acute distress. Head: Normocephalic and atraumatic. Pupils equal and round. Neck: Neck supple. No JVP or carotid bruit appreciated. No mass and no thyromegaly present. No lymphadenopathy present. Cardiovascular: Normal rate. Normal heart sounds. Exam reveals no gallop and no friction rub. No murmur heard. Pulmonary/Chest: Effort normal and breath sounds normal. No respiratory distress. She has no wheezes, rhonchi or rales. Abdominal: Soft, non-tender. Bowel sounds are normal. She exhibits no organomegaly, mass or bruit. Extremities: No edema, cyanosis, or clubbing. Pulses are 2+ radial/dorsalis pedis/posterior tibial/carotid bilaterally. Neurological: No gross cranial nerve deficit. Coordination normal.   Skin: Skin is warm and dry.  There is no rash or diaphoresis. Psychiatric: She has a normal mood and affect. Her speech is normal and behavior is normal.     Lab Review:   Lab Results   Component Value Date    TRIG 163 07/30/2020    HDL 43 03/03/2021    HDL 45 01/29/2012    LDLCALC 68 03/03/2021    LDLDIRECT 148 07/08/2010    LABVLDL 32 03/03/2021      Lab Results   Component Value Date    BUN 29 08/17/2021    CREATININE 1.5 08/17/2021       EKG Interpretation:   3/12/19 Sinus  Bradycardia Left bundle branch block  2/25/21: device interrogation today   8/25/21: not completed, reviewed last device check 5/2021. Image Review:     ECHO 3/26/16  Normal left ventricular size and wall thickness. Severely decreased left  ventricular systolic function with an estimated ejection fraction of 20-25%  Global hypokinesis. Mild MR/TR    Cardiac Cath 3/26/16  CORONARY ANATOMY:  1. The left main is free of disease. 2.  The LAD has a long stent in the mid segment which is widely patent. The  rest of the vessels are free of disease. 3.  The left circumflex artery has 4 to 5 obtuse marginal branches which are  free of disease. 4.  The right coronary artery is the dominant vessel and osteal stent which  is widely patent. The PD and the posterior lateral branches are also free of  disease. CONCLUSION:    1. No obstructive coronary artery disease. 2.  Previously stented sites are widely patent in the mid LAD and osteal RCA. 3.  Improved LV function with EF of about 40% to 45% with posterior basal  hypokinesis. 4.  Elevated left heart pressures. Carotid Doppler 6/29/18  The right internal carotid artery appears to have a <50% diameter reducing    stenosis based on velocity criteria.    The right vertebral artery demonstrates normal antegrade flow.    The left internal carotid artery appears to have a 50-79% diameter reducing    stenosis based on velocity criteria.    The left vertebral artery demonstrates normal antegrade flow.      ECHO 3/25/19  Suboptimal image quality. Left ventricular cavity size is normal.  There is mild concentric left ventricular hypertrophy. Ejection fraction is visually estimated to be 50-55%. Indeterminate diastolic function. Mitral valve leaflets appear mildly thickened. Mild mitral regurgitation. The left atrium is mildly dilated. Trivial aortic regurgitation is present. Mild tricuspid regurgitation. Pacemaker / ICD lead is visualized in the right atrium. The right atrium is normal in size.     Carotid doppler 2/5/2020  Impression:     o < 50% stenosis of the right internal carotid artery based on velocity       criteria.     o 50-69% stenosis of the left internal carotid artery based on velocity       criteria.     o There is antegrade flow demonstrated in the right and left vertebral       arteries. Stress study:7/30/20  Normal myocardial perfusion study.     Normal LV size and systolic function.          Breast and diaphragmatic attenuation present.     Non-diagnostic EKG response due to failure to reach target heart rate.     Frequent uniform premature ventricular contractions with infusion.     Overall findings represent a low risk study.           Echo:7/30/20  Left ventricular cavity size is mildly dilated. There is asymmetric hypertrophy of the basal septum. Ejection fraction is visually estimated to be 50-55%. There is mild diffuse hypokinesis. Diastolic filling parameters suggest grade II diastolic dysfunction. Pacer / ICD wire is visualized in the right ventricle. The right ventricle is mildly enlarged. Right ventricular systolic function is normal.   Mild to moderate mitral regurgitation is present.      Carotid duplex: 1/15/21 [facility]   Impression:       o 50-69% stenosis of the right internal carotid artery based on velocity       criteria.     o 50-69% stenosis of the left internal carotid artery based on velocity       criteria.     o There is antegrade flow demonstrated in the right and left vertebral       arteries. Assessment/Plan:     Hypertension, essential   Blood pressure is controlled today today on Coreg, Hydralazine. BMP remains abnormal 8/17/21 but showed improvement. I have previously asked her to space her medications throughout the day and adhere to a low sodium diet as she reports high sodium diet. Continue walking program.      CAD  She denies any symptoms of angina today and remains stable since our last visit. Continue BB,statin, and Asa. Stress negative completed 7/2020. Chronic Systolic Heart Failure  EF 20-25% post arrest improved to 40-45% on [facility] repeat echocardiogram 3/19 showed normal LVEF at 50-55%. Repeat echo 7/30/20 50-55% with grade II diastolic dysfunction. Today, she denies any dyspnea on exertion, orthopnea or PND. She continues to appear compensated on exam.  She is currently not on ACE inhibitor/ARB,ARNI and Aldactone therapy due to chronic kidney disease and hyperkalemia. Her last ICD interrogation 5/2021 was wnl. She has obtained COVID vaccine. She lives in assisted living. Hyperlipidemia, unspecified   Last lipid profile 3/3/21 LDLc 68,  otherwise WNL. LFT abnormal 8/17/21. Will continue Crestor 10 mg daily. She did not tolerate Vascepa. Will repeat lipid/liver profile yearly. ICD  Implanted 4/1/16 and is managed per our EP team/device clinic. Amiodarone has been formerly discontinued. She had device interrogation 5/10/21 remote transmission shows normal sensing and pacing function with next interrogation 11/2021. She feels the occasional palpitations. Carotid stenosis, bilateral   Last carotid doppler 2/5/2020 is unchanged from previous 2016. CTA head/neck 11/7/20 revealing 62% proximal LICA. We had referred her to Dr. Mandi Zuluaga but she is now managed per Dr. Isabella Ling.  Repeat carotid duplex 1/15/21 50-69% bilateral.     Chronic kidney disease  She is being followed by nephrology-Dr. Rui Todd a regular basis.     DM  She is on Lantus and is managed per her PCP. Last visit, start her on Jardiance 10 mg daily but her creatinine clearance will not allow. We initiated her on Vascepa 2 G BID but she did not tolerate with nausea and consitpation. We will see her back in follow up in 6 months. Instructed to obtain flu vaccine this season, annually. Thank you very much for allowing me to participate in the care of your patient. Please do not hesitate to contact me if you have any questions. Sincerely,  Arlette Escamilla MD      [facility], [address] [place] [address]  Ph: [phone]  Fax: [phone]    This note was scribed in the presence of Dr. Ryan Crenshaw MD by Salud Escamilla RN.

## 2021-08-25 ENCOUNTER — OFFICE VISIT (OUTPATIENT)
Dept: CARDIOLOGY CLINIC | Age: 81
End: 2021-08-25
Payer: MEDICARE

## 2021-08-25 VITALS
DIASTOLIC BLOOD PRESSURE: 74 MMHG | SYSTOLIC BLOOD PRESSURE: 128 MMHG | OXYGEN SATURATION: 97 % | BODY MASS INDEX: 21.82 KG/M2 | HEART RATE: 62 BPM | HEIGHT: 68 IN | WEIGHT: 144 LBS

## 2021-08-25 DIAGNOSIS — Z95.810 ICD (IMPLANTABLE CARDIOVERTER-DEFIBRILLATOR) IN PLACE: ICD-10-CM

## 2021-08-25 DIAGNOSIS — I65.23 BILATERAL CAROTID ARTERY STENOSIS: ICD-10-CM

## 2021-08-25 DIAGNOSIS — I25.10 CORONARY ARTERY DISEASE INVOLVING NATIVE CORONARY ARTERY OF NATIVE HEART WITHOUT ANGINA PECTORIS: ICD-10-CM

## 2021-08-25 DIAGNOSIS — E78.5 HYPERLIPIDEMIA, UNSPECIFIED HYPERLIPIDEMIA TYPE: ICD-10-CM

## 2021-08-25 DIAGNOSIS — I50.22 CHRONIC SYSTOLIC HEART FAILURE (HCC): ICD-10-CM

## 2021-08-25 DIAGNOSIS — I10 ESSENTIAL HYPERTENSION: Primary | ICD-10-CM

## 2021-08-25 PROCEDURE — 99214 OFFICE O/P EST MOD 30 MIN: CPT | Performed by: INTERNAL MEDICINE

## 2021-08-26 ENCOUNTER — NURSE ONLY (OUTPATIENT)
Dept: CARDIOLOGY CLINIC | Age: 81
End: 2021-08-26
Payer: MEDICARE

## 2021-08-26 DIAGNOSIS — Z95.810 AUTOMATIC IMPLANTABLE CARDIOVERTER-DEFIBRILLATOR IN SITU: ICD-10-CM

## 2021-08-26 DIAGNOSIS — I50.22 CHRONIC SYSTOLIC HEART FAILURE (HCC): ICD-10-CM

## 2021-08-26 DIAGNOSIS — I25.5 ISCHEMIC CARDIOMYOPATHY: ICD-10-CM

## 2021-08-26 PROCEDURE — 93295 DEV INTERROG REMOTE 1/2/MLT: CPT | Performed by: INTERNAL MEDICINE

## 2021-08-26 PROCEDURE — 93296 REM INTERROG EVL PM/IDS: CPT | Performed by: INTERNAL MEDICINE

## 2021-08-26 PROCEDURE — 93297 REM INTERROG DEV EVAL ICPMS: CPT | Performed by: INTERNAL MEDICINE

## 2021-08-26 NOTE — LETTER
[address] [facility] [phone]  [address] [phone]    Pacemaker/Defibrillator Clinic    08/27/21      Rosetta Chow  [address]  [place] [address]      Dear Rosetta Chow    This letter is to inform you that we received the transmission from your monitor at home that checks your implanted heart device. The next date your monitor will automatically transmit will be 12/1. If your report needs attention we will notify you. Your device and monitor are wireless and most transmit cellularly, but please periodically check your monitor is still plugged in to the electrical outlet. If you still use the telephone land line to send please ensure the connection to the phone rebeca is secure. This will help to ensure successful automatic transmissions in the future. Also, the monitor needs to be close to you while sleeping at night. Please be aware that the remote device transmission sites are periodically monitored only during regular business hours during which simultaneous in-office device clinics are being run. If your transmission requires attention, we will contact you as soon as possible. **PLEASE NOTE** that our [facility] policy and processes are changing to ensure a more seamless approach for all parties involved, allowing more time for our nurses to address patient issues and concerns. We will no longer be sending letters for NORMAL remote transmissions. You will be contacted by phone if your transmission requires attention (as previously done), and letters will only be sent regarding monitor disconnections or missed transmissions if you are unable to be reached by phone. Please do not be alarmed by this new process, as we will continue to contact you if your transmission report requires attention. This will be your final \"remote received\" letter.   From this point forward, the [facility] will be utilizing the no news is good news approach. As always, please feel free to contact your nurse with any questions or concerns. Thank you.     [facility]

## 2021-08-27 NOTE — PROGRESS NOTES
We received remote transmission from patient's single chamber ICD monitor at home. Transmission shows normal sensing and pacing function. No new arrhythmias/events recorded. Optivol is within normal range. EP physician will review. See interrogation under cardiology tab in the [address] [place] [address] [address] field for more details.

## 2021-10-13 ENCOUNTER — APPOINTMENT (OUTPATIENT)
Dept: CT IMAGING | Age: 81
DRG: 394 | End: 2021-10-13
Payer: MEDICARE

## 2021-10-13 ENCOUNTER — HOSPITAL ENCOUNTER (INPATIENT)
Age: 81
LOS: 4 days | Discharge: HOME | DRG: 394 | End: 2021-10-18
Attending: INTERNAL MEDICINE | Admitting: INTERNAL MEDICINE
Payer: MEDICARE

## 2021-10-13 DIAGNOSIS — N13.30 HYDROURETERONEPHROSIS: Primary | ICD-10-CM

## 2021-10-13 DIAGNOSIS — N39.0 URINARY TRACT INFECTION IN FEMALE: ICD-10-CM

## 2021-10-13 LAB
A/G RATIO: 1 (ref 1.1–2.2)
ALBUMIN SERPL-MCNC: 3.7 G/DL (ref 3.4–5)
ALP BLD-CCNC: 54 U/L (ref 40–129)
ALT SERPL-CCNC: 7 U/L (ref 10–40)
ANION GAP SERPL CALCULATED.3IONS-SCNC: 13 MMOL/L (ref 3–16)
AST SERPL-CCNC: 12 U/L (ref 15–37)
BACTERIA: ABNORMAL /HPF
BASOPHILS ABSOLUTE: 0 K/UL (ref 0–0.2)
BASOPHILS RELATIVE PERCENT: 0.5 %
BILIRUB SERPL-MCNC: <0.2 MG/DL (ref 0–1)
BILIRUBIN URINE: NEGATIVE
BLOOD, URINE: ABNORMAL
BUN BLDV-MCNC: 33 MG/DL (ref 7–20)
CALCIUM SERPL-MCNC: 9.5 MG/DL (ref 8.3–10.6)
CHLORIDE BLD-SCNC: 102 MMOL/L (ref 99–110)
CLARITY: ABNORMAL
CO2: 23 MMOL/L (ref 21–32)
COLOR: YELLOW
CREAT SERPL-MCNC: 2 MG/DL (ref 0.6–1.2)
EOSINOPHILS ABSOLUTE: 0.1 K/UL (ref 0–0.6)
EOSINOPHILS RELATIVE PERCENT: 1.7 %
EPITHELIAL CELLS, UA: 1 /HPF (ref 0–5)
GFR AFRICAN AMERICAN: 29
GFR NON-AFRICAN AMERICAN: 24
GLOBULIN: 3.6 G/DL
GLUCOSE BLD-MCNC: 280 MG/DL (ref 70–99)
GLUCOSE URINE: NEGATIVE MG/DL
HCT VFR BLD CALC: 33.4 % (ref 36–48)
HEMOGLOBIN: 11.1 G/DL (ref 12–16)
HYALINE CASTS: 6 /LPF (ref 0–8)
KETONES, URINE: NEGATIVE MG/DL
LACTIC ACID, SEPSIS: 1.4 MMOL/L (ref 0.4–1.9)
LEUKOCYTE ESTERASE, URINE: ABNORMAL
LIPASE: 41 U/L (ref 13–60)
LYMPHOCYTES ABSOLUTE: 0.9 K/UL (ref 1–5.1)
LYMPHOCYTES RELATIVE PERCENT: 13.9 %
MCH RBC QN AUTO: 30.9 PG (ref 26–34)
MCHC RBC AUTO-ENTMCNC: 33.1 G/DL (ref 31–36)
MCV RBC AUTO: 93.5 FL (ref 80–100)
MICROSCOPIC EXAMINATION: YES
MONOCYTES ABSOLUTE: 0.6 K/UL (ref 0–1.3)
MONOCYTES RELATIVE PERCENT: 9 %
NEUTROPHILS ABSOLUTE: 4.9 K/UL (ref 1.7–7.7)
NEUTROPHILS RELATIVE PERCENT: 74.9 %
NITRITE, URINE: NEGATIVE
PDW BLD-RTO: 15.4 % (ref 12.4–15.4)
PH UA: 7.5 (ref 5–8)
PLATELET # BLD: 220 K/UL (ref 135–450)
PMV BLD AUTO: 8.1 FL (ref 5–10.5)
POTASSIUM SERPL-SCNC: 4.8 MMOL/L (ref 3.5–5.1)
PROTEIN UA: 100 MG/DL
RBC # BLD: 3.57 M/UL (ref 4–5.2)
RBC UA: 8 /HPF (ref 0–4)
SODIUM BLD-SCNC: 138 MMOL/L (ref 136–145)
SPECIFIC GRAVITY UA: 1.01 (ref 1–1.03)
TOTAL PROTEIN: 7.3 G/DL (ref 6.4–8.2)
URINE REFLEX TO CULTURE: YES
URINE TYPE: ABNORMAL
UROBILINOGEN, URINE: 0.2 E.U./DL
WBC # BLD: 6.6 K/UL (ref 4–11)
WBC UA: 265 /HPF (ref 0–5)

## 2021-10-13 PROCEDURE — 85025 COMPLETE CBC W/AUTO DIFF WBC: CPT

## 2021-10-13 PROCEDURE — 6360000002 HC RX W HCPCS: Performed by: NURSE PRACTITIONER

## 2021-10-13 PROCEDURE — 96374 THER/PROPH/DIAG INJ IV PUSH: CPT

## 2021-10-13 PROCEDURE — 81001 URINALYSIS AUTO W/SCOPE: CPT

## 2021-10-13 PROCEDURE — 96375 TX/PRO/DX INJ NEW DRUG ADDON: CPT

## 2021-10-13 PROCEDURE — 83605 ASSAY OF LACTIC ACID: CPT

## 2021-10-13 PROCEDURE — 87086 URINE CULTURE/COLONY COUNT: CPT

## 2021-10-13 PROCEDURE — 80053 COMPREHEN METABOLIC PANEL: CPT

## 2021-10-13 PROCEDURE — 83690 ASSAY OF LIPASE: CPT

## 2021-10-13 PROCEDURE — 87077 CULTURE AEROBIC IDENTIFY: CPT

## 2021-10-13 PROCEDURE — 87186 SC STD MICRODIL/AGAR DIL: CPT

## 2021-10-13 PROCEDURE — 74176 CT ABD & PELVIS W/O CONTRAST: CPT

## 2021-10-13 RX ORDER — HYDROMORPHONE HYDROCHLORIDE 1 MG/ML
0.5 INJECTION, SOLUTION INTRAMUSCULAR; INTRAVENOUS; SUBCUTANEOUS ONCE
Status: COMPLETED | OUTPATIENT
Start: 2021-10-14 | End: 2021-10-14

## 2021-10-13 RX ORDER — MORPHINE SULFATE 4 MG/ML
4 INJECTION, SOLUTION INTRAMUSCULAR; INTRAVENOUS ONCE
Status: DISCONTINUED | OUTPATIENT
Start: 2021-10-14 | End: 2021-10-13

## 2021-10-13 RX ORDER — ONDANSETRON 2 MG/ML
4 INJECTION INTRAMUSCULAR; INTRAVENOUS EVERY 30 MIN PRN
Status: DISCONTINUED | OUTPATIENT
Start: 2021-10-13 | End: 2021-10-14 | Stop reason: HOSPADM

## 2021-10-13 RX ORDER — MORPHINE SULFATE 4 MG/ML
4 INJECTION, SOLUTION INTRAMUSCULAR; INTRAVENOUS ONCE
Status: COMPLETED | OUTPATIENT
Start: 2021-10-13 | End: 2021-10-13

## 2021-10-13 RX ADMIN — ONDANSETRON 4 MG: 2 INJECTION INTRAMUSCULAR; INTRAVENOUS at 20:14

## 2021-10-13 RX ADMIN — MORPHINE SULFATE 4 MG: 4 INJECTION, SOLUTION INTRAMUSCULAR; INTRAVENOUS at 20:14

## 2021-10-13 ASSESSMENT — ENCOUNTER SYMPTOMS
CHEST TIGHTNESS: 0
DIARRHEA: 0
ABDOMINAL PAIN: 1
VOMITING: 0
NAUSEA: 1
SHORTNESS OF BREATH: 0

## 2021-10-13 ASSESSMENT — PAIN DESCRIPTION - PAIN TYPE: TYPE: ACUTE PAIN

## 2021-10-13 ASSESSMENT — PAIN DESCRIPTION - LOCATION: LOCATION: FLANK

## 2021-10-13 ASSESSMENT — PAIN DESCRIPTION - ORIENTATION: ORIENTATION: LEFT

## 2021-10-13 ASSESSMENT — PAIN SCALES - GENERAL: PAINLEVEL_OUTOF10: 10

## 2021-10-13 NOTE — ED NOTES
Bed: 12  Expected date:   Expected time:   Means of arrival:   Comments:  Medic [place]- flank pain     Tomás Conde, ASPEN  10/13/21 1922

## 2021-10-13 NOTE — ED PROVIDER NOTES
Lloyd Moon        Pt Name: Carley Solomon  MRN: [id number]  [place] 1940  Date of evaluation: 10/13/2021  Provider: RIZWAN Murphy - CNP  PCP: Jared Parada MD  Note Started: 7:57 PM EDT        I have seen and evaluated this patient with my supervising physician Augie Mota MD.    [address] [place] [address]       Chief Complaint   Patient presents with    Flank Pain     pt brought in by [facility] from [facility] c/o sudden onset of left flank pain radiating into left side of abd.  pt has hx of kidney stones       HISTORY OF PRESENT ILLNESS   (Location, Timing/Onset, Context/Setting, Quality, Duration, Modifying Factors, Severity, Associated Signs and Symptoms)  Note limiting factors. Chief Complaint: left sided flank pain with nausea     Carley Solomon is a 78 y.o. female who presents to the ER with complaint of left sided flank pian with nausea. Onset of symptoms about an hour prior to arrival, describes the pain as throbbing/sharp, rates as 10/10. History of cancer with urostomy and left side. Denies any headache, fever, lightheadedness, dizziness, visual disturbances. No chest pain or pressure. No neck or back pain. No shortness of breath, cough, or congestion. No vomiting, diarrhea, constipation, or dysuria. No rash. Nursing Notes were all reviewed and agreed with or any disagreements were addressed in the HPI. REVIEW OF SYSTEMS    (2-9 systems for level 4, 10 or more for level 5)     Review of Systems   Constitutional: Negative for activity change, chills and fever. Respiratory: Negative for chest tightness and shortness of breath. Cardiovascular: Negative for chest pain. Gastrointestinal: Positive for abdominal pain and nausea. Negative for diarrhea and vomiting. Genitourinary: Positive for flank pain. Negative for dysuria. All other systems reviewed and are negative.       Positives and Pertinent negatives as per HPI. Except as noted above in the ROS, all other systems were reviewed and negative. PAST MEDICAL HISTORY     Past Medical History:   Diagnosis Date    Anxiety     Atrial fibrillation [facility]     CAD (coronary artery disease)     Cancer ([facility]     bladder right kidney     Cardiac arrest ([facility] 3/27/2016    Carotid artery stenosis     CHF (congestive heart failure) (HCC)     Chronic kidney disease     cancer of kidney and bladder    Depression     Diabetes mellitus (HCC)     IDDM    GERD (gastroesophageal reflux disease)     Hip pain, chronic     HYPERCHOLESTERAEMIA     Hypertension     Irritable bowel syndrome     Laceration of head 3/25/2016    fall with cardiac arrest    MI, old     Neuropathy     Osteoarthritis     Presence of combination internal cardiac defibrillator (ICD) and pacemaker     Staph infection     PT. STATES SHE HAS HAD SEVERAL BOILS WITH STAPH LAST ONE 20 YEARS AGO.  Type 2 diabetes mellitus without complication ([facility]     Urinary incontinence          SURGICAL HISTORY     Past Surgical History:   Procedure Laterality Date    ANGIOPLASTY      x3  2009    BLADDER REMOVAL      BLADDER SUSPENSION      CYSTOSCOPY  5/28/2013    with dilitation    HYSTERECTOMY      KIDNEY REMOVAL      right    PACEMAKER INSERTION      SKIN CANCER EXCISION      TOTAL HIP ARTHROPLASTY Left 11/6/2019    LEFT ANTERIOR TOTAL HIP REPLACEMENT WITH C-ARM performed by Octavio Mccarty MD at [address]       Current Discharge Medication List      CONTINUE these medications which have NOT CHANGED    Details   ondansetron (ZOFRAN ODT) 4 MG disintegrating tablet Take 1-2 tablets by mouth every 12 hours as needed for Nausea May Sub regular tablet (non-ODT) if insurance does not cover ODT.   Qty: 12 tablet, Refills: 0      hydrALAZINE (APRESOLINE) 25 MG tablet TAKE ONE TABLET BY MOUTH THREE TIMES A DAY  Qty: 90 tablet, Refills: 11      carvedilol (COREG) 12.5 MG tablet TAKE ONE TABLET BY MOUTH TWICE A DAY  Qty: 180 tablet, Refills: 2      aspirin 81 MG EC tablet Take 1 tablet by mouth daily HOLD for 30 days after hip surgery. See Eliquis order  Qty: 30 tablet, Refills: 3      acetaminophen (TYLENOL) 500 MG tablet Take 1,000 mg by mouth daily as needed for Pain       insulin glargine (LANTUS) 100 UNIT/ML injection vial Inject 18 Units into the skin nightly       sodium bicarbonate 650 MG tablet Take 650 mg by mouth 2 times daily       ALPRAZolam (XANAX) 0.25 MG tablet Take 0.25 mg by mouth nightly as needed for Sleep (patient taking during the day to calm herself down). rosuvastatin (CRESTOR) 10 MG tablet Take 10 mg by mouth every evening       Coenzyme Q10 (CO Q 10) 100 MG CAPS Take 1 capsule by mouth daily       fluticasone (FLONASE) 50 MCG/ACT nasal spray 1 spray by Nasal route as needed for Rhinitis      therapeutic multivitamin-minerals (THERAGRAN-M) tablet Take 1 tablet by mouth daily.  Centrum silver       vitamin D (CHOLECALCIFEROL) 1000 UNIT TABS tablet Take 1,000 Units by mouth daily                ALLERGIES     Amlodipine besylate, Calcium channel blockers, Citalopram hydrobromide, Flagyl [metronidazole], Losartan, Pneumococcal vaccine, Simvastatin, Valsartan-hydrochlorothiazide, Venlafaxine, Hydrocodone-acetaminophen, and Nitrofurantoin    FAMILYHISTORY       Family History   Problem Relation Age of Onset    Diabetes Mother     Heart Disease Father     Cancer Father           SOCIAL HISTORY       Social History     Tobacco Use    Smoking status: Never Smoker    Smokeless tobacco: Never Used   Vaping Use    Vaping Use: Never used   Substance Use Topics    Alcohol use: No    Drug use: No       SCREENINGS             PHYSICAL EXAM    (up to 7 for level 4, 8 or more for level 5)     ED Triage Vitals [10/13/21 1923]   BP Temp Temp Source Pulse Resp SpO2 Height Weight   (!) 181/82 97.7 °F (36.5 °C) Oral 86 20 98 % 5' 8\" (1.727 m) 144 lb (65.3 kg)       Physical Exam  Vitals and nursing note reviewed. Constitutional:       Appearance: She is well-developed. She is not diaphoretic. HENT:      Head: Normocephalic and atraumatic. Right Ear: External ear normal.      Left Ear: External ear normal.   Eyes:      General:         Right eye: No discharge. Left eye: No discharge. Neck:      Vascular: No JVD. Cardiovascular:      Rate and Rhythm: Normal rate and regular rhythm. Pulses: Normal pulses. Heart sounds: Normal heart sounds. Pulmonary:      Effort: Pulmonary effort is normal. No respiratory distress. Breath sounds: Normal breath sounds. Abdominal:      Palpations: Abdomen is soft. Tenderness: There is abdominal tenderness. There is guarding. Musculoskeletal:         General: Normal range of motion. Cervical back: Normal range of motion and neck supple. Skin:     General: Skin is warm and dry. Coloration: Skin is not pale. Neurological:      Mental Status: She is alert and oriented to person, place, and time.    Psychiatric:         Behavior: Behavior normal.         DIAGNOSTIC RESULTS   LABS:    Labs Reviewed   CBC WITH AUTO DIFFERENTIAL - Abnormal; Notable for the following components:       Result Value    RBC 3.57 (*)     Hemoglobin 11.1 (*)     Hematocrit 33.4 (*)     Lymphocytes Absolute 0.9 (*)     All other components within normal limits    Narrative:     Performed at:  [facility] [address] [place]   Phone [phone]   COMPREHENSIVE METABOLIC PANEL - Abnormal; Notable for the following components:    Glucose 280 (*)     BUN 33 (*)     CREATININE 2.0 (*)     GFR Non- 24 (*)     GFR African American 29 (*)     Albumin/Globulin Ratio 1.0 (*)     ALT 7 (*)     AST 12 (*)     All other components within normal limits    Narrative:     Performed at:  [facility]  [address] [facility]  [place]   Phone [phone]   URINE RT REFLEX TO CULTURE - Abnormal; Notable for the following components:    Clarity, UA TURBID (*)     Blood, Urine MODERATE (*)     Protein,  (*)     Leukocyte Esterase, Urine LARGE (*)     All other components within normal limits    Narrative:     Performed at:  [facility] [address],  [place], [facility] [place]   Phone [phone]   MICROSCOPIC URINALYSIS - Abnormal; Notable for the following components:    Bacteria, UA 2+ (*)     WBC,  (*)     RBC, UA 8 (*)     All other components within normal limits    Narrative:     Performed at:  [facility] [address],  [place], [facility] [place]   Phone [phone]   CULTURE, URINE   CULTURE, URINE   LACTATE, SEPSIS    Narrative:     Performed at:  [facility] [address], [facility] [place]   Phone [phone]   LIPASE    Narrative:     Performed at:  [facility] [address] [facility],  [place] [facility] [place]   Phone [phone]       When ordered only abnormal lab results are displayed. All other labs were within normal range or not returned as of this dictation. EKG: When ordered, EKG's are interpreted by the Emergency Department Physician in the absence of a cardiologist.  Please see their note for interpretation of EKG. RADIOLOGY:   Non-plain film images such as CT, Ultrasound and MRI are read by the radiologist. Plain radiographic images are visualized and preliminarily interpreted by the ED Provider with the below findings:        Interpretation per the Radiologist below, if available at the time of this note:    CT ABDOMEN PELVIS WO CONTRAST Additional Contrast? None   Final Result   Moderate severe left-sided hydroureteronephrosis. There is wall thickening   in surrounding inflammatory changes along the neobladder/ileal conduit and   involving the left perinephric fat planes.   This may be related to underlying   infection versus outlet obstruction caused by a new herniated bowel loop   through the site of urostomy. Please correlate urinalysis findings. Urologic consultation may be beneficial.      Colonic diverticulosis. No results found.         PROCEDURES   Unless otherwise noted below, none     Procedures    CRITICAL CARE TIME   N/A    CONSULTS:  IP CONSULT TO UROLOGY  IP CONSULT TO INTERNAL MEDICINE      EMERGENCY DEPARTMENT COURSE and DIFFERENTIAL DIAGNOSIS/MDM:   Vitals:    Vitals:    10/14/21 0100 10/14/21 0115 10/14/21 0130 10/14/21 0200   BP: (!) 164/67 (!) 156/64 (!) 161/62 (!) 156/64   Pulse:       Resp:       Temp:       TempSrc:       SpO2: 99% 96% 96% 96%   Weight:       Height:           Patient was given the following medications:  Medications   acetaminophen (TYLENOL) tablet 650 mg (has no administration in time range)   ALPRAZolam (XANAX) tablet 0.25 mg (has no administration in time range)   aspirin EC tablet 81 mg (has no administration in time range)   carvedilol (COREG) tablet 12.5 mg (has no administration in time range)   fluticasone (FLONASE) 50 MCG/ACT nasal spray 1 spray (has no administration in time range)   hydrALAZINE (APRESOLINE) tablet 25 mg (has no administration in time range)   insulin glargine (LANTUS) injection vial 18 Units (has no administration in time range)   ondansetron (ZOFRAN-ODT) disintegrating tablet 4 mg (has no administration in time range)   rosuvastatin (CRESTOR) tablet 10 mg (has no administration in time range)   sodium bicarbonate tablet 650 mg (has no administration in time range)   therapeutic multivitamin-minerals 1 tablet (has no administration in time range)   vitamin D (CHOLECALCIFEROL) tablet 1,000 Units (has no administration in time range)   0.9 % sodium chloride infusion (has no administration in time range)   cefTRIAXone (ROCEPHIN) 1000 mg in sterile water 10 mL IV syringe (has no administration in time range) enoxaparin (LOVENOX) injection 30 mg (has no administration in time range)   HYDROmorphone HCl PF (DILAUDID) injection 0.5 mg (has no administration in time range)   morphine injection 4 mg (4 mg IntraVENous Given 10/13/21 2014)   cefTRIAXone (ROCEPHIN) 1000 mg in sterile water 10 mL IV syringe (1,000 mg IntraVENous Given 10/14/21 0009)   HYDROmorphone HCl PF (DILAUDID) injection 0.5 mg (0.5 mg IntraVENous Given 10/14/21 0007)           Briefly, this is a 78year old female that presents to the emergency department with acute left-sided flank pain. 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 CBC is unremarkable. CMP shows glucose of 280 with CATARINA, creatinine 2.0. Lactate 1.4. Lipase 41. UA shows infection with bacteria 2+ WBCs of 265. CT ABDOMEN PELVIS WO CONTRAST Additional Contrast? None (Final result)  Result time 10/13/21 23:51:02  Procedure changed from [facility] Additional Contrast? None  Final result by Alem Giordaon MD (10/13/21 23:51:02)                Impression:    Moderate severe left-sided hydroureteronephrosis. Windell Keara is wall thickening   in surrounding inflammatory changes along the neobladder/ileal conduit and   involving the left perinephric fat planes.  This may be related to underlying   infection versus outlet obstruction caused by a new herniated bowel loop   through the site of urostomy.  Please correlate urinalysis findings. Urologic consultation may be beneficial.               Urology called, spoke with dr. Kathia Danielle, he does recommend abx and admission. NPO    Patient is admitted to Dr. Mika Dang as she is from out [facility]. Updated regarding plan of care. Pain was sufficiently managed in the emergency department. FINAL IMPRESSION      1. Hydroureteronephrosis    2.  Urinary tract infection in female          DISPOSITION/PLAN   DISPOSITION Admitted 10/14/2021 01:07:00 AM      PATIENT REFERRED TO:  No follow-up provider specified.     DISCHARGE MEDICATIONS:  Current Discharge Medication List          DISCONTINUED MEDICATIONS:  Current Discharge Medication List                 (Please note that portions of this note were completed with a voice recognition program.  Efforts were made to edit the dictations but occasionally words are mis-transcribed.)    RIZWAN Castillo CNP (electronically signed)            RIZWAN Castillo CNP  10/14/21 [id number]

## 2021-10-14 PROBLEM — N28.89 RIGHT RENAL MASS: Chronic | Status: ACTIVE | Noted: 2017-04-27

## 2021-10-14 PROBLEM — Z93.6 STATUS POST ILEAL CONDUIT (HCC): Status: RESOLVED | Noted: 2017-09-12 | Resolved: 2021-10-14

## 2021-10-14 PROBLEM — I50.23 ACUTE ON CHRONIC SYSTOLIC HEART FAILURE (HCC): Status: RESOLVED | Noted: 2017-08-28 | Resolved: 2021-10-14

## 2021-10-14 PROBLEM — N13.4 HYDROURETER ON LEFT: Status: ACTIVE | Noted: 2021-10-14

## 2021-10-14 PROBLEM — Z51.5 ENCOUNTER FOR PALLIATIVE CARE: Status: RESOLVED | Noted: 2017-09-12 | Resolved: 2021-10-14

## 2021-10-14 PROBLEM — D50.9 IRON DEFICIENCY ANEMIA: Status: RESOLVED | Noted: 2017-09-12 | Resolved: 2021-10-14

## 2021-10-14 PROBLEM — E83.42 HYPOMAGNESEMIA: Status: RESOLVED | Noted: 2017-08-29 | Resolved: 2021-10-14

## 2021-10-14 PROBLEM — Z71.89 ADVANCE DIRECTIVE DISCUSSED WITH PATIENT: Status: RESOLVED | Noted: 2017-09-12 | Resolved: 2021-10-14

## 2021-10-14 PROBLEM — C65.9 MALIGNANT NEOPLASM OF RENAL PELVIS (HCC): Status: RESOLVED | Noted: 2017-06-27 | Resolved: 2021-10-14

## 2021-10-14 PROBLEM — E87.6 DECREASED POTASSIUM IN THE BLOOD: Status: RESOLVED | Noted: 2017-08-30 | Resolved: 2021-10-14

## 2021-10-14 PROBLEM — N20.0 KIDNEY STONE: Status: ACTIVE | Noted: 2021-10-14

## 2021-10-14 LAB
BACTERIA: ABNORMAL /HPF
BILIRUBIN URINE: NEGATIVE
BLOOD, URINE: ABNORMAL
CLARITY: ABNORMAL
COLOR: YELLOW
COMMENT UA: ABNORMAL
EPITHELIAL CELLS, UA: 0 /HPF (ref 0–5)
GLUCOSE BLD-MCNC: 148 MG/DL (ref 70–99)
GLUCOSE BLD-MCNC: 181 MG/DL (ref 70–99)
GLUCOSE BLD-MCNC: 190 MG/DL (ref 70–99)
GLUCOSE BLD-MCNC: 191 MG/DL (ref 70–99)
GLUCOSE URINE: NEGATIVE MG/DL
HYALINE CASTS: 1 /LPF (ref 0–8)
KETONES, URINE: NEGATIVE MG/DL
LEUKOCYTE ESTERASE, URINE: ABNORMAL
MICROSCOPIC EXAMINATION: YES
NITRITE, URINE: NEGATIVE
PERFORMED ON: ABNORMAL
PH UA: 7 (ref 5–8)
PROTEIN UA: 30 MG/DL
RBC UA: 11 /HPF (ref 0–4)
SPECIFIC GRAVITY UA: 1.01 (ref 1–1.03)
URINE REFLEX TO CULTURE: YES
URINE TYPE: ABNORMAL
UROBILINOGEN, URINE: 0.2 E.U./DL
WBC UA: 356 /HPF (ref 0–5)
YEAST: PRESENT /HPF

## 2021-10-14 PROCEDURE — 6360000002 HC RX W HCPCS: Performed by: INTERNAL MEDICINE

## 2021-10-14 PROCEDURE — 6370000000 HC RX 637 (ALT 250 FOR IP): Performed by: INTERNAL MEDICINE

## 2021-10-14 PROCEDURE — 87086 URINE CULTURE/COLONY COUNT: CPT

## 2021-10-14 PROCEDURE — 81001 URINALYSIS AUTO W/SCOPE: CPT

## 2021-10-14 PROCEDURE — 99284 EMERGENCY DEPT VISIT MOD MDM: CPT

## 2021-10-14 PROCEDURE — 96375 TX/PRO/DX INJ NEW DRUG ADDON: CPT

## 2021-10-14 PROCEDURE — 2580000003 HC RX 258: Performed by: INTERNAL MEDICINE

## 2021-10-14 PROCEDURE — 1200000000 HC SEMI PRIVATE

## 2021-10-14 PROCEDURE — 99223 1ST HOSP IP/OBS HIGH 75: CPT | Performed by: SURGERY

## 2021-10-14 PROCEDURE — 6360000002 HC RX W HCPCS: Performed by: NURSE PRACTITIONER

## 2021-10-14 RX ORDER — ASPIRIN 81 MG/1
81 TABLET ORAL DAILY
Status: DISCONTINUED | OUTPATIENT
Start: 2021-10-14 | End: 2021-10-18 | Stop reason: HOSPADM

## 2021-10-14 RX ORDER — ROSUVASTATIN CALCIUM 10 MG/1
10 TABLET, COATED ORAL EVERY EVENING
Status: DISCONTINUED | OUTPATIENT
Start: 2021-10-14 | End: 2021-10-18 | Stop reason: HOSPADM

## 2021-10-14 RX ORDER — M-VIT,TX,IRON,MINS/CALC/FOLIC 27MG-0.4MG
1 TABLET ORAL DAILY
Status: DISCONTINUED | OUTPATIENT
Start: 2021-10-14 | End: 2021-10-18 | Stop reason: HOSPADM

## 2021-10-14 RX ORDER — CARVEDILOL 6.25 MG/1
12.5 TABLET ORAL 2 TIMES DAILY
Status: DISCONTINUED | OUTPATIENT
Start: 2021-10-14 | End: 2021-10-18 | Stop reason: HOSPADM

## 2021-10-14 RX ORDER — CEFUROXIME AXETIL 250 MG/1
250 TABLET ORAL DAILY
Status: DISCONTINUED | OUTPATIENT
Start: 2021-10-14 | End: 2021-10-18 | Stop reason: HOSPADM

## 2021-10-14 RX ORDER — NICOTINE POLACRILEX 4 MG
15 LOZENGE BUCCAL PRN
Status: DISCONTINUED | OUTPATIENT
Start: 2021-10-14 | End: 2021-10-18 | Stop reason: HOSPADM

## 2021-10-14 RX ORDER — VITAMIN B COMPLEX
1000 TABLET ORAL DAILY
Status: DISCONTINUED | OUTPATIENT
Start: 2021-10-14 | End: 2021-10-18 | Stop reason: HOSPADM

## 2021-10-14 RX ORDER — HYDROMORPHONE HYDROCHLORIDE 1 MG/ML
0.5 INJECTION, SOLUTION INTRAMUSCULAR; INTRAVENOUS; SUBCUTANEOUS EVERY 4 HOURS PRN
Status: DISCONTINUED | OUTPATIENT
Start: 2021-10-14 | End: 2021-10-18 | Stop reason: HOSPADM

## 2021-10-14 RX ORDER — SODIUM BICARBONATE 650 MG/1
650 TABLET ORAL 2 TIMES DAILY
Status: DISCONTINUED | OUTPATIENT
Start: 2021-10-14 | End: 2021-10-18 | Stop reason: HOSPADM

## 2021-10-14 RX ORDER — HYDRALAZINE HYDROCHLORIDE 25 MG/1
25 TABLET, FILM COATED ORAL EVERY 8 HOURS SCHEDULED
Status: DISCONTINUED | OUTPATIENT
Start: 2021-10-14 | End: 2021-10-18 | Stop reason: HOSPADM

## 2021-10-14 RX ORDER — ALPRAZOLAM 0.5 MG/1
0.25 TABLET ORAL NIGHTLY PRN
Status: DISCONTINUED | OUTPATIENT
Start: 2021-10-14 | End: 2021-10-18 | Stop reason: HOSPADM

## 2021-10-14 RX ORDER — DEXTROSE MONOHYDRATE 50 MG/ML
100 INJECTION, SOLUTION INTRAVENOUS PRN
Status: DISCONTINUED | OUTPATIENT
Start: 2021-10-14 | End: 2021-10-18 | Stop reason: HOSPADM

## 2021-10-14 RX ORDER — ACETAMINOPHEN 325 MG/1
650 TABLET ORAL EVERY 4 HOURS PRN
Status: DISCONTINUED | OUTPATIENT
Start: 2021-10-14 | End: 2021-10-18 | Stop reason: HOSPADM

## 2021-10-14 RX ORDER — ONDANSETRON 4 MG/1
4 TABLET, ORALLY DISINTEGRATING ORAL EVERY 8 HOURS PRN
Status: DISCONTINUED | OUTPATIENT
Start: 2021-10-14 | End: 2021-10-18 | Stop reason: HOSPADM

## 2021-10-14 RX ORDER — SODIUM CHLORIDE 9 MG/ML
INJECTION, SOLUTION INTRAVENOUS CONTINUOUS
Status: DISCONTINUED | OUTPATIENT
Start: 2021-10-14 | End: 2021-10-18 | Stop reason: HOSPADM

## 2021-10-14 RX ORDER — DEXTROSE MONOHYDRATE 25 G/50ML
12.5 INJECTION, SOLUTION INTRAVENOUS PRN
Status: DISCONTINUED | OUTPATIENT
Start: 2021-10-14 | End: 2021-10-18 | Stop reason: HOSPADM

## 2021-10-14 RX ORDER — FLUTICASONE PROPIONATE 50 MCG
1 SPRAY, SUSPENSION (ML) NASAL PRN
Status: DISCONTINUED | OUTPATIENT
Start: 2021-10-14 | End: 2021-10-18 | Stop reason: HOSPADM

## 2021-10-14 RX ADMIN — HYDRALAZINE HYDROCHLORIDE 25 MG: 25 TABLET, FILM COATED ORAL at 15:05

## 2021-10-14 RX ADMIN — SODIUM CHLORIDE: 9 INJECTION, SOLUTION INTRAVENOUS at 14:22

## 2021-10-14 RX ADMIN — CEFUROXIME AXETIL 250 MG: 250 TABLET ORAL at 19:24

## 2021-10-14 RX ADMIN — HYDRALAZINE HYDROCHLORIDE 25 MG: 25 TABLET, FILM COATED ORAL at 05:19

## 2021-10-14 RX ADMIN — Medication 1000 UNITS: at 09:07

## 2021-10-14 RX ADMIN — ONDANSETRON 4 MG: 4 TABLET, ORALLY DISINTEGRATING ORAL at 03:48

## 2021-10-14 RX ADMIN — SODIUM BICARBONATE 650 MG: 650 TABLET ORAL at 21:04

## 2021-10-14 RX ADMIN — HYDROMORPHONE HYDROCHLORIDE 0.5 MG: 1 INJECTION, SOLUTION INTRAMUSCULAR; INTRAVENOUS; SUBCUTANEOUS at 04:08

## 2021-10-14 RX ADMIN — ROSUVASTATIN 10 MG: 10 TABLET, FILM COATED ORAL at 19:24

## 2021-10-14 RX ADMIN — Medication 1000 MG: at 00:09

## 2021-10-14 RX ADMIN — MULTIPLE VITAMINS W/ MINERALS TAB 1 TABLET: TAB at 09:07

## 2021-10-14 RX ADMIN — HYDRALAZINE HYDROCHLORIDE 25 MG: 25 TABLET, FILM COATED ORAL at 23:02

## 2021-10-14 RX ADMIN — SODIUM CHLORIDE: 9 INJECTION, SOLUTION INTRAVENOUS at 03:09

## 2021-10-14 RX ADMIN — ASPIRIN 81 MG: 81 TABLET, COATED ORAL at 09:07

## 2021-10-14 RX ADMIN — CARVEDILOL 12.5 MG: 6.25 TABLET, FILM COATED ORAL at 09:07

## 2021-10-14 RX ADMIN — Medication: at 21:05

## 2021-10-14 RX ADMIN — CARVEDILOL 12.5 MG: 6.25 TABLET, FILM COATED ORAL at 21:04

## 2021-10-14 RX ADMIN — INSULIN GLARGINE 18 UNITS: 100 INJECTION, SOLUTION SUBCUTANEOUS at 22:17

## 2021-10-14 RX ADMIN — HYDROMORPHONE HYDROCHLORIDE 0.5 MG: 1 INJECTION, SOLUTION INTRAMUSCULAR; INTRAVENOUS; SUBCUTANEOUS at 00:07

## 2021-10-14 RX ADMIN — SODIUM BICARBONATE 650 MG: 650 TABLET ORAL at 09:07

## 2021-10-14 ASSESSMENT — PAIN DESCRIPTION - LOCATION
LOCATION: ABDOMEN
LOCATION: KNEE

## 2021-10-14 ASSESSMENT — PAIN SCALES - GENERAL
PAINLEVEL_OUTOF10: 0
PAINLEVEL_OUTOF10: 0
PAINLEVEL_OUTOF10: 3
PAINLEVEL_OUTOF10: 0
PAINLEVEL_OUTOF10: 9
PAINLEVEL_OUTOF10: 0
PAINLEVEL_OUTOF10: 10

## 2021-10-14 ASSESSMENT — PAIN DESCRIPTION - ONSET: ONSET: GRADUAL

## 2021-10-14 ASSESSMENT — PAIN DESCRIPTION - FREQUENCY: FREQUENCY: INTERMITTENT

## 2021-10-14 ASSESSMENT — ENCOUNTER SYMPTOMS
COLOR CHANGE: 0
APNEA: 0
ABDOMINAL PAIN: 1
CHEST TIGHTNESS: 0
EYE ITCHING: 0
BACK PAIN: 0
EYE DISCHARGE: 0
NAUSEA: 1

## 2021-10-14 ASSESSMENT — PAIN DESCRIPTION - ORIENTATION
ORIENTATION: MID
ORIENTATION: RIGHT

## 2021-10-14 ASSESSMENT — PAIN DESCRIPTION - PAIN TYPE
TYPE: ACUTE PAIN
TYPE: ACUTE PAIN

## 2021-10-14 ASSESSMENT — PAIN DESCRIPTION - DESCRIPTORS: DESCRIPTORS: ACHING

## 2021-10-14 ASSESSMENT — PAIN DESCRIPTION - PROGRESSION: CLINICAL_PROGRESSION: NOT CHANGED

## 2021-10-14 NOTE — ED NOTES
Went into the room with RN for report and Pt tearful talking about her medical condition and that she doesn't see her family often. Pt discussing how she use to have cancer and that she has concerns about the potential of having cancer again. She stated that her  passed away awhile ago and that his birthday is on [place]. Sat at the bedside with other RN and discussed things with Pt. Pt provided with warm blanket and ice chips for comfort.       Amee Goodman RN  10/14/21 1381

## 2021-10-14 NOTE — CONSULTS
Estefanía Anne-Marie CELAYA    Chief complaint-left lower back and left lower quadrant abdominal pain    HPI: 78-year-old female with a history of a right nephrectomy, cystectomy and ileal conduit in 2017 per Dr. Emigdio Masters. She presented to the emergency room yesterday with acute onset of left CVA tenderness radiating around the left lower quadrant and groin. Pain is described as dull. It is of moderately severe intensity. Nothing made it better or worse. The only associated symptoms are nausea and mild anorexia. No history of vomiting, fevers, chills, change in bowel habits or urinary symptoms. Past Medical History:   Diagnosis Date    Anxiety     Atrial fibrillation ([address]     CAD (coronary artery disease)     Cancer (HCC)     bladder right kidney     Cardiac arrest ([facility] 3/27/2016    Carotid artery stenosis     CHF (congestive heart failure) (HCC)     Chronic kidney disease     cancer of kidney and bladder    Depression     Diabetes mellitus (HCC)     IDDM    GERD (gastroesophageal reflux disease)     Hip pain, chronic     HYPERCHOLESTERAEMIA     Hypertension     Irritable bowel syndrome     Laceration of head 3/25/2016    fall with cardiac arrest    MI, old     Neuropathy     Osteoarthritis     Presence of combination internal cardiac defibrillator (ICD) and pacemaker     Staph infection     PT. STATES SHE HAS HAD SEVERAL BOILS WITH STAPH LAST ONE 20 YEARS AGO.     Type 2 diabetes mellitus without complication ([address]     Urinary incontinence        Past Surgical History:   Procedure Laterality Date    ANGIOPLASTY      x3  2009    BLADDER REMOVAL      BLADDER SUSPENSION      CYSTOSCOPY  5/28/2013    with dilitation    HYSTERECTOMY      KIDNEY REMOVAL      right    PACEMAKER INSERTION      SKIN CANCER EXCISION      TOTAL HIP ARTHROPLASTY Left 11/6/2019    LEFT ANTERIOR TOTAL HIP REPLACEMENT WITH C-ARM performed by Laura Isaac MD at [address] [place] [address] History    Marital status:      Spouse name: Not on file    Number of children: Not on file    Years of education: Not on file    Highest education level: Not on file   Occupational History    Not on file   Tobacco Use    Smoking status: Never Smoker    Smokeless tobacco: Never Used   Vaping Use    Vaping Use: Never used   Substance and Sexual Activity    Alcohol use: No    Drug use: No    Sexual activity: Not Currently   Other Topics Concern    Not on file   Social History Narrative    Not on file     Social Determinants of Health     Financial Resource Strain:     Difficulty of Paying Living Expenses:    Food Insecurity:     Worried About Running Out of Food in the Last Year:     [address] in the Last Year:    Transportation Needs:     Lack of Transportation (Medical):  Lack of Transportation (Non-Medical):    Physical Activity:     Days of Exercise per Week:     Minutes of Exercise per Session:    Stress:     Feeling of Stress :    Social Connections:     Frequency of Communication with Friends and Family:     Frequency of Social Gatherings with Friends and Family:     Attends [employer]ism Services:     Active Member of Clubs or Organizations:     Attends Club or Organization Meetings:     Marital Status:    Intimate Partner Violence:     Fear of Current or Ex-Partner:     Emotionally Abused:     Physically Abused:     Sexually Abused: Allergies: Allergies   Allergen Reactions    Amlodipine Besylate      Other reaction(s): Unknown    Calcium Channel Blockers      Other reaction(s): Unknown    Citalopram Hydrobromide      Other reaction(s): Unknown    Flagyl [Metronidazole]      Took with Cipro and it made her vomit    Losartan      Muscle aches  Muscle aches      Pneumococcal Vaccine Swelling     Swelling at injection site    Simvastatin      myalgias  ?       Valsartan-Hydrochlorothiazide      Does not remember    Venlafaxine Nausea Only    Hydrocodone-Acetaminophen Other (See Comments)     Makes patient feel weird, dizzy, nauseous, and sleepy. Patient states she is not allergic to this    Nitrofurantoin Nausea And Vomiting and Other (See Comments)     Other reaction(s): Dizziness, GI Upset  Unsure if Macrobid caused it  Unsure if Macrobid caused it         Prior to Admission medications    Medication Sig Start Date End Date Taking? Authorizing Provider   ondansetron (ZOFRAN ODT) 4 MG disintegrating tablet Take 1-2 tablets by mouth every 12 hours as needed for Nausea May Sub regular tablet (non-ODT) if insurance does not cover ODT. 7/11/21  Yes Michelle Carlson PA-C   hydrALAZINE (APRESOLINE) 25 MG tablet TAKE ONE TABLET BY MOUTH THREE TIMES A DAY 5/3/21  Yes Candelario Cantrell MD   carvedilol (COREG) 12.5 MG tablet TAKE ONE TABLET BY MOUTH TWICE A DAY 2/8/21  Yes Candelario Cantrell MD   aspirin 81 MG EC tablet Take 1 tablet by mouth daily HOLD for 30 days after hip surgery. See Eliquis order 11/6/19  Yes Kenyon Caraballo, APRN - CNP   acetaminophen (TYLENOL) 500 MG tablet Take 1,000 mg by mouth daily as needed for Pain    Yes Historical Provider, MD   insulin glargine (LANTUS) 100 UNIT/ML injection vial Inject 18 Units into the skin nightly    Yes Historical Provider, MD   sodium bicarbonate 650 MG tablet Take 650 mg by mouth 2 times daily    Yes Historical Provider, MD   ALPRAZolam Robbin Patience) 0.25 MG tablet Take 0.25 mg by mouth nightly as needed for Sleep (patient taking during the day to calm herself down). Yes Historical Provider, MD   rosuvastatin (CRESTOR) 10 MG tablet Take 10 mg by mouth every evening    Yes Historical Provider, MD   Coenzyme Q10 (CO Q 10) 100 MG CAPS Take 1 capsule by mouth daily    Yes Historical Provider, MD   fluticasone (FLONASE) 50 MCG/ACT nasal spray 1 spray by Nasal route as needed for Rhinitis   Yes Historical Provider, MD   therapeutic multivitamin-minerals (THERAGRAN-M) tablet Take 1 tablet by mouth daily. Centrum silver    Yes Historical Provider, MD   vitamin D (CHOLECALCIFEROL) 1000 UNIT TABS tablet Take 1,000 Units by mouth daily    Yes Historical Provider, MD       Active Problems:    Pyelonephritis  Resolved Problems:    * No resolved hospital problems. *      Blood pressure (!) 152/72, pulse 87, temperature 98.4 °F (36.9 °C), temperature source Oral, resp. rate 16, height 5' 8\" (1.727 m), weight 143 lb 14.4 oz (65.3 kg), SpO2 93 %, not currently breastfeeding. Review of Systems   Constitutional: Positive for appetite change. Negative for activity change, chills and fever. HENT: Negative for congestion and dental problem. Eyes: Negative for discharge and itching. Respiratory: Negative for apnea and chest tightness. Cardiovascular: Negative for chest pain and leg swelling. Gastrointestinal: Positive for abdominal pain and nausea. Endocrine: Negative for cold intolerance and heat intolerance. Genitourinary: Negative for difficulty urinating and dyspareunia. Musculoskeletal: Negative for arthralgias and back pain. Skin: Negative for color change and pallor. Allergic/Immunologic: Negative for environmental allergies and food allergies. Neurological: Negative for dizziness and facial asymmetry. Hematological: Negative for adenopathy. Does not bruise/bleed easily. Psychiatric/Behavioral: Negative for agitation and behavioral problems. Physical Exam  Constitutional:       Appearance: She is well-developed. HENT:      Head: Normocephalic and atraumatic. Right Ear: External ear normal.      Left Ear: External ear normal.   Eyes:      Conjunctiva/sclera: Conjunctivae normal.   Cardiovascular:      Rate and Rhythm: Normal rate and regular rhythm. Pulmonary:      Effort: Pulmonary effort is normal.      Breath sounds: Normal breath sounds. Abdominal:      General: There is no distension. Palpations: Abdomen is soft.       Comments: Mild tenderness around right lower quadrant urostomy. There is clear yellow urine in the bag. Musculoskeletal:         General: Normal range of motion. Cervical back: Normal range of motion and neck supple. Skin:     General: Skin is warm and dry. Neurological:      Mental Status: She is alert and oriented to person, place, and time. Psychiatric:         Behavior: Behavior normal.         Assessment:  78-year-old female with a history of a right nephrectomy, cystectomy and ileal conduit in 2017 per Dr. Sherrell Spence. She presented to the emergent yesterday with acute onset of left CVA tenderness radiating around to the left groin. The only associated symptoms were nausea and mild anorexia. On physical examination, she is not ill-appearing. Her abdomen is soft with mild peristomal tenderness. She is making clear yellow urine. BUN and creatinine are 33 and 2.0. White blood count is normal at 6.6. CAT scan of the abdomen and pelvis showed left-sided hydronephrosis. 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 The bowel is not compromised clinically or by CAT scan. The ileal conduit is not significantly distended but it does appear that the likely site of obstruction is the abdominal wall. The question is whether the increasingly larger parastomal hernia is causing some pressure on the stoma leading to obstruction. The case was discussed with Dr. Gale Buck. He will plan to place a catheter through the stomal opening to see if this leads to improvement with the hydronephrosis. Plan:  No decision has been made about surgical repair of the parastomal hernia. We will follow with you.     Lovely Islas MD  10/14/2021

## 2021-10-14 NOTE — CONSULTS
Urology Consult Note  [facility]     Patient: Tristian Contreras MRN: [id number]  Room/Bed: [facility]37/9624-17   YOB: 1940  Age/Sex: 78 y. o.female  Admission Date: 10/13/2021     Date of Service:  10/14/2021    Consulting Provider: RIZWAN Ambrosio CNP  Admitting/Requesting Physician: Marifre Tan MD  Primary Care Physician: Ivette Bonilla MD    Reason for Consult: Hydronephrosis    ASSESSMENT/PLAN     77 yo female with hx of hysterectomy, right renal cancer and bladder cancer s/p right nephrectomy and cystectomy w/ ileal conduit. Presented with left colic. CT a/p shows severe to moderate Left hydro possibly form left hernia near mirella bladder. UA with 265 wbc +2 bacteria. Cr 1.5 on admission and now 2.0. Urostomy CDI with CYU. Recommendations:  -UA will always show infection with ileal conduit. Should be treated for UTI if fever or leukocytosis or other clinical signs of infection. Unlikely infection causing hydronephrosis.   -Unsure if possible left hernia could be cause for hydronephrosis, surgery consulted, appreciate recommendations. -May need cysto stent placement with IR. Keep NPO. All the patients questions were answered. She understands the plan as listed above. HISTORY     Chief Complaint:   Chief Complaint   Patient presents with    Flank Pain     pt brought in by [facility] from [facility] c/o sudden onset of left flank pain radiating into left side of abd.  pt has hx of kidney stones       History of Present Illness: Tristian Contreras is a 78 y.o. female with hydronephrosis and possible hernia. Onset of symptoms was days ago with the same course since that time. Symptoms are aggravated by ileal conduit and hysterectomy. Symptoms improved with pain medication and nausea medication. Associated symptoms include flank pain. Patient also reports hx of nephrectomy and systectomy. She has tried the following treatments: abx, fluids, pain and nausea meds.      Past History:  family history includes Cancer in her father; Diabetes in her mother; Heart Disease in her father. Medications:  Scheduled Meds:   aspirin  81 mg Oral Daily    carvedilol  12.5 mg Oral BID    hydrALAZINE  25 mg Oral 3 times per day    insulin glargine  18 Units SubCUTAneous Nightly    rosuvastatin  10 mg Oral QPM    sodium bicarbonate  650 mg Oral BID    therapeutic multivitamin-minerals  1 tablet Oral Daily    Vitamin D  1,000 Units Oral Daily    [START ON 10/15/2021] cefTRIAXone (ROCEPHIN) IV  1,000 mg IntraVENous Q24H    enoxaparin  30 mg SubCUTAneous Daily     Continuous Infusions:   sodium chloride 100 mL/hr at 10/14/21 0309    dextrose       PRN Meds:acetaminophen, ALPRAZolam, fluticasone, ondansetron, HYDROmorphone, glucose, dextrose, glucagon (rDNA), dextrose    Review of Systems:  Pertinent positives/negatives reviewed in HPI. All other systems reviewed and negative, unless noted below. Constitutional: Negative  Genitourinary:  Left flank pain, Negative  HEENT: Negative   Cardiovascular: Negative   Respiratory: Negative   Gastrointestinal: Negative   Musculoskeletal: Negative   Neurological: Negative   Psychiatric: Negative   Integumentary: Negative     PHYSICAL EXAM     Vitals:    10/14/21 0845   BP: (!) 152/72   Pulse: 87   Resp: 16   Temp: 98.4 °F (36.9 °C)   SpO2: 93%     Constitutional: NAD, well-developed, well-nourished. HEENT: MMM. Hearing intact. Neck: no thyroid masses appreciated. Trachea is midline. Neck appears unremarkable. Lymph: no palpable adenopathy in supraclavicular, or axillary lymph nodes. Cardiovascular: Regular rate. No peripheral edema. ABDOMEN: Urostomy in place drianing clear yellow,  Abdomen soft, non-tender, non-distended. No enlarged liver or spleen. No hernias noted. Stool occult blood not indicated. Respiratory: Respirations are even and non-labored. No audible breath sounds. Genitourinary: no CVAT, pelvic deferred.   Psychiatric: A + O x sudden onset of left flank pain radiating into left side of abd.  pt has hx of kidney stones). Acuity: Acute Type of Exam: Initial FINDINGS: Lower Chest: Cardiomegaly. Pacemaker leads. Mild interstitial edema identified involving lung bases. Minimal hypoventilatory change. Organs: Evaluation challenge by lack contrast. Layering gallstones within the gallbladder. Otherwise, the liver, spleen, adrenal glands, pancreas, and gallbladder unremarkable. Prior right nephrectomy. Severe hydroureteronephrosis involving left kidney. Left perinephric fat stranding. Layering calcific density within the region the iliac conduit may represent stones versus calcified suture material..  There is mild wall thickening along the iliac conduit. Urostomy is slightly lateralized likely due to mass effect from a parastomal hernia containing omental fat and a small knuckle of bowel. The hernia sac containing bowel is new from the prior exams. GI/Bowel: Otherwise no evidence of bowel obstruction. Moderate retained stool rectosigmoid junction may represent fecal impaction versus constipation. Colonic diverticulosis. Appendix not visualized. Pelvis: Bladder is absent. No suspicious adnexal mass. Peritoneum/Retroperitoneum: No free air or free fluid. Small fat containing umbilical hernia. Aortic vascular calcifications. Bones/Soft Tissues: Multilevel degenerate changes of the lumbar spine. Moderate severe left-sided hydroureteronephrosis. There is wall thickening in surrounding inflammatory changes along the neobladder/ileal conduit and involving the left perinephric fat planes. This may be related to underlying infection versus outlet obstruction caused by a new herniated bowel loop through the site of urostomy. Please correlate urinalysis findings. Urologic consultation may be beneficial. Colonic diverticulosis.             Electronically signed by: RIZWAN Wall CNP, 10/14/2021   [facility]  Office contact: [phone]

## 2021-10-15 LAB
ANION GAP SERPL CALCULATED.3IONS-SCNC: 8 MMOL/L (ref 3–16)
BUN BLDV-MCNC: 28 MG/DL (ref 7–20)
CALCIUM SERPL-MCNC: 8.3 MG/DL (ref 8.3–10.6)
CHLORIDE BLD-SCNC: 107 MMOL/L (ref 99–110)
CO2: 23 MMOL/L (ref 21–32)
CREAT SERPL-MCNC: 1.8 MG/DL (ref 0.6–1.2)
GFR AFRICAN AMERICAN: 32
GFR NON-AFRICAN AMERICAN: 27
GLUCOSE BLD-MCNC: 110 MG/DL (ref 70–99)
GLUCOSE BLD-MCNC: 119 MG/DL (ref 70–99)
GLUCOSE BLD-MCNC: 124 MG/DL (ref 70–99)
GLUCOSE BLD-MCNC: 129 MG/DL (ref 70–99)
GLUCOSE BLD-MCNC: 257 MG/DL (ref 70–99)
GLUCOSE BLD-MCNC: 97 MG/DL (ref 70–99)
HCT VFR BLD CALC: 28.6 % (ref 36–48)
HEMOGLOBIN: 9.5 G/DL (ref 12–16)
MCH RBC QN AUTO: 30.7 PG (ref 26–34)
MCHC RBC AUTO-ENTMCNC: 33.1 G/DL (ref 31–36)
MCV RBC AUTO: 92.6 FL (ref 80–100)
ORGANISM: ABNORMAL
ORGANISM: ABNORMAL
PDW BLD-RTO: 14.6 % (ref 12.4–15.4)
PERFORMED ON: ABNORMAL
PERFORMED ON: NORMAL
PLATELET # BLD: 173 K/UL (ref 135–450)
PMV BLD AUTO: 7.6 FL (ref 5–10.5)
POTASSIUM SERPL-SCNC: 4.7 MMOL/L (ref 3.5–5.1)
RBC # BLD: 3.08 M/UL (ref 4–5.2)
SODIUM BLD-SCNC: 138 MMOL/L (ref 136–145)
URINE CULTURE, ROUTINE: ABNORMAL
URINE CULTURE, ROUTINE: ABNORMAL
URINE CULTURE, ROUTINE: NORMAL
WBC # BLD: 6.4 K/UL (ref 4–11)

## 2021-10-15 PROCEDURE — 97165 OT EVAL LOW COMPLEX 30 MIN: CPT

## 2021-10-15 PROCEDURE — 97535 SELF CARE MNGMENT TRAINING: CPT

## 2021-10-15 PROCEDURE — 2580000003 HC RX 258: Performed by: INTERNAL MEDICINE

## 2021-10-15 PROCEDURE — 80048 BASIC METABOLIC PNL TOTAL CA: CPT

## 2021-10-15 PROCEDURE — 6370000000 HC RX 637 (ALT 250 FOR IP): Performed by: INTERNAL MEDICINE

## 2021-10-15 PROCEDURE — 85027 COMPLETE CBC AUTOMATED: CPT

## 2021-10-15 PROCEDURE — 97530 THERAPEUTIC ACTIVITIES: CPT

## 2021-10-15 PROCEDURE — 97161 PT EVAL LOW COMPLEX 20 MIN: CPT

## 2021-10-15 PROCEDURE — 99232 SBSQ HOSP IP/OBS MODERATE 35: CPT | Performed by: SURGERY

## 2021-10-15 PROCEDURE — 1200000000 HC SEMI PRIVATE

## 2021-10-15 RX ORDER — HYDRALAZINE HYDROCHLORIDE 20 MG/ML
10 INJECTION INTRAMUSCULAR; INTRAVENOUS EVERY 6 HOURS PRN
Status: DISCONTINUED | OUTPATIENT
Start: 2021-10-15 | End: 2021-10-18 | Stop reason: HOSPADM

## 2021-10-15 RX ORDER — POTASSIUM CHLORIDE 7.45 MG/ML
10 INJECTION INTRAVENOUS PRN
Status: DISCONTINUED | OUTPATIENT
Start: 2021-10-15 | End: 2021-10-15

## 2021-10-15 RX ORDER — 0.9 % SODIUM CHLORIDE 0.9 %
500 INTRAVENOUS SOLUTION INTRAVENOUS PRN
Status: DISCONTINUED | OUTPATIENT
Start: 2021-10-15 | End: 2021-10-18 | Stop reason: HOSPADM

## 2021-10-15 RX ORDER — POTASSIUM CHLORIDE 20 MEQ/1
40 TABLET, EXTENDED RELEASE ORAL PRN
Status: DISCONTINUED | OUTPATIENT
Start: 2021-10-15 | End: 2021-10-15

## 2021-10-15 RX ADMIN — CARVEDILOL 12.5 MG: 6.25 TABLET, FILM COATED ORAL at 09:36

## 2021-10-15 RX ADMIN — SODIUM BICARBONATE 650 MG: 650 TABLET ORAL at 20:28

## 2021-10-15 RX ADMIN — SODIUM BICARBONATE 650 MG: 650 TABLET ORAL at 09:37

## 2021-10-15 RX ADMIN — SODIUM CHLORIDE: 9 INJECTION, SOLUTION INTRAVENOUS at 23:35

## 2021-10-15 RX ADMIN — SODIUM CHLORIDE: 9 INJECTION, SOLUTION INTRAVENOUS at 00:27

## 2021-10-15 RX ADMIN — CARVEDILOL 12.5 MG: 6.25 TABLET, FILM COATED ORAL at 20:29

## 2021-10-15 RX ADMIN — INSULIN GLARGINE 18 UNITS: 100 INJECTION, SOLUTION SUBCUTANEOUS at 20:29

## 2021-10-15 RX ADMIN — MULTIPLE VITAMINS W/ MINERALS TAB 1 TABLET: TAB at 09:37

## 2021-10-15 RX ADMIN — SODIUM CHLORIDE: 9 INJECTION, SOLUTION INTRAVENOUS at 14:31

## 2021-10-15 RX ADMIN — CEFUROXIME AXETIL 250 MG: 250 TABLET ORAL at 09:36

## 2021-10-15 RX ADMIN — HYDRALAZINE HYDROCHLORIDE 25 MG: 25 TABLET, FILM COATED ORAL at 07:46

## 2021-10-15 RX ADMIN — ROSUVASTATIN 10 MG: 10 TABLET, FILM COATED ORAL at 18:38

## 2021-10-15 RX ADMIN — HYDRALAZINE HYDROCHLORIDE 25 MG: 25 TABLET, FILM COATED ORAL at 14:30

## 2021-10-15 RX ADMIN — Medication 1000 UNITS: at 09:36

## 2021-10-15 RX ADMIN — ASPIRIN 81 MG: 81 TABLET, COATED ORAL at 09:37

## 2021-10-15 RX ADMIN — HYDRALAZINE HYDROCHLORIDE 25 MG: 25 TABLET, FILM COATED ORAL at 20:28

## 2021-10-15 ASSESSMENT — PAIN SCALES - GENERAL
PAINLEVEL_OUTOF10: 0

## 2021-10-15 NOTE — PROGRESS NOTES
Occupational Therapy   Occupational Therapy Initial Assessment  Date: 10/15/2021   Patient Name: Brenden Guzman  MRN: [id number]     : 1940    Date of Service: 10/15/2021    Discharge Recommendations: Brenden Guzman scored a 22/24 on the -[facility] ADL Inpatient form. At this time, no further OT is recommended upon discharge due to pt presenting at her baseline level of occupational performance. Recommend patient returns to prior setting with prior services. OT Equipment Recommendations  Equipment Needed: No  Other: reports owing needs equipment    Assessment   Performance deficits / Impairments: Decreased endurance;Decreased functional mobility   Assessment: pt is a 78 yr old female who presents at or near her baseline level of function. Pt admits to increased fatigue and need for more exercise at baseline. Demo'd ability to complete ADLs and ambulate household distance w/o physical assist. No further OT services warranted at this time. Treatment Diagnosis: Hydronephrosis  Prognosis: Good  Decision Making: Low Complexity  History: (I) with ADLs and light IADLs, (I) with mobility  OT Education: OT Role;Plan of Care  Patient Education: pt verbalized understanding of all education  Barriers to Learning: none  REQUIRES OT FOLLOW UP: Yes  Activity Tolerance  Activity Tolerance: Patient Tolerated treatment well  Safety Devices  Safety Devices in place: Yes  Type of devices: Left in bed;Nurse notified;Call light within reach; Bed alarm in place           Patient Diagnosis(es): The primary encounter diagnosis was Hydroureteronephrosis. A diagnosis of Urinary tract infection in female was also pertinent to this visit.      has a past medical history of Anxiety, Atrial fibrillation ([facility] [address] [address] CAD (coronary artery disease), Cancer ([facility] Cardiac arrest ([facility] Carotid artery stenosis, CHF (congestive heart failure) ([facility] [address] Chronic kidney disease, Depression, Diabetes mellitus ([facility] GERD (gastroesophageal reflux disease), Hip pain, chronic, HYPERCHOLESTERAEMIA, Hypertension, Irritable bowel syndrome, Laceration of head, MI, old, Neuropathy, Osteoarthritis, Presence of combination internal cardiac defibrillator (ICD) and pacemaker, Staph infection, Type 2 diabetes mellitus without complication ([facility] [address] and Urinary incontinence. has a past surgical history that includes angioplasty; Hysterectomy; bladder suspension; Cystoscopy (5/28/2013); Pacemaker insertion; Kidney removal; Bladder removal; Total hip arthroplasty (Left, 11/6/2019); and Skin cancer excision. Treatment Diagnosis: Hydronephrosis      Restrictions  Restrictions/Precautions  Restrictions/Precautions: Fall Risk, General Precautions  Required Braces or Orthoses?: No  Position Activity Restriction  Other position/activity restrictions: 24-year-old female who came to the emergency room with a history of left-sided flank pain. The patient also has frequency of urination and dysuria with some nauseaand emesis. There was no fever or chills. There was no hematuria. Nobowel movements. She was brought in from  [facility]. The pain onset of the left flank was rather abrupt. It was radiating intothe left side of the abdomen. She has a known history of kidney stones. No gross hematuria.     Subjective   General  Chart Reviewed: Yes  Family / Caregiver Present: No  Diagnosis: Hydronephrosis  Subjective  Subjective: pt in bed at entry, agreeable to eval  Patient Currently in Pain: Denies  Vital Signs  Patient Currently in Pain: Denies    Social/Functional History  Social/Functional History  Type of Home: Assisted living [facility]  Bathroom Shower/Tub: Tub/Shower unit  Bathroom Equipment: Hand-held shower, Shower chair  Home Equipment: Rolling walker  Receives Help From: Other (comment) (staff)  ADL Assistance: [facility]: Needs assistance (facility staff assists with cleaning and laundry, cooking)  Ambulation Assistance: Independent  Transfer Assistance: Independent  Active : No (has not driven since moving to [place])  Occupation: Retired  Type of occupation: Vascular Designser   Additional Comments: denies fall in past 6 months       Objective   Vision: Impaired  Vision Exceptions: Wears glasses at all times (does not feel as if glasses assist with vision, states that she sees more clearly without)  Hearing: Exceptions to [facility]  Hearing Exceptions: No hearing aid      Orientation  Overall Orientation Status: Within Functional Limits  Observation/Palpation  Observation: urostomy; IV R UE     Balance  Sitting Balance: Independent  Standing Balance: Modified independent   Standing Balance  Time: 10+mins  Activity: functional mobility, transers, ADL completion    Functional Mobility  Functional - Mobility Device: Rolling Walker  Activity: Other  Assist Level: Modified independent   Functional Mobility Comments: 60+ft in hallway and room, no LOB noted    ADL  Grooming: Independent  UE Dressing: Independent  Toileting: Independent (emptied urostomy in stance, pt able to manage undergarments w/o assist)  Additional Comments: Pt ambulated to/from sink for oral care needs- gown change completed in stance along EOB- pt then completed toileting needs     Tone RUE  RUE Tone: Normotonic  Tone LUE  LUE Tone: Normotonic  Coordination  Movements Are Fluid And Coordinated: Yes        Bed mobility  Supine to Sit: Independent  Sit to Supine: Independent  Scooting: Independent  Comment: HOb elevated     Transfers  Sit to stand: Independent  Stand to sit:  Independent        Cognition  Overall Cognitive Status: WFL           Sensation  Overall Sensation Status: Impaired (neuropathy in B LEs and hands)        LUE AROM (degrees)  LUE AROM : WFL  RUE AROM (degrees)  RUE AROM : WFL                      Plan   Plan  Times per week: Eval and D/C      AM-PAC Score        AM-PAC Inpatient Daily Activity Raw Score: 22 (10/15/21 1117)  AM-PAC Inpatient ADL T-Scale Score : 47.1 (10/15/21 1117)  ADL Inpatient CMS 0-100% Score: 25.8 (10/15/21 1117)  ADL Inpatient CMS G-Code Modifier : Frida Dress (10/15/21 1117)    Goals  Short term goals  Time Frame for Short term goals: eval and d/c       Therapy Time   Individual Concurrent Group Co-treatment   Time In       1034   Time Out       1101   Minutes       27     Timed Code Treatment Minutes:   12    Total Treatment Minutes:  [address], [address] [facility], OTR/L #[id number]

## 2021-10-15 NOTE — PROGRESS NOTES
defibrillator (ICD) and pacemaker, Staph infection, Type 2 diabetes mellitus without complication ([address] and Urinary incontinence. has a past surgical history that includes angioplasty; Hysterectomy; bladder suspension; Cystoscopy (5/28/2013); Pacemaker insertion; Kidney removal; Bladder removal; Total hip arthroplasty (Left, 11/6/2019); and Skin cancer excision. Restrictions  Restrictions/Precautions  Restrictions/Precautions: Fall Risk, General Precautions  Required Braces or Orthoses?: No  Position Activity Restriction  Other position/activity restrictions: 78-year-old female who came to the emergency room with a history of left-sided flank pain. The patient also has frequency of urination and dysuria with some nauseaand emesis. There was no fever or chills. There was no hematuria. Nobowel movements. She was brought in from  [facility]. The pain onset of the left flank was rather abrupt. It was radiating intothe left side of the abdomen. She has a known history of kidney stones. No gross hematuria. Vision/Hearing  Vision: Impaired  Vision Exceptions: Wears glasses at all times (does not feel as if glasses assist with vision, states that she sees more clearly without)  Hearing: Exceptions to [facility]  Hearing Exceptions: No hearing aid     Subjective  General  Chart Reviewed: Yes  Patient assessed for rehabilitation services?: Yes  Response To Previous Treatment: Not applicable  Follows Commands: Within Functional Limits  General Comment  Comments: Pt supine in bed upon PT/OT arrival, denies pain and is agreeable to therapy evaluations.   Pain Screening  Patient Currently in Pain: Denies  Vital Signs  Patient Currently in Pain: Denies     Social/Functional History  Social/Functional History  Type of Home: Assisted living [facility])  Bathroom Shower/Tub: Tub/Shower unit  Bathroom Equipment: Hand-held shower, Shower chair  Home Equipment: Rolling walker  Receives Help From: Other (comment) (staff)  ADL Assistance: Independent  Homemaking Assistance: Needs assistance (facility staff assists with cleaning and laundry, cooking)  Ambulation Assistance: Independent  Transfer Assistance: Independent  Active : No (has not driven since moving to [place])  Occupation: Retired  Type of occupation: former   Additional Comments: denies fall in past 6 months    Objective             Strength RLE  Strength RLE: WFL  Strength LLE  Strength LLE: WFL     Sensation  Overall Sensation Status: Impaired (neuropathy affecting BUE and BLE)  Bed mobility  Supine to Sit: Independent  Sit to Supine: Independent  Scooting: Independent  Transfers  Sit to Stand: Independent  Stand to sit:  Independent  Bed to Chair: Independent  Ambulation  Ambulation?: Yes  Ambulation 1  Surface: level tile  Device: Rolling Walker  Assistance: Modified Independent  Quality of Gait: intermittent reliance on RW with fatigue, normal gait pattern, no postural sway  Distance: 100'  Stairs/Curb  Stairs?: No     Balance  Posture: Fair (FW head, rounded shoulders)  Sitting - Static: Good (independent)  Sitting - Dynamic: Good (independent)  Standing - Static: Good (mod I with RW)  Standing - Dynamic: Good (mod I with RW)        Plan   Plan  Times per week: one time visit - d/c from PT  Safety Devices  Type of devices: Call light within reach, Left in bed, Nurse notified    AM-PAC Score  AM-PAC Inpatient Mobility Raw Score : 24 (10/15/21 1320)  AM-PAC Inpatient T-Scale Score : 61.14 (10/15/21 1320)  Mobility Inpatient CMS 0-100% Score: 0 (10/15/21 1320)  Mobility Inpatient CMS G-Code Modifier : [address] (10/15/21 1320)          Goals  Short term goals  Time Frame for Short term goals: no acute skilled PT goals established at this time       Therapy Time   Individual Concurrent Group Co-treatment   Time In       1034   Time Out       1101   Minutes       27         Timed Code Treatment Minutes:  12 minutes    Total Treatment Minutes:  27 minutes    Chantale Song, PT     Oscar Padron, DPT [id number]

## 2021-10-15 NOTE — PLAN OF CARE
Problem: Falls - Risk of:  Goal: Will remain free from falls  Description: Will remain free from falls  Outcome: Ongoing  Note: High fall risk per Pineda scale. Fall precautions in place, bed alarm engaged. Non-skid footwear on patient, belongings within reach, bed in lowest position. Problem: Pain:  Goal: Control of acute pain  Description: Control of acute pain  Outcome: Ongoing  Note: Pain assessed Q4 and PRN. PRN pain medication available.   Pt denies any pain this AM.

## 2021-10-15 NOTE — PROGRESS NOTES
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 ([facility])    Chronic coronary artery disease    Chronic ischemic heart disease    Chronic systolic heart failure ([facility])    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    Mixed anxiety depressive disorder    Anxiety, generalized    Gastro-esophageal reflux disease without esophagitis    Generalized OA    Other specified counseling    Herpes zoster with nervous system complication    Hydronephrosis    Hyperlipidemia    Iron malabsorption    Anemia, normocytic normochromic    Degenerative arthritis of hip    Scar of skin    Awareness of heartbeats    Protein-calorie malnutrition (HCC)    Tinnitus of vascular origin    Presence of coronary angioplasty implant and graft    Depression, reactive    Hyperkalemia    Pyelonephritis    Acute pyelonephritis    Chronic kidney disease, stage III (moderate) ([facility]    Encounter for ostomy care education    Arthritis of left hip    Kidney stone    Hydroureter on left   H&P dictated

## 2021-10-15 NOTE — PLAN OF CARE
Problem: Falls - Risk of: Intervention: Assess risk factors for falls  Note: PT given additional supplies at the bedside to assist with emptying pt urostomy bag. PT asked to call when ambulating to restroom for stanby assistance with IV pole. Bed locked lowest position, non skid socks in place, call light in reach.

## 2021-10-15 NOTE — PROGRESS NOTES
Carley Solomon is a 78 y.o. female patient.     Chief complaint-left flank pain    HPI-78year-old female seen in follow-up for left flank pain  Current Facility-Administered Medications   Medication Dose Route Frequency Provider Last Rate Last Admin    acetaminophen (TYLENOL) tablet 650 mg  650 mg Oral Q4H PRN Augie Mota MD        ALPRAZolam Sunny Paci) tablet 0.25 mg  0.25 mg Oral Nightly PRN Augie Mota MD        aspirin EC tablet 81 mg  81 mg Oral Daily Augie Mota MD   81 mg at 10/15/21 0937    carvedilol (COREG) tablet 12.5 mg  12.5 mg Oral BID Augie Mota MD   12.5 mg at 10/15/21 0936    fluticasone (FLONASE) 50 MCG/ACT nasal spray 1 spray  1 spray Nasal PRN Augie Mota MD        hydrALAZINE (APRESOLINE) tablet 25 mg  25 mg Oral 3 times per day Augie Mota MD   25 mg at 10/15/21 1430    insulin glargine (LANTUS;BASAGLAR) injection pen 18 Units  18 Units SubCUTAneous Nightly Augie Mota MD   18 Units at 10/14/21 2217    ondansetron (ZOFRAN-ODT) disintegrating tablet 4 mg  4 mg Oral Q8H PRN Augie Mota MD   4 mg at 10/14/21 0348    rosuvastatin (CRESTOR) tablet 10 mg  10 mg Oral QPM Augie Mota MD   10 mg at 10/14/21 1924    sodium bicarbonate tablet 650 mg  650 mg Oral BID Augie Mota MD   650 mg at 10/15/21 1546    therapeutic multivitamin-minerals 1 tablet  1 tablet Oral Daily Augie Mota MD   1 tablet at 10/15/21 1949    vitamin D (CHOLECALCIFEROL) tablet 1,000 Units  1,000 Units Oral Daily Augie Mota MD   1,000 Units at 10/15/21 0936    0.9 % sodium chloride infusion   IntraVENous Continuous Augie Mota  mL/hr at 10/15/21 1431 New Bag at 10/15/21 1431    enoxaparin (LOVENOX) injection 30 mg  30 mg SubCUTAneous Daily Augie Mota MD        HYDROmorphone HCl PF (DILAUDID) injection 0.5 mg  0.5 mg IntraVENous Q4H PRN Augie Mota MD   0.5 mg at 10/14/21 0408    glucose (GLUTOSE) 40 % oral gel 15 g  15 g Oral PRN Carmen Longo MD        dextrose 50 % IV solution  12.5 g IntraVENous PRN Carmen Longo MD        glucagon (rDNA) injection 1 mg  1 mg IntraMUSCular PRN Carmen Longo MD        dextrose 5 % solution  100 mL/hr IntraVENous PRN Carmen Longo MD        cefUROXime (CEFTIN) tablet 250 mg  250 mg Oral Daily Carmen Longo MD   250 mg at 10/15/21 9999     Allergies   Allergen Reactions    Amlodipine Besylate      Other reaction(s): Unknown    Calcium Channel Blockers      Other reaction(s): Unknown    Citalopram Hydrobromide      Other reaction(s): Unknown    Flagyl [Metronidazole]      Took with Cipro and it made her vomit    Losartan      Muscle aches  Muscle aches      Pneumococcal Vaccine Swelling     Swelling at injection site    Simvastatin      myalgias  ?  Valsartan-Hydrochlorothiazide      Does not remember    Venlafaxine Nausea Only    Hydrocodone-Acetaminophen Other (See Comments)     Makes patient feel weird, dizzy, nauseous, and sleepy.   Patient states she is not allergic to this    Nitrofurantoin Nausea And Vomiting and Other (See Comments)     Other reaction(s): Dizziness, GI Upset  Unsure if Macrobid caused it  Unsure if Macrobid caused it       Active Problems:    CATARINA (acute kidney injury) ([address]    High anion gap metabolic acidosis    Cardiomyopathy ([address]    Ischemic cardiomyopathy    Essential hypertension    Automatic implantable cardioverter-defibrillator in situ    Right renal mass    UTI (urinary tract infection)    DM2 (diabetes mellitus, type 2) (HCC)    Acute-on-chronic renal failure (HCC)    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)    Chronic coronary artery disease    Chronic ischemic heart disease    Chronic systolic heart failure (HCC)    Mixed anxiety depressive disorder    Gastro-esophageal reflux disease without esophagitis    Hydronephrosis    Hyperlipidemia    Anemia, normocytic normochromic    Degenerative arthritis of hip Protein-calorie malnutrition (HCC)    Tinnitus of vascular origin    Presence of coronary angioplasty implant and graft    Hyperkalemia    Pyelonephritis    Acute pyelonephritis    Chronic kidney disease, stage III (moderate) (HCC)    Kidney stone    Hydroureter on left  Resolved Problems:    * No resolved hospital problems. *    Blood pressure (!) 148/64, pulse 85, temperature 98.4 °F (36.9 °C), temperature source Oral, resp. rate 18, height 5' 8\" (1.727 m), weight 143 lb 14.4 oz (65.3 kg), SpO2 94 %, not currently breastfeeding. Subjective:  Symptoms:  Improved. Activity level: Normal.    Pain:  She reports no pain. Objective:  General Appearance:  Comfortable. Vital signs: (most recent): Blood pressure (!) 148/64, pulse 85, temperature 98.4 °F (36.9 °C), temperature source Oral, resp. rate 18, height 5' 8\" (1.727 m), weight 143 lb 14.4 oz (65.3 kg), SpO2 94 %, not currently breastfeeding. Output: Producing urine. Lungs:  Normal effort and normal respiratory rate. Abdomen: Abdomen is soft and non-distended. There is no abdominal tenderness. Neurological: Patient is alert and oriented to person, place and time. Skin:  Warm and dry. Assessment & Plan 64-year-old female with a history of a right nephrectomy, cystectomy and ileal conduit in 2017 who is seen in follow-up for parastomal hernia with left-sided hydronephrosis. The left CVA tenderness and left lower quadrant abdominal pain has resolved. Urine output is adequate. Creatinine down to 1.8 (not sure what  baseline is for patient). Per the patient, catheter was not able to be placed into the ileal conduit at bedside. Overall, she is doing much better. Will plan to discuss further plans for treatment of hydronephrosis with Dr. Janet Blanca.     Sheila Stark MD  10/15/2021

## 2021-10-15 NOTE — PROGRESS NOTES
Urology Progress Note  [facility]    Provider: RIZWAN Dawn - CNP  Patient ID:  Admission Date: 10/13/2021 Name: Stefanie Ordaz Date: 10/13/2021 MRN: [id number]   Patient Location: [id number] : 1940  Attending: Neyda Sawant MD Date of Service: 10/15/2021  PCP: Teja Figueroa MD     Diagnoses:  Hydronephrosis    Assessment/Plan:  79 yo female with hx of hysterectomy, right renal cancer and bladder cancer s/p right nephrectomy and cystectomy w/ ileal conduit by Dr. Rona Anton in 2017. Presented with left colic. CT a/p shows severe to moderate LEFT hydro possibly form left hernia near mirella bladder. UA with 265 wbc +2 bacteria. Cr 1.5 on admission and now 2.0. Urostomy CDI with CYU. Cr improved to 1.8 today. Output 250cc today. Exam shows 16F urostomy catheter with tip curling out of stoma. Attempted to replace 16F urostomy catheter but unable to place.     Recommendations:  -Replace ostomy bag, monitor output, may need procedure today. Keep NPO for now. -UA will always show infection with ileal conduit. Should be treated for UTI if fever or leukocytosis or other clinical signs of infection. Unlikely infection causing hydronephrosis. -CT non-con after catheter in stoma and Cr sravan to evaluate for resolution of hydronephrosis  -She will follow up with Dr. Rona Anton x1-2 weeks   -General surgery follow, appreciate reccomendations       The patient had a chance to ask questions which were answered. she understands the above plan. Subjective:   Yasmin Currie is a 78 y.o. female. She was seen and examined this morning. Today we discussed leaving catheter in place, trending kidney function and f/u with gen surg and urology. Likely get CT non-con after Cr nadirs to evaluate hydronephrosis.      Objective:   Vitals:  Vitals:    10/15/21 0730   BP: (!) 147/58   Pulse:    Resp:    Temp:    SpO2:        Intake/Output Summary (Last 24 hours) at 10/15/2021 0755  Last data filed at 10/15/2021 5450  Gross per 24 hour   Intake 2646.94 ml   Output 1825 ml   Net 821.94 ml     Physical Exam:  Gen: Alert and oriented x3, no acute distress  CV: Regular rate   Resp: unlabored respirations  Abd: Soft, non-distended, non-tender, no masses  Ext: no peripheral edema noted, moves upper and lower extremities spontaneously  Skin: warmand well perfused, no rashes noted on the face, or arms.      Labs:  Lab Results   Component Value Date    WBC 6.6 10/13/2021    HGB 11.1 (L) 10/13/2021    HCT 33.4 (L) 10/13/2021    MCV 93.5 10/13/2021     10/13/2021     Lab Results   Component Value Date    CREATININE 2.0 (H) 10/13/2021    BUN 33 (H) 10/13/2021     10/13/2021    K 4.8 10/13/2021     10/13/2021    CO2 23 10/13/2021       RIZWAN Peraza - CNP   10/15/2021

## 2021-10-15 NOTE — H&P
[address]                     [address] [place] [address], [address]                              HISTORY AND PHYSICAL    PATIENT NAME: Jenna Hernandez                     :        1940  MED REC NO:   [id number]                          ROOM:       3316  ACCOUNT NO:   [de-identified]                           ADMIT DATE: 10/13/2021  PROVIDER:     Molina Moody MD    HISTORY OF PRESENT ILLNESS:  The patient is an 68-year-old white lady,  who came to the emergency room with a history of left-sided flank pain. The patient also has frequency of urination and dysuria with some nausea  and emesis. There was no fever or chills. There was no hematuria. No  bowel movements. She was brought in from _____ [facility]. The  pain onset of the left flank was rather abrupt. It was radiating into  the left side of the abdomen. She has a known history of kidney stones. No gross hematuria. PAST MEDICAL HISTORY:  Pertinent for atherosclerotic heart disease,  hypertension, type 2 diabetes mellitus, hyperlipidemia, GERD, anxiety  neurosis, STAPH infection, cardiac arrest, laceration of the head,  chronic hip pain, presence of an ICD device, myocardial infarction,  chronic renal insufficiency, osteoarthritis, atrial fibrillation,  carotid artery stenosis, CHF, depression, irritable bowel syndrome,  peripheral neuropathy, urinary incontinence, bladder cancer. PAST SURGICAL HISTORY:  Pertinent for angioplasty, hysterectomy, bladder  suspension procedure, pacemaker insertion, nephrectomy, bladder removal,  skin cancer excision, cystoscopy, total hip arthroplasty. MEDICATIONS:  Carvedilol, aspirin, enoxaparin, hydralazine, Dilaudid,  insulin glargine, rosuvastatin, sodium bicarbonate, multivitamin,  vitamin D.     ALLERGIES:  Allergic to eleven different medications, which included  AMLODIPINE, CALCIUM-CHANNEL BLOCKER, CITALOPRAM, METRONIDAZOLE,  LOSARTAN, PNEUMOCOCCAL VACCINE, VALSARTAN, HYDROCHLOROTHIAZIDE,  VENLAFAXINE, HYDROCODONE, ACETAMINOPHEN, and NITROFURANTOIN.    SOCIAL HISTORY:  The patient is a . She has six children. She was  always a nonsmoker, always a nondrinker. She used to work as a school  . No history of substance abuse. REVIEW OF SYSTEMS:  GENERAL:  No fever, no chills. GASTROINTESTINAL:  No hematemesis. No melena. GENITOURINARY:  No hematochezia. No hematuria. NEUROLOGIC: Negative for loss of consciousness. No headache. No  dizziness. No convulsions. There is no seizure activity. No TIA. No  speech disturbance. MUSCULOSKELETAL:  Does have chronic musculoskeletal pain. PHYSICAL EXAMINATION:  GENERAL:  Alert, awake, oriented x3, moderately distressed 75-year-old  white woman looking consistent with stated age. VITAL SIGNS:  Her temperature is 98.3, blood pressure 147/67,  respirations 15, heart rate is 78, O2 sat 93% on room air. HEENT:  Oral mucosa dry. NECK:  Supple. Faint carotid bruit. No jugular venous distention. No  lymphadenopathy. No thyromegaly. LUNGS:  Have vesicular breath sounds. Poor inspiration. No crackles or  wheezing. HEART:  Regular rate and rhythm. S1, S2 without any S3 or S4 gallop. ABDOMEN:  Soft. There is tenderness in the left flank. There is mild  guarding. No rebound, no rigidity. Bowel sounds present. EXTREMITIES:  Show no cyanosis or edema. Distal pulsations are weak. SKIN:  Warm and dry. NEUROLOGIC:  The patient appears grossly intact. LABORATORY DATA:  Shows sodium 138, potassium 4.8, chloride 102, CO2 of  23, BUN 33, creatinine 2.0, GFR is 24, blood sugar is 280. White blood  cell count 6.6, hemoglobin/hematocrit 11.1 and 33.4, platelet count 078. PT/INR is 11.2 and 0.97. Microbiology shows urine culture, which is  growing Klebsiella and Proteus species.   Urinalysis consistent with  urinary tract infection, has 356 wbc's, large amount of leukocyte  esterase. IMAGING DATA:  The patient had a CT scan of the abdomen and pelvis. Moderate severe left-sided hydroureteronephrosis, wall thickening and  surrounding inflammatory changes along the neobladder, ileal conduit  involving the left paranephric fat layer; all related to underlying  infection versus outlet obstruction caused by new herniated large bowel  through the site of urostomy. Urology consultation was recommended. Chest x-ray not indicated. Echocardiogram done in 07/2020, LVEF 67% to 82%, diastolic filling  parameters suggest grade 2 diastolic dysfunction. ASSESSMENT:  Kidney stones, left-sided hydronephrosis, and hydroureter,  acute-on-chronic kidney disease, hypertension. PLAN:  1. Plan is to get her admitted. 2.  Treat her with IV hydration. 3.  Monitor renal function. 4.  She may need a stent placement. 5.  Urology consultation.         Delmas Boas, MD    D: 10/14/2021 22:33:30       T: 10/14/2021 22:36:25     [email or web address]  Job#: [id number]     Doc#: [id number]    CC:

## 2021-10-15 NOTE — CARE COORDINATION
Discharge Planning Assessment  RN discharge planner met with patient  to discuss reason for admission, current living situation, and potential needs at the time of discharge    Demographics/Insurance verified Yes- [employer]    Current type of dwelling:  One level apartment at  [address]     Patient from ECF/SW confirmed with:    Living arrangements:  Assisted Living     Level of function/Support: Independent with ADLs , Active . Provided 3 meals per day by the facility and facility;s staff check on on. Her daughter visits her every Sunday. PCP: Dr Leslie Benoit     Last Visit to PCP:   June 2021    DME: [employer] and [employer]    Active with any community resources/agencies/skilled home care: has had skilled hhc services before but cannot recall the agency. Medication compliance issues:  Denies issues . Gets her medications from [facility] in [employer] issues that could impact healthcare: No      Tentative discharge plan: back to the Assisted Living     Discussed and provided facilities of choice if transition to a skilled nursing facility is required at the time of discharge- No. Has been to [facility] before      Discussed with patient and/or family that on the day of discharge home tentative time of discharge will be between 10 AM and noon. Transportation at the time of discharge: one of the family members.

## 2021-10-16 LAB
ANION GAP SERPL CALCULATED.3IONS-SCNC: 9 MMOL/L (ref 3–16)
BASOPHILS ABSOLUTE: 0 K/UL (ref 0–0.2)
BASOPHILS RELATIVE PERCENT: 0.3 %
BUN BLDV-MCNC: 25 MG/DL (ref 7–20)
CALCIUM SERPL-MCNC: 8.6 MG/DL (ref 8.3–10.6)
CHLORIDE BLD-SCNC: 111 MMOL/L (ref 99–110)
CO2: 22 MMOL/L (ref 21–32)
CREAT SERPL-MCNC: 1.6 MG/DL (ref 0.6–1.2)
EOSINOPHILS ABSOLUTE: 0.1 K/UL (ref 0–0.6)
EOSINOPHILS RELATIVE PERCENT: 1.8 %
GFR AFRICAN AMERICAN: 37
GFR NON-AFRICAN AMERICAN: 31
GLUCOSE BLD-MCNC: 147 MG/DL (ref 70–99)
GLUCOSE BLD-MCNC: 157 MG/DL (ref 70–99)
GLUCOSE BLD-MCNC: 180 MG/DL (ref 70–99)
GLUCOSE BLD-MCNC: 87 MG/DL (ref 70–99)
GLUCOSE BLD-MCNC: 93 MG/DL (ref 70–99)
HCT VFR BLD CALC: 28.5 % (ref 36–48)
HEMOGLOBIN: 9.6 G/DL (ref 12–16)
LYMPHOCYTES ABSOLUTE: 0.7 K/UL (ref 1–5.1)
LYMPHOCYTES RELATIVE PERCENT: 10.3 %
MCH RBC QN AUTO: 30.9 PG (ref 26–34)
MCHC RBC AUTO-ENTMCNC: 33.6 G/DL (ref 31–36)
MCV RBC AUTO: 91.8 FL (ref 80–100)
MONOCYTES ABSOLUTE: 0.7 K/UL (ref 0–1.3)
MONOCYTES RELATIVE PERCENT: 10.5 %
NEUTROPHILS ABSOLUTE: 5.2 K/UL (ref 1.7–7.7)
NEUTROPHILS RELATIVE PERCENT: 77.1 %
PDW BLD-RTO: 14.7 % (ref 12.4–15.4)
PERFORMED ON: ABNORMAL
PERFORMED ON: NORMAL
PLATELET # BLD: 197 K/UL (ref 135–450)
PMV BLD AUTO: 7.9 FL (ref 5–10.5)
POTASSIUM SERPL-SCNC: 4.2 MMOL/L (ref 3.5–5.1)
RBC # BLD: 3.1 M/UL (ref 4–5.2)
SODIUM BLD-SCNC: 142 MMOL/L (ref 136–145)
WBC # BLD: 6.7 K/UL (ref 4–11)

## 2021-10-16 PROCEDURE — 85025 COMPLETE CBC W/AUTO DIFF WBC: CPT

## 2021-10-16 PROCEDURE — 99232 SBSQ HOSP IP/OBS MODERATE 35: CPT | Performed by: SURGERY

## 2021-10-16 PROCEDURE — 80048 BASIC METABOLIC PNL TOTAL CA: CPT

## 2021-10-16 PROCEDURE — 36415 COLL VENOUS BLD VENIPUNCTURE: CPT

## 2021-10-16 PROCEDURE — 1200000000 HC SEMI PRIVATE

## 2021-10-16 PROCEDURE — 6370000000 HC RX 637 (ALT 250 FOR IP): Performed by: INTERNAL MEDICINE

## 2021-10-16 RX ADMIN — HYDRALAZINE HYDROCHLORIDE 25 MG: 25 TABLET, FILM COATED ORAL at 15:37

## 2021-10-16 RX ADMIN — SODIUM BICARBONATE 650 MG: 650 TABLET ORAL at 20:48

## 2021-10-16 RX ADMIN — HYDRALAZINE HYDROCHLORIDE 25 MG: 25 TABLET, FILM COATED ORAL at 06:35

## 2021-10-16 RX ADMIN — ONDANSETRON 4 MG: 4 TABLET, ORALLY DISINTEGRATING ORAL at 06:35

## 2021-10-16 RX ADMIN — INSULIN GLARGINE 18 UNITS: 100 INJECTION, SOLUTION SUBCUTANEOUS at 20:48

## 2021-10-16 RX ADMIN — CARVEDILOL 12.5 MG: 6.25 TABLET, FILM COATED ORAL at 20:48

## 2021-10-16 RX ADMIN — ALPRAZOLAM 0.25 MG: 0.5 TABLET ORAL at 11:54

## 2021-10-16 RX ADMIN — HYDRALAZINE HYDROCHLORIDE 25 MG: 25 TABLET, FILM COATED ORAL at 20:48

## 2021-10-16 RX ADMIN — ROSUVASTATIN 10 MG: 10 TABLET, FILM COATED ORAL at 17:24

## 2021-10-16 RX ADMIN — Medication 1000 UNITS: at 09:10

## 2021-10-16 RX ADMIN — MULTIPLE VITAMINS W/ MINERALS TAB 1 TABLET: TAB at 09:10

## 2021-10-16 RX ADMIN — CARVEDILOL 12.5 MG: 6.25 TABLET, FILM COATED ORAL at 09:10

## 2021-10-16 RX ADMIN — ASPIRIN 81 MG: 81 TABLET, COATED ORAL at 09:10

## 2021-10-16 RX ADMIN — SODIUM BICARBONATE 650 MG: 650 TABLET ORAL at 09:10

## 2021-10-16 RX ADMIN — CEFUROXIME AXETIL 250 MG: 250 TABLET ORAL at 09:10

## 2021-10-16 ASSESSMENT — PAIN SCALES - GENERAL
PAINLEVEL_OUTOF10: 0

## 2021-10-16 NOTE — PROGRESS NOTES
alcohol and does not use drugs. .        Active Hospital Problems    Diagnosis Date Noted    Kidney stone [N20.0] 10/14/2021    Hydroureter on left [N13.4] 10/14/2021    Acute pyelonephritis [N10] 05/29/2018    Chronic kidney disease, stage III (moderate) ([facility] [N18.30] 05/29/2018    Pyelonephritis [N12] 05/07/2018    Hyperkalemia [E87.5] 10/27/2017    Mixed anxiety depressive disorder [F41.8] 09/12/2017    Protein-calorie malnutrition ([facility] Terry  08/17/2017    Acute-on-chronic renal failure ([facility] [N17.9, N18.9] 08/11/2017    Hydronephrosis [N13.30] 06/07/2017    Right renal mass [N28.89] 04/27/2017    DM2 (diabetes mellitus, type 2) ([facility] [E11.9] 04/27/2017    UTI (urinary tract infection) [N39.0] 04/27/2017    Chronic systolic heart failure ([facility] [I50.22] 04/25/2016    Automatic implantable cardioverter-defibrillator in situ [Z95.810] 04/04/2016    Ischemic cardiomyopathy [I25.5]     Essential hypertension [I10]     CATARINA (acute kidney injury) ([facility] [N17.9]     High anion gap metabolic acidosis [K09.9]     Cardiomyopathy ([facility] [I42.9]     Degenerative arthritis of hip [M16.9] 11/12/2015    Anemia, normocytic normochromic [D64.9] 05/02/2014    Chronic coronary artery disease [I25.10] 02/13/2013    Tinnitus of vascular origin [V39. 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) [I13.10, N18.30] 06/30/2008    Gastro-esophageal reflux disease without esophagitis [K21.9] 06/30/2008       Current Facility-Administered Medications: hydrALAZINE (APRESOLINE) injection 10 mg, 10 mg, IntraVENous, Q6H PRN  0.9 % sodium chloride bolus, 500 mL, IntraVENous, PRN  acetaminophen (TYLENOL) tablet 650 mg, 650 mg, Oral, Q4H PRN  ALPRAZolam (XANAX) tablet 0.25 mg, 0.25 mg, Oral, Nightly PRN  aspirin EC tablet 81 mg, 81 mg, Oral, Daily  carvedilol (COREG) tablet 12.5 mg, 12.5 mg, Oral, BID  fluticasone (FLONASE) 50 MCG/ACT nasal spray 1 spray, 1 spray, Nasal, PRN  hydrALAZINE (APRESOLINE) tablet 25 mg, 25 mg, Oral, 3 times per day  insulin glargine (LANTUS;BASAGLAR) injection pen 18 Units, 18 Units, SubCUTAneous, Nightly  ondansetron (ZOFRAN-ODT) disintegrating tablet 4 mg, 4 mg, Oral, Q8H PRN  rosuvastatin (CRESTOR) tablet 10 mg, 10 mg, Oral, QPM  sodium bicarbonate tablet 650 mg, 650 mg, Oral, BID  therapeutic multivitamin-minerals 1 tablet, 1 tablet, Oral, Daily  vitamin D (CHOLECALCIFEROL) tablet 1,000 Units, 1,000 Units, Oral, Daily  0.9 % sodium chloride infusion, , IntraVENous, Continuous  enoxaparin (LOVENOX) injection 30 mg, 30 mg, SubCUTAneous, Daily  HYDROmorphone HCl PF (DILAUDID) injection 0.5 mg, 0.5 mg, IntraVENous, Q4H PRN  glucose (GLUTOSE) 40 % oral gel 15 g, 15 g, Oral, PRN  dextrose 50 % IV solution, 12.5 g, IntraVENous, PRN  glucagon (rDNA) injection 1 mg, 1 mg, IntraMUSCular, PRN  dextrose 5 % solution, 100 mL/hr, IntraVENous, PRN  cefUROXime (CEFTIN) tablet 250 mg, 250 mg, Oral, Daily         Objective:  BP (!) 146/64   Pulse 69   Temp 99.1 °F (37.3 °C) (Oral)   Resp 15   Ht 5' 8\" (1.727 m)   Wt 146 lb 6.2 oz (66.4 kg)   SpO2 94%   BMI 22.26 kg/m²      Patient Vitals for the past 24 hrs:   BP Temp Temp src Pulse Resp SpO2 Weight   10/16/21 0630 -- -- -- -- -- -- 146 lb 6.2 oz (66.4 kg)   10/16/21 0421 (!) 146/64 99.1 °F (37.3 °C) Oral 69 15 94 % --   10/15/21 2333 (!) 147/74 98.6 °F (37 °C) Oral 86 15 95 % --   10/15/21 2024 (!) 151/68 98.5 °F (36.9 °C) Oral 86 16 94 % --   10/15/21 1542 (!) 157/67 98 °F (36.7 °C) Oral 83 17 95 % --   10/15/21 1429 (!) 148/64 -- -- 85 -- -- --   10/15/21 1250 (!) 147/77 98.4 °F (36.9 °C) Oral 84 18 94 % --     Patient Vitals for the past 96 hrs (Last 3 readings):   Weight   10/16/21 0630 146 lb 6.2 oz (66.4 kg)   10/14/21 0228 143 lb 14.4 oz (65.3 kg)   10/13/21 1923 144 lb (65.3 kg)           Intake/Output Summary (Last 24 hours) at 10/16/2021 0817  Last data filed at 10/16/2021 0045  Gross per 24 hour   Intake 120 ml   Output 1075 ml   Net -955 ml         Physical Exam:   BP (!) 146/64   Pulse 69   Temp 99.1 °F (37.3 °C) (Oral)   Resp 15   Ht 5' 8\" (1.727 m)   Wt 146 lb 6.2 oz (66.4 kg)   SpO2 94%   BMI 22.26 kg/m²   General appearance: alert, appears stated age and cooperative  Head: Normocephalic, without obvious abnormality, atraumatic  Lungs: clear to auscultation bilaterally  Heart: regular rate and rhythm, S1, S2 normal, no murmur, click, rub or gallop  Abdomen: soft, non tender  Extremities: extremities normal, atraumatic, no cyanosis or edema    Labs:  Lab Results   Component Value Date    WBC 6.7 10/16/2021    HGB 9.6 (L) 10/16/2021    HCT 28.5 (L) 10/16/2021     10/16/2021    CHOL 152 07/30/2020    TRIG 163 (H) 07/30/2020    HDL 43 03/03/2021    LDLDIRECT 148 (H) 07/08/2010    ALT 7 (L) 10/13/2021    AST 12 (L) 10/13/2021     10/16/2021    K 4.2 10/16/2021     (H) 10/16/2021    CREATININE 1.6 (H) 10/16/2021    BUN 25 (H) 10/16/2021    CO2 22 10/16/2021    TSH 0.60 11/22/2017    INR 0.97 01/15/2020    LABA1C 7.0 07/30/2020    LABMICR YES 10/14/2021     Lab Results   Component Value Date    CKMB <0.2 06/06/2010    TROPONINI 0.01 07/11/2021           Recent Imaging Results are Reviewed:  CT ABDOMEN PELVIS WO CONTRAST Additional Contrast? None    Result Date: 10/13/2021  EXAMINATION: CT OF THE ABDOMEN AND PELVIS WITHOUT CONTRAST 10/13/2021 9:38 pm TECHNIQUE: CT of the abdomen and pelvis was performed without the administration of intravenous contrast. Multiplanar reformatted images are provided for review. Dose modulation, iterative reconstruction, and/or weight based adjustment of the mA/kV was utilized to reduce the radiation dose to as low as reasonably achievable.  COMPARISON: CT chest 08/17/2021 and is 07/11/2020 HISTORY: ORDERING SYSTEM PROVIDED HISTORY: left sided abd pain TECHNOLOGIST PROVIDED HISTORY: Reason for exam:->left sided abd pain Additional Contrast?->None Reason for Exam: Left sided abd pain. Flank Pain (pt brought in by [facility] from [place] c/o sudden onset of left flank pain radiating into left side of abd.  pt has hx of kidney stones). Acuity: Acute Type of Exam: Initial FINDINGS: Lower Chest: Cardiomegaly. Pacemaker leads. Mild interstitial edema identified involving lung bases. Minimal hypoventilatory change. Organs: Evaluation challenge by lack contrast. Layering gallstones within the gallbladder. Otherwise, the liver, spleen, adrenal glands, pancreas, and gallbladder unremarkable. Prior right nephrectomy. Severe hydroureteronephrosis involving left kidney. Left perinephric fat stranding. Layering calcific density within the region the iliac conduit may represent stones versus calcified suture material..  There is mild wall thickening along the iliac conduit. Urostomy is slightly lateralized likely due to mass effect from a parastomal hernia containing omental fat and a small knuckle of bowel. The hernia sac containing bowel is new from the prior exams. GI/Bowel: Otherwise no evidence of bowel obstruction. Moderate retained stool rectosigmoid junction may represent fecal impaction versus constipation. Colonic diverticulosis. Appendix not visualized. Pelvis: Bladder is absent. No suspicious adnexal mass. Peritoneum/Retroperitoneum: No free air or free fluid. Small fat containing umbilical hernia. Aortic vascular calcifications. Bones/Soft Tissues: Multilevel degenerate changes of the lumbar spine. Moderate severe left-sided hydroureteronephrosis. There is wall thickening in surrounding inflammatory changes along the neobladder/ileal conduit and involving the left perinephric fat planes.   This may be related to underlying infection versus outlet obstruction caused by a new herniated bowel loop through the site of urostomy. Please correlate urinalysis findings. Urologic consultation may be beneficial. Colonic diverticulosis. Lab Results   Component Value Date    GLUCOSE 93 10/16/2021     Lab Results   Component Value Date    POCGLU 87 10/16/2021       Assessment and Plan:  Principal Problem:    Hydroureter on left -Established problem. Stable. Per the patient, catheter was not able to be placed into the ileal conduit at bedside. Plan: await re-eval per urology  Active Problems:    CATARINA (acute kidney injury) ([address] -Established problem. Stable. Creat 1.6  Plan: cont fluids, repeat labs in am    Essential hypertension -Established problem. Stable. 146/64  Plan: stay on same meds    Automatic implantable cardioverter-defibrillator in situ    Right renal mass    UTI (urinary tract infection) -Established problem. Stable. Urine cx kelbsiella, proteus  Plan: on ceftin po    DM2 (diabetes mellitus, type 2) ([address] - Established problem. Stable. fsbs 87  Plan: stay on diet, sliding scale, home meds    Hydronephrosis  Plan: cont mgmt per urology    Protein-calorie malnutrition ([address]    Hyperkalemia -Established problem, now resolved. k 4.2  Plan: Continue present orders/plan.            Maverick Leslie MD  10/16/2021

## 2021-10-16 NOTE — PROGRESS NOTES
Department of Internal Medicine  General Internal Medicine   Progress Note      SUBJECTIVE: feeling over all comfortable and painfree     History obtained from chart review and the patient  General ROS: positive for  - fatigue and malaise  negative for - chills, fever or night sweats  Psychological ROS: negative  Ophthalmic ROS: negative  Respiratory ROS: no cough, shortness of breath, or wheezing  Cardiovascular ROS: no chest pain or dyspnea on exertion  Gastrointestinal ROS: no abdominal pain, change in bowel habits, or black or bloody stools  Genito-Urinary ROS: no dysuria, trouble voiding, or hematuria  Musculoskeletal ROS: negative  Neurological ROS: no TIA or stroke symptoms  Dermatological ROS: negative     OBJECTIVE      Medications      Current Facility-Administered Medications: hydrALAZINE (APRESOLINE) injection 10 mg, 10 mg, IntraVENous, Q6H PRN  0.9 % sodium chloride bolus, 500 mL, IntraVENous, PRN  acetaminophen (TYLENOL) tablet 650 mg, 650 mg, Oral, Q4H PRN  ALPRAZolam (XANAX) tablet 0.25 mg, 0.25 mg, Oral, Nightly PRN  aspirin EC tablet 81 mg, 81 mg, Oral, Daily  carvedilol (COREG) tablet 12.5 mg, 12.5 mg, Oral, BID  fluticasone (FLONASE) 50 MCG/ACT nasal spray 1 spray, 1 spray, Nasal, PRN  hydrALAZINE (APRESOLINE) tablet 25 mg, 25 mg, Oral, 3 times per day  insulin glargine (LANTUS;BASAGLAR) injection pen 18 Units, 18 Units, SubCUTAneous, Nightly  ondansetron (ZOFRAN-ODT) disintegrating tablet 4 mg, 4 mg, Oral, Q8H PRN  rosuvastatin (CRESTOR) tablet 10 mg, 10 mg, Oral, QPM  sodium bicarbonate tablet 650 mg, 650 mg, Oral, BID  therapeutic multivitamin-minerals 1 tablet, 1 tablet, Oral, Daily  vitamin D (CHOLECALCIFEROL) tablet 1,000 Units, 1,000 Units, Oral, Daily  0.9 % sodium chloride infusion, , IntraVENous, Continuous  enoxaparin (LOVENOX) injection 30 mg, 30 mg, SubCUTAneous, Daily  HYDROmorphone HCl PF (DILAUDID) injection 0.5 mg, 0.5 mg, IntraVENous, Q4H PRN  glucose (GLUTOSE) 40 % oral gel 15 g, 15 g, Oral, PRN  dextrose 50 % IV solution, 12.5 g, IntraVENous, PRN  glucagon (rDNA) injection 1 mg, 1 mg, IntraMUSCular, PRN  dextrose 5 % solution, 100 mL/hr, IntraVENous, PRN  cefUROXime (CEFTIN) tablet 250 mg, 250 mg, Oral, Daily    Physical      Vitals: BP (!) 151/68   Pulse 86   Temp 98.5 °F (36.9 °C) (Oral)   Resp 16   Ht 5' 8\" (1.727 m)   Wt 143 lb 14.4 oz (65.3 kg)   SpO2 94%   BMI 21.88 kg/m²   Temp: Temp: 98.5 °F (36.9 °C)  Max: Temp  Av.3 °F (36.8 °C)  Min: 98 °F (36.7 °C)  Max: 98.7 °F (37.1 °C)  Respiration range:  Resp  Av.8  Min: 16  Max: 18  Pulse Range:  Pulse  Av.4  Min: 78  Max: 86  Blood pressure range:  Systolic (50CBT), OUY:678 , Min:128 , BASIA:911   , Diastolic (04WEE), FVC:83, Min:55, Max:77    SpO2  Av.8 %  Min: 93 %  Max: 95 %    Intake/Output Summary (Last 24 hours) at 10/15/2021 2251  Last data filed at 10/15/2021 2125  Gross per 24 hour   Intake 1179.96 ml   Output 1425 ml   Net -245.04 ml       Vent settings:  Pulse  Av.3  Min: 78  Max: 87  Resp  Av  Min: 15  Max: 20  SpO2  Av.5 %  Min: 83 %  Max: 99 %    CONSTITUTIONAL:  fatigued, alert, cooperative, mild distress, appears stated age and thin  EYES:  Unremarkable   NECK:  Mild JVD  and supple, symmetrical, trachea midline  BACK:  symmetric and no curvature  LUNGS:  no increased work of breathing, good air exchange, no retractions and no crackles or wheezing  CARDIOVASCULAR:  normal apical pulses, regular rate and rhythm, normal S1 and S2, no S3 and no S4  ABDOMEN:  Soft BS + non tender BS +   MUSCULOSKELETAL:  No edema   NEUROLOGIC:  Grossly intact   SKIN:  Warm and dry  and no bruising or bleeding    Data      Recent Results (from the past 96 hour(s))   CBC Auto Differential    Collection Time: 10/13/21  8:12 PM   Result Value Ref Range    WBC 6.6 4.0 - 11.0 K/uL    RBC 3.57 (L) 4.00 - 5.20 M/uL    Hemoglobin 11.1 (L) 12.0 - 16.0 g/dL    Hematocrit 33.4 (L) 36.0 - 48.0 %    MCV 93.5 80.0 - 100.0 fL    MCH 30.9 26.0 - 34.0 pg    MCHC 33.1 31.0 - 36.0 g/dL    RDW 15.4 12.4 - 15.4 %    Platelets 318 758 - 306 K/uL    MPV 8.1 5.0 - 10.5 fL    Neutrophils % 74.9 %    Lymphocytes % 13.9 %    Monocytes % 9.0 %    Eosinophils % 1.7 %    Basophils % 0.5 %    Neutrophils Absolute 4.9 1.7 - 7.7 K/uL    Lymphocytes Absolute 0.9 (L) 1.0 - 5.1 K/uL    Monocytes Absolute 0.6 0.0 - 1.3 K/uL    Eosinophils Absolute 0.1 0.0 - 0.6 K/uL    Basophils Absolute 0.0 0.0 - 0.2 K/uL   Comprehensive metabolic panel    Collection Time: 10/13/21  8:12 PM   Result Value Ref Range    Sodium 138 136 - 145 mmol/L    Potassium 4.8 3.5 - 5.1 mmol/L    Chloride 102 99 - 110 mmol/L    CO2 23 21 - 32 mmol/L    Anion Gap 13 3 - 16    Glucose 280 (H) 70 - 99 mg/dL    BUN 33 (H) 7 - 20 mg/dL    CREATININE 2.0 (H) 0.6 - 1.2 mg/dL    GFR Non-African American 24 (A) >60    GFR  29 (A) >60    Calcium 9.5 8.3 - 10.6 mg/dL    Total Protein 7.3 6.4 - 8.2 g/dL    Albumin 3.7 3.4 - 5.0 g/dL    Albumin/Globulin Ratio 1.0 (L) 1.1 - 2.2    Total Bilirubin <0.2 0.0 - 1.0 mg/dL    Alkaline Phosphatase 54 40 - 129 U/L    ALT 7 (L) 10 - 40 U/L    AST 12 (L) 15 - 37 U/L    Globulin 3.6 Not Established g/dL   Lactate, Sepsis    Collection Time: 10/13/21  8:12 PM   Result Value Ref Range    Lactic Acid, Sepsis 1.4 0.4 - 1.9 mmol/L   Lipase    Collection Time: 10/13/21  8:12 PM   Result Value Ref Range    Lipase 41.0 13.0 - 60.0 U/L   Urine, reflex to culture    Collection Time: 10/13/21  8:12 PM    Specimen: Urine, clean catch   Result Value Ref Range    Color, UA YELLOW Straw/Yellow    Clarity, UA TURBID (A) Clear    Glucose, Ur Negative Negative mg/dL    Bilirubin Urine Negative Negative    Ketones, Urine Negative Negative mg/dL    Specific Gravity, UA 1.011 1.005 - 1.030    Blood, Urine MODERATE (A) Negative    pH, UA 7.5 5.0 - 8.0    Protein,  (A) Negative mg/dL    Urobilinogen, Urine 0.2 <2.0 E.U./dL    Nitrite, Urine Negative Negative    Leukocyte Esterase, Urine LARGE (A) Negative    Microscopic Examination YES     Urine Type NotGiven     Urine Reflex to Culture Yes    Microscopic Urinalysis    Collection Time: 10/13/21  8:12 PM   Result Value Ref Range    Bacteria, UA 2+ (A) None Seen /HPF    Hyaline Casts, UA 6 0 - 8 /LPF    WBC,  (H) 0 - 5 /HPF    RBC, UA 8 (H) 0 - 4 /HPF    Epithelial Cells, UA 1 0 - 5 /HPF   Culture, Urine    Collection Time: 10/13/21  8:12 PM    Specimen: Urine, clean catch   Result Value Ref Range    Organism Klebsiella pneumoniae (A)     Urine Culture, Routine >100,000 CFU/ml     Organism Proteus species (A)     Urine Culture, Routine <10,000 CFU/ml  No further workup          Susceptibility    Klebsiella pneumoniae - BACTERIAL SUSCEPTIBILITY PANEL BY OTILIO     ampicillin >=32 Resistant mcg/mL     ceFAZolin* <=4 Sensitive mcg/mL      * NOTE: Cefazolin should only be used for uncomplicated UTI      for E.coli or Klebsiella pneumoniae.      cefepime <=0.12 Sensitive mcg/mL     cefTRIAXone <=0.25 Sensitive mcg/mL     ciprofloxacin <=0.25 Sensitive mcg/mL     ertapenem <=0.12 Sensitive mcg/mL     gentamicin <=1 Sensitive mcg/mL     levofloxacin <=0.12 Sensitive mcg/mL     nitrofurantoin 64 Intermediate mcg/mL     piperacillin-tazobactam <=4 Sensitive mcg/mL     trimethoprim-sulfamethoxazole <=20 Sensitive mcg/mL   POCT Glucose    Collection Time: 10/14/21  8:19 AM   Result Value Ref Range    POC Glucose 191 (H) 70 - 99 mg/dl    Performed on ACCU-CHEK    POCT Glucose    Collection Time: 10/14/21 11:38 AM   Result Value Ref Range    POC Glucose 181 (H) 70 - 99 mg/dl    Performed on ACCU-CHEK    POCT Glucose    Collection Time: 10/14/21  4:40 PM   Result Value Ref Range    POC Glucose 148 (H) 70 - 99 mg/dl    Performed on ACCU-CHEK    Urine rt reflex to culture    Collection Time: 10/14/21  5:11 PM    Specimen: Urine, clean catch   Result Value Ref Range    Color, UA YELLOW Straw/Yellow    Clarity, UA CLOUDY (A) Clear    Glucose, Ur Negative Negative mg/dL    Bilirubin Urine Negative Negative    Ketones, Urine Negative Negative mg/dL    Specific Gravity, UA 1.009 1.005 - 1.030    Blood, Urine SMALL (A) Negative    pH, UA 7.0 5.0 - 8.0    Protein, UA 30 (A) Negative mg/dL    Urobilinogen, Urine 0.2 <2.0 E.U./dL    Nitrite, Urine Negative Negative    Leukocyte Esterase, Urine LARGE (A) Negative    Microscopic Examination YES     Urine Type Voided     Urine Reflex to Culture Yes    Microscopic Urinalysis    Collection Time: 10/14/21  5:11 PM   Result Value Ref Range    Bacteria, UA RARE (A) None Seen /HPF    Yeast, UA Present (A) None Seen /HPF    Urinalysis Comments see below     Hyaline Casts, UA 1 0 - 8 /LPF    WBC,  (H) 0 - 5 /HPF    RBC, UA 11 (H) 0 - 4 /HPF    Epithelial Cells, UA 0 0 - 5 /HPF   Culture, Urine    Collection Time: 10/14/21  5:11 PM    Specimen: Urine, clean catch   Result Value Ref Range    Urine Culture, Routine       <50,000 CFU/ml mixed skin/urogenital miguel.  No further workup   POCT Glucose    Collection Time: 10/14/21  8:14 PM   Result Value Ref Range    POC Glucose 190 (H) 70 - 99 mg/dl    Performed on ACCU-CHEK    POCT Glucose    Collection Time: 10/15/21  4:18 AM   Result Value Ref Range    POC Glucose 124 (H) 70 - 99 mg/dl    Performed on ACCU-CHEK    POCT Glucose    Collection Time: 10/15/21  7:14 AM   Result Value Ref Range    POC Glucose 119 (H) 70 - 99 mg/dl    Performed on ACCU-CHEK    CBC    Collection Time: 10/15/21  7:39 AM   Result Value Ref Range    WBC 6.4 4.0 - 11.0 K/uL    RBC 3.08 (L) 4.00 - 5.20 M/uL    Hemoglobin 9.5 (L) 12.0 - 16.0 g/dL    Hematocrit 28.6 (L) 36.0 - 48.0 %    MCV 92.6 80.0 - 100.0 fL    MCH 30.7 26.0 - 34.0 pg    MCHC 33.1 31.0 - 36.0 g/dL    RDW 14.6 12.4 - 15.4 %    Platelets 479 392 - 289 K/uL    MPV 7.6 5.0 - 10.5 fL   Basic metabolic panel    Collection Time: 10/15/21  7:39 AM   Result Value Ref Range    Sodium 138 136 - 145 mmol/L    Potassium 4.7 3.5 - 5.1 mmol/L    Chloride 107 99 - 110 mmol/L    CO2 23 21 - 32 mmol/L    Anion Gap 8 3 - 16    Glucose 129 (H) 70 - 99 mg/dL    BUN 28 (H) 7 - 20 mg/dL    CREATININE 1.8 (H) 0.6 - 1.2 mg/dL    GFR Non-African American 27 (A) >60    GFR  32 (A) >60    Calcium 8.3 8.3 - 10.6 mg/dL   POCT Glucose    Collection Time: 10/15/21  1:06 PM   Result Value Ref Range    POC Glucose 110 (H) 70 - 99 mg/dl    Performed on ACCU-CHEK    POCT Glucose    Collection Time: 10/15/21  4:11 PM   Result Value Ref Range    POC Glucose 97 70 - 99 mg/dl    Performed on ACCU-CHEK    POCT Glucose    Collection Time: 10/15/21  8:27 PM   Result Value Ref Range    POC Glucose 257 (H) 70 - 99 mg/dl    Performed on ACCU-CHEK        ASSESSMENT AND PLAN     Principal Problem:    Hydroureter on left  Active Problems:    CATARINA (acute kidney injury) ([facility])    High anion gap metabolic acidosis    Cardiomyopathy (HCC)    Ischemic cardiomyopathy    Essential hypertension    Automatic implantable cardioverter-defibrillator in situ    Right renal mass    UTI (urinary tract infection)    DM2 (diabetes mellitus, type 2) ([facility])    Acute-on-chronic renal failure (HCC)    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)    Chronic coronary artery disease    Chronic ischemic heart disease    Chronic systolic heart failure (HCC)    Mixed anxiety depressive disorder    Gastro-esophageal reflux disease without esophagitis    Hydronephrosis    Hyperlipidemia    Anemia, normocytic normochromic    Degenerative arthritis of hip    Protein-calorie malnutrition (HCC)    Tinnitus of vascular origin    Presence of coronary angioplasty implant and graft    Hyperkalemia    Pyelonephritis    Acute pyelonephritis    Chronic kidney disease, stage III (moderate) ([facility])    Kidney stone  Resolved Problems:    * No resolved hospital problems.  *    PO cefuroxime    surgical eval for parastomal hernia   Follow rrenal function    urology intervention per Dr Serina Parekh for hydronephrosis    PT OT eval and treat

## 2021-10-16 NOTE — PROGRESS NOTES
[facility]        Progress Note    Pt Name: Estefanía Xie  MRN: [id number]  YOB: 1940  Date of evaluation: 10/16/2021    Chief Complaint: abdominal pain      Subjective:    No acute events overnight  Sore from yesterday and catheter manipulation  Isolated episode of nausea this morning, still hungry for lunch  Urostomy with urine output  No recent stool, says because she hasn't been eating    Vital Signs:  Patient Vitals for the past 24 hrs:   BP Temp Temp src Pulse Resp SpO2 Weight   10/16/21 0815 132/65 98.5 °F (36.9 °C) Oral 90 16 94 % --   10/16/21 0630 -- -- -- -- -- -- 146 lb 6.2 oz (66.4 kg)   10/16/21 0421 (!) 146/64 99.1 °F (37.3 °C) Oral 69 15 94 % --   10/15/21 2333 (!) 147/74 98.6 °F (37 °C) Oral 86 15 95 % --   10/15/21 2024 (!) 151/68 98.5 °F (36.9 °C) Oral 86 16 94 % --   10/15/21 1542 (!) 157/67 98 °F (36.7 °C) Oral 83 17 95 % --   10/15/21 1429 (!) 148/64 -- -- 85 -- -- --   10/15/21 1250 (!) 147/77 98.4 °F (36.9 °C) Oral 84 18 94 % --      TEMPERATURE HISTORY 24H: Temp (24hrs), Av.5 °F (36.9 °C), Min:98 °F (36.7 °C), Max:99.1 °F (37.3 °C)    BLOOD PRESSURE HISTORY: Systolic (58GXX), TGJ:933 , Min:128 , PJB:407    Diastolic (98HOL), ZYA:91, Min:55, Max:77      Intake/Output:    I/O last 3 completed shifts: In: 120 [P.O.:120]  Out: 1075 [Urine:1075]  No intake/output data recorded.   Drain/tube Output:           Physical Exam:  General: awake, alert, oriented to  person, place, time  Cardiac: regular rate and rhythm   Pulmonary: clear to auscultation bilaterally   Abdomen: soft, non-distended, non-tender, urostomy pink viable with appropriate urinary output    Labs:  CBC:    Recent Labs     10/13/21  2012 10/15/21  0739 10/16/21  0528   WBC 6.6 6.4 6.7   HGB 11.1* 9.5* 9.6*   HCT 33.4* 28.6* 28.5*    173 197     BMP:    Recent Labs     10/13/21  2012 10/15/21  0739 10/16/21  0528    138 142   K 4.8 4.7 4.2    107 111* Severe hydroureteronephrosis involving left kidney. Left perinephric fat stranding. Layering calcific density within the region the iliac conduit may represent stones versus calcified suture material..  There is mild wall thickening along the iliac conduit. Urostomy is slightly lateralized likely due to mass effect from a parastomal hernia containing omental fat and a small knuckle of bowel. The hernia sac containing bowel is new from the prior exams. GI/Bowel: Otherwise no evidence of bowel obstruction. Moderate retained stool rectosigmoid junction may represent fecal impaction versus constipation. Colonic diverticulosis. Appendix not visualized. Pelvis: Bladder is absent. No suspicious adnexal mass. Peritoneum/Retroperitoneum: No free air or free fluid. Small fat containing umbilical hernia. Aortic vascular calcifications. Bones/Soft Tissues: Multilevel degenerate changes of the lumbar spine. Moderate severe left-sided hydroureteronephrosis. There is wall thickening in surrounding inflammatory changes along the neobladder/ileal conduit and involving the left perinephric fat planes. This may be related to underlying infection versus outlet obstruction caused by a new herniated bowel loop through the site of urostomy. Please correlate urinalysis findings. Urologic consultation may be beneficial. Colonic diverticulosis.        Scheduled Meds:   aspirin  81 mg Oral Daily    carvedilol  12.5 mg Oral BID    hydrALAZINE  25 mg Oral 3 times per day    insulin glargine  18 Units SubCUTAneous Nightly    rosuvastatin  10 mg Oral QPM    sodium bicarbonate  650 mg Oral BID    therapeutic multivitamin-minerals  1 tablet Oral Daily    Vitamin D  1,000 Units Oral Daily    enoxaparin  30 mg SubCUTAneous Daily    cefUROXime  250 mg Oral Daily     Continuous Infusions:   sodium chloride 100 mL/hr at 10/15/21 2335    dextrose       PRN Meds:.hydrALAZINE, sodium chloride, acetaminophen, ALPRAZolam, fluticasone, ondansetron, HYDROmorphone, glucose, dextrose, glucagon (rDNA), dextrose      Assessment:  Patient Active Problem List   Diagnosis    [facility]) [facility] [address]    High anion gap metabolic acidosis    Cardiomyopathy ([facility] [address]    PVC's (premature ventricular contractions)    Ischemic cardiomyopathy    Essential hypertension    Automatic implantable cardioverter-defibrillator in situ    Right renal mass    UTI (urinary tract infection)    DM2 (diabetes mellitus, type 2) (HCC)    Acute-on-chronic renal failure (HCC)    Arthralgia    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)    Chronic coronary artery disease    Chronic ischemic heart disease    Chronic systolic heart failure (HCC)    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    Mixed anxiety depressive disorder    Anxiety, generalized    Gastro-esophageal reflux disease without esophagitis    Generalized OA    Other specified counseling    Herpes zoster with nervous system complication    Hydronephrosis    Hyperlipidemia    Iron malabsorption    Anemia, normocytic normochromic    Degenerative arthritis of hip    Scar of skin    Awareness of heartbeats    Protein-calorie malnutrition (HCC)    Tinnitus of vascular origin    Presence of coronary angioplasty implant and graft    Depression, reactive    Hyperkalemia    Pyelonephritis    Acute pyelonephritis    Chronic kidney disease, stage III (moderate) ([address]    Encounter for ostomy care education    Arthritis of left hip    Kidney stone    Hydroureter on left     Parastomal hernia with ileal conduit    Plan:  1. Isolated episode of nausea this morning, still hungry and will keep on regular diet, monitor  2. Conduit functional with appropriate output  3. Pain controlled and improving  4. Activity as tolerated  5. Defer management of remainder of medical comorbidities to primary and consulting teams    Bakari Bailey MD, FACS  10/16/2021  10:01 AM

## 2021-10-17 LAB
ANION GAP SERPL CALCULATED.3IONS-SCNC: 11 MMOL/L (ref 3–16)
BASOPHILS ABSOLUTE: 0 K/UL (ref 0–0.2)
BASOPHILS RELATIVE PERCENT: 0.3 %
BUN BLDV-MCNC: 19 MG/DL (ref 7–20)
CALCIUM SERPL-MCNC: 8.8 MG/DL (ref 8.3–10.6)
CHLORIDE BLD-SCNC: 107 MMOL/L (ref 99–110)
CO2: 24 MMOL/L (ref 21–32)
CREAT SERPL-MCNC: 1.7 MG/DL (ref 0.6–1.2)
EOSINOPHILS ABSOLUTE: 0.1 K/UL (ref 0–0.6)
EOSINOPHILS RELATIVE PERCENT: 2.6 %
GFR AFRICAN AMERICAN: 35
GFR NON-AFRICAN AMERICAN: 29
GLUCOSE BLD-MCNC: 177 MG/DL (ref 70–99)
GLUCOSE BLD-MCNC: 188 MG/DL (ref 70–99)
GLUCOSE BLD-MCNC: 207 MG/DL (ref 70–99)
GLUCOSE BLD-MCNC: 76 MG/DL (ref 70–99)
GLUCOSE BLD-MCNC: 83 MG/DL (ref 70–99)
GLUCOSE BLD-MCNC: 86 MG/DL (ref 70–99)
HCT VFR BLD CALC: 28.3 % (ref 36–48)
HEMOGLOBIN: 9.6 G/DL (ref 12–16)
LYMPHOCYTES ABSOLUTE: 0.9 K/UL (ref 1–5.1)
LYMPHOCYTES RELATIVE PERCENT: 16.3 %
MCH RBC QN AUTO: 31.2 PG (ref 26–34)
MCHC RBC AUTO-ENTMCNC: 33.7 G/DL (ref 31–36)
MCV RBC AUTO: 92.5 FL (ref 80–100)
MONOCYTES ABSOLUTE: 0.6 K/UL (ref 0–1.3)
MONOCYTES RELATIVE PERCENT: 11.5 %
NEUTROPHILS ABSOLUTE: 3.7 K/UL (ref 1.7–7.7)
NEUTROPHILS RELATIVE PERCENT: 69.3 %
PDW BLD-RTO: 14.6 % (ref 12.4–15.4)
PERFORMED ON: ABNORMAL
PERFORMED ON: NORMAL
PERFORMED ON: NORMAL
PLATELET # BLD: 213 K/UL (ref 135–450)
PMV BLD AUTO: 7.3 FL (ref 5–10.5)
POTASSIUM SERPL-SCNC: 4 MMOL/L (ref 3.5–5.1)
RBC # BLD: 3.06 M/UL (ref 4–5.2)
SODIUM BLD-SCNC: 142 MMOL/L (ref 136–145)
WBC # BLD: 5.3 K/UL (ref 4–11)

## 2021-10-17 PROCEDURE — 6360000002 HC RX W HCPCS: Performed by: INTERNAL MEDICINE

## 2021-10-17 PROCEDURE — 6370000000 HC RX 637 (ALT 250 FOR IP): Performed by: SURGERY

## 2021-10-17 PROCEDURE — 1200000000 HC SEMI PRIVATE

## 2021-10-17 PROCEDURE — 6370000000 HC RX 637 (ALT 250 FOR IP): Performed by: INTERNAL MEDICINE

## 2021-10-17 PROCEDURE — 80048 BASIC METABOLIC PNL TOTAL CA: CPT

## 2021-10-17 PROCEDURE — 85025 COMPLETE CBC W/AUTO DIFF WBC: CPT

## 2021-10-17 PROCEDURE — 36415 COLL VENOUS BLD VENIPUNCTURE: CPT

## 2021-10-17 PROCEDURE — 99232 SBSQ HOSP IP/OBS MODERATE 35: CPT | Performed by: SURGERY

## 2021-10-17 RX ORDER — BISACODYL 10 MG
10 SUPPOSITORY, RECTAL RECTAL DAILY PRN
Status: DISCONTINUED | OUTPATIENT
Start: 2021-10-17 | End: 2021-10-18 | Stop reason: HOSPADM

## 2021-10-17 RX ORDER — BISACODYL 10 MG
10 SUPPOSITORY, RECTAL RECTAL ONCE
Status: COMPLETED | OUTPATIENT
Start: 2021-10-17 | End: 2021-10-17

## 2021-10-17 RX ADMIN — HYDRALAZINE HYDROCHLORIDE 25 MG: 25 TABLET, FILM COATED ORAL at 14:59

## 2021-10-17 RX ADMIN — CARVEDILOL 12.5 MG: 6.25 TABLET, FILM COATED ORAL at 20:05

## 2021-10-17 RX ADMIN — SODIUM BICARBONATE 650 MG: 650 TABLET ORAL at 08:02

## 2021-10-17 RX ADMIN — MULTIPLE VITAMINS W/ MINERALS TAB 1 TABLET: TAB at 08:03

## 2021-10-17 RX ADMIN — CEFUROXIME AXETIL 250 MG: 250 TABLET ORAL at 08:02

## 2021-10-17 RX ADMIN — HYDRALAZINE HYDROCHLORIDE 25 MG: 25 TABLET, FILM COATED ORAL at 22:22

## 2021-10-17 RX ADMIN — ASPIRIN 81 MG: 81 TABLET, COATED ORAL at 08:02

## 2021-10-17 RX ADMIN — HYDRALAZINE HYDROCHLORIDE 25 MG: 25 TABLET, FILM COATED ORAL at 05:59

## 2021-10-17 RX ADMIN — CARVEDILOL 12.5 MG: 6.25 TABLET, FILM COATED ORAL at 08:02

## 2021-10-17 RX ADMIN — SODIUM BICARBONATE 650 MG: 650 TABLET ORAL at 20:05

## 2021-10-17 RX ADMIN — ROSUVASTATIN 10 MG: 10 TABLET, FILM COATED ORAL at 17:32

## 2021-10-17 RX ADMIN — Medication 1000 UNITS: at 08:03

## 2021-10-17 RX ADMIN — INSULIN GLARGINE 18 UNITS: 100 INJECTION, SOLUTION SUBCUTANEOUS at 20:36

## 2021-10-17 RX ADMIN — BISACODYL 10 MG: 10 SUPPOSITORY RECTAL at 14:59

## 2021-10-17 ASSESSMENT — PAIN SCALES - GENERAL
PAINLEVEL_OUTOF10: 0

## 2021-10-17 NOTE — PROGRESS NOTES
[facility] 81 [facility]        Progress Note    Pt Name: Fabiola Calix  MRN: [id number]  YOB: 1940  Date of evaluation: 10/17/2021    Chief Complaint: abdominal pain      Subjective:    No acute events overnight  Abdominal pain slowly improving  Nausea resolved, tolerating diet  No recent stool and feels constipated  Urostomy with urine output    Vital Signs:  Patient Vitals for the past 24 hrs:   BP Temp Temp src Pulse Resp SpO2 Weight   10/17/21 0745 (!) 151/87 97.7 °F (36.5 °C) Axillary 84 16 97 % --   10/17/21 0402 (!) 148/73 98.2 °F (36.8 °C) Oral 82 15 96 % 145 lb 8.1 oz (66 kg)   10/16/21 2300 (!) 151/66 98.5 °F (36.9 °C) Oral 89 16 94 % --   10/16/21 1947 (!) 162/79 98 °F (36.7 °C) Oral 76 16 95 % --   10/16/21 1530 (!) 160/79 98.1 °F (36.7 °C) Oral 65 16 95 % --   10/16/21 1145 (!) 150/65 97.9 °F (36.6 °C) Oral 90 16 95 % --      TEMPERATURE HISTORY 24H: Temp (24hrs), Av.1 °F (36.7 °C), Min:97.7 °F (36.5 °C), Max:98.5 °F (36.9 °C)    BLOOD PRESSURE HISTORY: Systolic (94IQQ), BZO:720 , Min:132 , PVF:449    Diastolic (74QRD), DFO:27, Min:64, Max:87      Intake/Output:    I/O last 3 completed shifts:  In: -   Out: 500 [Urine:500]  No intake/output data recorded.   Drain/tube Output:           Physical Exam:  General: awake, alert, oriented to  person, place, time  Cardiac: regular rate and rhythm   Pulmonary: clear to auscultation bilaterally   Abdomen: soft, non-distended, non-tender, urostomy pink viable with appropriate urinary output    Labs:  CBC:    Recent Labs     10/15/21  0739 10/16/21  0528 10/17/21  0520   WBC 6.4 6.7 5.3   HGB 9.5* 9.6* 9.6*   HCT 28.6* 28.5* 28.3*    197 213     BMP:    Recent Labs     10/15/21  0739 10/16/21  0528 10/17/21  0520    142 142   K 4.7 4.2 4.0    111* 107   CO2 23 22 24   BUN 28* 25* 19   CREATININE 1.8* 1.6* 1.7*   GLUCOSE 129* 93 86     Hepatic:   No results for input(s): AST, ALT, ALB, BILITOT, ALKPHOS in the last 72 hours. Amylase: No results for input(s): AMYLASE in the last 72 hours. Lipase:   No results for input(s): LIPASE in the last 72 hours. Mag:    No results for input(s): MG in the last 72 hours. Phos:   No results for input(s): PHOS in the last 72 hours. Coags: No results for input(s): INR, APTT in the last 72 hours. Cultures:  Relevant cultures documented under results section     Pathology:   No relevant pathology     Imaging:  I have personally reviewed the following films:    CT ABDOMEN PELVIS WO CONTRAST Additional Contrast? None    Result Date: 10/13/2021  EXAMINATION: CT OF THE ABDOMEN AND PELVIS WITHOUT CONTRAST 10/13/2021 9:38 pm TECHNIQUE: CT of the abdomen and pelvis was performed without the administration of intravenous contrast. Multiplanar reformatted images are provided for review. Dose modulation, iterative reconstruction, and/or weight based adjustment of the mA/kV was utilized to reduce the radiation dose to as low as reasonably achievable. COMPARISON: CT chest 08/17/2021 and is 07/11/2020 HISTORY: ORDERING SYSTEM PROVIDED HISTORY: left sided abd pain TECHNOLOGIST PROVIDED HISTORY: Reason for exam:->left sided abd pain Additional Contrast?->None Reason for Exam: Left sided abd pain. Flank Pain (pt brought in by [facility] from [facility] c/o sudden onset of left flank pain radiating into left side of abd.  pt has hx of kidney stones). Acuity: Acute Type of Exam: Initial FINDINGS: Lower Chest: Cardiomegaly. Pacemaker leads. Mild interstitial edema identified involving lung bases. Minimal hypoventilatory change. Organs: Evaluation challenge by lack contrast. Layering gallstones within the gallbladder. Otherwise, the liver, spleen, adrenal glands, pancreas, and gallbladder unremarkable. Prior right nephrectomy. Severe hydroureteronephrosis involving left kidney. Left perinephric fat stranding.  Layering calcific density within the region the iliac conduit may represent stones versus calcified suture material..  There is mild wall thickening along the iliac conduit. Urostomy is slightly lateralized likely due to mass effect from a parastomal hernia containing omental fat and a small knuckle of bowel. The hernia sac containing bowel is new from the prior exams. GI/Bowel: Otherwise no evidence of bowel obstruction. Moderate retained stool rectosigmoid junction may represent fecal impaction versus constipation. Colonic diverticulosis. Appendix not visualized. Pelvis: Bladder is absent. No suspicious adnexal mass. Peritoneum/Retroperitoneum: No free air or free fluid. Small fat containing umbilical hernia. Aortic vascular calcifications. Bones/Soft Tissues: Multilevel degenerate changes of the lumbar spine. Moderate severe left-sided hydroureteronephrosis. There is wall thickening in surrounding inflammatory changes along the neobladder/ileal conduit and involving the left perinephric fat planes. This may be related to underlying infection versus outlet obstruction caused by a new herniated bowel loop through the site of urostomy. Please correlate urinalysis findings. Urologic consultation may be beneficial. Colonic diverticulosis.        Scheduled Meds:   aspirin  81 mg Oral Daily    carvedilol  12.5 mg Oral BID    hydrALAZINE  25 mg Oral 3 times per day    insulin glargine  18 Units SubCUTAneous Nightly    rosuvastatin  10 mg Oral QPM    sodium bicarbonate  650 mg Oral BID    therapeutic multivitamin-minerals  1 tablet Oral Daily    Vitamin D  1,000 Units Oral Daily    enoxaparin  30 mg SubCUTAneous Daily    cefUROXime  250 mg Oral Daily     Continuous Infusions:   sodium chloride 100 mL/hr at 10/15/21 2335    dextrose       PRN Meds:.hydrALAZINE, sodium chloride, acetaminophen, ALPRAZolam, fluticasone, ondansetron, HYDROmorphone, glucose, dextrose, glucagon (rDNA), dextrose      Assessment:  Patient Active Problem List   Diagnosis    [facility]acute [facility]) ([facility] [address]    High anion gap metabolic acidosis    Cardiomyopathy ([facility] [address]    PVC's (premature ventricular contractions)    Ischemic cardiomyopathy    Essential hypertension    Automatic implantable cardioverter-defibrillator in situ    Right renal mass    UTI (urinary tract infection)    DM2 (diabetes mellitus, type 2) (HCC)    Acute-on-chronic renal failure (HCC)    Arthralgia    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)    Chronic coronary artery disease    Chronic ischemic heart disease    Chronic systolic heart failure (HCC)    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    Mixed anxiety depressive disorder    Anxiety, generalized    Gastro-esophageal reflux disease without esophagitis    Generalized OA    Other specified counseling    Herpes zoster with nervous system complication    Hydronephrosis    Hyperlipidemia    Iron malabsorption    Anemia, normocytic normochromic    Degenerative arthritis of hip    Scar of skin    Awareness of heartbeats    Protein-calorie malnutrition (HCC)    Tinnitus of vascular origin    Presence of coronary angioplasty implant and graft    Depression, reactive    Hyperkalemia    Pyelonephritis    Acute pyelonephritis    Chronic kidney disease, stage III (moderate) ([facility] [address]    Encounter for ostomy care education    Arthritis of left hip    Kidney stone    Hydroureter on left     Parastomal hernia with ileal conduit    Plan:  1. Nausea resolved, tolerating diet  2. Conduit functional with appropriate output, NPO after midnight for possible urology study tomorrow  3. Pain controlled and improving  4. Activity as tolerated  5. Passing flatus but no recent stool, requesting suppository and not PO laxative  6. Defer management of remainder of medical comorbidities to primary and consulting teams    Bakari Lew MD, FACS  10/17/2021  9:20 AM

## 2021-10-17 NOTE — PROGRESS NOTES
Assessment complete. VSS, respirations are even and unlabored. Afebrile. Pt is resting. Patient is up adlib in room. Non-skid socks on, bed in lowest position and locked. No other needs expressed. will continue to monitor.

## 2021-10-17 NOTE — PROGRESS NOTES
Arrived to place PIV with ASPEN Saxena. No issues accessing right arm vein, blood return and flushed well. Pt left in stable condition, bed in lowest position and call light within reach.

## 2021-10-17 NOTE — PROGRESS NOTES
Progress Note - Dr. Santy Curran - Internal Medicine  PCP: Macy Santamaria MD [address] [facility]. [address] [phone]    Hospital Day: 3  Code Status: Prior  Current Diet: ADULT DIET; Regular; 4 carb choices (60 gm/meal)  Diet NPO        CC: follow up on medical issues    Subjective:   Jared Bergman is a 78 y.o. female. She admits to problems    Patient is doing a little bit better this morning. She was able to eat breakfast with minimal discomfort. The nausea she complained about yesterday is improved. Has been seen by urology this weekend, plan is to do imaging tomorrow to further evaluate her anatomy. Patient understands this means she will not be discharged today    She denies chest pain, denies shortness of breath, denies nausea,  denies emesis. 10 system Review of Systems is reviewed with patient, and pertinent positives are noted in HPI above . Otherwise, Review of systems is negative. I have reviewed the patient's medical and social history in detail and updated the computerized patient record. To recap: She  has a past medical history of Anxiety, Atrial fibrillation ([address] CAD (coronary artery disease), Cancer ([address] Cardiac arrest ([address] Carotid artery stenosis, CHF (congestive heart failure) ([address] Chronic kidney disease, Depression, Diabetes mellitus ([address] GERD (gastroesophageal reflux disease), Hip pain, chronic, HYPERCHOLESTERAEMIA, Hypertension, Irritable bowel syndrome, Laceration of head, MI, old, Neuropathy, Osteoarthritis, Presence of combination internal cardiac defibrillator (ICD) and pacemaker, Staph infection, Type 2 diabetes mellitus without complication ([address] and Urinary incontinence. . She  has a past surgical history that includes angioplasty; Hysterectomy; bladder suspension; Cystoscopy (5/28/2013); Pacemaker insertion; Kidney removal; Bladder removal; Total hip arthroplasty (Left, 11/6/2019); and Skin cancer excision. . She  reports that she has never smoked. She has never used smokeless tobacco. She reports that she does not drink alcohol and does not use drugs. .        Active Hospital Problems    Diagnosis Date Noted    Kidney stone [N20.0] 10/14/2021    Hydroureter on left [N13.4] 10/14/2021    Acute pyelonephritis [N10] 05/29/2018    Chronic kidney disease, stage III (moderate) ([facility] [N18.30] 05/29/2018    Pyelonephritis [N12] 05/07/2018    Hyperkalemia [E87.5] 10/27/2017    Mixed anxiety depressive disorder [F41.8] 09/12/2017    Protein-calorie malnutrition ([facility] [place] 08/17/2017    Acute-on-chronic renal failure ([facility] [N17.9, N18.9] 08/11/2017    Hydronephrosis [N13.30] 06/07/2017    Right renal mass [N28.89] 04/27/2017    DM2 (diabetes mellitus, type 2) ([facility] [E11.9] 04/27/2017    UTI (urinary tract infection) [N39.0] 04/27/2017    Chronic systolic heart failure ([facility] [I50.22] 04/25/2016    Automatic implantable cardioverter-defibrillator in situ [Z95.810] 04/04/2016    Ischemic cardiomyopathy [I25.5]     Essential hypertension [I10]     CATARINA (acute kidney injury) ([facility] [N17.9]     High anion gap metabolic acidosis [T78.0]     Cardiomyopathy ([facility] [I42.9]     Degenerative arthritis of hip [M16.9] 11/12/2015    Anemia, normocytic normochromic [D64.9] 05/02/2014    Chronic coronary artery disease [I25.10] 02/13/2013    Tinnitus of vascular origin [Y33. A9] 11/09/2012    Hyperlipidemia [E78.5] 08/09/2012    Presence of coronary angioplasty implant and graft [Z95.5] 08/09/2012    Chronic ischemic heart disease [I25.9] 10/20/2009    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 ([facility] [I13.10, N18.30] 06/30/2008    Gastro-esophageal reflux disease without esophagitis [K21.9] 06/30/2008       Current Facility-Administered Medications: hydrALAZINE (APRESOLINE) injection 10 mg, 10 mg, IntraVENous, Q6H PRN  0.9 % sodium chloride bolus, 500 mL, IntraVENous, PRN  acetaminophen (TYLENOL) tablet 650 mg, 650 mg, Oral, Q4H PRN  ALPRAZolam Nenett Dubin) tablet 0.25 mg, 0.25 mg, Oral, Nightly PRN  aspirin EC tablet 81 mg, 81 mg, Oral, Daily  carvedilol (COREG) tablet 12.5 mg, 12.5 mg, Oral, BID  fluticasone (FLONASE) 50 MCG/ACT nasal spray 1 spray, 1 spray, Nasal, PRN  hydrALAZINE (APRESOLINE) tablet 25 mg, 25 mg, Oral, 3 times per day  insulin glargine (LANTUS;BASAGLAR) injection pen 18 Units, 18 Units, SubCUTAneous, Nightly  ondansetron (ZOFRAN-ODT) disintegrating tablet 4 mg, 4 mg, Oral, Q8H PRN  rosuvastatin (CRESTOR) tablet 10 mg, 10 mg, Oral, QPM  sodium bicarbonate tablet 650 mg, 650 mg, Oral, BID  therapeutic multivitamin-minerals 1 tablet, 1 tablet, Oral, Daily  vitamin D (CHOLECALCIFEROL) tablet 1,000 Units, 1,000 Units, Oral, Daily  0.9 % sodium chloride infusion, , IntraVENous, Continuous  enoxaparin (LOVENOX) injection 30 mg, 30 mg, SubCUTAneous, Daily  HYDROmorphone HCl PF (DILAUDID) injection 0.5 mg, 0.5 mg, IntraVENous, Q4H PRN  glucose (GLUTOSE) 40 % oral gel 15 g, 15 g, Oral, PRN  dextrose 50 % IV solution, 12.5 g, IntraVENous, PRN  glucagon (rDNA) injection 1 mg, 1 mg, IntraMUSCular, PRN  dextrose 5 % solution, 100 mL/hr, IntraVENous, PRN  cefUROXime (CEFTIN) tablet 250 mg, 250 mg, Oral, Daily         Objective:  BP (!) 151/87   Pulse 84   Temp 97.7 °F (36.5 °C) (Axillary)   Resp 16   Ht 5' 8\" (1.727 m)   Wt 145 lb 8.1 oz (66 kg)   SpO2 97%   BMI 22.12 kg/m²      Patient Vitals for the past 24 hrs:   BP Temp Temp src Pulse Resp SpO2 Weight   10/17/21 0745 (!) 151/87 97.7 °F (36.5 °C) Axillary 84 16 97 % --   10/17/21 0402 (!) 148/73 98.2 °F (36.8 °C) Oral 82 15 96 % 145 lb 8.1 oz (66 kg)   10/16/21 2300 (!) 151/66 98.5 °F (36.9 °C) Oral 89 16 94 % --   10/16/21 1947 (!) 162/79 98 °F (36.7 °C) Oral 76 16 95 % --   10/16/21 1530 (!) 160/79 98.1 °F (36.7 °C) Oral 65 16 95 % --   10/16/21 1145 (!) 150/65 97.9 °F (36.6 °C) Oral 90 16 95 % --     Patient Vitals for the past 96 hrs (Last 3 readings):   Weight   10/17/21 0402 145 lb 8.1 oz (66 kg)   10/16/21 0630 146 lb 6.2 oz (66.4 kg)   10/14/21 0228 143 lb 14.4 oz (65.3 kg)           Intake/Output Summary (Last 24 hours) at 10/17/2021 0848  Last data filed at 10/17/2021 0440  Gross per 24 hour   Intake --   Output 500 ml   Net -500 ml         Physical Exam:   BP (!) 151/87   Pulse 84   Temp 97.7 °F (36.5 °C) (Axillary)   Resp 16   Ht 5' 8\" (1.727 m)   Wt 145 lb 8.1 oz (66 kg)   SpO2 97%   BMI 22.12 kg/m²   General appearance: alert, appears stated age and cooperative  Head: Normocephalic, without obvious abnormality, atraumatic  Lungs: clear to auscultation bilaterally  Heart: regular rate and rhythm, S1, S2 normal, no murmur, click, rub or gallop  Abdomen: soft, mildly tender, stoma pink  Extremities: extremities normal, atraumatic, no cyanosis or edema    Labs:  Lab Results   Component Value Date    WBC 5.3 10/17/2021    HGB 9.6 (L) 10/17/2021    HCT 28.3 (L) 10/17/2021     10/17/2021    CHOL 152 07/30/2020    TRIG 163 (H) 07/30/2020    HDL 43 03/03/2021    LDLDIRECT 148 (H) 07/08/2010    ALT 7 (L) 10/13/2021    AST 12 (L) 10/13/2021     10/17/2021    K 4.0 10/17/2021     10/17/2021    CREATININE 1.7 (H) 10/17/2021    BUN 19 10/17/2021    CO2 24 10/17/2021    TSH 0.60 11/22/2017    INR 0.97 01/15/2020    LABA1C 7.0 07/30/2020    LABMICR YES 10/14/2021     Lab Results   Component Value Date    CKMB <0.2 06/06/2010    TROPONINI 0.01 07/11/2021           Recent Imaging Results are Reviewed:  CT ABDOMEN PELVIS WO CONTRAST Additional Contrast? None    Result Date: 10/13/2021  EXAMINATION: CT OF THE ABDOMEN AND PELVIS WITHOUT CONTRAST 10/13/2021 9:38 pm TECHNIQUE: CT of the abdomen and pelvis was performed without the administration of intravenous contrast. Multiplanar reformatted images are provided for review. Dose modulation, iterative reconstruction, and/or weight based adjustment of the mA/kV was utilized to reduce the radiation dose to as low as reasonably achievable. COMPARISON: CT chest 08/17/2021 and is 07/11/2020 HISTORY: ORDERING SYSTEM PROVIDED HISTORY: left sided abd pain TECHNOLOGIST PROVIDED HISTORY: Reason for exam:->left sided abd pain Additional Contrast?->None Reason for Exam: Left sided abd pain. Flank Pain (pt brought in by [facility] from [facility] c/o sudden onset of left flank pain radiating into left side of abd.  pt has hx of kidney stones). Acuity: Acute Type of Exam: Initial FINDINGS: Lower Chest: Cardiomegaly. Pacemaker leads. Mild interstitial edema identified involving lung bases. Minimal hypoventilatory change. Organs: Evaluation challenge by lack contrast. Layering gallstones within the gallbladder. Otherwise, the liver, spleen, adrenal glands, pancreas, and gallbladder unremarkable. Prior right nephrectomy. Severe hydroureteronephrosis involving left kidney. Left perinephric fat stranding. Layering calcific density within the region the iliac conduit may represent stones versus calcified suture material..  There is mild wall thickening along the iliac conduit. Urostomy is slightly lateralized likely due to mass effect from a parastomal hernia containing omental fat and a small knuckle of bowel. The hernia sac containing bowel is new from the prior exams. GI/Bowel: Otherwise no evidence of bowel obstruction. Moderate retained stool rectosigmoid junction may represent fecal impaction versus constipation. Colonic diverticulosis. Appendix not visualized. Pelvis: Bladder is absent. No suspicious adnexal mass. Peritoneum/Retroperitoneum: No free air or free fluid. Small fat containing umbilical hernia. Aortic vascular calcifications. Bones/Soft Tissues: Multilevel degenerate changes of the lumbar spine. Moderate severe left-sided hydroureteronephrosis. There is wall thickening in surrounding inflammatory changes along the neobladder/ileal conduit and involving the left perinephric fat planes.   This may be related to underlying infection versus outlet obstruction caused by a new herniated bowel loop through the site of urostomy. Please correlate urinalysis findings. Urologic consultation may be beneficial. Colonic diverticulosis. Lab Results   Component Value Date    GLUCOSE 86 10/17/2021     Lab Results   Component Value Date    POCGLU 76 10/17/2021       Assessment and Plan:  Principal Problem:    Hydroureter on left -Established problem. Stable. Plan: re-eval per urology - for imaging monday  Active Problems:    CATARINA (acute kidney injury) ([address] -Established problem. Stable. Creat 1.7  Plan: cont fluids, repeat labs in am    Essential hypertension -Established problem. Stable. 151/87  Plan: stay on same meds    Automatic implantable cardioverter-defibrillator in situ    Right renal mass    UTI (urinary tract infection) -Established problem. Stable. Urine cx kelbsiella, proteus  Plan: on ceftin po    DM2 (diabetes mellitus, type 2) ([address] - Established problem. Stable.   fsbs 76  Plan: stay on diet, sliding scale, home meds    Hydronephrosis  Plan: cont mgmt per urology    Protein-calorie malnutrition ([address]    Hyperkalemia -Established problem, now resolved. k 4.0  Plan: Continue present orders/plan.         Dr. Morteza Silvestre to resume care on Monday when he returns    Parul Gonzalez MD  10/17/2021

## 2021-10-17 NOTE — PROGRESS NOTES
Pt awake and alert      Nad  No fever   vss  abd soft   Not signif distended  Stoma pink  Urine yellow and clear  Cr 1.7  Discussed findings and options.     Not sure if the stomal hernia if the cause of the hydro on the left       Plan   Loop o gram tomorrow to eval the anatomy further   Discussed with pt who understands

## 2021-10-18 ENCOUNTER — APPOINTMENT (OUTPATIENT)
Dept: GENERAL RADIOLOGY | Age: 81
DRG: 394 | End: 2021-10-18
Payer: MEDICARE

## 2021-10-18 VITALS
WEIGHT: 145.2 LBS | BODY MASS INDEX: 22.01 KG/M2 | DIASTOLIC BLOOD PRESSURE: 77 MMHG | TEMPERATURE: 97.9 F | RESPIRATION RATE: 16 BRPM | HEART RATE: 80 BPM | SYSTOLIC BLOOD PRESSURE: 167 MMHG | OXYGEN SATURATION: 96 % | HEIGHT: 68 IN

## 2021-10-18 LAB
ANION GAP SERPL CALCULATED.3IONS-SCNC: 9 MMOL/L (ref 3–16)
BASOPHILS ABSOLUTE: 0 K/UL (ref 0–0.2)
BASOPHILS RELATIVE PERCENT: 0.6 %
BUN BLDV-MCNC: 18 MG/DL (ref 7–20)
CALCIUM SERPL-MCNC: 8.6 MG/DL (ref 8.3–10.6)
CHLORIDE BLD-SCNC: 111 MMOL/L (ref 99–110)
CO2: 24 MMOL/L (ref 21–32)
CREAT SERPL-MCNC: 1.4 MG/DL (ref 0.6–1.2)
EOSINOPHILS ABSOLUTE: 0.1 K/UL (ref 0–0.6)
EOSINOPHILS RELATIVE PERCENT: 2.1 %
GFR AFRICAN AMERICAN: 43
GFR NON-AFRICAN AMERICAN: 36
GLUCOSE BLD-MCNC: 105 MG/DL (ref 70–99)
GLUCOSE BLD-MCNC: 73 MG/DL (ref 70–99)
GLUCOSE BLD-MCNC: 75 MG/DL (ref 70–99)
GLUCOSE BLD-MCNC: 78 MG/DL (ref 70–99)
GLUCOSE BLD-MCNC: 85 MG/DL (ref 70–99)
HCT VFR BLD CALC: 29.6 % (ref 36–48)
HEMOGLOBIN: 10.2 G/DL (ref 12–16)
LYMPHOCYTES ABSOLUTE: 0.8 K/UL (ref 1–5.1)
LYMPHOCYTES RELATIVE PERCENT: 13.8 %
MCH RBC QN AUTO: 31.2 PG (ref 26–34)
MCHC RBC AUTO-ENTMCNC: 34.6 G/DL (ref 31–36)
MCV RBC AUTO: 90.1 FL (ref 80–100)
MONOCYTES ABSOLUTE: 0.4 K/UL (ref 0–1.3)
MONOCYTES RELATIVE PERCENT: 7.6 %
NEUTROPHILS ABSOLUTE: 4.4 K/UL (ref 1.7–7.7)
NEUTROPHILS RELATIVE PERCENT: 75.9 %
PDW BLD-RTO: 15 % (ref 12.4–15.4)
PERFORMED ON: ABNORMAL
PERFORMED ON: NORMAL
PLATELET # BLD: 236 K/UL (ref 135–450)
PMV BLD AUTO: 7.1 FL (ref 5–10.5)
POTASSIUM SERPL-SCNC: 3.8 MMOL/L (ref 3.5–5.1)
RBC # BLD: 3.29 M/UL (ref 4–5.2)
SODIUM BLD-SCNC: 144 MMOL/L (ref 136–145)
WBC # BLD: 5.8 K/UL (ref 4–11)

## 2021-10-18 PROCEDURE — 2580000003 HC RX 258: Performed by: INTERNAL MEDICINE

## 2021-10-18 PROCEDURE — 85025 COMPLETE CBC W/AUTO DIFF WBC: CPT

## 2021-10-18 PROCEDURE — 99232 SBSQ HOSP IP/OBS MODERATE 35: CPT | Performed by: SURGERY

## 2021-10-18 PROCEDURE — 74425 UROGRAPHY ANTEGRADE RS&I: CPT

## 2021-10-18 PROCEDURE — 36415 COLL VENOUS BLD VENIPUNCTURE: CPT

## 2021-10-18 PROCEDURE — APPNB30 APP NON BILLABLE TIME 0-30 MINS: Performed by: NURSE PRACTITIONER

## 2021-10-18 PROCEDURE — APPSS15 APP SPLIT SHARED TIME 0-15 MINUTES: Performed by: NURSE PRACTITIONER

## 2021-10-18 PROCEDURE — 6360000004 HC RX CONTRAST MEDICATION

## 2021-10-18 PROCEDURE — 6370000000 HC RX 637 (ALT 250 FOR IP): Performed by: INTERNAL MEDICINE

## 2021-10-18 PROCEDURE — 80048 BASIC METABOLIC PNL TOTAL CA: CPT

## 2021-10-18 RX ORDER — CEFUROXIME AXETIL 250 MG/1
250 TABLET ORAL DAILY
Qty: 10 TABLET | Refills: 0 | Status: ON HOLD
Start: 2021-10-19 | End: 2021-11-02 | Stop reason: HOSPADM

## 2021-10-18 RX ADMIN — Medication 1000 UNITS: at 10:25

## 2021-10-18 RX ADMIN — HYDRALAZINE HYDROCHLORIDE 25 MG: 25 TABLET, FILM COATED ORAL at 15:46

## 2021-10-18 RX ADMIN — SODIUM BICARBONATE 650 MG: 650 TABLET ORAL at 10:25

## 2021-10-18 RX ADMIN — CARVEDILOL 12.5 MG: 6.25 TABLET, FILM COATED ORAL at 10:25

## 2021-10-18 RX ADMIN — ALPRAZOLAM 0.25 MG: 0.5 TABLET ORAL at 04:06

## 2021-10-18 RX ADMIN — SODIUM CHLORIDE: 9 INJECTION, SOLUTION INTRAVENOUS at 13:40

## 2021-10-18 RX ADMIN — CEFUROXIME AXETIL 250 MG: 250 TABLET ORAL at 10:25

## 2021-10-18 RX ADMIN — HYDRALAZINE HYDROCHLORIDE 25 MG: 25 TABLET, FILM COATED ORAL at 04:07

## 2021-10-18 RX ADMIN — MULTIPLE VITAMINS W/ MINERALS TAB 1 TABLET: TAB at 10:25

## 2021-10-18 RX ADMIN — IOPAMIDOL 50 ML: 755 INJECTION, SOLUTION INTRAVENOUS at 15:35

## 2021-10-18 RX ADMIN — FLUTICASONE PROPIONATE 1 SPRAY: 50 SPRAY, METERED NASAL at 04:06

## 2021-10-18 ASSESSMENT — PAIN SCALES - GENERAL
PAINLEVEL_OUTOF10: 0

## 2021-10-18 NOTE — PROGRESS NOTES
Assessment complete, VSS. Pt denies any complaints of pain at this time. Will continue to monitor.  Shruti Montano RN

## 2021-10-18 NOTE — PROGRESS NOTES
[address] and Laparoscopic Surgery        Progress Note    Pt Name: Jasmyn Garcia  MRN: [id number]  YOB: 1940  Date of evaluation: 10/18/2021    Chief Complaint: Abdominal pain      Subjective:    No acute events overnight  Abdominal pain controlled, not requiring PRN analgesics  No nausea or vomiting, tolerating regular diet  Had large BM since suppository yesterday  Urostomy with urine output  Resting in bed at this time      Vital Signs:  Patient Vitals for the past 24 hrs:   BP Temp Temp src Pulse Resp SpO2 Weight   10/18/21 1025 (!) 169/76 -- -- 82 -- -- --   10/18/21 0914 -- -- -- -- -- -- 145 lb 3.2 oz (65.9 kg)   10/18/21 0800 (!) 162/73 98 °F (36.7 °C) Oral 84 16 95 % --   10/18/21 0345 (!) 161/71 98.1 °F (36.7 °C) Oral 75 17 95 % --   10/18/21 0143 (!) 167/87 98 °F (36.7 °C) Temporal 80 18 96 % --   10/17/21 2002 (!) 175/79 98.2 °F (36.8 °C) Oral 75 18 96 % --   10/17/21 1600 (!) 140/65 98.2 °F (36.8 °C) Oral 68 14 96 % --   10/17/21 1145 (!) 150/63 98.1 °F (36.7 °C) Oral 79 16 96 % --      TEMPERATURE HISTORY 24H: Temp (24hrs), Av.1 °F (36.7 °C), Min:98 °F (36.7 °C), Max:98.2 °F (36.8 °C)    BLOOD PRESSURE HISTORY: Systolic (16LJP), JDV:738 , Min:140 , OAX:833    Diastolic (93NDR), SHERWIN:89, Min:63, Max:87      Intake/Output:    I/O last 3 completed shifts: In: 1596.5 [P.O.:240; I.V.:1356.5]  Out: 1200 [Urine:1200]  No intake/output data recorded.   Drain/tube Output:         Physical Exam:  General: awake, alert, oriented to person, place, time  Cardiac: regular rate and rhythm   Pulmonary: clear to auscultation bilaterally   Abdomen: soft, non-distended, non-tender, urostomy pink viable with appropriate urinary output    Labs:  CBC:    Recent Labs     10/16/21  0528 10/17/21  0520 10/18/21  0635   WBC 6.7 5.3 5.8   HGB 9.6* 9.6* 10.2*   HCT 28.5* 28.3* 29.6*    213 236     BMP:    Recent Labs     10/16/21  0528 10/17/21  0520 10/18/21  0635    142 144 K 4.2 4.0 3.8   * 107 111*   CO2 22 24 24   BUN 25* 19 18   CREATININE 1.6* 1.7* 1.4*   GLUCOSE 93 86 75     Hepatic:   No results for input(s): AST, ALT, ALB, BILITOT, ALKPHOS in the last 72 hours. Amylase: No results for input(s): AMYLASE in the last 72 hours. Lipase:   No results for input(s): LIPASE in the last 72 hours. Mag:    No results for input(s): MG in the last 72 hours. Phos:   No results for input(s): PHOS in the last 72 hours. Coags: No results for input(s): INR, APTT in the last 72 hours. Cultures:  Relevant cultures documented under results section     Pathology:  No relevant pathology     Imaging:  I have personally reviewed the following films:    No results found. Scheduled Meds:   aspirin  81 mg Oral Daily    carvedilol  12.5 mg Oral BID    hydrALAZINE  25 mg Oral 3 times per day    insulin glargine  18 Units SubCUTAneous Nightly    rosuvastatin  10 mg Oral QPM    sodium bicarbonate  650 mg Oral BID    therapeutic multivitamin-minerals  1 tablet Oral Daily    Vitamin D  1,000 Units Oral Daily    enoxaparin  30 mg SubCUTAneous Daily    cefUROXime  250 mg Oral Daily     Continuous Infusions:   sodium chloride 100 mL/hr at 10/17/21 2007    dextrose       PRN Meds:.bisacodyl, hydrALAZINE, sodium chloride, acetaminophen, ALPRAZolam, fluticasone, ondansetron, HYDROmorphone, glucose, dextrose, glucagon (rDNA), dextrose      Assessment:  Parastomal hernia with ileal conduit      Plan:  1. No plans for surgical intervention at this time, further workup per urology  2. Regular diet as tolerated; monitor bowel function--passing stool since suppository yesterday   3. Activity as tolerated  4. PRN analgesics and antiemetics--minimizing narcotics as tolerated  5. Management of medical comorbid etiologies per primary team and consulting services    EDUCATION:  Educated patient on plan of care and disease process--all questions answered.     Zach May is stable from surgical standpoint. Will follow peripherally. Please call with questions or concerns. Thank you for allowing us to participate in [address] [place] [address]. Plans discussed with patient and nursing. Reviewed and discussed with Dr. Slime Steinberg        Signed:  RIZWAN Dozier - CNP  10/18/2021 11:34 AM     49-year-old female with an ileal conduit who is seen in follow-up for parastomal hernia  Tolerating diet and having bowel function  Renal function improving  Loopogram has been ordered for today per Dr. Bowens Graft  No plans for surgery at this point in time  We will follow peripherally. Please call if questions.

## 2021-10-18 NOTE — PROGRESS NOTES
Urology Progress Note  [facility]    Provider: RIZWAN John - CNP  Patient ID:  Admission Date: 10/13/2021 Name: Jaki Katz Date: 10/13/2021 MRN: [id number]   Patient Location: [id number] : 1940  Attending: Carmen Longo MD Date of Service: 10/18/2021  PCP: Destin Chilel MD     Diagnoses:  Hydronephrosis    Assessment/Plan:  79 yo female with hx of hysterectomy, right renal cancer and bladder cancer s/p right nephrectomy and cystectomy w/ ileal conduit by Dr. Katie Suarez in 2017. Presented with left colic. CT a/p shows severe to moderate LEFT hydro possibly form left hernia near mirella bladder. UA with 265 wbc +2 bacteria. Cr 1.5 on admission and now 2.0. Urostomy CDI with CYU. Cr improved to 1.4 today. Appropriate output from ileal conduit. Denies left flank pain today. No CVAT.     Recommendations:  -Planning Loopogram today, keep npo for procedure  -She will follow up with Dr. Katie Suarez x1-2 weeks   -Hopefully can discharge today after procedure if nl and no hydro  -Urology will continue to follow, call with any questions       The patient had a chance to ask questions which were answered. she understands the above plan. Subjective:   Christy Ojeda is a 78 y.o. female. She was seen and examined this morning. Today we discussed leaving catheter in place, trending kidney function and f/u with gen surg and urology. Likely get CT non-con after Cr nadirs to evaluate hydronephrosis.      Objective:   Vitals:  Vitals:    10/18/21 0800   BP: (!) 162/73   Pulse: 84   Resp: 16   Temp: 98 °F (36.7 °C)   SpO2: 95%       Intake/Output Summary (Last 24 hours) at 10/18/2021 0906  Last data filed at 10/18/2021 0349  Gross per 24 hour   Intake 1476.48 ml   Output 1200 ml   Net 276.48 ml     Physical Exam:  Gen: Alert and oriented x3, no acute distress  CV: Regular rate   Resp: unlabored respirations  Abd: Soft, non-distended, non-tender, no masses  Ext: no peripheral edema noted, moves upper and lower extremities spontaneously  Skin: warmand well perfused, no rashes noted on the face, or arms.      Labs:  Lab Results   Component Value Date    WBC 5.8 10/18/2021    HGB 10.2 (L) 10/18/2021    HCT 29.6 (L) 10/18/2021    MCV 90.1 10/18/2021     10/18/2021     Lab Results   Component Value Date    CREATININE 1.4 (H) 10/18/2021    BUN 18 10/18/2021     10/18/2021    K 3.8 10/18/2021     (H) 10/18/2021    CO2 24 10/18/2021       Enoch Hoyt, APRN - CNP   10/18/2021

## 2021-10-18 NOTE — DISCHARGE INSTR - COC
Continuity of Care Form    Patient Name: Zach May   :  1940  MRN:  [id number]    Admit date:  10/13/2021  Discharge date:  10/18/2021    Code Status Order: Prior   Advance Directives:      Admitting Physician:  Johnny Hobbs MD  PCP: Peter Dhaliwal MD    Discharging Nurse: [facility] Unit/Room#: [id number]  Discharging Unit Phone Number: ***    Emergency Contact:   Extended Emergency Contact Information  Primary Emergency Contact: [address] [facility] Phone: [phone]  Mobile Phone: [phone]  Relation: Child  Secondary Emergency Contact: [address] Phone: [phone]  Mobile Phone: [phone]  Relation: Child    Past Surgical History:  Past Surgical History:   Procedure Laterality Date    ANGIOPLASTY      x3  2009    BLADDER REMOVAL      BLADDER SUSPENSION      CYSTOSCOPY  2013    with dilitation    HYSTERECTOMY      KIDNEY REMOVAL      right    PACEMAKER INSERTION      SKIN CANCER EXCISION      TOTAL HIP ARTHROPLASTY Left 2019    LEFT ANTERIOR TOTAL HIP REPLACEMENT WITH C-ARM performed by Ulices Randall MD at [place] [address]       Immunization History:   Immunization History   Administered Date(s) Administered    COVID-19, Moderna, PF, 100mcg/0.5mL 2021, 2021    Influenza Virus Vaccine 2012    Pneumococcal Conjugate 7-valent (Trino Mejia) 2003       Active Problems:  Patient Active Problem List   Diagnosis Code    [facility] N17.9    High anion gap metabolic acidosis M05.0    Cardiomyopathy ([facility] I42.9    PVC's (premature ventricular contractions) I49.3    Ischemic cardiomyopathy I25.5    Essential hypertension I10    Automatic implantable cardioverter-defibrillator in situ Z95.810    Right renal mass N28.89    UTI (urinary tract infection) N39.0    DM2 (diabetes mellitus, type 2) (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 A9    Presence of coronary angioplasty implant and graft Z95.5    Depression, 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    Hyperkalemia E87.5    Pyelonephritis N12    Acute pyelonephritis N10    Chronic kidney disease, stage III (moderate) (HCC) N18.30    Encounter for ostomy care education Z71.89    Arthritis of left hip M16.12    Kidney stone N20.0    Hydroureter on left N13.4       Isolation/Infection:   Isolation            No Isolation          Patient Infection Status       None to display            Nurse Assessment:  Last Vital Signs: BP (!) 167/77   Pulse 80   Temp 97.9 °F (36.6 °C) (Oral)   Resp 16   Ht 5' 8\" (1.727 m)   Wt 145 lb 3.2 oz (65.9 kg)   SpO2 96%   BMI 22.08 kg/m²     Last documented pain score (0-10 scale): Pain Level: 0  Last Weight:   Wt Readings from Last 1 Encounters:   10/18/21 145 lb 3.2 oz (65.9 kg)     Mental Status:  {IP PT MENTAL STATUS:20030:::0}    IV Access:  { KIMBERLI IV ACCESS:024687265:::0}    Nursing Mobility/ADLs:  Walking   {CHP DME ADLs:397792692:::0}  Transfer  {CHP DME ADLs:844544894:::0}  Bathing  {CHP DME ADLs:776366144:::0}  Dressing  {CHP DME ADLs:918135297:::0}  Toileting  {CHP DME ADLs:365211262:::0}  Feeding  {CHP DME ADLs:163727896:::0}  Med Admin  {CHP DME ADLs:068817601:::0}  Med Delivery   { KIMBERLI MED Delivery:472940736:::0}    Wound Care Documentation and Therapy:  Pressure Ulcer 10/25/17 Sacrum (Active)   Number of days: 6990       Wound 17 Other (Comment) Other (Comment) Medial 0.5 cm (Active)   Number of days: 1497        Elimination:  Continence: Bowel: {YES / IP:08052}  Bladder: {YES / PI:38613}  Urinary Catheter: {Urinary Catheter:267012695:::0}   Colostomy/Ileostomy/Ileal Conduit: {YES / FI:27483}       Date of Last BM: ***    Intake/Output Summary (Last 24 hours) at 10/18/2021 1907  Last data filed at 10/18/2021 0349  Gross per 24 hour   Intake 1356.48 ml   Output 400 ml   Net 956.48 ml     I/O last 3 completed shifts:   In: 1356.5 [I.V.:1356.5]  Out: 750 [Urine:750]    Safety Concerns:     [address] Safety Concerns:781064860:::0}    Impairments/Disabilities:      [address] Impairments/Disabilities:285719643:::0}    Nutrition Therapy:  Current Nutrition Therapy:   [address] Diet List:834060092:::0}    Routes of Feeding: {CHP DME Other Feedings:091922894:::0}  Liquids: {Slp liquid thickness:46934}  Daily Fluid Restriction: {CHP DME Yes amt example:478795519:::0}  Last Modified Barium Swallow with Video (Video Swallowing Test): {Done Not Done NANM:324187442:::3}    Treatments at the Time of Hospital Discharge:   Respiratory Treatments: ***  Oxygen Therapy:  {Therapy; copd oxygen:64401:::0}  Ventilator:    { CC Vent List:291653619:::0}    Rehab Therapies: {THERAPEUTIC INTERVENTION:2809980499}  Weight Bearing Status/Restrictions: { CC Weight Bearin:::0}  Other Medical Equipment (for information only, NOT a DME order):  {EQUIPMENT:346343808}  Other Treatments: ***    Patient's personal belongings (please select all that are sent with patient):  {CHP DME Belongings:915721667:::0}    RN SIGNATURE:  {Esignature:810661103:::0}    CASE MANAGEMENT/SOCIAL WORK SECTION    Inpatient Status Date: ***    Readmission Risk Assessment Score:  Readmission Risk              Risk of Unplanned Readmission:  20           Discharging to Facility/ Agency   Name:   Address:  Phone:  Fax:    Dialysis Facility (if applicable)   Name:  Address:  Dialysis Schedule:  Phone:  Fax:    / signature: {Esignature:474164793:::0}    PHYSICIAN SECTION    Prognosis: Fair    Condition at Discharge: Stable    Rehab Potential (if transferring to Rehab): Fair    Recommended Labs or Other Treatments After Discharge: assisted living      Physician Certification: I certify the above information and transfer of Zach May  is necessary for the continuing treatment of the diagnosis listed and that she requires Assisted Living for greater 30 days.      Update Admission H&P: No change in H&P    PHYSICIAN SIGNATURE:  Electronically signed by Johnny Hobbs MD on 10/18/21 at 7:08 PM EDT

## 2021-10-18 NOTE — PROGRESS NOTES
Data- discharge order received, pt verbalized agreement to discharge, disposition to previous residence, no needs for HHC/DME. Action- discharge instructions prepared and given to patient, pt verbalized understanding. Medication information packet given r/t NEW and/or CHANGED prescriptions emphasizing name/purpose/side effects, pt verbalized understanding. Discharge instruction summary: Diet- general, Activity- as tolerated, Primary Care Physician as follows: Camilo Fuentes -[phone] f/u appointment within 1 week, immunizations reviewed and updated, prescription medications filled by patient. Inpatient surgical procedure precautions reviewed: loopogram. CHF Education reviewed. Pt/ Family has had a total of 60 minutes CHF education this admission encounter. 1. WEIGHT: Admit Weight: 144 lb (65.3 kg) (10/13/21 1923)        Today  Weight: 145 lb 3.2 oz (65.9 kg) (10/18/21 0914)       2. O2 SAT.: SpO2: 96 % (10/18/21 1700)    Response- Pt belongings gathered, IV removed. Disposition is home (no HHC/DME needs), transported with daughter, taken to lobby via w/c w/ RN, no complications.

## 2021-10-18 NOTE — PLAN OF CARE
Problem: Falls - Risk of:  Goal: Will remain free from falls  Description: Will remain free from falls  10/18/2021 1036 by Musa Bustamante RN  Outcome: Ongoing  Note: High fall risk per Sharlynn Ward scale. Fall precautions in place, bed alarm engaged. Non-skid footwear on patient, belongings within reach, bed in lowest position. Problem: Pain:  Goal: Pain level will decrease  Description: Pain level will decrease  Outcome: Ongoing  Note: Pain assessed Q4 and PRN. PRN pain medication available.   Pt denies any pain this AM.

## 2021-10-19 ENCOUNTER — CARE COORDINATION (OUTPATIENT)
Dept: CASE MANAGEMENT | Age: 81
End: 2021-10-19

## 2021-10-19 NOTE — CARE COORDINATION
[facility] Transitions Initial Follow Up Call    Call within 2 business days of discharge: Yes    Patient: Cruz Luciano Patient : 1940   MRN: [id number]  Reason for Admission: flank pain, abdominal pain  Discharge Date: 10/18/21 RARS: Readmission Risk Score: 21      Last Discharge [facility]       Complaint Diagnosis Description Type Department Provider    10/13/21 Flank Pain Hydroureteronephrosis . .. ED to Hosp-Admission (Discharged) (ADMITTED) Cheo Aponte MD           Spoke with: \"Sarita\" [address]: [facility] Transitions 24 Hour Call    Care Transitions Interventions         Follow Up: First attempt at 24 hour discharge call. Patient resides at [facility]. She does not have a personal phone line. CTN contacted nursing staff and they were not aware that she had returned. \"Sarita\" reports that patient is in [place] [address]. CTN will not continue to call since patient cannot be reached. CTN will resolve episode and remain available.     Future Appointments   Date Time Provider Chelsi Marmolejo   2021  9:00 AM SCHEDULE, I WEST REMOTE TRANSMISSION I [facility]   3/2/2022  9:00 AM SCHEDULE, I WEST REMOTE TRANSMISSION I [facility]   2022  9:00 AM SCHEDULE, I WEST REMOTE TRANSMISSION I [facility]   2022  9:00 AM SCHEDULE, I WEST REMOTE TRANSMISSION I WEST [facility]       Ana Juárez RN

## 2021-10-28 ENCOUNTER — APPOINTMENT (OUTPATIENT)
Dept: CT IMAGING | Age: 81
DRG: 682 | End: 2021-10-28
Payer: MEDICARE

## 2021-10-28 ENCOUNTER — HOSPITAL ENCOUNTER (INPATIENT)
Age: 81
LOS: 5 days | Discharge: HOME | DRG: 682 | End: 2021-11-02
Attending: EMERGENCY MEDICINE | Admitting: INTERNAL MEDICINE
Payer: MEDICARE

## 2021-10-28 DIAGNOSIS — J96.01 ACUTE RESPIRATORY FAILURE WITH HYPOXEMIA (HCC): ICD-10-CM

## 2021-10-28 DIAGNOSIS — J81.0 ACUTE PULMONARY EDEMA (HCC): ICD-10-CM

## 2021-10-28 DIAGNOSIS — N17.9 AKI (ACUTE KIDNEY INJURY) (HCC): Primary | ICD-10-CM

## 2021-10-28 DIAGNOSIS — F41.1 ANXIETY, GENERALIZED: ICD-10-CM

## 2021-10-28 LAB
A/G RATIO: 1.4 (ref 1.1–2.2)
ALBUMIN SERPL-MCNC: 4.1 G/DL (ref 3.4–5)
ALP BLD-CCNC: 51 U/L (ref 40–129)
ALT SERPL-CCNC: <5 U/L (ref 10–40)
ANION GAP SERPL CALCULATED.3IONS-SCNC: 11 MMOL/L (ref 3–16)
AST SERPL-CCNC: 15 U/L (ref 15–37)
BACTERIA: ABNORMAL /HPF
BASOPHILS ABSOLUTE: 0 K/UL (ref 0–0.2)
BASOPHILS RELATIVE PERCENT: 0.5 %
BILIRUB SERPL-MCNC: <0.2 MG/DL (ref 0–1)
BILIRUBIN URINE: NEGATIVE
BLOOD, URINE: NEGATIVE
BUN BLDV-MCNC: 39 MG/DL (ref 7–20)
CALCIUM SERPL-MCNC: 9.5 MG/DL (ref 8.3–10.6)
CHLORIDE BLD-SCNC: 105 MMOL/L (ref 99–110)
CLARITY: ABNORMAL
CO2: 26 MMOL/L (ref 21–32)
COLOR: YELLOW
CREAT SERPL-MCNC: 2.1 MG/DL (ref 0.6–1.2)
EOSINOPHILS ABSOLUTE: 0.1 K/UL (ref 0–0.6)
EOSINOPHILS RELATIVE PERCENT: 1.9 %
EPITHELIAL CELLS, UA: 1 /HPF (ref 0–5)
GFR AFRICAN AMERICAN: 27
GFR NON-AFRICAN AMERICAN: 22
GLUCOSE BLD-MCNC: 202 MG/DL (ref 70–99)
GLUCOSE URINE: NEGATIVE MG/DL
HCT VFR BLD CALC: 31.9 % (ref 36–48)
HEMOGLOBIN: 10.4 G/DL (ref 12–16)
HYALINE CASTS: 0 /LPF (ref 0–8)
KETONES, URINE: NEGATIVE MG/DL
LACTIC ACID, SEPSIS: 1.3 MMOL/L (ref 0.4–1.9)
LEUKOCYTE ESTERASE, URINE: ABNORMAL
LYMPHOCYTES ABSOLUTE: 0.8 K/UL (ref 1–5.1)
LYMPHOCYTES RELATIVE PERCENT: 13.6 %
MCH RBC QN AUTO: 30.3 PG (ref 26–34)
MCHC RBC AUTO-ENTMCNC: 32.6 G/DL (ref 31–36)
MCV RBC AUTO: 93.1 FL (ref 80–100)
MICROSCOPIC EXAMINATION: YES
MONOCYTES ABSOLUTE: 0.5 K/UL (ref 0–1.3)
MONOCYTES RELATIVE PERCENT: 8.8 %
NEUTROPHILS ABSOLUTE: 4.4 K/UL (ref 1.7–7.7)
NEUTROPHILS RELATIVE PERCENT: 75.2 %
NITRITE, URINE: NEGATIVE
PDW BLD-RTO: 15 % (ref 12.4–15.4)
PH UA: 7.5 (ref 5–8)
PLATELET # BLD: 279 K/UL (ref 135–450)
PMV BLD AUTO: 7.6 FL (ref 5–10.5)
POTASSIUM SERPL-SCNC: 4.8 MMOL/L (ref 3.5–5.1)
PROTEIN UA: ABNORMAL MG/DL
RBC # BLD: 3.43 M/UL (ref 4–5.2)
RBC UA: 1 /HPF (ref 0–4)
SODIUM BLD-SCNC: 142 MMOL/L (ref 136–145)
SPECIFIC GRAVITY UA: 1.01 (ref 1–1.03)
TOTAL PROTEIN: 7.1 G/DL (ref 6.4–8.2)
URINE REFLEX TO CULTURE: YES
URINE TYPE: ABNORMAL
UROBILINOGEN, URINE: 0.2 E.U./DL
WBC # BLD: 5.8 K/UL (ref 4–11)
WBC UA: 15 /HPF (ref 0–5)

## 2021-10-28 PROCEDURE — 36415 COLL VENOUS BLD VENIPUNCTURE: CPT

## 2021-10-28 PROCEDURE — 80053 COMPREHEN METABOLIC PANEL: CPT

## 2021-10-28 PROCEDURE — 83605 ASSAY OF LACTIC ACID: CPT

## 2021-10-28 PROCEDURE — 74176 CT ABD & PELVIS W/O CONTRAST: CPT

## 2021-10-28 PROCEDURE — 1200000000 HC SEMI PRIVATE

## 2021-10-28 PROCEDURE — 84484 ASSAY OF TROPONIN QUANT: CPT

## 2021-10-28 PROCEDURE — 87186 SC STD MICRODIL/AGAR DIL: CPT

## 2021-10-28 PROCEDURE — 87184 SC STD DISK METHOD PER PLATE: CPT

## 2021-10-28 PROCEDURE — 87086 URINE CULTURE/COLONY COUNT: CPT

## 2021-10-28 PROCEDURE — 2580000003 HC RX 258: Performed by: EMERGENCY MEDICINE

## 2021-10-28 PROCEDURE — 85025 COMPLETE CBC W/AUTO DIFF WBC: CPT

## 2021-10-28 PROCEDURE — 99284 EMERGENCY DEPT VISIT MOD MDM: CPT

## 2021-10-28 PROCEDURE — 81001 URINALYSIS AUTO W/SCOPE: CPT

## 2021-10-28 PROCEDURE — 83880 ASSAY OF NATRIURETIC PEPTIDE: CPT

## 2021-10-28 PROCEDURE — 87077 CULTURE AEROBIC IDENTIFY: CPT

## 2021-10-28 RX ORDER — 0.9 % SODIUM CHLORIDE 0.9 %
1000 INTRAVENOUS SOLUTION INTRAVENOUS ONCE
Status: COMPLETED | OUTPATIENT
Start: 2021-10-28 | End: 2021-10-29

## 2021-10-28 RX ADMIN — SODIUM CHLORIDE 1000 ML: 9 INJECTION, SOLUTION INTRAVENOUS at 23:15

## 2021-10-28 ASSESSMENT — ENCOUNTER SYMPTOMS
DIARRHEA: 0
ABDOMINAL DISTENTION: 1
VOMITING: 0
CHEST TIGHTNESS: 0
NAUSEA: 1
ABDOMINAL PAIN: 0
SHORTNESS OF BREATH: 0

## 2021-10-28 NOTE — ED PROVIDER NOTES
[address]        Pt Name: Rk Barraza  MRN: [id number]  [place] 1940  Date of evaluation: 10/28/2021  Provider: RIZWAN Crocker - NATALIE  PCP: Pita Coombs MD  Note Started: 7:48 PM EDT        I have seen and evaluated this patient with my supervising physician Macie Doan DO.    CHIEF COMPLAINT       Chief Complaint   Patient presents with    Female  Problem     Problems with urostomy bag, no output since 1600, feeling bloated around site       HISTORY OF PRESENT ILLNESS   (Location, Timing/Onset, Context/Setting, Quality, Duration, Modifying Factors, Severity, Associated Signs and Symptoms)  Note limiting factors. Chief Complaint: Urostomy problem/abdominal fullness     Rk Barraza is a 78 y.o. female who presents to the emergency department with concerns for her urostomy not draining and abdominal \"swelling\". States she was at a party prior to arrival and all of a sudden felt fullness around her urostomy site and noticed the bag did not have any urine in it. She denies pain states she feels \"swollen\" in her abdomen. Nothing makes it better or worse. She finished a dose of antibiotics today for an infection but she is not sure what kind of infection or why she was taking them. Reports some nausea but no vomiting. Denies any headache, fever, lightheadedness, dizziness, visual disturbances. No chest pain or pressure. No neck or back pain. No shortness of breath, cough, or congestion. No abdominal pain, vomiting, diarrhea, constipation, or dysuria. No rash. Nursing Notes were all reviewed and agreed with or any disagreements were addressed in the HPI. REVIEW OF SYSTEMS    (2-9 systems for level 4, 10 or more for level 5)     Review of Systems   Constitutional: Negative for activity change, chills and fever. Respiratory: Negative for chest tightness and shortness of breath.     Cardiovascular: Negative for chest pain.   Gastrointestinal: Positive for abdominal distention and nausea. Negative for abdominal pain, diarrhea and vomiting. Genitourinary: Negative for dysuria. All other systems reviewed and are negative. Positives and Pertinent negatives as per HPI. Except as noted above in the ROS, all other systems were reviewed and negative. PAST MEDICAL HISTORY     Past Medical History:   Diagnosis Date    Anxiety     Atrial fibrillation [facility]     CAD (coronary artery disease)     Cancer ([facility]     bladder right kidney     Cardiac arrest ([facility] 3/27/2016    Carotid artery stenosis     CHF (congestive heart failure) (HCC)     Chronic kidney disease     cancer of kidney and bladder    Depression     Diabetes mellitus (HCC)     IDDM    GERD (gastroesophageal reflux disease)     Hip pain, chronic     HYPERCHOLESTERAEMIA     Hypertension     Irritable bowel syndrome     Laceration of head 3/25/2016    fall with cardiac arrest    MI, old     Neuropathy     Osteoarthritis     Presence of combination internal cardiac defibrillator (ICD) and pacemaker     Staph infection     PT. STATES SHE HAS HAD SEVERAL BOILS WITH STAPH LAST ONE 20 YEARS AGO.     Type 2 diabetes mellitus without complication ([facility] [facility]     Urinary incontinence          SURGICAL HISTORY     Past Surgical History:   Procedure Laterality Date    ANGIOPLASTY      x3  2009    BLADDER REMOVAL      BLADDER SUSPENSION      CYSTOSCOPY  5/28/2013    with dilitation    HYSTERECTOMY      KIDNEY REMOVAL      right    PACEMAKER INSERTION      SKIN CANCER EXCISION      TOTAL HIP ARTHROPLASTY Left 11/6/2019    LEFT ANTERIOR TOTAL HIP REPLACEMENT WITH C-ARM [facility]       Current Discharge Medication List      CONTINUE these medications which have NOT CHANGED    Details   cefUROXime (CEFTIN) 250 MG tablet Take 1 tablet by mouth daily for 10 days  Qty: 10 tablet, Refills: 0 hydrALAZINE (APRESOLINE) 25 MG tablet TAKE ONE TABLET BY MOUTH THREE TIMES A DAY  Qty: 90 tablet, Refills: 11      carvedilol (COREG) 12.5 MG tablet TAKE ONE TABLET BY MOUTH TWICE A DAY  Qty: 180 tablet, Refills: 2      aspirin 81 MG EC tablet Take 1 tablet by mouth daily HOLD for 30 days after hip surgery. See Eliquis order  Qty: 30 tablet, Refills: 3      acetaminophen (TYLENOL) 500 MG tablet Take 1,000 mg by mouth daily as needed for Pain       insulin glargine (LANTUS) 100 UNIT/ML injection vial Inject 18 Units into the skin nightly       sodium bicarbonate 650 MG tablet Take 650 mg by mouth 2 times daily       ALPRAZolam (XANAX) 0.25 MG tablet Take 0.25 mg by mouth nightly as needed for Sleep (patient taking during the day to calm herself down). rosuvastatin (CRESTOR) 10 MG tablet Take 10 mg by mouth every evening       Coenzyme Q10 (CO Q 10) 100 MG CAPS Take 1 capsule by mouth daily       fluticasone (FLONASE) 50 MCG/ACT nasal spray 1 spray by Nasal route as needed for Rhinitis      therapeutic multivitamin-minerals (THERAGRAN-M) tablet Take 1 tablet by mouth daily. Centrum silver       vitamin D (CHOLECALCIFEROL) 1000 UNIT TABS tablet Take 1,000 Units by mouth daily       ondansetron (ZOFRAN ODT) 4 MG disintegrating tablet Take 1-2 tablets by mouth every 12 hours as needed for Nausea May Sub regular tablet (non-ODT) if insurance does not cover ODT.   Qty: 12 tablet, Refills: 0               ALLERGIES     Amlodipine besylate, Calcium channel blockers, Citalopram hydrobromide, Flagyl [metronidazole], Losartan, Pneumococcal vaccine, Simvastatin, Valsartan-hydrochlorothiazide, Venlafaxine, Hydrocodone-acetaminophen, and Nitrofurantoin    FAMILYHISTORY       Family History   Problem Relation Age of Onset    Diabetes Mother     Heart Disease Father     Cancer Father           SOCIAL HISTORY       Social History     Tobacco Use    Smoking status: Never Smoker    Smokeless tobacco: Never Used   Vaping Use  Vaping Use: Never used   Substance Use Topics    Alcohol use: No    Drug use: No       SCREENINGS             PHYSICAL EXAM    (up to 7 for level 4, 8 or more for level 5)     ED Triage Vitals [10/28/21 1852]   BP Temp Temp Source Pulse Resp SpO2 Height Weight   (!) 166/71 97.4 °F (36.3 °C) Oral 84 16 96 % 5' 8\" (1.727 m) 144 lb (65.3 kg)       Physical Exam  Vitals and nursing note reviewed. Constitutional:       Appearance: Normal appearance. She is well-developed. She is not diaphoretic. HENT:      Head: Normocephalic and atraumatic. Right Ear: External ear normal.      Left Ear: External ear normal.   Eyes:      General:         Right eye: No discharge. Left eye: No discharge. Neck:      Vascular: No JVD. Cardiovascular:      Rate and Rhythm: Normal rate and regular rhythm. Pulses: Normal pulses. Heart sounds: Normal heart sounds. Pulmonary:      Effort: Pulmonary effort is normal. No respiratory distress. Breath sounds: Normal breath sounds. Abdominal:      General: Bowel sounds are normal. There is no distension. Palpations: Abdomen is soft. Tenderness: There is no abdominal tenderness. Comments: Urostomy stoma RUQ    Musculoskeletal:         General: Normal range of motion. Cervical back: Normal range of motion and neck supple. Skin:     General: Skin is warm and dry. Coloration: Skin is not pale. Neurological:      Mental Status: She is alert and oriented to person, place, and time.    Psychiatric:         Behavior: Behavior normal.         DIAGNOSTIC RESULTS   LABS:    Labs Reviewed   CBC WITH AUTO DIFFERENTIAL - Abnormal; Notable for the following components:       Result Value    RBC 3.43 (*)     Hemoglobin 10.4 (*)     Hematocrit 31.9 (*)     Lymphocytes Absolute 0.8 (*)     All other components within normal limits    Narrative:     Performed at:  [facility] [address], [place], [address] [place] [address]   Phone [phone]       When ordered only abnormal lab results are displayed. All other labs were within normal range or not returned as of this dictation. EKG: When ordered, EKG's are interpreted by the Emergency Department Physician in the absence of a cardiologist.  Please see their note for interpretation of EKG. RADIOLOGY:   Non-plain film images such as CT, Ultrasound and MRI are read by the radiologist. Plain radiographic images are visualized and preliminarily interpreted by the ED Provider with the below findings:        Interpretation per the Radiologist below, if available at the time of this note:    XR CHEST PORTABLE   Final Result   Bilateral pneumonia versus pulmonary edema. The possibility of COVID related   pneumonia should be considered. CT ABDOMEN PELVIS WO CONTRAST Additional Contrast? None   Final Result   Improved/resolving hydronephrosis on left with minimal left perinephric fat   stranding identified. Please correlate urinalysis findings. Improved appearance of the parastomal hernia identified on the right   containing omental fat. The previously noted hernia loop of bowel has   resolved. Cholelithiasis. Colonic diverticulosis         NM LUNG VENT/PERFUSION (VQ)    (Results Pending)     No results found. PROCEDURES   Unless otherwise noted below, none     Procedures    CRITICAL CARE TIME   The total critical care time spent while evaluating and treating this patient was at least 32 minutes. This excludes time spent doing separately billable procedures. This includes time at the bedside, data interpretation, medication management, obtaining critical history from collateral sources if the patient is unable to provide it directly, and physician consultation. Specifics of interventions taken and potentially life-threatening diagnostic considerations are listed above in the medical decision making.       CONSULTS:  IP CONSULT TO INTERNAL MEDICINE      EMERGENCY DEPARTMENT COURSE and DIFFERENTIAL DIAGNOSIS/MDM:   Vitals:    Vitals:    10/28/21 2049 10/29/21 0045 10/29/21 0205 10/29/21 0230   BP: (!) 147/68 (!) 138/101  (!) 165/89   Pulse: 86   95   Resp: 16   14   Temp:    97 °F (36.1 °C)   TempSrc:    Oral   SpO2: 95% 97% 94% 96%   Weight:       Height:           Patient was given the following medications:  Medications   acetaminophen (TYLENOL) tablet 1,000 mg (has no administration in time range)   ALPRAZolam (XANAX) tablet 0.25 mg (has no administration in time range)   aspirin EC tablet 81 mg (has no administration in time range)   fluticasone (FLONASE) 50 MCG/ACT nasal spray 1 spray (has no administration in time range)   hydrALAZINE (APRESOLINE) tablet 25 mg (has no administration in time range)   insulin glargine (LANTUS;BASAGLAR) injection pen 18 Units (has no administration in time range)   ondansetron (ZOFRAN-ODT) disintegrating tablet 4 mg (has no administration in time range)   rosuvastatin (CRESTOR) tablet 10 mg (has no administration in time range)   sodium bicarbonate tablet 650 mg (has no administration in time range)   therapeutic multivitamin-minerals 1 tablet (has no administration in time range)   vitamin D (CHOLECALCIFEROL) tablet 1,000 Units (has no administration in time range)   dextrose 5 % and 0.45 % NaCl with KCl 20 mEq infusion (has no administration in time range)   carvedilol (COREG) tablet 12.5 mg (has no administration in time range)   furosemide (LASIX) injection 40 mg (has no administration in time range)   enoxaparin (LOVENOX) injection 70 mg (has no administration in time range)   0.9 % sodium chloride bolus (0 mLs IntraVENous Stopped 10/29/21 0100)   albuterol (PROVENTIL) nebulizer solution 5 mg (5 mg Nebulization Given 10/29/21 0201)   ALPRAZolam (XANAX) tablet 0.25 mg (0.25 mg Oral Given 10/29/21 0202)     ED Course as of Oct 29 0342   Fri Oct 29, 2021   0121 Change in status, nursing staff did asked that I come to the bedside. The patient is acutely hypoxic and is complaining of chest pain and shortness of breath. She does appear anxious. We will order an EKG and chest x-ray. We will give her her nightly Xanax. And look into the hypoxia. Dr. Aishwarya Reynolds contacted, he wants to transfer care of this patient to the hospitalist.    [KF]      ED Course User Index  [KF] Kaleb , APRN - CNP        Briefly, this is a 78year old female who presents to the emergency department with abdominal fullness around her urostomy site. She was recently treated for a UTI and finished her antibiotics today. She was seen on 10/13/21 and diagnosed with hydroureteronephrosis and had a catheter placed through the urostomy stoma. UA does show moderate leukocytes with 4+ bacteria and 13 WBCs. This will be sent for culture, the patient was recently treated for urinary tract infection and did finish 10 days of antibiotics. Lactate 1.3. CBC shows hemoglobin of 10.4, otherwise unremarkable. CMP shows a BUN of 39 with a creatinine of 2.1 and GFR of 22, the patient does have an CATARINA and only a solitary kidney. CT ABDOMEN PELVIS WO CONTRAST Additional Contrast? None (Final result)  Result time 10/28/21 21:05:53  Final result by Lucy Membreno MD (10/28/21 21:05:53)                Impression:    Improved/resolving hydronephrosis on left with minimal left perinephric fat   stranding identified.  Please correlate urinalysis findings. Improved appearance of the parastomal hernia identified on the right   containing omental fat.  The previously noted hernia loop of bowel has   resolved. Cholelithiasis. Colonic diverticulosis               Plan to admit for CATARINA with presence of solitary kidney. Urostomy is draining without intervention. The patient is not experiencing any pain. Admitted to Dr. Aishwarya Reynolds.     Addendum-  patient admitted recently for hydroureternephrosis and catarina, kidney function had cleared before she was discharged home. She has 1 kidney. CATARINA has returned, we admitted to [address] [place] [address] for catarina with 1 kidney. nursing alerted me that patient was complaining of sob and chest pain and became hypoxic, she was 84 % on room air. placed on 3 liters of oxygen. ordered ekg and trop, bnp, and cxr. she does appear very anxious, gave her her night xanax. I would like to evaluate for PE given sudden onset of symptoms, but her kidney function would not tolerate a CT to rule out PE with contrast.  Dimer is elevated at 553. Ordered Lovenox 1 mg/kg for one-time dose. BNP is elevated at 1383 and chest x-ray shows bilateral pneumonia versus pulmonary edema, pneumonia does not fit as this patient does not have an elevated white count, she has not been ill, and has not been coughing prior to this episode of hypoxia. We will treat the patient with 40 mg of Lasix and active diuresis for pulmonary edema. Possibility of Covid related pneumonia was considered, Covid swab ordered and patient placed on droplet plus precautions. I did speak with Irene Camargo RN, on 4 tower, we reviewed new orders and plan of care. She does verbalize understanding. Any further change in patient condition should be alerted to Dr. Mar Lundy. The patient was tolerating the 3 L of oxygen well when she left the emergency department with an SPO2 of 96%. She was more comfortable. FINAL IMPRESSION      1. CATARINA (acute kidney injury) ([facility] [address]    2. Acute pulmonary edema (HCC)    3. Acute respiratory failure with hypoxemia [facility])          DISPOSITION/PLAN   DISPOSITION Admitted 10/28/2021 11:38:50 PM      PATIENT REFERRED TO:  No follow-up provider specified.     DISCHARGE MEDICATIONS:  Current Discharge Medication List          DISCONTINUED MEDICATIONS:  Current Discharge Medication List                 (Please note that portions of this note were completed with a voice recognition program.  Efforts were made to edit the dictations but occasionally words are mis-transcribed.)    Anuja Araujo, APRN - CNP (electronically signed)            Anuja Araujo, RIZWAN - CNP  10/28/21 1155 E RIZWAN Marquez - NATALIE  10/29/21 6142

## 2021-10-29 ENCOUNTER — APPOINTMENT (OUTPATIENT)
Dept: GENERAL RADIOLOGY | Age: 81
DRG: 682 | End: 2021-10-29
Payer: MEDICARE

## 2021-10-29 PROBLEM — Z20.822 SUSPECTED COVID-19 VIRUS INFECTION: Status: ACTIVE | Noted: 2021-10-29

## 2021-10-29 PROBLEM — K90.9 IRON MALABSORPTION: Status: RESOLVED | Noted: 2017-09-12 | Resolved: 2021-10-29

## 2021-10-29 PROBLEM — M25.50 ARTHRALGIA: Status: RESOLVED | Noted: 2017-09-12 | Resolved: 2021-10-29

## 2021-10-29 PROBLEM — L90.5 SCAR OF SKIN: Status: RESOLVED | Noted: 2017-09-12 | Resolved: 2021-10-29

## 2021-10-29 PROBLEM — E46 PROTEIN-CALORIE MALNUTRITION (HCC): Status: RESOLVED | Noted: 2017-08-17 | Resolved: 2021-10-29

## 2021-10-29 PROBLEM — N28.89 RIGHT RENAL MASS: Status: RESOLVED | Noted: 2017-04-27 | Resolved: 2021-10-29

## 2021-10-29 LAB
ANION GAP SERPL CALCULATED.3IONS-SCNC: 16 MMOL/L (ref 3–16)
BUN BLDV-MCNC: 40 MG/DL (ref 7–20)
CALCIUM SERPL-MCNC: 9.2 MG/DL (ref 8.3–10.6)
CHLORIDE BLD-SCNC: 105 MMOL/L (ref 99–110)
CO2: 18 MMOL/L (ref 21–32)
CREAT SERPL-MCNC: 2 MG/DL (ref 0.6–1.2)
D DIMER: 553 NG/ML DDU (ref 0–229)
EKG ATRIAL RATE: 95 BPM
EKG DIAGNOSIS: NORMAL
EKG P AXIS: 43 DEGREES
EKG P-R INTERVAL: 142 MS
EKG Q-T INTERVAL: 374 MS
EKG QRS DURATION: 106 MS
EKG QTC CALCULATION (BAZETT): 469 MS
EKG R AXIS: 2 DEGREES
EKG T AXIS: 48 DEGREES
EKG VENTRICULAR RATE: 95 BPM
GFR AFRICAN AMERICAN: 29
GFR NON-AFRICAN AMERICAN: 24
GLUCOSE BLD-MCNC: 153 MG/DL (ref 70–99)
GLUCOSE BLD-MCNC: 198 MG/DL (ref 70–99)
HCT VFR BLD CALC: 33.8 % (ref 36–48)
HEMOGLOBIN: 10.9 G/DL (ref 12–16)
MCH RBC QN AUTO: 29.9 PG (ref 26–34)
MCHC RBC AUTO-ENTMCNC: 32.3 G/DL (ref 31–36)
MCV RBC AUTO: 92.6 FL (ref 80–100)
PDW BLD-RTO: 14.8 % (ref 12.4–15.4)
PERFORMED ON: ABNORMAL
PLATELET # BLD: 274 K/UL (ref 135–450)
PMV BLD AUTO: 7.8 FL (ref 5–10.5)
POTASSIUM SERPL-SCNC: 4.7 MMOL/L (ref 3.5–5.1)
PRO-BNP: 1383 PG/ML (ref 0–449)
RBC # BLD: 3.65 M/UL (ref 4–5.2)
REASON FOR REJECTION: NORMAL
REJECTED TEST: NORMAL
SODIUM BLD-SCNC: 139 MMOL/L (ref 136–145)
TROPONIN: <0.01 NG/ML
WBC # BLD: 8 K/UL (ref 4–11)

## 2021-10-29 PROCEDURE — 6360000002 HC RX W HCPCS: Performed by: NURSE PRACTITIONER

## 2021-10-29 PROCEDURE — 6370000000 HC RX 637 (ALT 250 FOR IP): Performed by: INTERNAL MEDICINE

## 2021-10-29 PROCEDURE — 2500000003 HC RX 250 WO HCPCS: Performed by: INTERNAL MEDICINE

## 2021-10-29 PROCEDURE — 36415 COLL VENOUS BLD VENIPUNCTURE: CPT

## 2021-10-29 PROCEDURE — 6360000002 HC RX W HCPCS: Performed by: INTERNAL MEDICINE

## 2021-10-29 PROCEDURE — 71045 X-RAY EXAM CHEST 1 VIEW: CPT

## 2021-10-29 PROCEDURE — 6370000000 HC RX 637 (ALT 250 FOR IP): Performed by: NURSE PRACTITIONER

## 2021-10-29 PROCEDURE — 6370000000 HC RX 637 (ALT 250 FOR IP): Performed by: EMERGENCY MEDICINE

## 2021-10-29 PROCEDURE — 2580000003 HC RX 258: Performed by: INTERNAL MEDICINE

## 2021-10-29 PROCEDURE — 94640 AIRWAY INHALATION TREATMENT: CPT

## 2021-10-29 PROCEDURE — 85027 COMPLETE CBC AUTOMATED: CPT

## 2021-10-29 PROCEDURE — 83036 HEMOGLOBIN GLYCOSYLATED A1C: CPT

## 2021-10-29 PROCEDURE — 2700000000 HC OXYGEN THERAPY PER DAY

## 2021-10-29 PROCEDURE — 93010 ELECTROCARDIOGRAM REPORT: CPT | Performed by: INTERNAL MEDICINE

## 2021-10-29 PROCEDURE — 93005 ELECTROCARDIOGRAM TRACING: CPT | Performed by: EMERGENCY MEDICINE

## 2021-10-29 PROCEDURE — 85379 FIBRIN DEGRADATION QUANT: CPT

## 2021-10-29 PROCEDURE — 80048 BASIC METABOLIC PNL TOTAL CA: CPT

## 2021-10-29 PROCEDURE — 1200000000 HC SEMI PRIVATE

## 2021-10-29 PROCEDURE — U0003 INFECTIOUS AGENT DETECTION BY NUCLEIC ACID (DNA OR RNA); SEVERE ACUTE RESPIRATORY SYNDROME CORONAVIRUS 2 (SARS-COV-2) (CORONAVIRUS DISEASE [COVID-19]), AMPLIFIED PROBE TECHNIQUE, MAKING USE OF HIGH THROUGHPUT TECHNOLOGIES AS DESCRIBED BY CMS-2020-01-R: HCPCS

## 2021-10-29 PROCEDURE — U0005 INFEC AGEN DETEC AMPLI PROBE: HCPCS

## 2021-10-29 RX ORDER — ACETAMINOPHEN 500 MG
1000 TABLET ORAL DAILY PRN
Status: DISCONTINUED | OUTPATIENT
Start: 2021-10-29 | End: 2021-11-02 | Stop reason: HOSPADM

## 2021-10-29 RX ORDER — POTASSIUM CHLORIDE 20 MEQ/1
40 TABLET, EXTENDED RELEASE ORAL PRN
Status: DISCONTINUED | OUTPATIENT
Start: 2021-10-29 | End: 2021-10-29

## 2021-10-29 RX ORDER — ALPRAZOLAM 0.5 MG/1
0.5 TABLET ORAL 2 TIMES DAILY PRN
Status: DISCONTINUED | OUTPATIENT
Start: 2021-10-29 | End: 2021-11-02 | Stop reason: HOSPADM

## 2021-10-29 RX ORDER — ASPIRIN 81 MG/1
81 TABLET ORAL DAILY
Status: DISCONTINUED | OUTPATIENT
Start: 2021-10-29 | End: 2021-11-02 | Stop reason: HOSPADM

## 2021-10-29 RX ORDER — CARVEDILOL 6.25 MG/1
12.5 TABLET ORAL 2 TIMES DAILY WITH MEALS
Status: DISCONTINUED | OUTPATIENT
Start: 2021-10-29 | End: 2021-11-02 | Stop reason: HOSPADM

## 2021-10-29 RX ORDER — HYDRALAZINE HYDROCHLORIDE 20 MG/ML
10 INJECTION INTRAMUSCULAR; INTRAVENOUS EVERY 6 HOURS PRN
Status: DISCONTINUED | OUTPATIENT
Start: 2021-10-29 | End: 2021-11-02 | Stop reason: HOSPADM

## 2021-10-29 RX ORDER — ALPRAZOLAM 0.25 MG/1
0.25 TABLET ORAL NIGHTLY PRN
Status: DISCONTINUED | OUTPATIENT
Start: 2021-10-29 | End: 2021-11-02 | Stop reason: HOSPADM

## 2021-10-29 RX ORDER — FLUTICASONE PROPIONATE 50 MCG
1 SPRAY, SUSPENSION (ML) NASAL PRN
Status: DISCONTINUED | OUTPATIENT
Start: 2021-10-29 | End: 2021-11-02 | Stop reason: HOSPADM

## 2021-10-29 RX ORDER — HYDRALAZINE HYDROCHLORIDE 25 MG/1
25 TABLET, FILM COATED ORAL 3 TIMES DAILY
Status: DISCONTINUED | OUTPATIENT
Start: 2021-10-29 | End: 2021-11-02 | Stop reason: HOSPADM

## 2021-10-29 RX ORDER — CEFUROXIME AXETIL 250 MG/1
250 TABLET ORAL DAILY
Status: DISCONTINUED | OUTPATIENT
Start: 2021-10-29 | End: 2021-10-29 | Stop reason: ALTCHOICE

## 2021-10-29 RX ORDER — ONDANSETRON 4 MG/1
4 TABLET, ORALLY DISINTEGRATING ORAL EVERY 12 HOURS PRN
Status: DISCONTINUED | OUTPATIENT
Start: 2021-10-29 | End: 2021-11-02 | Stop reason: HOSPADM

## 2021-10-29 RX ORDER — INSULIN LISPRO 100 [IU]/ML
0-6 INJECTION, SOLUTION INTRAVENOUS; SUBCUTANEOUS NIGHTLY
Status: DISCONTINUED | OUTPATIENT
Start: 2021-10-29 | End: 2021-11-02 | Stop reason: HOSPADM

## 2021-10-29 RX ORDER — VITAMIN B COMPLEX
1000 TABLET ORAL DAILY
Status: DISCONTINUED | OUTPATIENT
Start: 2021-10-29 | End: 2021-11-02 | Stop reason: HOSPADM

## 2021-10-29 RX ORDER — M-VIT,TX,IRON,MINS/CALC/FOLIC 27MG-0.4MG
1 TABLET ORAL DAILY
Status: DISCONTINUED | OUTPATIENT
Start: 2021-10-29 | End: 2021-11-02 | Stop reason: HOSPADM

## 2021-10-29 RX ORDER — ALPRAZOLAM 0.25 MG/1
0.25 TABLET ORAL ONCE
Status: COMPLETED | OUTPATIENT
Start: 2021-10-29 | End: 2021-10-29

## 2021-10-29 RX ORDER — ROSUVASTATIN CALCIUM 10 MG/1
10 TABLET, COATED ORAL EVERY EVENING
Status: DISCONTINUED | OUTPATIENT
Start: 2021-10-29 | End: 2021-11-02 | Stop reason: HOSPADM

## 2021-10-29 RX ORDER — DEXTROSE MONOHYDRATE 50 MG/ML
100 INJECTION, SOLUTION INTRAVENOUS PRN
Status: DISCONTINUED | OUTPATIENT
Start: 2021-10-29 | End: 2021-11-02 | Stop reason: HOSPADM

## 2021-10-29 RX ORDER — DEXTROSE MONOHYDRATE 25 G/50ML
12.5 INJECTION, SOLUTION INTRAVENOUS PRN
Status: DISCONTINUED | OUTPATIENT
Start: 2021-10-29 | End: 2021-11-02 | Stop reason: HOSPADM

## 2021-10-29 RX ORDER — DEXTROSE, SODIUM CHLORIDE, AND POTASSIUM CHLORIDE 5; .45; .15 G/100ML; G/100ML; G/100ML
INJECTION INTRAVENOUS CONTINUOUS
Status: DISCONTINUED | OUTPATIENT
Start: 2021-10-29 | End: 2021-10-29

## 2021-10-29 RX ORDER — NICOTINE POLACRILEX 4 MG
15 LOZENGE BUCCAL PRN
Status: DISCONTINUED | OUTPATIENT
Start: 2021-10-29 | End: 2021-11-02 | Stop reason: HOSPADM

## 2021-10-29 RX ORDER — CARVEDILOL 6.25 MG/1
12.5 TABLET ORAL 2 TIMES DAILY
Status: DISCONTINUED | OUTPATIENT
Start: 2021-10-29 | End: 2021-10-29 | Stop reason: SDUPTHER

## 2021-10-29 RX ORDER — ALBUTEROL SULFATE 2.5 MG/3ML
5 SOLUTION RESPIRATORY (INHALATION) ONCE
Status: COMPLETED | OUTPATIENT
Start: 2021-10-29 | End: 2021-10-29

## 2021-10-29 RX ORDER — INSULIN LISPRO 100 [IU]/ML
0-12 INJECTION, SOLUTION INTRAVENOUS; SUBCUTANEOUS
Status: DISCONTINUED | OUTPATIENT
Start: 2021-10-30 | End: 2021-11-02 | Stop reason: HOSPADM

## 2021-10-29 RX ORDER — SODIUM BICARBONATE 650 MG/1
650 TABLET ORAL 2 TIMES DAILY
Status: DISCONTINUED | OUTPATIENT
Start: 2021-10-29 | End: 2021-11-02 | Stop reason: HOSPADM

## 2021-10-29 RX ORDER — POTASSIUM CHLORIDE 7.45 MG/ML
10 INJECTION INTRAVENOUS PRN
Status: DISCONTINUED | OUTPATIENT
Start: 2021-10-29 | End: 2021-10-29

## 2021-10-29 RX ORDER — 0.9 % SODIUM CHLORIDE 0.9 %
500 INTRAVENOUS SOLUTION INTRAVENOUS PRN
Status: DISCONTINUED | OUTPATIENT
Start: 2021-10-29 | End: 2021-11-02 | Stop reason: HOSPADM

## 2021-10-29 RX ORDER — FUROSEMIDE 10 MG/ML
40 INJECTION INTRAMUSCULAR; INTRAVENOUS ONCE
Status: COMPLETED | OUTPATIENT
Start: 2021-10-29 | End: 2021-10-29

## 2021-10-29 RX ORDER — HEPARIN SODIUM 5000 [USP'U]/ML
5000 INJECTION, SOLUTION INTRAVENOUS; SUBCUTANEOUS EVERY 8 HOURS SCHEDULED
Status: DISCONTINUED | OUTPATIENT
Start: 2021-10-29 | End: 2021-11-02 | Stop reason: HOSPADM

## 2021-10-29 RX ADMIN — INSULIN GLARGINE 18 UNITS: 100 INJECTION, SOLUTION SUBCUTANEOUS at 21:53

## 2021-10-29 RX ADMIN — SODIUM BICARBONATE: 84 INJECTION, SOLUTION INTRAVENOUS at 17:08

## 2021-10-29 RX ADMIN — CARVEDILOL 12.5 MG: 6.25 TABLET, FILM COATED ORAL at 19:02

## 2021-10-29 RX ADMIN — POTASSIUM CHLORIDE, DEXTROSE MONOHYDRATE AND SODIUM CHLORIDE: 150; 5; 450 INJECTION, SOLUTION INTRAVENOUS at 04:20

## 2021-10-29 RX ADMIN — ALPRAZOLAM 0.5 MG: 0.5 TABLET ORAL at 15:02

## 2021-10-29 RX ADMIN — ALPRAZOLAM 0.25 MG: 0.25 TABLET ORAL at 02:02

## 2021-10-29 RX ADMIN — FUROSEMIDE 40 MG: 10 INJECTION, SOLUTION INTRAMUSCULAR; INTRAVENOUS at 04:21

## 2021-10-29 RX ADMIN — CARVEDILOL 12.5 MG: 6.25 TABLET, FILM COATED ORAL at 08:16

## 2021-10-29 RX ADMIN — HEPARIN SODIUM 5000 UNITS: 5000 INJECTION INTRAVENOUS; SUBCUTANEOUS at 15:03

## 2021-10-29 RX ADMIN — ROSUVASTATIN CALCIUM 10 MG: 10 TABLET, FILM COATED ORAL at 19:02

## 2021-10-29 RX ADMIN — ENOXAPARIN SODIUM 70 MG: 80 INJECTION SUBCUTANEOUS at 04:21

## 2021-10-29 RX ADMIN — MULTIPLE VITAMINS W/ MINERALS TAB 1 TABLET: TAB at 08:16

## 2021-10-29 RX ADMIN — HYDRALAZINE HYDROCHLORIDE 25 MG: 25 TABLET, FILM COATED ORAL at 08:16

## 2021-10-29 RX ADMIN — ASPIRIN 81 MG: 81 TABLET, COATED ORAL at 08:16

## 2021-10-29 RX ADMIN — SODIUM BICARBONATE 650 MG: 650 TABLET ORAL at 19:54

## 2021-10-29 RX ADMIN — SODIUM BICARBONATE 650 MG: 650 TABLET ORAL at 08:16

## 2021-10-29 RX ADMIN — ALBUTEROL SULFATE 5 MG: 2.5 SOLUTION RESPIRATORY (INHALATION) at 02:01

## 2021-10-29 RX ADMIN — HYDRALAZINE HYDROCHLORIDE 25 MG: 25 TABLET, FILM COATED ORAL at 19:54

## 2021-10-29 RX ADMIN — INSULIN LISPRO 1 UNITS: 100 INJECTION, SOLUTION INTRAVENOUS; SUBCUTANEOUS at 21:53

## 2021-10-29 RX ADMIN — Medication 1000 UNITS: at 08:16

## 2021-10-29 RX ADMIN — HEPARIN SODIUM 5000 UNITS: 5000 INJECTION INTRAVENOUS; SUBCUTANEOUS at 21:57

## 2021-10-29 ASSESSMENT — ENCOUNTER SYMPTOMS
DIARRHEA: 0
BACK PAIN: 0
SORE THROAT: 0
NAUSEA: 0
RHINORRHEA: 0
COUGH: 0
ABDOMINAL PAIN: 0
VOMITING: 0
SINUS PRESSURE: 0
SHORTNESS OF BREATH: 0
CONSTIPATION: 0
ABDOMINAL DISTENTION: 1
BLOOD IN STOOL: 0
CHEST TIGHTNESS: 0

## 2021-10-29 ASSESSMENT — PAIN SCALES - GENERAL
PAINLEVEL_OUTOF10: 2
PAINLEVEL_OUTOF10: 0

## 2021-10-29 ASSESSMENT — PAIN DESCRIPTION - PROGRESSION
CLINICAL_PROGRESSION: NOT CHANGED
CLINICAL_PROGRESSION: NOT CHANGED

## 2021-10-29 NOTE — CARE COORDINATION
Ostomy Referral Progress Note      NAME:  Evan Melendez RECORD NUMBER:  [id number]  AGE: 78 y.o. GENDER:  female  :  1940  TODAY'S DATE:  10/29/2021    Kary Manriquez is a 78 y.o. female referred by:   [x] Physician  [x] Nursing  [] Other:     Established    PAST MEDICAL HISTORY:        Diagnosis Date    Anxiety     Atrial fibrillation ([facility]     CAD (coronary artery disease)     Cancer ([facility] [address]     bladder right kidney     Cardiac arrest ([facility] 3/27/2016    Carotid artery stenosis     CHF [facility]congestive heart failure) ([facility])     Chronic kidney disease     cancer of kidney and bladder    Depression     Diabetes mellitus (HCC)     IDDM    GERD (gastroesophageal reflux disease)     Hip pain, chronic     HYPERCHOLESTERAEMIA     Hypertension     Irritable bowel syndrome     Laceration of head 3/25/2016    fall with cardiac arrest    MI, old     Neuropathy     Osteoarthritis     Presence of combination internal cardiac defibrillator (ICD) and pacemaker     Staph infection     PT. STATES SHE HAS HAD SEVERAL BOILS WITH STAPH LAST ONE 20 YEARS AGO.  Type 2 diabetes mellitus without complication ([facility])     Urinary incontinence        MEDICATIONS:    No current facility-administered medications on file prior to encounter. Current Outpatient Medications on File Prior to Encounter   Medication Sig Dispense Refill    cefUROXime (CEFTIN) 250 MG tablet Take 1 tablet by mouth daily for 10 days 10 tablet 0    hydrALAZINE (APRESOLINE) 25 MG tablet TAKE ONE TABLET BY MOUTH THREE TIMES A DAY 90 tablet 11    carvedilol (COREG) 12.5 MG tablet TAKE ONE TABLET BY MOUTH TWICE A  tablet 2    aspirin 81 MG EC tablet Take 1 tablet by mouth daily HOLD for 30 days after hip surgery.  See Eliquis order 30 tablet 3    acetaminophen (TYLENOL) 500 MG tablet Take 1,000 mg by mouth daily as needed for Pain       insulin glargine (LANTUS) 100 UNIT/ML injection vial Inject 18 Units into the skin nightly       sodium bicarbonate 650 MG tablet Take 650 mg by mouth 2 times daily       ALPRAZolam (XANAX) 0.25 MG tablet Take 0.25 mg by mouth nightly as needed for Sleep (patient taking during the day to calm herself down).  rosuvastatin (CRESTOR) 10 MG tablet Take 10 mg by mouth every evening       Coenzyme Q10 (CO Q 10) 100 MG CAPS Take 1 capsule by mouth daily       fluticasone (FLONASE) 50 MCG/ACT nasal spray 1 spray by Nasal route as needed for Rhinitis      therapeutic multivitamin-minerals (THERAGRAN-M) tablet Take 1 tablet by mouth daily. Centrum silver       vitamin D (CHOLECALCIFEROL) 1000 UNIT TABS tablet Take 1,000 Units by mouth daily       ondansetron (ZOFRAN ODT) 4 MG disintegrating tablet Take 1-2 tablets by mouth every 12 hours as needed for Nausea May Sub regular tablet (non-ODT) if insurance does not cover ODT. 12 tablet 0       ALLERGIES:    Allergies   Allergen Reactions    Amlodipine Besylate      Other reaction(s): Unknown    Calcium Channel Blockers      Other reaction(s): Unknown    Citalopram Hydrobromide      Other reaction(s): Unknown    Flagyl [Metronidazole]      Took with Cipro and it made her vomit    Losartan      Muscle aches  Muscle aches      Pneumococcal Vaccine Swelling     Swelling at injection site    Simvastatin      myalgias  ?  Valsartan-Hydrochlorothiazide      Does not remember    Venlafaxine Nausea Only    Hydrocodone-Acetaminophen Other (See Comments)     Makes patient feel weird, dizzy, nauseous, and sleepy.   Patient states she is not allergic to this    Nitrofurantoin Nausea And Vomiting and Other (See Comments)     Other reaction(s): Dizziness, GI Upset  Unsure if Macrobid caused it  Unsure if Macrobid caused it         PAST SURGICAL HISTORY:    Past Surgical History:   Procedure Laterality Date    ANGIOPLASTY      x3  2009    BLADDER REMOVAL      BLADDER SUSPENSION      CYSTOSCOPY  5/28/2013    with dilitation    HYSTERECTOMY      KIDNEY REMOVAL      right    PACEMAKER INSERTION      SKIN CANCER EXCISION      TOTAL HIP ARTHROPLASTY Left 11/6/2019    LEFT ANTERIOR TOTAL HIP REPLACEMENT WITH C-ARM performed by Belgica Chi [facility] [place]:    family history includes Cancer in her father; Diabetes in her mother; Heart Disease in her father.     SOCIAL HISTORY:    Social History     Tobacco Use    Smoking status: Never Smoker    Smokeless tobacco: Never Used   Vaping Use    Vaping Use: Never used   Substance Use Topics    Alcohol use: No    Drug use: No       LABS:  WBC:    Lab Results   Component Value Date    WBC 8.0 10/29/2021     H/H:    Lab Results   Component Value Date    HGB 10.9 10/29/2021    HCT 33.8 10/29/2021     BMP:    Lab Results   Component Value Date     10/29/2021    K 4.7 10/29/2021    K 4.2 08/17/2021     10/29/2021    CO2 18 10/29/2021    BUN 40 10/29/2021    LABALBU 4.1 10/28/2021    CREATININE 2.0 10/29/2021    CALCIUM 9.2 10/29/2021    GFRAA 29 10/29/2021    GFRAA 51 05/28/2013    LABGLOM 24 10/29/2021    GLUCOSE 198 10/29/2021     PTT:    Lab Results   Component Value Date    APTT 26.2 10/22/2019   [APTT}  PT/INR:    Lab Results   Component Value Date    PROTIME 11.2 01/15/2020    INR 0.97 01/15/2020       Objective    /64   Pulse 85   Temp 97.8 °F (36.6 °C) (Temporal)   Resp 14   Ht 5' 8\" (1.727 m)   Wt 144 lb (65.3 kg)   SpO2 95%   BMI 21.90 kg/m²     Mark Risk Score Mark Scale Score: 19    Patient Active Problem List   Diagnosis Code    CATARINA (acute kidney injury) ([facility] N17.9    Cardiomyopathy ([facility] I42.9    Ischemic cardiomyopathy I25.5    Essential hypertension I10    Automatic implantable cardioverter-defibrillator in situ Z95.810    UTI (urinary tract infection) N39.0    DM2 (diabetes mellitus, type 2) (HCC) E11.9    Acute-on-chronic renal failure (HCC) N17.9, N18.9    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 ([facility]) I13.10, N18.30    Chronic coronary artery disease I25.10    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    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    Anxiety, generalized F41.1    Gastro-esophageal reflux disease without esophagitis K21.9    Generalized OA M15.9    Hydroureteronephrosis N13.30    Hyperlipidemia E78.5    Anemia, normocytic normochromic D64.9    Depression, 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    Hyperkalemia E87.5    Pyelonephritis N12    Acute pyelonephritis N10    Chronic kidney disease, stage III (moderate) ([facility]) N18.30    Encounter for ostomy care education Z71.89    Arthritis of left hip M16.12    Kidney stone N20.0    Hydroureter on left N13.4    Acute renal failure (ARF) ([facility]) N17.9       Assessment                       Urostomy Ileal conduit RLQ (Active)   Stomal Appliance 1 piece; Changed 10/29/21 1722   Flange Size (inches) 2.5 Inches 10/29/21 1722   Stoma  Assessment Pink;Protrudes; Moist 10/29/21 1722   Mucocutaneous Junction Intact 10/29/21 1722   Peristomal Assessment Clean; Intact 10/29/21 1722   Treatment Bag change 10/29/21 1722   Urine Color Yellow 10/29/21 1722   Urine Appearance Clear 10/29/21 1722   Output (ml) 275 ml 10/29/21 1712   Number of days: 652     Patient has pre existing urostomy, she use [employer] products. Patient agreeable to use Coloplast while here. Old appliance was removed. Stoma is RLQ, stoma is pink moist, protrudes above skin line. Stoma measures 25 mm wide and 12 mm tall. Protrudes 10 mm. Intake/Output Summary (Last 24 hours) at 10/29/2021 1724  Last data filed at 10/29/2021 1712  Gross per 24 hour   Intake 1027.09 ml   Output 2125 ml   Net -1097.91 ml         Plan   Plan for Ostomy Care:    Routine ostomy care -   Change pouch every 3 days & PRN. Empty when 1/3 to 1/2 full of urine . Remove old pouch. Clean skin with water only pat dry.  DO NOT USE WIPE!!!!    Supplies:  1 piece Coloplast urostomy pouches  left at bedside. Adapter included to attach to night bag     If more supplies needed. Contact central supply for 2 piece. [employer] Flex flat barrier  #[id number] (3/8-1-7/8\" RED)  [employer] [place] Flex Urostomy Pouch #[id number] RED      Ostomy Plan of Care  [x] Supplies/Instructions left in room  [] Patient using home supplies  [x] Brand/supplies at bedside coloplast   [] Current pouching system     Current Diet: ADULT DIET;  Regular  Dietician consult:  No    Discharge Plan:  Placement for patient upon discharge: home with support    Outpatient visit plan No  Supplies given Yes   Samples requested Yes    Referrals:  []   [] [facility]  [] Supplies  [] Other      Patient/Caregiver Teaching:  Written Instructions given to patient/family Patient has had a urostomy for 4 years and she takes care of it herself  Teaching provided:  [x] Reviewed GI and A&P        [x] Supplies  [x] Pouch emptying      [] Manipulate closure  [x] Routine Care         [] Comment  [] Pouch maintenance           Level of patient/caregiver understanding able to:  [x] Indicates understanding       [] Needs reinforcement  [] Unsuccessful      [x] Verbal Understanding  [] Demonstrated understanding       [] No evidence of learning  [] Refused teaching         [] N/A    Electronically signed by  ALBARO Royal, RN  Wound/Ostomy Care on 10/29/2021 at 5:24 PM

## 2021-10-29 NOTE — PROGRESS NOTES
Covid PCR swab sent, patient placed in droplet plus precautions. Patient would like her daughter, Keyana Isbell (), updated about her hospital admission.

## 2021-10-29 NOTE — ED PROVIDER NOTES
was updated on patient's change in condition. Patient Referrals:  No follow-up provider specified. Discharge Medications:  Current Discharge Medication List          FINAL IMPRESSION  1. CATARINA (acute kidney injury) ([facility]    2. Acute pulmonary edema (HCC)        Blood pressure 128/62, pulse 83, temperature 97.9 °F (36.6 °C), temperature source Temporal, resp. rate 19, height 5' 8\" (1.727 m), weight 144 lb (65.3 kg), SpO2 91 %, not currently breastfeeding. For further details of [facility] emergency department encounter, please see documentation by advanced practice provider, Giovanny Lainez NP.     Juan David Angel DO (electronically signed)  Attending Emergency Physician       Juan David Angel DO  10/29/21 [id number]

## 2021-10-29 NOTE — CONSULTS
Nephrology Consult Note  [phone]   [facility]           Reason for Consult: CATARINA on CKD    HISTORY OF PRESENT ILLNESS:                The patient is a 78 y. 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 Since arrival to the ER, her urostomy has begun to drain spontaneously. 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 CXR showed bilateral pneumonia vs pulm edema, covid-19 pending at this time. Of note, she complained of chest pain and sob, and became hypoxic while in the ER.    We are consulted for CATARINA on CKD  She follows with Dr. ANUEL DIAMOND for CKD  Base-line creatinine appears to be around mid 1s  Creatinine on admission was 2.1 and is stable at this time  Recently treated for UTI  Pt seen and examined  Denies abdominal pain or nausea/emesis  No fevers/chills    Past Medical History:        Diagnosis Date    Anxiety     Atrial fibrillation ([address]     CAD (coronary artery disease)     Cancer ([address]     bladder right kidney     Cardiac arrest ([address] 3/27/2016    Carotid artery stenosis     CHF (congestive heart failure) (HCC)     Chronic kidney disease     cancer of kidney and bladder    Depression     Diabetes mellitus ([address]     IDDM    GERD (gastroesophageal reflux disease)     Hip pain, chronic     HYPERCHOLESTERAEMIA     Hypertension     Irritable bowel syndrome     Laceration of head 3/25/2016    fall with cardiac arrest    MI, old     Neuropathy     Osteoarthritis     Presence of combination internal cardiac defibrillator (ICD) and pacemaker     Staph infection     PT. STATES SHE HAS HAD SEVERAL BOILS WITH STAPH LAST ONE 20 YEARS AGO.  Type 2 diabetes mellitus without complication ([address]     Urinary incontinence        Past Surgical History:        Procedure Laterality Date    ANGIOPLASTY      x3  2009    BLADDER REMOVAL      BLADDER SUSPENSION      CYSTOSCOPY  5/28/2013    with dilitation    HYSTERECTOMY      KIDNEY REMOVAL      right    PACEMAKER INSERTION      SKIN CANCER EXCISION      TOTAL HIP ARTHROPLASTY Left 11/6/2019    LEFT ANTERIOR TOTAL HIP REPLACEMENT WITH C-ARM performed by Charma Romberg, MD at [place] [id number]         Current Medications:    No current facility-administered medications on file prior to encounter. Current Outpatient Medications on File Prior to Encounter   Medication Sig Dispense Refill    cefUROXime (CEFTIN) 250 MG tablet Take 1 tablet by mouth daily for 10 days 10 tablet 0    hydrALAZINE (APRESOLINE) 25 MG tablet TAKE ONE TABLET BY MOUTH THREE TIMES A DAY 90 tablet 11    carvedilol (COREG) 12.5 MG tablet TAKE ONE TABLET BY MOUTH TWICE A  tablet 2    aspirin 81 MG EC tablet Take 1 tablet by mouth daily HOLD for 30 days after hip surgery. See Eliquis order 30 tablet 3    acetaminophen (TYLENOL) 500 MG tablet Take 1,000 mg by mouth daily as needed for Pain       insulin glargine (LANTUS) 100 UNIT/ML injection vial Inject 18 Units into the skin nightly       sodium bicarbonate 650 MG tablet Take 650 mg by mouth 2 times daily       ALPRAZolam (XANAX) 0.25 MG tablet Take 0.25 mg by mouth nightly as needed for Sleep (patient taking during the day to calm herself down).        rosuvastatin (CRESTOR) 10 MG tablet Take 10 mg by mouth every evening       Coenzyme Q10 (CO Q 10) 100 MG CAPS Take 1 capsule by mouth daily       fluticasone (FLONASE) 50 MCG/ACT nasal spray 1 spray by Nasal route as needed for Rhinitis      therapeutic multivitamin-minerals (THERAGRAN-M) tablet Take 1 tablet by mouth daily. Centrum silver       vitamin D (CHOLECALCIFEROL) 1000 UNIT TABS tablet Take 1,000 Units by mouth daily       ondansetron (ZOFRAN ODT) 4 MG disintegrating tablet Take 1-2 tablets by mouth every 12 hours as needed for Nausea May Sub regular tablet (non-ODT) if insurance does not cover ODT. 12 tablet 0       Allergies:  Amlodipine besylate, Calcium channel blockers, Citalopram hydrobromide, Flagyl [metronidazole], Losartan, Pneumococcal vaccine, Simvastatin, Valsartan-hydrochlorothiazide, Venlafaxine, Hydrocodone-acetaminophen, and Nitrofurantoin    Social History:    Social History     Socioeconomic History    Marital status:      Spouse name: Not on file    Number of children: Not on file    Years of education: Not on file    Highest education level: Not on file   Occupational History    Not on file   Tobacco Use    Smoking status: Never Smoker    Smokeless tobacco: Never Used   Vaping Use    Vaping Use: Never used   Substance and Sexual Activity    Alcohol use: No    Drug use: No    Sexual activity: Not Currently   Other Topics Concern    Not on file   Social History Narrative    Not on file     Social Determinants of Health     Financial Resource Strain:     Difficulty of Paying Living Expenses:    Food Insecurity:     Worried About Running Out of Food in the Last Year:     [address] in the Last Year:    Transportation Needs:     Lack of Transportation (Medical):      Lack of Transportation (Non-Medical):    Physical Activity:     Days of Exercise per Week:     Minutes of Exercise per Session:    Stress:     Feeling of Stress :    Social Connections:     Frequency of Communication with Friends and Family:     Frequency of Social Gatherings with Friends and Family:     Attends Christian Services:     Active Member of Clubs or Organizations:     Attends Club or Organization Meetings:     Marital Status: Intimate Partner Violence:     Fear of Current or Ex-Partner:     Emotionally Abused:     Physically Abused:     Sexually Abused:        Family History:       Problem Relation Age of Onset    Diabetes Mother     Heart Disease Father     Cancer Father            Review of Systems   Constitutional: Positive for fatigue. Negative for activity change, appetite change, chills and fever. HENT: Negative for ear discharge, ear pain, rhinorrhea, sinus pressure and sore throat. Respiratory: Negative for cough, chest tightness and shortness of breath. Cardiovascular: Negative for chest pain, palpitations and leg swelling. Gastrointestinal: Positive for abdominal distention. Negative for abdominal pain, blood in stool, constipation, diarrhea, nausea and vomiting. Genitourinary: Negative for hematuria. Musculoskeletal: Positive for arthralgias. Negative for back pain and myalgias. Skin: Negative for pallor and rash. Neurological: Negative for dizziness, tremors, seizures and syncope. Psychiatric/Behavioral: Negative for confusion and hallucinations. PHYSICAL EXAM:      Vitals:    /64   Pulse 85   Temp 97.8 °F (36.6 °C) (Temporal)   Resp 14   Ht 5' 8\" (1.727 m)   Wt 144 lb (65.3 kg)   SpO2 95%   BMI 21.90 kg/m²   I/O last 3 completed shifts: In: 928.6 [I.V.:928.6]  Out: 1850 [Urine:1850]  No intake/output data recorded. Physical Exam:  General : AAOx3, not in pain or respiratory distress, resting in bed  HEENT : normocephalic, atraumatic, mucosa moist, no palor or icterus  CVS: S1 S2 normal, regular rhythm, no murmurs or rubs. Lungs: Clear, no wheezing or crackles. Abd: Soft, bowel sounds normal, non-tender. Ext: No edema, no cyanosis  Skin: Warm. No rashes appreciated. : bladder non-distended, no tenderness over the bladder, urostomy+ with clear ueine  Neuro: Alert and oriented x 3, nonfocal.  Joints: No erythema noted over joints.       DATA:    CBC with Differential: Lab Results   Component Value Date    WBC 8.0 10/29/2021    RBC 3.65 10/29/2021    HGB 10.9 10/29/2021    HCT 33.8 10/29/2021     10/29/2021    MCV 92.6 10/29/2021    MCH 29.9 10/29/2021    MCHC 32.3 10/29/2021    RDW 14.8 10/29/2021    SEGSPCT 60.3 01/28/2012    BANDSPCT 4 09/21/2017    LYMPHOPCT 13.6 10/28/2021    MONOPCT 8.8 10/28/2021    MYELOPCT 1 03/25/2016    EOSPCT 1.7 01/28/2012    BASOPCT 0.5 10/28/2021    MONOSABS 0.5 10/28/2021    LYMPHSABS 0.8 10/28/2021    EOSABS 0.1 10/28/2021    BASOSABS 0.0 10/28/2021    DIFFTYPE Auto-K 01/28/2012     BMP:    Lab Results   Component Value Date     10/29/2021    K 4.7 10/29/2021    K 4.2 08/17/2021     10/29/2021    CO2 18 10/29/2021    BUN 40 10/29/2021    LABALBU 4.1 10/28/2021    CREATININE 2.0 10/29/2021    CALCIUM 9.2 10/29/2021    GFRAA 29 10/29/2021    GFRAA 51 05/28/2013    LABGLOM 24 10/29/2021    GLUCOSE 198 10/29/2021     Ionized Calcium:  No results found for: IONCA  Magnesium:    Lab Results   Component Value Date    MG 1.80 03/26/2018     Phosphorus:    Lab Results   Component Value Date    PHOS 3.5 04/12/2021     PT/INR:    Lab Results   Component Value Date    PROTIME 11.2 01/15/2020    INR 0.97 01/15/2020     Warfarin PT/INR:  No components found for: PTPATWAR, PTINRWAR  PTT:    Lab Results   Component Value Date    APTT 26.2 10/22/2019   [APTT}  Troponin:    Lab Results   Component Value Date    TROPONINI <0.01 10/28/2021     Last 3 Troponin:    Lab Results   Component Value Date    TROPONINI <0.01 10/28/2021    TROPONINI 0.01 07/11/2021    TROPONINI <0.01 04/17/2021     U/A:    Lab Results   Component Value Date    COLORU YELLOW 10/28/2021    PROTEINU TRACE 10/28/2021    PHUR 7.5 10/28/2021    LABCAST All else obscured 12/23/2011    WBCUA 15 10/28/2021    RBCUA 1 10/28/2021    MUCUS Trace 06/06/2010    YEAST Present 10/14/2021    BACTERIA 4+ 10/28/2021    CLARITYU CLOUDY 10/28/2021    SPECGRAV 1.012 10/28/2021    LEUKOCYTESUR MODERATE 10/28/2021    UROBILINOGEN 0.2 10/28/2021    BILIRUBINUR Negative 10/28/2021    BILIRUBINUR NEGATIVE, confirmed by icto test 12/23/2011    BLOODU Negative 10/28/2021    GLUCOSEU Negative 10/28/2021    GLUCOSEU NEGATIVE 12/23/2011    AMORPHOUS 1+ 06/09/2020       ASSESSMENT/PLAN:      1. Acute Kidney injury on CKD  Pre-renal vs ATN in the setting of decreased urostomy out-put  Imaging showed improving left sided hydronephrosis. Avoid nephrotoxins including IV contrast, NSAIDs, ACEI/ARBs. Continue Isotonic fluids, eliminate Potassium in the IVF, change to bicarb gtt  2. Obstructive uropathy  Imaging with left sided hydronephrosis resolving  Consult urology. 3. Acidosis   Due to CKD  Keep on bicarb gtt  4. DM II  5. Hypertension controlled  Continue same meds      Thank you for allowing me to participate in the care of this patient. I will continue to follow along. Please call with questions.     Rodolfo Pinedo MD, MD.  Office Phone : [phone]  10/29/2021

## 2021-10-29 NOTE — H&P
[place] [address]                     [facility], [address]                              HISTORY AND PHYSICAL    PATIENT NAME: Tariq Abarca                     :        1940  MED REC NO:   [id number]                          ROOM:       [id number]  ACCOUNT NO:   [de-identified]                           ADMIT DATE: 10/28/2021  PROVIDER:     Ondina Morales MD    HISTORY OF PRESENT ILLNESS:  The patient is an 68-year-old white  American lady came to the emergency room with history of having a  problem with her urostomy bag. The patient has not had any output since  04:00 p.m. on the day of arrival here. The patient also felt bloated. The patient was found in acute kidney injury. She had concerns about  her urostomy not draining and abdominal swelling. Stated that she was  at a party prior to arrival and all of a sudden felt fullness around her  urostomy site. Noticed the bag did not have any urine in it. She  denied any pain, stated that she feels swollen in her abdomen, nothing  makes it better or worse. She finished the dose of antibiotic today for  infection. She is not sure what kind of infection. She did report some  nausea, but no vomiting. Denied any headache, fever, lightheadedness,  dizziness or visual disturbance. No chest pain or pressure. No neck or  back pain, denied any obvious dyspnea.     PAST MEDICAL HISTORY:  Pertinent for atherosclerotic heart disease,  hypertension, diabetes mellitus, hyperlipidemia, gastroesophageal reflux  disease, anxiety neurosis, Staph infection, history of cardiac arrest,  history of head laceration, history of chronic hip pain, history of ICD  device placed, history of MI, chronic kidney disease, atrial  fibrillation, carotid artery stenosis, congestive heart failure,  depression, type 2 diabetes mellitus, irritable bowel syndrome,  peripheral neuropathy, urinary incontinence, right-sided kidney and  bladder cancer. PAST SURGICAL HISTORY:  Pertinent for angioplasty, hysterectomy, bladder  suspension procedure, pacemaker insertion, nephrectomy, bladder removal,  skin cancer excision, cystoscopy, total hip arthroplasty. FAMILY HISTORY:  Both the parents are . Mother had diabetes. Father had cancer and atherosclerotic heart disease. MEDICATIONS:  The patient is on Proventil, Xanax, aspirin, carvedilol,  Lasix, Apresoline, Lantus, Crestor, sodium bicarbonate, multivitamin,  vitamin D. ALLERGIES:  The patient has 11 allergies that include CALCIUM CHANNEL  BLOCKER, CITALOPRAM, FLAGYL, LOSARTAN, PNEUMOCOCCAL VACCINE,  SIMVASTATIN, ANGIOTENSIN RECEPTIVE BLOCKERS, VENLAFAXINE, HYDROCODONE  and NITROFURANTOIN.    SOCIAL HISTORY:  She is a . She has six grown children. She was  never a smoker, never a drinker. She worked as a . There is no history of substance abuse. REVIEW OF SYSTEMS:  Negative for loss of consciousness. No fever, no  chills. No obvious hematuria. The patient is oliguric technically, no  dysphagia. She does ambulate. No hematemesis. No melena. Does have  some abdominal pain. Does have chronic musculoskeletal pain. No  neurologic symptoms. Overall she is poorly nourished with a BMI of  21.90. PHYSICAL EXAMINATION:  GENERAL:  Alert, awake, oriented x2, moderately distressed. VITAL SIGNS:  Her temperature is 97.4, blood pressure 166/71,  respirations 16, heart rate is 84. O2 sat 96% on room air. HEENT:  Oral mucosa dry. SKIN:  Warm and dry. NECK:  Neck is supple. Mild jugular venous distention. No  lymphadenopathy. No thyromegaly. Faint carotid bruit. LUNGS:  Vesicular breath sounds. Fairly clear to auscultation. HEART:  Regular rate and rhythm. S1, S2. No gallop rhythm. ABDOMEN:  Soft. Urostomy present. By the time the patient came to the  ER the urostomy had started draining back. No guarding, rebound, or  rigidity.   Bowel sounds present. EXTREMITIES:  Show no cyanosis or edema. NEUROLOGIC:  Grossly intact. LABORATORY EVALUATION:  Shows sodium 139, potassium 4.7, chloride 105,  CO2 18, BUN 40, creatinine 2.0, in fact it was 2.1. Her last baseline  just 10 days ago was BUN of 18, creatinine 1.4. White blood cell count  5.8, hemoglobin/hematocrit 10.4 and 31.9, platelet count 746. D-dimer  is 553. Microbiology report shows COVID-19 has been checked, but  pending yet. Urinalysis is consistent with low grade urinary tract  infection, but compared to last time the urine wbc's are only 15 versus  356, so I would say overall burden of infection is very low in the  urine. DIAGNOSTIC DATA:  The patient had a CT scan of the abdomen and pelvis  and that shows improved resolving hydronephrosis of the left with  minimal left perinephric fat stranding identified, improved appearance  of the parastomal hernia identified in the right containing omental fat,  cholelithiasis present, colonic diverticulosis present. Chest x-ray,  bilateral pneumonia versus pulmonary edema. The possibility of COVID  related pneumonia should be considered. A transthoracic echocardiogram  done in 07/2020 shows LVEF of 03-54%, diastolic filling parameters  suggest a grade 2 diastolic dysfunction. ASSESSMENT:  Acute renal failure superimposed on chronic kidney disease,  obstructive uropathy, parastomal hernia, cholelithiasis, chronic  diastolic heart failure, ischemic cardiomyopathy, essential  hypertension, pulmonary edema versus COVID-19 disease, history of renal  cell carcinoma, diabetes mellitus, low grade urinary tract infection,  acute-on-chronic kidney disease, coronary artery disease, chronic  ischemic heart disease, anxiety neurosis, gastroesophageal reflux  disease, hydroureteronephrosis, hyperlipidemia, anemia of chronic  disease. PLAN:  Get her admitted. Treat her with IV hydration. Nephrology  consultation comanagement.   The patient is a full code.        Rachid Villafuerte MD    D: 10/29/2021 10:17:49       T: 10/29/2021 10:21:37     [id number]  Job#: [id number]     Doc#: [id number]    CC:

## 2021-10-29 NOTE — PROGRESS NOTES
Patient arrived to [facility], vitals are stable on room air. Patient denies any SOB, pain or needs at his time.

## 2021-10-29 NOTE — PROGRESS NOTES
-Patient to room, VSS, patient requiring oxygen, stable on 2L. -This nurse spoke with ED NP, patient's nuclear medicine scan to be done in the morning to check for PE. New orders added tonight as well.

## 2021-10-29 NOTE — PLAN OF CARE
Problem: Pain:  Goal: Pain level will decrease  Description: Pain level will decrease  Outcome: Ongoing  Goal: Control of acute pain  Description: Control of acute pain  Outcome: Ongoing  Goal: Control of chronic pain  Description: Control of chronic pain  Outcome: Ongoing     Problem: Urinary Elimination:  Goal: Complications related to the disease process, condition or treatment will be avoided or minimized  Outcome: Ongoing     Problem:  Activity:  Goal: Ability to tolerate increased activity will improve  Description: Ability to tolerate increased activity will improve  Outcome: Ongoing  Goal: Capacity to carry out activities will improve  Description: Capacity to carry out activities will improve  Outcome: Ongoing  Goal: Fatigue will decrease  Description: Fatigue will decrease  Outcome: Ongoing  Goal: Energy level will increase  Description: Energy level will increase  Outcome: Ongoing  Goal: Ability to implement measures to reduce episodes of fatigue will improve  Description: Ability to implement measures to reduce episodes of fatigue will improve  Outcome: Ongoing     Problem: Discharge Planning:  Goal: Discharged to appropriate level of care  Description: Discharged to appropriate level of care  Outcome: Ongoing     Problem: Coping:  Goal: Ability to identify and develop effective coping behavior will improve  Description: Ability to identify and develop effective coping behavior will improve  Outcome: Ongoing  Goal: Ability to identify and utilize available resources and services will improve  Description: Ability to identify and utilize available resources and services will improve  Outcome: Ongoing

## 2021-10-29 NOTE — PROGRESS NOTES
Pt awake and alert. Denied SOB c/o overall feeling ill. PO meds taken easily with water. Discussed pending transfer to [facility]. Report given via telephone to [facility]. Transporter en route.

## 2021-10-29 NOTE — ED PROVIDER NOTES
EKG reviewed by myself. Dated today 147. Rate 106 sinus tach otherwise normal EKG.   No change from August 2021     Sowmya Spann MD  10/29/21 Lucero Hernandez MD  10/29/21 8650

## 2021-10-30 LAB
ANION GAP SERPL CALCULATED.3IONS-SCNC: 11 MMOL/L (ref 3–16)
BASOPHILS ABSOLUTE: 0.1 K/UL (ref 0–0.2)
BASOPHILS RELATIVE PERCENT: 0.8 %
BUN BLDV-MCNC: 30 MG/DL (ref 7–20)
C-REACTIVE PROTEIN: 21.3 MG/L (ref 0–5.1)
CALCIUM SERPL-MCNC: 8.9 MG/DL (ref 8.3–10.6)
CHLORIDE BLD-SCNC: 107 MMOL/L (ref 99–110)
CO2: 25 MMOL/L (ref 21–32)
CREAT SERPL-MCNC: 1.5 MG/DL (ref 0.6–1.2)
EOSINOPHILS ABSOLUTE: 0.1 K/UL (ref 0–0.6)
EOSINOPHILS RELATIVE PERCENT: 1.1 %
GFR AFRICAN AMERICAN: 40
GFR NON-AFRICAN AMERICAN: 33
GLUCOSE BLD-MCNC: 100 MG/DL (ref 70–99)
GLUCOSE BLD-MCNC: 105 MG/DL (ref 70–99)
GLUCOSE BLD-MCNC: 135 MG/DL (ref 70–99)
GLUCOSE BLD-MCNC: 158 MG/DL (ref 70–99)
GLUCOSE BLD-MCNC: 207 MG/DL (ref 70–99)
GLUCOSE BLD-MCNC: 60 MG/DL (ref 70–99)
HCT VFR BLD CALC: 32.5 % (ref 36–48)
HEMOGLOBIN: 10.7 G/DL (ref 12–16)
LACTIC ACID: 0.9 MMOL/L (ref 0.4–2)
LYMPHOCYTES ABSOLUTE: 1 K/UL (ref 1–5.1)
LYMPHOCYTES RELATIVE PERCENT: 13 %
MCH RBC QN AUTO: 30 PG (ref 26–34)
MCHC RBC AUTO-ENTMCNC: 32.8 G/DL (ref 31–36)
MCV RBC AUTO: 91.3 FL (ref 80–100)
MONOCYTES ABSOLUTE: 0.9 K/UL (ref 0–1.3)
MONOCYTES RELATIVE PERCENT: 11.7 %
NEUTROPHILS ABSOLUTE: 5.5 K/UL (ref 1.7–7.7)
NEUTROPHILS RELATIVE PERCENT: 73.4 %
PDW BLD-RTO: 14.8 % (ref 12.4–15.4)
PERFORMED ON: ABNORMAL
PLATELET # BLD: 261 K/UL (ref 135–450)
PMV BLD AUTO: 7.5 FL (ref 5–10.5)
POTASSIUM SERPL-SCNC: 3.9 MMOL/L (ref 3.5–5.1)
RBC # BLD: 3.56 M/UL (ref 4–5.2)
SARS-COV-2, PCR: NOT DETECTED
SODIUM BLD-SCNC: 143 MMOL/L (ref 136–145)
WBC # BLD: 7.5 K/UL (ref 4–11)

## 2021-10-30 PROCEDURE — 80048 BASIC METABOLIC PNL TOTAL CA: CPT

## 2021-10-30 PROCEDURE — 6360000002 HC RX W HCPCS: Performed by: INTERNAL MEDICINE

## 2021-10-30 PROCEDURE — 86140 C-REACTIVE PROTEIN: CPT

## 2021-10-30 PROCEDURE — 85025 COMPLETE CBC W/AUTO DIFF WBC: CPT

## 2021-10-30 PROCEDURE — 36415 COLL VENOUS BLD VENIPUNCTURE: CPT

## 2021-10-30 PROCEDURE — 1200000000 HC SEMI PRIVATE

## 2021-10-30 PROCEDURE — 6370000000 HC RX 637 (ALT 250 FOR IP): Performed by: EMERGENCY MEDICINE

## 2021-10-30 PROCEDURE — 6370000000 HC RX 637 (ALT 250 FOR IP): Performed by: INTERNAL MEDICINE

## 2021-10-30 PROCEDURE — 2580000003 HC RX 258: Performed by: INTERNAL MEDICINE

## 2021-10-30 PROCEDURE — 2500000003 HC RX 250 WO HCPCS: Performed by: INTERNAL MEDICINE

## 2021-10-30 PROCEDURE — 83605 ASSAY OF LACTIC ACID: CPT

## 2021-10-30 RX ADMIN — MULTIPLE VITAMINS W/ MINERALS TAB 1 TABLET: TAB at 09:38

## 2021-10-30 RX ADMIN — SODIUM BICARBONATE 650 MG: 650 TABLET ORAL at 20:49

## 2021-10-30 RX ADMIN — HEPARIN SODIUM 5000 UNITS: 5000 INJECTION INTRAVENOUS; SUBCUTANEOUS at 14:22

## 2021-10-30 RX ADMIN — HYDRALAZINE HYDROCHLORIDE 25 MG: 25 TABLET, FILM COATED ORAL at 20:49

## 2021-10-30 RX ADMIN — CARVEDILOL 12.5 MG: 6.25 TABLET, FILM COATED ORAL at 09:38

## 2021-10-30 RX ADMIN — INSULIN LISPRO 1 UNITS: 100 INJECTION, SOLUTION INTRAVENOUS; SUBCUTANEOUS at 20:49

## 2021-10-30 RX ADMIN — HEPARIN SODIUM 5000 UNITS: 5000 INJECTION INTRAVENOUS; SUBCUTANEOUS at 06:24

## 2021-10-30 RX ADMIN — INSULIN LISPRO 4 UNITS: 100 INJECTION, SOLUTION INTRAVENOUS; SUBCUTANEOUS at 12:10

## 2021-10-30 RX ADMIN — INSULIN GLARGINE 18 UNITS: 100 INJECTION, SOLUTION SUBCUTANEOUS at 20:50

## 2021-10-30 RX ADMIN — SODIUM BICARBONATE 650 MG: 650 TABLET ORAL at 09:38

## 2021-10-30 RX ADMIN — ROSUVASTATIN CALCIUM 10 MG: 10 TABLET, FILM COATED ORAL at 17:29

## 2021-10-30 RX ADMIN — ASPIRIN 81 MG: 81 TABLET, COATED ORAL at 09:38

## 2021-10-30 RX ADMIN — HYDRALAZINE HYDROCHLORIDE 25 MG: 25 TABLET, FILM COATED ORAL at 09:38

## 2021-10-30 RX ADMIN — Medication 1000 UNITS: at 09:38

## 2021-10-30 RX ADMIN — SODIUM BICARBONATE: 84 INJECTION, SOLUTION INTRAVENOUS at 09:34

## 2021-10-30 RX ADMIN — HEPARIN SODIUM 5000 UNITS: 5000 INJECTION INTRAVENOUS; SUBCUTANEOUS at 20:52

## 2021-10-30 RX ADMIN — HYDRALAZINE HYDROCHLORIDE 25 MG: 25 TABLET, FILM COATED ORAL at 14:22

## 2021-10-30 RX ADMIN — CARVEDILOL 12.5 MG: 6.25 TABLET, FILM COATED ORAL at 17:29

## 2021-10-30 ASSESSMENT — PAIN DESCRIPTION - PROGRESSION
CLINICAL_PROGRESSION: NOT CHANGED

## 2021-10-30 ASSESSMENT — PAIN SCALES - GENERAL
PAINLEVEL_OUTOF10: 0

## 2021-10-30 NOTE — PROGRESS NOTES
Re-assessment complete. VSS. No changes at this time. Patient resting comfortably in bed at this time. No other acute care needs at this time.  Call light within reach

## 2021-10-30 NOTE — PROGRESS NOTES
Assessment completed and documented. VSS. Pt on RA, satting mid 90s- covid test came negative. meds given per [facility]. Urostomy bag in place. Pt denies pain or needs. Call light within reach, will continue to monitor.

## 2021-10-30 NOTE — PROGRESS NOTES
Shift assessment complete. VSS. Patient remains on RA. Pt alert and oriented. Medications administered per MAR. Urostomy bag checked and drained. Patient states no other acute care needs at this time. Fall precautions in place.  Call light within reach

## 2021-10-30 NOTE — CONSULTS
[facility] Consult Note  Inpatient Setting - [facility]    Provider: Madeline Araujo MD MD Patient ID:  Admission Date: 10/28/2021 Name: Saige Valverde  OR Date: n/a MRN: [id number]   Patient Location: [id number] : 1940  Attending: Hattie Jaimes DO Date of Service: 10/30/2021  PCP: Apolinar Valentine MD     Diagnoses:  1. CATARINA (acute kidney injury) ([facility]    2. Acute pulmonary edema (HCC)    3. Acute respiratory failure with hypoxemia (HCC)    4. Bladder cancer    Assessment/Plan:  Pain is resolved. Stoma appears unremarkable on physical examination. Urine output seems appropriate with clear yellow urine. No acute urology intervention planned. Monitor hemodynamically. Renal function at the time of her cystectomy was 1.7. Today renal function is 1.5. Hopefully discharge home soon. All the patients questions were answered in detail. She understands the plan as listed above. CC:   Chief Complaint   Patient presents with    Female  Problem     Problems with urostomy bag, no output since 1600, feeling bloated around site       HPI: Saige Valverde is a 78 y.o. female. I saw the patient today for decreased urine output through ostomy, and associated symptoms of abdominal pain, that have been present for days. Symptoms relieved by IV fluids and aggravated by recent activity during a family gathering. She has tried the following treatments: She has a history of cystectomy with ileal conduit formation as well as nephrectomy with Dr. Juline Closs. She presents for increased abdominal pain and difficulty passing urine via the stoma.      Past Medical History:   She has a past medical history of Anxiety, Atrial fibrillation ([facility] CAD (coronary artery disease), Cancer ([facility] Cardiac arrest ([facility] (3/27/2016), Carotid artery stenosis, CHF (congestive heart failure) ([facility] Chronic kidney disease, Depression, Diabetes mellitus ([facility] GERD (gastroesophageal reflux disease), Hip pain, chronic, HYPERCHOLESTERAEMIA, Hypertension, Irritable bowel syndrome, Laceration of head (3/25/2016), MI, old, Neuropathy, Osteoarthritis, Presence of combination internal cardiac defibrillator (ICD) and pacemaker, Staph infection, Type 2 diabetes mellitus without complication ([facility] and Urinary incontinence. Past Surgical History:  She has a past surgical history that includes angioplasty; Hysterectomy; bladder suspension; Cystoscopy (5/28/2013); Pacemaker insertion; Kidney removal; Bladder removal; Total hip arthroplasty (Left, 11/6/2019); and Skin cancer excision. Allergies: Allergies   Allergen Reactions    Amlodipine Besylate      Other reaction(s): Unknown    Calcium Channel Blockers      Other reaction(s): Unknown    Citalopram Hydrobromide      Other reaction(s): Unknown    Flagyl [Metronidazole]      Took with Cipro and it made her vomit    Losartan      Muscle aches  Muscle aches      Pneumococcal Vaccine Swelling     Swelling at injection site    Simvastatin      myalgias  ?  Valsartan-Hydrochlorothiazide      Does not remember    Venlafaxine Nausea Only    Hydrocodone-Acetaminophen Other (See Comments)     Makes patient feel weird, dizzy, nauseous, and sleepy. Patient states she is not allergic to this    Nitrofurantoin Nausea And Vomiting and Other (See Comments)     Other reaction(s): Dizziness, GI Upset  Unsure if Macrobid caused it  Unsure if Macrobid caused it         Social History:  She reports that she has never smoked. She has never used smokeless tobacco. She reports that she does not drink alcohol and does not use drugs. Family History:  family history includes Cancer in her father; Diabetes in her mother; Heart Disease in her father.     Medications:   Scheduled Meds:   aspirin  81 mg Oral Daily    hydrALAZINE  25 mg Oral TID    insulin glargine  18 Units SubCUTAneous Nightly    rosuvastatin  10 mg Oral QPM    sodium bicarbonate  650 mg Oral BID    therapeutic multivitamin-minerals  1 tablet Oral Daily    Vitamin D  1,000 Units Oral Daily    carvedilol  12.5 mg Oral BID     heparin (porcine)  5,000 Units SubCUTAneous 3 times per day    insulin lispro  0-12 Units SubCUTAneous TID     insulin lispro  0-6 Units SubCUTAneous Nightly     Continuous Infusions:   sodium bicarbonate infusion 75 mL/hr at 10/30/21 0934    dextrose       PRN Meds:acetaminophen, ALPRAZolam, fluticasone, ondansetron, ALPRAZolam, hydrALAZINE, sodium chloride, glucose, dextrose, glucagon (rDNA), dextrose    Review of Systems:  Constitutional: Negative for fever    Genitourinary: see HPI  Eyes: negative for sudden change in vision  EENT: no complaints  Cardiovascular: Negative for chest pain  Respiratory: Negative for shortness of breath  Gastrointestinal: Negative for nausea  Musculoskeletal: Negative for back pain   Neurological: Negative for weakness  Psychiatric: Negative for anxiety  Integumentary: Negative for rashes or adenopathy     Physical Exam:  Vitals:    10/30/21 1422   BP: 103/78   Pulse:    Resp:    Temp:    SpO2:      Constitutional: NAD, well-developed, well-nourished. HEENT: MMM. Hearing intact. PERRL  Neck: no thyroid masses appreciated. Trachea is midline. Neck appears unremarkable   Lymph: no palpable adenopathy in supraclavicular, or axillary lymph nodes  Cardiovascular: Regular rate. No peripheral edema  Respiratory: Respirations are even and non-labored. No audible breath sounds. Genitourinary: not done  Abdomen: Soft. No distension, tenderness, hernias, masses or guarding. No CVA tenderness. No hernias appreciated. Liver and spleen appear normal.  Healthy pink appearing urostomy in the right lower quadrant. No palpable tenderness surrounding the stoma. Psychiatric: A + O x 3, normal affect. Insight appears intact. Muskuloskeletal: AUTUMN x 4   Skin: Pink, warm and dry. No rashes on face and arms.     Labs:  Lab Results   Component Value Date    WBC 7.5 10/30/2021    HGB 10.7 (L) 10/30/2021    HCT 32.5 (L) 10/30/2021    MCV 91.3 10/30/2021     10/30/2021     Lab Results   Component Value Date    CREATININE 1.5 (H) 10/30/2021    BUN 30 (H) 10/30/2021     10/30/2021    K 3.9 10/30/2021     10/30/2021    CO2 25 10/30/2021       Imaging:   Impression   Improved/resolving hydronephrosis on left with minimal left perinephric fat   stranding identified.  Please correlate urinalysis findings.       Improved appearance of the parastomal hernia identified on the right   containing omental fat.  The previously noted hernia loop of bowel has   resolved.       Cholelithiasis.       Colonic diverticulosis             Robb Pompa MD, MD  10/30/2021

## 2021-10-30 NOTE — PLAN OF CARE
Problem: Pain:  Goal: Pain level will decrease  Description: Pain level will decrease  10/30/2021 1023 by Debbie Bardales RN  Outcome: Ongoing  10/30/2021 0304 by Jeremiah Delatorre RN  Outcome: Ongoing     Problem: Urinary Elimination:  Goal: Complications related to the disease process, condition or treatment will be avoided or minimized  10/30/2021 1023 by Debbie Bardales RN  Outcome: Ongoing  10/30/2021 0304 by Jeremiah Delatorre RN  Outcome: Ongoing     Problem:  Activity:  Goal: Ability to tolerate increased activity will improve  Description: Ability to tolerate increased activity will improve  10/30/2021 1023 by Debbie Bardales RN  Outcome: Ongoing  10/30/2021 0304 by Jeremiah Delatorre RN  Outcome: Ongoing     Problem: Discharge Planning:  Goal: Discharged to appropriate level of care  Description: Discharged to appropriate level of care  10/30/2021 1023 by Debbie Bardales RN  Outcome: Ongoing  10/30/2021 0304 by Jeremiah Delatorre RN  Outcome: Ongoing     Problem: Coping:  Goal: Ability to identify and develop effective coping behavior will improve  Description: Ability to identify and develop effective coping behavior will improve  10/30/2021 1023 by Debbie Bardales RN  Outcome: Ongoing  10/30/2021 0304 by Jeremiah Delatorre RN  Outcome: Ongoing     Problem: Falls - Risk of:  Goal: Will remain free from falls  Description: Will remain free from falls  10/30/2021 1023 by Debbie Bardales RN  Outcome: Ongoing  10/30/2021 0304 by Jeremiah Delatorre RN  Outcome: Ongoing

## 2021-10-30 NOTE — PROGRESS NOTES
Patient called out to use restroom. Patient became very angry about having bed alarm on and not being able to get up on her own. Allowed patient to get out of bed with RN bedside her. Patient was unsteady and took time to gather balance. Patient ambulated to bathroom and back to bed with RN assistance. RN explained to patient the need for safety precautions while in the hospital. Patient remains angry about fall precautions being used. Patient resting in bed and bed alarm turned back on.  Call light within reach

## 2021-10-30 NOTE — PROGRESS NOTES
Re-assessment complete. VSS. Patient resting comfortably in bed at this time. Fall precautions in place.  Call light within reach

## 2021-10-30 NOTE — PLAN OF CARE
Problem: Urinary Elimination:  Goal: Complications related to the disease process, condition or treatment will be avoided or minimized  10/30/2021 0304 by Macario Mcknight RN  Outcome: Ongoing  10/29/2021 1841 by Amrik Emmanuel RN  Outcome: Ongoing     Problem: Activity:  Goal: Capacity to carry out activities will improve  Description: Capacity to carry out activities will improve  10/30/2021 0304 by Macario Mcknight RN  Outcome: Ongoing  10/29/2021 1841 by Amrik Emmanuel RN  Outcome: Ongoing     Problem: Activity:  Goal: Ability to tolerate increased activity will improve  Description: Ability to tolerate increased activity will improve  10/30/2021 0304 by Macario Mcknight RN  Outcome: Ongoing  10/29/2021 1841 by Amrik Emmanuel RN  Outcome: Ongoing     Problem:  Activity:  Goal: Energy level will increase  Description: Energy level will increase  10/30/2021 0304 by Macario Mcknight RN  Outcome: Ongoing  10/29/2021 1841 by Amrik Emmanuel RN  Outcome: Ongoing     Problem: Coping:  Goal: Ability to identify and develop effective coping behavior will improve  Description: Ability to identify and develop effective coping behavior will improve  10/30/2021 0304 by Macario Mcknight RN  Outcome: Ongoing  10/29/2021 1841 by Amrik Emmanuel RN  Outcome: Ongoing     Problem: Falls - Risk of:  Goal: Will remain free from falls  Description: Will remain free from falls  Outcome: Ongoing

## 2021-10-30 NOTE — PROGRESS NOTES
Progress Note - Dr. Lea Jackson - Internal Medicine  PCP: Cortney Anderson MD [address] [facility] [place] / Liza [address] [phone]    Hospital Day: 2  Code Status: Full Code  Current Diet: ADULT DIET; Regular; 4 carb choices (60 gm/meal)        CC: follow up on medical issues    Subjective:   Porter Luna is a 78 y.o. female. Pt seen and examined  Chart reviewed since last visit, labs and imaging below      Patient doing okay. Covid test is negative, patient is taken out of droplet plus isolation. Partial urine culture results are reviewed, positive for Enterobacter. Patient remains on empiric IV antibiotics    No other issues noted. She denies chest pain, denies shortness of breath, denies nausea,  denies emesis. 10 system Review of Systems is reviewed with patient, and pertinent positives are noted in HPI above . Otherwise, Review of systems is negative. I have reviewed the patient's medical and social history in detail and updated the computerized patient record. To recap: She  has a past medical history of Anxiety, Atrial fibrillation ([address] CAD (coronary artery disease), Cancer ([address] Cardiac arrest ([address] Carotid artery stenosis, CHF (congestive heart failure) ([address] Chronic kidney disease, Depression, Diabetes mellitus ([address] GERD (gastroesophageal reflux disease), Hip pain, chronic, HYPERCHOLESTERAEMIA, Hypertension, Irritable bowel syndrome, Laceration of head, MI, old, Neuropathy, Osteoarthritis, Presence of combination internal cardiac defibrillator (ICD) and pacemaker, Staph infection, Type 2 diabetes mellitus without complication ([address] and Urinary incontinence. . She  has a past surgical history that includes angioplasty; Hysterectomy; bladder suspension; Cystoscopy (5/28/2013); Pacemaker insertion; Kidney removal; Bladder removal; Total hip arthroplasty (Left, 11/6/2019); and Skin cancer excision. . She  reports that she has never smoked.  She has never used smokeless tobacco. She reports that she does not drink alcohol and does not use drugs. .        Active Hospital Problems    Diagnosis Date Noted    Suspected COVID-19 virus infection [Z20.822] 10/29/2021    [facility] renal failure [facility]ARF) [facility] [N17.9] 10/28/2021    Hydroureter on left [N13.4] 10/14/2021    Mixed anxiety depressive disorder [F41.8] 09/12/2017    Acute-on-chronic renal failure ([facility] [N17.9, N18.9] 08/11/2017    Hydroureteronephrosis [N13.30] 06/07/2017    DM2 (diabetes mellitus, type 2) ([facility] [E11.9] 04/27/2017    UTI (urinary tract infection) [N39.0] 04/27/2017    Ischemic cardiomyopathy [I25.5]     Essential hypertension [I10]     CATARINA (acute kidney injury) ([facility] [N17.9]     Cardiomyopathy ([facility] [I42.9]     Anemia, normocytic normochromic [D64.9] 05/02/2014    Chronic coronary artery disease [I25.10] 02/13/2013    Hyperlipidemia [E78.5] 08/09/2012    Gastro-esophageal reflux disease without esophagitis [K21.9] 06/30/2008       Current Facility-Administered Medications: acetaminophen (TYLENOL) tablet 1,000 mg, 1,000 mg, Oral, Daily PRN  ALPRAZolam (XANAX) tablet 0.25 mg, 0.25 mg, Oral, Nightly PRN  aspirin EC tablet 81 mg, 81 mg, Oral, Daily  fluticasone (FLONASE) 50 MCG/ACT nasal spray 1 spray, 1 spray, Nasal, PRN  hydrALAZINE (APRESOLINE) tablet 25 mg, 25 mg, Oral, TID  insulin glargine (LANTUS;BASAGLAR) injection pen 18 Units, 18 Units, SubCUTAneous, Nightly  ondansetron (ZOFRAN-ODT) disintegrating tablet 4 mg, 4 mg, Oral, Q12H PRN  rosuvastatin (CRESTOR) tablet 10 mg, 10 mg, Oral, QPM  sodium bicarbonate tablet 650 mg, 650 mg, Oral, BID  therapeutic multivitamin-minerals 1 tablet, 1 tablet, Oral, Daily  vitamin D (CHOLECALCIFEROL) tablet 1,000 Units, 1,000 Units, Oral, Daily  carvedilol (COREG) tablet 12.5 mg, 12.5 mg, Oral, BID WC  heparin (porcine) injection 5,000 Units, 5,000 Units, SubCUTAneous, 3 times per day  ALPRAZolam (XANAX) tablet 0.5 mg, 0.5 mg, Oral, BID PRN  sodium bicarbonate 75 mEq in sodium chloride 0.45 % 1,000 mL infusion, , IntraVENous, Continuous  hydrALAZINE (APRESOLINE) injection 10 mg, 10 mg, IntraVENous, Q6H PRN  0.9 % sodium chloride bolus, 500 mL, IntraVENous, PRN  insulin lispro (1 Unit Dial) 0-12 Units, 0-12 Units, SubCUTAneous, TID WC  insulin lispro (1 Unit Dial) 0-6 Units, 0-6 Units, SubCUTAneous, Nightly  glucose (GLUTOSE) 40 % oral gel 15 g, 15 g, Oral, PRN  dextrose 50 % IV solution, 12.5 g, IntraVENous, PRN  glucagon (rDNA) injection 1 mg, 1 mg, IntraMUSCular, PRN  dextrose 5 % solution, 100 mL/hr, IntraVENous, PRN         Objective:  /78   Pulse 74   Temp 97.8 °F (36.6 °C) (Temporal)   Resp 14   Ht 5' 8\" (1.727 m)   Wt 152 lb 8 oz (69.2 kg)   SpO2 93%   BMI 23.19 kg/m²      Patient Vitals for the past 24 hrs:   BP Temp Temp src Pulse Resp SpO2 Weight   10/30/21 0800 109/78 97.8 °F (36.6 °C) Temporal 74 14 93 % --   10/30/21 0400 133/61 98.1 °F (36.7 °C) Temporal 91 27 94 % 152 lb 8 oz (69.2 kg)   10/30/21 0000 138/74 98.4 °F (36.9 °C) Temporal 84 19 94 % --   10/29/21 1949 -- -- -- 73 -- -- --   10/29/21 1945 (!) 128/55 98.1 °F (36.7 °C) Temporal 79 17 93 % --   10/29/21 1900 (!) 151/100 -- -- -- -- -- --   10/29/21 1600 106/82 97.1 °F (36.2 °C) Temporal 83 27 94 % --   10/29/21 1500 -- -- -- 85 14 95 % --   10/29/21 1400 -- -- -- 92 21 93 % --   10/29/21 1200 124/64 97.8 °F (36.6 °C) Temporal 74 22 96 % --     Patient Vitals for the past 96 hrs (Last 3 readings):   Weight   10/30/21 0400 152 lb 8 oz (69.2 kg)   10/28/21 1852 144 lb (65.3 kg)           Intake/Output Summary (Last 24 hours) at 10/30/2021 1145  Last data filed at 10/30/2021 0947  Gross per 24 hour   Intake 1567.05 ml   Output 1950 ml   Net -382.95 ml         Physical Exam:   Vitals as above  General appearance: alert, appears stated age and cooperative    Head: Normocephalic, without obvious abnormality, atraumatic    Lungs: clear to auscultation bilaterally    Heart: regular rate and rhythm, S1, S2 normal, no murmur    Abdomen: soft, non-tender; bowel sounds normal; no masses, no organomegaly    Extremities: extremities normal, atraumatic, no cyanosis, no edema      Labs:  Lab Results   Component Value Date    WBC 7.5 10/30/2021    HGB 10.7 (L) 10/30/2021    HCT 32.5 (L) 10/30/2021     10/30/2021    CHOL 152 07/30/2020    TRIG 163 (H) 07/30/2020    HDL 43 03/03/2021    LDLDIRECT 148 (H) 07/08/2010    ALT <5 (L) 10/28/2021    AST 15 10/28/2021     10/30/2021    K 3.9 10/30/2021     10/30/2021    CREATININE 1.5 (H) 10/30/2021    BUN 30 (H) 10/30/2021    CO2 25 10/30/2021    TSH 0.60 11/22/2017    INR 0.97 01/15/2020    LABA1C 7.0 07/30/2020    LABMICR YES 10/28/2021     Lab Results   Component Value Date    CKMB <0.2 06/06/2010    TROPONINI <0.01 10/28/2021       Recent Imaging Results are Reviewed:  CT ABDOMEN PELVIS WO CONTRAST Additional Contrast? None    Result Date: 10/28/2021  EXAMINATION: CT OF THE ABDOMEN AND PELVIS WITHOUT CONTRAST 10/28/2021 7:27 pm TECHNIQUE: CT of the abdomen and pelvis was performed without the administration of intravenous contrast. Multiplanar reformatted images are provided for review. Dose modulation, iterative reconstruction, and/or weight based adjustment of the mA/kV was utilized to reduce the radiation dose to as low as reasonably achievable. COMPARISON: 10/13/2021 HISTORY: ORDERING SYSTEM PROVIDED HISTORY: urostomy not draining TECHNOLOGIST PROVIDED HISTORY: Reason for exam:->urostomy not draining Additional Contrast?->None Decision Support Exception - unselect if not a suspected or confirmed emergency medical condition->Emergency Medical Condition (MA) Reason for Exam: Female  Problem (Problems with urostomy bag, no output since 1600, feeling bloated around site) FINDINGS: Lower Chest: Coronary artery disease. Small effusions. Interstitial thickening and nodular identified both lung bases appears stable. Organs: Cholelithiasis.   Right kidney is absent. Mild left perinephric fat stranding. Mild left-sided hydronephrosis. Inflammation along the ileal conduit on the right lower quadrant appears improved since the previous examination. The the ureter appears less dilated than on the prior exam. Stable small for parastomal hernia identified containing fat. The hernia loop of bowel a prior examination appears resolved. Otherwise the noncontrast of the liver, spleen, and pancreas unremarkable. GI/Bowel: Moderate retained stool. Posterior change involving bowel loops. No bowel obstruction. Colonic diverticulosis. Pelvis: Previous cystectomy. Uterus absent. No suspicious adnexal mass. Posterior changes left hip. Peritoneum/Retroperitoneum: No free air or free fluid. Aortic vascular calcification. Bones/Soft Tissues: Multilevel degenerate change. No suspicious osseous lesion. Posterior changes left hip. Improved/resolving hydronephrosis on left with minimal left perinephric fat stranding identified. Please correlate urinalysis findings. Improved appearance of the parastomal hernia identified on the right containing omental fat. The previously noted hernia loop of bowel has resolved. Cholelithiasis. Colonic diverticulosis     CT ABDOMEN PELVIS WO CONTRAST Additional Contrast? None    Result Date: 10/13/2021  EXAMINATION: CT OF THE ABDOMEN AND PELVIS WITHOUT CONTRAST 10/13/2021 9:38 pm TECHNIQUE: CT of the abdomen and pelvis was performed without the administration of intravenous contrast. Multiplanar reformatted images are provided for review. Dose modulation, iterative reconstruction, and/or weight based adjustment of the mA/kV was utilized to reduce the radiation dose to as low as reasonably achievable. COMPARISON: CT chest 08/17/2021 and is 07/11/2020 HISTORY: ORDERING SYSTEM PROVIDED HISTORY: left sided abd pain TECHNOLOGIST PROVIDED HISTORY: Reason for exam:->left sided abd pain Additional Contrast?->None Reason for Exam: Left sided abd pain. Flank Pain (pt brought in by [facility] c/o sudden onset of left flank pain radiating into left side of abd.  pt has hx of kidney stones). Acuity: Acute Type of Exam: Initial FINDINGS: Lower Chest: Cardiomegaly. Pacemaker leads. Mild interstitial edema identified involving lung bases. Minimal hypoventilatory change. Organs: Evaluation challenge by lack contrast. Layering gallstones within the gallbladder. Otherwise, the liver, spleen, adrenal glands, pancreas, and gallbladder unremarkable. Prior right nephrectomy. Severe hydroureteronephrosis involving left kidney. Left perinephric fat stranding. Layering calcific density within the region the iliac conduit may represent stones versus calcified suture material..  There is mild wall thickening along the iliac conduit. Urostomy is slightly lateralized likely due to mass effect from a parastomal hernia containing omental fat and a small knuckle of bowel. The hernia sac containing bowel is new from the prior exams. GI/Bowel: Otherwise no evidence of bowel obstruction. Moderate retained stool rectosigmoid junction may represent fecal impaction versus constipation. Colonic diverticulosis. Appendix not visualized. Pelvis: Bladder is absent. No suspicious adnexal mass. Peritoneum/Retroperitoneum: No free air or free fluid. Small fat containing umbilical hernia. Aortic vascular calcifications. Bones/Soft Tissues: Multilevel degenerate changes of the lumbar spine. Moderate severe left-sided hydroureteronephrosis. There is wall thickening in surrounding inflammatory changes along the neobladder/ileal conduit and involving the left perinephric fat planes. This may be related to underlying infection versus outlet obstruction caused by a new herniated bowel loop through the site of urostomy. Please correlate urinalysis findings. Urologic consultation may be beneficial. Colonic diverticulosis.      FL LOOPOGRAM    Result Date: 10/18/2021  EXAMINATION: LOOPOGRAM 10/18/2021 2:14 pm COMPARISON: CT abdomen and pelvis October 13, 2021 HISTORY: ORDERING SYSTEM PROVIDED HISTORY: Ileal conduit malfunction. Possible peristomal hernia TECHNOLOGIST PROVIDED HISTORY: Reason for exam:->Ileal conduit malfunction. Possible peristomal hernia Reason for Exam: Ileal conduit malfunction. Possible peristomal hernia Acuity: Acute Type of Exam: Initial FINDINGS: There is persistent compression upon the ileo conduit outlet from the peristomal hernia demonstrated on the the recent CT. Compression demonstrated on the recent CT within the lateral sac, demonstrated on this examination on multiple cine loop images, well demonstrated on the last cine loop images, series 8. Distention of the ileal pouch is present but slightly decreased. There is persistent moderate hydronephrosis, which appears slightly decreased. The patient could not tolerate further imaging and there was spillage of contrast, despite maximum pressure achievable with retrograde installation of the contrast.     There is persistent compression upon the ileal conduit outlet within the hernia sac due to the known peristomal hernia. The degree of obstruction appears slightly decreased but incompletely visualized, the entirety of the ureter could not be visualized due to technical factors, and the degree of hydronephrosis could not be assessed. Recommend noncontrast CT of the abdomen and pelvis for comparison to the examination from 5 days ago, and as an adjunct to this examination.      XR CHEST PORTABLE    Result Date: 10/29/2021  EXAMINATION: ONE XRAY VIEW OF THE CHEST 10/29/2021 2:04 am COMPARISON: 08/17/2021 HISTORY: ORDERING SYSTEM PROVIDED HISTORY: SOB TECHNOLOGIST PROVIDED HISTORY: Reason for exam:->SOB Reason for Exam: shortness of breath Acuity: Acute Type of Exam: Initial Relevant Medical/Surgical History: previous A-FIB,CAD,CHF,cancer,hypertension,MI FINDINGS: There are poorly defined opacities throughout both lungs, right greater than left. The heart size is mildly enlarged. Left chest single lead AICD is again seen. Calcified mediastinal lymph nodes are again noted. There are probably small bilateral pleural effusions. There is no discernible pneumothorax. Bilateral pneumonia versus pulmonary edema. The possibility of COVID related pneumonia should be considered. Lab Results   Component Value Date    GLUCOSE 60 10/30/2021     Lab Results   Component Value Date    POCGLU 207 10/30/2021     /78   Pulse 74   Temp 97.8 °F (36.6 °C) (Temporal)   Resp 14   Ht 5' 8\" (1.727 m)   Wt 152 lb 8 oz (69.2 kg)   SpO2 93%   BMI 23.19 kg/m²     Assessment and Plan: Active Problems:    CATARINA (acute kidney injury) ([address] -Established problem. Stable. Creatinine 1.5 today  Plan: Continue fluids, continue treating UTI    Essential hypertension-Established problem. Stable. 109/78  Plan: Continue same medications    UTI (urinary tract infection)-Established problem. Stable. Enterobacter on cultures  Plan: Continue empiric antibiotics, await sensitivities on culture    DM2 (diabetes mellitus, type 2) (HCC)-Established problem. Stable. Fingerstick blood sugars 207  Plan: Continue controlled carb diet, sliding scale insulin, home medications    Acute-on-chronic renal failure (HCC)    Mixed anxiety depressive disorder    Gastro-esophageal reflux disease without esophagitis    Anemia, normocytic normochromic-Established problem. Stable. Hemoglobin 10.7  Plan: No indication for transfusion. Cont to monitor h/h to assess progression of anemia. Recommend ferrous sulfate or MVI as outpatient. Hydroureter on left  Plan: Urology consult pending    Suspected COVID-19 virus infection-Established problem, now resolved.    Plan: Taken out of droplet plus isolation as swab result is negative      (Please note that portions of this note were completed with a voice recognition program.  Efforts were made to edit the dictations but occasionally words are mis-transcribed.)        Guzman Camejo MD  10/30/2021

## 2021-10-31 LAB
ANION GAP SERPL CALCULATED.3IONS-SCNC: 9 MMOL/L (ref 3–16)
BASOPHILS ABSOLUTE: 0 K/UL (ref 0–0.2)
BASOPHILS RELATIVE PERCENT: 0.8 %
BUN BLDV-MCNC: 29 MG/DL (ref 7–20)
CALCIUM SERPL-MCNC: 9 MG/DL (ref 8.3–10.6)
CHLORIDE BLD-SCNC: 107 MMOL/L (ref 99–110)
CO2: 26 MMOL/L (ref 21–32)
CREAT SERPL-MCNC: 1.5 MG/DL (ref 0.6–1.2)
EOSINOPHILS ABSOLUTE: 0.1 K/UL (ref 0–0.6)
EOSINOPHILS RELATIVE PERCENT: 2.2 %
ESTIMATED AVERAGE GLUCOSE: 162.8 MG/DL
GFR AFRICAN AMERICAN: 40
GFR NON-AFRICAN AMERICAN: 33
GLUCOSE BLD-MCNC: 140 MG/DL (ref 70–99)
GLUCOSE BLD-MCNC: 178 MG/DL (ref 70–99)
GLUCOSE BLD-MCNC: 231 MG/DL (ref 70–99)
GLUCOSE BLD-MCNC: 53 MG/DL (ref 70–99)
GLUCOSE BLD-MCNC: 67 MG/DL (ref 70–99)
GLUCOSE BLD-MCNC: 71 MG/DL (ref 70–99)
HBA1C MFR BLD: 7.3 %
HCT VFR BLD CALC: 28.6 % (ref 36–48)
HEMOGLOBIN: 9.6 G/DL (ref 12–16)
LYMPHOCYTES ABSOLUTE: 1.1 K/UL (ref 1–5.1)
LYMPHOCYTES RELATIVE PERCENT: 22.3 %
MCH RBC QN AUTO: 30.1 PG (ref 26–34)
MCHC RBC AUTO-ENTMCNC: 33.6 G/DL (ref 31–36)
MCV RBC AUTO: 89.6 FL (ref 80–100)
MONOCYTES ABSOLUTE: 0.7 K/UL (ref 0–1.3)
MONOCYTES RELATIVE PERCENT: 13.6 %
NEUTROPHILS ABSOLUTE: 2.9 K/UL (ref 1.7–7.7)
NEUTROPHILS RELATIVE PERCENT: 61.1 %
ORGANISM: ABNORMAL
PDW BLD-RTO: 14.9 % (ref 12.4–15.4)
PERFORMED ON: ABNORMAL
PERFORMED ON: NORMAL
PLATELET # BLD: 225 K/UL (ref 135–450)
PMV BLD AUTO: 7.7 FL (ref 5–10.5)
POTASSIUM SERPL-SCNC: 3.7 MMOL/L (ref 3.5–5.1)
RBC # BLD: 3.19 M/UL (ref 4–5.2)
SODIUM BLD-SCNC: 142 MMOL/L (ref 136–145)
URINE CULTURE, ROUTINE: ABNORMAL
WBC # BLD: 4.8 K/UL (ref 4–11)

## 2021-10-31 PROCEDURE — 6370000000 HC RX 637 (ALT 250 FOR IP): Performed by: INTERNAL MEDICINE

## 2021-10-31 PROCEDURE — 6370000000 HC RX 637 (ALT 250 FOR IP): Performed by: EMERGENCY MEDICINE

## 2021-10-31 PROCEDURE — 6360000002 HC RX W HCPCS: Performed by: INTERNAL MEDICINE

## 2021-10-31 PROCEDURE — 85025 COMPLETE CBC W/AUTO DIFF WBC: CPT

## 2021-10-31 PROCEDURE — 80048 BASIC METABOLIC PNL TOTAL CA: CPT

## 2021-10-31 PROCEDURE — 36415 COLL VENOUS BLD VENIPUNCTURE: CPT

## 2021-10-31 PROCEDURE — 1200000000 HC SEMI PRIVATE

## 2021-10-31 PROCEDURE — 2580000003 HC RX 258: Performed by: INTERNAL MEDICINE

## 2021-10-31 RX ADMIN — ASPIRIN 81 MG: 81 TABLET, COATED ORAL at 07:58

## 2021-10-31 RX ADMIN — CARVEDILOL 12.5 MG: 6.25 TABLET, FILM COATED ORAL at 07:58

## 2021-10-31 RX ADMIN — ROSUVASTATIN CALCIUM 10 MG: 10 TABLET, FILM COATED ORAL at 17:16

## 2021-10-31 RX ADMIN — ALPRAZOLAM 0.5 MG: 0.5 TABLET ORAL at 20:29

## 2021-10-31 RX ADMIN — MULTIPLE VITAMINS W/ MINERALS TAB 1 TABLET: TAB at 07:58

## 2021-10-31 RX ADMIN — SODIUM BICARBONATE 650 MG: 650 TABLET ORAL at 22:01

## 2021-10-31 RX ADMIN — Medication 1000 UNITS: at 07:58

## 2021-10-31 RX ADMIN — HEPARIN SODIUM 5000 UNITS: 5000 INJECTION INTRAVENOUS; SUBCUTANEOUS at 22:01

## 2021-10-31 RX ADMIN — HEPARIN SODIUM 5000 UNITS: 5000 INJECTION INTRAVENOUS; SUBCUTANEOUS at 14:04

## 2021-10-31 RX ADMIN — SODIUM BICARBONATE 650 MG: 650 TABLET ORAL at 07:58

## 2021-10-31 RX ADMIN — HYDRALAZINE HYDROCHLORIDE 25 MG: 25 TABLET, FILM COATED ORAL at 22:01

## 2021-10-31 RX ADMIN — MEROPENEM 1000 MG: 1 INJECTION, POWDER, FOR SOLUTION INTRAVENOUS at 14:09

## 2021-10-31 RX ADMIN — CARVEDILOL 12.5 MG: 6.25 TABLET, FILM COATED ORAL at 17:16

## 2021-10-31 RX ADMIN — ACETAMINOPHEN 1000 MG: 500 TABLET, FILM COATED ORAL at 08:08

## 2021-10-31 RX ADMIN — INSULIN LISPRO 2 UNITS: 100 INJECTION, SOLUTION INTRAVENOUS; SUBCUTANEOUS at 17:17

## 2021-10-31 RX ADMIN — INSULIN GLARGINE 18 UNITS: 100 INJECTION, SOLUTION SUBCUTANEOUS at 22:08

## 2021-10-31 RX ADMIN — INSULIN LISPRO 4 UNITS: 100 INJECTION, SOLUTION INTRAVENOUS; SUBCUTANEOUS at 11:34

## 2021-10-31 RX ADMIN — HYDRALAZINE HYDROCHLORIDE 25 MG: 25 TABLET, FILM COATED ORAL at 14:04

## 2021-10-31 RX ADMIN — HYDRALAZINE HYDROCHLORIDE 25 MG: 25 TABLET, FILM COATED ORAL at 07:58

## 2021-10-31 RX ADMIN — HEPARIN SODIUM 5000 UNITS: 5000 INJECTION INTRAVENOUS; SUBCUTANEOUS at 05:34

## 2021-10-31 ASSESSMENT — PAIN SCALES - GENERAL
PAINLEVEL_OUTOF10: 6
PAINLEVEL_OUTOF10: 0
PAINLEVEL_OUTOF10: 2
PAINLEVEL_OUTOF10: 6
PAINLEVEL_OUTOF10: 0

## 2021-10-31 NOTE — PROGRESS NOTES
Progress Note - Dr. Kate Elizalde - Internal Medicine  PCP: Sachin Ramos MD [address] [facility] [phone]    Hospital Day: 3  Code Status: Full Code  Current Diet: ADULT DIET; Regular; 4 carb choices (60 gm/meal)        CC: follow up on medical issues    Subjective:   Angelica Mayes is a 78 y.o. female. Pt seen and examined  Chart reviewed since last visit, labs and imaging below      Patient doing okay. Covid test is negative, patient is taken out of droplet plus isolation 10/30    Susceptibility    Enterobacter cloacae complex (1)    Antibiotic Interpretation OTILIO Status    amoxicillin-clavulanate Resistant >16/8 mcg/mL     ampicillin Resistant >16 mcg/mL     ceFAZolin Resistant >16 mcg/mL     cefepime Intermediate 4 mcg/mL     cefTRIAXone Resistant >32 mcg/mL     cefuroxime Resistant >16 mcg/mL     ciprofloxacin Sensitive <=1 mcg/mL     ertapenem Resistant >1 mcg/mL     gentamicin Sensitive <=4 mcg/mL     meropenem Sensitive <=1 mcg/mL     nitrofurantoin Sensitive <=32 mcg/mL     piperacillin-tazobactam Resistant >64 mcg/mL     trimethoprim-sulfamethoxazole          Patient has been placed on Merrem based on these culture results. No other issues noted. She denies chest pain, denies shortness of breath, denies nausea,  denies emesis. 10 system Review of Systems is reviewed with patient, and pertinent positives are noted in HPI above . Otherwise, Review of systems is negative. I have reviewed the patient's medical and social history in detail and updated the computerized patient record.   To recap: She  has a past medical history of Anxiety, Atrial fibrillation ([address] CAD (coronary artery disease), Cancer ([facility] Cardiac arrest ([facility] [address] Carotid artery stenosis, CHF (congestive heart failure) ([facility] [address] Chronic kidney disease, Depression, Diabetes mellitus ([address] GERD (gastroesophageal reflux disease), Hip pain, chronic, HYPERCHOLESTERAEMIA, Hypertension, Irritable bowel syndrome, Laceration of head, MI, old, Neuropathy, Osteoarthritis, Presence of combination internal cardiac defibrillator (ICD) and pacemaker, Staph infection, Type 2 diabetes mellitus without complication ([facility] [address] and Urinary incontinence. . She  has a past surgical history that includes angioplasty; Hysterectomy; bladder suspension; Cystoscopy (5/28/2013); Pacemaker insertion; Kidney removal; Bladder removal; Total hip arthroplasty (Left, 11/6/2019); and Skin cancer excision. . She  reports that she has never smoked. She has never used smokeless tobacco. She reports that she does not drink alcohol and does not use drugs. .        Active Hospital Problems    Diagnosis Date Noted    Suspected COVID-19 virus infection [Z20.822] 10/29/2021    [facility] [N17.9] 10/28/2021    Hydroureter on left [N13.4] 10/14/2021    Mixed anxiety depressive disorder [F41.8] 09/12/2017    Acute-on-chronic renal failure [facility] [N17.9, N18.9] 08/11/2017    Hydroureteronephrosis [N13.30] 06/07/2017    DM2 (diabetes mellitus, type 2) ([facility] [E11.9] 04/27/2017    UTI (urinary tract infection) [N39.0] 04/27/2017    Ischemic cardiomyopathy [I25.5]     Essential hypertension [I10]     CATARINA (acute kidney injury) ([facility] [N17.9]     Cardiomyopathy [facility] [I42.9]     Anemia, normocytic normochromic [D64.9] 05/02/2014    Chronic coronary artery disease [I25.10] 02/13/2013    Hyperlipidemia [E78.5] 08/09/2012    Gastro-esophageal reflux disease without esophagitis [K21.9] 06/30/2008       Current Facility-Administered Medications: acetaminophen (TYLENOL) tablet 1,000 mg, 1,000 mg, Oral, Daily PRN  ALPRAZolam (XANAX) tablet 0.25 mg, 0.25 mg, Oral, Nightly PRN  aspirin EC tablet 81 mg, 81 mg, Oral, Daily  fluticasone (FLONASE) 50 MCG/ACT nasal spray 1 spray, 1 spray, Nasal, PRN  hydrALAZINE (APRESOLINE) tablet 25 mg, 25 mg, Oral, TID  insulin glargine (LANTUS;BASAGLAR) injection pen 18 Units, 18 Units, SubCUTAneous, kg)   10/28/21 1852 144 lb (65.3 kg)           Intake/Output Summary (Last 24 hours) at 10/31/2021 1144  Last data filed at 10/31/2021 0530  Gross per 24 hour   Intake 2875 ml   Output 1475 ml   Net 1400 ml         Physical Exam:   Vitals as above  General appearance: alert, appears stated age and cooperative    Head: Normocephalic, without obvious abnormality, atraumatic    Lungs: clear to auscultation bilaterally    Heart: regular rate and rhythm, S1, S2 normal, no murmur    Abdomen: soft, non-tender; bowel sounds normal; no masses, no organomegaly    Extremities: extremities normal, atraumatic, no cyanosis, no edema      Labs:  Lab Results   Component Value Date    WBC 4.8 10/31/2021    HGB 9.6 (L) 10/31/2021    HCT 28.6 (L) 10/31/2021     10/31/2021    CHOL 152 07/30/2020    TRIG 163 (H) 07/30/2020    HDL 43 03/03/2021    LDLDIRECT 148 (H) 07/08/2010    ALT <5 (L) 10/28/2021    AST 15 10/28/2021     10/31/2021    K 3.7 10/31/2021     10/31/2021    CREATININE 1.5 (H) 10/31/2021    BUN 29 (H) 10/31/2021    CO2 26 10/31/2021    TSH 0.60 11/22/2017    INR 0.97 01/15/2020    LABA1C 7.0 07/30/2020    LABMICR YES 10/28/2021     Lab Results   Component Value Date    CKMB <0.2 06/06/2010    Marshia Narayan <0.01 10/28/2021       Recent Imaging Results are Reviewed:  CT ABDOMEN PELVIS WO CONTRAST Additional Contrast? None    Result Date: 10/28/2021  EXAMINATION: CT OF THE ABDOMEN AND PELVIS WITHOUT CONTRAST 10/28/2021 7:27 pm TECHNIQUE: CT of the abdomen and pelvis was performed without the administration of intravenous contrast. Multiplanar reformatted images are provided for review. Dose modulation, iterative reconstruction, and/or weight based adjustment of the mA/kV was utilized to reduce the radiation dose to as low as reasonably achievable.  COMPARISON: 10/13/2021 HISTORY: ORDERING SYSTEM PROVIDED HISTORY: urostomy not draining TECHNOLOGIST PROVIDED HISTORY: Reason for exam:->urostomy not draining Additional Contrast?->None Decision Support Exception - unselect if not a suspected or confirmed emergency medical condition->Emergency Medical Condition (MA) Reason for Exam: Female  Problem (Problems with urostomy bag, no output since 1600, feeling bloated around site) FINDINGS: Lower Chest: Coronary artery disease. Small effusions. Interstitial thickening and nodular identified both lung bases appears stable. Organs: Cholelithiasis. Right kidney is absent. Mild left perinephric fat stranding. Mild left-sided hydronephrosis. Inflammation along the ileal conduit on the right lower quadrant appears improved since the previous examination. The the ureter appears less dilated than on the prior exam. Stable small for parastomal hernia identified containing fat. The hernia loop of bowel a prior examination appears resolved. Otherwise the noncontrast of the liver, spleen, and pancreas unremarkable. GI/Bowel: Moderate retained stool. Posterior change involving bowel loops. No bowel obstruction. Colonic diverticulosis. Pelvis: Previous cystectomy. Uterus absent. No suspicious adnexal mass. Posterior changes left hip. Peritoneum/Retroperitoneum: No free air or free fluid. Aortic vascular calcification. Bones/Soft Tissues: Multilevel degenerate change. No suspicious osseous lesion. Posterior changes left hip. Improved/resolving hydronephrosis on left with minimal left perinephric fat stranding identified. Please correlate urinalysis findings. Improved appearance of the parastomal hernia identified on the right containing omental fat. The previously noted hernia loop of bowel has resolved. Cholelithiasis.  Colonic diverticulosis     CT ABDOMEN PELVIS WO CONTRAST Additional Contrast? None    Result Date: 10/13/2021  EXAMINATION: CT OF THE ABDOMEN AND PELVIS WITHOUT CONTRAST 10/13/2021 9:38 pm TECHNIQUE: CT of the abdomen and pelvis was performed without the administration of intravenous contrast. Multiplanar reformatted images are provided for review. Dose modulation, iterative reconstruction, and/or weight based adjustment of the mA/kV was utilized to reduce the radiation dose to as low as reasonably achievable. COMPARISON: CT chest 08/17/2021 and is 07/11/2020 HISTORY: ORDERING SYSTEM PROVIDED HISTORY: left sided abd pain TECHNOLOGIST PROVIDED HISTORY: Reason for exam:->left sided abd pain Additional Contrast?->None Reason for Exam: Left sided abd pain. Flank Pain (pt brought in by [facility] from [facility] c/o sudden onset of left flank pain radiating into left side of abd.  pt has hx of kidney stones). Acuity: Acute Type of Exam: Initial FINDINGS: Lower Chest: Cardiomegaly. Pacemaker leads. Mild interstitial edema identified involving lung bases. Minimal hypoventilatory change. Organs: Evaluation challenge by lack contrast. Layering gallstones within the gallbladder. Otherwise, the liver, spleen, adrenal glands, pancreas, and gallbladder unremarkable. Prior right nephrectomy. Severe hydroureteronephrosis involving left kidney. Left perinephric fat stranding. Layering calcific density within the region the iliac conduit may represent stones versus calcified suture material..  There is mild wall thickening along the iliac conduit. Urostomy is slightly lateralized likely due to mass effect from a parastomal hernia containing omental fat and a small knuckle of bowel. The hernia sac containing bowel is new from the prior exams. GI/Bowel: Otherwise no evidence of bowel obstruction. Moderate retained stool rectosigmoid junction may represent fecal impaction versus constipation. Colonic diverticulosis. Appendix not visualized. Pelvis: Bladder is absent. No suspicious adnexal mass. Peritoneum/Retroperitoneum: No free air or free fluid. Small fat containing umbilical hernia. Aortic vascular calcifications. Bones/Soft Tissues: Multilevel degenerate changes of the lumbar spine.      Moderate severe left-sided hydroureteronephrosis. There is wall thickening in surrounding inflammatory changes along the neobladder/ileal conduit and involving the left perinephric fat planes. This may be related to underlying infection versus outlet obstruction caused by a new herniated bowel loop through the site of urostomy. Please correlate urinalysis findings. Urologic consultation may be beneficial. Colonic diverticulosis. FL LOOPOGRAM    Result Date: 10/18/2021  EXAMINATION: LOOPOGRAM 10/18/2021 2:14 pm COMPARISON: CT abdomen and pelvis October 13, 2021 HISTORY: ORDERING SYSTEM PROVIDED HISTORY: Ileal conduit malfunction. Possible peristomal hernia TECHNOLOGIST PROVIDED HISTORY: Reason for exam:->Ileal conduit malfunction. Possible peristomal hernia Reason for Exam: Ileal conduit malfunction. Possible peristomal hernia Acuity: Acute Type of Exam: Initial FINDINGS: There is persistent compression upon the ileo conduit outlet from the peristomal hernia demonstrated on the the recent CT. Compression demonstrated on the recent CT within the lateral sac, demonstrated on this examination on multiple cine loop images, well demonstrated on the last cine loop images, series 8. Distention of the ileal pouch is present but slightly decreased. There is persistent moderate hydronephrosis, which appears slightly decreased. The patient could not tolerate further imaging and there was spillage of contrast, despite maximum pressure achievable with retrograde installation of the contrast.     There is persistent compression upon the ileal conduit outlet within the hernia sac due to the known peristomal hernia. The degree of obstruction appears slightly decreased but incompletely visualized, the entirety of the ureter could not be visualized due to technical factors, and the degree of hydronephrosis could not be assessed.   Recommend noncontrast CT of the abdomen and pelvis for comparison to the examination from 5 days ago, and as an adjunct to this examination. XR CHEST PORTABLE    Result Date: 10/29/2021  EXAMINATION: ONE XRAY VIEW OF THE CHEST 10/29/2021 2:04 am COMPARISON: 08/17/2021 HISTORY: ORDERING SYSTEM PROVIDED HISTORY: SOB TECHNOLOGIST PROVIDED HISTORY: Reason for exam:->SOB Reason for Exam: shortness of breath Acuity: Acute Type of Exam: Initial Relevant Medical/Surgical History: previous A-FIB,CAD,CHF,cancer,hypertension,MI FINDINGS: There are poorly defined opacities throughout both lungs, right greater than left. The heart size is mildly enlarged. Left chest single lead AICD is again seen. Calcified mediastinal lymph nodes are again noted. There are probably small bilateral pleural effusions. There is no discernible pneumothorax. Bilateral pneumonia versus pulmonary edema. The possibility of COVID related pneumonia should be considered. Lab Results   Component Value Date    GLUCOSE 53 10/31/2021     Lab Results   Component Value Date    POCGLU 67 10/31/2021     /64   Pulse 83   Temp 98.7 °F (37.1 °C) (Temporal)   Resp 16   Ht 5' 8\" (1.727 m)   Wt 151 lb (68.5 kg)   SpO2 92%   BMI 22.96 kg/m²     Assessment and Plan: Active Problems:    CATARINA (acute kidney injury) ([address] -Established problem. Stable. Creatinine 1.5 today  Plan: Continue fluids, continue treating UTI    Essential hypertension-Established problem. Stable. 117/64  Plan: Continue same medications    UTI (urinary tract infection)-Established problem. Stable. Enterobacter on cultures, multidrug resistant organism  Plan: Change antibiotics to Merrem based on culture results    DM2 (diabetes mellitus, type 2) ([facility])-Established problem. Stable.   Fingerstick blood sugars 67  Plan: Continue controlled carb diet, sliding scale insulin, home medications    Acute-on-chronic renal failure (HCC)    Mixed anxiety depressive disorder    Gastro-esophageal reflux disease without esophagitis    Anemia, normocytic normochromic-Established problem. Stable. Hemoglobin 9.6  Plan: No indication for transfusion. Cont to monitor h/h to assess progression of anemia. Recommend ferrous sulfate or MVI as outpatient. Hydroureter on left  Plan: Urology consult appreciated, no immediate plans for surgery.   No further recommendations from urology    Dr Leim Bosworth to resume care Monday, November 1     (Please note that portions of this note were completed with a voice recognition program.  Efforts were made to edit the dictations but occasionally words are mis-transcribed.)        Blue Stuot MD  10/31/2021

## 2021-10-31 NOTE — PROGRESS NOTES
Shift assessment completed, see flowsheets. Medications administered, see MAR. Vital signs stable, SPO2 96% on room air. Urostomy to urinary drainage bag. Plan of care discussed with patient. Fall precautions in place. Call light and bedside table within reach. Nonskid footwear on. Pt denies further needs at this time. Will continue to monitor.   Jennifer Molina RN

## 2021-10-31 NOTE — PROGRESS NOTES
Nephrology progress Note  [phone]  [phone]   [facility] [employer]       Patient:  Leana Villanueva   : 1940    Brief HPI    The patient is a 78 y. o.female with significant past medical history of CKD, CAD, h/o cardiac arrest, s/p AICD, Afib, FELICITA, CHF, HTN, DM, HPL, GERD, Depression, IBS, right renal cell cancer s/p nephrectomy and cystectomy with ileal conduit , h/o left hydronephrosis possibly from left hernia  near neobladder, solitary kidney,  came in to ER with c/o decreased out-put from her urostomy bag and abdominal swelling. Since arrival to the ER, her urostomy has begun to drain spontaneously. 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 CXR showed bilateral pneumonia vs pulm edema, covid-19 pending at this time. Of note, she complained of chest pain and sob, and became hypoxic while in the ER.    We are consulted for CATARINA on CKD  She follows with Dr. Scarlet Boas for CKD  Base-line creatinine appears to be around mid 1s  Creatinine on admission was 2.1   Recently treated for UTI    Subjective/Interval history    Pt seen and examined  Creatinine stable  BPs controlled  On room air  Denies abdominal pain or nausea/emesis    Review of Systems   No chest pains or sob    SHx:  No visitors at the bedside    Meds:  Scheduled Meds:   meropenem  1,000 mg IntraVENous Q12H    aspirin  81 mg Oral Daily    hydrALAZINE  25 mg Oral TID    insulin glargine  18 Units SubCUTAneous Nightly    rosuvastatin  10 mg Oral QPM    sodium bicarbonate  650 mg Oral BID    therapeutic multivitamin-minerals  1 tablet Oral Daily    Vitamin D  1,000 Units Oral Daily    carvedilol  12.5 mg Oral BID WC    heparin (porcine)  5,000 Units SubCUTAneous 3 times per day    insulin lispro  0-12 Units SubCUTAneous TID WC    insulin lispro  0-6 Units SubCUTAneous Nightly Results   Component Value Date    PHOS 3.5 04/12/2021       Assessment/Plan:    1. Acute Kidney injury on CKD  Pre-renal vs ATN in the setting of decreased urostomy out-put  Imaging showed improving left sided hydronephrosis. Avoid nephrotoxins including IV contrast, NSAIDs, ACEI/ARBs. Can stop IVF  2. Obstructive uropathy  Imaging with left sided hydronephrosis resolving  Seen by urology  3. Acidosis   Due to CKD  Can stop bicarb gtt  4. UTI Enterobacter  On meropenem  5. DM II  6. Hypertension controlled  Continue same meds      Jacey Brandt MD, MD.  10/31/2021  Office Phone : [phone]    Thank you for allowing us to participate in the care of this pt. I willcontinue to follow along. Please call with questions or concerns.

## 2021-10-31 NOTE — PLAN OF CARE
Problem: Pain:  Description: Pain management should include both nonpharmacologic and pharmacologic interventions. Goal: Pain level will decrease  Description: Pain level will decrease  Outcome: Ongoing  Goal: Control of acute pain  Description: Control of acute pain  Outcome: Ongoing  Goal: Control of chronic pain  Description: Control of chronic pain  Outcome: Ongoing     Problem: Urinary Elimination:  Goal: Complications related to the disease process, condition or treatment will be avoided or minimized  Outcome: Ongoing     Problem:  Activity:  Goal: Ability to tolerate increased activity will improve  Description: Ability to tolerate increased activity will improve  Outcome: Ongoing  Goal: Capacity to carry out activities will improve  Description: Capacity to carry out activities will improve  Outcome: Ongoing  Goal: Fatigue will decrease  Description: Fatigue will decrease  Outcome: Ongoing  Goal: Energy level will increase  Description: Energy level will increase  Outcome: Ongoing  Goal: Ability to implement measures to reduce episodes of fatigue will improve  Description: Ability to implement measures to reduce episodes of fatigue will improve  Outcome: Ongoing     Problem: Discharge Planning:  Goal: Discharged to appropriate level of care  Description: Discharged to appropriate level of care  Outcome: Ongoing     Problem: Coping:  Goal: Ability to identify and develop effective coping behavior will improve  Description: Ability to identify and develop effective coping behavior will improve  Outcome: Ongoing  Goal: Ability to identify and utilize available resources and services will improve  Description: Ability to identify and utilize available resources and services will improve  Outcome: Ongoing     Problem: Falls - Risk of:  Goal: Will remain free from falls  Description: Will remain free from falls  Outcome: Ongoing  Goal: Absence of physical injury  Description: Absence of physical injury  Outcome: Ongoing

## 2021-10-31 NOTE — PROGRESS NOTES
Pt up to floor and oriented on how to use call light . Fresh water provided and pt denies any needs. Call light in reach and bed alarm engaged.

## 2021-10-31 NOTE — PROGRESS NOTES
Nephrology Consult Note  [phone]  [phone]   [facility]       Patient:  Javier Giron   : 1940    Brief HPI    The patient is a 78 y. o.female with significant past medical history of CKD, CAD, h/o cardiac arrest, s/p AICD, Afib, FELICITA, CHF, HTN, DM, HPL, GERD, Depression, IBS, right renal cell cancer s/p nephrectomy and cystectomy with ileal conduit , h/o left hydronephrosis possibly from left hernia  near neobladder, solitary kidney,  came in to ER with c/o decreased out-put from her urostomy bag and abdominal swelling. Since arrival to the ER, her urostomy has begun to drain spontaneously. 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 CXR showed bilateral pneumonia vs pulm edema, covid-19 pending at this time. Of note, she complained of chest pain and sob, and became hypoxic while in the ER.    We are consulted for CATARINA on CKD  She follows with Dr. Rosemary Alatorre for CKD  Base-line creatinine appears to be around mid 1s  Creatinine on admission was 2.1   Recently treated for UTI    Subjective/Interval history    Pt seen and examined  Creatinine better  BPs controlled  On room air    Review of Systems   No chest pains or sob    SHx:  No visitors at the bedside    Meds:  Scheduled Meds:   aspirin  81 mg Oral Daily    hydrALAZINE  25 mg Oral TID    insulin glargine  18 Units SubCUTAneous Nightly    rosuvastatin  10 mg Oral QPM    sodium bicarbonate  650 mg Oral BID    therapeutic multivitamin-minerals  1 tablet Oral Daily    Vitamin D  1,000 Units Oral Daily    carvedilol  12.5 mg Oral BID WC    heparin (porcine)  5,000 Units SubCUTAneous 3 times per day    insulin lispro  0-12 Units SubCUTAneous TID WC    insulin lispro  0-6 Units SubCUTAneous Nightly     Continuous Infusions:   sodium bicarbonate infusion 75 mL/hr at 10/30/21 0934  dextrose       PRN Meds:.acetaminophen, ALPRAZolam, fluticasone, ondansetron, ALPRAZolam, hydrALAZINE, sodium chloride, glucose, dextrose, glucagon (rDNA), dextrose      Vitals:  /70   Pulse 76   Temp 99.1 °F (37.3 °C) (Temporal)   Resp 26   Ht 5' 8\" (1.727 m)   Wt 152 lb 8 oz (69.2 kg)   SpO2 95%   BMI 23.19 kg/m²       Physical Exam  General : AAOx3, not in pain or respiratory distress, resting in bed  HEENT : normocephalic, atraumatic, mucosa moist, no palor or icterus  CVS: S1 S2 normal, regular rhythm, no murmurs or rubs. Lungs: Clear, no wheezing or crackles. Abd: Soft, bowel sounds normal, non-tender. Ext: No edema, no cyanosis  Skin: Warm. No rashes appreciated. : bladder non-distended, no tenderness over the bladder, urostomy+ with clear ueine  Neuro: Alert and oriented x 3, nonfocal.  Joints: No erythema noted over joints.       Labs:  CBC with Differential:    Lab Results   Component Value Date    WBC 7.5 10/30/2021    RBC 3.56 10/30/2021    HGB 10.7 10/30/2021    HCT 32.5 10/30/2021     10/30/2021    MCV 91.3 10/30/2021    MCH 30.0 10/30/2021    MCHC 32.8 10/30/2021    RDW 14.8 10/30/2021    SEGSPCT 60.3 01/28/2012    BANDSPCT 4 09/21/2017    LYMPHOPCT 13.0 10/30/2021    MONOPCT 11.7 10/30/2021    MYELOPCT 1 03/25/2016    EOSPCT 1.7 01/28/2012    BASOPCT 0.8 10/30/2021    MONOSABS 0.9 10/30/2021    LYMPHSABS 1.0 10/30/2021    EOSABS 0.1 10/30/2021    BASOSABS 0.1 10/30/2021    DIFFTYPE Auto-K 01/28/2012     BMP:    Lab Results   Component Value Date     10/30/2021    K 3.9 10/30/2021    K 4.2 08/17/2021     10/30/2021    CO2 25 10/30/2021    BUN 30 10/30/2021    LABALBU 4.1 10/28/2021    CREATININE 1.5 10/30/2021    CALCIUM 8.9 10/30/2021    GFRAA 40 10/30/2021    GFRAA 51 05/28/2013    LABGLOM 33 10/30/2021    GLUCOSE 60 10/30/2021     Ionized Calcium:  No results found for: IONCA  Magnesium:    Lab Results   Component Value Date    MG 1.80 03/26/2018 Phosphorus:    Lab Results   Component Value Date    PHOS 3.5 04/12/2021       Assessment/Plan:    1. Acute Kidney injury on CKD  Pre-renal vs ATN in the setting of decreased urostomy out-put  Imaging showed improving left sided hydronephrosis. Avoid nephrotoxins including IV contrast, NSAIDs, ACEI/ARBs. Continue Isotonic fluids, change to Normal saline  2. Obstructive uropathy  Imaging with left sided hydronephrosis resolving  Seen by urology  3. Acidosis   Due to CKD  Can stop bicarb gtt  4. DM II  5. Hypertension controlled  Continue same meds      Jacey Brandt MD, MD.  10/30/2021  Office Phone : [phone]    Thank you for allowing us to participate in the care of this pt. I willcontinue to follow along. Please call with questions or concerns.

## 2021-10-31 NOTE — PROGRESS NOTES
Urology Progress Note  [facility]    Provider: Yvette Crawford MD MD Patient ID:  Admission Date: 10/28/2021 Name: Jack Player Date: 10/28/2021 MRN: [id number]   Patient Location: [id number] : 1940  Attending: Brady Zambrano DO Date of Service: 10/31/2021  PCP: Ez Chowdhury MD     Diagnoses:  CATARINA  Bladder cancer  respiratory failure  Pulmonary edema    Assessment/Plan:  No urology surgery plans. Discharge when medically ready  Follow up with Dr. Mario Marroquin from Urology    The patient had a chance to ask questions which were answered. she understands the above plan. Subjective:   Chau Wolff is a 78 y.o. female. She was seen and examined this morning. Today we discussed discharge planning. No urology interventions planned. Objective:   Vitals:  Vitals:    10/31/21 0752   BP: (!) 152/84   Pulse: 80   Resp: 19   Temp: 98.2 °F (36.8 °C)   SpO2: 95%       Intake/Output Summary (Last 24 hours) at 10/31/2021 1052  Last data filed at 10/31/2021 0530  Gross per 24 hour   Intake 2875 ml   Output 1475 ml   Net 1400 ml     Physical Exam:  Gen: Alert and oriented x3, no acute distress  CV: Regular rate   Resp: unlabored respirations  Abd: Soft, non-distended, non-tender, no masses. Healthy appearing stoma in the right lower quadrant. Urine is clear yellow. Ext: no peripheral edema noted, moves upper and lower extremities spontaneously  Skin: warm and well perfused, no rashes noted on the face, or arms.      Labs:  Lab Results   Component Value Date    WBC 4.8 10/31/2021    HGB 9.6 (L) 10/31/2021    HCT 28.6 (L) 10/31/2021    MCV 89.6 10/31/2021     10/31/2021     Lab Results   Component Value Date    CREATININE 1.5 (H) 10/31/2021    BUN 29 (H) 10/31/2021     10/31/2021    K 3.7 10/31/2021     10/31/2021    CO2 26 10/31/2021       Yvette Crawford MD MD  10/31/2021

## 2021-11-01 LAB
ANION GAP SERPL CALCULATED.3IONS-SCNC: 12 MMOL/L (ref 3–16)
BASOPHILS ABSOLUTE: 0 K/UL (ref 0–0.2)
BASOPHILS RELATIVE PERCENT: 0.8 %
BUN BLDV-MCNC: 26 MG/DL (ref 7–20)
CALCIUM SERPL-MCNC: 9.6 MG/DL (ref 8.3–10.6)
CHLORIDE BLD-SCNC: 107 MMOL/L (ref 99–110)
CO2: 25 MMOL/L (ref 21–32)
CREAT SERPL-MCNC: 1.6 MG/DL (ref 0.6–1.2)
EOSINOPHILS ABSOLUTE: 0.2 K/UL (ref 0–0.6)
EOSINOPHILS RELATIVE PERCENT: 3 %
GFR AFRICAN AMERICAN: 37
GFR NON-AFRICAN AMERICAN: 31
GLUCOSE BLD-MCNC: 103 MG/DL (ref 70–99)
GLUCOSE BLD-MCNC: 103 MG/DL (ref 70–99)
GLUCOSE BLD-MCNC: 126 MG/DL (ref 70–99)
GLUCOSE BLD-MCNC: 290 MG/DL (ref 70–99)
GLUCOSE BLD-MCNC: 89 MG/DL (ref 70–99)
HCT VFR BLD CALC: 33.5 % (ref 36–48)
HEMOGLOBIN: 11 G/DL (ref 12–16)
LYMPHOCYTES ABSOLUTE: 1.2 K/UL (ref 1–5.1)
LYMPHOCYTES RELATIVE PERCENT: 18.6 %
MCH RBC QN AUTO: 29.9 PG (ref 26–34)
MCHC RBC AUTO-ENTMCNC: 32.8 G/DL (ref 31–36)
MCV RBC AUTO: 91.2 FL (ref 80–100)
MONOCYTES ABSOLUTE: 0.6 K/UL (ref 0–1.3)
MONOCYTES RELATIVE PERCENT: 9.2 %
NEUTROPHILS ABSOLUTE: 4.3 K/UL (ref 1.7–7.7)
NEUTROPHILS RELATIVE PERCENT: 68.4 %
PDW BLD-RTO: 14.4 % (ref 12.4–15.4)
PERFORMED ON: ABNORMAL
PLATELET # BLD: 275 K/UL (ref 135–450)
PMV BLD AUTO: 7.9 FL (ref 5–10.5)
POTASSIUM SERPL-SCNC: 4.3 MMOL/L (ref 3.5–5.1)
RBC # BLD: 3.68 M/UL (ref 4–5.2)
SODIUM BLD-SCNC: 144 MMOL/L (ref 136–145)
WBC # BLD: 6.3 K/UL (ref 4–11)

## 2021-11-01 PROCEDURE — 80048 BASIC METABOLIC PNL TOTAL CA: CPT

## 2021-11-01 PROCEDURE — 6360000002 HC RX W HCPCS: Performed by: INTERNAL MEDICINE

## 2021-11-01 PROCEDURE — 36415 COLL VENOUS BLD VENIPUNCTURE: CPT

## 2021-11-01 PROCEDURE — 6370000000 HC RX 637 (ALT 250 FOR IP): Performed by: INTERNAL MEDICINE

## 2021-11-01 PROCEDURE — 1200000000 HC SEMI PRIVATE

## 2021-11-01 PROCEDURE — 97116 GAIT TRAINING THERAPY: CPT

## 2021-11-01 PROCEDURE — 97161 PT EVAL LOW COMPLEX 20 MIN: CPT

## 2021-11-01 PROCEDURE — 85025 COMPLETE CBC W/AUTO DIFF WBC: CPT

## 2021-11-01 PROCEDURE — 97530 THERAPEUTIC ACTIVITIES: CPT

## 2021-11-01 PROCEDURE — 6370000000 HC RX 637 (ALT 250 FOR IP): Performed by: EMERGENCY MEDICINE

## 2021-11-01 PROCEDURE — 2580000003 HC RX 258: Performed by: INTERNAL MEDICINE

## 2021-11-01 RX ORDER — SULFAMETHOXAZOLE AND TRIMETHOPRIM 800; 160 MG/1; MG/1
0.5 TABLET ORAL EVERY 12 HOURS SCHEDULED
Status: DISCONTINUED | OUTPATIENT
Start: 2021-11-02 | End: 2021-11-02 | Stop reason: HOSPADM

## 2021-11-01 RX ADMIN — HYDRALAZINE HYDROCHLORIDE 25 MG: 25 TABLET, FILM COATED ORAL at 14:43

## 2021-11-01 RX ADMIN — MEROPENEM 1000 MG: 1 INJECTION, POWDER, FOR SOLUTION INTRAVENOUS at 00:26

## 2021-11-01 RX ADMIN — Medication 1000 UNITS: at 08:24

## 2021-11-01 RX ADMIN — CARVEDILOL 12.5 MG: 6.25 TABLET, FILM COATED ORAL at 08:24

## 2021-11-01 RX ADMIN — HYDRALAZINE HYDROCHLORIDE 25 MG: 25 TABLET, FILM COATED ORAL at 08:24

## 2021-11-01 RX ADMIN — HEPARIN SODIUM 5000 UNITS: 5000 INJECTION INTRAVENOUS; SUBCUTANEOUS at 14:43

## 2021-11-01 RX ADMIN — ROSUVASTATIN CALCIUM 10 MG: 10 TABLET, FILM COATED ORAL at 17:00

## 2021-11-01 RX ADMIN — ALPRAZOLAM 0.5 MG: 0.5 TABLET ORAL at 11:21

## 2021-11-01 RX ADMIN — HYDRALAZINE HYDROCHLORIDE 25 MG: 25 TABLET, FILM COATED ORAL at 21:49

## 2021-11-01 RX ADMIN — INSULIN GLARGINE 18 UNITS: 100 INJECTION, SOLUTION SUBCUTANEOUS at 21:51

## 2021-11-01 RX ADMIN — HEPARIN SODIUM 5000 UNITS: 5000 INJECTION INTRAVENOUS; SUBCUTANEOUS at 05:19

## 2021-11-01 RX ADMIN — HEPARIN SODIUM 5000 UNITS: 5000 INJECTION INTRAVENOUS; SUBCUTANEOUS at 21:49

## 2021-11-01 RX ADMIN — MEROPENEM 1000 MG: 1 INJECTION, POWDER, FOR SOLUTION INTRAVENOUS at 11:23

## 2021-11-01 RX ADMIN — ASPIRIN 81 MG: 81 TABLET, COATED ORAL at 08:24

## 2021-11-01 RX ADMIN — SODIUM BICARBONATE 650 MG: 650 TABLET ORAL at 08:24

## 2021-11-01 RX ADMIN — INSULIN LISPRO 6 UNITS: 100 INJECTION, SOLUTION INTRAVENOUS; SUBCUTANEOUS at 11:51

## 2021-11-01 RX ADMIN — MULTIPLE VITAMINS W/ MINERALS TAB 1 TABLET: TAB at 08:24

## 2021-11-01 RX ADMIN — SODIUM BICARBONATE 650 MG: 650 TABLET ORAL at 21:49

## 2021-11-01 RX ADMIN — CARVEDILOL 12.5 MG: 6.25 TABLET, FILM COATED ORAL at 16:59

## 2021-11-01 ASSESSMENT — PAIN SCALES - GENERAL
PAINLEVEL_OUTOF10: 0

## 2021-11-01 NOTE — PLAN OF CARE
Problem: Pain:  Goal: Pain level will decrease  Description: Pain level will decrease  Note: Denies pain at this time, vitals stable, assisted to position of comfort.        Problem: Urinary Elimination:  Goal: Complications related to the disease process, condition or treatment will be avoided or minimized  Note: Urostomy draining adequate amounts of clear yellow urine     Problem: Falls - Risk of:  Goal: Will remain free from falls  Description: Will remain free from falls  Note: Free from injury or falls at this time, bed in low position, side rail up x2, Pineda Fall Risk: High (45 and higher), calls with needs, call light in reach, will continue to monitor

## 2021-11-01 NOTE — PLAN OF CARE
Problem: Pain:  Goal: Pain level will decrease  Description: Pain level will decrease  Outcome: Ongoing     Problem: Urinary Elimination:  Goal: Complications related to the disease process, condition or treatment will be avoided or minimized  Outcome: Ongoing     Problem: Activity:  Goal: Ability to tolerate increased activity will improve  Description: Ability to tolerate increased activity will improve  Outcome: Ongoing     Problem: Discharge Planning:  Goal: Discharged to appropriate level of care  Description: Discharged to appropriate level of care  Outcome: Ongoing     Problem: Coping:  Goal: Ability to identify and develop effective coping behavior will improve  Description: Ability to identify and develop effective coping behavior will improve  Outcome: Ongoing     Problem: Falls - Risk of:  Goal: Will remain free from falls  Description: Will remain free from falls  Outcome: Ongoing   Yellow urine with sediment draining from urostomy. A&O x 4. No complaint of pain at this time, call light within reach. Up as tolerated.

## 2021-11-01 NOTE — PROGRESS NOTES
Nephrology Attending  Progress Note        SUMMARY :  We are following this patient for CATARINA / CKD    78 y. o.female with significant past medical history of CKD, CAD, h/o cardiac arrest, s/p AICD, Afib, FELICITA, CHF, HTN, DM, HPL, GERD, Depression, IBS, right renal cell cancer s/p nephrectomy and cystectomy with ileal conduit 2017, h/o left hydronephrosis possibly from left hernia  near neobladder, solitary kidney,  came in to ER with c/o decreased out-put from her urostomy bag and abdominal swelling. Since arrival to the ER, her urostomy has begun to drain spontaneously. 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 CXR showed bilateral pneumonia vs pulm edema, covid-19 pending at this time. Of note, she complained of chest pain and sob, and became hypoxic while in the ER. Base-line creatinine appears to be around mid 1s  Creatinine on admission was 2.1   Recently treated for UTI    SUBJECTIVE:   Patient progress reviewed. The patient has been seen by Urology     Physical Exam:    VITALS:  /66   Pulse 80   Temp 97.3 °F (36.3 °C) (Oral)   Resp 16   Ht 5' 8\" (1.727 m)   Wt 151 lb (68.5 kg)   SpO2 96%   BMI 22.96 kg/m²   BLOOD PRESSURE RANGE:  Systolic (03MPH), GDM:115 , Min:117 , HO   ; Diastolic (99XDY), NPV:94, Min:57, Max:80    24HR INTAKE/OUTPUT:    Intake/Output Summary (Last 24 hours) at 2021 1106  Last data filed at 2021 0924  Gross per 24 hour   Intake 1188.81 ml   Output 1650 ml   Net -461.19 ml       Gen:  alert, oriented x 3  PERRL , EOM +  Pallor +, No icterus  JVP not raised   CV: RRR no murmur or rub . Left side defibrillator   Lungs:B/ L air entry, Normal breath sounds   Abd: soft, bowel sounds + , No organomegaly , Scar + , Urostomy   Ext: No edema, no cyanosis  Skin: Warm.   No rash  Neuro: nonfocal.      DATA:    CBC with Differential:    Lab Results   Component Value Date    WBC 6.3 11/01/2021    RBC 3.68 11/01/2021    HGB 11.0 11/01/2021    HCT 33.5 11/01/2021     11/01/2021    MCV 91.2 11/01/2021    MCH 29.9 11/01/2021    MCHC 32.8 11/01/2021    RDW 14.4 11/01/2021    SEGSPCT 60.3 01/28/2012    BANDSPCT 4 09/21/2017    LYMPHOPCT 18.6 11/01/2021    MONOPCT 9.2 11/01/2021    MYELOPCT 1 03/25/2016    EOSPCT 1.7 01/28/2012    BASOPCT 0.8 11/01/2021    MONOSABS 0.6 11/01/2021    LYMPHSABS 1.2 11/01/2021    EOSABS 0.2 11/01/2021    BASOSABS 0.0 11/01/2021    DIFFTYPE Auto-K 01/28/2012     CMP:    Lab Results   Component Value Date     11/01/2021    K 4.3 11/01/2021    K 4.2 08/17/2021     11/01/2021    CO2 25 11/01/2021    BUN 26 11/01/2021    CREATININE 1.6 11/01/2021    GFRAA 37 11/01/2021    GFRAA 51 05/28/2013    AGRATIO 1.4 10/28/2021    LABGLOM 31 11/01/2021    GLUCOSE 89 11/01/2021    PROT 7.1 10/28/2021    PROT 7.1 01/28/2012    LABALBU 4.1 10/28/2021    CALCIUM 9.6 11/01/2021    BILITOT <0.2 10/28/2021    ALKPHOS 51 10/28/2021    AST 15 10/28/2021    ALT <5 10/28/2021     Magnesium:    Lab Results   Component Value Date    MG 1.80 03/26/2018     Phosphorus:    Lab Results   Component Value Date    PHOS 3.5 04/12/2021     Uric Acid:  No results found for: LABURIC, URICACID  Last 3 Troponin:    Lab Results   Component Value Date    TROPONINI <0.01 10/28/2021    TROPONINI 0.01 07/11/2021    TROPONINI <0.01 04/17/2021     U/A:    Lab Results   Component Value Date    COLORU YELLOW 10/28/2021    PROTEINU TRACE 10/28/2021    PHUR 7.5 10/28/2021    LABCAST All else obscured 12/23/2011    WBCUA 15 10/28/2021    RBCUA 1 10/28/2021    MUCUS Trace 06/06/2010    YEAST Present 10/14/2021    BACTERIA 4+ 10/28/2021    CLARITYU CLOUDY 10/28/2021    SPECGRAV 1.012 10/28/2021    LEUKOCYTESUR MODERATE 10/28/2021    UROBILINOGEN 0.2 10/28/2021    BILIRUBINUR Negative 10/28/2021    BILIRUBINUR NEGATIVE, confirmed by icto test 12/23/2011    BLOODU Negative 10/28/2021    GLUCOSEU Negative 10/28/2021    GLUCOSEU NEGATIVE 12/23/2011    AMORPHOUS 1+ 06/09/2020         IMPRESSION/RECOMMENDATIONS:      Diagnosis and Plan     1. CATARINA  Cr 1.6, better     2. CKD stage 2b  Baseline Cr 1.5  S/ P partial nephrectomy    3.  Vaginal Fistula     Janice Cunningham MD

## 2021-11-01 NOTE — PROGRESS NOTES
the patient seen for urostomy dressing follow up. Urostomy dressing clean and intact. the patient states she changes the dressing every 3 or 4 days and dressing change should wait until tomorrow. Will see the patient on 11-2-21 & change urostomy dressing as needed. Zach Webb RN, BSN, OMS.

## 2021-11-01 NOTE — PROGRESS NOTES
Physical Therapy    Facility/Department: [address] NURSING  Initial Assessment    NAME: Dru Maldonado  : 1940  MRN: [id number]    Date of Service: 2021    Discharge Recommendations:Cely Jauregui scored a 22/24 on the AM-PAC short mobility form. Current research shows that an AM-PAC score of 18 or greater is typically associated with a discharge to the patient's home setting. Based on the patient's AM-PAC score and their current functional mobility deficits, it is recommended that the patient have 2-3 sessions per week of Physical Therapy at d/c to increase the patient's independence. At this time, this patient demonstrates the endurance and safety to discharge home with home PT and a follow up treatment frequency of 2-3x/wk. Please see assessment section for further patient specific details. Pt would benefit from transition to exercise program at [facility] once finished with PT. If patient discharges prior to next session this note will serve as a discharge summary. Please see below for the latest assessment towards goals. PT Equipment Recommendations  Equipment Needed: No    Assessment   Body structures, Functions, Activity limitations: Decreased functional mobility ; Decreased endurance  Assessment: Pt with decreased endurance and fatigue following prolonged infection. Pt reporting lack of activity due to covid over past months. Pt needing skilled PT services to address these issues. Would benefit from regular exercise programs or fitness center at [facility] once PT finished. Treatment Diagnosis: difficulty with gait. Prognosis: Good  Decision Making: Low Complexity  PT Education: PT Role;Plan of Care  Patient Education: Pt verbalized good understanding. Barriers to Learning: none  REQUIRES PT FOLLOW UP: Yes  Activity Tolerance  Activity Tolerance: Patient limited by endurance       Patient Diagnosis(es): The primary encounter diagnosis was CATARINA (acute kidney injury) ([facility] [address] Diagnoses of Acute pulmonary edema (HCC) and Acute respiratory failure with hypoxemia [facility]) were also pertinent to this visit. has a past medical history of Anxiety, Atrial fibrillation ([facility] [address] CAD (coronary artery disease), Cancer ([facility] [address] Cardiac arrest ([facility] [address] Carotid artery stenosis, CHF (congestive heart failure) ([facility] [address] Chronic kidney disease, Depression, Diabetes mellitus ([facility] [address] GERD (gastroesophageal reflux disease), Hip pain, chronic, HYPERCHOLESTERAEMIA, Hypertension, Irritable bowel syndrome, Laceration of head, MI, old, Neuropathy, Osteoarthritis, Presence of combination internal cardiac defibrillator (ICD) and pacemaker, Staph infection, Type 2 diabetes mellitus without complication ([facility] [address] and Urinary incontinence. has a past surgical history that includes angioplasty; Hysterectomy; bladder suspension; Cystoscopy (5/28/2013); Pacemaker insertion; Kidney removal; Bladder removal; Total hip arthroplasty (Left, 11/6/2019); and Skin cancer excision. Restrictions  Restrictions/Precautions  Restrictions/Precautions: Contact Precautions, Fall Risk (high fall risk, contact for MRSA urine)  Position Activity Restriction  Other position/activity restrictions: Viola Johnson is a 78 y.o. female who presents to the emergency department with concerns for her urostomy not draining and abdominal \"swelling\". States she was at a party prior to arrival and all of a sudden felt fullness around her urostomy site and noticed the bag did not have any urine in it. She denies pain states she feels \"swollen\" in her abdomen. Nothing makes it better or worse. She finished a dose of antibiotics today for an infection but she is not sure what kind of infection or why she was taking them. Reports some nausea but no vomiting.   Vision/Hearing  Vision: Impaired  Vision Exceptions: Wears glasses at all times  Hearing: Exceptions to [facility]  Hearing Exceptions: Hard of hearing/hearing concerns (L ear [place])     Subjective  General  Chart Reviewed: Yes  Response To Previous Treatment: Patient with no complaints from previous session. Family / Caregiver Present: No  Diagnosis: ARF, UTI  Follows Commands: Within Functional Limits  General Comment  Comments: Pt supine in bed upon arrival. Seems generally depressed over passing of  and recent move to [place]. Subjective  Subjective: Pt denies pain. Agreeable to working with PT. Pain Screening  Patient Currently in Pain: Denies  Vital Signs  Patient Currently in Pain: Denies       Orientation  Orientation  Overall Orientation Status: Within Functional Limits  Social/Functional History  Social/Functional History  Lives With: Alone  Type of Home: Facility ([place] at [facility])  Home Layout: One level  Home Access: Level entry  Bathroom Shower/Tub: Tub/Shower unit  Bathroom Toilet: Handicap height  Bathroom Equipment: Shower chair, Grab bars in shower, Hand-held shower, Grab bars around toilet  Bathroom Accessibility: Walker accessible  Home Equipment: Cane, Rolling walker, 4 wheeled walker, Alert Button  ADL Assistance: Independent  Homemaking Assistance: Needs assistance (Pt gets assist with cleaning, occasionally laundry, but she does on her own mostly. Meals in dinning room or brought to room.)  Homemaking Responsibilities: Yes  Ambulation Assistance: Independent  Transfer Assistance: Independent  Active : No  Leisure & Hobbies: Painting,bingo  Additional Comments: Pt denies falls in past 6mo. Pt with IPLOF using RW. Recently moved to [facility].   Cognition        Objective     Observation/Palpation  Posture: Good    AROM RLE (degrees)  RLE AROM: WFL  AROM LLE (degrees)  LLE AROM : WFL  Strength RLE  Comment: grossly 4/5  Strength LLE  Comment: grossly 4/5  Tone RLE  RLE Tone: Normotonic  Tone LLE  LLE Tone: Normotonic  Motor Control  Gross Motor?: WFL  Sensation  Overall Sensation Status: WFL  Bed mobility  Supine to Sit: Independent  Scooting: Independent  Comment: Pt sat EOB with good balance. Transfers  Sit to Stand: Supervision (from EOB.)  Stand to sit: Supervision  Ambulation  Ambulation?: Yes  Ambulation 1  Surface: level tile  Device: Rolling Walker  Other Apparatus:  (urostomy bag)  Assistance: Stand by assistance  Quality of Gait: head down and RW too far ahead of pt, VCs to keep close. Gait Deviations: Slow May  Distance: Pt amb with RW and SBA for 250 ft. Comments: Pt tolerated well. Reporting fatigue LEs and L hip at end of amb. Stairs/Curb  Stairs?: No     Balance  Posture: Good  Sitting - Static: Good  Sitting - Dynamic: Good  Standing - Static: Good (with RW)  Standing - Dynamic: Good (with RW)        Plan   Plan  Times per week: 1-2x/week  Times per day: Daily  Current Treatment Recommendations: Strengthening, Functional Mobility Training, Endurance Training  Safety Devices  Type of devices: All fall risk precautions in place, Call light within reach, Chair alarm in place, Gait belt, Left in chair, Nurse notified  Restraints  Initially in place: No    G-Code       OutComes Score                                                  AM-PAC Score  AM-PAC Inpatient Mobility Raw Score : 22 (11/01/21 1137)  AM-PAC Inpatient T-Scale Score : 53.28 (11/01/21 1137)  Mobility Inpatient CMS 0-100% Score: 20.91 (11/01/21 1137)  Mobility Inpatient CMS G-Code Modifier : Alexandre Soriano (11/01/21 1137)          Goals  Short term goals  Time Frame for Short term goals: To be met by DC. Short term goal 1: Pt to perform transfers mod I. Short term goal 2: Pt to perform amb with AAD and mod I for 300 ft. Long term goals  Time Frame for Long term goals : LTGs=STGs  Patient Goals   Patient goals : to get stronger.        Therapy Time   Individual Concurrent Group Co-treatment   Time In 4267         Time Out 1125         Minutes 38         Timed Code Treatment Minutes: 52 [facility], [id number]

## 2021-11-02 VITALS
RESPIRATION RATE: 18 BRPM | BODY MASS INDEX: 22.88 KG/M2 | DIASTOLIC BLOOD PRESSURE: 77 MMHG | TEMPERATURE: 98.6 F | HEIGHT: 68 IN | HEART RATE: 80 BPM | SYSTOLIC BLOOD PRESSURE: 157 MMHG | WEIGHT: 151 LBS | OXYGEN SATURATION: 96 %

## 2021-11-02 LAB
ANION GAP SERPL CALCULATED.3IONS-SCNC: 12 MMOL/L (ref 3–16)
BUN BLDV-MCNC: 27 MG/DL (ref 7–20)
CALCIUM SERPL-MCNC: 8.9 MG/DL (ref 8.3–10.6)
CHLORIDE BLD-SCNC: 106 MMOL/L (ref 99–110)
CO2: 24 MMOL/L (ref 21–32)
CREAT SERPL-MCNC: 1.5 MG/DL (ref 0.6–1.2)
GFR AFRICAN AMERICAN: 40
GFR NON-AFRICAN AMERICAN: 33
GLUCOSE BLD-MCNC: 165 MG/DL (ref 70–99)
GLUCOSE BLD-MCNC: 74 MG/DL (ref 70–99)
GLUCOSE BLD-MCNC: 76 MG/DL (ref 70–99)
PERFORMED ON: ABNORMAL
PERFORMED ON: NORMAL
POTASSIUM SERPL-SCNC: 4 MMOL/L (ref 3.5–5.1)
SODIUM BLD-SCNC: 142 MMOL/L (ref 136–145)

## 2021-11-02 PROCEDURE — 36415 COLL VENOUS BLD VENIPUNCTURE: CPT

## 2021-11-02 PROCEDURE — 6370000000 HC RX 637 (ALT 250 FOR IP): Performed by: INTERNAL MEDICINE

## 2021-11-02 PROCEDURE — 6360000002 HC RX W HCPCS: Performed by: INTERNAL MEDICINE

## 2021-11-02 PROCEDURE — 6370000000 HC RX 637 (ALT 250 FOR IP): Performed by: EMERGENCY MEDICINE

## 2021-11-02 PROCEDURE — 97110 THERAPEUTIC EXERCISES: CPT

## 2021-11-02 PROCEDURE — 80048 BASIC METABOLIC PNL TOTAL CA: CPT

## 2021-11-02 PROCEDURE — 97116 GAIT TRAINING THERAPY: CPT

## 2021-11-02 RX ORDER — INSULIN LISPRO 100 [IU]/ML
0-12 INJECTION, SOLUTION INTRAVENOUS; SUBCUTANEOUS
Qty: 1 PEN | Refills: 0 | Status: SHIPPED | OUTPATIENT
Start: 2021-11-02

## 2021-11-02 RX ORDER — SULFAMETHOXAZOLE AND TRIMETHOPRIM 800; 160 MG/1; MG/1
0.5 TABLET ORAL EVERY 12 HOURS SCHEDULED
Qty: 7 TABLET | Refills: 0 | Status: SHIPPED | OUTPATIENT
Start: 2021-11-02 | End: 2021-11-09

## 2021-11-02 RX ORDER — ALPRAZOLAM 0.25 MG/1
0.25 TABLET ORAL NIGHTLY PRN
Qty: 10 TABLET | Refills: 0 | Status: SHIPPED | OUTPATIENT
Start: 2021-11-02 | End: 2021-11-12

## 2021-11-02 RX ORDER — INSULIN LISPRO 100 [IU]/ML
0-6 INJECTION, SOLUTION INTRAVENOUS; SUBCUTANEOUS NIGHTLY
Qty: 1 PEN | Refills: 0 | Status: SHIPPED | OUTPATIENT
Start: 2021-11-02

## 2021-11-02 RX ADMIN — ASPIRIN 81 MG: 81 TABLET, COATED ORAL at 08:13

## 2021-11-02 RX ADMIN — MULTIPLE VITAMINS W/ MINERALS TAB 1 TABLET: TAB at 08:12

## 2021-11-02 RX ADMIN — INSULIN LISPRO 2 UNITS: 100 INJECTION, SOLUTION INTRAVENOUS; SUBCUTANEOUS at 11:41

## 2021-11-02 RX ADMIN — SULFAMETHOXAZOLE AND TRIMETHOPRIM 0.5 TABLET: 800; 160 TABLET ORAL at 08:12

## 2021-11-02 RX ADMIN — HYDRALAZINE HYDROCHLORIDE 25 MG: 25 TABLET, FILM COATED ORAL at 08:12

## 2021-11-02 RX ADMIN — ALPRAZOLAM 0.25 MG: 0.5 TABLET ORAL at 11:40

## 2021-11-02 RX ADMIN — HEPARIN SODIUM 5000 UNITS: 5000 INJECTION INTRAVENOUS; SUBCUTANEOUS at 05:38

## 2021-11-02 RX ADMIN — SODIUM BICARBONATE 650 MG: 650 TABLET ORAL at 08:12

## 2021-11-02 RX ADMIN — CARVEDILOL 12.5 MG: 6.25 TABLET, FILM COATED ORAL at 08:12

## 2021-11-02 RX ADMIN — Medication 1000 UNITS: at 08:12

## 2021-11-02 ASSESSMENT — PAIN DESCRIPTION - LOCATION: LOCATION: ABDOMEN

## 2021-11-02 ASSESSMENT — PAIN SCALES - GENERAL
PAINLEVEL_OUTOF10: 0

## 2021-11-02 ASSESSMENT — PAIN SCALES - WONG BAKER: WONGBAKER_NUMERICALRESPONSE: 2

## 2021-11-02 NOTE — PROGRESS NOTES
Urostomy dressing had been changed this am by Mickie Daugherty RN. New urostomy dressing clean & intact. Discussed ostomy management and concerns, all questions answered. Fredis Ornelas RN, BSN, OMS.

## 2021-11-02 NOTE — PLAN OF CARE
Problem: Pain:  Goal: Pain level will decrease  Description: Pain level will decrease  11/2/2021 0937 by Flora Owens RN  Outcome: Ongoing     Problem: Urinary Elimination:  Goal: Complications related to the disease process, condition or treatment will be avoided or minimized  11/2/2021 0937 by Flora Owens RN  Outcome: Ongoing     Problem: Activity:  Goal: Ability to tolerate increased activity will improve  Description: Ability to tolerate increased activity will improve  11/2/2021 0937 by Flora Owens RN  Outcome: Ongoing     Problem: Discharge Planning:  Goal: Discharged to appropriate level of care  Description: Discharged to appropriate level of care  11/2/2021 0937 by Flora Owens RN  Outcome: Ongoing     Problem: Falls - Risk of:  Goal: Will remain free from falls  Description: Will remain free from falls  11/2/2021 0937 by Flora Owens RN  Outcome: Ongoing     Problem: Coping:  Goal: Ability to identify and develop effective coping behavior will improve  Description: Ability to identify and develop effective coping behavior will improve  11/2/2021 0937 by Flora Owens RN  Outcome: Ongoing   A&O x 4. Up as tolerated. Urostomy bag changed. No complaint of pain at this time. Tolerated all medications per [facility] so far without any issues.

## 2021-11-02 NOTE — PROGRESS NOTES
Nephrology Attending  Progress Note        SUMMARY :  We are following this patient for CATARINA / CKD    78 y. 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 Since arrival to the ER, her urostomy has begun to drain spontaneously. 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 CXR showed bilateral pneumonia vs pulm edema, covid-19 pending at this time. Of note, she complained of chest pain and sob, and became hypoxic while in the ER. Base-line creatinine appears to be around mid 1s  Creatinine on admission was 2.1   Recently treated for UTI    SUBJECTIVE:   Patient progress reviewed. The patient has been seen by Urology     Physical Exam:    VITALS:  BP (!) 157/77   Pulse 80   Temp 98.6 °F (37 °C) (Oral)   Resp 18   Ht 5' 8\" (1.727 m)   Wt 151 lb (68.5 kg)   SpO2 96%   BMI 22.96 kg/m²   BLOOD PRESSURE RANGE:  Systolic (91MBV), JAA:732 , Min:130 , IJL:187   ; Diastolic (87TQT), WYJ:05, Min:60, Max:89    24HR INTAKE/OUTPUT:      Intake/Output Summary (Last 24 hours) at 11/2/2021 1052  Last data filed at 11/2/2021 0923  Gross per 24 hour   Intake 840 ml   Output 1500 ml   Net -660 ml       Gen:  alert, oriented x 3  PERRL , EOM +  Pallor +, No icterus  JVP not raised   CV: RRR no murmur or rub . Left side defibrillator   Lungs:B/ L air entry, Normal breath sounds   Abd: soft, bowel sounds + , No organomegaly , Scar + , Urostomy   Ext: No edema, no cyanosis  Skin: Warm.   No rash  Neuro: nonfocal.      DATA:    CBC with 12/23/2011    BLOODU Negative 10/28/2021    GLUCOSEU Negative 10/28/2021    GLUCOSEU NEGATIVE 12/23/2011    AMORPHOUS 1+ 06/09/2020         IMPRESSION/RECOMMENDATIONS:      Diagnosis and Plan     CATARINA  Cr 1.5, recovered      CKD stage 2b  Baseline Cr 1.5  S/ P partial nephrectomy    Vaginal Fistula : Urology on the case     Leesa Rojo MD

## 2021-11-02 NOTE — DISCHARGE INSTR - COC
Continuity of Care Form    Patient Name: Derik Patel   :  1940  MRN:  [id number]    Admit date:  10/28/2021  Discharge date:  2021    Code Status Order: Full Code   Advance Directives:      Admitting Physician:  Laith Sosa MD  PCP: Norman Caicedo MD    Discharging Nurse: Sharren Canavan.   [address] Unit/Room#: 3AN-3327/3327-01  Discharging Unit Phone Number: [phone]    Emergency Contact:   Extended Emergency Contact Information  Primary Emergency Contact: [address] [facility] Phone: [phone]  Mobile Phone: [phone]  Relation: Child  Secondary Emergency Contact: [address] [facility], [address] [place] [address] Phone: [phone]  Mobile Phone: [phone]  Relation: Child    Past Surgical History:  Past Surgical History:   Procedure Laterality Date    ANGIOPLASTY      x3  2009    BLADDER REMOVAL      BLADDER SUSPENSION      CYSTOSCOPY  2013    with dilitation    HYSTERECTOMY      KIDNEY REMOVAL      right    PACEMAKER INSERTION      SKIN CANCER EXCISION      TOTAL HIP ARTHROPLASTY Left 2019    LEFT ANTERIOR TOTAL HIP REPLACEMENT WITH C-ARM performed by Katarzyna Burris MD at [place] [address]       Immunization History:   Immunization History   Administered Date(s) Administered    COVID-19, Graciella Belling, Primary or Immunocompromised, PF, 100mcg/0.5mL 2021, 2021    Influenza Virus Vaccine 2012, 2021    Pneumococcal Conjugate 7-valent (Rhina Hones) 2003       Active Problems:  Patient Active Problem List   Diagnosis Code    CATARINA (acute kidney injury) ([facility] N17.9    Cardi[facility] I42.9    Ischemic cardiomyopathy I25.5    Essential hypertension I10    Automatic implantable cardioverter-defibrillator in situ Z95.810    UTI (urinary tract infection) N39.0    DM2 (diabetes mellitus, type 2) ([facility] E11.9    Acute-on-chronic renal failure ([facility] N17.9, N18.9    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) I13.10, N18.30    Chronic coronary artery disease I25.10    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4    Acute renal failure (ARF) ([facility]) N17.9    Suspected COVID-19 virus infection Z20.822       Isolation/Infection:   Isolation            Contact          Patient Infection Status       Infection Onset Added Last Indicated Last Indicated By Review Planned Expiration Resolved Resolved By    MDRO (multi-drug resistant organism) 10/28/21 10/31/21 10/28/21 Culture, Urine        Resolved    COVID-19 Rule Out 10/29/21 10/29/21 10/29/21 COVID-19 (Ordered)   10/30/21 Rule-Out Test Resulted            Nurse Assessment:  Last Vital Signs: BP (!) 157/77   Pulse 80   Temp 98.6 °F (37 °C) (Oral)   Resp 18   Ht 5' 8\" (1.727 m)   Wt 151 lb (68.5 kg)   SpO2 96%   BMI 22.96 kg/m²     Last documented pain score (0-10 scale): Pain Level: 0  Last Weight:   Wt Readings from Last 1 Encounters:   10/31/21 151 lb (68.5 kg)     Mental Status:  oriented and alert    IV Access:  - None    Nursing Mobility/ADLs:  Walking   Independent  Transfer  Independent  Bathing  Assisted  Dressing  Independent  Toileting  Independent  Feeding  Independent  Med Admin  Dependent  Med Delivery   whole    Wound Care Documentation and Therapy:  Pressure Ulcer 10/25/17 Sacrum (Active)   Number of days: 6859       Wound 09/12/17 Other (Comment) Other (Comment) Medial 0.5 cm (Active)   Number of days: 1512 Elimination:  Continence:   · Bowel: Yes  · Bladder: Yes  Urinary Catheter: None   Colostomy/Ileostomy/Ileal Conduit: Yes  Urostomy Ileal conduit RLQ-Stomal Appliance: 1 piece  Urostomy Ileal conduit RLQ-Flange Size (inches): 2.5 Inches  Urostomy Ileal conduit RLQ-Stoma  Assessment: Pink, Protrudes, Moist  Urostomy Ileal conduit RLQ-Mucocutaneous Junction: Intact  Urostomy Ileal conduit RLQ-Peristomal Assessment: Clean, Intact  Urostomy Ileal conduit RLQ-Treatment: Bag change    Date of Last BM: 10/31/2021    Intake/Output Summary (Last 24 hours) at 11/2/2021 1103  Last data filed at 11/2/2021 0923  Gross per 24 hour   Intake 840 ml   Output 1500 ml   Net -660 ml     I/O last 3 completed shifts: In: 840 [P.O.:840]  Out: 1400 [Urine:1400]    Safety Concerns:     None    Impairments/Disabilities:      None    Nutrition Therapy:  Current Nutrition Therapy:   - Oral Diet:  Carb Control 4 carbs/meal (1800kcals/day)    Routes of Feeding: Oral  Liquids: Thin Liquids  Daily Fluid Restriction: no  Last Modified Barium Swallow with Video (Video Swallowing Test): not done    Treatments at the Time of Hospital Discharge:   Respiratory Treatments: n/a  Oxygen Therapy:  is not on home oxygen therapy.   Ventilator:    - No ventilator support    Rehab Therapies: Physical Therapy and Occupational Therapy  Weight Bearing Status/Restrictions: No weight bearing restirctions  Other Medical Equipment (for information only, NOT a DME order):  hospital bed  Other Treatments: n/a    Patient's personal belongings (please select all that are sent with patient):  None    RN SIGNATURE:  Electronically signed by Mike Julian RN on 11/2/21 at 11:15 AM EDT    CASE MANAGEMENT/SOCIAL WORK SECTION    Inpatient Status Date: ***    Readmission Risk Assessment Score:  Readmission Risk              Risk of Unplanned Readmission:  26           Discharging to Facility/ Agency   [facility]:  [facility]   phone [phone] [address] [facility], [address]  · Address:  · Phone:  · Fax:    Dialysis Facility (if applicable)   · Name:  · Address:  · Dialysis Schedule:  · Phone:  · Fax:    / signature: Electronically signed by Lucinda Addison RN on 11/2/21 at 12:46 PM EDT    PHYSICIAN SECTION    Prognosis: Guarded    Condition at Discharge: Stable    Rehab Potential (if transferring to Rehab): Fair    Recommended Labs or Other Treatments After Discharge: BMP in 1 week , Urology follow up     Physician Certification: I certify the above information and transfer of Saw Hobson  is necessary for the continuing treatment of the diagnosis listed and that she requires Intermediate Nursing Care for greater 30 days.      Update Admission H&P: No change in H&P    PHYSICIAN SIGNATURE:  Electronically signed by Lu Díaz MD on 11/2/21 at 11:03 AM EDT

## 2021-11-02 NOTE — CARE COORDINATION
Discharge Plan:   Patient discharged to: [facility] living   phone [phone] [address] [facility], [address]  Transported by ehsan  Jimy Murray at the facility informed of patient going back. All discharge needs met per case management.

## 2021-11-02 NOTE — PROGRESS NOTES
Physician Progress Note      PATIENT:               Alton Mohan  [facility] #:                  [id number]  :                       1940  ADMIT DATE:       10/28/2021 6:43 PM  DISCH DATE:  RESPONDING  PROVIDER #:        Mingo Jeffries MD          QUERY TEXT:    Patient admitted with CATARINA, noted to have Acute Respiratory Failure w/Hypoxia   in ED note 10/28/21. If possible, please document in progress notes and   discharge summary:    The medical record reflects the following:  Risk Factors: Age, Velna Sharon on CKD, HTN, HLD, Anxiety, UTI, DM2, GERD  Clinical Indicators: Per ED note 10/2/21 \"nursing alerted me that patient was   complaining of sob and chest pain and became hypoxic, she was 84 % on room   air. placed on 3 liters of oxygen. ordered ekg and trop, bnp, and cxr. she   does appear very anxious, gave her her night xanax. \"   Per Flowsheet pt.   weaned off O2 10/29/21 at 0945 with SpO2 91-93%. Treatment: Nephrology Consult, CxR, CT Abd/Pel, monitor respiratory status and   labs  Options provided:  -- Acute Respiratory Failure w/Hypoxia confirmed present on admission  -- Acute Respiratory Failure w/Hypoxia ruled out  -- Other - I will add my own diagnosis  -- Disagree - Not applicable / Not valid  -- Disagree - Clinically unable to determine / Unknown  -- Refer to Clinical Documentation Reviewer    PROVIDER RESPONSE TEXT:    The diagnosis of Acute Respiratory Failure w/Hypoxia was confirmed as present   on admission.     Query created by: Umesh Crenshaw on 10/30/2021 2:12 PM      Electronically signed by:  Mingo Jeffries MD 2021 11:32 PM

## 2021-11-02 NOTE — PROGRESS NOTES
Physical Therapy  Facility/Department: [facility] 3A NURSING  Daily Treatment Note/DC summary  NAME: Nilam Pina  : 1940  MRN: [id number]    Date of Service: 2021    Discharge Recommendations:Cely Estrada scored a 22/24 on the AM-PAC short mobility form. Current research shows that an AM-PAC score of 18 or greater is typically associated with a discharge to the patient's home setting. Based on the patient's AM-PAC score and their current functional mobility deficits, it is recommended that the patient have 2-3 sessions per week of Physical Therapy at d/c to increase the patient's independence. At this time, this patient demonstrates the endurance and safety to discharge home with home PT and a follow up treatment frequency of 2-3x/wk. Please see assessment section for further patient specific details. Recommend pt have 1-2 time HHPT assessment and HEP education. HOME HEALTH CARE: LEVEL 1 STANDARD    - Initial home health evaluation to occur within 24-48 hours, in patient home   - Therapy to evaluate with goal of regaining prior level of functioning   - Therapy to evaluate if patient has [address] needs for personal care  If patient discharges prior to next session this note will serve as a discharge summary. Please see below for the latest assessment towards goals. PT Equipment Recommendations  Equipment Needed: No    Assessment   Body structures, Functions, Activity limitations: Decreased functional mobility ; Decreased endurance  Assessment: CO acute care PT with all goals met in full. Recommend pt have 1-2 time HHPT assessment and HEP education. Pt with decreased endurance and fatigue following prolonged infection. Pt reporting lack of activity due to covid over past months. Pt needing skilled PT services to address these issues. Would benefit from regular exercise programs or fitness center at [facility] once PT finished. Treatment Diagnosis: difficulty with gait.   Prognosis: Good  Decision Making: Low Complexity  PT Education: PT Role;Plan of Care;Goals; Home Exercise Program;Functional Mobility Training  Patient Education: Pt verbalized good understanding of HEP and DC recommendations. Barriers to Learning: none  REQUIRES PT FOLLOW UP: No  Activity Tolerance  Activity Tolerance: Patient limited by endurance     Patient Diagnosis(es): The primary encounter diagnosis was CATARINA (acute kidney injury) ([facility] Diagnoses of Acute pulmonary edema ([facility] Acute respiratory failure with hypoxemia ([address] and Anxiety, generalized were also pertinent to this visit. 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 has a past surgical history that includes angioplasty; Hysterectomy; bladder suspension; Cystoscopy (5/28/2013); Pacemaker insertion; Kidney removal; Bladder removal; Total hip arthroplasty (Left, 11/6/2019); and Skin cancer excision. Restrictions  Restrictions/Precautions  Restrictions/Precautions: Contact Precautions, Fall Risk (med fall risk, contact for MRSA urine)  Position Activity Restriction  Other position/activity restrictions: Sol Stanley is a 78 y.o. female who presents to the emergency department with concerns for her urostomy not draining and abdominal \"swelling\".  States she was at a party prior to arrival and all of a sudden felt fullness around her urostomy site and noticed the bag did not have any urine in it. She denies pain states she feels \"swollen\" in her abdomen. Nothing makes it better or worse. She finished a dose of antibiotics today for an infection but she is not sure what kind of infection or why she was taking them. Reports some nausea but no vomiting. Subjective   General  Chart Reviewed: Yes  Family / Caregiver Present: No  Subjective  Subjective: Pt reporting some soreness where shots given in abdomen, but otherwise, no pain. General Comment  Comments: Pt supine in bed upon arrival. Agreeable to working with PT. Pain Screening  Patient Currently in Pain: Yes  Pain Assessment  Pain Assessment: Faces  Nettles-Baker Pain Rating: Hurts a little bit  Pain Location: Abdomen  Vital Signs  Patient Currently in Pain: Yes       Orientation  Orientation  Overall Orientation Status: Within Functional Limits  Cognition      Objective   Bed mobility  Supine to Sit: Independent  Scooting: Independent  Comment: Pt sat EOB and crystal/doff shoes/socks independently. Transfers  Sit to Stand: Modified independent  Stand to sit: Modified independent  Comment: transfers from EOB. Ambulation  Ambulation?: Yes  Ambulation 1  Surface: level tile  Device: Rolling Walker  Other Apparatus:  (urostomy bag)  Assistance: Modified Independent  Quality of Gait: head down and RW too far ahead of pt, VCs to keep close. Gait Deviations: Slow May  Distance: Pt amb with RW and SBA for 350 ft. Comments: Pt tolerated well. Reporting LE fatigue bilat after amb. Stairs/Curb  Stairs?: No     Balance  Posture: Good  Sitting - Static: Good  Sitting - Dynamic: Good  Standing - Static: Good (with RW)  Standing - Dynamic: Good (with RW)  Exercises  Gluteal Sets: x 10 bilat  Hip Flexion: x 20 bilat  Knee Long Arc Quad: x 20 bilat  Ankle Pumps: HR/TR x 20 bilat  Comments: Pt independent with HEP.  Pt reports not being compliant with LE ther ex after her L THR and feeling increased weakness on that side. Wanting to get stronger. Comment: Discussed DC recommendations at length. Pt would benefit greatly from HEP instruction fitness center use and home set up eval from PT.              G-Code     OutComes Score                                                     AM-PAC Score  AM-PAC Inpatient Mobility Raw Score : 22 (11/02/21 1122)  AM-PAC Inpatient T-Scale Score : 53.28 (11/02/21 1122)  Mobility Inpatient CMS 0-100% Score: 20.91 (11/02/21 1122)  Mobility Inpatient CMS G-Code Modifier : CJ (11/02/21 1122)          Goals  Short term goals  Time Frame for Short term goals: To be met by DC. (all goals met in full this date)  Short term goal 1: Pt to perform transfers mod I. - Goal met 11/2  Short term goal 2: Pt to perform amb with AAD and mod I for 300 ft. - Goal met 11/2  Long term goals  Time Frame for Long term goals : LTGs=STGs  Patient Goals   Patient goals : to get stronger. Plan    Plan  Times per week: 1-2x/week  Times per day: Daily  Current Treatment Recommendations: Strengthening, Functional Mobility Training, Endurance Training  Plan Comment: No further acute care PT at this time, goals met in full. Recommend HHPT for  HEP instruction. Safety Devices  Type of devices:  All fall risk precautions in place, Call light within reach, Gait belt, Left in chair, Nurse notified  Restraints  Initially in place: No     Therapy Time   Individual Concurrent Group Co-treatment   Time In 1028         Time Out 1055         Minutes 27         Timed Code Treatment Minutes: [address], [id number]

## 2021-11-02 NOTE — PROGRESS NOTES
Patient states [address] on [address] is incorrect, I called [facility], [address] [facility] at [phone] to notify that patient would like rx filled there.

## 2021-11-02 NOTE — PROGRESS NOTES
Department of Internal Medicine  General Internal Medicine   Progress Note      SUBJECTIVE: feeling over all comfortable and painfree     History obtained from chart review and the patient  General ROS: positive for  - fatigue and malaise  negative for - chills, fever or night sweats  Psychological ROS: negative  Ophthalmic ROS: negative  Respiratory ROS: no cough, shortness of breath, or wheezing  Cardiovascular ROS: no chest pain or dyspnea on exertion  Gastrointestinal ROS: no abdominal pain, change in bowel habits, or black or bloody stools  Genito-Urinary ROS: no dysuria, trouble voiding, or hematuria  Musculoskeletal ROS: negative  Neurological ROS: no TIA or stroke symptoms  Dermatological ROS: negative     OBJECTIVE      Medications      Current Facility-Administered Medications: [START ON 11/2/2021] sulfamethoxazole-trimethoprim (BACTRIM DS;SEPTRA DS) 800-160 MG per tablet 0.5 tablet, 0.5 tablet, Oral, 2 times per day  acetaminophen (TYLENOL) tablet 1,000 mg, 1,000 mg, Oral, Daily PRN  ALPRAZolam (XANAX) tablet 0.25 mg, 0.25 mg, Oral, Nightly PRN  aspirin EC tablet 81 mg, 81 mg, Oral, Daily  fluticasone (FLONASE) 50 MCG/ACT nasal spray 1 spray, 1 spray, Nasal, PRN  hydrALAZINE (APRESOLINE) tablet 25 mg, 25 mg, Oral, TID  insulin glargine (LANTUS;BASAGLAR) injection pen 18 Units, 18 Units, SubCUTAneous, Nightly  ondansetron (ZOFRAN-ODT) disintegrating tablet 4 mg, 4 mg, Oral, Q12H PRN  rosuvastatin (CRESTOR) tablet 10 mg, 10 mg, Oral, QPM  sodium bicarbonate tablet 650 mg, 650 mg, Oral, BID  therapeutic multivitamin-minerals 1 tablet, 1 tablet, Oral, Daily  vitamin D (CHOLECALCIFEROL) tablet 1,000 Units, 1,000 Units, Oral, Daily  carvedilol (COREG) tablet 12.5 mg, 12.5 mg, Oral, BID WC  heparin (porcine) injection 5,000 Units, 5,000 Units, SubCUTAneous, 3 times per day  ALPRAZolam (XANAX) tablet 0.5 mg, 0.5 mg, Oral, BID PRN  hydrALAZINE (APRESOLINE) injection 10 mg, 10 mg, IntraVENous, Q6H PRN  0.9 % sodium chloride bolus, 500 mL, IntraVENous, PRN  insulin lispro (1 Unit Dial) 0-12 Units, 0-12 Units, SubCUTAneous, TID WC  insulin lispro (1 Unit Dial) 0-6 Units, 0-6 Units, SubCUTAneous, Nightly  glucose (GLUTOSE) 40 % oral gel 15 g, 15 g, Oral, PRN  dextrose 50 % IV solution, 12.5 g, IntraVENous, PRN  glucagon (rDNA) injection 1 mg, 1 mg, IntraMUSCular, PRN  dextrose 5 % solution, 100 mL/hr, IntraVENous, PRN    Physical      Vitals: BP (!) 150/74   Pulse 80   Temp 98.3 °F (36.8 °C) (Oral)   Resp 18   Ht 5' 8\" (1.727 m)   Wt 151 lb (68.5 kg)   SpO2 97%   BMI 22.96 kg/m²   Temp: Temp: 98.3 °F (36.8 °C)  Max: Temp  Av.8 °F (36.6 °C)  Min: 97.3 °F (36.3 °C)  Max: 98.3 °F (36.8 °C)  Respiration range:  Resp  Av.6  Min: 16  Max: 18  Pulse Range:  Pulse  Av.3  Min: 77  Max: 88  Blood pressure range:  Systolic (95BQJ), NZR:496 , Min:130 , MU:411   , Diastolic (11JYM), YMJ:29, Min:60, Max:74    SpO2  Av %  Min: 94 %  Max: 97 %    Intake/Output Summary (Last 24 hours) at 2021 2342  Last data filed at 2021 2220  Gross per 24 hour   Intake 1284.81 ml   Output 1150 ml   Net 134.81 ml       Vent settings:  Pulse  Av.4  Min: 65  Max: 95  Resp  Av.4  Min: 12  Max: 27  SpO2  Av.8 %  Min: 83 %  Max: 99 %    CONSTITUTIONAL:  fatigued, alert, cooperative, mild distress, appears stated age and thin  EYES:  Unremarkable   NECK:  Mild JVD  and supple, symmetrical, trachea midline  BACK:  symmetric and no curvature  LUNGS:  no increased work of breathing, good air exchange, no retractions and no crackles or wheezing  CARDIOVASCULAR:  normal apical pulses, regular rate and rhythm, normal S1 and S2, no S3 and no S4  ABDOMEN:  Soft BS + non tender BS +   MUSCULOSKELETAL:  No edema   NEUROLOGIC:  Grossly intact   SKIN:  Warm and dry  and no bruising or bleeding    Data      Recent Results (from the past 96 hour(s))   D-Dimer, Quantitative    Collection Time: 10/29/21  1:44 AM   Result Value Ref Range    D-Dimer, Quant 553 (H) 0 - 229 ng/mL DDU   EKG 12 Lead    Collection Time: 10/29/21  1:54 AM   Result Value Ref Range    Ventricular Rate 95 BPM    Atrial Rate 95 BPM    P-R Interval 142 ms    QRS Duration 106 ms    Q-T Interval 374 ms    QTc Calculation (Bazett) 469 ms    P Axis 43 degrees    R Axis 2 degrees    T Axis 48 degrees    Diagnosis       Sinus rhythm with occasional Premature ventricular complexes and Fusion complexesPoor R wave progressionAbnormal ECGConfirmed by WILLIAM QUINONEZ MD ([id number]) on 10/29/2021 1:12:19 PM   Basic metabolic panel    Collection Time: 10/29/21  2:20 AM   Result Value Ref Range    Sodium 139 136 - 145 mmol/L    Potassium 4.7 3.5 - 5.1 mmol/L    Chloride 105 99 - 110 mmol/L    CO2 18 (L) 21 - 32 mmol/L    Anion Gap 16 3 - 16    Glucose 198 (H) 70 - 99 mg/dL    BUN 40 (H) 7 - 20 mg/dL    CREATININE 2.0 (H) 0.6 - 1.2 mg/dL    GFR Non-African American 24 (A) >60    GFR  29 (A) >60    Calcium 9.2 8.3 - 10.6 mg/dL   COVID-19    Collection Time: 10/29/21  4:30 AM   Result Value Ref Range    SARS-CoV-2, PCR Not Detected Not Detected   SPECIMEN REJECTION    Collection Time: 10/29/21 10:03 AM   Result Value Ref Range    Rejected Test cbcnd     Reason for Rejection see below    CBC    Collection Time: 10/29/21 11:13 AM   Result Value Ref Range    WBC 8.0 4.0 - 11.0 K/uL    RBC 3.65 (L) 4.00 - 5.20 M/uL    Hemoglobin 10.9 (L) 12.0 - 16.0 g/dL    Hematocrit 33.8 (L) 36.0 - 48.0 %    MCV 92.6 80.0 - 100.0 fL    MCH 29.9 26.0 - 34.0 pg    MCHC 32.3 31.0 - 36.0 g/dL    RDW 14.8 12.4 - 15.4 %    Platelets 440 096 - 705 K/uL    MPV 7.8 5.0 - 10.5 fL   Hemoglobin A1c    Collection Time: 10/29/21 11:13 AM   Result Value Ref Range    Hemoglobin A1C 7.3 See comment %    eAG 162.8 mg/dL   POCT Glucose    Collection Time: 10/29/21  9:53 PM   Result Value Ref Range    POC Glucose 153 (H) 70 - 99 mg/dl    Performed on ACCU-CHEK    Basic Metabolic Panel    Collection Time: 10/30/21  4:41 AM   Result Value Ref Range    Sodium 143 136 - 145 mmol/L    Potassium 3.9 3.5 - 5.1 mmol/L    Chloride 107 99 - 110 mmol/L    CO2 25 21 - 32 mmol/L    Anion Gap 11 3 - 16    Glucose 60 (L) 70 - 99 mg/dL    BUN 30 (H) 7 - 20 mg/dL    CREATININE 1.5 (H) 0.6 - 1.2 mg/dL    GFR Non- 33 (A) >60    GFR  40 (A) >60    Calcium 8.9 8.3 - 10.6 mg/dL   CBC Auto Differential    Collection Time: 10/30/21  4:41 AM   Result Value Ref Range    WBC 7.5 4.0 - 11.0 K/uL    RBC 3.56 (L) 4.00 - 5.20 M/uL    Hemoglobin 10.7 (L) 12.0 - 16.0 g/dL    Hematocrit 32.5 (L) 36.0 - 48.0 %    MCV 91.3 80.0 - 100.0 fL    MCH 30.0 26.0 - 34.0 pg    MCHC 32.8 31.0 - 36.0 g/dL    RDW 14.8 12.4 - 15.4 %    Platelets 960 229 - 703 K/uL    MPV 7.5 5.0 - 10.5 fL    Neutrophils % 73.4 %    Lymphocytes % 13.0 %    Monocytes % 11.7 %    Eosinophils % 1.1 %    Basophils % 0.8 %    Neutrophils Absolute 5.5 1.7 - 7.7 K/uL    Lymphocytes Absolute 1.0 1.0 - 5.1 K/uL    Monocytes Absolute 0.9 0.0 - 1.3 K/uL    Eosinophils Absolute 0.1 0.0 - 0.6 K/uL    Basophils Absolute 0.1 0.0 - 0.2 K/uL   C-Reactive Protein    Collection Time: 10/30/21  4:41 AM   Result Value Ref Range    CRP 21.3 (H) 0.0 - 5.1 mg/L   Lactic Acid, Plasma    Collection Time: 10/30/21  4:41 AM   Result Value Ref Range    Lactic Acid 0.9 0.4 - 2.0 mmol/L   POCT Glucose    Collection Time: 10/30/21  6:50 AM   Result Value Ref Range    POC Glucose 105 (H) 70 - 99 mg/dl    Performed on ACCU-CHEK    POCT Glucose    Collection Time: 10/30/21  7:25 AM   Result Value Ref Range    POC Glucose 100 (H) 70 - 99 mg/dl    Performed on ACCU-CHEK    POCT Glucose    Collection Time: 10/30/21 11:40 AM   Result Value Ref Range    POC Glucose 207 (H) 70 - 99 mg/dl    Performed on ACCU-CHEK    POCT Glucose    Collection Time: 10/30/21  4:31 PM   Result Value Ref Range    POC Glucose 135 (H) 70 - 99 mg/dl    Performed on ACCU-CHEK    POCT Glucose    Collection Time: 10/30/21  8:48 PM Result Value Ref Range    POC Glucose 158 (H) 70 - 99 mg/dl    Performed on ACCU-CHEK    POCT Glucose    Collection Time: 10/31/21  2:40 AM   Result Value Ref Range    POC Glucose 71 70 - 99 mg/dl    Performed on ACCU-CHEK    Basic Metabolic Panel    Collection Time: 10/31/21  4:39 AM   Result Value Ref Range    Sodium 142 136 - 145 mmol/L    Potassium 3.7 3.5 - 5.1 mmol/L    Chloride 107 99 - 110 mmol/L    CO2 26 21 - 32 mmol/L    Anion Gap 9 3 - 16    Glucose 53 (L) 70 - 99 mg/dL    BUN 29 (H) 7 - 20 mg/dL    CREATININE 1.5 (H) 0.6 - 1.2 mg/dL    GFR Non- 33 (A) >60    GFR  40 (A) >60    Calcium 9.0 8.3 - 10.6 mg/dL   CBC Auto Differential    Collection Time: 10/31/21  4:39 AM   Result Value Ref Range    WBC 4.8 4.0 - 11.0 K/uL    RBC 3.19 (L) 4.00 - 5.20 M/uL    Hemoglobin 9.6 (L) 12.0 - 16.0 g/dL    Hematocrit 28.6 (L) 36.0 - 48.0 %    MCV 89.6 80.0 - 100.0 fL    MCH 30.1 26.0 - 34.0 pg    MCHC 33.6 31.0 - 36.0 g/dL    RDW 14.9 12.4 - 15.4 %    Platelets 274 924 - 677 K/uL    MPV 7.7 5.0 - 10.5 fL    Neutrophils % 61.1 %    Lymphocytes % 22.3 %    Monocytes % 13.6 %    Eosinophils % 2.2 %    Basophils % 0.8 %    Neutrophils Absolute 2.9 1.7 - 7.7 K/uL    Lymphocytes Absolute 1.1 1.0 - 5.1 K/uL    Monocytes Absolute 0.7 0.0 - 1.3 K/uL    Eosinophils Absolute 0.1 0.0 - 0.6 K/uL    Basophils Absolute 0.0 0.0 - 0.2 K/uL   POCT Glucose    Collection Time: 10/31/21  7:13 AM   Result Value Ref Range    POC Glucose 67 (L) 70 - 99 mg/dl    Performed on ACCU-CHEK    POCT Glucose    Collection Time: 10/31/21 11:15 AM   Result Value Ref Range    POC Glucose 231 (H) 70 - 99 mg/dl    Performed on ACCU-CHEK    POCT Glucose    Collection Time: 10/31/21  4:55 PM   Result Value Ref Range    POC Glucose 140 (H) 70 - 99 mg/dl    Performed on ACCU-CHEK    POCT Glucose    Collection Time: 10/31/21  9:59 PM   Result Value Ref Range    POC Glucose 178 (H) 70 - 99 mg/dl    Performed on ACCU-CHEK    CBC Auto Differential    Collection Time: 11/01/21  6:57 AM   Result Value Ref Range    WBC 6.3 4.0 - 11.0 K/uL    RBC 3.68 (L) 4.00 - 5.20 M/uL    Hemoglobin 11.0 (L) 12.0 - 16.0 g/dL    Hematocrit 33.5 (L) 36.0 - 48.0 %    MCV 91.2 80.0 - 100.0 fL    MCH 29.9 26.0 - 34.0 pg    MCHC 32.8 31.0 - 36.0 g/dL    RDW 14.4 12.4 - 15.4 %    Platelets 196 797 - 336 K/uL    MPV 7.9 5.0 - 10.5 fL    Neutrophils % 68.4 %    Lymphocytes % 18.6 %    Monocytes % 9.2 %    Eosinophils % 3.0 %    Basophils % 0.8 %    Neutrophils Absolute 4.3 1.7 - 7.7 K/uL    Lymphocytes Absolute 1.2 1.0 - 5.1 K/uL    Monocytes Absolute 0.6 0.0 - 1.3 K/uL    Eosinophils Absolute 0.2 0.0 - 0.6 K/uL    Basophils Absolute 0.0 0.0 - 0.2 K/uL   Basic Metabolic Panel    Collection Time: 11/01/21  6:57 AM   Result Value Ref Range    Sodium 144 136 - 145 mmol/L    Potassium 4.3 3.5 - 5.1 mmol/L    Chloride 107 99 - 110 mmol/L    CO2 25 21 - 32 mmol/L    Anion Gap 12 3 - 16    Glucose 89 70 - 99 mg/dL    BUN 26 (H) 7 - 20 mg/dL    CREATININE 1.6 (H) 0.6 - 1.2 mg/dL    GFR Non- 31 (A) >60    GFR  37 (A) >60    Calcium 9.6 8.3 - 10.6 mg/dL   POCT Glucose    Collection Time: 11/01/21  8:19 AM   Result Value Ref Range    POC Glucose 103 (H) 70 - 99 mg/dl    Performed on ACCU-CHEK    POCT Glucose    Collection Time: 11/01/21 11:40 AM   Result Value Ref Range    POC Glucose 290 (H) 70 - 99 mg/dl    Performed on ACCU-CHEK    POCT Glucose    Collection Time: 11/01/21  4:57 PM   Result Value Ref Range    POC Glucose 103 (H) 70 - 99 mg/dl    Performed on ACCU-CHEK    POCT Glucose    Collection Time: 11/01/21  9:36 PM   Result Value Ref Range    POC Glucose 126 (H) 70 - 99 mg/dl    Performed on ACCU-CHEK        ASSESSMENT AND PLAN     Active Problems:    CATARINA (acute kidney injury) ([facility]    Cardiomy[facility]    Ischemic cardiomyopathy    Essential hypertension    UTI (urinary tract infection)    DM2 (diabetes mellitus, type 2) ([facility] Acute-on-chronic renal failure (HCC)    Chronic coronary artery disease    Mixed anxiety depressive disorder    Gastro-esophageal reflux disease without esophagitis    Hydroureteronephrosis    Hyperlipidemia    Anemia, normocytic normochromic    Hydroureter on left    Acute renal failure (ARF) ([address]    Suspected COVID-19 virus infection  Resolved Problems:    * No resolved hospital problems.  *    Switch to PO Bactrim per enterobacter sensitivity report    PT OT eval and treat    renal function modestly im proved    DVT prophylaxis

## 2021-11-02 NOTE — PLAN OF CARE
Problem: Pain:  Goal: Pain level will decrease  Description: Pain level will decrease  11/2/2021 0243 by Moraima Grayson RN  Outcome: Ongoing  11/1/2021 1711 by Emani Fan RN  Outcome: Ongoing  Goal: Control of acute pain  Description: Control of acute pain  Outcome: Ongoing  Goal: Control of chronic pain  Description: Control of chronic pain  Outcome: Ongoing     Problem: Urinary Elimination:  Goal: Complications related to the disease process, condition or treatment will be avoided or minimized  11/2/2021 0243 by Moraima Grayson RN  Outcome: Ongoing  11/1/2021 1711 by Emani Fan RN  Outcome: Ongoing     Problem:  Activity:  Goal: Ability to tolerate increased activity will improve  Description: Ability to tolerate increased activity will improve  11/2/2021 0243 by Moraima Grayson RN  Outcome: Ongoing  11/1/2021 1711 by Emani Fan RN  Outcome: Ongoing  Goal: Capacity to carry out activities will improve  Description: Capacity to carry out activities will improve  Outcome: Ongoing  Goal: Fatigue will decrease  Description: Fatigue will decrease  Outcome: Ongoing  Goal: Energy level will increase  Description: Energy level will increase  Outcome: Ongoing  Goal: Ability to implement measures to reduce episodes of fatigue will improve  Description: Ability to implement measures to reduce episodes of fatigue will improve  Outcome: Ongoing     Problem: Discharge Planning:  Goal: Discharged to appropriate level of care  Description: Discharged to appropriate level of care  11/2/2021 0243 by Moraima Grayson RN  Outcome: Ongoing  11/1/2021 1711 by Emani Fan RN  Outcome: Ongoing     Problem: Coping:  Goal: Ability to identify and develop effective coping behavior will improve  Description: Ability to identify and develop effective coping behavior will improve  11/2/2021 0243 by Moraima Grayson RN  Outcome: Ongoing  11/1/2021 1711 by Emani Fan RN  Outcome: Ongoing  Goal: Ability to identify and utilize available resources and services will improve  Description: Ability to identify and utilize available resources and services will improve  Outcome: Ongoing     Problem: Falls - Risk of:  Goal: Will remain free from falls  Description: Will remain free from falls  11/2/2021 0243 by Lillian Hawk RN  Outcome: Ongoing  11/1/2021 1711 by Husam Trotter RN  Outcome: Ongoing  Goal: Absence of physical injury  Description: Absence of physical injury  Outcome: Ongoing

## 2021-11-18 NOTE — PROGRESS NOTES
Patient seen , discharge dictated scripts given , arrangements made , KIMBERLI completed .  Discussed with nursing staff  And   If applicable ,  Discussed with  Patient's family , all questions answered and concerns addressed  When applicable

## 2021-11-19 NOTE — DISCHARGE SUMMARY
[address]                     [address] [facility], [facility] [address]                               DISCHARGE SUMMARY    PATIENT NAME: Ayla Garcia                     :        1940  MED REC NO:   [id number]                          ROOM:       3316  ACCOUNT NO:   [de-identified]                           ADMIT DATE: 10/13/2021  PROVIDER:     Augie Mota MD                  DISCHARGE DATE:  10/18/2021    FINAL DIAGNOSES:  1.  Kidney stones. 2.  Left-sided hydronephrosis and hydroureter. 3.  Acute-on-chronic kidney disease. 4.  Hypertension. DISCHARGE MEDICATIONS:  1. Ceftin 250 p.o. b.i.d. for 10 days. 2.  Zofran orally disintegrating tablet one tablet by mouth every 12  hours p.r.n.  3.  Hydralazine 25 p.o. t.i.d.  4.  Coreg 12.5 b.i.d.  5.  Aspirin 81 mg once a day. 6.  Tylenol 500 four times a day p.r.n.  7.  Lantus 18 units nightly. 8.  Sodium bicarbonate 650 mg twice a day. 9.  Crestor 10 mg once a day. 10.  Coenzyme Q10 one tablet daily. 11.  Flonase one spray each nostril daily. 12.  Multivitamin once a day. HOSPITAL COURSE:  This elderly woman came to the emergency room with a  history of left-sided flank pain. The patient had left-sided kidney  stone with hydronephrosis. She was brought in by [facility]. Her  temperature 98.3, blood pressure 147/67, respirations 15, heart rate 78,  O2 sat 93% on room air. Sodium 138, potassium 4.8, BUN 33 and  creatinine 2.0. Platelet count 772. Urinalysis showed UTI. Urine  culture was growing Klebsiella and Proteus species. CT of the abdomen  and pelvis shows moderate severe left-sided hydronephrosis, wall  thickening, surrounding inflammatory changes of the neobladder and ileal  conduit involving the left paranephric fat layer. Urology consultation  was obtained. Echocardiogram last year was showing LVEF of 53%,  diastolic filling parameters suggest grade 2 diastolic dysfunction.    After three to four days of treatment, general condition improved. The  patient was remaining pain free. The patient was discharged in stable  condition to [facility]. Necessary discharge  arrangements were made. The patient was discharged in stable condition.         Fred Sandra MD    D: 11/18/2021 14:07:37       T: 11/19/2021 6:37:13     [email or web address]  Job#: [id number]     Doc#: [id number]    CC:

## 2021-11-28 PROBLEM — N39.0 UTI (URINARY TRACT INFECTION): Status: RESOLVED | Noted: 2017-04-27 | Resolved: 2021-11-28

## 2021-12-01 ENCOUNTER — NURSE ONLY (OUTPATIENT)
Dept: CARDIOLOGY CLINIC | Age: 81
End: 2021-12-01
Payer: MEDICARE

## 2021-12-01 DIAGNOSIS — I50.22 CHRONIC SYSTOLIC (CONGESTIVE) HEART FAILURE (HCC): ICD-10-CM

## 2021-12-01 DIAGNOSIS — Z95.810 AUTOMATIC IMPLANTABLE CARDIOVERTER-DEFIBRILLATOR IN SITU: ICD-10-CM

## 2021-12-01 DIAGNOSIS — I25.5 ISCHEMIC CARDIOMYOPATHY: ICD-10-CM

## 2021-12-01 PROCEDURE — 93295 DEV INTERROG REMOTE 1/2/MLT: CPT | Performed by: INTERNAL MEDICINE

## 2021-12-01 PROCEDURE — 93297 REM INTERROG DEV EVAL ICPMS: CPT | Performed by: INTERNAL MEDICINE

## 2021-12-01 PROCEDURE — 93296 REM INTERROG EVL PM/IDS: CPT | Performed by: INTERNAL MEDICINE

## 2021-12-02 NOTE — PROGRESS NOTES
We received remote transmission from patient's single chamber ICD monitor at home. Transmission shows normal sensing and pacing function. No new arrhythmias/events recorded. Possible Optivol fluid accumulation 10.25-10.30.21 and resolved; slight elevation noted currently, not yet to 60 threshold. EP physician will review. See interrogation under cardiology tab in the [address] [place] [address] [address] field for more details. Will continue to monitor remotely.

## 2021-12-03 NOTE — DISCHARGE SUMMARY
[address]                     [address] [facility], [address]                               DISCHARGE SUMMARY    PATIENT NAME: Burnadette Shone                     :        1940  MED REC NO:   [id number]                          ROOM:       2656  ACCOUNT NO:   [de-identified]                           ADMIT DATE: 10/28/2021  PROVIDER:     Kendal Avelar MD                  DISCHARGE DATE:  2021    FINAL DIAGNOSES:  1. Acute renal failure superimposed on chronic kidney disease. 2.  Obstructive uropathy. 3.  Parastomal hernia. 4.  Cholelithiasis. 5.  Chronic diastolic heart failure. 6.  Ischemic cardiomyopathy. 7.  Essential hypertension. 8.  Pulmonary edema versus COVID-19 disease. 9.  History of renal cell carcinoma. 10.  Diabetes mellitus. 11.  Low-grade urinary tract infection. 12.  Acute-on-chronic kidney disease. 13.  Coronary atherosclerosis. 14.  Chronic ischemic heart disease. 15.  Anxiety neurosis. 16.  Gastroesophageal reflux disease. 17.  Hydroureteronephrosis. 18.  Hyperlipidemia. 19.  Anemia of chronic disease. DISCHARGE MEDICATIONS:  1. Xanax 0.25 mg nightly as needed. 2.  Insulin lispro sliding scale and prandial coverage. 3.  Bactrim half tablet b.i.d.  4.  Hydralazine 25 mg p.o. t.i.d.  5.  Coreg 12.5 mg twice a day. 6.  Aspirin 81 mg once a day. 7.  Tylenol 650 q. 4 p.r.n.  8.  Insulin glargine 18 units nightly. 9.  Sodium bicarbonate 650 mg p.o. b.i.d. 10.  Crestor 10 mg every evening. 11.  Coenzyme Q10 one capsule by mouth daily. 12.  Flonase 50 mcg one spray each nostril daily. 13.  Multivitamin once a day. 14.  Vitamin D3 at 1000 units daily. 15.  Zofran orally disintegrating tablet 1 tablet every 12 hours as  needed. HOSPITAL COURSE:  This elderly lady came to the emergency room with  increasing lethargy, altered mental status. Sodium 139, potassium 4.7,  chloride 105.   BUN 40, creatinine 2.0; baseline was just 18 and 1.4. White blood cell count 5.8, hemoglobin/hematocrit 10.4 and 31.9. The  patient admitted with acute-on-chronic renal failure. Treated with IV  hydration. Nephrology consultation comanagement. The patient was a  full code. The patient was treated for 3 to 4 days. Finally, her BUN  and creatinine improved to 27 and 1.5. White blood cell count improved  to 6. 3.  hemoglobin/hematocrit at 11 and 33.5. Urinalysis shows 15  wbc's. CT scan of the abdomen and pelvis shows improved resolving  hydronephrosis on the left with minimal left perinephric fat stranding  identified, improved appearance of the parastomal hernia identified on  the right containing omental fat. The patient did have cholelithiasis  and colonic diverticulosis without evidence of diverticulitis. Dr. Jessica leone saw the patient for nephrology consultation  comanagement. Dr. Clyde Estrada saw the patient. The patient does  have a known history of bladder cancer. Blood pressure was 152/84,  heart rate 88, respiration rate 19, temperature 98.2,  hemoglobin/hematocrit was 9.6 and 28.6. The patient was not a surgical  urology candidate. DISCHARGE INSTRUCTIONS:  The patient was discharged in stable condition  when medically best.  The patient was stable. The patient discharged in  stable condition on 11/02. Dr. Yoana Mccarthy Nephrology Service signed off  the case. The patient was discharged to [facility]. Transportation provided by [facility]. Renal failure had resolved.   The  patient discharged in stable condition        Marly Callahan MD    D: 12/01/2021 23:35:37       T: 12/01/2021 23:38:11     [email or web address]  Job#: [id number]     Doc#: [id number]    CC:

## 2021-12-17 NOTE — TELEPHONE ENCOUNTER
I spoke with pt. She said that she is feeling bad. She has had a loss of appetite and has lost about 40 lbs since getting the device implant. She said that she \"just feels down\". I had Rufino Simpson at the  schedule her an appt to see Sherin Ramirez on Nov 22 at 11:00am with device check. Pt confirmed appt.
Nicolette Collet called in stating that she went to see her PCP today who told her that she needs to get in to see her cardiologist due to slow HR and possibly discuss getting a pacemaker.     Nicolette Collet can be reached at [phone]
Reviewed EKG and discussed with Dr. Jose Mccarthy. He agreed that it is likely PVC's causing the abnormal HR- but it is unlikely to be that low in reality. If she is feeling bad then she can be scheduled with Yolanda Katz next week. Her lower rate could be increased to 50 at that time if Yolanda Katz believes it would be helpful. Please call.
Patient

## 2022-01-19 ENCOUNTER — NURSE ONLY (OUTPATIENT)
Dept: CARDIOLOGY CLINIC | Age: 82
End: 2022-01-19
Payer: MEDICARE

## 2022-01-19 DIAGNOSIS — I25.5 ISCHEMIC CARDIOMYOPATHY: ICD-10-CM

## 2022-01-19 DIAGNOSIS — Z95.810 AUTOMATIC IMPLANTABLE CARDIOVERTER-DEFIBRILLATOR IN SITU: ICD-10-CM

## 2022-01-19 DIAGNOSIS — I50.22 CHRONIC SYSTOLIC (CONGESTIVE) HEART FAILURE (HCC): ICD-10-CM

## 2022-01-20 ENCOUNTER — TELEPHONE (OUTPATIENT)
Dept: CARDIOLOGY CLINIC | Age: 82
End: 2022-01-20

## 2022-01-20 PROCEDURE — 93297 REM INTERROG DEV EVAL ICPMS: CPT | Performed by: NURSE PRACTITIONER

## 2022-01-20 PROCEDURE — G2066 INTER DEVC REMOTE 30D: HCPCS | Performed by: NURSE PRACTITIONER

## 2022-01-20 NOTE — TELEPHONE ENCOUNTER
Remote device check completed. Optivol reviewed. Thoracic impedence decreased indicating fluid accumulation.  Please call patient and assess for S/S of HF including SOB, wt gain, orthopnea, abd distention, swelling, fatigue, etc.

## 2022-01-20 NOTE — TELEPHONE ENCOUNTER
Called number on file and patients nurse states that she has been doing fine. Patient has been up and moving around even doing her own laundry. Patient has not complained of any sob, edema, fatigue, cough, or chest pain.

## 2022-01-20 NOTE — PROGRESS NOTES
We received remote transmission from patient's single chamber ICD monitor at home. Transmission shows normal sensing and pacing function. Brief AT noted (Coreg). Possible Optivol fluid accumulation 12.04.21-01.02.22, resolved; slight elevation up to 20 noted currently w increasing trend towards reference line noted on TI. NP will review. See interrogation under cardiology tab in the [address] [place] [address] [address] field for more details. Will continue to monitor remotely.

## 2022-03-02 ENCOUNTER — NURSE ONLY (OUTPATIENT)
Dept: CARDIOLOGY CLINIC | Age: 82
End: 2022-03-02
Payer: MEDICARE

## 2022-03-02 ENCOUNTER — TELEPHONE (OUTPATIENT)
Dept: CARDIOLOGY CLINIC | Age: 82
End: 2022-03-02

## 2022-03-02 DIAGNOSIS — Z95.810 AUTOMATIC IMPLANTABLE CARDIOVERTER-DEFIBRILLATOR IN SITU: ICD-10-CM

## 2022-03-02 DIAGNOSIS — I25.5 ISCHEMIC CARDIOMYOPATHY: ICD-10-CM

## 2022-03-02 DIAGNOSIS — I50.22 CHRONIC SYSTOLIC HEART FAILURE (HCC): ICD-10-CM

## 2022-03-02 PROCEDURE — G2066 INTER DEVC REMOTE 30D: HCPCS | Performed by: NURSE PRACTITIONER

## 2022-03-02 PROCEDURE — 93297 REM INTERROG DEV EVAL ICPMS: CPT | Performed by: NURSE PRACTITIONER

## 2022-03-02 PROCEDURE — 93296 REM INTERROG EVL PM/IDS: CPT | Performed by: INTERNAL MEDICINE

## 2022-03-02 PROCEDURE — 93295 DEV INTERROG REMOTE 1/2/MLT: CPT | Performed by: INTERNAL MEDICINE

## 2022-03-02 NOTE — TELEPHONE ENCOUNTER
Remote device check completed. Optivol reviewed. Thoracic impedence decreased indicating fluid accumulation. Please call patient and assess for S/S of HF. Please ask:  Worsening SOB above baseline? Orthopnea/unable to lay flat without getting SOB? PND/ dyspnea that wakes them up at night? Chest pain/presure?:   Weight gain?:     What is their Dry weight:       Today' weight:  Edema/ swelling worse than their baseline? Abdominal Distention/bloating? Activity level/ not tolerating typical activity? Lightheaded/syncope? Follows 2gm Na Diet? Follows Fluid Restriction?

## 2022-03-02 NOTE — TELEPHONE ENCOUNTER
We received remote transmission from patient's single chamber ICD monitor at home. Possible Optivol fluid accumulation 02.10. 22-ongoing, currently elevated up to 50 (below 60 threshold) w plateau noted; TI slightly below reference line. NP notified.

## 2022-03-02 NOTE — PROGRESS NOTES
We received remote transmission from patient's single chamber ICD monitor at home. Transmission shows normal sensing and pacing function. No new arrhythmias/events recorded. Possible Optivol fluid accumulation 02.10. 22-ongoing, currently elevated up to 50 (below 60 threshold) w plateau noted; TI slightly below reference line. NP notified. EP physician will review. See interrogation under cardiology tab in the [address] [place] [address] [address] field for more details. Will continue to monitor remotely.

## 2022-03-02 NOTE — TELEPHONE ENCOUNTER
Called number on file and patients nurse states Aviva Jordan has not been complaining of any CHF symptoms. Cely's nurse states they are not to sure if she has had any weight gain. Aviva Jordan has not complained of any sob, swelling, chest pain, or abdominal fullness.

## 2022-04-20 ENCOUNTER — NURSE ONLY (OUTPATIENT)
Dept: CARDIOLOGY CLINIC | Age: 82
End: 2022-04-20
Payer: MEDICARE

## 2022-04-20 DIAGNOSIS — I25.5 ISCHEMIC CARDIOMYOPATHY: ICD-10-CM

## 2022-04-20 DIAGNOSIS — I50.22 CHRONIC SYSTOLIC (CONGESTIVE) HEART FAILURE (HCC): ICD-10-CM

## 2022-04-20 DIAGNOSIS — Z95.810 AUTOMATIC IMPLANTABLE CARDIOVERTER-DEFIBRILLATOR IN SITU: ICD-10-CM

## 2022-04-22 PROCEDURE — G2066 INTER DEVC REMOTE 30D: HCPCS | Performed by: NURSE PRACTITIONER

## 2022-04-22 PROCEDURE — 93297 REM INTERROG DEV EVAL ICPMS: CPT | Performed by: NURSE PRACTITIONER

## 2022-04-22 NOTE — PROGRESS NOTES
We received remote transmission from patient's single chamber ICD monitor at home. Transmission shows normal sensing and pacing function. No new arrhythmias/events recorded. Possible Optivol fluid accumulation 02.10-03.20.22, now resolved and WNL; TI back to reference line. NP will review. See interrogation under cardiology tab in the [address] [place] [address] [address] field for more details. Will continue to monitor remotely.

## 2022-06-01 ENCOUNTER — NURSE ONLY (OUTPATIENT)
Dept: CARDIOLOGY CLINIC | Age: 82
End: 2022-06-01
Payer: MEDICARE

## 2022-06-01 DIAGNOSIS — I50.22 CHRONIC SYSTOLIC HF (HEART FAILURE) (HCC): ICD-10-CM

## 2022-06-01 DIAGNOSIS — I42.9 CARDIOMYOPATHY, UNSPECIFIED TYPE (HCC): ICD-10-CM

## 2022-06-01 DIAGNOSIS — Z95.810 AUTOMATIC IMPLANTABLE CARDIOVERTER-DEFIBRILLATOR IN SITU: ICD-10-CM

## 2022-06-01 PROCEDURE — 93297 REM INTERROG DEV EVAL ICPMS: CPT | Performed by: INTERNAL MEDICINE

## 2022-06-01 PROCEDURE — 93295 DEV INTERROG REMOTE 1/2/MLT: CPT | Performed by: INTERNAL MEDICINE

## 2022-06-01 PROCEDURE — 93296 REM INTERROG EVL PM/IDS: CPT | Performed by: INTERNAL MEDICINE

## 2022-06-01 NOTE — PROGRESS NOTES
We received remote transmission from patient's single chamber ICD monitor at home. Transmission shows normal sensing and pacing function. No new arrhythmias/events recorded. Optivol is WNL. EP will review. See interrogation under cardiology tab in the [address] [place] [address] [address] field for more details. Will continue to monitor remotely.

## 2022-07-27 ENCOUNTER — NURSE ONLY (OUTPATIENT)
Dept: CARDIOLOGY CLINIC | Age: 82
End: 2022-07-27
Payer: MEDICARE

## 2022-07-27 DIAGNOSIS — I25.5 ISCHEMIC CARDIOMYOPATHY: Primary | ICD-10-CM

## 2022-07-27 DIAGNOSIS — Z95.810 AUTOMATIC IMPLANTABLE CARDIOVERTER-DEFIBRILLATOR IN SITU: ICD-10-CM

## 2022-07-27 DIAGNOSIS — I50.22 CHRONIC SYSTOLIC (CONGESTIVE) HEART FAILURE (HCC): ICD-10-CM

## 2022-07-27 PROCEDURE — G2066 INTER DEVC REMOTE 30D: HCPCS | Performed by: NURSE PRACTITIONER

## 2022-07-27 PROCEDURE — 93297 REM INTERROG DEV EVAL ICPMS: CPT | Performed by: NURSE PRACTITIONER

## 2022-07-27 NOTE — PROGRESS NOTES
Remote transmission received from patient's single chamber ICD monitor at home. Transmission shows normal sensing and pacing function. Noted NSVT vs PSVT (Coreg). Optivol is within normal range, currently elevated up to 60 threshold w plateau noted; TI has returned back to reference line. TriageHF Heart Failure Risk Status on 27-Jul-2022 is Medium*. End of 31-day monitoring period 7/27/22. NP will review. See interrogation under cardiology tab in the [address] [place] [address] [address] field for more details. Will continue to monitor remotely.

## 2022-08-04 NOTE — PROGRESS NOTES
[address] [id number] [phone]    PrimaryCare Doctor:  Tessie Renteria MD  Primary Cardiologist: Dr. Alisa Mcnamara    Chief Complaint   Patient presents with    Follow-up     No cc       History of Present Illness:  Fabiola Calix is a 78 y.o. 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 Patient presents to [facility] cardiology for follow up for heart failure, HTN and CAD. Last seen by [facility] 8/2021  Recent device check 7/29/2022 revealed decreased thoracic impendence. Patient reported abd bloating, SOB and not feeling well. She was scheduled for this appt. Today she mostly C/O abd discomfort R/T UTI and hernia which is being treated by PCP. BP slightly elevated today. She does not monitor at home. She says she has increased anxiety- worsened D/T her living arrangements. She doesn't like assisted living facility she is residing in. She lives alone (assisted living facility) but is completely independent with her own care. She is able to do routine self care, housework, cooking. She denies any SOB, CP, edema, LH, dizziness, palpitations, syncope, HA or vision chsnges. Review of Systems:   General: Denies fever, chills, fatigue, weakness   Skin: Denies skin changes, rash, itching, lesions.   HEENT: Denies headache, dizziness, vision changes, nosebleeds, sore throat, nasal drainage  RESP: Denies cough, sputum, dyspnea, wheeze, snoring  CARD: Denies palpitations,  murmur  GI:Denies nausea, vomiting, heartburn, loss of appetite, change in bowels  : Denies frequency, pain, incontinence, polyuria  VASC: Denies claudication, leg cramps, clots  MUSC/SKEL: Denies pain, stiffness, arthritis  PSYCH: Denies anxiety, depression, stress  NEURO: Denies numbness, tingling, weakness,change in mood or memory  HEME: Denies abn bruising, bleeding, anemia  ENDO: Denies intolerance to heat, cold, excessive thirst or hunger, hx thyroid disease    BP (!) 144/78   Pulse 67   Ht 5' 8\" (1.727 m)   SpO2 98%   BMI 22.96 kg/m²   Wt Readings from Last 3 Encounters:   10/31/21 151 lb (68.5 kg)   10/18/21 145 lb 3.2 oz (65.9 kg)   08/25/21 144 lb (65.3 kg)       Physical Exam:  GEN: Appears well, no acute distress  SKIN: Pink, warm, dry. Nails without clubbing. HEENT: PERRLA. Normocephalic, atraumatic. Neck supple. No adenopathy. LUNG: AP diameter normal. Clear bilateral. No wheeze, rales, or ronchi. Respiratory effort normal.  HEART: S1S2 A/R. No JVD. No carotid bruit. No murmur, rub or gallop. ABD: Soft, nontender. +BS X 4 quads. No hepatomegaly. EXT: Radial and pedal pulses 2+ and symmetric. Without varicosities. No edema. MUSCSKEL: Good ROM X4 extremities. No deformity. NEURO: A/O X3. Calm and cooperative. 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 Surgical History:   has a past surgical history that includes angioplasty; Hysterectomy; bladder suspension; Cystoscopy (5/28/2013); Pacemaker insertion; Kidney removal; Bladder removal; Total hip arthroplasty (Left, 11/6/2019); and Skin cancer excision.    Social History:   reports that she has never smoked. She has never used smokeless tobacco. She reports that she does not drink alcohol and does not use drugs. Family History:   Family History   Problem Relation Age of Onset    Diabetes Mother     Heart Disease Father     Cancer Father        HomeMedications:  Prior to Admission medications    Medication Sig Start Date End Date Taking? Authorizing Provider   insulin lispro, 1 Unit Dial, 100 UNIT/ML SOPN Inject 0-12 Units into the skin 3 times daily (with meals) 11/2/21  Yes Braxton Quezada MD   insulin lispro, 1 Unit Dial, 100 UNIT/ML SOPN Inject 0-6 Units into the skin nightly 11/2/21  Yes Braxton Quezada MD   ondansetron (ZOFRAN ODT) 4 MG disintegrating tablet Take 1-2 tablets by mouth every 12 hours as needed for Nausea May Sub regular tablet (non-ODT) if insurance does not cover ODT. 7/11/21  Yes Selena Blackwell PA-C   hydrALAZINE (APRESOLINE) 25 MG tablet TAKE ONE TABLET BY MOUTH THREE TIMES A DAY  Patient taking differently: Pt states she is only taking 2 tablets daily 5/3/21  Yes Nasreen Bahena MD   carvedilol (COREG) 12.5 MG tablet TAKE ONE TABLET BY MOUTH TWICE A DAY 2/8/21  Yes Nasreen Bahena MD   aspirin 81 MG EC tablet Take 1 tablet by mouth daily HOLD for 30 days after hip surgery.  See Eliquis order 11/6/19  Yes RIZWAN Toscano CNP   acetaminophen (TYLENOL) 500 MG tablet Take 1,000 mg by mouth daily as needed for Pain    Yes Historical Provider, MD   insulin glargine (LANTUS) 100 UNIT/ML injection vial Inject 18 Units into the skin nightly Sliding scale   Yes Historical Provider, MD   sodium bicarbonate 650 MG tablet Take 650 mg by mouth 2 times daily    Yes Historical Provider, MD   rosuvastatin (CRESTOR) 10 MG tablet Take 10 mg by mouth every evening    Yes Historical Provider, MD   Coenzyme Q10 (CO Q 10) 100 MG CAPS Take 1 capsule by mouth daily    Yes Historical Provider, MD   fluticasone (FLONASE) 50 MCG/ACT nasal spray 1 spray by Nasal route as needed for Rhinitis   Yes Historical Provider, MD   therapeutic multivitamin-minerals (THERAGRAN-M) tablet Take 1 tablet by mouth daily. Centrum silver    Yes Historical Provider, MD   vitamin D (CHOLECALCIFEROL) 1000 UNIT TABS tablet Take 1,000 Units by mouth daily    Yes Historical Provider, MD        Allergies:  Amlodipine besylate, Calcium channel blockers, Citalopram hydrobromide, Losartan, Pneumococcal vaccine, Simvastatin, Valsartan-hydrochlorothiazide, Venlafaxine, Flagyl [metronidazole], Hydrocodone-acetaminophen, and Nitrofurantoin       LABS: Results reviewed with patient today.     CBC:   Lab Results   Component Value Date/Time    WBC 6.3 11/01/2021 06:57 AM    WBC 4.8 10/31/2021 04:39 AM    WBC 7.5 10/30/2021 04:41 AM    RBC 3.68 11/01/2021 06:57 AM    RBC 3.19 10/31/2021 04:39 AM    RBC 3.56 10/30/2021 04:41 AM    HGB 11.0 11/01/2021 06:57 AM    HGB 9.6 10/31/2021 04:39 AM    HGB 10.7 10/30/2021 04:41 AM    HCT 33.5 11/01/2021 06:57 AM    HCT 28.6 10/31/2021 04:39 AM    HCT 32.5 10/30/2021 04:41 AM    MCV 91.2 11/01/2021 06:57 AM    MCV 89.6 10/31/2021 04:39 AM    MCV 91.3 10/30/2021 04:41 AM    RDW 14.4 11/01/2021 06:57 AM    RDW 14.9 10/31/2021 04:39 AM    RDW 14.8 10/30/2021 04:41 AM     11/01/2021 06:57 AM     10/31/2021 04:39 AM     10/30/2021 04:41 AM     BMP:  Lab Results   Component Value Date/Time     11/02/2021 05:34 AM     11/01/2021 06:57 AM     10/31/2021 04:39 AM    K 4.0 11/02/2021 05:34 AM    K 4.3 11/01/2021 06:57 AM    K 3.7 10/31/2021 04:39 AM    K 4.2 08/17/2021 09:19 AM    K 4.3 04/17/2021 05:45 AM    K 4.4 07/30/2020 07:22 AM     11/02/2021 05:34 AM     11/01/2021 06:57 AM     10/31/2021 04:39 AM    CO2 24 11/02/2021 05:34 AM    CO2 25 11/01/2021 06:57 AM    CO2 26 10/31/2021 04:39 AM    PHOS 3.5 04/12/2021 10:59 AM    PHOS 4.1 01/07/2021 12:08 PM    PHOS 4.4 08/19/2020 01:40 PM    BUN 27 11/02/2021 05:34 AM    BUN 26 11/01/2021 06:57 AM    BUN 29 10/31/2021 04:39 AM    CREATININE 1.5 11/02/2021 05:34 AM    CREATININE 1.6 11/01/2021 06:57 AM    CREATININE 1.5 10/31/2021 04:39 AM     BNP:   Lab Results   Component Value Date/Time    PROBNP 1,383 10/28/2021 08:26 PM    PROBNP 2,881 07/11/2021 11:18 AM    PROBNP 832 04/17/2021 05:45 AM     Iron Studies:    Lab Results   Component Value Date/Time    TIBC 285 01/07/2021 12:08 PM    TIBC 275 02/10/2020 12:50 PM    TIBC 247 02/11/2019 09:40 AM    FERRITIN 206.1 01/07/2021 12:08 PM    FERRITIN 319.7 02/10/2020 12:50 PM    FERRITIN 579.0 02/11/2019 09:40 AM     GLUCOSE:   No results for input(s): GLUCOSE in the last 72 hours. LIVER PROFILE:   Lab Results   Component Value Date/Time    AST 15 10/28/2021 08:26 PM    ALT <5 10/28/2021 08:26 PM    LIPASE 41.0 10/13/2021 08:12 PM    AMYLASE 48 06/17/2010 02:12 AM    LABALBU 4.1 10/28/2021 08:26 PM    BILIDIR <0.2 03/03/2021 07:33 AM    BILITOT <0.2 10/28/2021 08:26 PM    ALKPHOS 51 10/28/2021 08:26 PM     PT/INR:   Lab Results   Component Value Date/Time    PROTIME 11.2 01/15/2020 11:18 AM    INR 0.97 01/15/2020 11:18 AM     Cardiac Enzymes:  Lab Results   Component Value Date/Time    CKMB <0.2 06/06/2010 04:55 PM    TROPONINI <0.01 10/28/2021 08:26 PM     FASTING LIPID PANEL:  Lab Results   Component Value Date/Time    CHOL 152 07/30/2020 07:22 AM    HDL 43 03/03/2021 07:33 AM    HDL 45 01/29/2012 08:10 AM    LDLCALC 68 03/03/2021 07:33 AM    TRIG 163 07/30/2020 07:22 AM       Cardiac Imaging: Reports reviewed with patient today. EKG:  Today, NSR HR 66, reviewed by me    ECHO 3/26/16  Normal left ventricular size and wall thickness. Severely decreased left  ventricular systolic function with an estimated ejection fraction of 20-25%  Global hypokinesis. Mild MR/TR    Cardiac Cath 3/26/16  CORONARY ANATOMY:  1. The left main is free of disease. 2.  The LAD has a long stent in the mid segment which is widely patent.   The  rest of the vessels are free of disease. 3.  The left circumflex artery has 4 to 5 obtuse marginal branches which are  free of disease. 4.  The right coronary artery is the dominant vessel and osteal stent which  is widely patent. The PD and the posterior lateral branches are also free of  disease. CONCLUSION:    1. No obstructive coronary artery disease. 2.  Previously stented sites are widely patent in the mid LAD and osteal RCA. 3.  Improved LV function with EF of about 40% to 45% with posterior basal  hypokinesis. 4.  Elevated left heart pressures    Carotid Doppler 6/29/18  The right internal carotid artery appears to have a <50% diameter reducing    stenosis based on velocity criteria. The right vertebral artery demonstrates normal antegrade flow. The left internal carotid artery appears to have a 50-79% diameter reducing    stenosis based on velocity criteria. The left vertebral artery demonstrates normal antegrade flow. ECHO 3/25/19  Suboptimal image quality. Left ventricular cavity size is normal.  There is mild concentric left ventricular hypertrophy. Ejection fraction is visually estimated to be 50-55%. Indeterminate diastolic function. Mitral valve leaflets appear mildly thickened. Mild mitral regurgitation. The left atrium is mildly dilated. Trivial aortic regurgitation is present. Mild tricuspid regurgitation. Pacemaker / ICD lead is visualized in the right atrium. The right atrium is normal in size. Carotid doppler 2/5/2020  Impression:       o < 50% stenosis of the right internal carotid artery based on velocity       criteria. o 50-69% stenosis of the left internal carotid artery based on velocity       criteria.     o There is antegrade flow demonstrated in the right and left vertebral       arteries. Device interrogation: 8/16/2022  TI within normal range indicating euvolemia. Assessment/Plan:      1.) Chronic systolic heart failure 25-01% improved to 50-55%: Stable. Euvoleimc. Continue current GDMT. Continue to monitor TI. NYHA Class II  Diuretic: none  Beta Blocker: coreg  ACEi/ARB/ARNI: none  Aldosterone Antagonist: none  Cardiac Rehab: Declines  2gm Na diet, daily weight, 64 oz fluid restriction  Avoid NSAIDS  ICD: s/p BIV ICD 2016- needs annual FU w EP  Needs routine labs- ordered    2.) CAD s/p PCI to LAD and RCA: Stable without evidence of angina/ischemia. Cont GDMT. DAPT: aspirin  Beta Blocker: none  ACEi/ARB: none  Anti anginal: none  Lipid management/high intensity statin: crestor  Risk factor management: high blood pressure, high cholesterol, Diabetes, smoking, obesity, family hx  Lifestyle modification: Heart healthy diet, regular exercise, weight loss, smoking cessation, stress reduction    3.) Hypertension: norvasc, hydralazine, coreg  Goal BP <130/80. Not met. May be from anxiety or white coat. Pt will obtain CP cuff and monitor home BP-she will report findings to [facility] n~ 1 week. Can adjust medications if consistently elevated.    Non pharmacologic interventions include:   -weight loss  -heart healthy low sodium and low fat diet that consist of mostly fruits, vegetables and grains (Dash diet)  -limited amount of alcohol (no more than 1 drink/day for women, 2 drinks/day for men)  -regular physical activity  -no smoking  -stress reduction    Fu 6 months    I appreciate the opportunity of cooperating in the care of this individual.    LINDA Aguilar, RIZWAN - CNP, CNP, 8/16/2022,11:33 AM

## 2022-08-16 ENCOUNTER — TELEPHONE (OUTPATIENT)
Dept: CARDIOLOGY CLINIC | Age: 82
End: 2022-08-16

## 2022-08-16 ENCOUNTER — NURSE ONLY (OUTPATIENT)
Dept: CARDIOLOGY CLINIC | Age: 82
End: 2022-08-16
Payer: MEDICARE

## 2022-08-16 ENCOUNTER — OFFICE VISIT (OUTPATIENT)
Dept: CARDIOLOGY CLINIC | Age: 82
End: 2022-08-16
Payer: MEDICARE

## 2022-08-16 VITALS
DIASTOLIC BLOOD PRESSURE: 78 MMHG | HEIGHT: 68 IN | BODY MASS INDEX: 22.96 KG/M2 | SYSTOLIC BLOOD PRESSURE: 144 MMHG | OXYGEN SATURATION: 98 % | HEART RATE: 67 BPM

## 2022-08-16 DIAGNOSIS — I25.5 ISCHEMIC CARDIOMYOPATHY: ICD-10-CM

## 2022-08-16 DIAGNOSIS — I42.9 CARDIOMYOPATHY, UNSPECIFIED TYPE (HCC): ICD-10-CM

## 2022-08-16 DIAGNOSIS — I50.22 CHRONIC SYSTOLIC (CONGESTIVE) HEART FAILURE (HCC): Primary | ICD-10-CM

## 2022-08-16 DIAGNOSIS — I50.22 CHRONIC SYSTOLIC HEART FAILURE (HCC): ICD-10-CM

## 2022-08-16 DIAGNOSIS — Z95.810 AUTOMATIC IMPLANTABLE CARDIOVERTER-DEFIBRILLATOR IN SITU: Primary | ICD-10-CM

## 2022-08-16 PROCEDURE — 1123F ACP DISCUSS/DSCN MKR DOCD: CPT | Performed by: NURSE PRACTITIONER

## 2022-08-16 PROCEDURE — 93000 ELECTROCARDIOGRAM COMPLETE: CPT | Performed by: NURSE PRACTITIONER

## 2022-08-16 PROCEDURE — 99214 OFFICE O/P EST MOD 30 MIN: CPT | Performed by: NURSE PRACTITIONER

## 2022-08-16 NOTE — TELEPHONE ENCOUNTER
Patient had BiV ICD implant 2016 with Dr. Breanne Pak. She has not followed up with EP since that time. She should be seen by EP at least yearly. Please make an appointment to see either Dr. Melissa Dumont or Dr. Miroslava Kat.

## 2022-08-23 NOTE — TELEPHONE ENCOUNTER
Called the assisted living where Yodit Armenta is currently at and the RN stated that the scheduling department isn't in yet and will leave a message for them to call the office to schedule.

## 2022-08-31 ENCOUNTER — NURSE ONLY (OUTPATIENT)
Dept: CARDIOLOGY CLINIC | Age: 82
End: 2022-08-31
Payer: MEDICARE

## 2022-08-31 DIAGNOSIS — I50.22 CHRONIC SYSTOLIC HEART FAILURE (HCC): ICD-10-CM

## 2022-08-31 DIAGNOSIS — Z95.810 AUTOMATIC IMPLANTABLE CARDIOVERTER-DEFIBRILLATOR IN SITU: Primary | ICD-10-CM

## 2022-08-31 DIAGNOSIS — I42.9 CARDIOMYOPATHY, UNSPECIFIED TYPE (HCC): ICD-10-CM

## 2022-08-31 PROCEDURE — 93295 DEV INTERROG REMOTE 1/2/MLT: CPT | Performed by: INTERNAL MEDICINE

## 2022-08-31 PROCEDURE — 93296 REM INTERROG EVL PM/IDS: CPT | Performed by: INTERNAL MEDICINE

## 2022-08-31 PROCEDURE — 93297 REM INTERROG DEV EVAL ICPMS: CPT | Performed by: INTERNAL MEDICINE

## 2022-08-31 PROCEDURE — 93282 PRGRMG EVAL IMPLANTABLE DFB: CPT | Performed by: INTERNAL MEDICINE

## 2022-08-31 NOTE — PROGRESS NOTES
Remote transmission received from patient's single chamber ICD monitor at home. Transmission shows normal sensing and pacing function. No arrhythmias/ or events. Optivol is within normal range   TriageHF Heart Failure Risk Status on 31-Aug-2022 is Low*    EP will review. See interrogation under cardiology tab in the [address] [place] [address] [address] field for more details. Will continue to monitor remotely. (End of 91-day monitoring period 8/31/22).

## 2022-09-02 NOTE — PROGRESS NOTES
Device programming evaluation on patient's Medtronic single chamber ICD by [employer] rep. Presenting NSR  1 NSVT  5 seconds 7.15.2022  134 PVC/hr  No changes made. EP physician will review. See Paceart report under the Cardiology tab. Patient remains on asa 81 mg, carvedilol. Patient will see [facility] today. We will continue to monitor remotely.

## 2022-09-06 ENCOUNTER — TELEPHONE (OUTPATIENT)
Dept: CARDIOLOGY | Age: 82
End: 2022-09-06

## 2022-09-06 NOTE — TELEPHONE ENCOUNTER
Lucien Jama  is in a facility called again today and was sent to a .  [facility] for a call back

## 2022-09-06 NOTE — TELEPHONE ENCOUNTER
Device interrogation per Dr. Joselo Modi today - per our telephone conversation, he reports increased PVCs. (Also clarification that pt has single chamber not BiV ICD). Patient needs to be seen by EP, he says OK to schedule with [facility]. Appears that we tried to set this up previously-please attempt to schedule again.

## 2022-11-09 ENCOUNTER — NURSE ONLY (OUTPATIENT)
Dept: CARDIOLOGY CLINIC | Age: 82
End: 2022-11-09
Payer: MEDICARE

## 2022-11-09 DIAGNOSIS — I50.22 CHRONIC SYSTOLIC HEART FAILURE (HCC): ICD-10-CM

## 2022-11-09 DIAGNOSIS — I25.5 ISCHEMIC CARDIOMYOPATHY: ICD-10-CM

## 2022-11-09 DIAGNOSIS — Z95.810 AUTOMATIC IMPLANTABLE CARDIOVERTER-DEFIBRILLATOR IN SITU: Primary | ICD-10-CM

## 2022-11-09 PROCEDURE — 93297 REM INTERROG DEV EVAL ICPMS: CPT | Performed by: INTERNAL MEDICINE

## 2022-11-09 PROCEDURE — G2066 INTER DEVC REMOTE 30D: HCPCS | Performed by: INTERNAL MEDICINE

## 2022-12-14 ENCOUNTER — NURSE ONLY (OUTPATIENT)
Dept: CARDIOLOGY CLINIC | Age: 82
End: 2022-12-14
Payer: MEDICARE

## 2022-12-14 DIAGNOSIS — Z95.810 AUTOMATIC IMPLANTABLE CARDIOVERTER-DEFIBRILLATOR IN SITU: Primary | ICD-10-CM

## 2022-12-14 DIAGNOSIS — I50.22 CHRONIC SYSTOLIC HEART FAILURE (HCC): ICD-10-CM

## 2022-12-14 DIAGNOSIS — I42.9 CARDIOMYOPATHY, UNSPECIFIED TYPE (HCC): ICD-10-CM

## 2022-12-14 PROCEDURE — 93297 REM INTERROG DEV EVAL ICPMS: CPT | Performed by: INTERNAL MEDICINE

## 2022-12-14 PROCEDURE — 93296 REM INTERROG EVL PM/IDS: CPT | Performed by: INTERNAL MEDICINE

## 2022-12-14 PROCEDURE — 93295 DEV INTERROG REMOTE 1/2/MLT: CPT | Performed by: INTERNAL MEDICINE

## 2022-12-14 NOTE — PROGRESS NOTES
Remote transmission received from patient's single chamber ICD monitor at home. Transmission shows normal sensing and pacing function. No new arrhythmias/events recorded.  0.1%  PVCs 105.7/hr    Optivol is within normal range. TriageHF Heart Failure Risk Status on 09-Nov-2022 is Medium*. End of 31-day monitoring period 11/9/22. EP physician will review. See interrogation under cardiology tab in the [address] [place] [address] [address] field for more details. Will continue to monitor remotely.

## 2022-12-17 ENCOUNTER — HOSPITAL ENCOUNTER (OUTPATIENT)
Age: 82
Discharge: HOME | End: 2022-12-17
Payer: MEDICARE

## 2022-12-17 DIAGNOSIS — I50.22 CHRONIC SYSTOLIC (CONGESTIVE) HEART FAILURE (HCC): ICD-10-CM

## 2022-12-17 LAB
A/G RATIO: 1.2 (ref 1.1–2.2)
ALBUMIN SERPL-MCNC: 3.8 G/DL (ref 3.4–5)
ALP BLD-CCNC: 50 U/L (ref 40–129)
ALT SERPL-CCNC: 7 U/L (ref 10–40)
ANION GAP SERPL CALCULATED.3IONS-SCNC: 15 MMOL/L (ref 3–16)
AST SERPL-CCNC: 16 U/L (ref 15–37)
BILIRUB SERPL-MCNC: 0.3 MG/DL (ref 0–1)
BUN BLDV-MCNC: 30 MG/DL (ref 7–20)
CALCIUM SERPL-MCNC: 9.2 MG/DL (ref 8.3–10.6)
CHLORIDE BLD-SCNC: 105 MMOL/L (ref 99–110)
CO2: 22 MMOL/L (ref 21–32)
CREAT SERPL-MCNC: 1.9 MG/DL (ref 0.6–1.2)
FERRITIN: 27.8 NG/ML (ref 15–150)
GFR SERPL CREATININE-BSD FRML MDRD: 26 ML/MIN/{1.73_M2}
GLUCOSE BLD-MCNC: 250 MG/DL (ref 70–99)
HCT VFR BLD CALC: 30.7 % (ref 36–48)
HEMOGLOBIN: 9.8 G/DL (ref 12–16)
IRON SATURATION: 16 % (ref 15–50)
IRON: 51 UG/DL (ref 37–145)
MCH RBC QN AUTO: 28.9 PG (ref 26–34)
MCHC RBC AUTO-ENTMCNC: 32 G/DL (ref 31–36)
MCV RBC AUTO: 90.4 FL (ref 80–100)
PDW BLD-RTO: 16.5 % (ref 12.4–15.4)
PLATELET # BLD: 186 K/UL (ref 135–450)
PMV BLD AUTO: 8.8 FL (ref 5–10.5)
POTASSIUM SERPL-SCNC: 4.8 MMOL/L (ref 3.5–5.1)
PRO-BNP: 3104 PG/ML (ref 0–449)
RBC # BLD: 3.39 M/UL (ref 4–5.2)
SODIUM BLD-SCNC: 142 MMOL/L (ref 136–145)
TOTAL IRON BINDING CAPACITY: 314 UG/DL (ref 260–445)
TOTAL PROTEIN: 6.9 G/DL (ref 6.4–8.2)
TRANSFERRIN: 282 MG/DL (ref 200–360)
WBC # BLD: 5.7 K/UL (ref 4–11)

## 2022-12-17 PROCEDURE — 84466 ASSAY OF TRANSFERRIN: CPT

## 2022-12-17 PROCEDURE — 36415 COLL VENOUS BLD VENIPUNCTURE: CPT

## 2022-12-17 PROCEDURE — 80053 COMPREHEN METABOLIC PANEL: CPT

## 2022-12-17 PROCEDURE — 83880 ASSAY OF NATRIURETIC PEPTIDE: CPT

## 2022-12-17 PROCEDURE — 82728 ASSAY OF FERRITIN: CPT

## 2022-12-17 PROCEDURE — 85027 COMPLETE CBC AUTOMATED: CPT

## 2022-12-17 PROCEDURE — 83540 ASSAY OF IRON: CPT

## 2022-12-21 DIAGNOSIS — N18.4 ANEMIA IN STAGE 4 CHRONIC KIDNEY DISEASE (HCC): ICD-10-CM

## 2022-12-21 DIAGNOSIS — D63.1 ANEMIA IN STAGE 4 CHRONIC KIDNEY DISEASE (HCC): ICD-10-CM

## 2022-12-21 DIAGNOSIS — N18.4 CKD (CHRONIC KIDNEY DISEASE) STAGE 4, GFR 15-29 ML/MIN (HCC): ICD-10-CM

## 2022-12-28 NOTE — PROGRESS NOTES
Remote transmission received from patient's single chamber ICD monitor at home. Transmission shows normal sensing and pacing function. No new arrhythmias/events recorded.  <0.1%  PVCs 63.1 p/hr    Optivol is within normal range. TriageHF Heart Failure Risk Status on 14-Dec-2022 is Medium*. EP physician will review. See interrogation under cardiology tab in the [address] [place] [address] [address] field for more details. Will continue to monitor remotely. (End of 91-day monitoring period 12/14/22).

## 2023-01-12 ENCOUNTER — HOSPITAL ENCOUNTER (OUTPATIENT)
Dept: ONCOLOGY | Age: 83
Setting detail: INFUSION SERIES
Discharge: HOME | End: 2023-01-12
Payer: MEDICARE

## 2023-01-12 VITALS
SYSTOLIC BLOOD PRESSURE: 131 MMHG | HEART RATE: 79 BPM | DIASTOLIC BLOOD PRESSURE: 71 MMHG | TEMPERATURE: 97.7 F | RESPIRATION RATE: 16 BRPM

## 2023-01-12 DIAGNOSIS — N18.4 ANEMIA IN STAGE 4 CHRONIC KIDNEY DISEASE (HCC): ICD-10-CM

## 2023-01-12 DIAGNOSIS — D63.1 ANEMIA IN STAGE 4 CHRONIC KIDNEY DISEASE (HCC): ICD-10-CM

## 2023-01-12 DIAGNOSIS — N18.4 CKD (CHRONIC KIDNEY DISEASE) STAGE 4, GFR 15-29 ML/MIN (HCC): Primary | ICD-10-CM

## 2023-01-12 LAB
FERRITIN: 19.6 NG/ML (ref 15–150)
FOLATE: >20 NG/ML (ref 4.78–24.2)
HCT VFR BLD CALC: 28.6 % (ref 36–48)
HEMOGLOBIN: 9.1 G/DL (ref 12–16)
IRON SATURATION: 24 % (ref 15–50)
IRON: 80 UG/DL (ref 37–145)
MCH RBC QN AUTO: 28.5 PG (ref 26–34)
MCHC RBC AUTO-ENTMCNC: 31.8 G/DL (ref 31–36)
MCV RBC AUTO: 89.7 FL (ref 80–100)
PDW BLD-RTO: 15.6 % (ref 12.4–15.4)
PLATELET # BLD: 254 K/UL (ref 135–450)
PMV BLD AUTO: 7.3 FL (ref 5–10.5)
RBC # BLD: 3.19 M/UL (ref 4–5.2)
TOTAL IRON BINDING CAPACITY: 328 UG/DL (ref 260–445)
VITAMIN B-12: 622 PG/ML (ref 211–911)
WBC # BLD: 5.6 K/UL (ref 4–11)

## 2023-01-12 PROCEDURE — 82746 ASSAY OF FOLIC ACID SERUM: CPT

## 2023-01-12 PROCEDURE — 82728 ASSAY OF FERRITIN: CPT

## 2023-01-12 PROCEDURE — 83540 ASSAY OF IRON: CPT

## 2023-01-12 PROCEDURE — 99211 OFF/OP EST MAY X REQ PHY/QHP: CPT

## 2023-01-12 PROCEDURE — 96372 THER/PROPH/DIAG INJ SC/IM: CPT

## 2023-01-12 PROCEDURE — 6360000002 HC RX W HCPCS: Performed by: INTERNAL MEDICINE

## 2023-01-12 PROCEDURE — 83550 IRON BINDING TEST: CPT

## 2023-01-12 PROCEDURE — 82607 VITAMIN B-12: CPT

## 2023-01-12 PROCEDURE — 85027 COMPLETE CBC AUTOMATED: CPT

## 2023-01-12 RX ORDER — GLIMEPIRIDE 1 MG/1
TABLET ORAL
COMMUNITY
Start: 2022-12-17

## 2023-01-12 RX ORDER — ALPRAZOLAM 0.25 MG/1
TABLET ORAL
COMMUNITY
Start: 2022-12-19

## 2023-01-12 RX ORDER — AMITRIPTYLINE HYDROCHLORIDE 10 MG/1
TABLET, FILM COATED ORAL
COMMUNITY

## 2023-01-12 RX ADMIN — EPOETIN ALFA-EPBX 15000 UNITS: 20000 INJECTION, SOLUTION INTRAVENOUS; SUBCUTANEOUS at 13:50

## 2023-01-12 NOTE — PROGRESS NOTES
Pt to Dept for Labs and possible Epogen injection. AVS printed and reviewed. Hgb 9.1, Retacrit 15,000 units given in left arm. Pt monitored for 30 minutes post injection with no adverse reaction noted.  Pt scheduled to return in 2 weeks

## 2023-01-26 ENCOUNTER — CLINICAL DOCUMENTATION (OUTPATIENT)
Dept: ONCOLOGY | Age: 83
End: 2023-01-26

## 2023-01-26 NOTE — CODE DOCUMENTATION
Pt did not show for appointment today. Call placed to [facility] who states pt was transported to hospital yesterday via squad. Pt remains in hospital at this time.

## 2023-02-27 ENCOUNTER — HOSPITAL ENCOUNTER (OUTPATIENT)
Age: 83
Discharge: HOME | End: 2023-02-27
Payer: MEDICARE

## 2023-02-27 LAB
HCT VFR BLD CALC: 26 % (ref 36–48)
HEMOGLOBIN: 8.3 G/DL (ref 12–16)

## 2023-02-27 PROCEDURE — 36415 COLL VENOUS BLD VENIPUNCTURE: CPT

## 2023-02-27 PROCEDURE — 85018 HEMOGLOBIN: CPT

## 2023-02-27 PROCEDURE — 85014 HEMATOCRIT: CPT

## 2023-03-02 NOTE — PROGRESS NOTES
Entered patient's chart because nursing staff paged me on this patient. I have recommended nursing staff page correct attending. No

## 2023-03-28 ENCOUNTER — HOSPITAL ENCOUNTER (OUTPATIENT)
Dept: ONCOLOGY | Age: 83
Setting detail: INFUSION SERIES
Discharge: HOME | End: 2023-03-28
Payer: MEDICARE

## 2023-03-28 VITALS
TEMPERATURE: 98.9 F | HEART RATE: 79 BPM | SYSTOLIC BLOOD PRESSURE: 134 MMHG | DIASTOLIC BLOOD PRESSURE: 68 MMHG | RESPIRATION RATE: 16 BRPM

## 2023-03-28 DIAGNOSIS — N18.4 ANEMIA IN STAGE 4 CHRONIC KIDNEY DISEASE (HCC): ICD-10-CM

## 2023-03-28 DIAGNOSIS — N18.4 CKD (CHRONIC KIDNEY DISEASE) STAGE 4, GFR 15-29 ML/MIN (HCC): Primary | ICD-10-CM

## 2023-03-28 DIAGNOSIS — D63.1 ANEMIA IN STAGE 4 CHRONIC KIDNEY DISEASE (HCC): ICD-10-CM

## 2023-03-28 LAB
HCT VFR BLD AUTO: 25.4 % (ref 36–48)
HGB BLD-MCNC: 8 G/DL (ref 12–16)

## 2023-03-28 PROCEDURE — 6360000002 HC RX W HCPCS: Performed by: INTERNAL MEDICINE

## 2023-03-28 PROCEDURE — 99211 OFF/OP EST MAY X REQ PHY/QHP: CPT

## 2023-03-28 PROCEDURE — 85014 HEMATOCRIT: CPT

## 2023-03-28 PROCEDURE — 85018 HEMOGLOBIN: CPT

## 2023-03-28 PROCEDURE — 96372 THER/PROPH/DIAG INJ SC/IM: CPT

## 2023-03-28 RX ADMIN — EPOETIN ALFA-EPBX 15000 UNITS: 10000 INJECTION, SOLUTION INTRAVENOUS; SUBCUTANEOUS at 14:12

## 2023-03-28 NOTE — PROGRESS NOTES
Pt to Dept for Labs and possible Epogen injection. AVS printed and reviewed. Hgb 8.0, Retacrit 15,000 units given in left arm. Pt scheduled to return in 2 weeks.

## 2023-03-28 NOTE — PROGRESS NOTES
mid LAD and osteal RCA. 3.  Improved LV function with EF of about 40% to 45% with posterior basal  hypokinesis. 4.  Elevated left heart pressures    Carotid Doppler 6/29/18  The right internal carotid artery appears to have a <50% diameter reducing    stenosis based on velocity criteria. The right vertebral artery demonstrates normal antegrade flow. The left internal carotid artery appears to have a 50-79% diameter reducing    stenosis based on velocity criteria. The left vertebral artery demonstrates normal antegrade flow. ECHO 3/25/19  Suboptimal image quality. Left ventricular cavity size is normal.  There is mild concentric left ventricular hypertrophy. Ejection fraction is visually estimated to be 50-55%. Indeterminate diastolic function. Mitral valve leaflets appear mildly thickened. Mild mitral regurgitation. The left atrium is mildly dilated. Trivial aortic regurgitation is present. Mild tricuspid regurgitation. Pacemaker / ICD lead is visualized in the right atrium. The right atrium is normal in size. Carotid doppler 2/5/2020  Impression:       o < 50% stenosis of the right internal carotid artery based on velocity       criteria. o 50-69% stenosis of the left internal carotid artery based on velocity       criteria.     o There is antegrade flow demonstrated in the right and left vertebral       arteries. ECHO 7/30/2020   Summary   Left ventricular cavity size is mildly dilated. There is asymmetric hypertrophy of the basal septum. Ejection fraction is visually estimated to be 50-55%. There is mild diffuse hypokinesis. Diastolic filling parameters suggest grade II diastolic dysfunction. Pacer / ICD wire is visualized in the right ventricle. The right ventricle is mildly enlarged. Right ventricular systolic function is normal.   Mild to moderate mitral regurgitation is present.     11/22/2021 B carotid doppler  Impression:      o 50-69% stenosis of the right

## 2023-03-29 ENCOUNTER — OFFICE VISIT (OUTPATIENT)
Dept: CARDIOLOGY CLINIC | Age: 83
End: 2023-03-29
Payer: MEDICARE

## 2023-03-29 ENCOUNTER — NURSE ONLY (OUTPATIENT)
Dept: CARDIOLOGY CLINIC | Age: 83
End: 2023-03-29
Payer: MEDICARE

## 2023-03-29 VITALS
SYSTOLIC BLOOD PRESSURE: 115 MMHG | OXYGEN SATURATION: 99 % | DIASTOLIC BLOOD PRESSURE: 59 MMHG | HEART RATE: 76 BPM | BODY MASS INDEX: 21.38 KG/M2 | HEIGHT: 68 IN | WEIGHT: 141.1 LBS

## 2023-03-29 DIAGNOSIS — I50.22 CHRONIC SYSTOLIC HEART FAILURE (HCC): Primary | ICD-10-CM

## 2023-03-29 DIAGNOSIS — I42.9 CARDIOMYOPATHY, UNSPECIFIED TYPE (HCC): ICD-10-CM

## 2023-03-29 DIAGNOSIS — Z95.810 AUTOMATIC IMPLANTABLE CARDIOVERTER-DEFIBRILLATOR IN SITU: Primary | ICD-10-CM

## 2023-03-29 DIAGNOSIS — I25.5 ISCHEMIC CARDIOMYOPATHY: ICD-10-CM

## 2023-03-29 DIAGNOSIS — I25.10 CHRONIC CORONARY ARTERY DISEASE: ICD-10-CM

## 2023-03-29 DIAGNOSIS — I50.22 CHRONIC SYSTOLIC HEART FAILURE (HCC): ICD-10-CM

## 2023-03-29 PROCEDURE — 93290 INTERROG DEV EVAL ICPMS IP: CPT | Performed by: INTERNAL MEDICINE

## 2023-03-29 PROCEDURE — 3074F SYST BP LT 130 MM HG: CPT | Performed by: NURSE PRACTITIONER

## 2023-03-29 PROCEDURE — 3078F DIAST BP <80 MM HG: CPT | Performed by: NURSE PRACTITIONER

## 2023-03-29 PROCEDURE — 1123F ACP DISCUSS/DSCN MKR DOCD: CPT | Performed by: NURSE PRACTITIONER

## 2023-03-29 PROCEDURE — 99214 OFFICE O/P EST MOD 30 MIN: CPT | Performed by: NURSE PRACTITIONER

## 2023-03-29 PROCEDURE — 93282 PRGRMG EVAL IMPLANTABLE DFB: CPT | Performed by: INTERNAL MEDICINE

## 2023-03-29 NOTE — PROGRESS NOTES
Patient comes in for programming evaluation on their Medtronic [id number] Evera MRI XT VR SC ICD    Last remote on 12/14/22    All sensing and pacing parameters are within normal range. Battery life 4.7y  P- irregular Vs @ 90bpm   <0.1%. PVC 94.8/hr  No episodes noted. Patient remains on Carvedilol  Updated time. Please see interrogation for more detail. Optivol is within normal range. Patient will see [facility] today and follow up in 3 months in office or remotely.

## 2023-04-11 ENCOUNTER — HOSPITAL ENCOUNTER (OUTPATIENT)
Dept: ONCOLOGY | Age: 83
Setting detail: INFUSION SERIES
Discharge: HOME | End: 2023-04-11
Payer: MEDICARE

## 2023-04-11 VITALS
DIASTOLIC BLOOD PRESSURE: 54 MMHG | HEART RATE: 69 BPM | SYSTOLIC BLOOD PRESSURE: 127 MMHG | RESPIRATION RATE: 16 BRPM | TEMPERATURE: 99.3 F

## 2023-04-11 DIAGNOSIS — N18.4 ANEMIA IN STAGE 4 CHRONIC KIDNEY DISEASE (HCC): ICD-10-CM

## 2023-04-11 DIAGNOSIS — D63.1 ANEMIA IN STAGE 4 CHRONIC KIDNEY DISEASE (HCC): ICD-10-CM

## 2023-04-11 DIAGNOSIS — N18.4 CKD (CHRONIC KIDNEY DISEASE) STAGE 4, GFR 15-29 ML/MIN (HCC): Primary | ICD-10-CM

## 2023-04-11 LAB
FERRITIN SERPL IA-MCNC: 20 NG/ML (ref 15–150)
HCT VFR BLD AUTO: 25.5 % (ref 36–48)
HGB BLD-MCNC: 8.2 G/DL (ref 12–16)
IRON SATN MFR SERPL: 17 % (ref 15–50)
IRON SERPL-MCNC: 55 UG/DL (ref 37–145)
TIBC SERPL-MCNC: 327 UG/DL (ref 260–445)

## 2023-04-11 PROCEDURE — 85018 HEMOGLOBIN: CPT

## 2023-04-11 PROCEDURE — 82728 ASSAY OF FERRITIN: CPT

## 2023-04-11 PROCEDURE — 96372 THER/PROPH/DIAG INJ SC/IM: CPT

## 2023-04-11 PROCEDURE — 85014 HEMATOCRIT: CPT

## 2023-04-11 PROCEDURE — 83550 IRON BINDING TEST: CPT

## 2023-04-11 PROCEDURE — 83540 ASSAY OF IRON: CPT

## 2023-04-11 PROCEDURE — 99211 OFF/OP EST MAY X REQ PHY/QHP: CPT

## 2023-04-11 NOTE — PROGRESS NOTES
Pt to ambulatory to dept for Labs and possible Epogen injection. Most common side effects reviewed. Pt v/u. Declines AVS. VSS. Lab collected; Hgb 8.2. Retacrit 15,000 units given in right arm. Pt tolerated tx without complaints. Pt discharged home to [facility] with daughter. Pt scheduled to return in 2 weeks.

## 2023-04-11 NOTE — DISCHARGE INSTRUCTIONS
epoetin susannah  Pronunciation:  e EWELINA e tin AL fa  Brand:  Epogen, Procrit, Retacrit  What is the most important information I should know about epoetin susannah? This medicine can cause serious side effects, including heart attack or stroke. Epoetin susannah may also speed up tumor growth, or shorten remission or survival time in some people. Talk with your doctor about the risks and benefits of using epoetin susannah. You should not use this medicine if you have uncontrolled high blood pressure, or if you have ever had pure red cell aplasia (PRCA, a type of anemia) caused by using epoetin susannah or darbepoetin susannah. Call your doctor at once if you have signs of a blood clot: sudden numbness or weakness, problems with vision or speech, chest pain, trouble breathing, pain or cold feeling in an arm or leg. What is epoetin susannah? Epoetin susannah is a man-made form of a protein that helps your body produce red blood cells. This protein may be reduced when you have kidney failure or use certain medications. When fewer red blood cells are produced, you can develop a condition called anemia. Epoetin susannah is used to treat anemia caused by chemotherapy in adults and children at least 11years old. Epoetin susannah is also used to treat anemia caused by chronic kidney disease in adults and children at least 2 month old. Epoetin susannah is also used to treat anemia in adults taking zidovudine to treat HIV (human immunodeficiency virus). Epoetin susannah is also used to reduce the need for red blood cell transfusions in adults having certain types of surgery. Epoetin susannah may also be used for purposes not listed in this medication guide. What should I discuss with my healthcare provider before using epoetin susannah?   You should not use this medication if you are allergic to epoetin susannah or darbepoetin susannah, or if:  you have untreated or uncontrolled high blood pressure;  you have had pure red cell aplasia (PRCA, a type of anemia) after using

## 2023-04-17 ENCOUNTER — HOSPITAL ENCOUNTER (OUTPATIENT)
Dept: CT IMAGING | Age: 83
Discharge: HOME | End: 2023-04-17
Payer: MEDICARE

## 2023-04-17 DIAGNOSIS — C66.1 MALIGNANT NEOPLASM OF RIGHT URETER (HCC): ICD-10-CM

## 2023-04-17 PROCEDURE — 6360000004 HC RX CONTRAST MEDICATION: Performed by: INTERNAL MEDICINE

## 2023-04-17 PROCEDURE — 74176 CT ABD & PELVIS W/O CONTRAST: CPT

## 2023-04-17 RX ADMIN — IOPAMIDOL 50 ML: 612 INJECTION, SOLUTION INTRAVENOUS at 09:56

## 2023-04-25 ENCOUNTER — HOSPITAL ENCOUNTER (OUTPATIENT)
Dept: ONCOLOGY | Age: 83
Setting detail: INFUSION SERIES
Discharge: HOME | End: 2023-04-25
Payer: MEDICARE

## 2023-04-25 ENCOUNTER — HOSPITAL ENCOUNTER (OUTPATIENT)
Age: 83
Discharge: HOME | End: 2023-04-25
Payer: MEDICARE

## 2023-04-25 VITALS
SYSTOLIC BLOOD PRESSURE: 133 MMHG | DIASTOLIC BLOOD PRESSURE: 52 MMHG | RESPIRATION RATE: 16 BRPM | HEART RATE: 84 BPM | TEMPERATURE: 97.4 F

## 2023-04-25 DIAGNOSIS — N18.4 CKD (CHRONIC KIDNEY DISEASE) STAGE 4, GFR 15-29 ML/MIN (HCC): Primary | ICD-10-CM

## 2023-04-25 DIAGNOSIS — D63.1 ANEMIA IN STAGE 4 CHRONIC KIDNEY DISEASE (HCC): ICD-10-CM

## 2023-04-25 DIAGNOSIS — N18.4 ANEMIA IN STAGE 4 CHRONIC KIDNEY DISEASE (HCC): ICD-10-CM

## 2023-04-25 LAB
ANION GAP SERPL CALCULATED.3IONS-SCNC: 8 MMOL/L (ref 3–16)
BUN SERPL-MCNC: 35 MG/DL (ref 7–20)
CALCIUM SERPL-MCNC: 9.1 MG/DL (ref 8.3–10.6)
CHLORIDE SERPL-SCNC: 103 MMOL/L (ref 99–110)
CO2 SERPL-SCNC: 26 MMOL/L (ref 21–32)
CREAT SERPL-MCNC: 2.3 MG/DL (ref 0.6–1.2)
DEPRECATED RDW RBC AUTO: 16.2 % (ref 12.4–15.4)
FERRITIN SERPL IA-MCNC: 23.2 NG/ML (ref 15–150)
GFR SERPLBLD CREATININE-BSD FMLA CKD-EPI: 20 ML/MIN/{1.73_M2}
GLUCOSE SERPL-MCNC: 255 MG/DL (ref 70–99)
HCT VFR BLD AUTO: 25.8 % (ref 36–48)
HCT VFR BLD AUTO: 25.9 % (ref 36–48)
HGB BLD-MCNC: 7.9 G/DL (ref 12–16)
HGB BLD-MCNC: 8.2 G/DL (ref 12–16)
IRON SATN MFR SERPL: 15 % (ref 15–50)
IRON SERPL-MCNC: 52 UG/DL (ref 37–145)
MCH RBC QN AUTO: 26.8 PG (ref 26–34)
MCHC RBC AUTO-ENTMCNC: 31.8 G/DL (ref 31–36)
MCV RBC AUTO: 84.4 FL (ref 80–100)
PHOSPHATE SERPL-MCNC: 4.5 MG/DL (ref 2.5–4.9)
PLATELET # BLD AUTO: 245 K/UL (ref 135–450)
PMV BLD AUTO: 8.8 FL (ref 5–10.5)
POTASSIUM SERPL-SCNC: 4.7 MMOL/L (ref 3.5–5.1)
PTH-INTACT SERPL-MCNC: 92.2 PG/ML (ref 14–72)
RBC # BLD AUTO: 3.06 M/UL (ref 4–5.2)
SODIUM SERPL-SCNC: 137 MMOL/L (ref 136–145)
TIBC SERPL-MCNC: 345 UG/DL (ref 260–445)
TRANSFERRIN SERPL-MCNC: 316 MG/DL (ref 200–360)
WBC # BLD AUTO: 5.3 K/UL (ref 4–11)

## 2023-04-25 PROCEDURE — 85014 HEMATOCRIT: CPT

## 2023-04-25 PROCEDURE — 83540 ASSAY OF IRON: CPT

## 2023-04-25 PROCEDURE — 84466 ASSAY OF TRANSFERRIN: CPT

## 2023-04-25 PROCEDURE — 82728 ASSAY OF FERRITIN: CPT

## 2023-04-25 PROCEDURE — 96372 THER/PROPH/DIAG INJ SC/IM: CPT

## 2023-04-25 PROCEDURE — 36415 COLL VENOUS BLD VENIPUNCTURE: CPT

## 2023-04-25 PROCEDURE — 82306 VITAMIN D 25 HYDROXY: CPT

## 2023-04-25 PROCEDURE — 99211 OFF/OP EST MAY X REQ PHY/QHP: CPT

## 2023-04-25 PROCEDURE — 83970 ASSAY OF PARATHORMONE: CPT

## 2023-04-25 PROCEDURE — 85027 COMPLETE CBC AUTOMATED: CPT

## 2023-04-25 PROCEDURE — 85018 HEMOGLOBIN: CPT

## 2023-04-25 PROCEDURE — 84100 ASSAY OF PHOSPHORUS: CPT

## 2023-04-25 PROCEDURE — 80048 BASIC METABOLIC PNL TOTAL CA: CPT

## 2023-04-25 PROCEDURE — 6360000002 HC RX W HCPCS: Performed by: INTERNAL MEDICINE

## 2023-04-25 PROCEDURE — 82610 CYSTATIN C: CPT

## 2023-04-25 RX ADMIN — EPOETIN ALFA-EPBX 18750 UNITS: 10000 INJECTION, SOLUTION INTRAVENOUS; SUBCUTANEOUS at 13:57

## 2023-04-25 NOTE — PROGRESS NOTES
Pt to ambulatory to dept for Labs and possible Epogen injection. Most common side effects reviewed. Pt v/u. Declines AVS. VSS. Lab collected; Hgb 7.9. Retacrit increased by 25% per physician orders makes it 18,750 units given in right arm. Pt tolerated tx without complaints. Pt discharged with son. Pt scheduled to return in 2 weeks.

## 2023-04-26 LAB — 25(OH)D3 SERPL-MCNC: 58.3 NG/ML

## 2023-04-27 LAB
CYSTATIN C SERPL-MCNC: 2.5 MG/L (ref 0.5–1.2)
EGFR BY CYSTATIN C: 19 ML/MIN/BSA

## 2023-05-09 ENCOUNTER — HOSPITAL ENCOUNTER (OUTPATIENT)
Dept: ONCOLOGY | Age: 83
Setting detail: INFUSION SERIES
Discharge: HOME | End: 2023-05-09

## 2023-05-23 ENCOUNTER — HOSPITAL ENCOUNTER (OUTPATIENT)
Dept: ONCOLOGY | Age: 83
Setting detail: INFUSION SERIES
Discharge: HOME | End: 2023-05-23
Payer: MEDICARE

## 2023-05-23 VITALS
HEART RATE: 66 BPM | RESPIRATION RATE: 16 BRPM | DIASTOLIC BLOOD PRESSURE: 50 MMHG | SYSTOLIC BLOOD PRESSURE: 104 MMHG | TEMPERATURE: 97.2 F

## 2023-05-23 DIAGNOSIS — N18.4 CKD (CHRONIC KIDNEY DISEASE) STAGE 4, GFR 15-29 ML/MIN (HCC): Primary | ICD-10-CM

## 2023-05-23 DIAGNOSIS — D63.1 ANEMIA IN STAGE 4 CHRONIC KIDNEY DISEASE (HCC): ICD-10-CM

## 2023-05-23 DIAGNOSIS — N18.4 ANEMIA IN STAGE 4 CHRONIC KIDNEY DISEASE (HCC): ICD-10-CM

## 2023-05-23 LAB
HCT VFR BLD AUTO: 29.5 % (ref 36–48)
HGB BLD-MCNC: 9.3 G/DL (ref 12–16)

## 2023-05-23 PROCEDURE — 85018 HEMOGLOBIN: CPT

## 2023-05-23 PROCEDURE — 99211 OFF/OP EST MAY X REQ PHY/QHP: CPT

## 2023-05-23 PROCEDURE — 6360000002 HC RX W HCPCS: Performed by: INTERNAL MEDICINE

## 2023-05-23 PROCEDURE — 85014 HEMATOCRIT: CPT

## 2023-05-23 PROCEDURE — 96372 THER/PROPH/DIAG INJ SC/IM: CPT

## 2023-05-23 RX ADMIN — EPOETIN ALFA-EPBX 18750 UNITS: 10000 INJECTION, SOLUTION INTRAVENOUS; SUBCUTANEOUS at 13:53

## 2023-05-23 NOTE — PROGRESS NOTES
Pt to ambulatory to dept for Labs and possible Epogen injection. Most common side effects reviewed. Pt v/u. Declines AVS. VSS. Lab collected; Hgb 9.3 today. Retacrit  18,750 units given in right arm. Dose stayed the same this week per order parameters. Pt tolerated tx without complaints. Pt discharged with son.  Pt scheduled to return in 2 weeks

## 2023-05-24 ENCOUNTER — NURSE ONLY (OUTPATIENT)
Dept: CARDIOLOGY CLINIC | Age: 83
End: 2023-05-24

## 2023-05-24 DIAGNOSIS — I25.5 ISCHEMIC CARDIOMYOPATHY: ICD-10-CM

## 2023-05-24 DIAGNOSIS — I50.22 CHRONIC SYSTOLIC HEART FAILURE (HCC): ICD-10-CM

## 2023-05-24 DIAGNOSIS — Z95.810 AUTOMATIC IMPLANTABLE CARDIOVERTER-DEFIBRILLATOR IN SITU: Primary | ICD-10-CM

## 2023-05-25 NOTE — PROGRESS NOTES
Remote transmission received from patient's single chamber ICD monitor at home. Transmission shows normal sensing and pacing function. Noted NSVT vs PSVT (Coreg).  <0.1%  PVCs 109.2/hr    Optivol is within normal range. TriageHF Heart Failure Risk Status on 24-May-2023 is Medium*. End of 31-day monitoring period 5/24/23. NP will review. See interrogation under cardiology tab in the [facility] [address] field for more details. Will continue to monitor remotely.

## 2023-06-20 ENCOUNTER — HOSPITAL ENCOUNTER (OUTPATIENT)
Dept: ONCOLOGY | Age: 83
Setting detail: INFUSION SERIES
Discharge: HOME | End: 2023-06-20
Payer: MEDICARE

## 2023-06-20 VITALS
DIASTOLIC BLOOD PRESSURE: 78 MMHG | SYSTOLIC BLOOD PRESSURE: 127 MMHG | TEMPERATURE: 96.7 F | RESPIRATION RATE: 16 BRPM | HEART RATE: 79 BPM

## 2023-06-20 DIAGNOSIS — D63.1 ANEMIA IN STAGE 4 CHRONIC KIDNEY DISEASE (HCC): ICD-10-CM

## 2023-06-20 DIAGNOSIS — N18.4 ANEMIA IN STAGE 4 CHRONIC KIDNEY DISEASE (HCC): ICD-10-CM

## 2023-06-20 DIAGNOSIS — N18.4 CKD (CHRONIC KIDNEY DISEASE) STAGE 4, GFR 15-29 ML/MIN (HCC): Primary | ICD-10-CM

## 2023-06-20 LAB
HCT VFR BLD AUTO: 29.9 % (ref 36–48)
HGB BLD-MCNC: 9.5 G/DL (ref 12–16)

## 2023-06-20 PROCEDURE — 99211 OFF/OP EST MAY X REQ PHY/QHP: CPT

## 2023-06-20 PROCEDURE — 85018 HEMOGLOBIN: CPT

## 2023-06-20 PROCEDURE — 85014 HEMATOCRIT: CPT

## 2023-06-20 PROCEDURE — 96372 THER/PROPH/DIAG INJ SC/IM: CPT

## 2023-06-20 PROCEDURE — 6360000002 HC RX W HCPCS: Performed by: INTERNAL MEDICINE

## 2023-06-20 RX ADMIN — EPOETIN ALFA-EPBX 18750 UNITS: 10000 INJECTION, SOLUTION INTRAVENOUS; SUBCUTANEOUS at 14:17

## 2023-06-20 NOTE — PROGRESS NOTES
Pt to ambulatory to dept for Labs and possible Epogen injection. Most common side effects reviewed. Pt v/u. Declines AVS. VSS. Lab collected; Hgb 9.5 today. Retacrit  18,750 units given in right arm. Dose stayed the same this week per order parameters. Pt tolerated tx without complaints. Pt discharged with son.  Pt scheduled to return in 2 weeks

## 2023-07-05 ENCOUNTER — HOSPITAL ENCOUNTER (OUTPATIENT)
Dept: ONCOLOGY | Age: 83
Setting detail: INFUSION SERIES
Discharge: HOME | End: 2023-07-05
Payer: MEDICARE

## 2023-07-05 VITALS
SYSTOLIC BLOOD PRESSURE: 126 MMHG | RESPIRATION RATE: 16 BRPM | TEMPERATURE: 97.9 F | DIASTOLIC BLOOD PRESSURE: 57 MMHG | HEART RATE: 64 BPM

## 2023-07-05 DIAGNOSIS — N18.4 ANEMIA IN STAGE 4 CHRONIC KIDNEY DISEASE (HCC): ICD-10-CM

## 2023-07-05 DIAGNOSIS — N18.4 CKD (CHRONIC KIDNEY DISEASE) STAGE 4, GFR 15-29 ML/MIN (HCC): Primary | ICD-10-CM

## 2023-07-05 DIAGNOSIS — D63.1 ANEMIA IN STAGE 4 CHRONIC KIDNEY DISEASE (HCC): ICD-10-CM

## 2023-07-05 LAB
FERRITIN SERPL IA-MCNC: 61.9 NG/ML (ref 15–150)
HCT VFR BLD AUTO: 32.7 % (ref 36–48)
HGB BLD-MCNC: 10.5 G/DL (ref 12–16)
IRON SATN MFR SERPL: 31 % (ref 15–50)
IRON SERPL-MCNC: 92 UG/DL (ref 37–145)
TIBC SERPL-MCNC: 299 UG/DL (ref 260–445)

## 2023-07-05 PROCEDURE — 83540 ASSAY OF IRON: CPT

## 2023-07-05 PROCEDURE — 96372 THER/PROPH/DIAG INJ SC/IM: CPT

## 2023-07-05 PROCEDURE — 82728 ASSAY OF FERRITIN: CPT

## 2023-07-05 PROCEDURE — 85014 HEMATOCRIT: CPT

## 2023-07-05 PROCEDURE — 83550 IRON BINDING TEST: CPT

## 2023-07-05 PROCEDURE — 85018 HEMOGLOBIN: CPT

## 2023-07-05 PROCEDURE — 99211 OFF/OP EST MAY X REQ PHY/QHP: CPT

## 2023-07-05 NOTE — PROGRESS NOTES
Pt to Dept for Labs and possible Epogen injection. AVS printed and reviewed. Hgb 10.5 today. Retacrit held per orders. Pt scheduled to return in 2 weeks.

## 2023-07-12 ENCOUNTER — NURSE ONLY (OUTPATIENT)
Dept: CARDIOLOGY CLINIC | Age: 83
End: 2023-07-12
Payer: MEDICARE

## 2023-07-12 DIAGNOSIS — I42.9 CARDIOMYOPATHY, UNSPECIFIED TYPE (HCC): ICD-10-CM

## 2023-07-12 DIAGNOSIS — Z95.810 AUTOMATIC IMPLANTABLE CARDIOVERTER-DEFIBRILLATOR IN SITU: Primary | ICD-10-CM

## 2023-07-12 DIAGNOSIS — I50.22 CHRONIC SYSTOLIC HEART FAILURE (HCC): ICD-10-CM

## 2023-07-12 PROCEDURE — 93297 REM INTERROG DEV EVAL ICPMS: CPT | Performed by: INTERNAL MEDICINE

## 2023-07-12 PROCEDURE — 93296 REM INTERROG EVL PM/IDS: CPT | Performed by: INTERNAL MEDICINE

## 2023-07-12 PROCEDURE — 93295 DEV INTERROG REMOTE 1/2/MLT: CPT | Performed by: INTERNAL MEDICINE

## 2023-07-18 ENCOUNTER — HOSPITAL ENCOUNTER (OUTPATIENT)
Dept: ONCOLOGY | Age: 83
Setting detail: INFUSION SERIES
Discharge: HOME | End: 2023-07-18
Payer: MEDICARE

## 2023-07-18 VITALS
SYSTOLIC BLOOD PRESSURE: 123 MMHG | TEMPERATURE: 98.1 F | DIASTOLIC BLOOD PRESSURE: 60 MMHG | HEART RATE: 66 BPM | RESPIRATION RATE: 16 BRPM

## 2023-07-18 DIAGNOSIS — N18.4 ANEMIA IN STAGE 4 CHRONIC KIDNEY DISEASE (HCC): ICD-10-CM

## 2023-07-18 DIAGNOSIS — N18.4 CKD (CHRONIC KIDNEY DISEASE) STAGE 4, GFR 15-29 ML/MIN (HCC): Primary | ICD-10-CM

## 2023-07-18 DIAGNOSIS — D63.1 ANEMIA IN STAGE 4 CHRONIC KIDNEY DISEASE (HCC): ICD-10-CM

## 2023-07-18 LAB
HCT VFR BLD AUTO: 31.7 % (ref 36–48)
HGB BLD-MCNC: 10.1 G/DL (ref 12–16)

## 2023-07-18 PROCEDURE — 99211 OFF/OP EST MAY X REQ PHY/QHP: CPT

## 2023-07-18 PROCEDURE — 85018 HEMOGLOBIN: CPT

## 2023-07-18 PROCEDURE — 85014 HEMATOCRIT: CPT

## 2023-07-18 RX ORDER — FERROUS SULFATE 325(65) MG
1 TABLET ORAL 2 TIMES DAILY
COMMUNITY
Start: 2023-07-13

## 2023-07-18 NOTE — PROGRESS NOTES
Pt to Dept for Labs and possible Epogen injection. AVS printed and reviewed. Hgb 10.1 today. Retacrit held per orders. Pt scheduled to return in 2 weeks.

## 2023-08-01 ENCOUNTER — HOSPITAL ENCOUNTER (OUTPATIENT)
Dept: ONCOLOGY | Age: 83
Setting detail: INFUSION SERIES
Discharge: HOME | End: 2023-08-01
Payer: MEDICARE

## 2023-08-01 VITALS
DIASTOLIC BLOOD PRESSURE: 88 MMHG | SYSTOLIC BLOOD PRESSURE: 127 MMHG | HEART RATE: 74 BPM | RESPIRATION RATE: 16 BRPM | TEMPERATURE: 97.2 F

## 2023-08-01 DIAGNOSIS — D63.1 ANEMIA IN STAGE 4 CHRONIC KIDNEY DISEASE (HCC): ICD-10-CM

## 2023-08-01 DIAGNOSIS — N18.4 CKD (CHRONIC KIDNEY DISEASE) STAGE 4, GFR 15-29 ML/MIN (HCC): Primary | ICD-10-CM

## 2023-08-01 DIAGNOSIS — N18.4 ANEMIA IN STAGE 4 CHRONIC KIDNEY DISEASE (HCC): ICD-10-CM

## 2023-08-01 LAB
HCT VFR BLD AUTO: 31.8 % (ref 36–48)
HGB BLD-MCNC: 10.2 G/DL (ref 12–16)

## 2023-08-01 PROCEDURE — 85018 HEMOGLOBIN: CPT

## 2023-08-01 PROCEDURE — 85014 HEMATOCRIT: CPT

## 2023-08-01 PROCEDURE — 99211 OFF/OP EST MAY X REQ PHY/QHP: CPT

## 2023-08-01 NOTE — PROGRESS NOTES
Pt to Dept for Labs and possible Epogen injection. AVS printed and reviewed. Hgb 10.2 today. Retacrit held per orders.  Pt scheduled to return in 2 weeks

## 2023-08-15 ENCOUNTER — HOSPITAL ENCOUNTER (OUTPATIENT)
Dept: ONCOLOGY | Age: 83
Setting detail: INFUSION SERIES
Discharge: HOME | End: 2023-08-15
Payer: MEDICARE

## 2023-08-15 VITALS
SYSTOLIC BLOOD PRESSURE: 154 MMHG | RESPIRATION RATE: 16 BRPM | HEART RATE: 72 BPM | TEMPERATURE: 98.8 F | DIASTOLIC BLOOD PRESSURE: 47 MMHG

## 2023-08-15 DIAGNOSIS — N18.4 CKD (CHRONIC KIDNEY DISEASE) STAGE 4, GFR 15-29 ML/MIN (HCC): Primary | ICD-10-CM

## 2023-08-15 DIAGNOSIS — D63.1 ANEMIA IN STAGE 4 CHRONIC KIDNEY DISEASE (HCC): ICD-10-CM

## 2023-08-15 DIAGNOSIS — N18.4 ANEMIA IN STAGE 4 CHRONIC KIDNEY DISEASE (HCC): ICD-10-CM

## 2023-08-15 LAB
HCT VFR BLD AUTO: 31 % (ref 36–48)
HGB BLD-MCNC: 10 G/DL (ref 12–16)

## 2023-08-15 PROCEDURE — 85018 HEMOGLOBIN: CPT

## 2023-08-15 PROCEDURE — 85014 HEMATOCRIT: CPT

## 2023-08-15 PROCEDURE — 99211 OFF/OP EST MAY X REQ PHY/QHP: CPT

## 2023-08-15 NOTE — PROGRESS NOTES
Pt to Dept for Labs and possible Epogen injection. AVS printed and reviewed. Hgb 10.0 today. Retacrit held per orders. Pt scheduled to return in 2 weeks.

## 2023-08-21 ENCOUNTER — NURSE ONLY (OUTPATIENT)
Dept: CARDIOLOGY CLINIC | Age: 83
End: 2023-08-21

## 2023-08-21 ENCOUNTER — TELEPHONE (OUTPATIENT)
Dept: CARDIOLOGY CLINIC | Age: 83
End: 2023-08-21

## 2023-08-21 NOTE — TELEPHONE ENCOUNTER
No daily wt. Denies SOB or activity intolerance. Denies edema. She has a \"water pill\"  that she takes when her legs are \"puffy\"  She last took 2 days ago. She agrees to take another tomorrow.

## 2023-08-26 PROCEDURE — G2066 INTER DEVC REMOTE 30D: HCPCS | Performed by: NURSE PRACTITIONER

## 2023-08-26 PROCEDURE — 93297 REM INTERROG DEV EVAL ICPMS: CPT | Performed by: NURSE PRACTITIONER

## 2023-08-29 ENCOUNTER — HOSPITAL ENCOUNTER (OUTPATIENT)
Dept: ONCOLOGY | Age: 83
Setting detail: INFUSION SERIES
Discharge: HOME | End: 2023-08-29
Payer: MEDICARE

## 2023-08-29 VITALS
HEART RATE: 70 BPM | TEMPERATURE: 98.2 F | RESPIRATION RATE: 16 BRPM | DIASTOLIC BLOOD PRESSURE: 64 MMHG | SYSTOLIC BLOOD PRESSURE: 130 MMHG

## 2023-08-29 DIAGNOSIS — N18.4 ANEMIA IN STAGE 4 CHRONIC KIDNEY DISEASE (HCC): ICD-10-CM

## 2023-08-29 DIAGNOSIS — N18.4 CKD (CHRONIC KIDNEY DISEASE) STAGE 4, GFR 15-29 ML/MIN (HCC): Primary | ICD-10-CM

## 2023-08-29 DIAGNOSIS — D63.1 ANEMIA IN STAGE 4 CHRONIC KIDNEY DISEASE (HCC): ICD-10-CM

## 2023-08-29 LAB
HCT VFR BLD AUTO: 30.1 % (ref 36–48)
HGB BLD-MCNC: 9.9 G/DL (ref 12–16)

## 2023-08-29 PROCEDURE — 85014 HEMATOCRIT: CPT

## 2023-08-29 PROCEDURE — 96372 THER/PROPH/DIAG INJ SC/IM: CPT

## 2023-08-29 PROCEDURE — 6360000002 HC RX W HCPCS: Performed by: INTERNAL MEDICINE

## 2023-08-29 PROCEDURE — 99211 OFF/OP EST MAY X REQ PHY/QHP: CPT

## 2023-08-29 PROCEDURE — 85018 HEMOGLOBIN: CPT

## 2023-08-29 RX ADMIN — EPOETIN ALFA-EPBX 14000 UNITS: 10000 INJECTION, SOLUTION INTRAVENOUS; SUBCUTANEOUS at 14:18

## 2023-08-29 NOTE — PROGRESS NOTES
Pt to ambulatory to dept for Labs and possible Epogen injection. Most common side effects reviewed. Pt v/u. Declines AVS. VSS. Lab collected; Hgb 9.9 today. Retacrit  14,000 units given in right arm. Dose decreased by 25% this  week per order parameters. Pt tolerated tx without complaints. Pt discharged with daughter. Pt scheduled to return in 2 weeks.

## 2023-09-01 ENCOUNTER — TELEPHONE (OUTPATIENT)
Dept: CARDIOLOGY CLINIC | Age: 83
End: 2023-09-01

## 2023-09-01 NOTE — TELEPHONE ENCOUNTER
Braden Mercado is calling stating someone called her on 8/31/23 but states no message was left and wanting to know what it was about.     I do not see anything in Epic     Has new phone number to her room at  [facility] [phone]

## 2023-09-12 ENCOUNTER — HOSPITAL ENCOUNTER (OUTPATIENT)
Dept: ONCOLOGY | Age: 83
Setting detail: INFUSION SERIES
Discharge: HOME | End: 2023-09-12
Payer: MEDICARE

## 2023-09-12 DIAGNOSIS — D63.1 ANEMIA IN STAGE 4 CHRONIC KIDNEY DISEASE (HCC): ICD-10-CM

## 2023-09-12 DIAGNOSIS — N18.4 ANEMIA IN STAGE 4 CHRONIC KIDNEY DISEASE (HCC): ICD-10-CM

## 2023-09-12 DIAGNOSIS — N18.4 CKD (CHRONIC KIDNEY DISEASE) STAGE 4, GFR 15-29 ML/MIN (HCC): Primary | ICD-10-CM

## 2023-09-12 LAB
HCT VFR BLD AUTO: 32 % (ref 36–48)
HGB BLD-MCNC: 10.4 G/DL (ref 12–16)

## 2023-09-12 PROCEDURE — 85018 HEMOGLOBIN: CPT

## 2023-09-12 PROCEDURE — 85014 HEMATOCRIT: CPT

## 2023-09-12 PROCEDURE — 99211 OFF/OP EST MAY X REQ PHY/QHP: CPT

## 2023-09-12 PROCEDURE — 96372 THER/PROPH/DIAG INJ SC/IM: CPT

## 2023-09-12 NOTE — PROGRESS NOTES
Pt to Dept for Labs and possible Epogen injection. AVS printed and reviewed. Hgb 10.4 today. Retacrit held per orders. Pt scheduled to return in 2 weeks for labs and possible epogen.

## 2023-09-26 ENCOUNTER — HOSPITAL ENCOUNTER (OUTPATIENT)
Dept: ONCOLOGY | Age: 83
Setting detail: INFUSION SERIES
Discharge: HOME | End: 2023-09-26
Payer: MEDICARE

## 2023-09-26 DIAGNOSIS — N18.4 ANEMIA IN STAGE 4 CHRONIC KIDNEY DISEASE (HCC): ICD-10-CM

## 2023-09-26 DIAGNOSIS — D63.1 ANEMIA IN STAGE 4 CHRONIC KIDNEY DISEASE (HCC): ICD-10-CM

## 2023-09-26 DIAGNOSIS — N18.4 CKD (CHRONIC KIDNEY DISEASE) STAGE 4, GFR 15-29 ML/MIN (HCC): Primary | ICD-10-CM

## 2023-09-26 LAB
HCT VFR BLD AUTO: 31 % (ref 36–48)
HGB BLD-MCNC: 10.2 G/DL (ref 12–16)

## 2023-09-26 PROCEDURE — 85018 HEMOGLOBIN: CPT

## 2023-09-26 PROCEDURE — 85014 HEMATOCRIT: CPT

## 2023-09-26 PROCEDURE — 99211 OFF/OP EST MAY X REQ PHY/QHP: CPT

## 2023-09-26 NOTE — PROGRESS NOTES
Pt to Dept for Labs and possible Epogen injection. AVS printed and reviewed. Hgb 10.2 today. Retacrit held per orders. Pt scheduled to return in 2 weeks for labs and possible epogen.

## 2023-09-27 PROCEDURE — G2066 INTER DEVC REMOTE 30D: HCPCS | Performed by: NURSE PRACTITIONER

## 2023-09-27 PROCEDURE — 93297 REM INTERROG DEV EVAL ICPMS: CPT | Performed by: NURSE PRACTITIONER

## 2023-10-04 ENCOUNTER — OFFICE VISIT (OUTPATIENT)
Dept: CARDIOLOGY CLINIC | Age: 83
End: 2023-10-04
Payer: MEDICARE

## 2023-10-04 VITALS
WEIGHT: 139 LBS | SYSTOLIC BLOOD PRESSURE: 134 MMHG | HEIGHT: 68 IN | HEART RATE: 78 BPM | DIASTOLIC BLOOD PRESSURE: 72 MMHG | BODY MASS INDEX: 21.07 KG/M2 | OXYGEN SATURATION: 98 %

## 2023-10-04 DIAGNOSIS — Z95.810 AUTOMATIC IMPLANTABLE CARDIOVERTER-DEFIBRILLATOR IN SITU: ICD-10-CM

## 2023-10-04 DIAGNOSIS — I50.22 CHRONIC SYSTOLIC HEART FAILURE (HCC): ICD-10-CM

## 2023-10-04 DIAGNOSIS — I25.10 CHRONIC CORONARY ARTERY DISEASE: Primary | ICD-10-CM

## 2023-10-04 PROCEDURE — 3075F SYST BP GE 130 - 139MM HG: CPT | Performed by: INTERNAL MEDICINE

## 2023-10-04 PROCEDURE — 3078F DIAST BP <80 MM HG: CPT | Performed by: INTERNAL MEDICINE

## 2023-10-04 PROCEDURE — 1123F ACP DISCUSS/DSCN MKR DOCD: CPT | Performed by: INTERNAL MEDICINE

## 2023-10-04 PROCEDURE — 99214 OFFICE O/P EST MOD 30 MIN: CPT | Performed by: INTERNAL MEDICINE

## 2023-10-04 PROCEDURE — 93000 ELECTROCARDIOGRAM COMPLETE: CPT | Performed by: INTERNAL MEDICINE

## 2023-10-04 RX ORDER — CIPROFLOXACIN 500 MG/1
TABLET, FILM COATED ORAL 2 TIMES DAILY
COMMUNITY
Start: 2023-09-28

## 2023-10-04 NOTE — PROGRESS NOTES
effect, not an allergy      Hydrocodone-Acetaminophen Other (See Comments)     Makes patient feel weird, dizzy, nauseous, and sleepy. Patient states she is not allergic to this. Side effect, not an allergy      Nitrofurantoin Nausea And Vomiting and Other (See Comments)     Other reaction(s): Dizziness, GI Upset  Side effect, not an allergy         Current Outpatient Medications   Medication Sig Dispense Refill    ciprofloxacin (CIPRO) 500 MG tablet in the morning and at bedtime      FEROSUL 325 (65 Fe) MG tablet Take 1 tablet by mouth 2 times daily      Furosemide (LASIX PO) Take by mouth Prn swelling      ALPRAZolam (XANAX) 0.25 MG tablet TAKE 1 TABLET BY MOUTH TWICE DAILY AS NEEDED. amitriptyline (ELAVIL) 10 MG tablet       hydrALAZINE (APRESOLINE) 25 MG tablet TAKE ONE TABLET BY MOUTH THREE TIMES A DAY (Patient taking differently: 1 tablet 2 times daily Patient states she has not been taking.) 90 tablet 11    carvedilol (COREG) 12.5 MG tablet TAKE ONE TABLET BY MOUTH TWICE A  tablet 2    aspirin 81 MG EC tablet Take 1 tablet by mouth daily HOLD for 30 days after hip surgery. See Eliquis order 30 tablet 3    acetaminophen (TYLENOL) 500 MG tablet Take 2 tablets by mouth daily as needed for Pain      insulin glargine (LANTUS) 100 UNIT/ML injection vial Inject 18 Units into the skin nightly Sliding scale      sodium bicarbonate 650 MG tablet Take 1 tablet by mouth 2 times daily      rosuvastatin (CRESTOR) 10 MG tablet Take 1 tablet by mouth every evening      Coenzyme Q10 (CO Q 10) 100 MG CAPS Take 1 capsule by mouth daily       fluticasone (FLONASE) 50 MCG/ACT nasal spray 1 spray by Nasal route as needed for Rhinitis      therapeutic multivitamin-minerals (THERAGRAN-M) tablet Take 1 tablet by mouth daily Centrum silver      vitamin D (CHOLECALCIFEROL) 1000 UNIT TABS tablet Take 1 tablet by mouth daily       No current facility-administered medications for this visit.        Review of

## 2023-10-21 PROCEDURE — 93295 DEV INTERROG REMOTE 1/2/MLT: CPT | Performed by: INTERNAL MEDICINE

## 2023-10-21 PROCEDURE — 93296 REM INTERROG EVL PM/IDS: CPT | Performed by: INTERNAL MEDICINE

## 2023-10-30 ENCOUNTER — CLINICAL DOCUMENTATION (OUTPATIENT)
Dept: ONCOLOGY | Age: 83
End: 2023-10-30

## 2023-10-30 NOTE — PROGRESS NOTES
Pt is to continue tx. Call to office requesting lab orders to be placed. Updated patient on status and waiting for CT.  Patient verbalized understanding

## 2023-10-30 NOTE — PROGRESS NOTES
Message left for patient's daughter inquiring if patient is to cont Labs/Epogen .  If patient to cont,will need additional labs for re-authorization

## 2023-11-04 ENCOUNTER — HOSPITAL ENCOUNTER (OUTPATIENT)
Age: 83
Discharge: HOME | End: 2023-11-04
Payer: MEDICARE

## 2023-11-04 LAB
25(OH)D3 SERPL-MCNC: 62.2 NG/ML
ANION GAP SERPL CALCULATED.3IONS-SCNC: 11 MMOL/L (ref 3–16)
BUN SERPL-MCNC: 30 MG/DL (ref 7–20)
CALCIUM SERPL-MCNC: 9.5 MG/DL (ref 8.3–10.6)
CHLORIDE SERPL-SCNC: 107 MMOL/L (ref 99–110)
CO2 SERPL-SCNC: 23 MMOL/L (ref 21–32)
CREAT SERPL-MCNC: 2 MG/DL (ref 0.6–1.2)
DEPRECATED RDW RBC AUTO: 13.3 % (ref 12.4–15.4)
GFR SERPLBLD CREATININE-BSD FMLA CKD-EPI: 24 ML/MIN/{1.73_M2}
GLUCOSE SERPL-MCNC: 130 MG/DL (ref 70–99)
HCT VFR BLD AUTO: 32.6 % (ref 36–48)
HGB BLD-MCNC: 10.9 G/DL (ref 12–16)
MCH RBC QN AUTO: 31.2 PG (ref 26–34)
MCHC RBC AUTO-ENTMCNC: 33.4 G/DL (ref 31–36)
MCV RBC AUTO: 93.3 FL (ref 80–100)
PHOSPHATE SERPL-MCNC: 3.8 MG/DL (ref 2.5–4.9)
PLATELET # BLD AUTO: 236 K/UL (ref 135–450)
PMV BLD AUTO: 8.9 FL (ref 5–10.5)
POTASSIUM SERPL-SCNC: 4.4 MMOL/L (ref 3.5–5.1)
PTH-INTACT SERPL-MCNC: 56.6 PG/ML (ref 14–72)
RBC # BLD AUTO: 3.49 M/UL (ref 4–5.2)
SODIUM SERPL-SCNC: 141 MMOL/L (ref 136–145)
WBC # BLD AUTO: 6 K/UL (ref 4–11)

## 2023-11-04 PROCEDURE — 85027 COMPLETE CBC AUTOMATED: CPT

## 2023-11-04 PROCEDURE — 84100 ASSAY OF PHOSPHORUS: CPT

## 2023-11-04 PROCEDURE — 80048 BASIC METABOLIC PNL TOTAL CA: CPT

## 2023-11-04 PROCEDURE — 36415 COLL VENOUS BLD VENIPUNCTURE: CPT

## 2023-11-04 PROCEDURE — 82306 VITAMIN D 25 HYDROXY: CPT

## 2023-11-04 PROCEDURE — 83970 ASSAY OF PARATHORMONE: CPT

## 2023-11-08 ENCOUNTER — HOSPITAL ENCOUNTER (OUTPATIENT)
Age: 83
Discharge: HOME | End: 2023-11-08
Payer: MEDICARE

## 2023-11-08 DIAGNOSIS — D63.1 ANEMIA IN STAGE 4 CHRONIC KIDNEY DISEASE (HCC): ICD-10-CM

## 2023-11-08 DIAGNOSIS — N18.4 ANEMIA IN STAGE 4 CHRONIC KIDNEY DISEASE (HCC): ICD-10-CM

## 2023-11-08 DIAGNOSIS — N18.4 CKD (CHRONIC KIDNEY DISEASE) STAGE 4, GFR 15-29 ML/MIN (HCC): ICD-10-CM

## 2023-11-08 LAB
DEPRECATED RDW RBC AUTO: 13.6 % (ref 12.4–15.4)
FERRITIN SERPL IA-MCNC: 29.7 NG/ML (ref 15–150)
HCT VFR BLD AUTO: 30.4 % (ref 36–48)
HGB BLD-MCNC: 10.2 G/DL (ref 12–16)
IRON SATN MFR SERPL: 39 % (ref 15–50)
IRON SERPL-MCNC: 125 UG/DL (ref 37–145)
MCH RBC QN AUTO: 31.4 PG (ref 26–34)
MCHC RBC AUTO-ENTMCNC: 33.4 G/DL (ref 31–36)
MCV RBC AUTO: 94 FL (ref 80–100)
PLATELET # BLD AUTO: 207 K/UL (ref 135–450)
PMV BLD AUTO: 8.8 FL (ref 5–10.5)
RBC # BLD AUTO: 3.23 M/UL (ref 4–5.2)
TIBC SERPL-MCNC: 320 UG/DL (ref 260–445)
WBC # BLD AUTO: 4.8 K/UL (ref 4–11)

## 2023-11-08 PROCEDURE — 82728 ASSAY OF FERRITIN: CPT

## 2023-11-08 PROCEDURE — 83550 IRON BINDING TEST: CPT

## 2023-11-08 PROCEDURE — 85027 COMPLETE CBC AUTOMATED: CPT

## 2023-11-08 PROCEDURE — 83540 ASSAY OF IRON: CPT

## 2023-11-08 PROCEDURE — 36415 COLL VENOUS BLD VENIPUNCTURE: CPT

## 2023-11-28 PROCEDURE — 93297 REM INTERROG DEV EVAL ICPMS: CPT | Performed by: NURSE PRACTITIONER

## 2023-11-28 PROCEDURE — G2066 INTER DEVC REMOTE 30D: HCPCS | Performed by: NURSE PRACTITIONER

## 2023-11-29 ENCOUNTER — HOSPITAL ENCOUNTER (OUTPATIENT)
Dept: ONCOLOGY | Age: 83
Setting detail: INFUSION SERIES
Discharge: HOME | End: 2023-11-29
Payer: MEDICARE

## 2023-11-29 VITALS
SYSTOLIC BLOOD PRESSURE: 139 MMHG | RESPIRATION RATE: 16 BRPM | DIASTOLIC BLOOD PRESSURE: 49 MMHG | TEMPERATURE: 97.5 F | HEART RATE: 66 BPM

## 2023-11-29 DIAGNOSIS — N18.4 ANEMIA IN STAGE 4 CHRONIC KIDNEY DISEASE (HCC): ICD-10-CM

## 2023-11-29 DIAGNOSIS — D63.1 ANEMIA IN STAGE 4 CHRONIC KIDNEY DISEASE (HCC): ICD-10-CM

## 2023-11-29 DIAGNOSIS — N18.4 CKD (CHRONIC KIDNEY DISEASE) STAGE 4, GFR 15-29 ML/MIN (HCC): Primary | ICD-10-CM

## 2023-11-29 LAB
HCT VFR BLD AUTO: 30.9 % (ref 36–48)
HGB BLD-MCNC: 10.1 G/DL (ref 12–16)

## 2023-11-29 PROCEDURE — 99211 OFF/OP EST MAY X REQ PHY/QHP: CPT

## 2023-11-29 PROCEDURE — 85014 HEMATOCRIT: CPT

## 2023-11-29 PROCEDURE — 85018 HEMOGLOBIN: CPT

## 2023-11-29 NOTE — PROGRESS NOTES
Pt to Dept for Labs and possible Epogen injection. AVS printed and reviewed. Hgb 10.1 today. Retacrit held per orders. Pt scheduled to return in 2 weeks for labs and possible epogen.

## 2023-12-20 ENCOUNTER — HOSPITAL ENCOUNTER (OUTPATIENT)
Dept: ONCOLOGY | Age: 83
Setting detail: INFUSION SERIES
Discharge: HOME | End: 2023-12-20
Payer: MEDICARE

## 2023-12-20 VITALS
SYSTOLIC BLOOD PRESSURE: 150 MMHG | TEMPERATURE: 97.3 F | HEART RATE: 68 BPM | RESPIRATION RATE: 16 BRPM | DIASTOLIC BLOOD PRESSURE: 51 MMHG

## 2023-12-20 DIAGNOSIS — D63.1 ANEMIA IN STAGE 4 CHRONIC KIDNEY DISEASE (HCC): ICD-10-CM

## 2023-12-20 DIAGNOSIS — N18.4 ANEMIA IN STAGE 4 CHRONIC KIDNEY DISEASE (HCC): ICD-10-CM

## 2023-12-20 DIAGNOSIS — N18.4 CKD (CHRONIC KIDNEY DISEASE) STAGE 4, GFR 15-29 ML/MIN (HCC): Primary | ICD-10-CM

## 2023-12-20 LAB
HCT VFR BLD AUTO: 27.8 % (ref 36–48)
HGB BLD-MCNC: 9.3 G/DL (ref 12–16)

## 2023-12-20 PROCEDURE — 85014 HEMATOCRIT: CPT

## 2023-12-20 PROCEDURE — 96372 THER/PROPH/DIAG INJ SC/IM: CPT

## 2023-12-20 PROCEDURE — 99211 OFF/OP EST MAY X REQ PHY/QHP: CPT

## 2023-12-20 PROCEDURE — 6360000002 HC RX W HCPCS: Performed by: INTERNAL MEDICINE

## 2023-12-20 PROCEDURE — 85018 HEMOGLOBIN: CPT

## 2023-12-20 RX ADMIN — EPOETIN ALFA-EPBX 10000 UNITS: 10000 INJECTION, SOLUTION INTRAVENOUS; SUBCUTANEOUS at 13:36

## 2023-12-20 NOTE — PROGRESS NOTES
Pt to Dept for Labs and possible Epogen injection. Denied need for printed AVS. Lab collected; Hgb 9.3 today. Retacrit 10,000 units given in left arm. Dose decreased by 25% this week per order parameters and rounded to 10,000 units. Pharmacy verified change in dose. Pt tolerated tx without complaints. Pt discharged with daughter. Pt scheduled to return in 2 weeks.

## 2023-12-29 PROCEDURE — G2066 INTER DEVC REMOTE 30D: HCPCS | Performed by: NURSE PRACTITIONER

## 2023-12-29 PROCEDURE — 93297 REM INTERROG DEV EVAL ICPMS: CPT | Performed by: NURSE PRACTITIONER

## 2024-01-03 ENCOUNTER — HOSPITAL ENCOUNTER (OUTPATIENT)
Dept: ONCOLOGY | Age: 84
Setting detail: INFUSION SERIES
Discharge: HOME | End: 2024-01-03
Payer: MEDICARE

## 2024-01-03 VITALS
HEART RATE: 64 BPM | DIASTOLIC BLOOD PRESSURE: 54 MMHG | SYSTOLIC BLOOD PRESSURE: 140 MMHG | TEMPERATURE: 97.7 F | RESPIRATION RATE: 16 BRPM

## 2024-01-03 DIAGNOSIS — N18.4 ANEMIA IN STAGE 4 CHRONIC KIDNEY DISEASE (HCC): ICD-10-CM

## 2024-01-03 DIAGNOSIS — D63.1 ANEMIA IN STAGE 4 CHRONIC KIDNEY DISEASE (HCC): ICD-10-CM

## 2024-01-03 DIAGNOSIS — N18.4 CKD (CHRONIC KIDNEY DISEASE) STAGE 4, GFR 15-29 ML/MIN (HCC): Primary | ICD-10-CM

## 2024-01-03 LAB
FERRITIN SERPL IA-MCNC: 21.1 NG/ML (ref 15–150)
HCT VFR BLD AUTO: 26.8 % (ref 36–48)
HGB BLD-MCNC: 8.9 G/DL (ref 12–16)

## 2024-01-03 PROCEDURE — 96372 THER/PROPH/DIAG INJ SC/IM: CPT

## 2024-01-03 PROCEDURE — 99211 OFF/OP EST MAY X REQ PHY/QHP: CPT

## 2024-01-03 PROCEDURE — 6360000002 HC RX W HCPCS: Performed by: INTERNAL MEDICINE

## 2024-01-03 PROCEDURE — 83550 IRON BINDING TEST: CPT

## 2024-01-03 PROCEDURE — 85018 HEMOGLOBIN: CPT

## 2024-01-03 PROCEDURE — 83540 ASSAY OF IRON: CPT

## 2024-01-03 PROCEDURE — 82728 ASSAY OF FERRITIN: CPT

## 2024-01-03 PROCEDURE — 85014 HEMATOCRIT: CPT

## 2024-01-03 RX ADMIN — EPOETIN ALFA-EPBX 12500 UNITS: 10000 INJECTION, SOLUTION INTRAVENOUS; SUBCUTANEOUS at 13:51

## 2024-01-03 NOTE — PROGRESS NOTES
Pt to Ambulatory to Dept for Labs and possible Retacrit injection. Declines AVS. VSS. Lab collected; Hgb 8.9. Retacrit increased by 25% per physician orders. Retacrit 12,500 units given in right arm. Pt tolerated tx without complaints. Pt discharged with daughter. Pt scheduled to return in 2 weeks.

## 2024-01-04 LAB
IRON SATN MFR SERPL: 23 % (ref 15–50)
IRON SERPL-MCNC: 75 UG/DL (ref 37–145)
TIBC SERPL-MCNC: 329 UG/DL (ref 260–445)

## 2024-01-08 ENCOUNTER — HOSPITAL ENCOUNTER (EMERGENCY)
Age: 84
Discharge: HOME | End: 2024-01-08
Payer: MEDICARE

## 2024-01-08 VITALS
OXYGEN SATURATION: 97 % | SYSTOLIC BLOOD PRESSURE: 131 MMHG | HEIGHT: 68 IN | HEART RATE: 64 BPM | BODY MASS INDEX: 21.52 KG/M2 | RESPIRATION RATE: 16 BRPM | DIASTOLIC BLOOD PRESSURE: 47 MMHG | TEMPERATURE: 98.5 F | WEIGHT: 142 LBS

## 2024-01-08 DIAGNOSIS — N18.9 CHRONIC KIDNEY DISEASE, UNSPECIFIED CKD STAGE: ICD-10-CM

## 2024-01-08 DIAGNOSIS — R20.2 PARESTHESIA: Primary | ICD-10-CM

## 2024-01-08 DIAGNOSIS — D64.9 CHRONIC ANEMIA: ICD-10-CM

## 2024-01-08 DIAGNOSIS — R52 BODY ACHES: ICD-10-CM

## 2024-01-08 LAB
ALBUMIN SERPL-MCNC: 4 G/DL (ref 3.4–5)
ALBUMIN/GLOB SERPL: 1.3 {RATIO} (ref 1.1–2.2)
ALP SERPL-CCNC: 38 U/L (ref 40–129)
ALT SERPL-CCNC: 9 U/L (ref 10–40)
ANION GAP SERPL CALCULATED.3IONS-SCNC: 12 MMOL/L (ref 3–16)
AST SERPL-CCNC: 12 U/L (ref 15–37)
BASOPHILS # BLD: 0 K/UL (ref 0–0.2)
BASOPHILS NFR BLD: 0.4 %
BILIRUB SERPL-MCNC: 0.3 MG/DL (ref 0–1)
BUN SERPL-MCNC: 42 MG/DL (ref 7–20)
CALCIUM SERPL-MCNC: 9.3 MG/DL (ref 8.3–10.6)
CHLORIDE SERPL-SCNC: 109 MMOL/L (ref 99–110)
CK SERPL-CCNC: 78 U/L (ref 26–192)
CO2 SERPL-SCNC: 20 MMOL/L (ref 21–32)
CREAT SERPL-MCNC: 2 MG/DL (ref 0.6–1.2)
DEPRECATED RDW RBC AUTO: 14 % (ref 12.4–15.4)
EOSINOPHIL # BLD: 0.1 K/UL (ref 0–0.6)
EOSINOPHIL NFR BLD: 1.6 %
GFR SERPLBLD CREATININE-BSD FMLA CKD-EPI: 24 ML/MIN/{1.73_M2}
GLUCOSE SERPL-MCNC: 173 MG/DL (ref 70–99)
HCT VFR BLD AUTO: 28.5 % (ref 36–48)
HGB BLD-MCNC: 9.1 G/DL (ref 12–16)
LYMPHOCYTES # BLD: 0.7 K/UL (ref 1–5.1)
LYMPHOCYTES NFR BLD: 13.5 %
MAGNESIUM SERPL-MCNC: 2.3 MG/DL (ref 1.8–2.4)
MCH RBC QN AUTO: 29.5 PG (ref 26–34)
MCHC RBC AUTO-ENTMCNC: 31.9 G/DL (ref 31–36)
MCV RBC AUTO: 92.5 FL (ref 80–100)
MONOCYTES # BLD: 0.4 K/UL (ref 0–1.3)
MONOCYTES NFR BLD: 8.4 %
NEUTROPHILS # BLD: 3.9 K/UL (ref 1.7–7.7)
NEUTROPHILS NFR BLD: 76.1 %
PLATELET # BLD AUTO: 261 K/UL (ref 135–450)
PMV BLD AUTO: 7.5 FL (ref 5–10.5)
POTASSIUM SERPL-SCNC: 4.9 MMOL/L (ref 3.5–5.1)
PROT SERPL-MCNC: 7 G/DL (ref 6.4–8.2)
RBC # BLD AUTO: 3.08 M/UL (ref 4–5.2)
SODIUM SERPL-SCNC: 141 MMOL/L (ref 136–145)
WBC # BLD AUTO: 5.1 K/UL (ref 4–11)

## 2024-01-08 PROCEDURE — 85025 COMPLETE CBC W/AUTO DIFF WBC: CPT

## 2024-01-08 PROCEDURE — 82550 ASSAY OF CK (CPK): CPT

## 2024-01-08 PROCEDURE — 99283 EMERGENCY DEPT VISIT LOW MDM: CPT

## 2024-01-08 PROCEDURE — 80053 COMPREHEN METABOLIC PANEL: CPT

## 2024-01-08 PROCEDURE — 83735 ASSAY OF MAGNESIUM: CPT

## 2024-01-08 ASSESSMENT — LIFESTYLE VARIABLES
HOW MANY STANDARD DRINKS CONTAINING ALCOHOL DO YOU HAVE ON A TYPICAL DAY: PATIENT DOES NOT DRINK
HOW OFTEN DO YOU HAVE A DRINK CONTAINING ALCOHOL: NEVER

## 2024-01-08 ASSESSMENT — PAIN DESCRIPTION - LOCATION: LOCATION: LEG

## 2024-01-08 ASSESSMENT — PAIN DESCRIPTION - PAIN TYPE: TYPE: ACUTE PAIN

## 2024-01-08 ASSESSMENT — PAIN SCALES - GENERAL: PAINLEVEL_OUTOF10: 7

## 2024-01-08 ASSESSMENT — PAIN DESCRIPTION - ORIENTATION: ORIENTATION: RIGHT;LEFT

## 2024-01-08 ASSESSMENT — PAIN - FUNCTIONAL ASSESSMENT: PAIN_FUNCTIONAL_ASSESSMENT: 0-10

## 2024-01-08 NOTE — ED PROVIDER NOTES
[facility] EMERGENCY DEPARTMENT  EMERGENCY DEPARTMENT ENCOUNTER        Pt Name: Cely Espitia  MRN: [id number]  Birthdate 1940  Date of evaluation: 1/8/2024  Provider: Luis aRmirez PA-C  PCP: Marcus Shukla MD  Note Started: 12:09 PM EST 1/8/24      GILBERTO. I have evaluated this patient.        CHIEF COMPLAINT       Chief Complaint   Patient presents with    Leg Pain     Pt w c/o bilateral legs pain that started 3 years ago. Pt stated \"vibration in my body\".       HISTORY OF PRESENT ILLNESS: 1 or more Elements     History From: patient  Limitations to history : None    Cely Espitia is a 83 y.o. female who presents to the emergency department with a chief complaint of paresthesias throughout her extremities and generalized bodyaches.  She states has been ongoing for multiple years intermittently but getting worse over the past few weeks.  Was seen at  emergency department 4 days ago and was discharged with Mobic, lidocaine patches and Ultram.  She states that she has had some chronic weakness in her legs for multiple years that is required to use a walker and still has been able to use her walker normally.  Denies urinary or bowel symptoms, nausea, vomiting, chest pain, shortness of breath, abdominal pain, rash or any other symptoms.    Nursing Notes were all reviewed and agreed with or any disagreements were addressed in the HPI.    REVIEW OF SYSTEMS :      Review of Systems    Positives and Pertinent negatives as per HPI.     SURGICAL HISTORY     Past Surgical History:   Procedure Laterality Date    ANGIOPLASTY      x3  2009    BLADDER REMOVAL      BLADDER SUSPENSION      CYSTOSCOPY  5/28/2013    with dilitation    HYSTERECTOMY (CERVIX STATUS UNKNOWN)      KIDNEY REMOVAL      right    PACEMAKER INSERTION      SKIN CANCER EXCISION      TOTAL HIP ARTHROPLASTY Left 11/6/2019    LEFT ANTERIOR TOTAL HIP REPLACEMENT WITH C-ARM performed by Avni Shepard MD at [facility] OR 
No

## 2024-01-17 ENCOUNTER — HOSPITAL ENCOUNTER (OUTPATIENT)
Dept: ONCOLOGY | Age: 84
Setting detail: INFUSION SERIES
Discharge: HOME | End: 2024-01-17
Payer: MEDICARE

## 2024-01-17 VITALS
RESPIRATION RATE: 16 BRPM | HEART RATE: 69 BPM | SYSTOLIC BLOOD PRESSURE: 146 MMHG | DIASTOLIC BLOOD PRESSURE: 69 MMHG | TEMPERATURE: 98.4 F

## 2024-01-17 DIAGNOSIS — N18.4 CKD (CHRONIC KIDNEY DISEASE) STAGE 4, GFR 15-29 ML/MIN (HCC): Primary | ICD-10-CM

## 2024-01-17 DIAGNOSIS — D63.1 ANEMIA IN STAGE 4 CHRONIC KIDNEY DISEASE (HCC): ICD-10-CM

## 2024-01-17 DIAGNOSIS — N18.4 ANEMIA IN STAGE 4 CHRONIC KIDNEY DISEASE (HCC): ICD-10-CM

## 2024-01-17 LAB
HCT VFR BLD AUTO: 28.1 % (ref 36–48)
HGB BLD-MCNC: 9.3 G/DL (ref 12–16)

## 2024-01-17 PROCEDURE — 85014 HEMATOCRIT: CPT

## 2024-01-17 PROCEDURE — 85018 HEMOGLOBIN: CPT

## 2024-01-17 PROCEDURE — 6360000002 HC RX W HCPCS: Performed by: INTERNAL MEDICINE

## 2024-01-17 PROCEDURE — 99211 OFF/OP EST MAY X REQ PHY/QHP: CPT

## 2024-01-17 PROCEDURE — 96372 THER/PROPH/DIAG INJ SC/IM: CPT

## 2024-01-17 RX ADMIN — EPOETIN ALFA-EPBX 12500 UNITS: 10000 INJECTION, SOLUTION INTRAVENOUS; SUBCUTANEOUS at 13:55

## 2024-01-17 NOTE — PROGRESS NOTES
Pt to Ambulatory to Dept for Labs and possible Retacrit injection. AVS given. VSS. Lab collected; Hgb 9.3. Retacrit dose stayed the same as last dose per physician orders. Retacrit 12,500 units given in right arm. Pt tolerated tx without complaints. Pt discharged with daughter. Pt scheduled to return in 2 weeks.

## 2024-01-22 PROCEDURE — 93296 REM INTERROG EVL PM/IDS: CPT | Performed by: INTERNAL MEDICINE

## 2024-01-22 PROCEDURE — 93295 DEV INTERROG REMOTE 1/2/MLT: CPT | Performed by: INTERNAL MEDICINE

## 2024-01-30 PROCEDURE — 93297 REM INTERROG DEV EVAL ICPMS: CPT | Performed by: NURSE PRACTITIONER

## 2024-01-31 ENCOUNTER — HOSPITAL ENCOUNTER (OUTPATIENT)
Dept: ONCOLOGY | Age: 84
Setting detail: INFUSION SERIES
Discharge: HOME | End: 2024-01-31
Payer: MEDICARE

## 2024-01-31 VITALS
TEMPERATURE: 98.6 F | DIASTOLIC BLOOD PRESSURE: 59 MMHG | RESPIRATION RATE: 16 BRPM | SYSTOLIC BLOOD PRESSURE: 151 MMHG | HEART RATE: 73 BPM

## 2024-01-31 DIAGNOSIS — N18.4 ANEMIA IN STAGE 4 CHRONIC KIDNEY DISEASE (HCC): ICD-10-CM

## 2024-01-31 DIAGNOSIS — N18.4 CKD (CHRONIC KIDNEY DISEASE) STAGE 4, GFR 15-29 ML/MIN (HCC): Primary | ICD-10-CM

## 2024-01-31 DIAGNOSIS — D63.1 ANEMIA IN STAGE 4 CHRONIC KIDNEY DISEASE (HCC): ICD-10-CM

## 2024-01-31 LAB
HCT VFR BLD AUTO: 30.1 % (ref 36–48)
HGB BLD-MCNC: 10 G/DL (ref 12–16)

## 2024-01-31 PROCEDURE — 99211 OFF/OP EST MAY X REQ PHY/QHP: CPT

## 2024-01-31 PROCEDURE — 85014 HEMATOCRIT: CPT

## 2024-01-31 PROCEDURE — 96372 THER/PROPH/DIAG INJ SC/IM: CPT

## 2024-01-31 PROCEDURE — 85018 HEMOGLOBIN: CPT

## 2024-01-31 PROCEDURE — 6360000002 HC RX W HCPCS: Performed by: INTERNAL MEDICINE

## 2024-01-31 RX ORDER — TRAMADOL HYDROCHLORIDE 50 MG/1
50 TABLET ORAL EVERY 6 HOURS PRN
COMMUNITY

## 2024-01-31 RX ADMIN — EPOETIN ALFA-EPBX 10000 UNITS: 10000 INJECTION, SOLUTION INTRAVENOUS; SUBCUTANEOUS at 13:39

## 2024-02-06 ENCOUNTER — HOSPITAL ENCOUNTER (EMERGENCY)
Age: 84
Discharge: HOME | End: 2024-02-06
Attending: EMERGENCY MEDICINE
Payer: MEDICARE

## 2024-02-06 VITALS
RESPIRATION RATE: 16 BRPM | TEMPERATURE: 97.2 F | WEIGHT: 143 LBS | SYSTOLIC BLOOD PRESSURE: 131 MMHG | DIASTOLIC BLOOD PRESSURE: 53 MMHG | HEART RATE: 73 BPM | OXYGEN SATURATION: 97 % | HEIGHT: 68 IN | BODY MASS INDEX: 21.67 KG/M2

## 2024-02-06 DIAGNOSIS — M79.604 LEG PAIN, BILATERAL: Primary | ICD-10-CM

## 2024-02-06 DIAGNOSIS — N18.9 CHRONIC RENAL IMPAIRMENT, UNSPECIFIED CKD STAGE: ICD-10-CM

## 2024-02-06 DIAGNOSIS — G62.9 PERIPHERAL POLYNEUROPATHY: ICD-10-CM

## 2024-02-06 DIAGNOSIS — M79.605 LEG PAIN, BILATERAL: Primary | ICD-10-CM

## 2024-02-06 LAB
ANION GAP SERPL CALCULATED.3IONS-SCNC: 9 MMOL/L (ref 3–16)
BASOPHILS # BLD: 0 K/UL (ref 0–0.2)
BASOPHILS NFR BLD: 0.8 %
BUN SERPL-MCNC: 32 MG/DL (ref 7–20)
CALCIUM SERPL-MCNC: 9.4 MG/DL (ref 8.3–10.6)
CHLORIDE SERPL-SCNC: 105 MMOL/L (ref 99–110)
CO2 SERPL-SCNC: 25 MMOL/L (ref 21–32)
CREAT SERPL-MCNC: 1.9 MG/DL (ref 0.6–1.2)
DEPRECATED RDW RBC AUTO: 15.4 % (ref 12.4–15.4)
EOSINOPHIL # BLD: 0.1 K/UL (ref 0–0.6)
EOSINOPHIL NFR BLD: 1.6 %
GFR SERPLBLD CREATININE-BSD FMLA CKD-EPI: 25 ML/MIN/{1.73_M2}
GLUCOSE SERPL-MCNC: 243 MG/DL (ref 70–99)
HCT VFR BLD AUTO: 33.4 % (ref 36–48)
HGB BLD-MCNC: 10.6 G/DL (ref 12–16)
LYMPHOCYTES # BLD: 0.6 K/UL (ref 1–5.1)
LYMPHOCYTES NFR BLD: 11.4 %
MAGNESIUM SERPL-MCNC: 2.2 MG/DL (ref 1.8–2.4)
MCH RBC QN AUTO: 29.4 PG (ref 26–34)
MCHC RBC AUTO-ENTMCNC: 31.8 G/DL (ref 31–36)
MCV RBC AUTO: 92.6 FL (ref 80–100)
MONOCYTES # BLD: 0.4 K/UL (ref 0–1.3)
MONOCYTES NFR BLD: 8.1 %
NEUTROPHILS # BLD: 4.3 K/UL (ref 1.7–7.7)
NEUTROPHILS NFR BLD: 78.1 %
PLATELET # BLD AUTO: 259 K/UL (ref 135–450)
PMV BLD AUTO: 7.8 FL (ref 5–10.5)
POTASSIUM SERPL-SCNC: 4.5 MMOL/L (ref 3.5–5.1)
RBC # BLD AUTO: 3.6 M/UL (ref 4–5.2)
SODIUM SERPL-SCNC: 139 MMOL/L (ref 136–145)
WBC # BLD AUTO: 5.5 K/UL (ref 4–11)

## 2024-02-06 PROCEDURE — 6370000000 HC RX 637 (ALT 250 FOR IP): Performed by: EMERGENCY MEDICINE

## 2024-02-06 PROCEDURE — 83735 ASSAY OF MAGNESIUM: CPT

## 2024-02-06 PROCEDURE — 99283 EMERGENCY DEPT VISIT LOW MDM: CPT

## 2024-02-06 PROCEDURE — 80048 BASIC METABOLIC PNL TOTAL CA: CPT

## 2024-02-06 PROCEDURE — 85025 COMPLETE CBC W/AUTO DIFF WBC: CPT

## 2024-02-06 RX ORDER — GABAPENTIN 100 MG/1
100 CAPSULE ORAL 2 TIMES DAILY
Qty: 30 CAPSULE | Refills: 0 | Status: SHIPPED | OUTPATIENT
Start: 2024-02-06 | End: 2024-02-21

## 2024-02-06 RX ORDER — GABAPENTIN 100 MG/1
100 CAPSULE ORAL 2 TIMES DAILY
Qty: 30 CAPSULE | Refills: 0 | Status: SHIPPED | OUTPATIENT
Start: 2024-02-06 | End: 2024-02-06

## 2024-02-06 RX ORDER — GABAPENTIN 100 MG/1
100 CAPSULE ORAL ONCE
Status: COMPLETED | OUTPATIENT
Start: 2024-02-06 | End: 2024-02-06

## 2024-02-06 RX ADMIN — GABAPENTIN 100 MG: 100 CAPSULE ORAL at 12:58

## 2024-02-06 ASSESSMENT — PAIN - FUNCTIONAL ASSESSMENT: PAIN_FUNCTIONAL_ASSESSMENT: 0-10

## 2024-02-06 ASSESSMENT — PAIN SCALES - GENERAL: PAINLEVEL_OUTOF10: 9

## 2024-02-06 NOTE — DISCHARGE INSTRUCTIONS
Do not combine tramadol (Ultram) with gabapentin in any 24-hour period.  Stop use of gabapentin immediately if you develop worsening dizziness, lightheadedness or confusion.    Please follow-up with your primary care physician within the next 2 to 3 days for reevaluation of your symptoms.    Continue to take your other medications as prescribed.

## 2024-02-07 NOTE — ED PROVIDER NOTES
Take 2 tablets by mouth daily as needed for PainHistorical Med      insulin glargine (LANTUS) 100 UNIT/ML injection vial Inject 18 Units into the skin nightly Sliding scaleHistorical Med      sodium bicarbonate 650 MG tablet Take 1 tablet by mouth 2 times dailyHistorical Med      rosuvastatin (CRESTOR) 10 MG tablet Take 1 tablet by mouth every eveningHistorical Med      Coenzyme Q10 (CO Q 10) 100 MG CAPS Take 1 capsule by mouth daily Historical Med      fluticasone (FLONASE) 50 MCG/ACT nasal spray 1 spray by Nasal route as needed for Rhinitis      therapeutic multivitamin-minerals (THERAGRAN-M) tablet Take 1 tablet by mouth daily Centrum silverHistorical Med      vitamin D (CHOLECALCIFEROL) 1000 UNIT TABS tablet Take 1 tablet by mouth dailyHistorical Med             Social history:  reports that she has never smoked. She has never used smokeless tobacco. She reports that she does not drink alcohol and does not use drugs.    Family history:    Family History   Problem Relation Age of Onset    Diabetes Mother     Heart Disease Father     Cancer Father          Exam  ED Triage Vitals [02/06/24 1210]   BP Temp Temp Source Pulse Respirations SpO2 Height Weight - Scale   (!) 131/53 97.2 °F (36.2 °C) Oral 73 16 97 % 1.727 m (5' 8\") 64.9 kg (143 lb)     Nursing note and vitals reviewed.  Constitutional: Well developed, well nourished. Non-toxic in appearance.  HENT:      Head: Normocephalic and atraumatic.      Ears: External ears normal.      Nose: Nose normal.     Mouth: Membrane mucosa moist and pink.  Eyes: Anicteric sclera. No discharge.   Neck: Supple. Trachea midline.   Cardiovascular: RRR; no murmurs, rubs, or gallops.  DP 1+ symmetric.  Pulmonary/Chest: Effort normal. No respiratory distress. CTAB. No stridor. No wheezes. No rales.   Musculoskeletal: Moves all extremities. No gross deformity.  Neurological: Alert and orientedx4. Face symmetric. Speech is clear.  5 out of 5 motor and sensation grossly intact

## 2024-02-12 ENCOUNTER — HOSPITAL ENCOUNTER (EMERGENCY)
Age: 84
Discharge: HOME | End: 2024-02-12
Attending: EMERGENCY MEDICINE
Payer: MEDICARE

## 2024-02-12 VITALS
HEART RATE: 79 BPM | TEMPERATURE: 97.5 F | RESPIRATION RATE: 16 BRPM | DIASTOLIC BLOOD PRESSURE: 55 MMHG | OXYGEN SATURATION: 96 % | SYSTOLIC BLOOD PRESSURE: 133 MMHG

## 2024-02-12 DIAGNOSIS — G62.9 NEUROPATHY: Primary | ICD-10-CM

## 2024-02-12 DIAGNOSIS — R53.1 GENERAL WEAKNESS: ICD-10-CM

## 2024-02-12 DIAGNOSIS — R53.83 FATIGUE, UNSPECIFIED TYPE: ICD-10-CM

## 2024-02-12 PROCEDURE — 6370000000 HC RX 637 (ALT 250 FOR IP): Performed by: EMERGENCY MEDICINE

## 2024-02-12 PROCEDURE — 99283 EMERGENCY DEPT VISIT LOW MDM: CPT

## 2024-02-12 RX ORDER — METHOCARBAMOL 500 MG/1
500 TABLET, FILM COATED ORAL ONCE
Status: COMPLETED | OUTPATIENT
Start: 2024-02-12 | End: 2024-02-12

## 2024-02-12 RX ORDER — ONDANSETRON 4 MG/1
4 TABLET, ORALLY DISINTEGRATING ORAL ONCE
Status: COMPLETED | OUTPATIENT
Start: 2024-02-12 | End: 2024-02-12

## 2024-02-12 RX ORDER — TIZANIDINE 4 MG/1
4 TABLET ORAL NIGHTLY PRN
Qty: 10 TABLET | Refills: 0 | Status: SHIPPED | OUTPATIENT
Start: 2024-02-12

## 2024-02-12 RX ADMIN — ONDANSETRON 4 MG: 4 TABLET, ORALLY DISINTEGRATING ORAL at 04:08

## 2024-02-12 RX ADMIN — METHOCARBAMOL 500 MG: 500 TABLET ORAL at 04:08

## 2024-02-14 ENCOUNTER — HOSPITAL ENCOUNTER (OUTPATIENT)
Dept: ONCOLOGY | Age: 84
Setting detail: INFUSION SERIES
Discharge: HOME | End: 2024-02-14
Payer: MEDICARE

## 2024-02-14 VITALS
DIASTOLIC BLOOD PRESSURE: 54 MMHG | RESPIRATION RATE: 16 BRPM | TEMPERATURE: 98 F | HEART RATE: 66 BPM | SYSTOLIC BLOOD PRESSURE: 144 MMHG

## 2024-02-14 DIAGNOSIS — N18.4 CKD (CHRONIC KIDNEY DISEASE) STAGE 4, GFR 15-29 ML/MIN (HCC): Primary | ICD-10-CM

## 2024-02-14 DIAGNOSIS — N18.4 ANEMIA IN STAGE 4 CHRONIC KIDNEY DISEASE (HCC): ICD-10-CM

## 2024-02-14 DIAGNOSIS — D63.1 ANEMIA IN STAGE 4 CHRONIC KIDNEY DISEASE (HCC): ICD-10-CM

## 2024-02-14 LAB
HCT VFR BLD AUTO: 32.7 % (ref 36–48)
HGB BLD-MCNC: 10.5 G/DL (ref 12–16)

## 2024-02-14 PROCEDURE — 85018 HEMOGLOBIN: CPT

## 2024-02-14 PROCEDURE — 96372 THER/PROPH/DIAG INJ SC/IM: CPT

## 2024-02-14 PROCEDURE — 85014 HEMATOCRIT: CPT

## 2024-02-14 PROCEDURE — 99211 OFF/OP EST MAY X REQ PHY/QHP: CPT

## 2024-02-14 NOTE — PROGRESS NOTES
Pt to Dept for Labs and possible Epogen injection. AVS printed and reviewed. Hgb 10.5 today.  Retacrit held per orders. Pt scheduled to return in 2 weeks for labs and possible epogen.

## 2024-02-14 NOTE — ED PROVIDER NOTES
Patient informed of biopsy results per Dr. Urena. Patient would like to take some time to look up if forefront is covered by his insurance. Patient will call back with decision after he has done some research. Waiting response        Disposition referral (if applicable):  Marcus Shukla MD  [address]  [facility] [address] [phone]          Disposition medications (if applicable):  Discharge Medication List as of 2/12/2024  6:26 AM        START taking these medications    Details   tiZANidine (ZANAFLEX) 4 MG tablet Take 1 tablet by mouth nightly as needed (spasm, neuropathy), Disp-10 tablet, R-0Normal           ED Provider Disposition Time  DISPOSITION Decision To Discharge 02/12/2024 04:23:17 AM      Comment: Please note this report has been produced using speech recognition software and may contain errors related to that system including errors in grammar, punctuation, and spelling, as well as words and phrases that may be inappropriate.  Efforts were made to edit the dictations.      João Tena MD  02/14/24 0136

## 2024-02-28 ENCOUNTER — HOSPITAL ENCOUNTER (OUTPATIENT)
Dept: ONCOLOGY | Age: 84
Setting detail: INFUSION SERIES
Discharge: HOME | End: 2024-02-28
Payer: MEDICARE

## 2024-02-28 VITALS
TEMPERATURE: 97.9 F | RESPIRATION RATE: 16 BRPM | SYSTOLIC BLOOD PRESSURE: 150 MMHG | DIASTOLIC BLOOD PRESSURE: 57 MMHG | HEART RATE: 72 BPM

## 2024-02-28 DIAGNOSIS — N18.4 ANEMIA IN STAGE 4 CHRONIC KIDNEY DISEASE (HCC): ICD-10-CM

## 2024-02-28 DIAGNOSIS — D63.1 ANEMIA IN STAGE 4 CHRONIC KIDNEY DISEASE (HCC): ICD-10-CM

## 2024-02-28 DIAGNOSIS — N18.4 CKD (CHRONIC KIDNEY DISEASE) STAGE 4, GFR 15-29 ML/MIN (HCC): Primary | ICD-10-CM

## 2024-02-28 LAB
HCT VFR BLD AUTO: 34.3 % (ref 36–48)
HGB BLD-MCNC: 11 G/DL (ref 12–16)

## 2024-02-28 PROCEDURE — 96372 THER/PROPH/DIAG INJ SC/IM: CPT

## 2024-02-28 PROCEDURE — 85014 HEMATOCRIT: CPT

## 2024-02-28 PROCEDURE — 99211 OFF/OP EST MAY X REQ PHY/QHP: CPT

## 2024-02-28 PROCEDURE — 85018 HEMOGLOBIN: CPT

## 2024-02-28 NOTE — PROGRESS NOTES
Pt to Dept for Labs and possible Epogen injection. AVS printed and reviewed. Hgb 11.0 today.  Retacrit held per orders. Pt scheduled to return in 1 month for labs and possible epogen. New orders received from Dr. Gr

## 2024-03-11 NOTE — TELEPHONE ENCOUNTER
- nephro following with [facility] per their recs   - no acute indications currently   Erlindacristo Rodriguez called in stating she is having blood pressure problems. Here is a log of the blood pressures:   141/69/52 yesterday afternoon  127/49/68 yesterday afternoon  188/74/71 before bed yesterday   175/71/66 this morning  141/62/52 1015a  She states she has been \"vibrating\" and her feet are swelling. You can reach Papito Rodriguez at [phone]. [facility]  [address] [place] [address]  [phone]    March 15, 2021    Re: Elisha Branham   :   1997  MRN:  [id number]   REFERRING PHYSICIAN:   Aneesh NAVARRETE [facility]  Date of Initial Evaluation:  20  Visits:  Rxs Used: 11  Reason for Referral:     Jaw pain  Cervicalgia    EVALUATION SUMMARY    PROGRESS  REPORT  Progress reporting period is from 20 to 3/12/21.     SUBJECTIVE  Subjective changes noted by patient: Silvia reports she she feels a 50-60% improvement in function. She is eating more easily but still has moderate difficulty eating crunchy breads. She is getting headaches 1-4 days per week, averaging 2. She experiences more symptoms when she is stressed.  Current pain level is 0/10 in her jaw, Headache 4-7/10 due to stress at work.     Previous pain level was   8/10.   Changes in function:  Yes (See Goal flowsheet attached for changes in current functional level)  Adverse reaction to treatment or activity: None  OBJECTIVE  Changes noted in objective findings:  Yes,   TMJ AROM:   Movement Degrees Joint noise Deviation Pain   Opening 55 none s-curve Present at right jaw   Left Lateral deviation  12         Right Lateral Deviation 10         Joint mobility:  Movement Movement Pain   Distraction WNL WNL   Cervical AROM: (Major, Moderate, Minimal or Nil loss)  Movement Loss Javier Mod Min Nil Pain   Protrusion     x       Flexion      x  Stretching     Retraction     x   Min stretching anterior neck     Extension      x      Left Rotation    x        Right Rotation       x Left sided tightness     Left Side Bending     x       Right Side bending     x       Re: Elisha Dardenrozva   :   1997    Facial symmetry: 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    ASSESSMENT/PLAN  Updated problem list and treatment plan: Diagnosis 1:   TMJ, neck pain    Pain -  hot/cold therapy, manual therapy, splint/taping/bracing/orthotics, education, directional preference exercise and home program  Decreased ROM/flexibility - manual therapy and therapeutic exercise  Decreased strength - therapeutic exercise and therapeutic activities  Impaired balance - neuro re-education and therapeutic activities  Decreased proprioception - neuro re-education and therapeutic activities  Impaired muscle performance - neuro re-education  Decreased function - therapeutic activities  Impaired posture - neuro re-education  STG/LTGs have been met or progress has been made towards goals:  Yes (See Goal flow sheet completed today.)  Assessment of Progress: The patient's condition is improving.  Self Management Plans:  Patient has been instructed in a home treatment program.  I have re-evaluated this patient and find that the nature, scope, duration and intensity of the therapy is appropriate for the medical condition of the patient.  Elisha continues to require the following intervention to meet STG and LTG's:  PT    Recommendations:  This patient would benefit from continued therapy.     Frequency:  1 X week, once daily  Duration:  for 3 weeks tapering to 2 X a month over 2 months    Thank you for your referral.    INQUIRIES  Therapist: Stephania Adair, PT, DPT, [facility] [place] [address] [place] [address]  Phone: [phone]  Fax: [phone]

## 2024-03-27 ENCOUNTER — HOSPITAL ENCOUNTER (OUTPATIENT)
Dept: ONCOLOGY | Age: 84
Setting detail: INFUSION SERIES
Discharge: HOME | End: 2024-03-27
Payer: MEDICARE

## 2024-03-27 VITALS
HEART RATE: 70 BPM | TEMPERATURE: 97.5 F | SYSTOLIC BLOOD PRESSURE: 135 MMHG | RESPIRATION RATE: 16 BRPM | DIASTOLIC BLOOD PRESSURE: 72 MMHG

## 2024-03-27 DIAGNOSIS — D63.1 ANEMIA IN STAGE 4 CHRONIC KIDNEY DISEASE (HCC): ICD-10-CM

## 2024-03-27 DIAGNOSIS — N18.4 CKD (CHRONIC KIDNEY DISEASE) STAGE 4, GFR 15-29 ML/MIN (HCC): Primary | ICD-10-CM

## 2024-03-27 DIAGNOSIS — N18.4 ANEMIA IN STAGE 4 CHRONIC KIDNEY DISEASE (HCC): ICD-10-CM

## 2024-03-27 LAB
FERRITIN SERPL IA-MCNC: 36.7 NG/ML (ref 15–150)
HCT VFR BLD AUTO: 31.6 % (ref 36–48)
HGB BLD-MCNC: 10.6 G/DL (ref 12–16)
IRON SATN MFR SERPL: 28 % (ref 15–50)
IRON SERPL-MCNC: 85 UG/DL (ref 37–145)
TIBC SERPL-MCNC: 302 UG/DL (ref 260–445)

## 2024-03-27 PROCEDURE — 99211 OFF/OP EST MAY X REQ PHY/QHP: CPT

## 2024-03-27 PROCEDURE — 85018 HEMOGLOBIN: CPT

## 2024-03-27 PROCEDURE — 83550 IRON BINDING TEST: CPT

## 2024-03-27 PROCEDURE — 82728 ASSAY OF FERRITIN: CPT

## 2024-03-27 PROCEDURE — 83540 ASSAY OF IRON: CPT

## 2024-03-27 PROCEDURE — 85014 HEMATOCRIT: CPT

## 2024-03-27 PROCEDURE — 96372 THER/PROPH/DIAG INJ SC/IM: CPT

## 2024-03-27 NOTE — PROGRESS NOTES
Pt to Dept for Labs and possible Epogen injection. AVS printed and reviewed. Hgb 10.6 today.  Retacrit held per orders. Pt scheduled to return in 1 month for labs and possible epogen.

## 2024-04-23 NOTE — PROGRESS NOTES
bilateral.     Chronic kidney disease  She is being followed by nephrology-Dr. James Coelho-on a regular basis with labs 1 week prior every 3 months.     DM  She is on Lantus and is managed per her PCP.  Last visit, start her on Jardiance 10 mg daily but her creatinine clearance will not allow. We initiated her on Vascepa 2 G BID but she did not tolerate with nausea and consitpation.     Total visit time > 35 minutes; > 50% spend counseling / coordinating care. I reviewed interval history, physical exam, review of data including labs, imaging, development and implementation of treatment plan and coordination of complex care. Counseled on risk factor modifications.      We will see her back in follow up in 3-4 months then resume 6-7 months routinely.   Thank you very much for allowing me to participate in the care of your patient. Please do not hesitate to contact me if you have any questions.    Sincerely,  Brown Gonzáles MD      [facility]  [address], [address]   [place] [address]  Ph: [phone]  Fax: [phone]    This note was scribed in the presence of Dr. CHANCE Gonzáles MD by Nicole Avendaño, RN.  Physician Attestation:  The scribes documentation has been prepared under my direction and personally reviewed by me in its entirety.     I confirm that the note above accurately reflects all work, treatment, procedures, and medical decision making performed by me.

## 2024-04-24 ENCOUNTER — NURSE ONLY (OUTPATIENT)
Dept: CARDIOLOGY CLINIC | Age: 84
End: 2024-04-24

## 2024-04-24 ENCOUNTER — HOSPITAL ENCOUNTER (OUTPATIENT)
Dept: ONCOLOGY | Age: 84
Setting detail: INFUSION SERIES
Discharge: HOME | End: 2024-04-24
Payer: MEDICARE

## 2024-04-24 ENCOUNTER — OFFICE VISIT (OUTPATIENT)
Dept: CARDIOLOGY CLINIC | Age: 84
End: 2024-04-24

## 2024-04-24 VITALS
SYSTOLIC BLOOD PRESSURE: 136 MMHG | TEMPERATURE: 99 F | RESPIRATION RATE: 16 BRPM | DIASTOLIC BLOOD PRESSURE: 68 MMHG | HEART RATE: 79 BPM

## 2024-04-24 VITALS
DIASTOLIC BLOOD PRESSURE: 72 MMHG | SYSTOLIC BLOOD PRESSURE: 130 MMHG | HEART RATE: 72 BPM | BODY MASS INDEX: 21.52 KG/M2 | OXYGEN SATURATION: 98 % | HEIGHT: 68 IN | WEIGHT: 142 LBS

## 2024-04-24 DIAGNOSIS — I50.22 CHRONIC SYSTOLIC HEART FAILURE (HCC): ICD-10-CM

## 2024-04-24 DIAGNOSIS — N18.9 CHRONIC KIDNEY DISEASE, UNSPECIFIED CKD STAGE: ICD-10-CM

## 2024-04-24 DIAGNOSIS — I10 ESSENTIAL HYPERTENSION: ICD-10-CM

## 2024-04-24 DIAGNOSIS — E78.5 HYPERLIPIDEMIA, UNSPECIFIED HYPERLIPIDEMIA TYPE: ICD-10-CM

## 2024-04-24 DIAGNOSIS — I25.10 CHRONIC CORONARY ARTERY DISEASE: ICD-10-CM

## 2024-04-24 DIAGNOSIS — Z95.810 AUTOMATIC IMPLANTABLE CARDIOVERTER-DEFIBRILLATOR IN SITU: Primary | ICD-10-CM

## 2024-04-24 DIAGNOSIS — N18.4 ANEMIA IN STAGE 4 CHRONIC KIDNEY DISEASE (HCC): ICD-10-CM

## 2024-04-24 DIAGNOSIS — Z95.810 AUTOMATIC IMPLANTABLE CARDIOVERTER-DEFIBRILLATOR IN SITU: ICD-10-CM

## 2024-04-24 DIAGNOSIS — I65.23 BILATERAL CAROTID ARTERY STENOSIS: ICD-10-CM

## 2024-04-24 DIAGNOSIS — I25.5 ISCHEMIC CARDIOMYOPATHY: ICD-10-CM

## 2024-04-24 DIAGNOSIS — D63.1 ANEMIA IN STAGE 4 CHRONIC KIDNEY DISEASE (HCC): ICD-10-CM

## 2024-04-24 DIAGNOSIS — I50.22 CHRONIC SYSTOLIC HEART FAILURE (HCC): Primary | ICD-10-CM

## 2024-04-24 DIAGNOSIS — I42.9 CARDIOMYOPATHY, UNSPECIFIED TYPE (HCC): ICD-10-CM

## 2024-04-24 DIAGNOSIS — N18.4 CKD (CHRONIC KIDNEY DISEASE) STAGE 4, GFR 15-29 ML/MIN (HCC): Primary | ICD-10-CM

## 2024-04-24 DIAGNOSIS — I50.22 CHRONIC SYSTOLIC (CONGESTIVE) HEART FAILURE (HCC): ICD-10-CM

## 2024-04-24 LAB
HCT VFR BLD AUTO: 33.1 % (ref 36–48)
HGB BLD-MCNC: 10.6 G/DL (ref 12–16)

## 2024-04-24 PROCEDURE — 85018 HEMOGLOBIN: CPT

## 2024-04-24 PROCEDURE — 99211 OFF/OP EST MAY X REQ PHY/QHP: CPT

## 2024-04-24 PROCEDURE — 85014 HEMATOCRIT: CPT

## 2024-04-24 RX ORDER — CARVEDILOL 12.5 MG/1
18.68 TABLET ORAL 2 TIMES DAILY
Qty: 270 TABLET | Refills: 3 | Status: SHIPPED | OUTPATIENT
Start: 2024-04-24

## 2024-04-24 NOTE — PATIENT INSTRUCTIONS
1) Increase her Coreg/carvedilol from 12.5 twice daily to 18.675 mg twice daily.   2) You will  remain off of hydralazine   3) Labs BNP and fasting lipid with next lab draw per your Nephrologist. We will call with results.   4) We will schedule you for an echocardiogram and call you with results.   5) Plan follow up between 3-4 months

## 2024-05-15 ENCOUNTER — HOSPITAL ENCOUNTER (OUTPATIENT)
Dept: CT IMAGING | Age: 84
Discharge: HOME | End: 2024-05-15
Attending: INTERNAL MEDICINE
Payer: MEDICARE

## 2024-05-15 DIAGNOSIS — C66.1 MALIGNANT NEOPLASM OF RIGHT URETER (HCC): ICD-10-CM

## 2024-05-15 PROCEDURE — 74176 CT ABD & PELVIS W/O CONTRAST: CPT

## 2024-06-05 ENCOUNTER — HOSPITAL ENCOUNTER (OUTPATIENT)
Dept: ONCOLOGY | Age: 84
Setting detail: INFUSION SERIES
Discharge: HOME | End: 2024-06-05
Payer: MEDICARE

## 2024-06-05 VITALS
RESPIRATION RATE: 16 BRPM | TEMPERATURE: 98.5 F | HEART RATE: 65 BPM | SYSTOLIC BLOOD PRESSURE: 129 MMHG | DIASTOLIC BLOOD PRESSURE: 45 MMHG

## 2024-06-05 DIAGNOSIS — N18.4 ANEMIA IN STAGE 4 CHRONIC KIDNEY DISEASE (HCC): ICD-10-CM

## 2024-06-05 DIAGNOSIS — N18.4 CKD (CHRONIC KIDNEY DISEASE) STAGE 4, GFR 15-29 ML/MIN (HCC): Primary | ICD-10-CM

## 2024-06-05 DIAGNOSIS — D63.1 ANEMIA IN STAGE 4 CHRONIC KIDNEY DISEASE (HCC): ICD-10-CM

## 2024-06-05 LAB
FERRITIN SERPL IA-MCNC: 31.3 NG/ML (ref 15–150)
HCT VFR BLD AUTO: 29.7 % (ref 36–48)
HGB BLD-MCNC: 10.1 G/DL (ref 12–16)
IRON SATN MFR SERPL: 17 % (ref 15–50)
IRON SERPL-MCNC: 54 UG/DL (ref 37–145)
TIBC SERPL-MCNC: 319 UG/DL (ref 260–445)

## 2024-06-05 PROCEDURE — 83540 ASSAY OF IRON: CPT

## 2024-06-05 PROCEDURE — 96372 THER/PROPH/DIAG INJ SC/IM: CPT

## 2024-06-05 PROCEDURE — 99211 OFF/OP EST MAY X REQ PHY/QHP: CPT

## 2024-06-05 PROCEDURE — 83550 IRON BINDING TEST: CPT

## 2024-06-05 PROCEDURE — 85018 HEMOGLOBIN: CPT

## 2024-06-05 PROCEDURE — 85014 HEMATOCRIT: CPT

## 2024-06-05 PROCEDURE — 82728 ASSAY OF FERRITIN: CPT

## 2024-06-05 NOTE — PROGRESS NOTES
Pt to Dept for Labs and possible Epogen injection. AVS printed and reviewed. Hgb 10.1 today.  Retacrit held per orders. Pt scheduled to return in 1 month for labs and possible epogen.

## 2024-06-21 ENCOUNTER — HOSPITAL ENCOUNTER (OUTPATIENT)
Age: 84
Discharge: HOME | End: 2024-06-21
Payer: MEDICARE

## 2024-06-21 DIAGNOSIS — N18.4 CHRONIC KIDNEY DISEASE, STAGE 4 (SEVERE) (HCC): ICD-10-CM

## 2024-06-21 LAB
25(OH)D3 SERPL-MCNC: 50.3 NG/ML
ANION GAP SERPL CALCULATED.3IONS-SCNC: 13 MMOL/L (ref 3–16)
BUN SERPL-MCNC: 36 MG/DL (ref 7–20)
CALCIUM SERPL-MCNC: 9 MG/DL (ref 8.3–10.6)
CHLORIDE SERPL-SCNC: 103 MMOL/L (ref 99–110)
CO2 SERPL-SCNC: 24 MMOL/L (ref 21–32)
CREAT SERPL-MCNC: 2.3 MG/DL (ref 0.6–1.2)
DEPRECATED RDW RBC AUTO: 13.8 % (ref 12.4–15.4)
GFR SERPLBLD CREATININE-BSD FMLA CKD-EPI: 20 ML/MIN/{1.73_M2}
GLUCOSE SERPL-MCNC: 310 MG/DL (ref 70–99)
HCT VFR BLD AUTO: 28.5 % (ref 36–48)
HGB BLD-MCNC: 9.4 G/DL (ref 12–16)
MAGNESIUM SERPL-MCNC: 2.2 MG/DL (ref 1.8–2.4)
MCH RBC QN AUTO: 30.8 PG (ref 26–34)
MCHC RBC AUTO-ENTMCNC: 33 G/DL (ref 31–36)
MCV RBC AUTO: 93.3 FL (ref 80–100)
PHOSPHATE SERPL-MCNC: 4.9 MG/DL (ref 2.5–4.9)
PLATELET # BLD AUTO: 205 K/UL (ref 135–450)
PMV BLD AUTO: 8.8 FL (ref 5–10.5)
POTASSIUM SERPL-SCNC: 4.7 MMOL/L (ref 3.5–5.1)
PTH-INTACT SERPL-MCNC: 111.1 PG/ML (ref 14–72)
RBC # BLD AUTO: 3.05 M/UL (ref 4–5.2)
SODIUM SERPL-SCNC: 140 MMOL/L (ref 136–145)
WBC # BLD AUTO: 5.4 K/UL (ref 4–11)

## 2024-06-21 PROCEDURE — 80048 BASIC METABOLIC PNL TOTAL CA: CPT

## 2024-06-21 PROCEDURE — 83970 ASSAY OF PARATHORMONE: CPT

## 2024-06-21 PROCEDURE — 84100 ASSAY OF PHOSPHORUS: CPT

## 2024-06-21 PROCEDURE — 83735 ASSAY OF MAGNESIUM: CPT

## 2024-06-21 PROCEDURE — 82306 VITAMIN D 25 HYDROXY: CPT

## 2024-06-21 PROCEDURE — 36415 COLL VENOUS BLD VENIPUNCTURE: CPT

## 2024-06-21 PROCEDURE — 85027 COMPLETE CBC AUTOMATED: CPT

## 2024-07-17 ENCOUNTER — HOSPITAL ENCOUNTER (OUTPATIENT)
Dept: ONCOLOGY | Age: 84
Setting detail: INFUSION SERIES
Discharge: HOME | End: 2024-07-17
Payer: MEDICARE

## 2024-07-17 VITALS
SYSTOLIC BLOOD PRESSURE: 134 MMHG | RESPIRATION RATE: 16 BRPM | TEMPERATURE: 98.8 F | HEART RATE: 69 BPM | DIASTOLIC BLOOD PRESSURE: 58 MMHG

## 2024-07-17 DIAGNOSIS — D63.1 ANEMIA IN STAGE 4 CHRONIC KIDNEY DISEASE (HCC): ICD-10-CM

## 2024-07-17 DIAGNOSIS — N18.4 ANEMIA IN STAGE 4 CHRONIC KIDNEY DISEASE (HCC): ICD-10-CM

## 2024-07-17 DIAGNOSIS — N18.4 CKD (CHRONIC KIDNEY DISEASE) STAGE 4, GFR 15-29 ML/MIN (HCC): Primary | ICD-10-CM

## 2024-07-17 LAB
HCT VFR BLD AUTO: 30.5 % (ref 36–48)
HGB BLD-MCNC: 10 G/DL (ref 12–16)

## 2024-07-17 PROCEDURE — 99211 OFF/OP EST MAY X REQ PHY/QHP: CPT

## 2024-07-17 PROCEDURE — 85018 HEMOGLOBIN: CPT

## 2024-07-17 PROCEDURE — 85014 HEMATOCRIT: CPT

## 2024-07-17 NOTE — PROGRESS NOTES
Pt to Dept for Labs and possible Epogen injection. AVS printed and reviewed. Hgb 10.0 today.  Retacrit held per orders to resume when hemoglobin is below 10.0. Pt scheduled to return in 1 month for labs and possible epogen.

## 2024-08-03 PROCEDURE — 93297 REM INTERROG DEV EVAL ICPMS: CPT | Performed by: NURSE PRACTITIONER

## 2024-08-14 ENCOUNTER — HOSPITAL ENCOUNTER (OUTPATIENT)
Dept: ONCOLOGY | Age: 84
Setting detail: INFUSION SERIES
Discharge: HOME | End: 2024-08-14
Payer: MEDICARE

## 2024-08-14 VITALS
RESPIRATION RATE: 16 BRPM | HEART RATE: 74 BPM | SYSTOLIC BLOOD PRESSURE: 146 MMHG | TEMPERATURE: 97.3 F | DIASTOLIC BLOOD PRESSURE: 58 MMHG

## 2024-08-14 DIAGNOSIS — N18.4 CKD (CHRONIC KIDNEY DISEASE) STAGE 4, GFR 15-29 ML/MIN (HCC): Primary | ICD-10-CM

## 2024-08-14 DIAGNOSIS — D63.1 ANEMIA IN STAGE 4 CHRONIC KIDNEY DISEASE (HCC): ICD-10-CM

## 2024-08-14 DIAGNOSIS — N18.4 ANEMIA IN STAGE 4 CHRONIC KIDNEY DISEASE (HCC): ICD-10-CM

## 2024-08-14 LAB
HCT VFR BLD AUTO: 28.6 % (ref 36–48)
HGB BLD-MCNC: 9.4 G/DL (ref 12–16)

## 2024-08-14 PROCEDURE — 6360000002 HC RX W HCPCS: Performed by: INTERNAL MEDICINE

## 2024-08-14 PROCEDURE — 85018 HEMOGLOBIN: CPT

## 2024-08-14 PROCEDURE — 85014 HEMATOCRIT: CPT

## 2024-08-14 PROCEDURE — 99211 OFF/OP EST MAY X REQ PHY/QHP: CPT

## 2024-08-14 PROCEDURE — 96372 THER/PROPH/DIAG INJ SC/IM: CPT

## 2024-08-14 RX ADMIN — EPOETIN ALFA-EPBX 15000 UNITS: 10000 INJECTION, SOLUTION INTRAVENOUS; SUBCUTANEOUS at 13:39

## 2024-08-14 NOTE — PROGRESS NOTES
Pt to Ambulatory to Dept for Labs and possible Retacrit injection. AVS given. VSS. Lab collected; Hgb 9.4. Retacrit 82732 units given in right arm per orders. Pt tolerated tx without complaints. Pt discharged with daughter. Pt scheduled to return in one month.

## 2024-08-21 ENCOUNTER — HOSPITAL ENCOUNTER (OUTPATIENT)
Age: 84
Discharge: HOME | End: 2024-08-23
Payer: MEDICARE

## 2024-08-21 ENCOUNTER — TELEPHONE (OUTPATIENT)
Dept: CARDIOLOGY CLINIC | Age: 84
End: 2024-08-21

## 2024-08-21 VITALS
HEIGHT: 68 IN | BODY MASS INDEX: 21.52 KG/M2 | DIASTOLIC BLOOD PRESSURE: 58 MMHG | WEIGHT: 142 LBS | SYSTOLIC BLOOD PRESSURE: 146 MMHG

## 2024-08-21 DIAGNOSIS — I50.22 CHRONIC SYSTOLIC HEART FAILURE (HCC): ICD-10-CM

## 2024-08-21 LAB
ECHO AO ASC DIAM: 2.8 CM
ECHO AO ASCENDING AORTA INDEX: 1.58 CM/M2
ECHO AO ROOT DIAM: 2.9 CM
ECHO AO ROOT INDEX: 1.64 CM/M2
ECHO AV AREA PEAK VELOCITY: 1.3 CM2
ECHO AV AREA VTI: 1.1 CM2
ECHO AV AREA/BSA PEAK VELOCITY: 0.7 CM2/M2
ECHO AV AREA/BSA VTI: 0.6 CM2/M2
ECHO AV MEAN GRADIENT: 5 MMHG
ECHO AV MEAN VELOCITY: 1.1 M/S
ECHO AV PEAK GRADIENT: 9 MMHG
ECHO AV PEAK VELOCITY: 1.5 M/S
ECHO AV VELOCITY RATIO: 0.53
ECHO AV VTI: 32.6 CM
ECHO BSA: 1.76 M2
ECHO EST RA PRESSURE: 15 MMHG
ECHO IVC EXP: 2.1 CM
ECHO LA AREA 2C: 25.4 CM2
ECHO LA AREA 4C: 26.9 CM2
ECHO LA MAJOR AXIS: 5.5 CM
ECHO LA MINOR AXIS: 5.6 CM
ECHO LA VOL BP: 97 ML (ref 22–52)
ECHO LA VOL MOD A2C: 92 ML (ref 22–52)
ECHO LA VOL MOD A4C: 101 ML (ref 22–52)
ECHO LA VOL/BSA BIPLANE: 55 ML/M2 (ref 16–34)
ECHO LA VOLUME INDEX MOD A2C: 52 ML/M2 (ref 16–34)
ECHO LA VOLUME INDEX MOD A4C: 57 ML/M2 (ref 16–34)
ECHO LV E' LATERAL VELOCITY: 7 CM/S
ECHO LV E' SEPTAL VELOCITY: 5 CM/S
ECHO LV EF PHYSICIAN: 37 %
ECHO LV FRACTIONAL SHORTENING: 17 % (ref 28–44)
ECHO LV INTERNAL DIMENSION DIASTOLE INDEX: 3.28 CM/M2
ECHO LV INTERNAL DIMENSION DIASTOLIC: 5.8 CM (ref 3.9–5.3)
ECHO LV INTERNAL DIMENSION SYSTOLIC INDEX: 2.71 CM/M2
ECHO LV INTERNAL DIMENSION SYSTOLIC: 4.8 CM
ECHO LV IVSD: 1.2 CM (ref 0.6–0.9)
ECHO LV MASS 2D: 297 G (ref 67–162)
ECHO LV MASS INDEX 2D: 167.8 G/M2 (ref 43–95)
ECHO LV POSTERIOR WALL DIASTOLIC: 1.2 CM (ref 0.6–0.9)
ECHO LV RELATIVE WALL THICKNESS RATIO: 0.41
ECHO LVOT AREA: 2.5 CM2
ECHO LVOT AV VTI INDEX: 0.44
ECHO LVOT DIAM: 1.8 CM
ECHO LVOT MEAN GRADIENT: 1 MMHG
ECHO LVOT PEAK GRADIENT: 2 MMHG
ECHO LVOT PEAK VELOCITY: 0.8 M/S
ECHO LVOT STROKE VOLUME INDEX: 20.8 ML/M2
ECHO LVOT SV: 36.9 ML
ECHO LVOT VTI: 14.5 CM
ECHO MV A VELOCITY: 0.64 M/S
ECHO MV AREA VTI: 1.1 CM2
ECHO MV E DECELERATION TIME (DT): 141 MS
ECHO MV E VELOCITY: 1.41 M/S
ECHO MV E/A RATIO: 2.2
ECHO MV E/E' LATERAL: 20.14
ECHO MV E/E' RATIO (AVERAGED): 24.17
ECHO MV E/E' SEPTAL: 28.2
ECHO MV LVOT VTI INDEX: 2.24
ECHO MV MAX VELOCITY: 1.6 M/S
ECHO MV MEAN GRADIENT: 3 MMHG
ECHO MV MEAN VELOCITY: 0.7 M/S
ECHO MV PEAK GRADIENT: 10 MMHG
ECHO MV REGURGITANT ALIASING (NYQUIST) VELOCITY: 31 CM/S
ECHO MV REGURGITANT PEAK GRADIENT: 139 MMHG
ECHO MV REGURGITANT PEAK VELOCITY: 5.9 M/S
ECHO MV REGURGITANT RADIUS PISA: 1.07 CM
ECHO MV REGURGITANT VTIA: 202 CM
ECHO MV VTI: 32.5 CM
ECHO PV MAX VELOCITY: 0.6 M/S
ECHO PV PEAK GRADIENT: 1 MMHG
ECHO RA AREA 4C: 13.8 CM2
ECHO RA END SYSTOLIC VOLUME APICAL 4 CHAMBER INDEX BSA: 20 ML/M2
ECHO RA VOLUME: 36 ML
ECHO RIGHT VENTRICULAR SYSTOLIC PRESSURE (RVSP): 52 MMHG
ECHO RV BASAL DIMENSION: 4.1 CM
ECHO RV FREE WALL PEAK S': 8 CM/S
ECHO RV MID DIMENSION: 2.6 CM
ECHO RV TAPSE: 1.9 CM (ref 1.7–?)
ECHO TV REGURGITANT MAX VELOCITY: 3.03 M/S
ECHO TV REGURGITANT PEAK GRADIENT: 37 MMHG

## 2024-08-21 PROCEDURE — 93306 TTE W/DOPPLER COMPLETE: CPT

## 2024-08-21 NOTE — TELEPHONE ENCOUNTER
Nicole,       echocardiogram and adjust OMT as needed if LVEF remains low. EF 35-40% on today's echo.      From notes   EF 20-25% post arrest improved to 40-45% on [facility] repeat echocardiogram 3/19 showed normal LVEF at 50-55%. Repeat echo 7/30/20 50-55% with grade II diastolic dysfunction. 5/2023 LVEF 35-40%.

## 2024-08-22 ENCOUNTER — TELEPHONE (OUTPATIENT)
Dept: CARDIOLOGY CLINIC | Age: 84
End: 2024-08-22

## 2024-08-22 DIAGNOSIS — I34.0 NONRHEUMATIC MITRAL VALVE REGURGITATION: Primary | ICD-10-CM

## 2024-08-22 NOTE — TELEPHONE ENCOUNTER
Cris,   Please schedule. Thanks.       Patel THOMAS W for scheduling the [facility] as her family is her transportation.       Transesophageal echocardiogram at [facility] with Dr. Gonzáles  The morning of your Procedure-ZACH you will park in the hospital parking lot and report directly to the registration desk to check in.       Pre-Procedure Instructions:  Do not eat or drink anything 8 hours before your procedure.   Hold all diabetic medications the morning of the procedure including, Metfomin.    If you take Lantus/Levemir only take ½ your normal dose the evening before.  Hold lasix morning of procedure.   All other medications can be taken in the morning with sips of water.   Do not hold anticoagulation therapy: warfarin, eliquis, xarelto therapy.   Do not use any lotions, creams or perfume the morning of procedure.   Please arrive 1 hour prior to procedure time.  Please have a responsible adult to drive you home after procedure. It is recommended you do not drive for 24 hours after procedure.    Cath lab will provide you with all post procedure instructions.     If you have any questions regarding the procedure itself or medications please call [phone] and ask to speak with a nurse.       Order placed.

## 2024-08-26 ENCOUNTER — TELEPHONE (OUTPATIENT)
Dept: CARDIOLOGY CLINIC | Age: 84
End: 2024-08-26

## 2024-08-26 NOTE — TELEPHONE ENCOUNTER
Called spoke with patient.     Worsening SOB above baseline? NO  What specifically is making them SOB? NO  When did they first notice increased SOB? NO     Orthopnea/unable to lay flat without getting SOB? NO  Do they wake up SOB? NO     PND (Paroxysmal nocturnal dyspnea)/ dyspnea that wakes them up at night? NO     Chest pain/pressure? NO  What causes the chest discomfort? NO     Weight gain?: NO    What is their Dry weight:           Today' weight: NO  STATES NO SCALE.     Edema/ swelling worse than their baseline? BOTH ANKLES SWELLING     Abdominal Distention/bloating? NO-STATES SHE DOES HAVE A HERNIA  Can they button their pants?  NO     Activity level/ not tolerating typical activity? NO  What specific activity can they not do that they were able to do 1 week ago? STATES ALL SHE DOES IS SIT, WILL GO TO LUNCH ROOM     Lightheaded/syncope? NO     Follows 2gm Na Diet? YES DOES LIMIT SODIUM     Follows Fluid Restriction 64 oz? NO    STATES SHE HAD BEEN DEALING WITH A HEAD COLD.    PLEASE ADVISE.

## 2024-08-26 NOTE — TELEPHONE ENCOUNTER
Please review Murj for: \"Possible OptiVol fluid accumulation: 15-Aug-2024 -- ongoing, elevated up to 80 w TI approaching back near reference line. Triage™ Heart Failure Risk Status on 24-Aug-2024 is Medium*.\"

## 2024-08-27 ENCOUNTER — HOSPITAL ENCOUNTER (OUTPATIENT)
Age: 84
Discharge: HOME | End: 2024-08-29
Attending: INTERNAL MEDICINE
Payer: MEDICARE

## 2024-08-27 VITALS
HEART RATE: 76 BPM | SYSTOLIC BLOOD PRESSURE: 127 MMHG | TEMPERATURE: 97.5 F | WEIGHT: 142 LBS | BODY MASS INDEX: 21.52 KG/M2 | DIASTOLIC BLOOD PRESSURE: 99 MMHG | OXYGEN SATURATION: 97 % | HEIGHT: 68 IN | RESPIRATION RATE: 21 BRPM

## 2024-08-27 DIAGNOSIS — I34.0 NONRHEUMATIC MITRAL VALVE REGURGITATION: ICD-10-CM

## 2024-08-27 LAB
ANION GAP SERPL CALCULATED.3IONS-SCNC: 13 MMOL/L (ref 3–16)
BUN SERPL-MCNC: 37 MG/DL (ref 7–20)
CALCIUM SERPL-MCNC: 9.1 MG/DL (ref 8.3–10.6)
CHLORIDE SERPL-SCNC: 107 MMOL/L (ref 99–110)
CO2 SERPL-SCNC: 24 MMOL/L (ref 21–32)
CREAT SERPL-MCNC: 2 MG/DL (ref 0.6–1.2)
DEPRECATED RDW RBC AUTO: 14 % (ref 12.4–15.4)
ECHO BSA: 1.76 M2
ECHO MV REGURGITANT ALIASING (NYQUIST) VELOCITY: 46 CM/S
ECHO MV REGURGITANT PEAK GRADIENT: 125 MMHG
ECHO MV REGURGITANT PEAK VELOCITY: 5.6 M/S
ECHO MV REGURGITANT RADIUS PISA: 0.61 CM
ECHO MV REGURGITANT VTIA: 190 CM
ERYTHROCYTE [SEDIMENTATION RATE] IN BLOOD BY WESTERGREN METHOD: 16 MM/HR (ref 0–30)
GFR SERPLBLD CREATININE-BSD FMLA CKD-EPI: 24 ML/MIN/{1.73_M2}
GLUCOSE SERPL-MCNC: 136 MG/DL (ref 70–99)
HCT VFR BLD AUTO: 28.1 % (ref 36–48)
HGB BLD-MCNC: 9.5 G/DL (ref 12–16)
MCH RBC QN AUTO: 31.1 PG (ref 26–34)
MCHC RBC AUTO-ENTMCNC: 33.7 G/DL (ref 31–36)
MCV RBC AUTO: 92.2 FL (ref 80–100)
PLATELET # BLD AUTO: 213 K/UL (ref 135–450)
PMV BLD AUTO: 8 FL (ref 5–10.5)
POTASSIUM SERPL-SCNC: 4.2 MMOL/L (ref 3.5–5.1)
RBC # BLD AUTO: 3.05 M/UL (ref 4–5.2)
SODIUM SERPL-SCNC: 144 MMOL/L (ref 136–145)
WBC # BLD AUTO: 5.1 K/UL (ref 4–11)

## 2024-08-27 PROCEDURE — 93320 DOPPLER ECHO COMPLETE: CPT

## 2024-08-27 PROCEDURE — 85027 COMPLETE CBC AUTOMATED: CPT

## 2024-08-27 PROCEDURE — 80048 BASIC METABOLIC PNL TOTAL CA: CPT

## 2024-08-27 PROCEDURE — 7100000010 HC PHASE II RECOVERY - FIRST 15 MIN: Performed by: INTERNAL MEDICINE

## 2024-08-27 PROCEDURE — 99152 MOD SED SAME PHYS/QHP 5/>YRS: CPT | Performed by: INTERNAL MEDICINE

## 2024-08-27 PROCEDURE — 85652 RBC SED RATE AUTOMATED: CPT

## 2024-08-27 PROCEDURE — 2580000003 HC RX 258: Performed by: INTERNAL MEDICINE

## 2024-08-27 PROCEDURE — 6360000002 HC RX W HCPCS: Performed by: INTERNAL MEDICINE

## 2024-08-27 PROCEDURE — 87040 BLOOD CULTURE FOR BACTERIA: CPT

## 2024-08-27 PROCEDURE — 6370000000 HC RX 637 (ALT 250 FOR IP): Performed by: INTERNAL MEDICINE

## 2024-08-27 PROCEDURE — 7100000011 HC PHASE II RECOVERY - ADDTL 15 MIN: Performed by: INTERNAL MEDICINE

## 2024-08-27 RX ORDER — LIDOCAINE HYDROCHLORIDE 20 MG/ML
SOLUTION OROPHARYNGEAL PRN
Status: COMPLETED | OUTPATIENT
Start: 2024-08-27 | End: 2024-08-27

## 2024-08-27 RX ORDER — SODIUM CHLORIDE 0.9 % (FLUSH) 0.9 %
5-40 SYRINGE (ML) INJECTION EVERY 12 HOURS SCHEDULED
Status: DISCONTINUED | OUTPATIENT
Start: 2024-08-27 | End: 2024-08-30 | Stop reason: HOSPADM

## 2024-08-27 RX ORDER — SODIUM CHLORIDE 9 MG/ML
INJECTION, SOLUTION INTRAVENOUS PRN
Status: DISCONTINUED | OUTPATIENT
Start: 2024-08-27 | End: 2024-08-30 | Stop reason: HOSPADM

## 2024-08-27 RX ORDER — MIDAZOLAM HYDROCHLORIDE 1 MG/ML
INJECTION INTRAMUSCULAR; INTRAVENOUS PRN
Status: COMPLETED | OUTPATIENT
Start: 2024-08-27 | End: 2024-08-27

## 2024-08-27 RX ORDER — SODIUM CHLORIDE 0.9 % (FLUSH) 0.9 %
5-40 SYRINGE (ML) INJECTION PRN
Status: DISCONTINUED | OUTPATIENT
Start: 2024-08-27 | End: 2024-08-30 | Stop reason: HOSPADM

## 2024-08-27 RX ORDER — FENTANYL CITRATE 50 UG/ML
INJECTION, SOLUTION INTRAMUSCULAR; INTRAVENOUS PRN
Status: COMPLETED | OUTPATIENT
Start: 2024-08-27 | End: 2024-08-27

## 2024-08-27 RX ADMIN — FENTANYL CITRATE 50 MCG: 50 INJECTION INTRAMUSCULAR; INTRAVENOUS at 09:28

## 2024-08-27 RX ADMIN — MIDAZOLAM 1 MG: 1 INJECTION INTRAMUSCULAR; INTRAVENOUS at 09:29

## 2024-08-27 RX ADMIN — MIDAZOLAM 2 MG: 1 INJECTION INTRAMUSCULAR; INTRAVENOUS at 09:28

## 2024-08-27 RX ADMIN — SODIUM CHLORIDE, PRESERVATIVE FREE 30 ML: 5 INJECTION INTRAVENOUS at 08:34

## 2024-08-27 RX ADMIN — LIDOCAINE HYDROCHLORIDE 15 ML: 20 SOLUTION ORAL; TOPICAL at 09:25

## 2024-08-27 RX ADMIN — FENTANYL CITRATE 25 MCG: 50 INJECTION INTRAMUSCULAR; INTRAVENOUS at 09:29

## 2024-08-27 RX ADMIN — SODIUM CHLORIDE 30 ML/HR: 9 INJECTION, SOLUTION INTRAVENOUS at 08:35

## 2024-08-27 NOTE — BRIEF OP NOTE
Brief Postoperative Note    Cely Espitia  YOB: 1940  [id number]    Pre-operative Diagnosis: Severe mitral dilatation     Post-operative Diagnosis: Same    Procedure: ZACH    Anesthesia: Moderate Sedation    Surgeons/Assistants: Nivia    Estimated Blood Loss: None    Complications: None    Specimens: Was Not Obtained    Findings: LA/NIOC-enlarged left atrium with no evidence of thrombus in NICO or LA                  Mitral valve-severe mitral regurgitation                  Aortic valve-normal with trivial AI                  Tricuspid valve-normal with trivial TR                   ICD lead visualized in right atrium and right ventricle with mobile echogenic density seen on ICD wire suspicious for vegetation                  LV function-grossly normal      Full report to follow    Moderate Sedation:  Start time: 9:28 AM  Stop time: 9:41 AM  3 mg versed   75 mcg fentanyl   An independent trained observer pushed medications at my direction. We monitored the patient's level of consciousness and vital signs/physiologic status throughout the procedure duration (see start and start times above).      Electronically signed by Brown Gonzáles MD on 8/27/2024 at 9:49 AM

## 2024-08-27 NOTE — DISCHARGE INSTRUCTIONS
TRANSESOPHAGEAL ECHOCARDIOGRAM (ZACH)      No driving for 24 hours after procedure  Do not make important / legal decisions within 24 hours after procedure  Do not drink any alcoholic beverages or sleeping pills for 24 hours   Advance your diet as tolerated  Continue all of your medication as directed  Call your doctor for the following symptoms:     -Fever   -Difficulty swallowing   -Chest pain   -Difficulty breathing   -Coughing up blood or bloody sputum    Follow up with your doctor as instructed    Electronically signed by Satish Ghosh RN on 8/27/2024 at 10:11 AM

## 2024-08-27 NOTE — PROGRESS NOTES
Dr Gonzáles speaking with pt and son regarding results and plan of care/ pt recovering and waiting for lab draw      1020 Labs drawn per lab    1032 Taken to discharge bridge via wc.  Patient discharged to home with family via car.

## 2024-08-27 NOTE — H&P
HPI:  The patient is 84 y.o. female with a past medical history significant for CAD, cardiomyopathy/systolic heart failure with prior PCI to LAD and RCA. She was hospitalized 3/26/16-4/5/16 after suffering Vfib cardiac arrest. Bystanders initiated CPR and when the EMS arrived she was in Vfib and subsequently shocked multiple times. Echo revealed EF 25%. LHC showed patient stents. Underwent hypothermia protocol and has had a significant neurological recovery. An ICD was placed on 4/1/16 for secondary prevention. We referred her to Dr. Colón for carotid stenosis but never followed up and now managed per Dr. Reynoso.  vascular surgeon at [facility] Dr. EAMON Reynoso with repeat carotid duplex 1/15/21. She also reports that she had nausea and constipation on Vascepa. Hospitalization for rectal bleed 1/2023.     [facility]      Today, overall she reports she is doing well and feeling well from a cardiac standpoint with no specific cardiac complaints. She does have issues with sinus issues, back and leg pian with use of walker with tingling due to her diabetes, sometimes hears her heart beat in her left ear.. Patient denies exertional chest pain/pressure, dyspnea at rest, BENTLEY, PND, orthopnea, palpitations, lightheadedness, weight changes, changes in LE edema, and syncope. The patient admits to medical therapy compliance and feels she is tolerating.         Past Medical History        Past Medical History:   Diagnosis Date    Anxiety      Atrial fibrillation (HCC)      CAD (coronary artery disease)      Cancer (HCC)       bladder right kidney     Cardiac arrest (HCC) 03/27/2016    Carotid artery stenosis      CHF (congestive heart failure) (HCC)      Chronic kidney disease       cancer of kidney and bladder    Depression      Diabetes mellitus (HCC)       IDDM    GERD (gastroesophageal reflux disease)      Hip pain, chronic      HYPERCHOLESTERAEMIA      Hypertension      Irritable    -I reviewed her echo from 5/2023 completed at [facility]  -her BP and HR are stable today  -I have reviewed her device interrogation today.  -I will increase her coreg from 12.5 to 18.675 mg BID  -she will remain off of hydralazine   -BNP and fasting lipid few weeks  -echocardiogram and adjust OMT as needed if LVEF remains low.   -see back in 3-4 months   -we had a long discussion regarding heart failure with all questions and concerns answered at this time.   -education printed on AVS.      NYHA class II     ICD: s/p BIV ICD 2016- needs annual FU w EP  -MURJ 9/27/23 stable.   -device interrogation reviewed today. Will increase coreg from 12.5 mg BID to 18.675 mg BID.      CAD s/p PCI LAD and RCA   Stress negative completed 7/2020.  She denies any symptoms of angina today and remains stable since our last visit. Continue BB, statin, and Asa.   Fasting lipid now     Hypertension, essential   Blood pressure is controlled today today on Coreg  She took herself off of Hydralazine because she lives alone. Will not resume now.   I have previously asked her toadhere to a low sodium diet as she reports high sodium diet. Continue walking program.       Hyperlipidemia, unspecified   Last lipid profile 3/3/21 LDLc 68,  otherwise WNL. LFT abnormal carvedilol and hydralazine therapy/17/21. Will continue Crestor 10 mg daily. She did not tolerate Vascepa. Liver 9/2023  LDL goal is 55-70  1/8/2024 LFT reviewed   will complete lipid, fasting with next blood draw per Nephrology.      ICD  Implanted 4/1/16 and is managed per our EP team/device clinic. Amiodarone has been formerly discontinued. She had device interrogation 5/10/21 remote transmission shows normal sensing and pacing function with next interrogation 11/2021. She feels the occasional palpitations and hears her heart beat in her ear. Increase coreg today as outlined above after interrogation reviewed.      Carotid stenosis, bilateral   Last carotid doppler 2/5/2020

## 2024-08-31 LAB
BACTERIA BLD CULT ORG #2: NORMAL
BACTERIA BLD CULT: NORMAL

## 2024-09-03 PROCEDURE — 93297 REM INTERROG DEV EVAL ICPMS: CPT | Performed by: NURSE PRACTITIONER

## 2024-09-03 NOTE — PROGRESS NOTES
and coordination of complex care. Counseled on risk factor modifications.        We will see her back in follow up 2-3 months     Thank you very much for allowing me to participate in the care of your patient. Please do not hesitate to contact me if you have any questions.    Sincerely,  Brown Gonzáles MD      [facility]  [address], [address]   [place] [address]  Ph: [phone]  Fax: [phone]    This note was scribed in the presence of Dr. CHANCE Gonzáles MD by Nicole Avendaño RN.  Physician Attestation:  The scribes documentation has been prepared under my direction and personally reviewed by me in its entirety.     I confirm that the note above accurately reflects all work, treatment, procedures, and medical decision making performed by me.

## 2024-09-04 ENCOUNTER — OFFICE VISIT (OUTPATIENT)
Dept: CARDIOLOGY CLINIC | Age: 84
End: 2024-09-04
Payer: MEDICARE

## 2024-09-04 ENCOUNTER — TELEPHONE (OUTPATIENT)
Dept: CARDIOLOGY CLINIC | Age: 84
End: 2024-09-04

## 2024-09-04 VITALS
HEIGHT: 68 IN | BODY MASS INDEX: 21.67 KG/M2 | SYSTOLIC BLOOD PRESSURE: 134 MMHG | DIASTOLIC BLOOD PRESSURE: 60 MMHG | OXYGEN SATURATION: 98 % | HEART RATE: 66 BPM | WEIGHT: 143 LBS

## 2024-09-04 DIAGNOSIS — I25.10 CORONARY ARTERY DISEASE INVOLVING NATIVE CORONARY ARTERY OF NATIVE HEART WITHOUT ANGINA PECTORIS: ICD-10-CM

## 2024-09-04 DIAGNOSIS — I50.22 CHRONIC SYSTOLIC HEART FAILURE (HCC): Primary | ICD-10-CM

## 2024-09-04 DIAGNOSIS — I10 ESSENTIAL HYPERTENSION: ICD-10-CM

## 2024-09-04 DIAGNOSIS — N18.9 CHRONIC KIDNEY DISEASE, UNSPECIFIED CKD STAGE: ICD-10-CM

## 2024-09-04 DIAGNOSIS — I05.9 MITRAL VALVE DISEASE: ICD-10-CM

## 2024-09-04 DIAGNOSIS — E78.5 HYPERLIPIDEMIA, UNSPECIFIED HYPERLIPIDEMIA TYPE: ICD-10-CM

## 2024-09-04 DIAGNOSIS — Z95.810 AUTOMATIC IMPLANTABLE CARDIOVERTER-DEFIBRILLATOR IN SITU: ICD-10-CM

## 2024-09-04 DIAGNOSIS — R93.1 ABNORMAL ECHOCARDIOGRAM: Primary | ICD-10-CM

## 2024-09-04 DIAGNOSIS — I65.23 BILATERAL CAROTID ARTERY STENOSIS: ICD-10-CM

## 2024-09-04 PROCEDURE — 1123F ACP DISCUSS/DSCN MKR DOCD: CPT | Performed by: INTERNAL MEDICINE

## 2024-09-04 PROCEDURE — 99214 OFFICE O/P EST MOD 30 MIN: CPT | Performed by: INTERNAL MEDICINE

## 2024-09-04 NOTE — PATIENT INSTRUCTIONS
Cris our procedure  will reach out to you to schedule     RIGHT HEART CATHETERIZATION AT [facility]   WITH Dr. Maury Velazquez. One of Dr. Gonzáles's partners.   The morning of your procedure you will park in the hospital parking lot and report directly to the registration desk to check in.   [address] [address]    DATE:     TIME:     ARRIVAL TIME:     Pre-Procedure Instructions:   You will need to fast for at least 8 hours prior to procedure.   No caffeine the morning of.   No gum or mints, if having general anesthesia.    Hold your diuretic, Lasix the morning of procedure.   Hold all diabetic medications the morning of procedure including, Metformin, glipizide for example.   If you take Lantus/Levemir only take ½ your normal dose the evening before.    All other medications can be taken in the morning with sips of water.   Do not use any lotions, creams or perfume the morning of procedure.   Pre-procedure lab work will need to be completed few days prior to procedure.   Please have a responsible adult to drive you home after procedure.   We advise you have someone to stay with you for 24 hours following procedure for precautionary measures.   Depending on procedure you may require an overnight stay.   Cath lab will provide you with all post procedure instructions.     If you have any questions regarding the procedure itself or medications, please call [phone] and ask to speak with a nurse.

## 2024-09-05 ENCOUNTER — TELEPHONE (OUTPATIENT)
Dept: CARDIOLOGY CLINIC | Age: 84
End: 2024-09-05

## 2024-09-05 NOTE — TELEPHONE ENCOUNTER
Order is placed for Right Heart Cath     Dr. Velazquez would like to do alphavac along with Dr. Colón Wednesday at [facility]   And would like 2 hours to do procedure with the right heart cath.      Also the alphavac rep would need to be called to let them know about procedure.

## 2024-09-11 ENCOUNTER — HOSPITAL ENCOUNTER (OUTPATIENT)
Dept: ONCOLOGY | Age: 84
Setting detail: INFUSION SERIES
Discharge: HOME | End: 2024-09-11

## 2024-09-11 DIAGNOSIS — N18.4 CKD (CHRONIC KIDNEY DISEASE) STAGE 4, GFR 15-29 ML/MIN (HCC): Primary | ICD-10-CM

## 2024-09-11 DIAGNOSIS — D63.1 ANEMIA IN STAGE 4 CHRONIC KIDNEY DISEASE (HCC): ICD-10-CM

## 2024-09-11 DIAGNOSIS — N18.4 ANEMIA IN STAGE 4 CHRONIC KIDNEY DISEASE (HCC): ICD-10-CM

## 2024-09-11 PROBLEM — R93.1 ABNORMAL ECHOCARDIOGRAM: Status: ACTIVE | Noted: 2024-09-04

## 2024-09-17 ENCOUNTER — TELEPHONE (OUTPATIENT)
Dept: CARDIOLOGY CLINIC | Age: 84
End: 2024-09-17

## 2024-09-17 DIAGNOSIS — R93.1 ABNORMAL FINDINGS ON DIAGNOSTIC IMAGING OF HEART AND CORONARY CIRCULATION: ICD-10-CM

## 2024-09-17 DIAGNOSIS — Z01.810 PRE-OPERATIVE CARDIOVASCULAR EXAMINATION: Primary | ICD-10-CM

## 2024-09-17 DIAGNOSIS — Z01.818 ENCOUNTER FOR OTHER PREPROCEDURAL EXAMINATION: ICD-10-CM

## 2024-09-19 ENCOUNTER — HOSPITAL ENCOUNTER (OUTPATIENT)
Age: 84
Discharge: HOME | End: 2024-09-19
Payer: MEDICARE

## 2024-09-19 DIAGNOSIS — I05.9 MITRAL VALVE DISEASE: ICD-10-CM

## 2024-09-19 DIAGNOSIS — I50.22 CHRONIC SYSTOLIC HEART FAILURE (HCC): ICD-10-CM

## 2024-09-19 DIAGNOSIS — Z95.810 AUTOMATIC IMPLANTABLE CARDIOVERTER-DEFIBRILLATOR IN SITU: ICD-10-CM

## 2024-09-19 LAB
ANION GAP SERPL CALCULATED.3IONS-SCNC: 13 MMOL/L (ref 3–16)
BUN SERPL-MCNC: 40 MG/DL (ref 7–20)
CALCIUM SERPL-MCNC: 9.4 MG/DL (ref 8.3–10.6)
CHLORIDE SERPL-SCNC: 106 MMOL/L (ref 99–110)
CO2 SERPL-SCNC: 24 MMOL/L (ref 21–32)
CREAT SERPL-MCNC: 2.2 MG/DL (ref 0.6–1.2)
CRP SERPL-MCNC: <3 MG/L (ref 0–5.1)
DEPRECATED RDW RBC AUTO: 15 % (ref 12.4–15.4)
ERYTHROCYTE [SEDIMENTATION RATE] IN BLOOD BY WESTERGREN METHOD: 18 MM/HR (ref 0–30)
GFR SERPLBLD CREATININE-BSD FMLA CKD-EPI: 21 ML/MIN/{1.73_M2}
GLUCOSE SERPL-MCNC: 205 MG/DL (ref 70–99)
HCT VFR BLD AUTO: 29 % (ref 36–48)
HGB BLD-MCNC: 9.5 G/DL (ref 12–16)
MCH RBC QN AUTO: 30 PG (ref 26–34)
MCHC RBC AUTO-ENTMCNC: 32.8 G/DL (ref 31–36)
MCV RBC AUTO: 91.6 FL (ref 80–100)
NT-PROBNP SERPL-MCNC: 9843 PG/ML (ref 0–449)
PLATELET # BLD AUTO: 192 K/UL (ref 135–450)
PMV BLD AUTO: 9.1 FL (ref 5–10.5)
POTASSIUM SERPL-SCNC: 5 MMOL/L (ref 3.5–5.1)
RBC # BLD AUTO: 3.16 M/UL (ref 4–5.2)
SODIUM SERPL-SCNC: 143 MMOL/L (ref 136–145)
WBC # BLD AUTO: 6.1 K/UL (ref 4–11)

## 2024-09-19 PROCEDURE — 80048 BASIC METABOLIC PNL TOTAL CA: CPT

## 2024-09-19 PROCEDURE — 85652 RBC SED RATE AUTOMATED: CPT

## 2024-09-19 PROCEDURE — 86140 C-REACTIVE PROTEIN: CPT

## 2024-09-19 PROCEDURE — 36415 COLL VENOUS BLD VENIPUNCTURE: CPT

## 2024-09-19 PROCEDURE — 85027 COMPLETE CBC AUTOMATED: CPT

## 2024-09-19 PROCEDURE — 83880 ASSAY OF NATRIURETIC PEPTIDE: CPT

## 2024-09-24 RX ORDER — ISOSORBIDE MONONITRATE 30 MG/1
30 TABLET, EXTENDED RELEASE ORAL DAILY
Qty: 90 TABLET | Refills: 3 | Status: SHIPPED | OUTPATIENT
Start: 2024-09-24

## 2024-09-25 ENCOUNTER — HOSPITAL ENCOUNTER (INPATIENT)
Age: 84
LOS: 1 days | Discharge: NURSING, REHAB OR LONG-TERM CARE | End: 2024-09-26
Attending: STUDENT IN AN ORGANIZED HEALTH CARE EDUCATION/TRAINING PROGRAM | Admitting: STUDENT IN AN ORGANIZED HEALTH CARE EDUCATION/TRAINING PROGRAM
Payer: MEDICARE

## 2024-09-25 ENCOUNTER — APPOINTMENT (OUTPATIENT)
Age: 84
End: 2024-09-25
Attending: STUDENT IN AN ORGANIZED HEALTH CARE EDUCATION/TRAINING PROGRAM
Payer: MEDICARE

## 2024-09-25 DIAGNOSIS — Z01.818 ENCOUNTER FOR OTHER PREPROCEDURAL EXAMINATION: ICD-10-CM

## 2024-09-25 DIAGNOSIS — Z01.810 PRE-OPERATIVE CARDIOVASCULAR EXAMINATION: ICD-10-CM

## 2024-09-25 DIAGNOSIS — R93.1 ABNORMAL ECHOCARDIOGRAM: ICD-10-CM

## 2024-09-25 DIAGNOSIS — R93.1 ABNORMAL FINDINGS ON DIAGNOSTIC IMAGING OF HEART AND CORONARY CIRCULATION: ICD-10-CM

## 2024-09-25 PROBLEM — I51.3 ATRIAL THROMBUS: Status: ACTIVE | Noted: 2024-09-25

## 2024-09-25 LAB
ECHO BSA: 1.74 M2
ECHO BSA: 1.74 M2
GLUCOSE BLD-MCNC: 118 MG/DL (ref 70–99)
GLUCOSE BLD-MCNC: 153 MG/DL (ref 70–99)
PERFORMED ON: ABNORMAL
PERFORMED ON: ABNORMAL
POC ACT LR: 232 SEC

## 2024-09-25 PROCEDURE — B24BZZ4 ULTRASONOGRAPHY OF HEART WITH AORTA, TRANSESOPHAGEAL: ICD-10-PCS | Performed by: STUDENT IN AN ORGANIZED HEALTH CARE EDUCATION/TRAINING PROGRAM

## 2024-09-25 PROCEDURE — 7100000010 HC PHASE II RECOVERY - FIRST 15 MIN

## 2024-09-25 PROCEDURE — 1200000000 HC SEMI PRIVATE

## 2024-09-25 PROCEDURE — 2500000003 HC RX 250 WO HCPCS: Performed by: STUDENT IN AN ORGANIZED HEALTH CARE EDUCATION/TRAINING PROGRAM

## 2024-09-25 PROCEDURE — 7100000010 HC PHASE II RECOVERY - FIRST 15 MIN: Performed by: STUDENT IN AN ORGANIZED HEALTH CARE EDUCATION/TRAINING PROGRAM

## 2024-09-25 PROCEDURE — 93312 ECHO TRANSESOPHAGEAL: CPT

## 2024-09-25 PROCEDURE — 99152 MOD SED SAME PHYS/QHP 5/>YRS: CPT | Performed by: STUDENT IN AN ORGANIZED HEALTH CARE EDUCATION/TRAINING PROGRAM

## 2024-09-25 PROCEDURE — 2709999900 HC NON-CHARGEABLE SUPPLY: Performed by: STUDENT IN AN ORGANIZED HEALTH CARE EDUCATION/TRAINING PROGRAM

## 2024-09-25 PROCEDURE — 2580000003 HC RX 258: Performed by: STUDENT IN AN ORGANIZED HEALTH CARE EDUCATION/TRAINING PROGRAM

## 2024-09-25 PROCEDURE — 0644T TCAT RMVL/DBLK ICAR MAS PERQ: CPT | Performed by: STUDENT IN AN ORGANIZED HEALTH CARE EDUCATION/TRAINING PROGRAM

## 2024-09-25 PROCEDURE — 6370000000 HC RX 637 (ALT 250 FOR IP): Performed by: STUDENT IN AN ORGANIZED HEALTH CARE EDUCATION/TRAINING PROGRAM

## 2024-09-25 PROCEDURE — 7100000011 HC PHASE II RECOVERY - ADDTL 15 MIN: Performed by: STUDENT IN AN ORGANIZED HEALTH CARE EDUCATION/TRAINING PROGRAM

## 2024-09-25 PROCEDURE — C1894 INTRO/SHEATH, NON-LASER: HCPCS | Performed by: STUDENT IN AN ORGANIZED HEALTH CARE EDUCATION/TRAINING PROGRAM

## 2024-09-25 PROCEDURE — C1757 CATH, THROMBECTOMY/EMBOLECT: HCPCS | Performed by: STUDENT IN AN ORGANIZED HEALTH CARE EDUCATION/TRAINING PROGRAM

## 2024-09-25 PROCEDURE — 85347 COAGULATION TIME ACTIVATED: CPT

## 2024-09-25 PROCEDURE — C1760 CLOSURE DEV, VASC: HCPCS | Performed by: STUDENT IN AN ORGANIZED HEALTH CARE EDUCATION/TRAINING PROGRAM

## 2024-09-25 PROCEDURE — C1769 GUIDE WIRE: HCPCS | Performed by: STUDENT IN AN ORGANIZED HEALTH CARE EDUCATION/TRAINING PROGRAM

## 2024-09-25 PROCEDURE — 99153 MOD SED SAME PHYS/QHP EA: CPT | Performed by: STUDENT IN AN ORGANIZED HEALTH CARE EDUCATION/TRAINING PROGRAM

## 2024-09-25 PROCEDURE — 88305 TISSUE EXAM BY PATHOLOGIST: CPT

## 2024-09-25 PROCEDURE — 6360000002 HC RX W HCPCS: Performed by: STUDENT IN AN ORGANIZED HEALTH CARE EDUCATION/TRAINING PROGRAM

## 2024-09-25 PROCEDURE — 02C63ZZ EXTIRPATION OF MATTER FROM RIGHT ATRIUM, PERCUTANEOUS APPROACH: ICD-10-PCS | Performed by: STUDENT IN AN ORGANIZED HEALTH CARE EDUCATION/TRAINING PROGRAM

## 2024-09-25 RX ORDER — ACETAMINOPHEN 325 MG/1
650 TABLET ORAL EVERY 6 HOURS PRN
Status: DISCONTINUED | OUTPATIENT
Start: 2024-09-25 | End: 2024-09-26 | Stop reason: HOSPADM

## 2024-09-25 RX ORDER — ALPRAZOLAM 0.25 MG
0.25 TABLET ORAL 2 TIMES DAILY PRN
Status: DISCONTINUED | OUTPATIENT
Start: 2024-09-25 | End: 2024-09-26 | Stop reason: HOSPADM

## 2024-09-25 RX ORDER — HEPARIN SODIUM 1000 [USP'U]/ML
INJECTION, SOLUTION INTRAVENOUS; SUBCUTANEOUS PRN
Status: COMPLETED | OUTPATIENT
Start: 2024-09-25 | End: 2024-09-25

## 2024-09-25 RX ORDER — ROSUVASTATIN CALCIUM 10 MG/1
10 TABLET, COATED ORAL EVERY EVENING
Status: DISCONTINUED | OUTPATIENT
Start: 2024-09-25 | End: 2024-09-26 | Stop reason: HOSPADM

## 2024-09-25 RX ORDER — DEXMEDETOMIDINE HYDROCHLORIDE 4 UG/ML
INJECTION, SOLUTION INTRAVENOUS CONTINUOUS PRN
Status: COMPLETED | OUTPATIENT
Start: 2024-09-25 | End: 2024-09-25

## 2024-09-25 RX ORDER — TRAMADOL HYDROCHLORIDE 50 MG/1
50 TABLET ORAL EVERY 6 HOURS PRN
Status: DISCONTINUED | OUTPATIENT
Start: 2024-09-25 | End: 2024-09-26 | Stop reason: HOSPADM

## 2024-09-25 RX ORDER — SODIUM CHLORIDE 9 MG/ML
INJECTION, SOLUTION INTRAVENOUS PRN
Status: DISCONTINUED | OUTPATIENT
Start: 2024-09-25 | End: 2024-09-26 | Stop reason: HOSPADM

## 2024-09-25 RX ORDER — SODIUM CHLORIDE 0.9 % (FLUSH) 0.9 %
5-40 SYRINGE (ML) INJECTION EVERY 12 HOURS SCHEDULED
Status: DISCONTINUED | OUTPATIENT
Start: 2024-09-25 | End: 2024-09-25 | Stop reason: HOSPADM

## 2024-09-25 RX ORDER — MIDAZOLAM HYDROCHLORIDE 1 MG/ML
INJECTION INTRAMUSCULAR; INTRAVENOUS PRN
Status: COMPLETED | OUTPATIENT
Start: 2024-09-25 | End: 2024-09-25

## 2024-09-25 RX ORDER — ASPIRIN 81 MG/1
81 TABLET ORAL DAILY
Status: DISCONTINUED | OUTPATIENT
Start: 2024-09-26 | End: 2024-09-26 | Stop reason: HOSPADM

## 2024-09-25 RX ORDER — M-VIT,TX,IRON,MINS/CALC/FOLIC 27MG-0.4MG
1 TABLET ORAL DAILY
Status: DISCONTINUED | OUTPATIENT
Start: 2024-09-26 | End: 2024-09-26 | Stop reason: HOSPADM

## 2024-09-25 RX ORDER — ASPIRIN 81 MG/1
81 TABLET, CHEWABLE ORAL DAILY
Status: DISCONTINUED | OUTPATIENT
Start: 2024-09-25 | End: 2024-09-25 | Stop reason: SDUPTHER

## 2024-09-25 RX ORDER — ONDANSETRON 2 MG/ML
4 INJECTION INTRAMUSCULAR; INTRAVENOUS EVERY 6 HOURS PRN
Status: DISCONTINUED | OUTPATIENT
Start: 2024-09-25 | End: 2024-09-26 | Stop reason: HOSPADM

## 2024-09-25 RX ORDER — SODIUM CHLORIDE 0.9 % (FLUSH) 0.9 %
5-40 SYRINGE (ML) INJECTION PRN
Status: DISCONTINUED | OUTPATIENT
Start: 2024-09-25 | End: 2024-09-25 | Stop reason: HOSPADM

## 2024-09-25 RX ORDER — DEXMEDETOMIDINE HYDROCHLORIDE 4 UG/ML
.1-1.5 INJECTION, SOLUTION INTRAVENOUS CONTINUOUS
Status: DISCONTINUED | OUTPATIENT
Start: 2024-09-25 | End: 2024-09-26 | Stop reason: HOSPADM

## 2024-09-25 RX ORDER — SODIUM BICARBONATE 650 MG/1
650 TABLET ORAL 2 TIMES DAILY
Status: DISCONTINUED | OUTPATIENT
Start: 2024-09-25 | End: 2024-09-26 | Stop reason: HOSPADM

## 2024-09-25 RX ORDER — SODIUM CHLORIDE 0.9 % (FLUSH) 0.9 %
5-40 SYRINGE (ML) INJECTION EVERY 12 HOURS SCHEDULED
Status: DISCONTINUED | OUTPATIENT
Start: 2024-09-25 | End: 2024-09-26 | Stop reason: HOSPADM

## 2024-09-25 RX ORDER — SODIUM CHLORIDE 0.9 % (FLUSH) 0.9 %
5-40 SYRINGE (ML) INJECTION PRN
Status: DISCONTINUED | OUTPATIENT
Start: 2024-09-25 | End: 2024-09-26 | Stop reason: HOSPADM

## 2024-09-25 RX ORDER — ACETAMINOPHEN 650 MG/1
650 SUPPOSITORY RECTAL EVERY 6 HOURS PRN
Status: DISCONTINUED | OUTPATIENT
Start: 2024-09-25 | End: 2024-09-26 | Stop reason: HOSPADM

## 2024-09-25 RX ORDER — ENOXAPARIN SODIUM 100 MG/ML
30 INJECTION SUBCUTANEOUS DAILY
Status: DISCONTINUED | OUTPATIENT
Start: 2024-09-26 | End: 2024-09-26 | Stop reason: HOSPADM

## 2024-09-25 RX ORDER — SODIUM CHLORIDE 9 MG/ML
INJECTION, SOLUTION INTRAVENOUS PRN
Status: DISCONTINUED | OUTPATIENT
Start: 2024-09-25 | End: 2024-09-25 | Stop reason: HOSPADM

## 2024-09-25 RX ORDER — ACETAMINOPHEN 500 MG
1000 TABLET ORAL EVERY 6 HOURS PRN
Status: DISCONTINUED | OUTPATIENT
Start: 2024-09-25 | End: 2024-09-25 | Stop reason: ALTCHOICE

## 2024-09-25 RX ORDER — VITAMIN B COMPLEX
1000 TABLET ORAL DAILY
Status: DISCONTINUED | OUTPATIENT
Start: 2024-09-26 | End: 2024-09-26 | Stop reason: HOSPADM

## 2024-09-25 RX ORDER — ONDANSETRON 4 MG/1
4 TABLET, ORALLY DISINTEGRATING ORAL EVERY 8 HOURS PRN
Status: DISCONTINUED | OUTPATIENT
Start: 2024-09-25 | End: 2024-09-26 | Stop reason: HOSPADM

## 2024-09-25 RX ORDER — POLYETHYLENE GLYCOL 3350 17 G/17G
17 POWDER, FOR SOLUTION ORAL DAILY PRN
Status: DISCONTINUED | OUTPATIENT
Start: 2024-09-25 | End: 2024-09-26 | Stop reason: HOSPADM

## 2024-09-25 RX ORDER — INSULIN GLARGINE 100 [IU]/ML
18 INJECTION, SOLUTION SUBCUTANEOUS NIGHTLY
Status: DISCONTINUED | OUTPATIENT
Start: 2024-09-25 | End: 2024-09-26 | Stop reason: HOSPADM

## 2024-09-25 RX ORDER — DEXTROSE MONOHYDRATE 100 MG/ML
INJECTION, SOLUTION INTRAVENOUS CONTINUOUS PRN
Status: DISCONTINUED | OUTPATIENT
Start: 2024-09-25 | End: 2024-09-26 | Stop reason: HOSPADM

## 2024-09-25 RX ORDER — GLUCAGON 1 MG/ML
1 KIT INJECTION PRN
Status: DISCONTINUED | OUTPATIENT
Start: 2024-09-25 | End: 2024-09-26 | Stop reason: HOSPADM

## 2024-09-25 RX ORDER — GABAPENTIN 100 MG/1
100 CAPSULE ORAL 2 TIMES DAILY
Status: DISCONTINUED | OUTPATIENT
Start: 2024-09-25 | End: 2024-09-26 | Stop reason: HOSPADM

## 2024-09-25 RX ORDER — ISOSORBIDE MONONITRATE 30 MG/1
30 TABLET, EXTENDED RELEASE ORAL DAILY
Status: DISCONTINUED | OUTPATIENT
Start: 2024-09-25 | End: 2024-09-26 | Stop reason: HOSPADM

## 2024-09-25 RX ADMIN — SODIUM CHLORIDE, PRESERVATIVE FREE 10 ML: 5 INJECTION INTRAVENOUS at 21:18

## 2024-09-25 RX ADMIN — SODIUM CHLORIDE 50 ML/HR: 9 INJECTION, SOLUTION INTRAVENOUS at 10:39

## 2024-09-25 RX ADMIN — DEXMEDETOMIDINE HYDROCHLORIDE 0.7 MCG/KG/HR: 4 INJECTION, SOLUTION INTRAVENOUS at 11:02

## 2024-09-25 RX ADMIN — SODIUM BICARBONATE 650 MG: 650 TABLET ORAL at 21:18

## 2024-09-25 RX ADMIN — ROSUVASTATIN CALCIUM 10 MG: 10 TABLET, FILM COATED ORAL at 21:18

## 2024-09-25 RX ADMIN — INSULIN GLARGINE 18 UNITS: 100 INJECTION, SOLUTION SUBCUTANEOUS at 21:18

## 2024-09-25 RX ADMIN — Medication 10 ML: at 10:39

## 2024-09-25 RX ADMIN — CARVEDILOL 18.75 MG: 6.25 TABLET, FILM COATED ORAL at 21:18

## 2024-09-25 RX ADMIN — GABAPENTIN 100 MG: 100 CAPSULE ORAL at 21:18

## 2024-09-25 RX ADMIN — ISOSORBIDE MONONITRATE 30 MG: 30 TABLET, EXTENDED RELEASE ORAL at 16:43

## 2024-09-25 RX ADMIN — Medication 10 ML: at 15:41

## 2024-09-25 NOTE — PROGRESS NOTES
1102  Precedex bolus 0.25 mcg/kg to infuse over 20 min started per pump  1122  Precedex bolus infused.  Maintenance dose infusing 0.7 mcg/kg/hr 11.4 ml/hr  1235  Returned from procedural area.  Right groin site soft.  Precedex infusing at 0.9 mcg/kg/kr  1238 Precedex reduced to 0.7 mcg/kg/hr.  Sats into high 80's, oxygen applied at 4 lpm via nasal cannula  1248  Precedex reduced to 0.5 mcg/kg/hr  1300  Precedex reduced to 0.3 mcg/kg/hr Daughter at bedside, call light in reach.  1308  Dr Velazquez at bedside discussing results of procedure with daughter  1440  Report called to ASPEN Stein on floor  1540  Transferred to [facility] via stretcher,.

## 2024-09-25 NOTE — PROGRESS NOTES
4 Eyes Skin Assessment     NAME:  Cely Espitia  YOB: 1940  MEDICAL RECORD NUMBER:  [id number]    The patient is being assessed for  Admission    I agree that at least one RN has performed a thorough Head to Toe Skin Assessment on the patient. ALL assessment sites listed below have been assessed.      Areas assessed by both nurses:    Head, Face, Ears, Shoulders, Back, Chest, Arms, Elbows, Hands, Sacrum. Buttock, Coccyx, Ischium, Legs. Feet and Heels, and Under Medical Devices         Does the Patient have a Wound? No noted wound(s)       Mark Prevention initiated by RN: Yes  Wound Care Orders initiated by RN: No    Pressure Injury (Stage 3,4, Unstageable, DTI, NWPT, and Complex wounds) if present, place Wound referral order by RN under : No    New Ostomies, if present place, Ostomy referral order under : No     Nurse 1 eSignature: Electronically signed by Sarita Lanier RN on 9/25/24 at 4:47 PM EDT    **SHARE this note so that the co-signing nurse can place an eSignature**    Nurse 2 eSignature: Electronically signed by Heron Tariq RN on 9/26/24 at 6:49 AM EDT

## 2024-09-26 VITALS
HEART RATE: 81 BPM | DIASTOLIC BLOOD PRESSURE: 74 MMHG | SYSTOLIC BLOOD PRESSURE: 116 MMHG | TEMPERATURE: 97.8 F | HEIGHT: 66 IN | BODY MASS INDEX: 23.03 KG/M2 | RESPIRATION RATE: 20 BRPM | WEIGHT: 143.3 LBS | OXYGEN SATURATION: 92 %

## 2024-09-26 LAB
ANION GAP SERPL CALCULATED.3IONS-SCNC: 10 MMOL/L (ref 3–16)
BUN SERPL-MCNC: 35 MG/DL (ref 7–20)
CALCIUM SERPL-MCNC: 8.5 MG/DL (ref 8.3–10.6)
CHLORIDE SERPL-SCNC: 109 MMOL/L (ref 99–110)
CO2 SERPL-SCNC: 25 MMOL/L (ref 21–32)
CREAT SERPL-MCNC: 2 MG/DL (ref 0.6–1.2)
DEPRECATED RDW RBC AUTO: 14.8 % (ref 12.4–15.4)
GFR SERPLBLD CREATININE-BSD FMLA CKD-EPI: 24 ML/MIN/{1.73_M2}
GLUCOSE BLD-MCNC: 162 MG/DL (ref 70–99)
GLUCOSE BLD-MCNC: 75 MG/DL (ref 70–99)
GLUCOSE SERPL-MCNC: 68 MG/DL (ref 70–99)
HCT VFR BLD AUTO: 24.5 % (ref 36–48)
HGB BLD-MCNC: 8.1 G/DL (ref 12–16)
MCH RBC QN AUTO: 29.6 PG (ref 26–34)
MCHC RBC AUTO-ENTMCNC: 32.9 G/DL (ref 31–36)
MCV RBC AUTO: 90 FL (ref 80–100)
PERFORMED ON: ABNORMAL
PERFORMED ON: NORMAL
PLATELET # BLD AUTO: 156 K/UL (ref 135–450)
PMV BLD AUTO: 8 FL (ref 5–10.5)
POTASSIUM SERPL-SCNC: 3.9 MMOL/L (ref 3.5–5.1)
RBC # BLD AUTO: 2.72 M/UL (ref 4–5.2)
SODIUM SERPL-SCNC: 144 MMOL/L (ref 136–145)
WBC # BLD AUTO: 4.4 K/UL (ref 4–11)

## 2024-09-26 PROCEDURE — 85027 COMPLETE CBC AUTOMATED: CPT

## 2024-09-26 PROCEDURE — 6370000000 HC RX 637 (ALT 250 FOR IP): Performed by: STUDENT IN AN ORGANIZED HEALTH CARE EDUCATION/TRAINING PROGRAM

## 2024-09-26 PROCEDURE — 2580000003 HC RX 258: Performed by: STUDENT IN AN ORGANIZED HEALTH CARE EDUCATION/TRAINING PROGRAM

## 2024-09-26 PROCEDURE — 6360000002 HC RX W HCPCS: Performed by: STUDENT IN AN ORGANIZED HEALTH CARE EDUCATION/TRAINING PROGRAM

## 2024-09-26 PROCEDURE — 80048 BASIC METABOLIC PNL TOTAL CA: CPT

## 2024-09-26 PROCEDURE — 36415 COLL VENOUS BLD VENIPUNCTURE: CPT

## 2024-09-26 RX ORDER — GABAPENTIN 100 MG/1
100 CAPSULE ORAL 2 TIMES DAILY
COMMUNITY

## 2024-09-26 RX ADMIN — ASPIRIN 81 MG: 81 TABLET, COATED ORAL at 08:26

## 2024-09-26 RX ADMIN — CARVEDILOL 18.75 MG: 6.25 TABLET, FILM COATED ORAL at 08:27

## 2024-09-26 RX ADMIN — ISOSORBIDE MONONITRATE 30 MG: 30 TABLET, EXTENDED RELEASE ORAL at 08:27

## 2024-09-26 RX ADMIN — TRAMADOL HYDROCHLORIDE 50 MG: 50 TABLET ORAL at 00:19

## 2024-09-26 RX ADMIN — SODIUM CHLORIDE, PRESERVATIVE FREE 10 ML: 5 INJECTION INTRAVENOUS at 08:08

## 2024-09-26 RX ADMIN — Medication 1000 UNITS: at 08:27

## 2024-09-26 RX ADMIN — Medication 1 TABLET: at 08:27

## 2024-09-26 RX ADMIN — SODIUM BICARBONATE 650 MG: 650 TABLET ORAL at 08:27

## 2024-09-26 RX ADMIN — GABAPENTIN 100 MG: 100 CAPSULE ORAL at 08:27

## 2024-09-26 RX ADMIN — ENOXAPARIN SODIUM 30 MG: 100 INJECTION SUBCUTANEOUS at 08:28

## 2024-09-26 ASSESSMENT — PAIN DESCRIPTION - DESCRIPTORS: DESCRIPTORS: SQUEEZING

## 2024-09-26 ASSESSMENT — PAIN SCALES - GENERAL
PAINLEVEL_OUTOF10: 5
PAINLEVEL_OUTOF10: 8
PAINLEVEL_OUTOF10: 4

## 2024-09-26 ASSESSMENT — PAIN DESCRIPTION - ONSET: ONSET: AWAKENED FROM SLEEP

## 2024-09-26 ASSESSMENT — PAIN - FUNCTIONAL ASSESSMENT: PAIN_FUNCTIONAL_ASSESSMENT: ACTIVITIES ARE NOT PREVENTED

## 2024-09-26 ASSESSMENT — PAIN DESCRIPTION - ORIENTATION: ORIENTATION: LEFT;LOWER

## 2024-09-26 ASSESSMENT — PAIN SCALES - WONG BAKER: WONGBAKER_NUMERICALRESPONSE: HURTS A LITTLE BIT

## 2024-09-26 ASSESSMENT — PAIN DESCRIPTION - LOCATION: LOCATION: ABDOMEN

## 2024-09-26 ASSESSMENT — PAIN DESCRIPTION - FREQUENCY: FREQUENCY: INTERMITTENT

## 2024-09-26 ASSESSMENT — PAIN DESCRIPTION - PAIN TYPE: TYPE: ACUTE PAIN

## 2024-09-26 NOTE — CARE COORDINATION
Case Management Assessment  Initial Evaluation    Date/Time of Evaluation: 9/26/2024 11:10 AM  Assessment Completed by: Stella Wolfe RN    If patient is discharged prior to next notation, then this note serves as note for discharge by case management.    Patient Name: Cely Espitia                   YOB: 1940  Diagnosis: Abnormal echocardiogram [R93.1]  Atrial thrombus [I51.3]                   Date / Time: 9/25/2024 10:07 AM    Patient Admission Status: Inpatient   Readmission Risk (Low < 19, Mod (19-27), High > 27): Readmission Risk Score: 15.5    Current PCP: Marcus Shukla MD  PCP verified by CM? Yes    Chart Reviewed: Yes      History Provided by: Medical Record  Patient Orientation: Alert and Oriented    Patient Cognition: Alert    Hospitalization in the last 30 days (Readmission):  No    If yes, Readmission Assessment in  Navigator will be completed.    Advance Directives:      Code Status: Full Code   Patient's Primary Decision Maker is: Legal Next of Kin      Discharge Planning:    Patient lives with: Other (Comment) (Kristofer HERNANDEZ) Type of Home: Assisted living  Primary Care Giver: Self  Patient Support Systems include: Children, Family Members   Current Financial resources: Medicare  Current community resources: Assisted Living (Kristofer Rashid)  Current services prior to admission: Durable Medical Equipment            Current DME: Walker            Type of Home Care services:  None    ADLS  Prior functional level: Assistance with the following:, Mobility  Current functional level: Assistance with the following:, Mobility    No current PT/OT orders    Family can provide assistance at DC: Yes  Would you like Case Management to discuss the discharge plan with any other family members/significant others, and if so, who? Yes (children if needed)  Plans to Return to Present Housing: Yes  Other Identified Issues/Barriers to RETURNING to current housing: None  Potential Assistance needed at

## 2024-09-26 NOTE — PLAN OF CARE
Problem: Chronic Conditions and Co-morbidities  Goal: Patient's chronic conditions and co-morbidity symptoms are monitored and maintained or improved  9/25/2024 2140 by Heron Tariq RN  Outcome: Progressing  9/25/2024 1733 by Sarita Lanier RN  Outcome: Progressing     Problem: Safety - Adult  Goal: Free from fall injury  9/25/2024 2140 by Heron Tariq RN  Outcome: Progressing  9/25/2024 1733 by Sarita Lainer RN  Outcome: Progressing     Problem: Discharge Planning  Goal: Discharge to home or other facility with appropriate resources  9/25/2024 2140 by Heron Tariq RN  Outcome: Progressing  9/25/2024 1733 by Sarita Lanier RN  Outcome: Progressing     Problem: Skin/Tissue Integrity  Goal: Absence of new skin breakdown  Description: 1.  Monitor for areas of redness and/or skin breakdown  2.  Assess vascular access sites hourly  3.  Every 4-6 hours minimum:  Change oxygen saturation probe site  4.  Every 4-6 hours:  If on nasal continuous positive airway pressure, respiratory therapy assess nares and determine need for appliance change or resting period.  9/25/2024 2140 by Heron Tariq RN  Outcome: Progressing  9/25/2024 1733 by Sarita Lanier RN  Outcome: Progressing     Problem: ABCDS Injury Assessment  Goal: Absence of physical injury  9/25/2024 2140 by Heron Tariq RN  Outcome: Progressing  9/25/2024 1733 by Sarita Lanier RN  Outcome: Progressing     
  Problem: Chronic Conditions and Co-morbidities  Goal: Patient's chronic conditions and co-morbidity symptoms are monitored and maintained or improved  Outcome: Progressing     Problem: Safety - Adult  Goal: Free from fall injury  Outcome: Progressing     Problem: Discharge Planning  Goal: Discharge to home or other facility with appropriate resources  Outcome: Progressing     Problem: Skin/Tissue Integrity  Goal: Absence of new skin breakdown  Description: 1.  Monitor for areas of redness and/or skin breakdown  2.  Assess vascular access sites hourly  3.  Every 4-6 hours minimum:  Change oxygen saturation probe site  4.  Every 4-6 hours:  If on nasal continuous positive airway pressure, respiratory therapy assess nares and determine need for appliance change or resting period.  Outcome: Progressing     Problem: ABCDS Injury Assessment  Goal: Absence of physical injury  Outcome: Progressing     Problem: Pain  Goal: Verbalizes/displays adequate comfort level or baseline comfort level  Outcome: Progressing     
Cardiac

## 2024-09-26 NOTE — PROGRESS NOTES
Pharmacy Medication Reconciliation Note     List of medications patient is currently taking is complete.    Source of information:   1. Discussion with patient at bedside  2. Epic records (dispense report)    Notes regarding home medications:   1. Medication list up to date-patient is unsure if she is supposed to be taking Imdur (has not been taking). Going to ask cardiologist when he comes to her room today.    Victorina Roberts PharmD, BCPS  9/26/2024 10:58 AM

## 2024-09-26 NOTE — PROGRESS NOTES
Patient examined.  Chart reviewed  Discussed with Dr. Velazquez.  Will await the results of the culture obtained yesterday around her ICD/pacemaker wire  If there is no evidence of infection, she will undergo left and right heart study by Dr. Marcus Jansen to discharge patient home from cardiac standpoint.  Discharge summary per Dr. Velazquez

## 2024-09-26 NOTE — PROGRESS NOTES
Patient discharged to home at [facility] at 1426 with daughter via wheelchair.  Daughter picked up Imdur at retail pharmacy.  Discharge instructions reviewed with patient who verbalized understanding.  Sent with belongings including cell phone.  Telemetry and IV removed with no complications.  Electronically signed by Lia Rodriguez RN on 9/26/2024 at 2:26 PM

## 2024-09-26 NOTE — DISCHARGE INSTR - COC
Continuity of Care Form    Patient Name: Cely Espitia   :  1940  MRN:  [id number]    Admit date:  2024  Discharge date:  ***    Code Status Order: Full Code   Advance Directives:   Advance Care Flowsheet Documentation             Admitting Physician:  Maury Velazquez MD  PCP: Marcus Shukla MD    Discharging Nurse: ***  Discharging Hospital Unit/Room#: W5D-7317/5132-01  Discharging Unit Phone Number: ***    Emergency Contact:   Extended Emergency Contact Information  Primary Emergency Contact: Vivian Maguire  Home Phone: [phone]  Mobile Phone: [phone]  Relation: Child  Secondary Emergency Contact: Nigel Gardner  Home Phone: [phone]  Mobile Phone: [phone]  Relation: Child    Past Surgical History:  Past Surgical History:   Procedure Laterality Date    ANGIOPLASTY      x3  2009    BLADDER REMOVAL      BLADDER SUSPENSION      CYSTOSCOPY  2013    with dilitation    HYSTERECTOMY (CERVIX STATUS UNKNOWN)      KIDNEY REMOVAL      right    PACEMAKER INSERTION      SKIN CANCER EXCISION      TOTAL HIP ARTHROPLASTY Left 2019    LEFT ANTERIOR TOTAL HIP REPLACEMENT WITH C-ARM performed by Avni Shepard MD at [facility] OR       Immunization History:   Immunization History   Administered Date(s) Administered    COVID-19, MODERNA BLUE border, Primary or Immunocompromised, (age 12y+), IM, 100 mcg/0.5mL 2021, 2021    COVID-19, PFIZER Bivalent, DO NOT Dilute, (age 12y+), IM, 30 mcg/0.3 mL 2022    COVID-19, PFIZER PURPLE top, DILUTE for use, (age 12 y+), 30mcg/0.3mL 2022    COVID-19, PFIZER, (age 12y+), IM, 30mcg/0.3mL 2023    Influenza Virus Vaccine 2012, 2021    Pneumococcal Conjugate 7-valent (Prevnar7) 2003       Active Problems:  Patient Active Problem List   Diagnosis Code    CATARINA (acute kidney injury) (HCC) N17.9    Cardiomyopathy (HCC) I42.9    Ischemic cardiomyopathy I25.5    Essential hypertension I10    Automatic implantable  kg (143 lb 4.8 oz)     Mental Status:  {IP PT MENTAL STATUS:}    IV Access:  { KIMBERLI IV ACCESS:707372600}    Nursing Mobility/ADLs:  Walking   {CHP DME ADLs:493111768}  Transfer  {CHP DME ADLs:408046182}  Bathing  {CHP DME ADLs:787661551}  Dressing  {CHP DME ADLs:126701570}  Toileting  {CHP DME ADLs:461639609}  Feeding  {CHP DME ADLs:021592211}  Med Admin  {CHP DME ADLs:001480512}  Med Delivery   { KIMBERLI MED Delivery:252161427}    Wound Care Documentation and Therapy:  Pressure Ulcer 10/25/17 Sacrum (Active)   Number of days: 2527       Wound 17 Other (Comment) Other (Comment) Medial 0.5 cm (Active)   Number of days: 2571       Incision 19 Hip Left (Active)   Number of days: 1785        Elimination:  Continence:   Bowel: {YES / NO:}  Bladder: {YES / NO:}  Urinary Catheter: {Urinary Catheter:809959214}   Colostomy/Ileostomy/Ileal Conduit: {YES / NO:}       Date of Last BM: ***    Intake/Output Summary (Last 24 hours) at 2024 1114  Last data filed at 2024 0947  Gross per 24 hour   Intake 480 ml   Output 1410 ml   Net -930 ml     I/O last 3 completed shifts:  In: 360 [P.O.:360]  Out: 1410 [Urine:1400; Blood:10]    Safety Concerns:     { KIMBERLI Safety Concerns:507566264}    Impairments/Disabilities:      { KIMBERLI Impairments/Disabilities:620047284}    Nutrition Therapy:  Current Nutrition Therapy:   { KIMBERLI Diet List:110225310}    Routes of Feeding: {CHP DME Other Feedings:027875155}  Liquids: {Slp liquid thickness:46788}  Daily Fluid Restriction: {CHP DME Yes amt example:515262769}  Last Modified Barium Swallow with Video (Video Swallowing Test): {Done Not Done Date:}    Treatments at the Time of Hospital Discharge:   Respiratory Treatments: ***  Oxygen Therapy:  {Therapy; copd oxygen:03090}  Ventilator:    {MH CC Vent List:948563783}    Rehab Therapies: {THERAPEUTIC INTERVENTION:0094422947}  Weight Bearing Status/Restrictions: {KRISTAN LONG Weight Bearin}  Other

## 2024-09-30 LAB — ECHO BSA: 1.74 M2

## 2024-10-02 LAB — ECHO BSA: 1.74 M2

## 2024-10-04 PROCEDURE — 93297 REM INTERROG DEV EVAL ICPMS: CPT | Performed by: INTERNAL MEDICINE

## 2024-10-09 ENCOUNTER — HOSPITAL ENCOUNTER (OUTPATIENT)
Dept: ONCOLOGY | Age: 84
Setting detail: INFUSION SERIES
Discharge: HOME | End: 2024-10-09
Payer: MEDICARE

## 2024-10-09 VITALS
TEMPERATURE: 97.8 F | RESPIRATION RATE: 18 BRPM | SYSTOLIC BLOOD PRESSURE: 138 MMHG | DIASTOLIC BLOOD PRESSURE: 64 MMHG | HEART RATE: 84 BPM

## 2024-10-09 DIAGNOSIS — N18.4 CKD (CHRONIC KIDNEY DISEASE) STAGE 4, GFR 15-29 ML/MIN (HCC): Primary | ICD-10-CM

## 2024-10-09 DIAGNOSIS — D63.1 ANEMIA IN STAGE 4 CHRONIC KIDNEY DISEASE (HCC): ICD-10-CM

## 2024-10-09 DIAGNOSIS — N18.4 ANEMIA IN STAGE 4 CHRONIC KIDNEY DISEASE (HCC): ICD-10-CM

## 2024-10-09 LAB
FERRITIN SERPL IA-MCNC: 28.4 NG/ML (ref 15–150)
HCT VFR BLD AUTO: 29.5 % (ref 36–48)
HGB BLD-MCNC: 9.4 G/DL (ref 12–16)
IRON SATN MFR SERPL: 15 % (ref 15–50)
IRON SERPL-MCNC: 57 UG/DL (ref 37–145)
TIBC SERPL-MCNC: 376 UG/DL (ref 260–445)

## 2024-10-09 PROCEDURE — 85014 HEMATOCRIT: CPT

## 2024-10-09 PROCEDURE — 6360000002 HC RX W HCPCS: Performed by: INTERNAL MEDICINE

## 2024-10-09 PROCEDURE — 85018 HEMOGLOBIN: CPT

## 2024-10-09 PROCEDURE — 83540 ASSAY OF IRON: CPT

## 2024-10-09 PROCEDURE — 96372 THER/PROPH/DIAG INJ SC/IM: CPT

## 2024-10-09 PROCEDURE — 99211 OFF/OP EST MAY X REQ PHY/QHP: CPT

## 2024-10-09 PROCEDURE — 83550 IRON BINDING TEST: CPT

## 2024-10-09 PROCEDURE — 82728 ASSAY OF FERRITIN: CPT

## 2024-10-09 RX ADMIN — EPOETIN ALFA-EPBX 15000 UNITS: 10000 INJECTION, SOLUTION INTRAVENOUS; SUBCUTANEOUS at 13:36

## 2024-10-09 NOTE — PROGRESS NOTES
Pt to Ambulatory to Dept for Labs and possible Retacrit injection. VSS. Lab collected; Hgb 9.4. Retacrit 35120 units given in right arm per orders. Pt tolerated tx without complaints. Pt discharged with daughter. Pt scheduled to return in one month.

## 2024-10-15 ENCOUNTER — HOSPITAL ENCOUNTER (INPATIENT)
Age: 84
LOS: 2 days | Discharge: HOME | DRG: 378 | End: 2024-10-17
Attending: EMERGENCY MEDICINE | Admitting: STUDENT IN AN ORGANIZED HEALTH CARE EDUCATION/TRAINING PROGRAM
Payer: MEDICARE

## 2024-10-15 ENCOUNTER — APPOINTMENT (OUTPATIENT)
Dept: CT IMAGING | Age: 84
DRG: 378 | End: 2024-10-15
Payer: MEDICARE

## 2024-10-15 DIAGNOSIS — D64.9 ANEMIA, UNSPECIFIED TYPE: ICD-10-CM

## 2024-10-15 DIAGNOSIS — K92.2 LOWER GI BLEED: Primary | ICD-10-CM

## 2024-10-15 DIAGNOSIS — K57.90 DIVERTICULAR DISEASE: ICD-10-CM

## 2024-10-15 LAB
ALBUMIN SERPL-MCNC: 3.6 G/DL (ref 3.4–5)
ALBUMIN/GLOB SERPL: 1.4 {RATIO} (ref 1.1–2.2)
ALP SERPL-CCNC: 40 U/L (ref 40–129)
ALT SERPL-CCNC: 10 U/L (ref 10–40)
ANION GAP SERPL CALCULATED.3IONS-SCNC: 10 MMOL/L (ref 3–16)
ANION GAP SERPL CALCULATED.3IONS-SCNC: 12 MMOL/L (ref 3–16)
APTT BLD: 21.5 SEC (ref 22.1–36.4)
AST SERPL-CCNC: 27 U/L (ref 15–37)
BASOPHILS # BLD: 0 K/UL (ref 0–0.2)
BASOPHILS # BLD: 0 K/UL (ref 0–0.2)
BASOPHILS NFR BLD: 0.4 %
BASOPHILS NFR BLD: 0.6 %
BILIRUB SERPL-MCNC: 0.3 MG/DL (ref 0–1)
BUN SERPL-MCNC: 37 MG/DL (ref 7–20)
BUN SERPL-MCNC: 40 MG/DL (ref 7–20)
CALCIUM SERPL-MCNC: 8.5 MG/DL (ref 8.3–10.6)
CALCIUM SERPL-MCNC: 8.7 MG/DL (ref 8.3–10.6)
CHLORIDE SERPL-SCNC: 106 MMOL/L (ref 99–110)
CHLORIDE SERPL-SCNC: 106 MMOL/L (ref 99–110)
CO2 SERPL-SCNC: 22 MMOL/L (ref 21–32)
CO2 SERPL-SCNC: 25 MMOL/L (ref 21–32)
CREAT SERPL-MCNC: 2.3 MG/DL (ref 0.6–1.2)
CREAT SERPL-MCNC: 2.3 MG/DL (ref 0.6–1.2)
DEPRECATED RDW RBC AUTO: 15.3 % (ref 12.4–15.4)
DEPRECATED RDW RBC AUTO: 15.8 % (ref 12.4–15.4)
EOSINOPHIL # BLD: 0.1 K/UL (ref 0–0.6)
EOSINOPHIL # BLD: 0.1 K/UL (ref 0–0.6)
EOSINOPHIL NFR BLD: 1.8 %
EOSINOPHIL NFR BLD: 1.9 %
GFR SERPLBLD CREATININE-BSD FMLA CKD-EPI: 20 ML/MIN/{1.73_M2}
GFR SERPLBLD CREATININE-BSD FMLA CKD-EPI: 20 ML/MIN/{1.73_M2}
GLUCOSE BLD-MCNC: 101 MG/DL (ref 70–99)
GLUCOSE BLD-MCNC: 103 MG/DL (ref 70–99)
GLUCOSE BLD-MCNC: 127 MG/DL (ref 70–99)
GLUCOSE BLD-MCNC: 139 MG/DL (ref 70–99)
GLUCOSE SERPL-MCNC: 130 MG/DL (ref 70–99)
GLUCOSE SERPL-MCNC: 203 MG/DL (ref 70–99)
HCT VFR BLD AUTO: 23.1 % (ref 36–48)
HCT VFR BLD AUTO: 23.3 % (ref 36–48)
HCT VFR BLD AUTO: 24.4 % (ref 36–48)
HGB BLD-MCNC: 7.3 G/DL (ref 12–16)
HGB BLD-MCNC: 7.4 G/DL (ref 12–16)
HGB BLD-MCNC: 7.7 G/DL (ref 12–16)
INR PPP: 1.15 (ref 0.85–1.15)
LACTATE BLDV-SCNC: 1.2 MMOL/L (ref 0.4–2)
LYMPHOCYTES # BLD: 0.6 K/UL (ref 1–5.1)
LYMPHOCYTES # BLD: 0.7 K/UL (ref 1–5.1)
LYMPHOCYTES NFR BLD: 14.7 %
LYMPHOCYTES NFR BLD: 18.4 %
MCH RBC QN AUTO: 29 PG (ref 26–34)
MCH RBC QN AUTO: 29.2 PG (ref 26–34)
MCHC RBC AUTO-ENTMCNC: 31.4 G/DL (ref 31–36)
MCHC RBC AUTO-ENTMCNC: 31.9 G/DL (ref 31–36)
MCV RBC AUTO: 91.5 FL (ref 80–100)
MCV RBC AUTO: 92.3 FL (ref 80–100)
MONOCYTES # BLD: 0.4 K/UL (ref 0–1.3)
MONOCYTES # BLD: 0.4 K/UL (ref 0–1.3)
MONOCYTES NFR BLD: 10.1 %
MONOCYTES NFR BLD: 11.6 %
NEUTROPHILS # BLD: 2.6 K/UL (ref 1.7–7.7)
NEUTROPHILS # BLD: 3.1 K/UL (ref 1.7–7.7)
NEUTROPHILS NFR BLD: 67.6 %
NEUTROPHILS NFR BLD: 72.9 %
PERFORMED ON: ABNORMAL
PLATELET # BLD AUTO: 214 K/UL (ref 135–450)
PLATELET # BLD AUTO: 214 K/UL (ref 135–450)
PMV BLD AUTO: 7.2 FL (ref 5–10.5)
PMV BLD AUTO: 7.7 FL (ref 5–10.5)
POTASSIUM SERPL-SCNC: 4.9 MMOL/L (ref 3.5–5.1)
POTASSIUM SERPL-SCNC: 5.2 MMOL/L (ref 3.5–5.1)
PROT SERPL-MCNC: 6.1 G/DL (ref 6.4–8.2)
PROTHROMBIN TIME: 14.9 SEC (ref 11.9–14.9)
RBC # BLD AUTO: 2.53 M/UL (ref 4–5.2)
RBC # BLD AUTO: 2.53 M/UL (ref 4–5.2)
REASON FOR REJECTION: NORMAL
REJECTED TEST: NORMAL
SODIUM SERPL-SCNC: 140 MMOL/L (ref 136–145)
SODIUM SERPL-SCNC: 141 MMOL/L (ref 136–145)
WBC # BLD AUTO: 3.9 K/UL (ref 4–11)
WBC # BLD AUTO: 4.3 K/UL (ref 4–11)

## 2024-10-15 PROCEDURE — 94760 N-INVAS EAR/PLS OXIMETRY 1: CPT

## 2024-10-15 PROCEDURE — 85610 PROTHROMBIN TIME: CPT

## 2024-10-15 PROCEDURE — 99285 EMERGENCY DEPT VISIT HI MDM: CPT

## 2024-10-15 PROCEDURE — G0378 HOSPITAL OBSERVATION PER HR: HCPCS

## 2024-10-15 PROCEDURE — 85730 THROMBOPLASTIN TIME PARTIAL: CPT

## 2024-10-15 PROCEDURE — 2580000003 HC RX 258: Performed by: PHYSICIAN ASSISTANT

## 2024-10-15 PROCEDURE — 6360000002 HC RX W HCPCS: Performed by: EMERGENCY MEDICINE

## 2024-10-15 PROCEDURE — 1200000000 HC SEMI PRIVATE

## 2024-10-15 PROCEDURE — 85018 HEMOGLOBIN: CPT

## 2024-10-15 PROCEDURE — 6370000000 HC RX 637 (ALT 250 FOR IP): Performed by: PHYSICIAN ASSISTANT

## 2024-10-15 PROCEDURE — 96374 THER/PROPH/DIAG INJ IV PUSH: CPT

## 2024-10-15 PROCEDURE — 96375 TX/PRO/DX INJ NEW DRUG ADDON: CPT

## 2024-10-15 PROCEDURE — 83605 ASSAY OF LACTIC ACID: CPT

## 2024-10-15 PROCEDURE — 74176 CT ABD & PELVIS W/O CONTRAST: CPT

## 2024-10-15 PROCEDURE — 85025 COMPLETE CBC W/AUTO DIFF WBC: CPT

## 2024-10-15 PROCEDURE — 36415 COLL VENOUS BLD VENIPUNCTURE: CPT

## 2024-10-15 PROCEDURE — 85014 HEMATOCRIT: CPT

## 2024-10-15 PROCEDURE — 80053 COMPREHEN METABOLIC PANEL: CPT

## 2024-10-15 RX ORDER — ACETAMINOPHEN 325 MG/1
650 TABLET ORAL EVERY 6 HOURS PRN
Status: DISCONTINUED | OUTPATIENT
Start: 2024-10-15 | End: 2024-10-17 | Stop reason: HOSPADM

## 2024-10-15 RX ORDER — SODIUM CHLORIDE 0.9 % (FLUSH) 0.9 %
5-40 SYRINGE (ML) INJECTION EVERY 12 HOURS SCHEDULED
Status: DISCONTINUED | OUTPATIENT
Start: 2024-10-15 | End: 2024-10-17 | Stop reason: HOSPADM

## 2024-10-15 RX ORDER — INSULIN GLARGINE 100 [IU]/ML
18 INJECTION, SOLUTION SUBCUTANEOUS NIGHTLY
Status: DISCONTINUED | OUTPATIENT
Start: 2024-10-15 | End: 2024-10-17 | Stop reason: HOSPADM

## 2024-10-15 RX ORDER — SODIUM CHLORIDE 9 MG/ML
INJECTION, SOLUTION INTRAVENOUS PRN
Status: DISCONTINUED | OUTPATIENT
Start: 2024-10-15 | End: 2024-10-17 | Stop reason: HOSPADM

## 2024-10-15 RX ORDER — INSULIN LISPRO 100 [IU]/ML
0-4 INJECTION, SOLUTION INTRAVENOUS; SUBCUTANEOUS
Status: DISCONTINUED | OUTPATIENT
Start: 2024-10-15 | End: 2024-10-17 | Stop reason: HOSPADM

## 2024-10-15 RX ORDER — ISOSORBIDE MONONITRATE 30 MG/1
30 TABLET, EXTENDED RELEASE ORAL DAILY
Status: DISCONTINUED | OUTPATIENT
Start: 2024-10-15 | End: 2024-10-17 | Stop reason: HOSPADM

## 2024-10-15 RX ORDER — ROSUVASTATIN CALCIUM 10 MG/1
10 TABLET, COATED ORAL EVERY EVENING
Status: DISCONTINUED | OUTPATIENT
Start: 2024-10-15 | End: 2024-10-17 | Stop reason: HOSPADM

## 2024-10-15 RX ORDER — ALPRAZOLAM 0.25 MG/1
0.25 TABLET ORAL 2 TIMES DAILY PRN
Status: DISCONTINUED | OUTPATIENT
Start: 2024-10-15 | End: 2024-10-17 | Stop reason: HOSPADM

## 2024-10-15 RX ORDER — ONDANSETRON 2 MG/ML
4 INJECTION INTRAMUSCULAR; INTRAVENOUS EVERY 6 HOURS PRN
Status: DISCONTINUED | OUTPATIENT
Start: 2024-10-15 | End: 2024-10-17 | Stop reason: HOSPADM

## 2024-10-15 RX ORDER — VITAMIN B COMPLEX
1000 TABLET ORAL DAILY
Status: DISCONTINUED | OUTPATIENT
Start: 2024-10-15 | End: 2024-10-17 | Stop reason: HOSPADM

## 2024-10-15 RX ORDER — GLUCAGON 1 MG/ML
1 KIT INJECTION PRN
Status: DISCONTINUED | OUTPATIENT
Start: 2024-10-15 | End: 2024-10-17 | Stop reason: HOSPADM

## 2024-10-15 RX ORDER — SENNOSIDES A AND B 8.6 MG/1
1 TABLET, FILM COATED ORAL DAILY PRN
Status: DISCONTINUED | OUTPATIENT
Start: 2024-10-15 | End: 2024-10-17 | Stop reason: HOSPADM

## 2024-10-15 RX ORDER — SODIUM BICARBONATE 650 MG/1
650 TABLET ORAL 2 TIMES DAILY
Status: DISCONTINUED | OUTPATIENT
Start: 2024-10-15 | End: 2024-10-17 | Stop reason: HOSPADM

## 2024-10-15 RX ORDER — HYDROMORPHONE HYDROCHLORIDE 1 MG/ML
0.25 INJECTION, SOLUTION INTRAMUSCULAR; INTRAVENOUS; SUBCUTANEOUS
Status: DISCONTINUED | OUTPATIENT
Start: 2024-10-15 | End: 2024-10-17 | Stop reason: HOSPADM

## 2024-10-15 RX ORDER — DEXTROSE MONOHYDRATE 100 MG/ML
INJECTION, SOLUTION INTRAVENOUS CONTINUOUS PRN
Status: DISCONTINUED | OUTPATIENT
Start: 2024-10-15 | End: 2024-10-17 | Stop reason: HOSPADM

## 2024-10-15 RX ORDER — PEG-3350, SODIUM SULFATE, SODIUM CHLORIDE, POTASSIUM CHLORIDE, SODIUM ASCORBATE AND ASCORBIC ACID 7.5-2.691G
100 KIT ORAL ONCE
Status: DISCONTINUED | OUTPATIENT
Start: 2024-10-15 | End: 2024-10-17 | Stop reason: HOSPADM

## 2024-10-15 RX ORDER — HYDROMORPHONE HYDROCHLORIDE 1 MG/ML
0.5 INJECTION, SOLUTION INTRAMUSCULAR; INTRAVENOUS; SUBCUTANEOUS
Status: DISCONTINUED | OUTPATIENT
Start: 2024-10-15 | End: 2024-10-17 | Stop reason: HOSPADM

## 2024-10-15 RX ORDER — SODIUM CHLORIDE 0.9 % (FLUSH) 0.9 %
5-40 SYRINGE (ML) INJECTION PRN
Status: DISCONTINUED | OUTPATIENT
Start: 2024-10-15 | End: 2024-10-17 | Stop reason: HOSPADM

## 2024-10-15 RX ORDER — CARVEDILOL 6.25 MG/1
18.68 TABLET ORAL 2 TIMES DAILY
Status: DISCONTINUED | OUTPATIENT
Start: 2024-10-15 | End: 2024-10-17 | Stop reason: HOSPADM

## 2024-10-15 RX ORDER — MORPHINE SULFATE 4 MG/ML
4 INJECTION, SOLUTION INTRAMUSCULAR; INTRAVENOUS
Status: COMPLETED | OUTPATIENT
Start: 2024-10-15 | End: 2024-10-15

## 2024-10-15 RX ORDER — GABAPENTIN 100 MG/1
100 CAPSULE ORAL 2 TIMES DAILY
Status: DISCONTINUED | OUTPATIENT
Start: 2024-10-15 | End: 2024-10-17 | Stop reason: HOSPADM

## 2024-10-15 RX ORDER — ACETAMINOPHEN 650 MG/1
650 SUPPOSITORY RECTAL EVERY 6 HOURS PRN
Status: DISCONTINUED | OUTPATIENT
Start: 2024-10-15 | End: 2024-10-17 | Stop reason: HOSPADM

## 2024-10-15 RX ORDER — ONDANSETRON 2 MG/ML
4 INJECTION INTRAMUSCULAR; INTRAVENOUS ONCE
Status: COMPLETED | OUTPATIENT
Start: 2024-10-15 | End: 2024-10-15

## 2024-10-15 RX ORDER — PEG-3350, SODIUM SULFATE, SODIUM CHLORIDE, POTASSIUM CHLORIDE, SODIUM ASCORBATE AND ASCORBIC ACID 7.5-2.691G
100 KIT ORAL ONCE
Status: COMPLETED | OUTPATIENT
Start: 2024-10-15 | End: 2024-10-15

## 2024-10-15 RX ADMIN — MORPHINE SULFATE 4 MG: 4 INJECTION, SOLUTION INTRAMUSCULAR; INTRAVENOUS at 01:01

## 2024-10-15 RX ADMIN — SODIUM CHLORIDE, PRESERVATIVE FREE 10 ML: 5 INJECTION INTRAVENOUS at 21:30

## 2024-10-15 RX ADMIN — SODIUM CHLORIDE, PRESERVATIVE FREE 10 ML: 5 INJECTION INTRAVENOUS at 09:30

## 2024-10-15 RX ADMIN — ROSUVASTATIN CALCIUM 10 MG: 10 TABLET, FILM COATED ORAL at 17:33

## 2024-10-15 RX ADMIN — SODIUM BICARBONATE 650 MG: 650 TABLET ORAL at 21:29

## 2024-10-15 RX ADMIN — GABAPENTIN 100 MG: 100 CAPSULE ORAL at 09:27

## 2024-10-15 RX ADMIN — INSULIN GLARGINE 18 UNITS: 100 INJECTION, SOLUTION SUBCUTANEOUS at 21:30

## 2024-10-15 RX ADMIN — PEG-3350, SODIUM SULFATE, SODIUM CHLORIDE, POTASSIUM CHLORIDE, SODIUM ASCORBATE AND ASCORBIC ACID 100 G: KIT at 17:31

## 2024-10-15 RX ADMIN — CARVEDILOL 18.68 MG: 6.25 TABLET, FILM COATED ORAL at 09:27

## 2024-10-15 RX ADMIN — Medication 1000 UNITS: at 09:28

## 2024-10-15 RX ADMIN — CARVEDILOL 18.68 MG: 6.25 TABLET, FILM COATED ORAL at 21:28

## 2024-10-15 RX ADMIN — ONDANSETRON 4 MG: 2 INJECTION, SOLUTION INTRAMUSCULAR; INTRAVENOUS at 01:03

## 2024-10-15 RX ADMIN — GABAPENTIN 100 MG: 100 CAPSULE ORAL at 21:28

## 2024-10-15 RX ADMIN — SODIUM BICARBONATE 650 MG: 650 TABLET ORAL at 09:27

## 2024-10-15 RX ADMIN — ISOSORBIDE MONONITRATE 30 MG: 30 TABLET, EXTENDED RELEASE ORAL at 09:28

## 2024-10-15 ASSESSMENT — PAIN DESCRIPTION - LOCATION
LOCATION: ABDOMEN
LOCATION: LEG
LOCATION: ABDOMEN
LOCATION: ABDOMEN

## 2024-10-15 ASSESSMENT — PAIN SCALES - GENERAL
PAINLEVEL_OUTOF10: 2
PAINLEVEL_OUTOF10: 6
PAINLEVEL_OUTOF10: 8
PAINLEVEL_OUTOF10: 0
PAINLEVEL_OUTOF10: 1

## 2024-10-15 ASSESSMENT — PAIN DESCRIPTION - ORIENTATION: ORIENTATION: LEFT;LOWER

## 2024-10-15 ASSESSMENT — PAIN DESCRIPTION - PAIN TYPE: TYPE: ACUTE PAIN

## 2024-10-15 ASSESSMENT — PAIN - FUNCTIONAL ASSESSMENT: PAIN_FUNCTIONAL_ASSESSMENT: 0-10

## 2024-10-15 NOTE — PROGRESS NOTES
4 Eyes Skin Assessment     NAME:  Cely Espitia  YOB: 1940  MEDICAL RECORD NUMBER:  [id number]    The patient is being assessed for  Admission    I agree that at least one RN has performed a thorough Head to Toe Skin Assessment on the patient. ALL assessment sites listed below have been assessed.      Areas assessed by both nurses:    Head, Face, Ears, Shoulders, Back, Chest, Arms, Elbows, Hands, Sacrum. Buttock, Coccyx, Ischium, Legs. Feet and Heels, and Under Medical Devices         Does the Patient have a Wound? No noted wound(s)       Mark Prevention initiated by RN: No  Wound Care Orders initiated by RN: No    Pressure Injury (Stage 3,4, Unstageable, DTI, NWPT, and Complex wounds) if present, place Wound referral order by RN under : No    New Ostomies, if present place, Ostomy referral order under : Yes   Urostomy   Nurse 1 eSignature: Electronically signed by Luna Burciaga RN on 10/15/24 at 5:15 AM EDT    **SHARE this note so that the co-signing nurse can place an eSignature**    Nurse 2 eSignature: Electronically signed by Yony Hinojosa RN on 10/15/24 at 5:16 AM EDT

## 2024-10-15 NOTE — H&P
[facility] Medicine History & Physical        Name:  Cely Espitia /Age/Sex: 1940  (84 y.o. female)   MRN & CSN:  [id number] & [id number] Encounter Date/Time: 10/15/2024 3:52 AM EDT   Location:  [facility] PCP: Marcus Shukla MD         CHIEF COMPLAINT:   Chief Complaint   Patient presents with    Rectal Bleeding     Pt presents [facility] ems from [facility] for rectal bleeding that began around 10pm. Pt states she has slight abdominal pain and that the blood is bright red.          HISTORY OF PRESENT ILLNESS:      History from: patient  Limitations to history : None     Cely Espitia is a 84 y.o. female who presented to ED for evaluation of abdominal pain and rectal bleeding.  The patient reports she went to bed last night feeling like her normal self.  She awoke about an hour later and thought she had diarrhea in bed and then realized that it was blood.  She reports when she woke up she also had left lower quadrant abdominal pain.  Patient reports she had an episode of rectal bleeding in the past secondary to a polyp.  She reports associated nausea but denies vomiting.  She denies fever, lightheadedness, chest pain, shortness of breath, cough, urinary symptoms.  She denies any other complaints or concerns at this time.    REVIEW OF SYSTEMS:   Pertinent positives as noted in the HPI. All other systems reviewed and negative.      PHYSICAL EXAM PERFORMED:  /60   Pulse 79   Temp 98.4 °F (36.9 °C) (Oral)   Resp 13   Ht 1.727 m (5' 8\")   Wt 64.9 kg (143 lb)   SpO2 92%   BMI 21.74 kg/m²     General appearance:  Awake, alert, no apparent distress  HEENT:  Normocephalic, atraumatic without obvious deformity. PERRL. EOM intact. Conjunctivae/corneas clear.  Neck:  Supple, with full range of motion. No JVD. Trachea midline.  Respiratory:  Clear to auscultation bilaterally without rales, wheezes, or rhonchi. Normal respiratory effort.   Cardiovascular:  Regular rate and rhythm without

## 2024-10-15 NOTE — CARE COORDINATION
Patient admitted for GI bleed.  Patient has pre-existing urostomy.  Per nurse Steffany patient is independent with urostomy and has her supplies.  Will follow this admission. MIRIAN WHITEN, RN, CWOCN  Inpatient  Wound/Ostomy Care  [phone]

## 2024-10-15 NOTE — ED NOTES
How does patient ambulate?   []Low Fall Risk (ambulates by themselves without support)  [x]Stand by assist   []Contact Guard   []Front wheel walker  []Wheelchair   []Steady  []Bed bound  []History of Lower Extremity Amputation  []Unknown, did not assess in the emergency department   How does patient take pills?  []Whole with Water  []Crushed in applesauce  []Crushed in pudding  []Other  [x]Unknown no oral medications were given in the ED  Is patient alert?   [x]Alert  []Drowsy but responds to voice  []Doesn't respond to voice but responds to painful stimuli  []Unresponsive  Is patient oriented?   [x]To person  [x]To place  [x]To time  [x]To situation  []Confused  []Agitated  [x]Follows commands  If patient is disoriented or from a Skill Nursing Facility has family been notified of admission?   []Yes   []No  Patient belongings?   []Cell phone  []Wallet   []Dentures  [x]Clothing  Any specific patient or family belongings/needs/dynamics?   None   Miscellaneous comments/pending orders?  See admit orders      If there are any additional questions please reach out to the Emergency Department.

## 2024-10-15 NOTE — CONSULTS
Gastroenterology Consult Note        Patient: Cely Espitia  : 1940  Acct#:      Date:  10/15/2024      1. Lower GI bleed    2. Diverticular disease        Subjective:       History of Present Illness  Patient is a 84 y.o.  female admitted with Diverticular disease [K57.90]  Lower GI bleed [K92.2] who is seen in consult for hematochezia.  History of diabetes, CAD with prior PCI to LAD and RCA, A-fib V-fib arrest in .  Has ICD.  Was seen by cardiology last month for severe mitral regurgitation and mobile structure on pacemaker/ICD lead.  Had ZACH-guided removal of fragment on 2024 with cardiology.     Last night she woke up and felt like she had diarrhea but realized she passed blood per rectum.  Some LLQ discomfort. No nausea, vomiting.  Came to the ER.  No further rectal bleeding.    Past Medical History:   Diagnosis Date    Anxiety     Atrial fibrillation (HCC)     CAD (coronary artery disease)     Cancer (HCC)     bladder right kidney     Cardiac arrest 2016    Carotid artery stenosis     CHF (congestive heart failure) (HCC)     Chronic kidney disease     cancer of kidney and bladder    Depression     Diabetes mellitus (HCC)     IDDM    GERD (gastroesophageal reflux disease)     Hip pain, chronic     HYPERCHOLESTERAEMIA     Hypertension     Irritable bowel syndrome     Laceration of head 2016    fall with cardiac arrest    MI, old     Multiple drug resistant organism (MDRO) culture positive 10/28/2021    urine    Neuropathy     Osteoarthritis     Presence of combination internal cardiac defibrillator (ICD) and pacemaker     Staph infection     PT. STATES SHE HAS HAD SEVERAL BOILS WITH STAPH LAST ONE 20 YEARS AGO.    Type 2 diabetes mellitus without complication (HCC)     Urinary incontinence       Past Surgical History:   Procedure Laterality Date    ANGIOPLASTY      x3      BLADDER REMOVAL      BLADDER SUSPENSION      CARDIAC PROCEDURE N/A 2024     resp. rate 18, height 1.727 m (5' 8\"), weight 67.1 kg (147 lb 14.9 oz), SpO2 95%, not currently breastfeeding.    General appearance: alert, cooperative, no distress, appears stated age  Eyes: Anicteric  Head: Normocephalic, without obvious abnormality  Lungs: clear to auscultation bilaterally, Normal Effort  Heart: regular rate and rhythm, normal S1 and S2, no murmurs or rubs  Abdomen: soft, non-tender. Bowel sounds normal. No masses,  no organomegaly.   Extremities: atraumatic, no cyanosis or edema  Skin: warm and dry, no jaundice  Neuro: Grossly intact, A&OX3  Musculoskeletal: 5/5  strength BUE      Data Review:    Recent Labs     10/15/24  0143 10/15/24  0527   WBC 4.3 3.9*   HGB 7.4* 7.3*   HCT 23.1* 23.3*   MCV 91.5 92.3    214     Recent Labs     10/15/24  0105 10/15/24  0527    141   K 5.2* 4.9    106   CO2 22 25   BUN 37* 40*   CREATININE 2.3* 2.3*     Recent Labs     10/15/24  0105   AST 27   ALT 10   BILITOT 0.3   ALKPHOS 40     No results for input(s): \"LIPASE\", \"AMYLASE\" in the last 72 hours.  Recent Labs     10/15/24  0105   PROTIME 14.9   INR 1.15     Invalid input(s): \"PTT\"  No results for input(s): \"OCCULTBLD\" in the last 72 hours.    Imaging Studies:                             CT-scan of abdomen and pelvis wo contrast 10/15/24:             IMPRESSION:  Prior right nephrectomy and cystectomy with no evidence of disease recurrence.     Stable appearance of moderate left hydroureteronephrosis status post ileal  conduit formation.     Peristomal hernia containing nonobstructed transverse colon similar to prior  examination.     Colonic diverticulosis, severe in the sigmoid colon.     Other chronic findings as above.       Assessment/Plan:     Hematochezia -began overnight.  No further bleeding.  Per chart review, she had hematochezia 1/2023 at .  Had EGD which was normal.  Colonoscopy deferred as bleeding stopped.  CT as above with diverticulosis. Could be diverticular

## 2024-10-15 NOTE — PROGRESS NOTES
Pt resting comfortably. A & O x4.  All LDA's remain C/D/I.  Bed remains in lowest and locked position with alarm in place. Call light, bedside table, and all personal belongings are within reach. No further needs at this time.

## 2024-10-15 NOTE — ED PROVIDER NOTES
EMERGENCY MEDICINE ATTENDING NOTE  Johnnie Jensen Jr., DO, FACEP, FAAEM        CHIEF COMPLAINT  Chief Complaint   Patient presents with    Rectal Bleeding     Pt presents [facility] ems from [facility] for rectal bleeding that began around 10pm. Pt states she has slight abdominal pain and that the blood is bright red.         HISTORY OF PRESENT ILLNESS  Cely Espitia is a 84 y.o. female who presents to the ED for evaluation of abdominal pain and rectal bleeding.  The patient states she went to bed tonight feeling her normal self.  She is awoken about an hour later and thought she had diarrhea in bed and then realized that it was blood.  Also noted at that time she was having left lower quadrant abdominal pain.  She does have significant medical issues including removal of kidney and her bladder.  States she had an episode of rectal bleeding in the past so secondary to a polyp.  She does feel little bit nauseous as well but has not vomited.  No fevers or chills.  Denies any chest pain or shortness of breath or lightheadedness or any other issues.    Nursing/triage notes reviewed.  No other complaints, modifying factors or associated symptoms.     REVIEW OF SYSTEMS:  All systems are reviewed and are negative unless noted in the HPI.    PAST MEDICAL HISTORY  Past Medical History:   Diagnosis Date    Anxiety     Atrial fibrillation (HCC)     CAD (coronary artery disease)     Cancer (HCC)     bladder right kidney     Cardiac arrest 03/27/2016    Carotid artery stenosis     CHF (congestive heart failure) (HCC)     Chronic kidney disease     cancer of kidney and bladder    Depression     Diabetes mellitus (HCC)     IDDM    GERD (gastroesophageal reflux disease)     Hip pain, chronic     HYPERCHOLESTERAEMIA     Hypertension     Irritable bowel syndrome     Laceration of head 03/25/2016    fall with cardiac arrest    MI, old     Multiple drug resistant organism (MDRO) culture positive 10/28/2021    urine      Does have some very mild tenderness to the left lower quadrant.  Also complaining some nausea.  She is given morphine and Zofran here with improvement.  Her labs do show her hemoglobin is 7.4 and normally is around 9-1/2 so this is an acute drop.  Her labs show her creatinine at 2.3 however her baseline is around 2 so not significantly different.  She is taken for CT which reveals significant diverticular disease.  She cannot have contrast so would not show any active extravasation however I do feel this most likely is a diverticular bleed.  She is hemodynamically stable otherwise.  I do feel given the active bleeding however and her other issues that hospitalization would be most appropriate at this time to monitor her blood counts and further address the bleeding.     Consults:  Hospitalist service consulted for admission    History obtained from:   Patient    Pertinent social determinants of health:   None applicable    Review of other records:  None    Medications given:  Medications   morphine injection 4 mg (4 mg IntraVENous Given 10/15/24 0101)   ondansetron (ZOFRAN) injection 4 mg (4 mg IntraVENous Given 10/15/24 0103)        Hospitalization considered?:  Yes    Additional testing considered?:  None    Sepsis present?:  No    Discharge Medications:  New Prescriptions    No medications on file       PROCEDURES  None    CLINICAL IMPRESSION  1. Lower GI bleed    2. Diverticular disease        DISPOSITION  Cely Espitia was hospitalized in guarded condition.    DISPOSITION TIME  0225    CRITICAL CARE TIME  None        I have personally seen and examined this patient. I have fully participated in the care of this patient and I have reviewed and agree with all pertinent clinical information including history, physical exam, and plan. I have also reviewed and agree with the medications, allergies and past medical history section for this patient.    Electronically signed by: Johnnie Jensen Jr., , RADHA,

## 2024-10-16 ENCOUNTER — APPOINTMENT (OUTPATIENT)
Dept: ENDOSCOPY | Age: 84
DRG: 378 | End: 2024-10-16
Attending: INTERNAL MEDICINE
Payer: MEDICARE

## 2024-10-16 ENCOUNTER — ANESTHESIA (OUTPATIENT)
Dept: ENDOSCOPY | Age: 84
DRG: 378 | End: 2024-10-16
Payer: MEDICARE

## 2024-10-16 ENCOUNTER — ANESTHESIA EVENT (OUTPATIENT)
Dept: ENDOSCOPY | Age: 84
DRG: 378 | End: 2024-10-16
Payer: MEDICARE

## 2024-10-16 LAB
ANION GAP SERPL CALCULATED.3IONS-SCNC: 13 MMOL/L (ref 3–16)
BACTERIA URNS QL MICRO: ABNORMAL /HPF
BASOPHILS # BLD: 0 K/UL (ref 0–0.2)
BASOPHILS NFR BLD: 0.4 %
BILIRUB UR QL STRIP.AUTO: NEGATIVE
BUN SERPL-MCNC: 43 MG/DL (ref 7–20)
CALCIUM SERPL-MCNC: 8.7 MG/DL (ref 8.3–10.6)
CHLORIDE SERPL-SCNC: 106 MMOL/L (ref 99–110)
CLARITY UR: CLEAR
CO2 SERPL-SCNC: 21 MMOL/L (ref 21–32)
COLOR UR: YELLOW
CREAT SERPL-MCNC: 2.4 MG/DL (ref 0.6–1.2)
DEPRECATED RDW RBC AUTO: 15.4 % (ref 12.4–15.4)
EOSINOPHIL # BLD: 0.1 K/UL (ref 0–0.6)
EOSINOPHIL NFR BLD: 2.3 %
EPI CELLS #/AREA URNS AUTO: 2 /HPF (ref 0–5)
GFR SERPLBLD CREATININE-BSD FMLA CKD-EPI: 19 ML/MIN/{1.73_M2}
GLUCOSE BLD-MCNC: 102 MG/DL (ref 70–99)
GLUCOSE BLD-MCNC: 160 MG/DL (ref 70–99)
GLUCOSE BLD-MCNC: 77 MG/DL (ref 70–99)
GLUCOSE BLD-MCNC: 89 MG/DL (ref 70–99)
GLUCOSE SERPL-MCNC: 83 MG/DL (ref 70–99)
GLUCOSE UR STRIP.AUTO-MCNC: NEGATIVE MG/DL
HCT VFR BLD AUTO: 25.2 % (ref 36–48)
HGB BLD-MCNC: 7.9 G/DL (ref 12–16)
HGB UR QL STRIP.AUTO: NEGATIVE
HYALINE CASTS #/AREA URNS AUTO: 1 /LPF (ref 0–8)
KETONES UR STRIP.AUTO-MCNC: NEGATIVE MG/DL
LEUKOCYTE ESTERASE UR QL STRIP.AUTO: ABNORMAL
LYMPHOCYTES # BLD: 0.9 K/UL (ref 1–5.1)
LYMPHOCYTES NFR BLD: 21.8 %
MCH RBC QN AUTO: 29 PG (ref 26–34)
MCHC RBC AUTO-ENTMCNC: 31.3 G/DL (ref 31–36)
MCV RBC AUTO: 92.8 FL (ref 80–100)
MONOCYTES # BLD: 0.4 K/UL (ref 0–1.3)
MONOCYTES NFR BLD: 10.9 %
NEUTROPHILS # BLD: 2.5 K/UL (ref 1.7–7.7)
NEUTROPHILS NFR BLD: 64.6 %
NITRITE UR QL STRIP.AUTO: NEGATIVE
PERFORMED ON: ABNORMAL
PERFORMED ON: ABNORMAL
PERFORMED ON: NORMAL
PERFORMED ON: NORMAL
PH UR STRIP.AUTO: 6.5 [PH] (ref 5–8)
PLATELET # BLD AUTO: 204 K/UL (ref 135–450)
PMV BLD AUTO: 7.5 FL (ref 5–10.5)
POTASSIUM SERPL-SCNC: 4.9 MMOL/L (ref 3.5–5.1)
PROT UR STRIP.AUTO-MCNC: ABNORMAL MG/DL
RBC # BLD AUTO: 2.72 M/UL (ref 4–5.2)
RBC CLUMPS #/AREA URNS AUTO: 0 /HPF (ref 0–4)
SODIUM SERPL-SCNC: 140 MMOL/L (ref 136–145)
SP GR UR STRIP.AUTO: 1.01 (ref 1–1.03)
UA COMPLETE W REFLEX CULTURE PNL UR: YES
UA DIPSTICK W REFLEX MICRO PNL UR: YES
URN SPEC COLLECT METH UR: ABNORMAL
UROBILINOGEN UR STRIP-ACNC: 0.2 E.U./DL
WBC # BLD AUTO: 3.9 K/UL (ref 4–11)
WBC #/AREA URNS AUTO: 62 /HPF (ref 0–5)

## 2024-10-16 PROCEDURE — 6360000002 HC RX W HCPCS: Performed by: NURSE ANESTHETIST, CERTIFIED REGISTERED

## 2024-10-16 PROCEDURE — 87086 URINE CULTURE/COLONY COUNT: CPT

## 2024-10-16 PROCEDURE — 2709999900 HC NON-CHARGEABLE SUPPLY: Performed by: INTERNAL MEDICINE

## 2024-10-16 PROCEDURE — 85025 COMPLETE CBC W/AUTO DIFF WBC: CPT

## 2024-10-16 PROCEDURE — 2500000003 HC RX 250 WO HCPCS: Performed by: NURSE ANESTHETIST, CERTIFIED REGISTERED

## 2024-10-16 PROCEDURE — 0DJD8ZZ INSPECTION OF LOWER INTESTINAL TRACT, VIA NATURAL OR ARTIFICIAL OPENING ENDOSCOPIC: ICD-10-PCS | Performed by: INTERNAL MEDICINE

## 2024-10-16 PROCEDURE — 3609027000 HC COLONOSCOPY: Performed by: INTERNAL MEDICINE

## 2024-10-16 PROCEDURE — G0378 HOSPITAL OBSERVATION PER HR: HCPCS

## 2024-10-16 PROCEDURE — 81001 URINALYSIS AUTO W/SCOPE: CPT

## 2024-10-16 PROCEDURE — 2580000003 HC RX 258: Performed by: PHYSICIAN ASSISTANT

## 2024-10-16 PROCEDURE — 88305 TISSUE EXAM BY PATHOLOGIST: CPT

## 2024-10-16 PROCEDURE — 3700000001 HC ADD 15 MINUTES (ANESTHESIA): Performed by: INTERNAL MEDICINE

## 2024-10-16 PROCEDURE — 7100000000 HC PACU RECOVERY - FIRST 15 MIN: Performed by: INTERNAL MEDICINE

## 2024-10-16 PROCEDURE — 80048 BASIC METABOLIC PNL TOTAL CA: CPT

## 2024-10-16 PROCEDURE — 6370000000 HC RX 637 (ALT 250 FOR IP): Performed by: PHYSICIAN ASSISTANT

## 2024-10-16 PROCEDURE — 36415 COLL VENOUS BLD VENIPUNCTURE: CPT

## 2024-10-16 PROCEDURE — 7100000001 HC PACU RECOVERY - ADDTL 15 MIN: Performed by: INTERNAL MEDICINE

## 2024-10-16 PROCEDURE — 3609012400 HC EGD TRANSORAL BIOPSY SINGLE/MULTIPLE: Performed by: INTERNAL MEDICINE

## 2024-10-16 PROCEDURE — 3700000000 HC ANESTHESIA ATTENDED CARE: Performed by: INTERNAL MEDICINE

## 2024-10-16 PROCEDURE — 1200000000 HC SEMI PRIVATE

## 2024-10-16 PROCEDURE — 0DB78ZX EXCISION OF STOMACH, PYLORUS, VIA NATURAL OR ARTIFICIAL OPENING ENDOSCOPIC, DIAGNOSTIC: ICD-10-PCS | Performed by: INTERNAL MEDICINE

## 2024-10-16 RX ORDER — OXYCODONE HYDROCHLORIDE 5 MG/1
10 TABLET ORAL PRN
Status: DISCONTINUED | OUTPATIENT
Start: 2024-10-16 | End: 2024-10-16 | Stop reason: HOSPADM

## 2024-10-16 RX ORDER — LIDOCAINE HYDROCHLORIDE 20 MG/ML
INJECTION, SOLUTION INFILTRATION; PERINEURAL
Status: DISCONTINUED | OUTPATIENT
Start: 2024-10-16 | End: 2024-10-16 | Stop reason: SDUPTHER

## 2024-10-16 RX ORDER — PROPOFOL 10 MG/ML
INJECTION, EMULSION INTRAVENOUS
Status: DISCONTINUED | OUTPATIENT
Start: 2024-10-16 | End: 2024-10-16 | Stop reason: SDUPTHER

## 2024-10-16 RX ORDER — SODIUM CHLORIDE 9 MG/ML
INJECTION, SOLUTION INTRAVENOUS PRN
Status: DISCONTINUED | OUTPATIENT
Start: 2024-10-16 | End: 2024-10-16 | Stop reason: HOSPADM

## 2024-10-16 RX ORDER — DIPHENHYDRAMINE HYDROCHLORIDE 50 MG/ML
12.5 INJECTION INTRAMUSCULAR; INTRAVENOUS
Status: DISCONTINUED | OUTPATIENT
Start: 2024-10-16 | End: 2024-10-16 | Stop reason: HOSPADM

## 2024-10-16 RX ORDER — SODIUM CHLORIDE 0.9 % (FLUSH) 0.9 %
5-40 SYRINGE (ML) INJECTION PRN
Status: DISCONTINUED | OUTPATIENT
Start: 2024-10-16 | End: 2024-10-16 | Stop reason: HOSPADM

## 2024-10-16 RX ORDER — LABETALOL HYDROCHLORIDE 5 MG/ML
5 INJECTION, SOLUTION INTRAVENOUS EVERY 10 MIN PRN
Status: DISCONTINUED | OUTPATIENT
Start: 2024-10-16 | End: 2024-10-16 | Stop reason: HOSPADM

## 2024-10-16 RX ORDER — PROCHLORPERAZINE EDISYLATE 5 MG/ML
5 INJECTION INTRAMUSCULAR; INTRAVENOUS
Status: DISCONTINUED | OUTPATIENT
Start: 2024-10-16 | End: 2024-10-16 | Stop reason: HOSPADM

## 2024-10-16 RX ORDER — OXYCODONE HYDROCHLORIDE 5 MG/1
5 TABLET ORAL PRN
Status: DISCONTINUED | OUTPATIENT
Start: 2024-10-16 | End: 2024-10-16 | Stop reason: HOSPADM

## 2024-10-16 RX ORDER — NALOXONE HYDROCHLORIDE 0.4 MG/ML
INJECTION, SOLUTION INTRAMUSCULAR; INTRAVENOUS; SUBCUTANEOUS PRN
Status: DISCONTINUED | OUTPATIENT
Start: 2024-10-16 | End: 2024-10-16 | Stop reason: HOSPADM

## 2024-10-16 RX ORDER — FENTANYL CITRATE 50 UG/ML
25 INJECTION, SOLUTION INTRAMUSCULAR; INTRAVENOUS EVERY 5 MIN PRN
Status: DISCONTINUED | OUTPATIENT
Start: 2024-10-16 | End: 2024-10-16 | Stop reason: HOSPADM

## 2024-10-16 RX ORDER — SODIUM CHLORIDE 0.9 % (FLUSH) 0.9 %
5-40 SYRINGE (ML) INJECTION EVERY 12 HOURS SCHEDULED
Status: DISCONTINUED | OUTPATIENT
Start: 2024-10-16 | End: 2024-10-16 | Stop reason: HOSPADM

## 2024-10-16 RX ORDER — ONDANSETRON 2 MG/ML
4 INJECTION INTRAMUSCULAR; INTRAVENOUS
Status: DISCONTINUED | OUTPATIENT
Start: 2024-10-16 | End: 2024-10-16 | Stop reason: HOSPADM

## 2024-10-16 RX ADMIN — SODIUM CHLORIDE, PRESERVATIVE FREE 10 ML: 5 INJECTION INTRAVENOUS at 20:04

## 2024-10-16 RX ADMIN — INSULIN GLARGINE 18 UNITS: 100 INJECTION, SOLUTION SUBCUTANEOUS at 21:10

## 2024-10-16 RX ADMIN — CARVEDILOL 18.68 MG: 6.25 TABLET, FILM COATED ORAL at 20:04

## 2024-10-16 RX ADMIN — SODIUM BICARBONATE 650 MG: 650 TABLET ORAL at 08:52

## 2024-10-16 RX ADMIN — SODIUM BICARBONATE 650 MG: 650 TABLET ORAL at 20:04

## 2024-10-16 RX ADMIN — PROPOFOL 30 MG: 10 INJECTION, EMULSION INTRAVENOUS at 12:54

## 2024-10-16 RX ADMIN — Medication 1000 UNITS: at 08:52

## 2024-10-16 RX ADMIN — ROSUVASTATIN CALCIUM 10 MG: 10 TABLET, FILM COATED ORAL at 18:07

## 2024-10-16 RX ADMIN — GABAPENTIN 100 MG: 100 CAPSULE ORAL at 20:04

## 2024-10-16 RX ADMIN — LIDOCAINE HYDROCHLORIDE 50 MG: 20 INJECTION, SOLUTION INFILTRATION; PERINEURAL at 12:54

## 2024-10-16 RX ADMIN — CARVEDILOL 18.68 MG: 6.25 TABLET, FILM COATED ORAL at 08:52

## 2024-10-16 RX ADMIN — ISOSORBIDE MONONITRATE 30 MG: 30 TABLET, EXTENDED RELEASE ORAL at 08:52

## 2024-10-16 RX ADMIN — GABAPENTIN 100 MG: 100 CAPSULE ORAL at 08:52

## 2024-10-16 RX ADMIN — SODIUM CHLORIDE, PRESERVATIVE FREE 10 ML: 5 INJECTION INTRAVENOUS at 08:52

## 2024-10-16 RX ADMIN — PROPOFOL 100 MCG/KG/MIN: 10 INJECTION, EMULSION INTRAVENOUS at 12:54

## 2024-10-16 ASSESSMENT — ENCOUNTER SYMPTOMS
NAUSEA: 0
VOMITING: 0
DIARRHEA: 0
BACK PAIN: 0
SHORTNESS OF BREATH: 0
COUGH: 0
CONSTIPATION: 0

## 2024-10-16 ASSESSMENT — PAIN - FUNCTIONAL ASSESSMENT: PAIN_FUNCTIONAL_ASSESSMENT: NONE - DENIES PAIN

## 2024-10-16 NOTE — CONSULTS
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 Lassiter  CKD dates back to atleast 2010  Baseline creatinine appears to be low 2s  Creatinine on admission was 2.3 and is 2.4 at the time of consult  Pt seen and examined  No complaints at this time      Past Medical History:        Diagnosis Date    Anxiety     Atrial fibrillation (HCC)     CAD (coronary artery disease)     Cancer (HCC)     bladder right kidney     Cardiac arrest 03/27/2016    Carotid artery stenosis     CHF (congestive heart failure) (HCC)     Chronic kidney disease     cancer of kidney and bladder    Depression     Diabetes mellitus (HCC)     IDDM    GERD (gastroesophageal reflux disease)     Hip pain, chronic     HYPERCHOLESTERAEMIA     Hypertension     Irritable bowel syndrome     Laceration of head 03/25/2016    fall with cardiac arrest    MI, old     Multiple drug resistant organism (MDRO) culture positive 10/28/2021    urine    Neuropathy     Osteoarthritis     Presence of combination internal cardiac defibrillator (ICD) and pacemaker     Staph infection     PT. STATES SHE HAS HAD SEVERAL BOILS WITH STAPH LAST ONE 20 YEARS AGO.    Type 2 diabetes mellitus without complication (HCC)     Urinary incontinence        Past Surgical History:        Procedure Laterality Date    ANGIOPLASTY      x3  2009    BLADDER REMOVAL      BLADDER SUSPENSION      CARDIAC PROCEDURE N/A 9/25/2024    Right heart cath performed

## 2024-10-16 NOTE — ANESTHESIA PRE PROCEDURE
Department of Anesthesiology  Preprocedure Note       Name:  Cely Espitia   Age:  84 y.o.  :  1940                                          MRN:  [id number]         Date:  10/16/2024      Surgeon: Surgeon(s):  Jose Lui MD    Procedure: Procedure(s):  ESOPHAGOGASTRODUODENOSCOPY DIAGNOSTIC ONLY  COLONOSCOPY DIAGNOSTIC    Medications prior to admission:   Prior to Admission medications    Medication Sig Start Date End Date Taking? Authorizing Provider   gabapentin (NEURONTIN) 100 MG capsule Take 1 capsule by mouth in the morning and at bedtime.   Yes Bolivar Orantes MD   carvedilol (COREG) 12.5 MG tablet Take 1.5 tablets by mouth 2 times daily 24  Yes Brown Gonzáles MD   furosemide (LASIX) 40 MG tablet Take 1 tablet by mouth daily as needed (swelling)   Yes ProviderBolivar MD   ALPRAZolam (XANAX) 0.25 MG tablet TAKE 1 TABLET BY MOUTH TWICE DAILY AS NEEDED. 22  Yes ProviderBolivar MD   aspirin 81 MG EC tablet Take 1 tablet by mouth daily HOLD for 30 days after hip surgery. See Eliquis order 19  Yes Corazon Guerrier, APRN - CNP   acetaminophen (TYLENOL) 500 MG tablet Take 2 tablets by mouth daily as needed for Pain   Yes ProviderBolivar MD   insulin glargine (LANTUS) 100 UNIT/ML injection vial Inject 18 Units into the skin nightly Sliding scale   Yes Provider, MD Bolivar   sodium bicarbonate 650 MG tablet Take 1 tablet by mouth 2 times daily   Yes ProviderBolivar MD   rosuvastatin (CRESTOR) 10 MG tablet Take 1 tablet by mouth every evening   Yes ProviderBolivar MD   therapeutic multivitamin-minerals (THERAGRAN-M) tablet Take 1 tablet by mouth daily Centrum silver   Yes ProviderBolivar MD   vitamin D (CHOLECALCIFEROL) 1000 UNIT TABS tablet Take 1 tablet by mouth daily   Yes Bolivar Orantes MD   isosorbide mononitrate (IMDUR) 30 MG extended release tablet Take 1 tablet by mouth daily -may take Tylenol one hour prior. 24

## 2024-10-16 NOTE — PROGRESS NOTES
This nurse notified by pt multiple times at start of shift in regards to pt's bowel prep. Pt upset that pt was bleeding with bowel movements and voiced concerns on continuing to take bowel prep due to multiple bloody bowel movements. This nurse explained to pt multiple times the reasoning behind the need for the bowel prep. The pt still voiced safety concerns to this nurse about having so many bowel movements with blood present. The patient said to this nurse\" I will see what I can do\" in regards to continuing to drink the bowel prep. Pt finished the first dose but not willing to start second bottle. On call notified via perfect serve.

## 2024-10-16 NOTE — ANESTHESIA POSTPROCEDURE EVALUATION
Department of Anesthesiology  Postprocedure Note    Patient: Cely Espitia  MRN: [id number]  YOB: 1940  Date of evaluation: 10/16/2024    Procedure Summary       Date: 10/16/24 Room / Location: [place] [address] / [facility]    Anesthesia Start: 1252 Anesthesia Stop: 1338    Procedures:       ESOPHAGOGASTRODUODENOSCOPY BIOPSY (Abdomen)      COLONOSCOPY DIAGNOSTIC Diagnosis:       Anemia, unspecified type      (Anemia, unspecified type [D64.9])    Surgeons: Jose Lui MD Responsible Provider: Mak Warren DO    Anesthesia Type: MAC ASA Status: 3            Anesthesia Type: No value filed.    Brian Phase I: Brian Score: 10    Brian Phase II:      Anesthesia Post Evaluation    Patient location during evaluation: PACU  Patient participation: complete - patient participated  Level of consciousness: awake  Airway patency: patent  Nausea & Vomiting: no nausea  Cardiovascular status: hemodynamically stable  Respiratory status: acceptable  Hydration status: euvolemic  Multimodal analgesia pain management approach  Pain management: adequate    No notable events documented.

## 2024-10-16 NOTE — ACP (ADVANCE CARE PLANNING)
Advance Care Planning Note       Purpose of Encounter: Advanced care planning in light of hospitalization for hematochezia  Parties in attendance: :Cely Espitia, SILAS NORTON MD  Decisional Capacity:Yes  Objective: This 84-year-old female presented with abdominal pain and rectal bleeding   Goals of Care Determinations: Pt wants full support measures including CPR, intubation, trach, PEG tube, dialysis   Code Status: Full  Time Spent on Advanced Planning Documents: 16 mins.  Advanced Care Planning Documents:  Completed advances directives, advanced directives in chart.      Electronically signed by SILAS NORTON MD on 10/16/2024 at 1:45 PM

## 2024-10-16 NOTE — CARE COORDINATION
Case Management Assessment  Initial Evaluation    Date/Time of Evaluation: 10/16/2024 5:28 PM  Assessment Completed by: LISA Brand, LSW    If patient is discharged prior to next notation, then this note serves as note for discharge by case management.    Patient Name: Cely Espitia                   YOB: 1940  Diagnosis: Diverticular disease [K57.90]  Lower GI bleed [K92.2]                   Date / Time: 10/15/2024 12:08 AM    Patient Admission Status: Inpatient   Readmission Risk (Low < 19, Mod (19-27), High > 27): Readmission Risk Score: 20.4    Current PCP: Marcus Shukla MD  PCP verified by CM? Yes    Chart Reviewed: Yes      History Provided by: Patient  Patient Orientation: Alert and Oriented    Patient Cognition: Alert    Hospitalization in the last 30 days (Readmission):  Yes    If yes, Readmission Assessment in CM Navigator will be completed.    Advance Directives:      Code Status: Full Code   Patient's Primary Decision Maker is:        Discharge Planning:    Patient expects to discharge to: Assisted living  Plan for transportation at discharge:      Financial    Payor: [employer] MEDICARE / Plan: [employer] MEDICARE-ADVANTAGE PPO / Product Type: Medicare /         Current Nursing Home Information:  Patient admitted from:    [facility]  [address]   [place] [address]  Phone: [phone]  Fax: [phone]    Patient Covid vaccination status:    Internal Administration   First Dose COVID-19, MODERNA BLUE border, Primary or Immunocompromised, (age 12y+), IM, 100 mcg/0.5mL  01/20/2021   Second Dose COVID-19, MODERNA BLUE border, Primary or Immunocompromised, (age 12y+), IM, 100 mcg/0.5mL   02/17/2021       Last COVID Lab SARS-CoV-2, PCR (no units)   Date Value   10/29/2021 Not Detected            Covid Test requirement for return: No Rapid/PCR Covid test needed before return      Additional Case Management Notes:   Patient is from [facility].  No CM  needs identified for discharge at this time.  Will continue to follow.    The Plan for Transition of Care is related to the following treatment goals of Diverticular disease [K57.90]  Lower GI bleed [K92.2]    The Patient and/or Patient Representative Agree with the Discharge Plan?      LISA Brand, PAULW  Case Management Department    Electronically signed by LISA Brand, RODERICK on 10/16/2024 at 5:29 PM

## 2024-10-16 NOTE — H&P
Gastroenterology Note             Pre-operative History and Physical    Patient: Cely Espitia  : 1940  CSN:     History Obtained From:  patient and/or guardian.     HISTORY OF PRESENT ILLNESS:    The patient is a 84 y.o. female  here for EGD/colonoscopy.  Rectal bleeding    Past Medical History:    Past Medical History:   Diagnosis Date    Anxiety     Atrial fibrillation (HCC)     CAD (coronary artery disease)     Cancer (HCC)     bladder right kidney     Cardiac arrest 2016    Carotid artery stenosis     CHF (congestive heart failure) (HCC)     Chronic kidney disease     cancer of kidney and bladder    Depression     Diabetes mellitus (HCC)     IDDM    GERD (gastroesophageal reflux disease)     Hip pain, chronic     HYPERCHOLESTERAEMIA     Hypertension     Irritable bowel syndrome     Laceration of head 2016    fall with cardiac arrest    MI, old     Multiple drug resistant organism (MDRO) culture positive 10/28/2021    urine    Neuropathy     Osteoarthritis     Presence of combination internal cardiac defibrillator (ICD) and pacemaker     Staph infection     PT. STATES SHE HAS HAD SEVERAL BOILS WITH STAPH LAST ONE 20 YEARS AGO.    Type 2 diabetes mellitus without complication (HCC)     Urinary incontinence      Past Surgical History:    Past Surgical History:   Procedure Laterality Date    ANGIOPLASTY      x3      BLADDER REMOVAL      BLADDER SUSPENSION      CARDIAC PROCEDURE N/A 2024    Right heart cath performed by Maury Velazquez MD at [facility] CARDIAC CATH LAB    CARDIAC PROCEDURE N/A 2024    Thrombectomy pulmonary artery performed by Maury Velazquez MD at [facility] CARDIAC CATH LAB    CYSTOSCOPY  2013    with dilitation    HYSTERECTOMY (CERVIX STATUS UNKNOWN)      KIDNEY REMOVAL      right    PACEMAKER INSERTION      SKIN CANCER EXCISION      TOTAL HIP ARTHROPLASTY Left 2019    LEFT ANTERIOR TOTAL HIP REPLACEMENT WITH C-ARM performed by Avni MARVIN     Alcohol use: No     Family History:   Family History   Problem Relation Age of Onset    Diabetes Mother     Heart Disease Father     Cancer Father        PHYSICAL EXAM:      /61   Pulse 83   Temp 98.1 °F (36.7 °C) (Oral)   Resp 16   Ht 1.727 m (5' 8\")   Wt 68.7 kg (151 lb 7.3 oz)   SpO2 97%   BMI 23.03 kg/m²  I        Heart:   RRR, normal s1s2    Lungs:  CTA bilat,  Normal effort    Abdomen:   NT, ND      ASA Grade:  ASA 3 - Patient with moderate systemic disease with functional limitations    Mallampati Class: 2          ASSESSMENT AND PLAN:    1.  Patient is a 84 y.o. female here for EGD/Colonoscopy with MAC.   2.  Procedure options, risks and benefits reviewed with patient.  Patient expresses understanding.    ENRIKE ARTEAGA MD,   [facility]  10/16/2024

## 2024-10-16 NOTE — PROCEDURES
EGD and Colonoscopy Procedure  Note            Patient: Cely Espitia  : 1940  CSN:     Procedure: Esophagogastroduodenoscopy with biopsy.  Colonoscopy    Date:  10/16/2024     Surgeon:  ENRIKE ARTEAGA MD     Referring Provider:      Preoperative Diagnosis: Anemia/rectal bleed    Postoperative Diagnosis:    -Antral gastritis  -Tortuous colon  -Severe pandiverticulosis with widemouth diverticuli throughout the colon  -Tiny cecal polyp not removed  -Old scant blood in the colon suggesting bleeding source could be diverticular in nature    Anesthesia: Propofol sedation    EBL: <50 mL    Indications: This is a 84 y.o. year old female who presents today with anemia and rectal bleeding.      Procedure Details:    With the patient in left lateral position the endoscope was passed through the hypopharynx into the esophagus. The scope was advanced all the way to the duodenum.  The mucosa in the duodenal bulb, post bulbar region in the descending duodenum appeared normal.  The mucosa in the antrum revealed antral gastritis and erosions.  Biopsies obtained to rule out Helicobacter pylori infection.  The mucosa in the remaining part of the stomach and retroflexion appeared normal.  The GE junction was at around 40 cms. the mucosa in the remainder of the esophagus appeared normal.  The scope was withdrawn and the procedure was terminated.        Procedure Details:    With the patient in left lateral decubitus position the endoscope was inserted through the anorectal area into the rectum. The scope was then advanced through the length of the colon to the C. The ileocecal valve was visualized and cannulated. The quality of preparation was patchy and fair. Water was insufflated through the biopsy channel to clean out the colon and look at the underlying mucosa. The scope was carefully withdrawn and found to be normal.    Digital rectal examination was performed, no abnormalities noted.  Patient has extremely tortuous  colon.  With assistance of abdominal pressure the scope was eventually advanced to the cecum.  Tiny polyp was noted in the cecum (not removed.  The scope was withdrawn, old scant blood noted in the colon.  Severe pan diverticulosis with widemouth diverticula noted throughout the colon.  No active bleeding noted.  Internal hemorrhoids noted.  Scope was withdrawn and the procedure was terminated.        Impression:    -Antral gastritis  -Tortuous colon  -Severe pandiverticulosis with widemouth diverticuli throughout the colon  -Tiny cecal polyp not removed  -Old scant blood in the colon suggesting bleeding source could be diverticular in nature    Recommendations:    -Advance diet as tolerated  -Metamucil fiber capsules (2 to 3 capsules daily)  -MiraLAX for constipation  -If any evidence for rectal bleeding would consider CTA followed by IR intervention.      ERNIKE ARTEAGA MD, MD   [facility]  10/16/2024

## 2024-10-16 NOTE — PROGRESS NOTES
HOSPITALISTS PROGRESS NOTE    10/16/2024 8:55 AM        Name: Cely Espitia .              Admitted: 10/15/2024  Primary Care Provider: Marcus Shukla MD (Tel: [phone])      Brief Course: This 84-year-old female presented with abdominal pain and rectal bleeding    Interval history:   Pt seen and examined today   Overnight events noted and interval ancillary notes reviewed.  Pt resting in bed; reported she is feeling very tired and fatigued as she was hoping all night due to bowel prep  Reported intermittent nausea but denied any vomiting or abdominal pain    Assessment & Plan:     Hematochezia;  Patient was evaluated for hematochezia on 1/2023 at ; EGD which was normal normal.  Colonoscopy was deferred as rectal bleeding stopped  CT abdomen on admission showed colonic diverticulosis, severe in the sigmoid colon, Justin stomal hernia containing nonobstructed transverse colon  GI consulted; aspirin held.  Plan for EGD and colonoscopy today.    Acute on chronic anemia; likely secondary to above  Hgb 7.3 on admission (baseline around 9-10) 7.9 today.  Monitor CBC closely and transfuse for<7     Hx of CAD; aspirin held due to GIB.  Continue beta-blocker and statins    CKD; creatinine 2.3 on admission; 2.4 this morning  Nephrology consulted.  Avoid nephrotoxin, strict I's &O's.  Monitor renal function closely    Diabetes mellitus; check A1c.  Continue Lantus and SSI monitor BG closely       DVT PPX: SCDs  Code:Full Code    Disposition: Once acute medical issues have resolved    Current Medications  carvedilol (COREG) tablet 18.675 mg, BID  gabapentin (NEURONTIN) capsule 100 mg, BID  insulin glargine (LANTUS) injection vial 18 Units, Nightly  isosorbide mononitrate (IMDUR) extended release tablet 30 mg, Daily  rosuvastatin (CRESTOR) tablet 10 mg, QPM  sodium bicarbonate tablet 650 mg, BID  Vitamin D (CHOLECALCIFEROL) tablet 1,000 Units,

## 2024-10-16 NOTE — PLAN OF CARE
Problem: Pain  Goal: Verbalizes/displays adequate comfort level or baseline comfort level  10/16/2024 0947 by Antonieta Pinto, RN  Outcome: Progressing  Flowsheets (Taken 10/16/2024 0845)  Verbalizes/displays adequate comfort level or baseline comfort level: Encourage patient to monitor pain and request assistance  10/16/2024 0451 by Shahida Guerin, RN  Outcome: Progressing  10/16/2024 0435 by Shahida Guerin, RN  Outcome: Progressing

## 2024-10-16 NOTE — PROGRESS NOTES
Patient just finished last bottle of bowel prep. Notified Endo nurse via telephone. Patient is having multiple bowl movements brown with red in color. Mainly liquid but some small formed pieces.

## 2024-10-16 NOTE — PLAN OF CARE
Problem: Chronic Conditions and Co-morbidities  Goal: Patient's chronic conditions and co-morbidity symptoms are monitored and maintained or improved  10/16/2024 0435 by Shahida Guerin RN  Outcome: Progressing  10/15/2024 1905 by Steffany Montesinos RN  Outcome: Progressing     Problem: Pain  Goal: Verbalizes/displays adequate comfort level or baseline comfort level  10/16/2024 0435 by Shahida Guerin RN  Outcome: Progressing  10/15/2024 1905 by Steffany Montesinos RN  Outcome: Progressing     Problem: Safety - Adult  Goal: Free from fall injury  10/16/2024 0435 by Shahida Guerin RN  Outcome: Progressing  10/15/2024 1905 by Steffany Montesinos, RN  Outcome: Progressing     Problem: ABCDS Injury Assessment  Goal: Absence of physical injury  10/16/2024 0435 by Shahida Guerin RN  Outcome: Progressing  10/15/2024 1905 by Steffany Montesinos, RN  Outcome: Progressing

## 2024-10-16 NOTE — CARE COORDINATION
10/16/24 1725   Readmission Assessment   Number of Days since last admission? 8-30 days   Previous Disposition Other (comment)  (home back to assisted living facility)   Who is being Interviewed Patient   What was the patient's/caregiver's perception as to why they think they needed to return back to the hospital? Other (Comment)  (Pt had abdominal pain and rectal bleeding)   Did you visit your Primary Care Physician after you left the hospital, before you returned this time? Yes   Did you see a specialist, such as Cardiac, Pulmonary, Orthopedic Physician, etc. after you left the hospital? No   Who advised the patient to return to the hospital? Self-referral   Does the patient report anything that got in the way of taking their medications? No   In our efforts to provide the best possible care to you and others like you, can you think of anything that we could have done to help you after you left the hospital the first time, so that you might not have needed to return so soon? Other (Comment)  (Pt has all ostomy supplies needed and able to self care w/assistance from [facility] at [facility].  No CM needs to have prevented readmit.)     Electronically signed by LISA Brand, RODERICK on 10/16/2024 at 5:27 PM

## 2024-10-17 VITALS
WEIGHT: 147.71 LBS | DIASTOLIC BLOOD PRESSURE: 66 MMHG | OXYGEN SATURATION: 97 % | SYSTOLIC BLOOD PRESSURE: 116 MMHG | RESPIRATION RATE: 20 BRPM | BODY MASS INDEX: 22.39 KG/M2 | HEART RATE: 70 BPM | TEMPERATURE: 98 F | HEIGHT: 68 IN

## 2024-10-17 LAB
ANION GAP SERPL CALCULATED.3IONS-SCNC: 10 MMOL/L (ref 3–16)
BACTERIA UR CULT: NORMAL
BASOPHILS # BLD: 0 K/UL (ref 0–0.2)
BASOPHILS NFR BLD: 0.3 %
BUN SERPL-MCNC: 40 MG/DL (ref 7–20)
CALCIUM SERPL-MCNC: 8.6 MG/DL (ref 8.3–10.6)
CHLORIDE SERPL-SCNC: 109 MMOL/L (ref 99–110)
CO2 SERPL-SCNC: 24 MMOL/L (ref 21–32)
CREAT SERPL-MCNC: 2.3 MG/DL (ref 0.6–1.2)
DEPRECATED RDW RBC AUTO: 15.2 % (ref 12.4–15.4)
EOSINOPHIL # BLD: 0.1 K/UL (ref 0–0.6)
EOSINOPHIL NFR BLD: 2.3 %
GFR SERPLBLD CREATININE-BSD FMLA CKD-EPI: 20 ML/MIN/{1.73_M2}
GLUCOSE BLD-MCNC: 104 MG/DL (ref 70–99)
GLUCOSE BLD-MCNC: 121 MG/DL (ref 70–99)
GLUCOSE BLD-MCNC: 213 MG/DL (ref 70–99)
GLUCOSE BLD-MCNC: 63 MG/DL (ref 70–99)
GLUCOSE SERPL-MCNC: 57 MG/DL (ref 70–99)
HCT VFR BLD AUTO: 23 % (ref 36–48)
HGB BLD-MCNC: 7.3 G/DL (ref 12–16)
LYMPHOCYTES # BLD: 0.6 K/UL (ref 1–5.1)
LYMPHOCYTES NFR BLD: 14.1 %
MCH RBC QN AUTO: 28.9 PG (ref 26–34)
MCHC RBC AUTO-ENTMCNC: 31.7 G/DL (ref 31–36)
MCV RBC AUTO: 91.2 FL (ref 80–100)
MONOCYTES # BLD: 0.5 K/UL (ref 0–1.3)
MONOCYTES NFR BLD: 12.3 %
NEUTROPHILS # BLD: 3 K/UL (ref 1.7–7.7)
NEUTROPHILS NFR BLD: 71 %
PERFORMED ON: ABNORMAL
PLATELET # BLD AUTO: 208 K/UL (ref 135–450)
PMV BLD AUTO: 7.4 FL (ref 5–10.5)
POTASSIUM SERPL-SCNC: 4.5 MMOL/L (ref 3.5–5.1)
RBC # BLD AUTO: 2.52 M/UL (ref 4–5.2)
SODIUM SERPL-SCNC: 143 MMOL/L (ref 136–145)
WBC # BLD AUTO: 4.2 K/UL (ref 4–11)

## 2024-10-17 PROCEDURE — 97530 THERAPEUTIC ACTIVITIES: CPT

## 2024-10-17 PROCEDURE — 80048 BASIC METABOLIC PNL TOTAL CA: CPT

## 2024-10-17 PROCEDURE — 2580000003 HC RX 258: Performed by: PHYSICIAN ASSISTANT

## 2024-10-17 PROCEDURE — 97116 GAIT TRAINING THERAPY: CPT

## 2024-10-17 PROCEDURE — G0378 HOSPITAL OBSERVATION PER HR: HCPCS

## 2024-10-17 PROCEDURE — 85025 COMPLETE CBC W/AUTO DIFF WBC: CPT

## 2024-10-17 PROCEDURE — 6370000000 HC RX 637 (ALT 250 FOR IP): Performed by: PHYSICIAN ASSISTANT

## 2024-10-17 PROCEDURE — 97165 OT EVAL LOW COMPLEX 30 MIN: CPT

## 2024-10-17 PROCEDURE — 97161 PT EVAL LOW COMPLEX 20 MIN: CPT

## 2024-10-17 PROCEDURE — 97535 SELF CARE MNGMENT TRAINING: CPT

## 2024-10-17 RX ORDER — POLYETHYLENE GLYCOL 3350 17 G/17G
17 POWDER, FOR SOLUTION ORAL DAILY
Qty: 510 G | Refills: 0 | Status: SHIPPED | OUTPATIENT
Start: 2024-10-17 | End: 2024-11-16

## 2024-10-17 RX ADMIN — Medication 1000 UNITS: at 08:38

## 2024-10-17 RX ADMIN — CARVEDILOL 18.68 MG: 6.25 TABLET, FILM COATED ORAL at 08:37

## 2024-10-17 RX ADMIN — SODIUM CHLORIDE, PRESERVATIVE FREE 10 ML: 5 INJECTION INTRAVENOUS at 08:41

## 2024-10-17 RX ADMIN — ISOSORBIDE MONONITRATE 30 MG: 30 TABLET, EXTENDED RELEASE ORAL at 08:38

## 2024-10-17 RX ADMIN — SODIUM BICARBONATE 650 MG: 650 TABLET ORAL at 08:38

## 2024-10-17 RX ADMIN — GABAPENTIN 100 MG: 100 CAPSULE ORAL at 08:38

## 2024-10-17 NOTE — CARE COORDINATION
10/17/24 1218   IMM Letter   IMM Letter given to Patient/Family/Significant other/Guardian/POA/by: IMM given by CM   IMM Letter date given: 10/17/24   IMM Letter time given: 0294

## 2024-10-17 NOTE — PROGRESS NOTES
[facility] - Inpatient Rehabilitation Department   Phone: [phone]    Physical Therapy    [x] Initial Evaluation            [] Daily Treatment Note         [] Discharge Summary      Patient: Cely Espitia   : 1940   MRN: [id number]   Date of Service:  10/17/2024  Admitting Diagnosis: Lower GI bleed  Current Admission Summary: Pt is an 85 yo female admitted to [facility] on 10/15 for blood per rectum. The patient underwent colonoscopy and EGD on 10/16. Scope showed diverticulitis.  Past Medical History:  has a past medical history of Anxiety, Atrial fibrillation ([facility]), CAD (coronary artery disease), Cancer ([facility]), Cardiac arrest, Carotid artery stenosis, CHF (congestive heart failure) ([facility]), Chronic kidney disease, Depression, Diabetes mellitus ([facility]), GERD (gastroesophageal reflux disease), Hip pain, chronic, HYPERCHOLESTERAEMIA, Hypertension, Irritable bowel syndrome, Laceration of head, MI, old, Multiple drug resistant organism (MDRO) culture positive, Neuropathy, Osteoarthritis, Presence of combination internal cardiac defibrillator (ICD) and pacemaker, Staph infection, Type 2 diabetes mellitus without complication ([facility]), and Urinary incontinence.  Past Surgical History:  has a past surgical history that includes angioplasty; Hysterectomy; bladder suspension; Cystoscopy (2013); Pacemaker insertion; Kidney removal; Bladder removal; Total hip arthroplasty (Left, 2019); Skin cancer excision; Cardiac procedure (N/A, 2024); Cardiac procedure (N/A, 2024); Upper gastrointestinal endoscopy (N/A, 10/16/2024); and Colonoscopy (N/A, 10/16/2024).    Discharge Recommendations: Cely Espitia scored a 17/24 on the AM-PAC short mobility form. Current research shows that an AM-PAC score of 17 or less is typically not associated with a discharge to the patient's home setting. Based on the patient's AM-PAC score and their current functional mobility deficits, it is recommended that the patient

## 2024-10-17 NOTE — PROGRESS NOTES
Nephrology progress Note  [phone]  [phone]   [facility]       Patient:  Cely Espitia   : 1940    Brief HPI    The patient is a 84 y.o.female with significant past medical history of CKD, Ureteral cancer  s/p right Nephrectomy and radical Cystectomy and ileal conduit 2017, Hypertension, DM II, CAD/CHF, Atrial fibrillation, Bladder Cancer, Carotid artery stenosis, Hyperlipidemia, Depression came in with abdominal pain and rectal bleeding via EMS from ECF  Admitted for further evaluation and GI consulted  We are consulted for CKD  She follows with Dr. Lassiter  CKD dates back to atleast   Baseline creatinine appears to be low 2s  Creatinine on admission was 2.3 and is 2.4 at the time of consult    Subjective/Interval history    Pt seen and examined  Creatinine stable  BPs controlled  Has been afebrile  On room air         Meds:  Scheduled Meds:   carvedilol  18.675 mg Oral BID    gabapentin  100 mg Oral BID    insulin glargine  18 Units SubCUTAneous Nightly    isosorbide mononitrate  30 mg Oral Daily    rosuvastatin  10 mg Oral QPM    sodium bicarbonate  650 mg Oral BID    Vitamin D  1,000 Units Oral Daily    insulin lispro  0-4 Units SubCUTAneous 4x Daily AC & HS    sodium chloride flush  5-40 mL IntraVENous 2 times per day    PEG-KCl-NaCl-NaSulf-Na Asc-C  100 g Oral Once     Continuous Infusions:   dextrose      sodium chloride       PRN Meds:.ALPRAZolam, dextrose bolus **OR** dextrose bolus, glucagon (rDNA), dextrose, sodium chloride flush, sodium chloride, ondansetron, senna, acetaminophen **OR** acetaminophen, HYDROmorphone **OR** HYDROmorphone      Vitals:  /66   Pulse 70   Temp 98 °F (36.7 °C) (Oral)   Resp 20   Ht 1.727 m (5' 8\")   Wt 67 kg (147 lb 11.3 oz)   SpO2 97%   BMI 22.46 kg/m²       Physical Exam  General : AAOx3, not in pain or respiratory distress, resting in bed  HEENT : mucosa moist.  CVS: S1 S2 normal, regular rhythm, no murmurs or rubs.  Lungs: Clear, no  wheezing or crackles.  Abd: Soft, bowel sounds normal, non-tender.  Ext: No edema, no cyanosis      Labs:  CBC with Differential:    Lab Results   Component Value Date/Time    WBC 4.2 10/17/2024 06:16 AM    RBC 2.52 10/17/2024 06:16 AM    HGB 7.3 10/17/2024 06:16 AM    HCT 23.0 10/17/2024 06:16 AM     10/17/2024 06:16 AM    MCV 91.2 10/17/2024 06:16 AM    MCH 28.9 10/17/2024 06:16 AM    MCHC 31.7 10/17/2024 06:16 AM    RDW 15.2 10/17/2024 06:16 AM    BANDSPCT 4 09/21/2017 02:24 AM    LYMPHOPCT 14.1 10/17/2024 06:16 AM    MONOPCT 12.3 10/17/2024 06:16 AM    MYELOPCT 1 03/25/2016 08:55 PM    EOSPCT 2.3 10/17/2024 06:16 AM    BASOPCT 0.3 10/17/2024 06:16 AM    MONOSABS 0.5 10/17/2024 06:16 AM    LYMPHSABS 0.6 10/17/2024 06:16 AM    EOSABS 0.1 10/17/2024 06:16 AM    BASOSABS 0.0 10/17/2024 06:16 AM    DIFFTYPE Auto-K 01/28/2012 11:05 AM     BMP:    Lab Results   Component Value Date/Time     10/17/2024 06:16 AM    K 4.5 10/17/2024 06:16 AM     10/17/2024 06:16 AM    CO2 24 10/17/2024 06:16 AM    BUN 40 10/17/2024 06:16 AM    CREATININE 2.3 10/17/2024 06:16 AM    CALCIUM 8.6 10/17/2024 06:16 AM    GFRAA 40 11/02/2021 05:34 AM    GFRAA 51 05/28/2013 06:17 AM    LABGLOM 20 10/17/2024 06:16 AM    LABGLOM 25 02/06/2024 12:57 PM    GLUCOSE 57 10/17/2024 06:16 AM     Ionized Calcium:  No components found for: \"IONCA\"  Magnesium:    Lab Results   Component Value Date/Time    MG 2.20 06/21/2024 11:06 AM     Phosphorus:    Lab Results   Component Value Date/Time    PHOS 4.9 06/21/2024 11:06 AM       Assessment/Plan:    CKD stage IV  Creatinine appears to be at baseline  Risk factors include solitary kidney, DM II,  Hypertension and age  Daily BMP  GI bleeding  S/p EGD and colonoscopy, findings reviewed   Possible diverticular source of bleeding  Anemia   Due to CKD and GI bleeding   Can give a dose of retacrit  Hypertension controlled, continue current regimen  Acidosis metabolic, continue sodium bicarb tabs

## 2024-10-17 NOTE — PLAN OF CARE
Problem: Chronic Conditions and Co-morbidities  Goal: Patient's chronic conditions and co-morbidity symptoms are monitored and maintained or improved  10/17/2024 0306 by Neo Mahoney RN  Outcome: Progressing  10/17/2024 0305 by Neo Mahoney RN  Outcome: Progressing     Problem: Pain  Goal: Verbalizes/displays adequate comfort level or baseline comfort level  10/17/2024 0306 by Neo Mahoney RN  Outcome: Progressing  10/17/2024 0305 by Neo Mahoney RN  Outcome: Progressing     Problem: Safety - Adult  Goal: Free from fall injury  10/17/2024 0306 by Neo Mahoney RN  Outcome: Progressing  10/17/2024 0305 by Neo Mahoney RN  Outcome: Progressing     Problem: ABCDS Injury Assessment  Goal: Absence of physical injury  10/17/2024 0306 by Neo Mahoney RN  Outcome: Progressing  10/17/2024 0305 by Neo Mahoney RN  Outcome: Progressing     Problem: Skin/Tissue Integrity  Goal: Absence of new skin breakdown  Description: 1.  Monitor for areas of redness and/or skin breakdown  2.  Assess vascular access sites hourly  3.  Every 4-6 hours minimum:  Change oxygen saturation probe site  4.  Every 4-6 hours:  If on nasal continuous positive airway pressure, respiratory therapy assess nares and determine need for appliance change or resting period.  10/17/2024 0306 by Neo Mahoney RN  Outcome: Progressing  10/17/2024 0305 by Neo Mahoney RN  Outcome: Progressing

## 2024-10-17 NOTE — PROGRESS NOTES
Shift assessment. VSS. Patient in room alert and oriented. Show mild confusion but can easily redirected. Denies pain, nausea and vomiting. Able to go to the bathroom without any complications. Able to take her pills. Bed lowe in position. Bed alarm on. No further intervention needed at this time. Plan of care ongoing.

## 2024-10-17 NOTE — PROGRESS NOTES
NUTRITION    Nutrition screening referral was triggered based on results obtained during nursing admission assessment for Unintentional Weight Loss and Decreased appetite.    The patient's chart was reviewed and nutrition assessment is not indicated at this time.  Patient will be seen per nutrition standards of care.         Angelic Gtz RD, LD

## 2024-10-17 NOTE — DISCHARGE INSTR - COC
Continuity of Care Form    Patient Name: Cely Espitia   :  1940  MRN:  [id number]    Admit date:  10/15/2024  Discharge date:  ***    Code Status Order: Full Code   Advance Directives:   Advance Care Flowsheet Documentation        Date/Time Healthcare Directive Type of Healthcare Directive Copy in Chart Healthcare Agent Appointed Healthcare Agent's Name Healthcare Agent's Phone Number    10/16/24 1230 Yes, patient has an advance directive for healthcare treatment  --  Yes, copy in chart  --  --  --                     Admitting Physician:  Ayah Casper DO  PCP: Marcus Shukla MD    Discharging Nurse: ***  Discharging Hospital Unit/Room#: 5TN-5578/5578-01  Discharging Unit Phone Number: ***    Emergency Contact:   Extended Emergency Contact Information  Primary Emergency Contact: RajjaradVivian  Home Phone: [phone]  Mobile Phone: [phone]  Relation: Child  Secondary Emergency Contact: Nigel Gardner  Home Phone: [phone]  Mobile Phone: [phone]  Relation: Child    Past Surgical History:  Past Surgical History:   Procedure Laterality Date    ANGIOPLASTY      x3      BLADDER REMOVAL      BLADDER SUSPENSION      CARDIAC PROCEDURE N/A 2024    Right heart cath performed by Maury Velazquez MD at [facility] CARDIAC CATH LAB    CARDIAC PROCEDURE N/A 2024    Thrombectomy pulmonary artery performed by Maury Velazquez MD at [facility] CARDIAC CATH LAB    COLONOSCOPY N/A 10/16/2024    COLONOSCOPY DIAGNOSTIC performed by Jose Lui MD at [facility] ENDOSCOPY    CYSTOSCOPY  2013    with dilitation    HYSTERECTOMY (CERVIX STATUS UNKNOWN)      KIDNEY REMOVAL      right    PACEMAKER INSERTION      SKIN CANCER EXCISION      TOTAL HIP ARTHROPLASTY Left 2019    LEFT ANTERIOR TOTAL HIP REPLACEMENT WITH C-ARM performed by Avni Shepard MD at [facility] OR    UPPER GASTROINTESTINAL ENDOSCOPY N/A 10/16/2024    ESOPHAGOGASTRODUODENOSCOPY BIOPSY performed by Jose Lui MD at [facility] ENDOSCOPY

## 2024-10-17 NOTE — PROGRESS NOTES
Gastroenterology Progress Note      Cely Espitia is a 84 y.o. female patient.  1. Lower GI bleed    2. Diverticular disease    3. Anemia, unspecified type        SUBJECTIVE:  no BMs or blood per rectum after colonoscopy.         Physical    VITALS:  /66   Pulse 70   Temp 98 °F (36.7 °C) (Oral)   Resp 20   Ht 1.727 m (5' 8\")   Wt 67 kg (147 lb 11.3 oz)   SpO2 97%   BMI 22.46 kg/m²   TEMPERATURE:  Current - Temp: 98 °F (36.7 °C); Max - Temp  Av.8 °F (36.6 °C)  Min: 96.9 °F (36.1 °C)  Max: 98.5 °F (36.9 °C)    Constitutional: Alert and oriented x 4. No acute distress.   Respiratory: Respirations nonlabored, no crepitus  GI: Abdomen nondistended, soft, and nontender.    Neurological: No focal deficits noted. No asterixis.      Data    Data Review:    Recent Labs     10/15/24  0527 10/15/24  2101 10/16/24  0551 10/17/24  0616   WBC 3.9*  --  3.9* 4.2   HGB 7.3* 7.7* 7.9* 7.3*   HCT 23.3* 24.4* 25.2* 23.0*   MCV 92.3  --  92.8 91.2     --  204 208     Recent Labs     10/15/24  0527 10/16/24  0551 10/17/24  0616    140 143   K 4.9 4.9 4.5    106 109   CO2 25 21 24   BUN 40* 43* 40*   CREATININE 2.3* 2.4* 2.3*     Recent Labs     10/15/24  0105   AST 27   ALT 10   BILITOT 0.3   ALKPHOS 40     No results for input(s): \"LIPASE\", \"AMYLASE\" in the last 72 hours.  Recent Labs     10/15/24  0105   PROTIME 14.9   INR 1.15     Invalid input(s): \"PTT\"           ASSESSMENT / PLAN      Hematochezia - .  Per chart review, she had hematochezia 2023 at .  Had EGD which was normal.  Colonoscopy deferred as bleeding stopped.  CT as above with diverticulosis.   10/16/2024: EGD with antral gastritis.  Colonoscopy with severe pandiverticulosis, tiny cecal polyp not removed, scant old blood in the colon suggesting resolved diverticular bleed.  No bowel movements or blood per rectum after colonoscopy.  -Follow-up pathology  -Metamucil fiber capsules 2 to 3/day.  -MiraLAX for

## 2024-10-17 NOTE — PROGRESS NOTES
Blood sugar this morning was 63. Patient is alert and oriented. Patient is still on clear liquids and states that she is very hungry. Apple juice given. Will recheck blood sugar in 15 minutes. Plan of care ongoing.

## 2024-10-17 NOTE — CARE COORDINATION
Case Management -  Discharge Note      Patient Name: Cely Espitia                   YOB: 1940            Readmission Risk (Low < 19, Mod (19-27), High > 27): Readmission Risk Score: 19.6    Current PCP: Marcus Shukla MD      (IMM) Important Message from Medicare:    Has pt received appropriate IMM before discharge if required: yes  Date: 10/17/24    PT AM-PAC: 17 /24  OT AM-PAC: 18 /24    Patient/patient representative has been educated on the benefits of HHC as well as the possible risks of declining recommended services. Patient/patient representative has acknowledged the information provided and decided on the following discharge plan. Patient/ patient representative has been provided freedom of choice regarding service provider, supported by basic dialogue that supports the patient's individualized plan of care/goals.    [facility]  [address] [place] [address]  Phone: [phone]  Fax: [phone]     Financial    Payor: [employer] MEDICARE / Plan: [employer] MEDICARE-ADVANTAGE PPO / Product Type: Medicare /     Pharmacy:  Potential assistance Purchasing Medications:    Meds-to-Beds request:       [id number] [place], [place] - [address] [phone] -  [phone]  [address] [place] [id number]  Phone: [phone] Fax: [phone]      Notes:    Additional Case Management Notes: Patient will discharge back to [facility] today. Therapy recommended SNF, however patient declined. CM suggested HHC but the patient also declined the services. CM educated the patient to call her PCP if she would decide she needs HHC after discharge.The patients daughter-Vivian who was at bedside and will transport the patient home. No further needs at this time.  Electronically signed by Eden Pradhan on 10/17/24 at 2:26 PM EDT

## 2024-10-17 NOTE — PROGRESS NOTES
[facility] - Inpatient Rehabilitation Department   Phone: [phone]    Occupational Therapy    [x] Initial Evaluation            [] Daily Treatment Note         [] Discharge Summary      Patient: Cely Espitia   : 1940   MRN: [id number]   Date of Service:  10/17/2024    Admitting Diagnosis:  Lower GI bleed  Current Admission Summary: Pt is an 83 yo female admitted to [facility] on 10/15 for blood per rectum. The patient underwent colonoscopy and EGD on 10/16. Scope showed diverticulitis.   Past Medical History:  has a past medical history of Anxiety, Atrial fibrillation ([facility]), CAD (coronary artery disease), Cancer ([facility]), Cardiac arrest, Carotid artery stenosis, CHF (congestive heart failure) ([facility]), Chronic kidney disease, Depression, Diabetes mellitus ([facility]), GERD (gastroesophageal reflux disease), Hip pain, chronic, HYPERCHOLESTERAEMIA, Hypertension, Irritable bowel syndrome, Laceration of head, MI, old, Multiple drug resistant organism (MDRO) culture positive, Neuropathy, Osteoarthritis, Presence of combination internal cardiac defibrillator (ICD) and pacemaker, Staph infection, Type 2 diabetes mellitus without complication ([facility]), and Urinary incontinence.  Past Surgical History:  has a past surgical history that includes angioplasty; Hysterectomy; bladder suspension; Cystoscopy (2013); Pacemaker insertion; Kidney removal; Bladder removal; Total hip arthroplasty (Left, 2019); Skin cancer excision; Cardiac procedure (N/A, 2024); Cardiac procedure (N/A, 2024); Upper gastrointestinal endoscopy (N/A, 10/16/2024); and Colonoscopy (N/A, 10/16/2024).    Discharge Recommendations: Cely Espitia scored a 18/24 on the AM-PAC ADL Inpatient form. Current research shows that an AM-PAC score of 17 or less is typically not associated with a discharge to the patient's home setting. Based on the patient's AM-PAC score and their current ADL deficits, it is recommended that the patient have 3-5

## 2024-10-20 NOTE — DISCHARGE SUMMARY
Hospital Medicine Discharge Summary    Patient: Cely Espitia     Gender: female  : 1940   Age: 84 y.o.  MRN: [id number]    Admitting Physician: Ayah Casper DO  Discharge Physician: Elida Birch PA-C     Code Status:Full code    Admit Date: 10/15/2024   Discharge Date: 10/17/2024      Disposition:  Home  Time spent arranging discharge: 32 minutes    Discharge Diagnoses:    Active Hospital Problems    Diagnosis Date Noted    Lower GI bleed [K92.2] 10/15/2024       Recommendations:     Condition at Discharge:  Stable    Hospital Course:   Patient is an 84-year-old female who presented to hospital with abdominal pain and rectal bleeding.  There was associated left lower quadrant abdominal pain.  In the emergency room her hemoglobin was 7.4.  Her baseline is 9.4.  CT abdomen pelvis revealed diverticulosis as well as a parastomal hernia containing nonobstructed transverse colon.  Patient was admitted and seen by GI as well as nephrology for her CKD.  She underwent an EGD and colonoscopy by Dr Lui on 10/16 which revealed antral gastritis, tortuous colon, severe pan diverticulosis and a tiny cecal polyp which was removed.  There was old scant blood in the colon suggestive of diverticular bleed.  Patient's hemoglobin remained stable at 7.3.  Patient was tolerated diet and had no further bleeding.  She was discharged back to assisted living in stable condition.    Discharge Exam:    /66   Pulse 70   Temp 98 °F (36.7 °C) (Oral)   Resp 20   Ht 1.727 m (5' 8\")   Wt 67 kg (147 lb 11.3 oz)   SpO2 97%   BMI 22.46 kg/m²   General appearance:  Appears comfortable. AAOx3  HEENT: atraumatic, Pupils equal, muscous membranes moist, no masses appreciated  Cardiovascular: Regular rate and rhythm no murmurs appreciated  Respiratory: CTAB no wheezing  Gastrointestinal: Abdomen soft, non-tender, BS+  EXT: no edema  Neurology: no gross focal deficts  Psychiatry: Appropriate affect. Not agitated  Skin:

## 2024-10-28 ENCOUNTER — TELEPHONE (OUTPATIENT)
Dept: CARDIOLOGY CLINIC | Age: 84
End: 2024-10-28

## 2024-10-28 NOTE — TELEPHONE ENCOUNTER
Phoned patient, message states \"thank you for calling Chiquis Rashid\"    Phoned daughter, Vivian and left message requesting she have Cely phone the office.          Patient/Family phoned and discussed findings.  Responses as listed below.    Shortness of breath?  Orthopnea?  Waking up gasping for air?    Chest pain or pressure?    Change in weight?               Have they been weighing themselves daily?    Edema?    Any lightheadedness/Syncope?    Any cough?    Change in activity level or tolerance?     Have they been following low sodium diet and fluid restrictions?     Any change in medications since last seen in office?     What is your current water pill/diuretic and dose?      Reinforced with patient/family regarding 64 fluid oz restriction, Na restrictions and to monitor wt   Instructed to phone with any symptoms or concerns. Patient agreeable. Patient informed if there   are not any changes to their treatment plan, no follow up call will be made.  If there is a change   they will receive a call to discuss. Patient verbalized understanding and was agreeable.

## 2024-10-28 NOTE — TELEPHONE ENCOUNTER
Please review Murj for: \"Possible OptiVol fluid accumulation: 28-Sep-2024 -- ongoing, elevated up to 120 w correlating TI trend below reference line. Triage™ Heart Failure Risk Status on 26-Oct-2024 is High*.\"

## 2024-10-29 ENCOUNTER — TELEPHONE (OUTPATIENT)
Dept: CARDIOLOGY CLINIC | Age: 84
End: 2024-10-29

## 2024-10-29 DIAGNOSIS — R93.1 ABNORMAL FINDINGS ON DIAGNOSTIC IMAGING OF HEART AND CORONARY CIRCULATION: ICD-10-CM

## 2024-10-29 DIAGNOSIS — Z01.818 PRE-OP EXAM: Primary | ICD-10-CM

## 2024-10-29 NOTE — TELEPHONE ENCOUNTER
Appears has lasix 40mg PRN ordered  Pls advise to take X3 days- notify [facility] if no improvement in swelling

## 2024-10-29 NOTE — TELEPHONE ENCOUNTER
Patient/Family phoned and discussed findings.  Responses as listed below.     Shortness of breath?  No  Orthopnea?  No  Waking up gasping for air?  No     Chest pain or pressure?  No     Change in weight? yes              Have they been weighing themselves daily?  No     Edema? Legs swelling knees down, patient has been c/o swelling     Any lightheadedness/Syncope? Sometimes     Any cough? No     Change in activity level or tolerance? No     Have they been following low sodium diet and fluid restrictions?  Yes       Any change in medications since last seen in office? No     What is your current water pill/diuretic and dose?  Furosemide  40 mg, daughter states Cely does her own medication but does not think patient has been taking this.        Reinforced with patient/family regarding 64 fluid oz restriction, Na restrictions and to monitor wt   Instructed to phone with any symptoms or concerns. Patient agreeable. Patient informed if there   are not any changes to their treatment plan, no follow up call will be made.  If there is a change   they will receive a call to discuss. Patient verbalized understanding and was agreeable.

## 2024-10-29 NOTE — TELEPHONE ENCOUNTER
Subjective   Patient ID: Judi is a 71 year old female.    Chief Complaint   Patient presents with   • Follow-up Hypertension   • Refill Request     HPI management of hypertension, CKD, elevated hemoglobin A1c and obesity.  Denies any headache, dizziness, chest pain or shortness of breath.  Judi has a past medical history of Cataract, bilateral (6/19/2018), Essential (primary) hypertension, Glaucoma (increased eye pressure), Renal insufficiency (2/9/2022), and Thyroid disease.    She has no past medical history of Allergy, Anemia, Anxiety, Arthritis, COPD (chronic obstructive pulmonary disease) (CMS/[facility]), Depressive disorder, Diabetes mellitus (CMS/[facility]), HIV disease (CMS/[facility]), Meningitis, Neuromuscular disorder (CMS/[facility]), Osteoporosis, Sickle cell anemia (CMS/[facility]), or Tuberculosis.  Judi has Elevated hemoglobin A1c; Hypothyroidism; Morbid obesity with BMI of 40.0-44.9, adult (CMS/[facility]); Cataract, bilateral; Hearing loss of both ears due to cerumen impaction; Medicare annual wellness visit, subsequent; Chronic hip pain, bilateral; Renal insufficiency; Other specified glaucoma; Acute pain of right knee; Dermatitis; Chronic kidney disease (CKD), stage II (mild); Benign hypertension with chronic kidney disease, stage II; Skin lesion; and Former smoker on their problem list.  Judi has a past surgical history that includes Thyroid surgery (01/01/1987).  Her family history includes Bilateral breast cancer in her mother; Cancer in her mother; Diabetes in her mother; Hypertension in her brother; Thyroid in her sister.  Judi reports that she has been smoking. She has never used smokeless tobacco. She reports current alcohol use of about 1.0 standard drink of alcohol per week. She reports that she does not use drugs.  Judi has a current medication list which includes the following prescription(s): levothyroxine, amlodipine, hydrochlorothiazide, losartan, and fluticasone.  Judi   Current Outpatient Medications  Vivian returned call and she was notified of message regarding Lasix. Vivian verbalized understanding      Medication Sig Dispense Refill   • levothyroxine 150 MCG tablet Take 1 tablet by mouth daily. 90 tablet 3   • amLODIPine (NORVASC) 5 MG tablet Take 1 tablet by mouth daily. 90 tablet 3   • hydrochlorothiazide (HYDRODIURIL) 25 MG tablet Take 1 tablet by mouth daily. 90 tablet 3   • losartan (COZAAR) 50 MG tablet Take 1 tablet by mouth daily. 90 tablet 3   • fluticasone (FLONASE) 50 MCG/ACT nasal spray SPRAY 2 SPRAYS INTO EACH NOSTRIL EVERY DAY 16 mL 1     No current facility-administered medications for this visit.     Judi is allergic to lisinopril and tetracycline.    Review of Systems   Constitutional: Negative for activity change, appetite change, fatigue, fever and unexpected weight change.   HENT: Negative for congestion, ear pain, hearing loss, nosebleeds, postnasal drip, sinus pain, sneezing, sore throat, tinnitus, trouble swallowing and voice change.    Eyes: Negative for pain and visual disturbance.   Respiratory: Negative for cough, shortness of breath and wheezing.    Cardiovascular: Negative for chest pain, palpitations and leg swelling.   Gastrointestinal: Negative for abdominal pain, blood in stool, constipation, diarrhea, nausea and vomiting.   Endocrine: Negative for cold intolerance, heat intolerance, polydipsia, polyphagia and polyuria.   Genitourinary: Negative for decreased urine volume, difficulty urinating, dysuria, frequency, hematuria and urgency.   Musculoskeletal: Negative for arthralgias, gait problem and myalgias.   Skin: Negative for color change and rash.   Allergic/Immunologic: Negative for environmental allergies and food allergies.   Neurological: Negative for dizziness, weakness, numbness and headaches.   Hematological: Negative for adenopathy. Does not bruise/bleed easily.   Psychiatric/Behavioral: Negative for behavioral problems, dysphoric mood, hallucinations and sleep disturbance. The patient is not nervous/anxious.      Objective   Physical Exam   Vitals:    09/09/22 0816    BP: 135/75   BP Location: LUE - Left upper extremity   Patient Position: Sitting   Cuff Size: Regular   Pulse: 67   Resp: 15   Temp: 98 °F (36.7 °C)   TempSrc: Oral   SpO2: 96%   Weight: 102.5 kg (225 lb 15.5 oz)   Height: 5' 2\" (1.575 m)   PainSc:  0     Nursing note and vitals reviewed  Constitutional:  oriented to person, place, and time.  appears well-developed and well-nourished.   HENT:   Head: Normocephalic and atraumatic.   Ears: External ears normal. No cerumen impaction, tympanic membrane normal  Nose: Nose normal.  No hypertrophy or edema of turbinates.  No sinus tenderness  Mouth/Throat: Oropharynx is clear and moist.   Eyes: EOM are normal. Pupils are equal, round, and reactive to light.   Neck: Neck supple. No JVD present. No tracheal deviation present. No thyromegaly present.   Lungs: Clear with good air entry, no wheezes or rales.  Cardiovascular: Normal rate, regular rhythm and normal heart sounds. Exam reveals no gallop.   No murmur heard.  Abdomen: Not distended, no tenderness, no mass, bowel sounds normal.  Musculoskeletal: Normal range of motion. no extremity edema.   Neurological:  oriented to person, place, and time.   Speech normal , gait normal  No sensory or motor deficit noted   Skin: Skin is dry. No rash   Psychiatric:has a normal mood and affect.     Assessment   Problem List Items Addressed This Visit        Medium    Benign hypertension with chronic kidney disease, stage II - Primary    Low-sodium diet advised  Avoid nonsteroidal anti-inflammatory medications  Reduce caffeine intake  Regular exercise advised  Monitor blood pressure at home  Continue current medications          Low    Elevated hemoglobin A1c  Diet exercise and weight reduction discussed  Repeat lab ordered    Morbid obesity with BMI of 40.0-44.9, adult (CMS/[facility])  Detailed nutritional and exercise counseling done and encouraged weight reduction    Other specified glaucoma  Advised follow-up with ophthalmology and  continue current medication        Chronic kidney disease (CKD), stage II (mild)  Avoid nonsteroidal anti-inflammatory medications  Low-sodium diet advised  Better blood pressure control advised  Will repeat lab

## 2024-10-29 NOTE — TELEPHONE ENCOUNTER
Patient was phoned due to her optival reading \"high risk\". Her daughter returned the call and opitval discussed and responses sent to [facility].  However, the daughter requested the results of an Echo, TTE and testing of a mass around Cely's pacemaker? She said Dr. Velazquez performed the procedure on 9/25/24.  Please phone and review results.

## 2024-10-29 NOTE — TELEPHONE ENCOUNTER
Cris,     Can you please schedule left and right heart cath at [facility].      Labs and procedure ordered    Please go for blood work one week prior to your procedure.       The morning of your procedure you will park in the hospital parking lot and report directly to the registration desk for check in.    Pre-Procedure Instructions   You will need to fast for at least 8 hours prior to procedure. No caffeine the morning of.    Hold your diuretic, lasix the morning of procedure.   Hold all diabetic medications including, Metfomin.  If you take Lantus/Levemir only take ½ your normal dose the evening before.  All other medications can be taken in the morning with sips of water.   You will need to take 325 mg aspirin the morning of.  If you are currently taking 81 mg please take 4 tablets that morning.   Do not use any lotions, creams or perfume the morning of procedure.   Pre-procedure lab work will need to be completed 5-7 days prior to procedure.   Please have a responsible adult to drive you home after procedure. We advise you have someone to stay with you for 24 hours following procedure for precautionary measures. Depending on procedure you may require an overnight stay.   Cath lab will provide you with all post procedure instructions.     If you have any questions regarding the procedure itself or medications, please call [phone] and ask to speak with a nurse.

## 2024-10-29 NOTE — TELEPHONE ENCOUNTER
Per Dr. Velazquez material sent for pathology was fibrous tissue -not infection or clot may have been clot at one point but just became fibers attached to lead.      Spoke to daughter regarding results asked if she would like to schedule appt for her mom to discuss right and left heart cath.      Daughter stated Dr. Velazquez explained everything so well she would just like to go ahead and schedule right and left heart cath.      Order placed.      Daughter verbalized understanding that Cris will call to schedule dates and times for cath.

## 2024-10-29 NOTE — TELEPHONE ENCOUNTER
[phone] (home)  No answer     Vivian Maguire (Child)  [phone] left message for pt or daughter to return call.

## 2024-10-30 NOTE — TELEPHONE ENCOUNTER
Date of Procedure: Tuesday 11/19/24 @ [facility] with Dr. Velazquez     Time of arrival: 9:30 am     Procedure time: 11:00 am     Called and spoke to Cely's mom Vivian and she is agreeable to date and time. I emailed Vivian Ta's procedure instructions and she verbalized understanding. Encouraged to call with any questions or concerns.     Vivian did ask if Dr. Velazquez can admit Cely after the procedure? I told her I would put a message back to Dr. Velazquez an his RN.     Published on [employer] and e-mail to Shahida.

## 2024-10-30 NOTE — TELEPHONE ENCOUNTER
Date of Procedure: Tuesday 11/19/24 @ [facility] with Dr. Velazquez     Time of arrival: 9:30 am     Procedure time: 11:00 am     Called and spoke to Cely's mom Vivian and she is agreeable to date and time. I emailed Vivian Ta's procedure instructions and she verbalized understanding. Encouraged to call with any questions or concerns.     Vivian did ask if Dr. Velazquez can admit Cely after the procedure? I told her I would put a message back to Dr. Velazquez an his RN.     Published on [employer] and e-mail to Shahida.

## 2024-11-01 PROBLEM — R07.9 CHEST PAIN: Status: ACTIVE | Noted: 2024-10-29

## 2024-11-06 ENCOUNTER — HOSPITAL ENCOUNTER (OUTPATIENT)
Dept: ONCOLOGY | Age: 84
Setting detail: INFUSION SERIES
Discharge: HOME | End: 2024-11-06
Payer: MEDICARE

## 2024-11-06 VITALS
TEMPERATURE: 97.8 F | SYSTOLIC BLOOD PRESSURE: 113 MMHG | HEART RATE: 85 BPM | RESPIRATION RATE: 16 BRPM | DIASTOLIC BLOOD PRESSURE: 46 MMHG

## 2024-11-06 DIAGNOSIS — N18.4 ANEMIA IN STAGE 4 CHRONIC KIDNEY DISEASE (HCC): ICD-10-CM

## 2024-11-06 DIAGNOSIS — N18.4 CKD (CHRONIC KIDNEY DISEASE) STAGE 4, GFR 15-29 ML/MIN (HCC): Primary | ICD-10-CM

## 2024-11-06 DIAGNOSIS — D63.1 ANEMIA IN STAGE 4 CHRONIC KIDNEY DISEASE (HCC): ICD-10-CM

## 2024-11-06 LAB
HCT VFR BLD AUTO: 23.4 % (ref 36–48)
HGB BLD-MCNC: 7.5 G/DL (ref 12–16)

## 2024-11-06 PROCEDURE — 6360000002 HC RX W HCPCS: Performed by: INTERNAL MEDICINE

## 2024-11-06 PROCEDURE — 99211 OFF/OP EST MAY X REQ PHY/QHP: CPT

## 2024-11-06 PROCEDURE — 85018 HEMOGLOBIN: CPT

## 2024-11-06 PROCEDURE — 96372 THER/PROPH/DIAG INJ SC/IM: CPT

## 2024-11-06 PROCEDURE — 85014 HEMATOCRIT: CPT

## 2024-11-06 RX ADMIN — EPOETIN ALFA-EPBX 18750 UNITS: 10000 INJECTION, SOLUTION INTRAVENOUS; SUBCUTANEOUS at 14:00

## 2024-11-06 NOTE — PROGRESS NOTES
Pt to Ambulatory to Dept for Labs and possible Retacrit injection. VSS. Lab collected; Hgb 7.5. [place]ac[place] [address] units given in right arm per orders. Pt tolerated tx without complaints. Pt discharged with daughter. Pt scheduled to return in one month.

## 2024-11-19 ENCOUNTER — HOSPITAL ENCOUNTER (OUTPATIENT)
Age: 84
Setting detail: OBSERVATION
Discharge: HOME | End: 2024-11-20
Attending: STUDENT IN AN ORGANIZED HEALTH CARE EDUCATION/TRAINING PROGRAM | Admitting: STUDENT IN AN ORGANIZED HEALTH CARE EDUCATION/TRAINING PROGRAM
Payer: MEDICARE

## 2024-11-19 DIAGNOSIS — R07.9 CHEST PAIN: ICD-10-CM

## 2024-11-19 PROBLEM — I34.0 NON-RHEUMATIC MITRAL REGURGITATION: Status: ACTIVE | Noted: 2024-11-19

## 2024-11-19 LAB
ANION GAP SERPL CALCULATED.3IONS-SCNC: 11 MMOL/L (ref 3–16)
BUN SERPL-MCNC: 38 MG/DL (ref 7–20)
CA-I BLD-SCNC: 1.18 MMOL/L (ref 1.12–1.32)
CALCIUM SERPL-MCNC: 9 MG/DL (ref 8.3–10.6)
CHLORIDE BLD-SCNC: 109 MMOL/L (ref 99–110)
CHLORIDE SERPL-SCNC: 107 MMOL/L (ref 99–110)
CO2 SERPL-SCNC: 24 MMOL/L (ref 21–32)
CREAT SERPL-MCNC: 2.2 MG/DL (ref 0.6–1.2)
DEPRECATED RDW RBC AUTO: 16.9 % (ref 12.4–15.4)
EKG ATRIAL RATE: 71 BPM
EKG DIAGNOSIS: NORMAL
EKG P AXIS: 44 DEGREES
EKG P-R INTERVAL: 156 MS
EKG Q-T INTERVAL: 442 MS
EKG QRS DURATION: 110 MS
EKG QTC CALCULATION (BAZETT): 480 MS
EKG R AXIS: 44 DEGREES
EKG T AXIS: 24 DEGREES
EKG VENTRICULAR RATE: 71 BPM
GFR SERPLBLD CREATININE-BSD FMLA CKD-EPI: 21 ML/MIN/{1.73_M2}
GLUCOSE BLD-MCNC: 209 MG/DL (ref 70–99)
GLUCOSE BLD-MCNC: 214 MG/DL (ref 70–99)
GLUCOSE BLD-MCNC: 83 MG/DL (ref 70–99)
GLUCOSE SERPL-MCNC: 88 MG/DL (ref 70–99)
HCT VFR BLD AUTO: 26.1 % (ref 36–48)
HGB BLD-MCNC: 8.3 G/DL (ref 12–16)
MCH RBC QN AUTO: 26.6 PG (ref 26–34)
MCHC RBC AUTO-ENTMCNC: 31.6 G/DL (ref 31–36)
MCV RBC AUTO: 84.2 FL (ref 80–100)
PERFORMED ON: ABNORMAL
PLATELET # BLD AUTO: 225 K/UL (ref 135–450)
PMV BLD AUTO: 8.1 FL (ref 5–10.5)
POC CREATININE: 2 MG/DL (ref 0.6–1.2)
POC SAMPLE TYPE: ABNORMAL
POTASSIUM BLD-SCNC: 4.6 MMOL/L (ref 3.5–5.1)
POTASSIUM SERPL-SCNC: 4.8 MMOL/L (ref 3.5–5.1)
RBC # BLD AUTO: 3.1 M/UL (ref 4–5.2)
SODIUM BLD-SCNC: 140 MMOL/L (ref 136–145)
SODIUM SERPL-SCNC: 142 MMOL/L (ref 136–145)
WBC # BLD AUTO: 5 K/UL (ref 4–11)

## 2024-11-19 PROCEDURE — 6370000000 HC RX 637 (ALT 250 FOR IP): Performed by: STUDENT IN AN ORGANIZED HEALTH CARE EDUCATION/TRAINING PROGRAM

## 2024-11-19 PROCEDURE — C1751 CATH, INF, PER/CENT/MIDLINE: HCPCS | Performed by: STUDENT IN AN ORGANIZED HEALTH CARE EDUCATION/TRAINING PROGRAM

## 2024-11-19 PROCEDURE — 2580000003 HC RX 258: Performed by: STUDENT IN AN ORGANIZED HEALTH CARE EDUCATION/TRAINING PROGRAM

## 2024-11-19 PROCEDURE — 99152 MOD SED SAME PHYS/QHP 5/>YRS: CPT | Performed by: STUDENT IN AN ORGANIZED HEALTH CARE EDUCATION/TRAINING PROGRAM

## 2024-11-19 PROCEDURE — 82947 ASSAY GLUCOSE BLOOD QUANT: CPT

## 2024-11-19 PROCEDURE — 93460 R&L HRT ART/VENTRICLE ANGIO: CPT | Performed by: STUDENT IN AN ORGANIZED HEALTH CARE EDUCATION/TRAINING PROGRAM

## 2024-11-19 PROCEDURE — 85027 COMPLETE CBC AUTOMATED: CPT

## 2024-11-19 PROCEDURE — 7100000011 HC PHASE II RECOVERY - ADDTL 15 MIN: Performed by: STUDENT IN AN ORGANIZED HEALTH CARE EDUCATION/TRAINING PROGRAM

## 2024-11-19 PROCEDURE — 2500000003 HC RX 250 WO HCPCS: Performed by: STUDENT IN AN ORGANIZED HEALTH CARE EDUCATION/TRAINING PROGRAM

## 2024-11-19 PROCEDURE — 82435 ASSAY OF BLOOD CHLORIDE: CPT

## 2024-11-19 PROCEDURE — 82565 ASSAY OF CREATININE: CPT

## 2024-11-19 PROCEDURE — 84132 ASSAY OF SERUM POTASSIUM: CPT

## 2024-11-19 PROCEDURE — 7100000010 HC PHASE II RECOVERY - FIRST 15 MIN: Performed by: STUDENT IN AN ORGANIZED HEALTH CARE EDUCATION/TRAINING PROGRAM

## 2024-11-19 PROCEDURE — 6360000002 HC RX W HCPCS: Performed by: STUDENT IN AN ORGANIZED HEALTH CARE EDUCATION/TRAINING PROGRAM

## 2024-11-19 PROCEDURE — 93010 ELECTROCARDIOGRAM REPORT: CPT | Performed by: INTERNAL MEDICINE

## 2024-11-19 PROCEDURE — 82330 ASSAY OF CALCIUM: CPT

## 2024-11-19 PROCEDURE — C1769 GUIDE WIRE: HCPCS | Performed by: STUDENT IN AN ORGANIZED HEALTH CARE EDUCATION/TRAINING PROGRAM

## 2024-11-19 PROCEDURE — G0378 HOSPITAL OBSERVATION PER HR: HCPCS

## 2024-11-19 PROCEDURE — C1894 INTRO/SHEATH, NON-LASER: HCPCS | Performed by: STUDENT IN AN ORGANIZED HEALTH CARE EDUCATION/TRAINING PROGRAM

## 2024-11-19 PROCEDURE — 93005 ELECTROCARDIOGRAM TRACING: CPT | Performed by: STUDENT IN AN ORGANIZED HEALTH CARE EDUCATION/TRAINING PROGRAM

## 2024-11-19 PROCEDURE — 99153 MOD SED SAME PHYS/QHP EA: CPT | Performed by: STUDENT IN AN ORGANIZED HEALTH CARE EDUCATION/TRAINING PROGRAM

## 2024-11-19 PROCEDURE — 84295 ASSAY OF SERUM SODIUM: CPT

## 2024-11-19 PROCEDURE — 80048 BASIC METABOLIC PNL TOTAL CA: CPT

## 2024-11-19 PROCEDURE — 6360000004 HC RX CONTRAST MEDICATION: Performed by: STUDENT IN AN ORGANIZED HEALTH CARE EDUCATION/TRAINING PROGRAM

## 2024-11-19 PROCEDURE — 2709999900 HC NON-CHARGEABLE SUPPLY: Performed by: STUDENT IN AN ORGANIZED HEALTH CARE EDUCATION/TRAINING PROGRAM

## 2024-11-19 RX ORDER — SODIUM CHLORIDE 0.9 % (FLUSH) 0.9 %
5-40 SYRINGE (ML) INJECTION PRN
Status: DISCONTINUED | OUTPATIENT
Start: 2024-11-19 | End: 2024-11-19 | Stop reason: HOSPADM

## 2024-11-19 RX ORDER — SODIUM BICARBONATE 650 MG/1
650 TABLET ORAL 2 TIMES DAILY
Status: DISCONTINUED | OUTPATIENT
Start: 2024-11-19 | End: 2024-11-20 | Stop reason: HOSPADM

## 2024-11-19 RX ORDER — SODIUM CHLORIDE 0.9 % (FLUSH) 0.9 %
5-40 SYRINGE (ML) INJECTION PRN
Status: DISCONTINUED | OUTPATIENT
Start: 2024-11-19 | End: 2024-11-20 | Stop reason: HOSPADM

## 2024-11-19 RX ORDER — SODIUM CHLORIDE 9 MG/ML
INJECTION, SOLUTION INTRAVENOUS PRN
Status: DISCONTINUED | OUTPATIENT
Start: 2024-11-19 | End: 2024-11-20 | Stop reason: HOSPADM

## 2024-11-19 RX ORDER — INSULIN GLARGINE 100 [IU]/ML
18 INJECTION, SOLUTION SUBCUTANEOUS NIGHTLY
Status: DISCONTINUED | OUTPATIENT
Start: 2024-11-19 | End: 2024-11-20 | Stop reason: HOSPADM

## 2024-11-19 RX ORDER — DEXTROSE MONOHYDRATE 100 MG/ML
INJECTION, SOLUTION INTRAVENOUS CONTINUOUS PRN
Status: DISCONTINUED | OUTPATIENT
Start: 2024-11-19 | End: 2024-11-20 | Stop reason: HOSPADM

## 2024-11-19 RX ORDER — FLUTICASONE PROPIONATE 50 MCG
1 SPRAY, SUSPENSION (ML) NASAL DAILY PRN
Status: DISCONTINUED | OUTPATIENT
Start: 2024-11-19 | End: 2024-11-20 | Stop reason: HOSPADM

## 2024-11-19 RX ORDER — ASPIRIN 81 MG/1
243 TABLET, CHEWABLE ORAL ONCE
Status: COMPLETED | OUTPATIENT
Start: 2024-11-19 | End: 2024-11-19

## 2024-11-19 RX ORDER — SODIUM CHLORIDE 9 MG/ML
INJECTION, SOLUTION INTRAVENOUS PRN
Status: DISCONTINUED | OUTPATIENT
Start: 2024-11-19 | End: 2024-11-19 | Stop reason: HOSPADM

## 2024-11-19 RX ORDER — IOPAMIDOL 755 MG/ML
INJECTION, SOLUTION INTRAVASCULAR PRN
Status: DISCONTINUED | OUTPATIENT
Start: 2024-11-19 | End: 2024-11-19 | Stop reason: HOSPADM

## 2024-11-19 RX ORDER — GLUCAGON 1 MG/ML
1 KIT INJECTION PRN
Status: DISCONTINUED | OUTPATIENT
Start: 2024-11-19 | End: 2024-11-20 | Stop reason: HOSPADM

## 2024-11-19 RX ORDER — CLOPIDOGREL BISULFATE 75 MG/1
75 TABLET ORAL DAILY
Status: DISCONTINUED | OUTPATIENT
Start: 2024-11-20 | End: 2024-11-20 | Stop reason: HOSPADM

## 2024-11-19 RX ORDER — CLOPIDOGREL 300 MG/1
300 TABLET, FILM COATED ORAL ONCE
Status: COMPLETED | OUTPATIENT
Start: 2024-11-19 | End: 2024-11-19

## 2024-11-19 RX ORDER — SODIUM CHLORIDE 0.9 % (FLUSH) 0.9 %
5-40 SYRINGE (ML) INJECTION EVERY 12 HOURS SCHEDULED
Status: DISCONTINUED | OUTPATIENT
Start: 2024-11-19 | End: 2024-11-20 | Stop reason: HOSPADM

## 2024-11-19 RX ORDER — MIDAZOLAM HYDROCHLORIDE 1 MG/ML
INJECTION, SOLUTION INTRAMUSCULAR; INTRAVENOUS PRN
Status: DISCONTINUED | OUTPATIENT
Start: 2024-11-19 | End: 2024-11-19 | Stop reason: HOSPADM

## 2024-11-19 RX ORDER — VITAMIN B COMPLEX
1000 TABLET ORAL DAILY
Status: DISCONTINUED | OUTPATIENT
Start: 2024-11-19 | End: 2024-11-20 | Stop reason: HOSPADM

## 2024-11-19 RX ORDER — ASPIRIN 81 MG/1
81 TABLET ORAL DAILY
Status: DISCONTINUED | OUTPATIENT
Start: 2024-11-20 | End: 2024-11-20 | Stop reason: HOSPADM

## 2024-11-19 RX ORDER — GABAPENTIN 100 MG/1
100 CAPSULE ORAL 2 TIMES DAILY
Status: DISCONTINUED | OUTPATIENT
Start: 2024-11-19 | End: 2024-11-20 | Stop reason: HOSPADM

## 2024-11-19 RX ORDER — FENTANYL CITRATE 50 UG/ML
INJECTION, SOLUTION INTRAMUSCULAR; INTRAVENOUS PRN
Status: DISCONTINUED | OUTPATIENT
Start: 2024-11-19 | End: 2024-11-19 | Stop reason: HOSPADM

## 2024-11-19 RX ORDER — SODIUM CHLORIDE 0.9 % (FLUSH) 0.9 %
5-40 SYRINGE (ML) INJECTION EVERY 12 HOURS SCHEDULED
Status: DISCONTINUED | OUTPATIENT
Start: 2024-11-19 | End: 2024-11-19 | Stop reason: HOSPADM

## 2024-11-19 RX ORDER — ALPRAZOLAM 0.25 MG/1
0.25 TABLET ORAL 2 TIMES DAILY PRN
Status: DISCONTINUED | OUTPATIENT
Start: 2024-11-19 | End: 2024-11-20 | Stop reason: HOSPADM

## 2024-11-19 RX ORDER — ACETAMINOPHEN 325 MG/1
650 TABLET ORAL EVERY 4 HOURS PRN
Status: DISCONTINUED | OUTPATIENT
Start: 2024-11-19 | End: 2024-11-20 | Stop reason: HOSPADM

## 2024-11-19 RX ORDER — M-VIT,TX,IRON,MINS/CALC/FOLIC 27MG-0.4MG
1 TABLET ORAL DAILY
Status: DISCONTINUED | OUTPATIENT
Start: 2024-11-19 | End: 2024-11-20 | Stop reason: HOSPADM

## 2024-11-19 RX ORDER — ROSUVASTATIN CALCIUM 10 MG/1
10 TABLET, COATED ORAL EVERY EVENING
Status: DISCONTINUED | OUTPATIENT
Start: 2024-11-19 | End: 2024-11-20 | Stop reason: HOSPADM

## 2024-11-19 RX ADMIN — ASPIRIN 243 MG: 81 TABLET, CHEWABLE ORAL at 10:22

## 2024-11-19 RX ADMIN — ROSUVASTATIN CALCIUM 10 MG: 10 TABLET, FILM COATED ORAL at 18:25

## 2024-11-19 RX ADMIN — INSULIN GLARGINE 18 UNITS: 100 INJECTION, SOLUTION SUBCUTANEOUS at 20:15

## 2024-11-19 RX ADMIN — Medication 1000 UNITS: at 18:25

## 2024-11-19 RX ADMIN — GABAPENTIN 100 MG: 100 CAPSULE ORAL at 19:49

## 2024-11-19 RX ADMIN — SODIUM CHLORIDE 75 ML/HR: 9 INJECTION, SOLUTION INTRAVENOUS at 10:24

## 2024-11-19 RX ADMIN — ALPRAZOLAM 0.25 MG: 0.25 TABLET ORAL at 19:49

## 2024-11-19 RX ADMIN — SODIUM CHLORIDE, PRESERVATIVE FREE 10 ML: 5 INJECTION INTRAVENOUS at 19:50

## 2024-11-19 RX ADMIN — Medication 1 TABLET: at 18:25

## 2024-11-19 RX ADMIN — SODIUM BICARBONATE 650 MG: 650 TABLET ORAL at 19:49

## 2024-11-19 RX ADMIN — ACETAMINOPHEN 325MG 650 MG: 325 TABLET ORAL at 19:49

## 2024-11-19 RX ADMIN — CLOPIDOGREL BISULFATE 300 MG: 300 TABLET, FILM COATED ORAL at 15:20

## 2024-11-19 RX ADMIN — CARVEDILOL 18.75 MG: 12.5 TABLET, FILM COATED ORAL at 18:25

## 2024-11-19 ASSESSMENT — PAIN SCALES - GENERAL
PAINLEVEL_OUTOF10: 2
PAINLEVEL_OUTOF10: 0

## 2024-11-19 ASSESSMENT — PAIN DESCRIPTION - LOCATION: LOCATION: GENERALIZED

## 2024-11-19 ASSESSMENT — PAIN SCALES - WONG BAKER: WONGBAKER_NUMERICALRESPONSE: NO HURT

## 2024-11-19 ASSESSMENT — PAIN - FUNCTIONAL ASSESSMENT: PAIN_FUNCTIONAL_ASSESSMENT: ACTIVITIES ARE NOT PREVENTED

## 2024-11-19 ASSESSMENT — PAIN DESCRIPTION - ORIENTATION: ORIENTATION: OTHER (COMMENT)

## 2024-11-19 ASSESSMENT — PAIN DESCRIPTION - DESCRIPTORS: DESCRIPTORS: ACHING

## 2024-11-19 NOTE — H&P
[facility]      History of Present Illness:  Cely Espitia is a 84 y.o. patient who presented to the hospital with complaints of shortness of breath. I have been asked to provide consultation regarding further management and testing.    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    Surgical History:   has a past surgical history that includes angioplasty; Hysterectomy; bladder suspension; Cystoscopy (5/28/2013); Pacemaker insertion; Kidney removal; Bladder removal; Total hip arthroplasty (Left, 11/6/2019); Skin cancer excision; Cardiac procedure (N/A, 9/25/2024); Cardiac procedure (N/A, 9/25/2024); Upper gastrointestinal endoscopy (N/A, 10/16/2024); and Colonoscopy (N/A, 10/16/2024).     Social History:   reports that she has never smoked. She has never used smokeless tobacco. She reports that she does not drink alcohol and does not use drugs.     Family History:  No family history of premature coronary artery disease, aortic disease, or valve disease.    Home Medications:  Were reviewed and are listed in nursing record. and/or listed below  Prior to Admission medications    Medication Sig Start Date End Date Taking? Authorizing Provider   gabapentin (NEURONTIN) 100 MG capsule Take 1 capsule by mouth in the morning and at bedtime.   Yes Provider,

## 2024-11-19 NOTE — DISCHARGE INSTRUCTIONS
CARDIAC ANGIOGRAM (LEFT HEART CATH) - RADIAL ACCESS    Care of your puncture site:  Remove clear bandage 24 hours after the procedure. Dressings may be removed from right arm and wrist at 3PM 11/20/24  May shower in 24 hours  Inspect the site daily and gently clean using soap and water.  Dry thoroughly and apply a Band-Aid.    Normal Observations:  Soreness or tenderness which may last one week.  Possible bruising that could last 2 weeks.    Activity:  You may resume driving 24 hours following the procedure.  You may resume normal activity in 3 days or after the wound heals.  Avoid lifting more than 10 pounds for 3 days with affected arm.  Do not make important / legal decisions within 24 hours after procedure.    Nutrition:  Regular diet   Drink at least 8 to 10 glasses of decaffeinated, non-alcoholic fluid for the next 24 hours to flush the x-ray dye used for your angiogram out of your body.    Call your doctor immediately if your condition worsens, for any other concerns, for a follow-up appointment or if you experience any of the following:  Significant bleeding that does not stop after 10 minutes of applying firm pressure on the puncture site.  Increased swelling of the wrist.  Unusual pain, numbness, or tingling of the wrist/arm.   Any signs of infection such as: redness, yellow drainage at the site, swelling or pain.  IF experiencing any recurrent chest pain, arm or jaw pain, shortness of breath,sweating,nausea & vomiting, lighteheadedness or sudden weak.      Other Instructions:  Hold Metformin or Metformin containing drugs for 48 hours after procedure.

## 2024-11-19 NOTE — FLOWSHEET NOTE
Patient returned to post cath lab area awake and talking with family.TR band intact with no hematoma or bleeding. Call light within reach. Right arm elevated on pillow.

## 2024-11-19 NOTE — SEDATION DOCUMENTATION
Pre-Sedation:  Pre-Sedation Documentation and Exam:  I have personally completed a history, physical exam & review of systems for this patient (see notes).    Prior History of Anesthesia Complications:   none    Modified Mallampati:  II (soft palate, uvula, fauces visible)    ASA Classification:  Class 3 - A patient with severe systemic disease that limits activity but is not incapacitating    Brian Scale:  Activity:  2 - Able to move 4 extremities voluntarily on command  Respiration:  2 - Able to breathe deeply and cough freely  Circulation:  2 - BP+/- 20mmHg of normal  Consciousness:  2 - Fully awake  Oxygen Saturation (color):  2 - Able to maintain oxygen saturation >92% on room air    Sedation/Anesthesia Plan:  Guard the patient's safety and welfare.  Minimize physical discomfort and pain.  Minimize negative psychological responses to treatment by providing sedation and analgesia and maximize the potential amnesia.  Patient to meet pre-procedure discharge plan.    Medication Planned:  midazolam intravenously and fentanyl intravenously    Patient is an appropriate candidate for plan of sedation:   yes

## 2024-11-19 NOTE — PROCEDURES
An immediate re-assessment was completed prior to sedation, and it is determined to be safe to proceed.    PROCEDURE TECHNIQUE:  Local anesthetic was given and access was obtained in the right Radial Artery using a micropuncture technique and a (5/6) Fr Slender Terumo Sheath was placed without difficulty. Catheters were advanced over a 0.35 wire under fluoroscopic guidance  Left coronary angiography was done using a 5 Fr Tig 4.0  diagnostic catheter.  Right coronary angiography was done using a 5 Fr  Tig 4.0  diagnostic catheter.  LVEDP obtained. At the end of the procedure a TR band device was used for hemostasis.     Using the modified Seldinger access technique,  a 7 Albanian sheath was placed in the right antecubital veins. A right heart catheterization was done.     HEMODYNAMICS:  Aortic pressure was 129/54;  LVEDP 7.  There was no gradient between the left ventricle and aorta.      RA 10  RV 55/14  PA 53/18 (32)  PCWP 10   PA % 49  AO % 86  CO/CI 3.99 L/min 2.2 L/min/m2  SVR 1080 dynes . Sec/cm-5  .62 dynes . Sec/cm-5  TPG 22    ANGIOGRAM/CORONARY ARTERIOGRAM:       The left main coronary artery is angiographically free of disease.    The left anterior descending artery has a patent previously placed stent.   D1 is angiographically free of disease    The left circumflex artery has a mid vessel 90% stenosis.   OM1 has luminal irregularities    The right coronary artery is dominant vessel with an ostial 50% stenosis.   RPDA is angiographically free of disease    SUMMARY:   Severe stenosis of the Lcx artery  Compensated valvular cardiomyopathy    RECOMMENDATION:    - Staged PCI of the Lcx, nothing performed today given her renal insufficiency and contrast concerns  - Start Plavix  - Hydration and monitoring of renal function    Maury Velazquez MD  Interventional Cardiology  11/19/2024  2:34 PM    [facility]  [address], [address]   [place] [address]  Ph: [phone])

## 2024-11-20 ENCOUNTER — APPOINTMENT (OUTPATIENT)
Dept: CT IMAGING | Age: 84
End: 2024-11-20
Payer: MEDICARE

## 2024-11-20 ENCOUNTER — HOSPITAL ENCOUNTER (EMERGENCY)
Age: 84
Discharge: HOME | End: 2024-11-20
Attending: EMERGENCY MEDICINE
Payer: MEDICARE

## 2024-11-20 VITALS
BODY MASS INDEX: 22.99 KG/M2 | RESPIRATION RATE: 13 BRPM | TEMPERATURE: 97.1 F | WEIGHT: 151.68 LBS | HEART RATE: 76 BPM | DIASTOLIC BLOOD PRESSURE: 61 MMHG | HEIGHT: 68 IN | SYSTOLIC BLOOD PRESSURE: 123 MMHG | OXYGEN SATURATION: 98 %

## 2024-11-20 VITALS
RESPIRATION RATE: 16 BRPM | TEMPERATURE: 98.4 F | DIASTOLIC BLOOD PRESSURE: 79 MMHG | OXYGEN SATURATION: 98 % | SYSTOLIC BLOOD PRESSURE: 95 MMHG | HEART RATE: 84 BPM

## 2024-11-20 DIAGNOSIS — S39.012A STRAIN OF LUMBAR REGION, INITIAL ENCOUNTER: ICD-10-CM

## 2024-11-20 DIAGNOSIS — S51.812A SKIN TEAR OF LEFT FOREARM WITHOUT COMPLICATION, INITIAL ENCOUNTER: ICD-10-CM
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
## 2024-11-20 DIAGNOSIS — S09.90XA CLOSED HEAD INJURY WITHOUT LOSS OF CONSCIOUSNESS, INITIAL ENCOUNTER: ICD-10-CM

## 2024-11-20 LAB
GLUCOSE BLD-MCNC: 172 MG/DL (ref 70–99)
PERFORMED ON: ABNORMAL

## 2024-11-20 PROCEDURE — 70450 CT HEAD/BRAIN W/O DYE: CPT

## 2024-11-20 PROCEDURE — 2580000003 HC RX 258: Performed by: STUDENT IN AN ORGANIZED HEALTH CARE EDUCATION/TRAINING PROGRAM

## 2024-11-20 PROCEDURE — 6370000000 HC RX 637 (ALT 250 FOR IP): Performed by: STUDENT IN AN ORGANIZED HEALTH CARE EDUCATION/TRAINING PROGRAM

## 2024-11-20 PROCEDURE — 12002 RPR S/N/AX/GEN/TRNK2.6-7.5CM: CPT

## 2024-11-20 PROCEDURE — 72192 CT PELVIS W/O DYE: CPT

## 2024-11-20 PROCEDURE — 94760 N-INVAS EAR/PLS OXIMETRY 1: CPT

## 2024-11-20 PROCEDURE — 72125 CT NECK SPINE W/O DYE: CPT

## 2024-11-20 PROCEDURE — 99284 EMERGENCY DEPT VISIT MOD MDM: CPT

## 2024-11-20 PROCEDURE — 72131 CT LUMBAR SPINE W/O DYE: CPT

## 2024-11-20 PROCEDURE — G0378 HOSPITAL OBSERVATION PER HR: HCPCS

## 2024-11-20 RX ORDER — CLOPIDOGREL BISULFATE 75 MG/1
75 TABLET ORAL DAILY
Qty: 30 TABLET | Refills: 3 | Status: SHIPPED | OUTPATIENT
Start: 2024-11-21

## 2024-11-20 RX ADMIN — CLOPIDOGREL BISULFATE 75 MG: 75 TABLET ORAL at 09:15

## 2024-11-20 RX ADMIN — SODIUM BICARBONATE 650 MG: 650 TABLET ORAL at 09:15

## 2024-11-20 RX ADMIN — ASPIRIN 81 MG: 81 TABLET, COATED ORAL at 09:15

## 2024-11-20 RX ADMIN — Medication 1000 UNITS: at 09:15

## 2024-11-20 RX ADMIN — Medication 1 TABLET: at 09:15

## 2024-11-20 RX ADMIN — ALPRAZOLAM 0.25 MG: 0.25 TABLET ORAL at 12:58

## 2024-11-20 RX ADMIN — CARVEDILOL 18.75 MG: 12.5 TABLET, FILM COATED ORAL at 09:15

## 2024-11-20 RX ADMIN — SODIUM CHLORIDE, PRESERVATIVE FREE 10 ML: 5 INJECTION INTRAVENOUS at 09:16

## 2024-11-20 RX ADMIN — GABAPENTIN 100 MG: 100 CAPSULE ORAL at 09:15

## 2024-11-20 ASSESSMENT — ENCOUNTER SYMPTOMS
DIARRHEA: 0
NAUSEA: 0
COUGH: 0
VOMITING: 0
SHORTNESS OF BREATH: 0
ABDOMINAL PAIN: 0
CHEST TIGHTNESS: 0
BACK PAIN: 1

## 2024-11-20 ASSESSMENT — PAIN SCALES - GENERAL: PAINLEVEL_OUTOF10: 8

## 2024-11-20 NOTE — PLAN OF CARE
Problem: Safety - Adult  Goal: Free from fall injury  11/20/2024 0921 by Annmarie Hanson, RN  Outcome: Progressing     Problem: Chronic Conditions and Co-morbidities  Goal: Patient's chronic conditions and co-morbidity symptoms are monitored and maintained or improved  11/20/2024 0921 by Annmarie Hanson, RN  Outcome: Progressing     Problem: Discharge Planning  Goal: Discharge to home or other facility with appropriate resources  11/20/2024 0921 by Annmarie Hanson, RN  Outcome: Progressing     Problem: ABCDS Injury Assessment  Goal: Absence of physical injury  11/20/2024 0921 by Annmarie Hanson, RN  Outcome: Progressing

## 2024-11-20 NOTE — PLAN OF CARE
Problem: Safety - Adult  Goal: Free from fall injury  11/20/2024 0146 by Thais Mercado RN  Outcome: Progressing     Problem: Chronic Conditions and Co-morbidities  Goal: Patient's chronic conditions and co-morbidity symptoms are monitored and maintained or improved  11/20/2024 0146 by Thais Mercado RN  Outcome: Progressing  Flowsheets (Taken 11/19/2024 1950)  Care Plan - Patient's Chronic Conditions and Co-Morbidity Symptoms are Monitored and Maintained or Improved: Monitor and assess patient's chronic conditions and comorbid symptoms for stability, deterioration, or improvement     Problem: Discharge Planning  Goal: Discharge to home or other facility with appropriate resources  11/20/2024 0146 by Thais Mercado RN  Outcome: Progressing  Flowsheets (Taken 11/19/2024 1950)  Discharge to home or other facility with appropriate resources: Identify barriers to discharge with patient and caregiver     Problem: ABCDS Injury Assessment  Goal: Absence of physical injury  11/20/2024 0146 by Thais Mercado RN  Outcome: Progressing

## 2024-11-20 NOTE — ED PROVIDER NOTES
[facility] EMERGENCY DEPARTMENT  EMERGENCY DEPARTMENT ENCOUNTER        Pt Name: Cely Espitia  MRN: [id number]  Birthdate 1940  Date of evaluation: 11/20/2024  Provider: Tony Todd PA-C  PCP: Marcus Shukla MD  Note Started: 6:33 PM EST 11/20/24       I have seen and evaluated this patient with my supervising physician Boby Nuñez DO.      CHIEF COMPLAINT       Chief Complaint   Patient presents with    Fall     Pt fell going to bathroom, on blood thinners       HISTORY OF PRESENT ILLNESS: 1 or more Elements     History from : Patient    Limitations to history : None    Cely Espitia is a 84 y.o. female with a history of hypertension, hyperlipidemia, CAD, CHF, atrial fibrillation, chronic anticoagulation with aspirin and Plavix, CKD and diabetes who presents to the emergency department today via ambulance from home stating just prior to arrival she was trying to get up off of the toilet with her walker when she stumbled and fell.  She did hit the right side of her head on the wall.  She denies losing consciousness.  She complains of a mild headache.  She denies pain in her neck but states she is having pain in her low back.  She sustained a skin tear to her left forearm.  She denies chest pain or shortness of breath.  She denies pain in her arms or legs        Nursing Notes were all reviewed and agreed with or any disagreements were addressed in the HPI.    REVIEW OF SYSTEMS :      Review of Systems   Constitutional:  Negative for chills and fever.   Respiratory:  Negative for cough, chest tightness and shortness of breath.    Cardiovascular:  Negative for chest pain and palpitations.   Gastrointestinal:  Negative for abdominal pain, diarrhea, nausea and vomiting.   Genitourinary:  Negative for difficulty urinating, dysuria, flank pain, frequency, hematuria and urgency.   Musculoskeletal:  Positive for back pain. Negative for arthralgias, neck pain and neck stiffness.   Skin:

## 2024-11-20 NOTE — PROGRESS NOTES
Discharge orders acknowledged by RN . Discharge teaching completed with pt . AVS reviewed and all questions answered. Medication regimen reviewed and pt understands schedule. E-scripts sent to pt RX. Follow up appointments also reviewed with pt and resources given for discharge. IV removed. Bedside monitor removed from pt. Pt vitals WNL. Pt discharged with all belongings . Pt transported off of unit via wheelchair . No complications.    Electronically signed by Annmarie Hanson RN on 11/20/2024 at 1:35 PM

## 2024-11-20 NOTE — ED PROVIDER NOTES
In addition to the advanced practice provider, I personally saw Cely Espitia and performed a substantive portion of the visit including all aspects of the medical decision making.    Medical Decision Making  Patient seen and evaluated at bedside. Briefly, patient had a mechanical fall. She does take Plavix, but denies hitting her head. She is alert and orient x 4 with a GCS of 15.  CT head negative acute trochanter hemorrhage. CT cervical spine negative for acute fracture. CT pelvis and lumbar spine negative for acute fracture.  Patient able to ambulate effectively with her walker which is her baseline. Patient is stable for discharge at this time.    SEP-1  Is this patient to be included in the SEP-1 Core Measure due to severe sepsis or septic shock?   No     Exclusion criteria - the patient is NOT to be included for SEP-1 Core Measure due to:  2+ SIRS criteria are not met    Screenings                   Patient Referrals:  Marcus Shukla MD  [address]  [facility] [address] [phone]    Schedule an appointment as soon as possible for a visit       [facility] Emergency Department  [address]  [place] [id number] [phone]  Go to   As needed, If symptoms worsen      Discharge Medications:  Discharge Medication List as of 11/20/2024  8:59 PM          FINAL IMPRESSION  1. Fall on same level from slipping, tripping or stumbling, initial encounter    2. Closed head injury without loss of consciousness, initial encounter    3. Strain of lumbar region, initial encounter    4. Skin tear of left forearm without complication, initial encounter        Blood pressure 95/79, pulse 84, temperature 98.4 °F (36.9 °C), temperature source Oral, resp. rate 16, SpO2 98%, not currently breastfeeding.     I personally saw the patient and made/approved the management plan and take responsibility for the patient management.    For further details of Cely Espitia's emergency department

## 2024-11-20 NOTE — PROGRESS NOTES
4 Eyes Skin Assessment     NAME:  Cely Espitia  YOB: 1940  MEDICAL RECORD NUMBER:  [id number]    The patient is being assessed for  Admission    I agree that at least one RN has performed a thorough Head to Toe Skin Assessment on the patient. ALL assessment sites listed below have been assessed.      Areas assessed by both nurses:    Head, Face, Ears, Shoulders, Back, Chest, Arms, Elbows, Hands, Sacrum. Buttock, Coccyx, Ischium, Legs. Feet and Heels, and Under Medical Devices         Does the Patient have a Wound? No noted wound(s)       Mark Prevention initiated by RN: No  Wound Care Orders initiated by RN: No    Pressure Injury (Stage 3,4, Unstageable, DTI, NWPT, and Complex wounds) if present, place Wound referral order by RN under : No    New Ostomies, if present place, Ostomy referral order under : No     Nurse 1 eSignature: Electronically signed by Annmarie Hanson RN on 11/20/24 at 7:16 AM EST    **SHARE this note so that the co-signing nurse can place an eSignature**    Nurse 2 eSignature: Electronically signed by Kristen Colorado RN on 11/20/24 at 7:20 PM EST

## 2024-11-22 ENCOUNTER — TELEPHONE (OUTPATIENT)
Dept: CARDIOLOGY CLINIC | Age: 84
End: 2024-11-22

## 2024-11-22 DIAGNOSIS — R07.9 CHEST PAIN, UNSPECIFIED TYPE: Primary | ICD-10-CM

## 2024-11-22 NOTE — TELEPHONE ENCOUNTER
Cris,      I spoke to Dr. Velazquez he said he spoke to patient about it being on Tuesday and it is an add on related to being a staged PCI.

## 2024-11-22 NOTE — TELEPHONE ENCOUNTER
Dr Marcus Lynch wanted this patient scheduled for Tuesday at [facility] for a PCI.      I placed orders and labs     Please go for blood work one week prior to your procedure.       The morning of your procedure you will park in the hospital parking lot and report directly to the registration desk for check in.    Pre-Procedure Instructions   You will need to fast for at least 8 hours prior to procedure. No caffeine the morning of.   Hold your diuretic, Lasix the morning of procedure.   Hold all diabetic medications including, Metfomin.  If you take Lantus/Levemir only take ½ your normal dose the evening before.  All other medications can be taken in the morning with sips of water.   You will need to take 325 mg aspirin the morning of.  If you are currently taking 81 mg please take 4 tablets that morning.   Do not use any lotions, creams or perfume the morning of procedure.   Pre-procedure lab work will need to be completed 5-7 days prior to procedure.   Please have a responsible adult to drive you home after procedure. We advise you have someone to stay with you for 24 hours following procedure for precautionary measures. Depending on procedure you may require an overnight stay.   Cath lab will provide you with all post procedure instructions.     If you have any questions regarding the procedure itself or medications, please call [phone] and ask to speak with a nurse.

## 2024-11-25 ENCOUNTER — TELEPHONE (OUTPATIENT)
Dept: CARDIOLOGY CLINIC | Age: 84
End: 2024-11-25

## 2024-11-25 NOTE — TELEPHONE ENCOUNTER
Please review Murj for: \"Possible OptiVol fluid accumulation: 28-Sep-2024 -- ongoing, elevated up to 80 w slight decreasing trend noted; TI is back near reference line. Triage™ Heart Failure Risk Status on 25-Nov-2024 is High*.\"

## 2024-11-25 NOTE — TELEPHONE ENCOUNTER
Date of Procedure: Tuesday 11/26/24 @ [facility] with Dr. Velazquez     Time of arrival: 8:30 am     Procedure time: 10:00 am     Called and spoke to Cely's daughter Vivian and she is agreeable to date and time. Reviewed and emailed Vivian Ta's procedure instructions and she verbalized understanding. Encouraged to call with any questions or concerns.     Published on [facility] and e-mail to Shahida.   Also spoke to Yoli in the cath lab about the add on for tomorrow.

## 2024-11-26 ENCOUNTER — TELEPHONE (OUTPATIENT)
Dept: CARDIOLOGY CLINIC | Age: 84
End: 2024-11-26

## 2024-11-26 ENCOUNTER — HOSPITAL ENCOUNTER (INPATIENT)
Age: 84
LOS: 1 days | Discharge: HOME | DRG: 322 | End: 2024-11-27
Attending: STUDENT IN AN ORGANIZED HEALTH CARE EDUCATION/TRAINING PROGRAM | Admitting: STUDENT IN AN ORGANIZED HEALTH CARE EDUCATION/TRAINING PROGRAM
Payer: MEDICARE

## 2024-11-26 DIAGNOSIS — R07.9 CHEST PAIN: ICD-10-CM

## 2024-11-26 PROBLEM — I20.0 UNSTABLE ANGINA (HCC): Status: ACTIVE | Noted: 2024-11-26

## 2024-11-26 LAB
ANION GAP SERPL CALCULATED.3IONS-SCNC: 15 MMOL/L (ref 3–16)
BUN SERPL-MCNC: 45 MG/DL (ref 7–20)
CALCIUM SERPL-MCNC: 9.2 MG/DL (ref 8.3–10.6)
CHLORIDE SERPL-SCNC: 106 MMOL/L (ref 99–110)
CO2 SERPL-SCNC: 21 MMOL/L (ref 21–32)
CREAT SERPL-MCNC: 2.5 MG/DL (ref 0.6–1.2)
DEPRECATED RDW RBC AUTO: 17.2 % (ref 12.4–15.4)
ECHO BSA: 1.76 M2
GFR SERPLBLD CREATININE-BSD FMLA CKD-EPI: 18 ML/MIN/{1.73_M2}
GLUCOSE BLD-MCNC: 160 MG/DL (ref 70–99)
GLUCOSE BLD-MCNC: 265 MG/DL (ref 70–99)
GLUCOSE BLD-MCNC: 290 MG/DL (ref 70–99)
GLUCOSE SERPL-MCNC: 144 MG/DL (ref 70–99)
HCT VFR BLD AUTO: 28.4 % (ref 36–48)
HGB BLD-MCNC: 9 G/DL (ref 12–16)
MCH RBC QN AUTO: 26.3 PG (ref 26–34)
MCHC RBC AUTO-ENTMCNC: 31.5 G/DL (ref 31–36)
MCV RBC AUTO: 83.4 FL (ref 80–100)
PERFORMED ON: ABNORMAL
PLATELET # BLD AUTO: 237 K/UL (ref 135–450)
PMV BLD AUTO: 8.7 FL (ref 5–10.5)
POC ACT LR: 280 SEC
POTASSIUM SERPL-SCNC: 5.3 MMOL/L (ref 3.5–5.1)
RBC # BLD AUTO: 3.41 M/UL (ref 4–5.2)
SODIUM SERPL-SCNC: 142 MMOL/L (ref 136–145)
WBC # BLD AUTO: 8.4 K/UL (ref 4–11)

## 2024-11-26 PROCEDURE — 6360000002 HC RX W HCPCS: Performed by: STUDENT IN AN ORGANIZED HEALTH CARE EDUCATION/TRAINING PROGRAM

## 2024-11-26 PROCEDURE — 027034Z DILATION OF CORONARY ARTERY, ONE ARTERY WITH DRUG-ELUTING INTRALUMINAL DEVICE, PERCUTANEOUS APPROACH: ICD-10-PCS | Performed by: STUDENT IN AN ORGANIZED HEALTH CARE EDUCATION/TRAINING PROGRAM

## 2024-11-26 PROCEDURE — 99153 MOD SED SAME PHYS/QHP EA: CPT | Performed by: STUDENT IN AN ORGANIZED HEALTH CARE EDUCATION/TRAINING PROGRAM

## 2024-11-26 PROCEDURE — 80048 BASIC METABOLIC PNL TOTAL CA: CPT

## 2024-11-26 PROCEDURE — B240ZZ3 ULTRASONOGRAPHY OF SINGLE CORONARY ARTERY, INTRAVASCULAR: ICD-10-PCS | Performed by: STUDENT IN AN ORGANIZED HEALTH CARE EDUCATION/TRAINING PROGRAM

## 2024-11-26 PROCEDURE — C1725 CATH, TRANSLUMIN NON-LASER: HCPCS | Performed by: STUDENT IN AN ORGANIZED HEALTH CARE EDUCATION/TRAINING PROGRAM

## 2024-11-26 PROCEDURE — C1753 CATH, INTRAVAS ULTRASOUND: HCPCS | Performed by: STUDENT IN AN ORGANIZED HEALTH CARE EDUCATION/TRAINING PROGRAM

## 2024-11-26 PROCEDURE — 92928 PRQ TCAT PLMT NTRAC ST 1 LES: CPT | Performed by: STUDENT IN AN ORGANIZED HEALTH CARE EDUCATION/TRAINING PROGRAM

## 2024-11-26 PROCEDURE — 1200000000 HC SEMI PRIVATE

## 2024-11-26 PROCEDURE — C1887 CATHETER, GUIDING: HCPCS | Performed by: STUDENT IN AN ORGANIZED HEALTH CARE EDUCATION/TRAINING PROGRAM

## 2024-11-26 PROCEDURE — 6370000000 HC RX 637 (ALT 250 FOR IP): Performed by: INTERNAL MEDICINE

## 2024-11-26 PROCEDURE — C1874 STENT, COATED/COV W/DEL SYS: HCPCS | Performed by: STUDENT IN AN ORGANIZED HEALTH CARE EDUCATION/TRAINING PROGRAM

## 2024-11-26 PROCEDURE — 6370000000 HC RX 637 (ALT 250 FOR IP): Performed by: STUDENT IN AN ORGANIZED HEALTH CARE EDUCATION/TRAINING PROGRAM

## 2024-11-26 PROCEDURE — C1769 GUIDE WIRE: HCPCS | Performed by: STUDENT IN AN ORGANIZED HEALTH CARE EDUCATION/TRAINING PROGRAM

## 2024-11-26 PROCEDURE — 2580000003 HC RX 258: Performed by: STUDENT IN AN ORGANIZED HEALTH CARE EDUCATION/TRAINING PROGRAM

## 2024-11-26 PROCEDURE — 2500000003 HC RX 250 WO HCPCS: Performed by: STUDENT IN AN ORGANIZED HEALTH CARE EDUCATION/TRAINING PROGRAM

## 2024-11-26 PROCEDURE — 99152 MOD SED SAME PHYS/QHP 5/>YRS: CPT | Performed by: STUDENT IN AN ORGANIZED HEALTH CARE EDUCATION/TRAINING PROGRAM

## 2024-11-26 PROCEDURE — 92978 ENDOLUMINL IVUS OCT C 1ST: CPT | Performed by: STUDENT IN AN ORGANIZED HEALTH CARE EDUCATION/TRAINING PROGRAM

## 2024-11-26 PROCEDURE — C1894 INTRO/SHEATH, NON-LASER: HCPCS | Performed by: STUDENT IN AN ORGANIZED HEALTH CARE EDUCATION/TRAINING PROGRAM

## 2024-11-26 PROCEDURE — 85347 COAGULATION TIME ACTIVATED: CPT

## 2024-11-26 PROCEDURE — 85027 COMPLETE CBC AUTOMATED: CPT

## 2024-11-26 PROCEDURE — 6360000004 HC RX CONTRAST MEDICATION: Performed by: STUDENT IN AN ORGANIZED HEALTH CARE EDUCATION/TRAINING PROGRAM

## 2024-11-26 PROCEDURE — 2709999900 HC NON-CHARGEABLE SUPPLY: Performed by: STUDENT IN AN ORGANIZED HEALTH CARE EDUCATION/TRAINING PROGRAM

## 2024-11-26 DEVICE — STENT ONYXNG27518UX ONYX 2.75X18RX
Type: IMPLANTABLE DEVICE | Status: FUNCTIONAL
Brand: ONYX FRONTIER™

## 2024-11-26 RX ORDER — ALPRAZOLAM 0.25 MG/1
0.25 TABLET ORAL 2 TIMES DAILY PRN
Status: DISCONTINUED | OUTPATIENT
Start: 2024-11-26 | End: 2024-11-27 | Stop reason: HOSPADM

## 2024-11-26 RX ORDER — INSULIN GLARGINE 100 [IU]/ML
18 INJECTION, SOLUTION SUBCUTANEOUS NIGHTLY
Status: DISCONTINUED | OUTPATIENT
Start: 2024-11-26 | End: 2024-11-27 | Stop reason: HOSPADM

## 2024-11-26 RX ORDER — ROSUVASTATIN CALCIUM 10 MG/1
10 TABLET, COATED ORAL NIGHTLY
Status: DISCONTINUED | OUTPATIENT
Start: 2024-11-26 | End: 2024-11-27 | Stop reason: HOSPADM

## 2024-11-26 RX ORDER — ONDANSETRON 2 MG/ML
4 INJECTION INTRAMUSCULAR; INTRAVENOUS ONCE
Status: COMPLETED | OUTPATIENT
Start: 2024-11-26 | End: 2024-11-26

## 2024-11-26 RX ORDER — ISOSORBIDE MONONITRATE 30 MG/1
30 TABLET, EXTENDED RELEASE ORAL DAILY
Status: DISCONTINUED | OUTPATIENT
Start: 2024-11-26 | End: 2024-11-27 | Stop reason: HOSPADM

## 2024-11-26 RX ORDER — ACETAMINOPHEN 325 MG/1
650 TABLET ORAL EVERY 4 HOURS PRN
Status: DISCONTINUED | OUTPATIENT
Start: 2024-11-26 | End: 2024-11-27 | Stop reason: HOSPADM

## 2024-11-26 RX ORDER — INSULIN LISPRO 100 [IU]/ML
0-4 INJECTION, SOLUTION INTRAVENOUS; SUBCUTANEOUS ONCE
Status: COMPLETED | OUTPATIENT
Start: 2024-11-26 | End: 2024-11-26

## 2024-11-26 RX ORDER — VITAMIN B COMPLEX
1000 TABLET ORAL DAILY
Status: DISCONTINUED | OUTPATIENT
Start: 2024-11-26 | End: 2024-11-27 | Stop reason: HOSPADM

## 2024-11-26 RX ORDER — M-VIT,TX,IRON,MINS/CALC/FOLIC 27MG-0.4MG
1 TABLET ORAL DAILY
Status: DISCONTINUED | OUTPATIENT
Start: 2024-11-26 | End: 2024-11-27 | Stop reason: HOSPADM

## 2024-11-26 RX ORDER — GLUCAGON 1 MG/ML
1 KIT INJECTION PRN
Status: DISCONTINUED | OUTPATIENT
Start: 2024-11-26 | End: 2024-11-27 | Stop reason: HOSPADM

## 2024-11-26 RX ORDER — CLOPIDOGREL BISULFATE 75 MG/1
TABLET ORAL PRN
Status: DISCONTINUED | OUTPATIENT
Start: 2024-11-26 | End: 2024-11-26 | Stop reason: HOSPADM

## 2024-11-26 RX ORDER — ASPIRIN 81 MG/1
81 TABLET ORAL DAILY
Status: DISCONTINUED | OUTPATIENT
Start: 2024-11-27 | End: 2024-11-27 | Stop reason: HOSPADM

## 2024-11-26 RX ORDER — SODIUM CHLORIDE 9 MG/ML
INJECTION, SOLUTION INTRAVENOUS PRN
Status: DISCONTINUED | OUTPATIENT
Start: 2024-11-26 | End: 2024-11-27 | Stop reason: HOSPADM

## 2024-11-26 RX ORDER — LIDOCAINE HYDROCHLORIDE 10 MG/ML
INJECTION, SOLUTION INFILTRATION; PERINEURAL PRN
Status: DISCONTINUED | OUTPATIENT
Start: 2024-11-26 | End: 2024-11-26 | Stop reason: HOSPADM

## 2024-11-26 RX ORDER — IOPAMIDOL 755 MG/ML
INJECTION, SOLUTION INTRAVASCULAR PRN
Status: DISCONTINUED | OUTPATIENT
Start: 2024-11-26 | End: 2024-11-26 | Stop reason: HOSPADM

## 2024-11-26 RX ORDER — SODIUM CHLORIDE 0.9 % (FLUSH) 0.9 %
5-40 SYRINGE (ML) INJECTION EVERY 12 HOURS SCHEDULED
Status: DISCONTINUED | OUTPATIENT
Start: 2024-11-26 | End: 2024-11-26 | Stop reason: HOSPADM

## 2024-11-26 RX ORDER — SODIUM BICARBONATE 650 MG/1
650 TABLET ORAL 2 TIMES DAILY
Status: DISCONTINUED | OUTPATIENT
Start: 2024-11-26 | End: 2024-11-27 | Stop reason: HOSPADM

## 2024-11-26 RX ORDER — ASPIRIN 81 MG/1
324 TABLET, CHEWABLE ORAL DAILY
Status: DISCONTINUED | OUTPATIENT
Start: 2024-11-26 | End: 2024-11-26

## 2024-11-26 RX ORDER — SODIUM CHLORIDE 0.9 % (FLUSH) 0.9 %
5-40 SYRINGE (ML) INJECTION EVERY 12 HOURS SCHEDULED
Status: DISCONTINUED | OUTPATIENT
Start: 2024-11-26 | End: 2024-11-27 | Stop reason: HOSPADM

## 2024-11-26 RX ORDER — CLOPIDOGREL BISULFATE 75 MG/1
75 TABLET ORAL DAILY
Status: DISCONTINUED | OUTPATIENT
Start: 2024-11-27 | End: 2024-11-27 | Stop reason: HOSPADM

## 2024-11-26 RX ORDER — SODIUM CHLORIDE 0.9 % (FLUSH) 0.9 %
5-40 SYRINGE (ML) INJECTION PRN
Status: DISCONTINUED | OUTPATIENT
Start: 2024-11-26 | End: 2024-11-27 | Stop reason: HOSPADM

## 2024-11-26 RX ORDER — GABAPENTIN 100 MG/1
100 CAPSULE ORAL 2 TIMES DAILY
Status: DISCONTINUED | OUTPATIENT
Start: 2024-11-26 | End: 2024-11-27 | Stop reason: HOSPADM

## 2024-11-26 RX ORDER — DEXTROSE MONOHYDRATE 100 MG/ML
INJECTION, SOLUTION INTRAVENOUS CONTINUOUS PRN
Status: DISCONTINUED | OUTPATIENT
Start: 2024-11-26 | End: 2024-11-27 | Stop reason: HOSPADM

## 2024-11-26 RX ORDER — CLOPIDOGREL BISULFATE 75 MG/1
75 TABLET ORAL ONCE
Status: COMPLETED | OUTPATIENT
Start: 2024-11-26 | End: 2024-11-26

## 2024-11-26 RX ORDER — ACETAMINOPHEN 500 MG
1000 TABLET ORAL DAILY PRN
Status: DISCONTINUED | OUTPATIENT
Start: 2024-11-26 | End: 2024-11-27 | Stop reason: HOSPADM

## 2024-11-26 RX ORDER — MIDAZOLAM HYDROCHLORIDE 1 MG/ML
INJECTION, SOLUTION INTRAMUSCULAR; INTRAVENOUS PRN
Status: DISCONTINUED | OUTPATIENT
Start: 2024-11-26 | End: 2024-11-26 | Stop reason: HOSPADM

## 2024-11-26 RX ORDER — SODIUM CHLORIDE 9 MG/ML
INJECTION, SOLUTION INTRAVENOUS PRN
Status: DISCONTINUED | OUTPATIENT
Start: 2024-11-26 | End: 2024-11-26 | Stop reason: HOSPADM

## 2024-11-26 RX ORDER — INSULIN LISPRO 100 [IU]/ML
0-4 INJECTION, SOLUTION INTRAVENOUS; SUBCUTANEOUS
Status: DISCONTINUED | OUTPATIENT
Start: 2024-11-26 | End: 2024-11-27 | Stop reason: HOSPADM

## 2024-11-26 RX ORDER — HEPARIN SODIUM 1000 [USP'U]/ML
INJECTION, SOLUTION INTRAVENOUS; SUBCUTANEOUS PRN
Status: DISCONTINUED | OUTPATIENT
Start: 2024-11-26 | End: 2024-11-26 | Stop reason: HOSPADM

## 2024-11-26 RX ORDER — SODIUM CHLORIDE 0.9 % (FLUSH) 0.9 %
5-40 SYRINGE (ML) INJECTION PRN
Status: DISCONTINUED | OUTPATIENT
Start: 2024-11-26 | End: 2024-11-26 | Stop reason: HOSPADM

## 2024-11-26 RX ORDER — INSULIN LISPRO 100 [IU]/ML
0-4 INJECTION, SOLUTION INTRAVENOUS; SUBCUTANEOUS
Status: DISCONTINUED | OUTPATIENT
Start: 2024-11-26 | End: 2024-11-26

## 2024-11-26 RX ADMIN — ROSUVASTATIN CALCIUM 10 MG: 10 TABLET, FILM COATED ORAL at 21:03

## 2024-11-26 RX ADMIN — INSULIN GLARGINE 18 UNITS: 100 INJECTION, SOLUTION SUBCUTANEOUS at 21:03

## 2024-11-26 RX ADMIN — INSULIN LISPRO 2 UNITS: 100 INJECTION, SOLUTION INTRAVENOUS; SUBCUTANEOUS at 18:29

## 2024-11-26 RX ADMIN — ISOSORBIDE MONONITRATE 30 MG: 30 TABLET, EXTENDED RELEASE ORAL at 12:28

## 2024-11-26 RX ADMIN — Medication 1000 UNITS: at 12:28

## 2024-11-26 RX ADMIN — CLOPIDOGREL BISULFATE 75 MG: 75 TABLET ORAL at 09:47

## 2024-11-26 RX ADMIN — SODIUM BICARBONATE 650 MG: 650 TABLET ORAL at 12:28

## 2024-11-26 RX ADMIN — INSULIN LISPRO 2 UNITS: 100 INJECTION, SOLUTION INTRAVENOUS; SUBCUTANEOUS at 21:03

## 2024-11-26 RX ADMIN — ONDANSETRON 4 MG: 2 INJECTION INTRAMUSCULAR; INTRAVENOUS at 09:33

## 2024-11-26 RX ADMIN — GABAPENTIN 100 MG: 100 CAPSULE ORAL at 12:28

## 2024-11-26 RX ADMIN — CARVEDILOL 18.75 MG: 12.5 TABLET, FILM COATED ORAL at 12:28

## 2024-11-26 RX ADMIN — SODIUM CHLORIDE: 9 INJECTION, SOLUTION INTRAVENOUS at 09:32

## 2024-11-26 RX ADMIN — GABAPENTIN 100 MG: 100 CAPSULE ORAL at 21:03

## 2024-11-26 RX ADMIN — Medication 1 TABLET: at 12:28

## 2024-11-26 RX ADMIN — CARVEDILOL 18.75 MG: 12.5 TABLET, FILM COATED ORAL at 16:59

## 2024-11-26 RX ADMIN — SODIUM BICARBONATE 650 MG: 650 TABLET ORAL at 21:03

## 2024-11-26 ASSESSMENT — PAIN SCALES - GENERAL: PAINLEVEL_OUTOF10: 0

## 2024-11-26 NOTE — PROGRESS NOTES
4 Eyes Skin Assessment     NAME:  Cely Espitia  YOB: 1940  MEDICAL RECORD NUMBER:  [id number]    The patient is being assessed for  Admission    I agree that at least one RN has performed a thorough Head to Toe Skin Assessment on the patient. ALL assessment sites listed below have been assessed.      Areas assessed by both nurses:    Head, Face, Ears, Shoulders, Back, Chest, Arms, Elbows, Hands, Sacrum. Buttock, Coccyx, Ischium, Legs. Feet and Heels, and Under Medical Devices         Does the Patient have a Wound? No noted wound(s). Stitches on L-lower arm, scattered abrasions.       Mark Prevention initiated by RN: Yes  Wound Care Orders initiated by RN: No    Pressure Injury (Stage 3,4, Unstageable, DTI, NWPT, and Complex wounds) if present, place Wound referral order by RN under : No    New Ostomies, if present place, Ostomy referral order under : No     Nurse 1 eSignature: Electronically signed by Chanelle Coronel RN on 11/26/24 at 4:16 PM EST    **SHARE this note so that the co-signing nurse can place an eSignature**    Nurse 2 eSignature: Electronically signed by Angelique Villegas RN on 11/27/24 at 6:41 AM EST

## 2024-11-26 NOTE — FLOWSHEET NOTE
11/26/24 1736   Treatment Team Notification   Reason for Communication Evaluate  (blood glucose 290, no insulin orders)   Name of Team Member Notified Dr. Diaz   Treatment Team Role Attending Provider   Method of Communication Page   Response Waiting for response   Notification Time [id number]

## 2024-11-26 NOTE — TELEPHONE ENCOUNTER
Patient is currently in the hospital.  Looks like she had a procedure and is in for observation. PCI with Dr. Velazquez

## 2024-11-26 NOTE — PROGRESS NOTES
Arrived for procedure and urostomy bag leaking, the bag and site ring size does not match, calling supplies for correct size.  Waiting for labs, family at bedside, call light in reach    1004 new ostomy connection applied and new bag on

## 2024-11-26 NOTE — CARE COORDINATION
Case Management Assessment  Initial Evaluation    Date/Time of Evaluation: 11/26/2024 3:35 PM  Assessment Completed by: Lisha Beltre RN    If patient is discharged prior to next notation, then this note serves as note for discharge by case management.    Patient Name: Cely Espitia                   YOB: 1940  Diagnosis: Chest pain [R07.9]                   Date / Time: 11/26/2024  9:04 AM    Patient Admission Status: Observation   Readmission Risk (Low < 19, Mod (19-27), High > 27): Readmission Risk Score: 19.6    Current PCP: Marcus Shukla MD  PCP verified by CM? Yes    Chart Reviewed: Yes      History Provided by: Patient, Child/Family  Patient Orientation: Alert and Oriented    Patient Cognition: Alert    Hospitalization in the last 30 days (Readmission):  Yes    If yes, Readmission Assessment in  Navigator will be completed.    Advance Directives:      Code Status: Full Code   Patient's Primary Decision Maker is: Legal Next of Kin      Discharge Planning:    Patient lives with: Alone Type of Home: Assisted living (from [facility]  Primary Care Giver: Self  Patient Support Systems include: Children   Current Financial resources: Medicare  Current community resources: Assisted Living  Current services prior to admission: Durable Medical Equipment            Current DME: Walker, Other (Comment) (motorized scooter, ostomy supplies from [employer])            Type of Home Care services:  Aide Services    ADLS  Prior functional level: Assistance with the following:, Cooking, Housework  Current functional level: Assistance with the following:, Mobility, Dressing, Bathing, Toileting    PT AM-PAC:   /24  OT AM-PAC:   /24    Family can provide assistance at DC: Yes  Would you like Case Management to discuss the discharge plan with any other family members/significant others, and if so, who? Yes (spoke to daughter Vivian)  Plans to Return to Present Housing: Unknown at

## 2024-11-26 NOTE — CARE COORDINATION
11/26/24 1545   Readmission Assessment   Number of Days since last admission? 1-7 days   Previous Disposition Home Alone  (return to AL)   Who is being Interviewed Patient   What was the patient's/caregiver's perception as to why they think they needed to return back to the hospital? Other (Comment)  (came back for planned procedure)   Did you visit your Primary Care Physician after you left the hospital, before you returned this time? No   Why weren't you able to visit your PCP? Did not have an appointment   Did you see a specialist, such as Cardiac, Pulmonary, Orthopedic Physician, etc. after you left the hospital? No   Who advised the patient to return to the hospital? Physician;Other (Comment)  (planned procedure)   Does the patient report anything that got in the way of taking their medications? No   In our efforts to provide the best possible care to you and others like you, can you think of anything that we could have done to help you after you left the hospital the first time, so that you might not have needed to return so soon? Other (Comment)  (patient says \"no, nothing really.\")

## 2024-11-26 NOTE — PROCEDURES
HEMODYNAMICS:  Aortic pressure was 121/56     ANGIOGRAM/CORONARY ARTERIOGRAM:       The left main coronary artery is angiographically free of disease, minimal   plaque by IVUS MLA > 10mm2.     The left circumflex artery has a mid vessel 90% stenosis.    OM1 is angiographically free of disease     INTERVENTION   Guide catheter: XB 3.0, Terumo RunThrough guide wire   - Predilation performed with a 2.5x12mm SC balloon   - IVUS performed distal vessel 2.75mm, proximal vessel 3.0mm vessel   reference   - Resolute Papi 2.13i00oz MONICA deployed at 13 debbi   - Post dilation with a 3.0mm NC   - Post IVUS performed with excellent expansion and apposition   - Completion angiography     SUMMARY:   IVUS guided PCI of the Lcx with DESx1   Pre - 90% stenosis, CASANDRA 3 flow   Post - 0% stenosis, CASANDRA 3 flow     RECOMMENDATION:    1. Recommend beta blockade, high potency statin, aspirin and plavix for 12   months   2. Referral to cardiac rehab placed   3. Patient has been advised on the importance of regular exercise of at   least 20-30 minutes daily.   4. Patient counseled about and offered assistance for smoking cessation   5. Follow up ECHO in 6 weeks   6. Follow up post PCI in 2 weeks     Maury Velazquez MD   Interventional Cardiology   11/26/2024   5:00 PM     [facility]   [address], [address]   [place] [address]   Ph: [phone]   Fax: [phone]

## 2024-11-26 NOTE — H&P
H&P     Patient presents for planned PCI of the Lcx  History of Severe MR    Pre-Sedation:  Pre-Sedation Documentation and Exam:  I have personally completed a history, physical exam & review of systems for this patient (see notes).    Prior History of Anesthesia Complications:   none    Modified Mallampati:  II (soft palate, uvula, fauces visible)    ASA Classification:  Class 2 - A normal healthy patient with mild systemic disease    Brian Scale:  Activity:  2 - Able to move 4 extremities voluntarily on command  Respiration:  2 - Able to breathe deeply and cough freely  Circulation:  2 - BP+/- 20mmHg of normal  Consciousness:  2 - Fully awake  Oxygen Saturation (color):  2 - Able to maintain oxygen saturation >92% on room air    Sedation/Anesthesia Plan:  Guard the patient's safety and welfare.  Minimize physical discomfort and pain.  Minimize negative psychological responses to treatment by providing sedation and analgesia and maximize the potential amnesia.  Patient to meet pre-procedure discharge plan.    Medication Planned:  midazolam intravenously and fentanyl intravenously    Patient is an appropriate candidate for plan of sedation:   yes      I have reviewed the history and physical and examined the patient and find no relevant changes. I have reviewed with the patient and/or family the risks, benefits, and alternatives to the procedure.    Pre-sedation Assessment    Patient:  Cely Espitia   :   1940    Intended Procedure: PCI/IVUS    Vitals:    24 1700   BP: (Abnormal) 122/57   Pulse: 79   Resp: 21   Temp:    SpO2:        Nursing notes reviewed and agreed.  Medications reviewed  Allergies:   Allergies   Allergen Reactions    Amlodipine Besylate      Other reaction(s): Unknown    Aspirin Other (See Comments)     Other reaction(s): Unknown    Calcium Channel Blockers      Other reaction(s): Unknown    Citalopram Hydrobromide      Other reaction(s): Unknown    Losartan Other (See Comments)

## 2024-11-26 NOTE — ACP (ADVANCE CARE PLANNING)
Advance Care Planning   Healthcare Decision Maker:    Primary Decision Maker: Vivian Maguire - Child - [phone]    Primary Decision Maker: Nigel Gardner - Child - [phone]    Primary Decision Maker: Garo Agee - Child - [phone]    Today we documented Decision Maker(s) consistent with Legal Next of Kin hierarchy.       Electronically signed by Lisha Beltre RN Case Management on 11/26/2024 at 3:41 PM

## 2024-11-27 VITALS
RESPIRATION RATE: 22 BRPM | DIASTOLIC BLOOD PRESSURE: 48 MMHG | OXYGEN SATURATION: 96 % | SYSTOLIC BLOOD PRESSURE: 118 MMHG | TEMPERATURE: 98.3 F | HEIGHT: 68 IN | HEART RATE: 67 BPM | WEIGHT: 147.27 LBS | BODY MASS INDEX: 22.32 KG/M2

## 2024-11-27 LAB
ANION GAP SERPL CALCULATED.3IONS-SCNC: 11 MMOL/L (ref 3–16)
BUN SERPL-MCNC: 50 MG/DL (ref 7–20)
CALCIUM SERPL-MCNC: 8.4 MG/DL (ref 8.3–10.6)
CHLORIDE SERPL-SCNC: 110 MMOL/L (ref 99–110)
CO2 SERPL-SCNC: 21 MMOL/L (ref 21–32)
CREAT SERPL-MCNC: 2.4 MG/DL (ref 0.6–1.2)
DEPRECATED RDW RBC AUTO: 17.3 % (ref 12.4–15.4)
GFR SERPLBLD CREATININE-BSD FMLA CKD-EPI: 19 ML/MIN/{1.73_M2}
GLUCOSE BLD-MCNC: 161 MG/DL (ref 70–99)
GLUCOSE BLD-MCNC: 185 MG/DL (ref 70–99)
GLUCOSE BLD-MCNC: 393 MG/DL (ref 70–99)
GLUCOSE BLD-MCNC: 410 MG/DL (ref 70–99)
GLUCOSE BLD-MCNC: 417 MG/DL (ref 70–99)
GLUCOSE SERPL-MCNC: 122 MG/DL (ref 70–99)
HCT VFR BLD AUTO: 25.2 % (ref 36–48)
HGB BLD-MCNC: 7.8 G/DL (ref 12–16)
MCH RBC QN AUTO: 25.9 PG (ref 26–34)
MCHC RBC AUTO-ENTMCNC: 31 G/DL (ref 31–36)
MCV RBC AUTO: 83.7 FL (ref 80–100)
PERFORMED ON: ABNORMAL
PLATELET # BLD AUTO: 194 K/UL (ref 135–450)
PMV BLD AUTO: 8.8 FL (ref 5–10.5)
POTASSIUM SERPL-SCNC: 4.9 MMOL/L (ref 3.5–5.1)
RBC # BLD AUTO: 3.01 M/UL (ref 4–5.2)
SODIUM SERPL-SCNC: 142 MMOL/L (ref 136–145)
WBC # BLD AUTO: 5.6 K/UL (ref 4–11)

## 2024-11-27 PROCEDURE — 97530 THERAPEUTIC ACTIVITIES: CPT

## 2024-11-27 PROCEDURE — 97166 OT EVAL MOD COMPLEX 45 MIN: CPT

## 2024-11-27 PROCEDURE — 6370000000 HC RX 637 (ALT 250 FOR IP): Performed by: INTERNAL MEDICINE

## 2024-11-27 PROCEDURE — 80048 BASIC METABOLIC PNL TOTAL CA: CPT

## 2024-11-27 PROCEDURE — 2580000003 HC RX 258: Performed by: STUDENT IN AN ORGANIZED HEALTH CARE EDUCATION/TRAINING PROGRAM

## 2024-11-27 PROCEDURE — 97161 PT EVAL LOW COMPLEX 20 MIN: CPT

## 2024-11-27 PROCEDURE — 6370000000 HC RX 637 (ALT 250 FOR IP): Performed by: STUDENT IN AN ORGANIZED HEALTH CARE EDUCATION/TRAINING PROGRAM

## 2024-11-27 PROCEDURE — 94760 N-INVAS EAR/PLS OXIMETRY 1: CPT

## 2024-11-27 PROCEDURE — 85027 COMPLETE CBC AUTOMATED: CPT

## 2024-11-27 PROCEDURE — 36415 COLL VENOUS BLD VENIPUNCTURE: CPT

## 2024-11-27 RX ADMIN — GABAPENTIN 100 MG: 100 CAPSULE ORAL at 10:15

## 2024-11-27 RX ADMIN — ASPIRIN 81 MG: 81 TABLET, COATED ORAL at 10:15

## 2024-11-27 RX ADMIN — ISOSORBIDE MONONITRATE 30 MG: 30 TABLET, EXTENDED RELEASE ORAL at 10:15

## 2024-11-27 RX ADMIN — CARVEDILOL 18.75 MG: 12.5 TABLET, FILM COATED ORAL at 10:17

## 2024-11-27 RX ADMIN — CLOPIDOGREL BISULFATE 75 MG: 75 TABLET ORAL at 10:15

## 2024-11-27 RX ADMIN — Medication 1 TABLET: at 10:15

## 2024-11-27 RX ADMIN — INSULIN LISPRO 4 UNITS: 100 INJECTION, SOLUTION INTRAVENOUS; SUBCUTANEOUS at 12:43

## 2024-11-27 RX ADMIN — SODIUM BICARBONATE 650 MG: 650 TABLET ORAL at 10:15

## 2024-11-27 RX ADMIN — Medication 1000 UNITS: at 10:15

## 2024-11-27 RX ADMIN — SODIUM CHLORIDE, PRESERVATIVE FREE 10 ML: 5 INJECTION INTRAVENOUS at 10:15

## 2024-11-27 RX ADMIN — ACETAMINOPHEN 1000 MG: 500 TABLET ORAL at 10:19

## 2024-11-27 ASSESSMENT — PAIN DESCRIPTION - PAIN TYPE: TYPE: CHRONIC PAIN

## 2024-11-27 ASSESSMENT — PAIN SCALES - GENERAL
PAINLEVEL_OUTOF10: 3
PAINLEVEL_OUTOF10: 0

## 2024-11-27 ASSESSMENT — PAIN DESCRIPTION - LOCATION: LOCATION: BACK;LEG

## 2024-11-27 ASSESSMENT — PAIN DESCRIPTION - ONSET: ONSET: ON-GOING

## 2024-11-27 ASSESSMENT — PAIN DESCRIPTION - DESCRIPTORS: DESCRIPTORS: ACHING;STABBING

## 2024-11-27 ASSESSMENT — PAIN - FUNCTIONAL ASSESSMENT: PAIN_FUNCTIONAL_ASSESSMENT: ACTIVITIES ARE NOT PREVENTED

## 2024-11-27 ASSESSMENT — PAIN DESCRIPTION - ORIENTATION: ORIENTATION: RIGHT

## 2024-11-27 NOTE — PLAN OF CARE
Problem: Discharge Planning  Goal: Discharge to home or other facility with appropriate resources  11/27/2024 1356 by Rosemary Montesinos, RN  Outcome: Completed     Problem: Chronic Conditions and Co-morbidities  Goal: Patient's chronic conditions and co-morbidity symptoms are monitored and maintained or improved  11/27/2024 1356 by Rosemary Montesinos, RN  Outcome: Completed     Problem: Skin/Tissue Integrity  Goal: Absence of new skin breakdown  11/27/2024 1356 by Rosemary Montesinos, RN  Outcome: Completed     Problem: Safety - Adult  Goal: Free from fall injury  11/27/2024 1356 by Rosemary Montesinos, RN  Outcome: Completed     Problem: ABCDS Injury Assessment  Goal: Absence of physical injury  11/27/2024 1356 by Rosemary Montesinos RN  Outcome: Completed     Problem: Pain  Goal: Verbalizes/displays adequate comfort level or baseline comfort level  11/27/2024 1356 by Rosemary Montesinos, RN  Outcome: Completed

## 2024-11-27 NOTE — DISCHARGE SUMMARY
[facility]    DISCHARGE SUMMARY      Patient ID:  Cely Espitia  [id number] 84 y.o. 1940    Admit date: 11/26/2024    Discharge date: 11/27/2024     Admitting Physician: Maury Velazquez MD     Discharge Physician: Maury Velazquez MD     Admission Diagnoses: Chest pain [R07.9]  Unstable angina (HCC) [I20.0]    Discharge Diagnoses:   Patient Active Problem List   Diagnosis    CATARINA (acute kidney injury) ([facility])    Cardiomyopathy ([facility])    Ischemic cardiomyopathy    Essential hypertension    Automatic implantable cardioverter-defibrillator in situ    DM2 (diabetes mellitus, type 2) ([facility])    Acute-on-chronic renal failure ([facility])    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 ([facility])    Coronary artery disease involving native coronary artery of native heart without angina pectoris    Chronic systolic heart failure ([facility])    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    Mixed anxiety depressive disorder    Anxiety, generalized    Gastro-esophageal reflux disease without esophagitis    Generalized OA    Hydroureteronephrosis    Hyperlipidemia    Anemia, normocytic normochromic    Depression, reactive    Hyperkalemia    Pyelonephritis    Acute pyelonephritis    Chronic kidney disease, stage III (moderate) ([facility])    Encounter for ostomy care education    Arthritis of left hip    Kidney stone    Hydroureter on left    Acute renal failure (ARF) ([facility])    Suspected COVID-19 virus infection    CKD (chronic kidney disease) stage 4, GFR 15-29 ml/min ([facility])    Anemia in stage 4 chronic kidney disease ([facility])    Nonrheumatic mitral valve regurgitation    Abnormal echocardiogram    Atrial thrombus    Lower GI bleed    Chest pain    Non-rheumatic mitral regurgitation    Unstable angina ([facility])        Discharged Condition: good    Hospital Course: Cely Espitia was admitted post PCI    Consults: none    Significant Diagnostic Studies:   Labs:   Lab Results   Component Value Date

## 2024-11-27 NOTE — PLAN OF CARE
Problem: Discharge Planning  Goal: Discharge to home or other facility with appropriate resources  11/27/2024 1042 by Rosemary Montesinos, RN  Outcome: Progressing     Problem: Chronic Conditions and Co-morbidities  Goal: Patient's chronic conditions and co-morbidity symptoms are monitored and maintained or improved  Outcome: Progressing     Problem: Skin/Tissue Integrity  Goal: Absence of new skin breakdown  Outcome: Progressing     Problem: Safety - Adult  Goal: Free from fall injury  Outcome: Progressing     Problem: ABCDS Injury Assessment  Goal: Absence of physical injury  Outcome: Progressing     Problem: Pain  Goal: Verbalizes/displays adequate comfort level or baseline comfort level  Outcome: Progressing

## 2024-11-27 NOTE — PROGRESS NOTES
Occupational Therapy  Facility/Department: [address] PROGRESSIVE CARE  Occupational Therapy Initial Assessment and Tentative D/C      Name: Cely Espitia  : 1940  MRN: [id number]  Date of Service: 2024    Staff assist recommendation: RW x1      Discharge Recommendations: Cely Espitia scored a 17/24 on the AM-PAC ADL Inpatient form. Current research shows that an AM-PAC score of 17 or less is typically not associated with a discharge to the patient's home setting.     If patient discharges prior to next session this note will serve as a discharge summary.  Please see below for the latest assessment towards goals.     Continue to assess pending progress (anticipate pt able to return to AL with prior level of assist as pt reports functioning at/close to baseline)  OT Equipment Recommendations  Equipment Needed: No       Patient Diagnosis(es): The encounter diagnosis was Chest pain.  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  Past Surgical History:  has a past surgical history that includes angioplasty; Hysterectomy; bladder suspension; Cystoscopy (2013); Pacemaker insertion; Kidney removal; Bladder removal; Total hip arthroplasty (Left, 2019); Skin cancer excision; Cardiac procedure (N/A, 2024); Cardiac procedure (N/A, 2024); Upper gastrointestinal endoscopy

## 2024-11-27 NOTE — PLAN OF CARE
Problem: Discharge Planning  Goal: Discharge to home or other facility with appropriate resources  11/26/2024 2246 by Angelique Villegas, RN  Outcome: Progressing  11/26/2024 1334 by Joey Park, RN  Outcome: Progressing

## 2024-11-27 NOTE — PROGRESS NOTES
Discharge instructions, follow up appointments and medications reviewed with patient. Patient verbalized understanding. All questions answered, denies any further questions/concerns.  All belongings/medications sent with patient.     Pt educated on using walker/scooter, slipper socks and proper fitting shoes.    Patient's son Nigel will transport patient home.    Electronically signed by Rosemary Montesinos RN on 11/27/2024 at 2:46 PM

## 2024-11-27 NOTE — PROGRESS NOTES
RN spoke with pt's son Nigel, pt is ready to dc.     Electronically signed by Rosemary Montesinos RN on 11/27/2024 at 1:57 PM

## 2024-11-27 NOTE — CARE COORDINATION
CASE MANAGEMENT DISCHARGE SUMMARY:    DISCHARGE DATE: 11/27/2024    DISCHARGED TO [facility]    TRANSPORTATION: via family             TIME: TBD by patient and bedside RN              COMMENTS: No discharge needs identified at this time.     Electronically signed by RUBIO CONTRERAS RN on 11/27/2024 at 2:10 PM

## 2024-11-27 NOTE — PROGRESS NOTES
Physical Therapy  Facility/Department: [address] PROGRESSIVE CARE  Physical Therapy Initial Assessment    Name: Cely Espitia  : 1940  MRN: [id number]  Date of Service: 2024    Discharge Recommendations:  Home with assist PRN   PT Equipment Recommendations  Equipment Needed: No    Cely Espitia scored a 18/24 on the AM-PAC short mobility form.  At this time, no further PT is recommended upon discharge due to being at functional baseline.  Recommend patient returns to prior setting with prior services.      This note serves as D/C summary if patient is discharged prior to next treatment session.         Patient Diagnosis(es): The encounter diagnosis was Chest pain.  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  Past Surgical History:  has a past surgical history that includes angioplasty; Hysterectomy; bladder suspension; Cystoscopy (2013); Pacemaker insertion; Kidney removal; Bladder removal; Total hip arthroplasty (Left, 2019); Skin cancer excision; Cardiac procedure (N/A, 2024); Cardiac procedure (N/A, 2024); Upper gastrointestinal endoscopy (N/A, 10/16/2024); Colonoscopy (N/A, 10/16/2024); Cardiac procedure (N/A, 2024); Cardiac procedure (N/A, 2024); and Cardiac procedure (N/A, 2024).    Assessment  Body Structures, Functions, Activity

## 2024-12-01 ENCOUNTER — APPOINTMENT (OUTPATIENT)
Dept: GENERAL RADIOLOGY | Age: 84
DRG: 689 | End: 2024-12-01
Payer: MEDICARE

## 2024-12-01 ENCOUNTER — APPOINTMENT (OUTPATIENT)
Dept: CT IMAGING | Age: 84
DRG: 689 | End: 2024-12-01
Payer: MEDICARE

## 2024-12-01 ENCOUNTER — HOSPITAL ENCOUNTER (INPATIENT)
Age: 84
LOS: 4 days | Discharge: HOME | DRG: 689 | End: 2024-12-05
Attending: EMERGENCY MEDICINE | Admitting: INTERNAL MEDICINE
Payer: MEDICARE

## 2024-12-01 DIAGNOSIS — R79.89 ELEVATED TROPONIN: ICD-10-CM

## 2024-12-01 DIAGNOSIS — N12 PYELONEPHRITIS: ICD-10-CM

## 2024-12-01 DIAGNOSIS — I50.9 ACUTE ON CHRONIC CONGESTIVE HEART FAILURE, UNSPECIFIED HEART FAILURE TYPE (HCC): ICD-10-CM

## 2024-12-01 DIAGNOSIS — N18.9 CHRONIC KIDNEY DISEASE, UNSPECIFIED CKD STAGE: ICD-10-CM
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
## 2024-12-01 LAB
ALBUMIN SERPL-MCNC: 3.6 G/DL (ref 3.4–5)
ALBUMIN/GLOB SERPL: 1.2 {RATIO} (ref 1.1–2.2)
ALP SERPL-CCNC: 52 U/L (ref 40–129)
ALT SERPL-CCNC: 88 U/L (ref 10–40)
ANION GAP SERPL CALCULATED.3IONS-SCNC: 13 MMOL/L (ref 3–16)
AST SERPL-CCNC: 40 U/L (ref 15–37)
BACTERIA URNS QL MICRO: ABNORMAL /HPF
BASOPHILS # BLD: 0 K/UL (ref 0–0.2)
BASOPHILS NFR BLD: 0.6 %
BILIRUB SERPL-MCNC: 0.6 MG/DL (ref 0–1)
BILIRUB UR QL STRIP.AUTO: NEGATIVE
BUN SERPL-MCNC: 34 MG/DL (ref 7–20)
CALCIUM SERPL-MCNC: 8.9 MG/DL (ref 8.3–10.6)
CHLORIDE SERPL-SCNC: 106 MMOL/L (ref 99–110)
CLARITY UR: ABNORMAL
CO2 SERPL-SCNC: 22 MMOL/L (ref 21–32)
COLOR UR: YELLOW
CREAT SERPL-MCNC: 2.2 MG/DL (ref 0.6–1.2)
DEPRECATED RDW RBC AUTO: 17.3 % (ref 12.4–15.4)
EOSINOPHIL # BLD: 0 K/UL (ref 0–0.6)
EOSINOPHIL NFR BLD: 0.6 %
EPI CELLS #/AREA URNS AUTO: 2 /HPF (ref 0–5)
FLUAV RNA RESP QL NAA+PROBE: NOT DETECTED
FLUBV RNA RESP QL NAA+PROBE: NOT DETECTED
GFR SERPLBLD CREATININE-BSD FMLA CKD-EPI: 21 ML/MIN/{1.73_M2}
GLUCOSE SERPL-MCNC: 172 MG/DL (ref 70–99)
GLUCOSE UR STRIP.AUTO-MCNC: NEGATIVE MG/DL
HCT VFR BLD AUTO: 26.7 % (ref 36–48)
HGB BLD-MCNC: 8.4 G/DL (ref 12–16)
HGB UR QL STRIP.AUTO: ABNORMAL
HYALINE CASTS #/AREA URNS AUTO: 3 /LPF (ref 0–8)
KETONES UR STRIP.AUTO-MCNC: NEGATIVE MG/DL
LEUKOCYTE ESTERASE UR QL STRIP.AUTO: ABNORMAL
LIPASE SERPL-CCNC: 41 U/L (ref 13–60)
LYMPHOCYTES # BLD: 0.7 K/UL (ref 1–5.1)
LYMPHOCYTES NFR BLD: 9.7 %
MCH RBC QN AUTO: 26.2 PG (ref 26–34)
MCHC RBC AUTO-ENTMCNC: 31.3 G/DL (ref 31–36)
MCV RBC AUTO: 83.7 FL (ref 80–100)
MONOCYTES # BLD: 0.6 K/UL (ref 0–1.3)
MONOCYTES NFR BLD: 8.7 %
NEUTROPHILS # BLD: 5.7 K/UL (ref 1.7–7.7)
NEUTROPHILS NFR BLD: 80.4 %
NITRITE UR QL STRIP.AUTO: POSITIVE
NT-PROBNP SERPL-MCNC: ABNORMAL PG/ML (ref 0–449)
PH UR STRIP.AUTO: 6.5 [PH] (ref 5–8)
PLATELET # BLD AUTO: 255 K/UL (ref 135–450)
PMV BLD AUTO: 8.4 FL (ref 5–10.5)
POTASSIUM SERPL-SCNC: 5.5 MMOL/L (ref 3.5–5.1)
PROCALCITONIN SERPL IA-MCNC: 0.06 NG/ML (ref 0–0.15)
PROT SERPL-MCNC: 6.6 G/DL (ref 6.4–8.2)
PROT UR STRIP.AUTO-MCNC: 30 MG/DL
RBC # BLD AUTO: 3.19 M/UL (ref 4–5.2)
RBC CLUMPS #/AREA URNS AUTO: 2 /HPF (ref 0–4)
SARS-COV-2 RNA RESP QL NAA+PROBE: NOT DETECTED
SODIUM SERPL-SCNC: 141 MMOL/L (ref 136–145)
SP GR UR STRIP.AUTO: 1.01 (ref 1–1.03)
TROPONIN, HIGH SENSITIVITY: 291 NG/L (ref 0–14)
TROPONIN, HIGH SENSITIVITY: 332 NG/L (ref 0–14)
TSH SERPL DL<=0.005 MIU/L-ACNC: 2.68 UIU/ML (ref 0.27–4.2)
UA COMPLETE W REFLEX CULTURE PNL UR: YES
UA DIPSTICK W REFLEX MICRO PNL UR: YES
URN SPEC COLLECT METH UR: ABNORMAL
UROBILINOGEN UR STRIP-ACNC: 1 E.U./DL
WBC # BLD AUTO: 7.1 K/UL (ref 4–11)
WBC #/AREA URNS AUTO: 82 /HPF (ref 0–5)

## 2024-12-01 PROCEDURE — 84484 ASSAY OF TROPONIN QUANT: CPT

## 2024-12-01 PROCEDURE — 1200000000 HC SEMI PRIVATE

## 2024-12-01 PROCEDURE — 80053 COMPREHEN METABOLIC PANEL: CPT

## 2024-12-01 PROCEDURE — 81001 URINALYSIS AUTO W/SCOPE: CPT

## 2024-12-01 PROCEDURE — 6360000004 HC RX CONTRAST MEDICATION: Performed by: PHYSICIAN ASSISTANT

## 2024-12-01 PROCEDURE — 85025 COMPLETE CBC W/AUTO DIFF WBC: CPT

## 2024-12-01 PROCEDURE — 87088 URINE BACTERIA CULTURE: CPT

## 2024-12-01 PROCEDURE — 6360000002 HC RX W HCPCS: Performed by: EMERGENCY MEDICINE

## 2024-12-01 PROCEDURE — 2580000003 HC RX 258: Performed by: EMERGENCY MEDICINE

## 2024-12-01 PROCEDURE — 70450 CT HEAD/BRAIN W/O DYE: CPT

## 2024-12-01 PROCEDURE — 93005 ELECTROCARDIOGRAM TRACING: CPT | Performed by: EMERGENCY MEDICINE

## 2024-12-01 PROCEDURE — 87086 URINE CULTURE/COLONY COUNT: CPT

## 2024-12-01 PROCEDURE — 6360000002 HC RX W HCPCS

## 2024-12-01 PROCEDURE — 87186 SC STD MICRODIL/AGAR DIL: CPT

## 2024-12-01 PROCEDURE — 96374 THER/PROPH/DIAG INJ IV PUSH: CPT

## 2024-12-01 PROCEDURE — 94760 N-INVAS EAR/PLS OXIMETRY 1: CPT

## 2024-12-01 PROCEDURE — 73030 X-RAY EXAM OF SHOULDER: CPT

## 2024-12-01 PROCEDURE — 70496 CT ANGIOGRAPHY HEAD: CPT

## 2024-12-01 PROCEDURE — 72125 CT NECK SPINE W/O DYE: CPT

## 2024-12-01 PROCEDURE — 6360000002 HC RX W HCPCS: Performed by: PHYSICIAN ASSISTANT

## 2024-12-01 PROCEDURE — 84443 ASSAY THYROID STIM HORMONE: CPT

## 2024-12-01 PROCEDURE — 83690 ASSAY OF LIPASE: CPT

## 2024-12-01 PROCEDURE — 84145 PROCALCITONIN (PCT): CPT

## 2024-12-01 PROCEDURE — 6370000000 HC RX 637 (ALT 250 FOR IP): Performed by: EMERGENCY MEDICINE

## 2024-12-01 PROCEDURE — 96375 TX/PRO/DX INJ NEW DRUG ADDON: CPT

## 2024-12-01 PROCEDURE — 83880 ASSAY OF NATRIURETIC PEPTIDE: CPT

## 2024-12-01 PROCEDURE — 87636 SARSCOV2 & INF A&B AMP PRB: CPT

## 2024-12-01 PROCEDURE — 99285 EMERGENCY DEPT VISIT HI MDM: CPT

## 2024-12-01 PROCEDURE — 74176 CT ABD & PELVIS W/O CONTRAST: CPT

## 2024-12-01 RX ORDER — CARVEDILOL 6.25 MG/1
12.5 TABLET ORAL 2 TIMES DAILY
Status: DISCONTINUED | OUTPATIENT
Start: 2024-12-01 | End: 2024-12-05 | Stop reason: HOSPADM

## 2024-12-01 RX ORDER — ONDANSETRON 4 MG/1
4 TABLET, FILM COATED ORAL EVERY 12 HOURS PRN
COMMUNITY

## 2024-12-01 RX ORDER — SODIUM CHLORIDE 0.9 % (FLUSH) 0.9 %
5-40 SYRINGE (ML) INJECTION EVERY 12 HOURS SCHEDULED
Status: DISCONTINUED | OUTPATIENT
Start: 2024-12-01 | End: 2024-12-05 | Stop reason: HOSPADM

## 2024-12-01 RX ORDER — 0.9 % SODIUM CHLORIDE 0.9 %
500 INTRAVENOUS SOLUTION INTRAVENOUS ONCE
Status: DISCONTINUED | OUTPATIENT
Start: 2024-12-01 | End: 2024-12-01

## 2024-12-01 RX ORDER — FUROSEMIDE 10 MG/ML
40 INJECTION INTRAMUSCULAR; INTRAVENOUS ONCE
Status: COMPLETED | OUTPATIENT
Start: 2024-12-01 | End: 2024-12-01

## 2024-12-01 RX ORDER — ACETAMINOPHEN 325 MG/1
650 TABLET ORAL EVERY 6 HOURS PRN
Status: DISCONTINUED | OUTPATIENT
Start: 2024-12-01 | End: 2024-12-05 | Stop reason: HOSPADM

## 2024-12-01 RX ORDER — ASPIRIN 81 MG/1
81 TABLET ORAL DAILY
Status: DISCONTINUED | OUTPATIENT
Start: 2024-12-02 | End: 2024-12-05 | Stop reason: HOSPADM

## 2024-12-01 RX ORDER — FENTANYL CITRATE 50 UG/ML
25 INJECTION, SOLUTION INTRAMUSCULAR; INTRAVENOUS ONCE
Status: COMPLETED | OUTPATIENT
Start: 2024-12-01 | End: 2024-12-01

## 2024-12-01 RX ORDER — HYDRALAZINE HYDROCHLORIDE 25 MG/1
25 TABLET, FILM COATED ORAL 3 TIMES DAILY
COMMUNITY

## 2024-12-01 RX ORDER — SENNOSIDES A AND B 8.6 MG/1
1 TABLET, FILM COATED ORAL DAILY PRN
Status: DISCONTINUED | OUTPATIENT
Start: 2024-12-01 | End: 2024-12-05 | Stop reason: HOSPADM

## 2024-12-01 RX ORDER — INSULIN LISPRO 100 [IU]/ML
0-12 INJECTION, SOLUTION INTRAVENOUS; SUBCUTANEOUS
COMMUNITY

## 2024-12-01 RX ORDER — SODIUM CHLORIDE 9 MG/ML
INJECTION, SOLUTION INTRAVENOUS PRN
Status: DISCONTINUED | OUTPATIENT
Start: 2024-12-01 | End: 2024-12-05 | Stop reason: HOSPADM

## 2024-12-01 RX ORDER — POLYETHYLENE GLYCOL 3350 17 G/17G
17 POWDER, FOR SOLUTION ORAL DAILY
COMMUNITY

## 2024-12-01 RX ORDER — CLOPIDOGREL BISULFATE 75 MG/1
75 TABLET ORAL DAILY
Status: DISCONTINUED | OUTPATIENT
Start: 2024-12-02 | End: 2024-12-05 | Stop reason: HOSPADM

## 2024-12-01 RX ORDER — HYDRALAZINE HYDROCHLORIDE 25 MG/1
25 TABLET, FILM COATED ORAL 3 TIMES DAILY
Status: DISCONTINUED | OUTPATIENT
Start: 2024-12-01 | End: 2024-12-05 | Stop reason: HOSPADM

## 2024-12-01 RX ORDER — ACETAMINOPHEN 650 MG/1
650 SUPPOSITORY RECTAL EVERY 6 HOURS PRN
Status: DISCONTINUED | OUTPATIENT
Start: 2024-12-01 | End: 2024-12-05 | Stop reason: HOSPADM

## 2024-12-01 RX ORDER — ALPRAZOLAM 0.25 MG/1
0.25 TABLET ORAL ONCE
Status: COMPLETED | OUTPATIENT
Start: 2024-12-01 | End: 2024-12-01

## 2024-12-01 RX ORDER — ONDANSETRON 2 MG/ML
4 INJECTION INTRAMUSCULAR; INTRAVENOUS EVERY 6 HOURS PRN
Status: DISCONTINUED | OUTPATIENT
Start: 2024-12-01 | End: 2024-12-05 | Stop reason: HOSPADM

## 2024-12-01 RX ORDER — HEPARIN SODIUM 5000 [USP'U]/ML
5000 INJECTION, SOLUTION INTRAVENOUS; SUBCUTANEOUS EVERY 8 HOURS SCHEDULED
Status: DISCONTINUED | OUTPATIENT
Start: 2024-12-02 | End: 2024-12-05 | Stop reason: HOSPADM

## 2024-12-01 RX ORDER — POLYETHYLENE GLYCOL 3350 17 G/17G
17 POWDER, FOR SOLUTION ORAL DAILY
Status: DISCONTINUED | OUTPATIENT
Start: 2024-12-02 | End: 2024-12-05 | Stop reason: HOSPADM

## 2024-12-01 RX ORDER — QUETIAPINE FUMARATE 25 MG/1
25 TABLET, FILM COATED ORAL ONCE
Status: COMPLETED | OUTPATIENT
Start: 2024-12-01 | End: 2024-12-01

## 2024-12-01 RX ORDER — SODIUM CHLORIDE 0.9 % (FLUSH) 0.9 %
10 SYRINGE (ML) INJECTION PRN
Status: DISCONTINUED | OUTPATIENT
Start: 2024-12-01 | End: 2024-12-05 | Stop reason: HOSPADM

## 2024-12-01 RX ORDER — GABAPENTIN 100 MG/1
100 CAPSULE ORAL 2 TIMES DAILY
Status: DISCONTINUED | OUTPATIENT
Start: 2024-12-02 | End: 2024-12-05 | Stop reason: HOSPADM

## 2024-12-01 RX ORDER — FUROSEMIDE 10 MG/ML
40 INJECTION INTRAMUSCULAR; INTRAVENOUS 2 TIMES DAILY
Status: DISCONTINUED | OUTPATIENT
Start: 2024-12-02 | End: 2024-12-05 | Stop reason: HOSPADM

## 2024-12-01 RX ORDER — SODIUM BICARBONATE 650 MG/1
650 TABLET ORAL 2 TIMES DAILY
Status: DISCONTINUED | OUTPATIENT
Start: 2024-12-01 | End: 2024-12-03

## 2024-12-01 RX ORDER — LACTOBACILLUS RHAMNOSUS GG 10B CELL
1 CAPSULE ORAL 2 TIMES DAILY WITH MEALS
Status: DISCONTINUED | OUTPATIENT
Start: 2024-12-02 | End: 2024-12-05 | Stop reason: HOSPADM

## 2024-12-01 RX ORDER — IOPAMIDOL 755 MG/ML
75 INJECTION, SOLUTION INTRAVASCULAR
Status: COMPLETED | OUTPATIENT
Start: 2024-12-01 | End: 2024-12-01

## 2024-12-01 RX ORDER — ONDANSETRON 2 MG/ML
INJECTION INTRAMUSCULAR; INTRAVENOUS
Status: DISPENSED
Start: 2024-12-01 | End: 2024-12-02

## 2024-12-01 RX ORDER — ALPRAZOLAM 0.25 MG/1
0.25 TABLET ORAL NIGHTLY PRN
Status: DISCONTINUED | OUTPATIENT
Start: 2024-12-01 | End: 2024-12-04

## 2024-12-01 RX ORDER — ONDANSETRON 2 MG/ML
4 INJECTION INTRAMUSCULAR; INTRAVENOUS ONCE
Status: COMPLETED | OUTPATIENT
Start: 2024-12-01 | End: 2024-12-01

## 2024-12-01 RX ORDER — ROSUVASTATIN CALCIUM 10 MG/1
10 TABLET, COATED ORAL EVERY EVENING
Status: DISCONTINUED | OUTPATIENT
Start: 2024-12-01 | End: 2024-12-05 | Stop reason: HOSPADM

## 2024-12-01 RX ORDER — INSULIN LISPRO 100 [IU]/ML
0-6 INJECTION, SOLUTION INTRAVENOUS; SUBCUTANEOUS NIGHTLY
Status: ON HOLD | COMMUNITY
End: 2024-12-05 | Stop reason: HOSPADM

## 2024-12-01 RX ORDER — ISOSORBIDE MONONITRATE 30 MG/1
30 TABLET, EXTENDED RELEASE ORAL DAILY
Status: DISCONTINUED | OUTPATIENT
Start: 2024-12-02 | End: 2024-12-05 | Stop reason: HOSPADM

## 2024-12-01 RX ADMIN — QUETIAPINE FUMARATE 25 MG: 25 TABLET ORAL at 20:41

## 2024-12-01 RX ADMIN — WATER 1000 MG: 1 INJECTION INTRAMUSCULAR; INTRAVENOUS; SUBCUTANEOUS at 20:41

## 2024-12-01 RX ADMIN — ONDANSETRON 4 MG: 2 INJECTION, SOLUTION INTRAMUSCULAR; INTRAVENOUS at 20:51

## 2024-12-01 RX ADMIN — IOPAMIDOL 75 ML: 755 INJECTION, SOLUTION INTRAVENOUS at 18:26

## 2024-12-01 RX ADMIN — FUROSEMIDE 40 MG: 10 INJECTION, SOLUTION INTRAMUSCULAR; INTRAVENOUS at 20:41

## 2024-12-01 RX ADMIN — FENTANYL CITRATE 25 MCG: 50 INJECTION INTRAMUSCULAR; INTRAVENOUS at 17:22

## 2024-12-01 RX ADMIN — ALPRAZOLAM 0.25 MG: 0.25 TABLET ORAL at 20:41

## 2024-12-01 ASSESSMENT — PAIN SCALES - GENERAL
PAINLEVEL_OUTOF10: 9
PAINLEVEL_OUTOF10: 0
PAINLEVEL_OUTOF10: 9

## 2024-12-01 ASSESSMENT — LIFESTYLE VARIABLES
HOW OFTEN DO YOU HAVE A DRINK CONTAINING ALCOHOL: NEVER
HOW MANY STANDARD DRINKS CONTAINING ALCOHOL DO YOU HAVE ON A TYPICAL DAY: PATIENT DOES NOT DRINK

## 2024-12-01 ASSESSMENT — PAIN DESCRIPTION - LOCATION
LOCATION: BACK
LOCATION: BACK

## 2024-12-01 ASSESSMENT — PAIN DESCRIPTION - DESCRIPTORS
DESCRIPTORS: DISCOMFORT
DESCRIPTORS: ACHING;DISCOMFORT

## 2024-12-01 ASSESSMENT — PAIN DESCRIPTION - ORIENTATION: ORIENTATION: UPPER
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
## 2024-12-02 LAB
ANION GAP SERPL CALCULATED.3IONS-SCNC: 15 MMOL/L (ref 3–16)
BASOPHILS # BLD: 0 K/UL (ref 0–0.2)
BASOPHILS NFR BLD: 0.6 %
BUN SERPL-MCNC: 38 MG/DL (ref 7–20)
CALCIUM SERPL-MCNC: 9.1 MG/DL (ref 8.3–10.6)
CHLORIDE SERPL-SCNC: 103 MMOL/L (ref 99–110)
CO2 SERPL-SCNC: 18 MMOL/L (ref 21–32)
CREAT SERPL-MCNC: 2.3 MG/DL (ref 0.6–1.2)
DEPRECATED RDW RBC AUTO: 17.9 % (ref 12.4–15.4)
EKG ATRIAL RATE: 82 BPM
EKG DIAGNOSIS: NORMAL
EKG P AXIS: 70 DEGREES
EKG P-R INTERVAL: 160 MS
EKG Q-T INTERVAL: 392 MS
EKG QRS DURATION: 106 MS
EKG QTC CALCULATION (BAZETT): 457 MS
EKG R AXIS: 104 DEGREES
EKG T AXIS: 49 DEGREES
EKG VENTRICULAR RATE: 82 BPM
EOSINOPHIL # BLD: 0 K/UL (ref 0–0.6)
EOSINOPHIL NFR BLD: 0.5 %
EST. AVERAGE GLUCOSE BLD GHB EST-MCNC: 194.4 MG/DL
GFR SERPLBLD CREATININE-BSD FMLA CKD-EPI: 20 ML/MIN/{1.73_M2}
GLUCOSE BLD-MCNC: 155 MG/DL (ref 70–99)
GLUCOSE BLD-MCNC: 180 MG/DL (ref 70–99)
GLUCOSE BLD-MCNC: 190 MG/DL (ref 70–99)
GLUCOSE BLD-MCNC: 197 MG/DL (ref 70–99)
GLUCOSE BLD-MCNC: 199 MG/DL (ref 70–99)
GLUCOSE SERPL-MCNC: 181 MG/DL (ref 70–99)
HBA1C MFR BLD: 8.4 %
HCT VFR BLD AUTO: 27.2 % (ref 36–48)
HGB BLD-MCNC: 8.5 G/DL (ref 12–16)
LYMPHOCYTES # BLD: 0.8 K/UL (ref 1–5.1)
LYMPHOCYTES NFR BLD: 11.9 %
MAGNESIUM SERPL-MCNC: 2.31 MG/DL (ref 1.8–2.4)
MCH RBC QN AUTO: 25.7 PG (ref 26–34)
MCHC RBC AUTO-ENTMCNC: 31.1 G/DL (ref 31–36)
MCV RBC AUTO: 82.4 FL (ref 80–100)
MONOCYTES # BLD: 0.7 K/UL (ref 0–1.3)
MONOCYTES NFR BLD: 10.8 %
NEUTROPHILS # BLD: 5 K/UL (ref 1.7–7.7)
NEUTROPHILS NFR BLD: 76.2 %
PERFORMED ON: ABNORMAL
PLATELET # BLD AUTO: 245 K/UL (ref 135–450)
PMV BLD AUTO: 8.1 FL (ref 5–10.5)
POTASSIUM SERPL-SCNC: 5.1 MMOL/L (ref 3.5–5.1)
RBC # BLD AUTO: 3.3 M/UL (ref 4–5.2)
SODIUM SERPL-SCNC: 136 MMOL/L (ref 136–145)
WBC # BLD AUTO: 6.6 K/UL (ref 4–11)

## 2024-12-02 PROCEDURE — 1200000000 HC SEMI PRIVATE

## 2024-12-02 PROCEDURE — 85025 COMPLETE CBC W/AUTO DIFF WBC: CPT

## 2024-12-02 PROCEDURE — 83036 HEMOGLOBIN GLYCOSYLATED A1C: CPT

## 2024-12-02 PROCEDURE — 83735 ASSAY OF MAGNESIUM: CPT

## 2024-12-02 PROCEDURE — 99223 1ST HOSP IP/OBS HIGH 75: CPT | Performed by: INTERNAL MEDICINE

## 2024-12-02 PROCEDURE — 6370000000 HC RX 637 (ALT 250 FOR IP): Performed by: PHYSICIAN ASSISTANT

## 2024-12-02 PROCEDURE — 6360000002 HC RX W HCPCS: Performed by: PHYSICIAN ASSISTANT

## 2024-12-02 PROCEDURE — 2580000003 HC RX 258: Performed by: PHYSICIAN ASSISTANT

## 2024-12-02 PROCEDURE — 36415 COLL VENOUS BLD VENIPUNCTURE: CPT

## 2024-12-02 PROCEDURE — 80048 BASIC METABOLIC PNL TOTAL CA: CPT

## 2024-12-02 PROCEDURE — 93010 ELECTROCARDIOGRAM REPORT: CPT | Performed by: INTERNAL MEDICINE

## 2024-12-02 RX ORDER — GLUCAGON 1 MG/ML
1 KIT INJECTION PRN
Status: DISCONTINUED | OUTPATIENT
Start: 2024-12-02 | End: 2024-12-05 | Stop reason: HOSPADM

## 2024-12-02 RX ORDER — DEXTROSE MONOHYDRATE 100 MG/ML
INJECTION, SOLUTION INTRAVENOUS CONTINUOUS PRN
Status: DISCONTINUED | OUTPATIENT
Start: 2024-12-02 | End: 2024-12-05 | Stop reason: HOSPADM

## 2024-12-02 RX ORDER — INSULIN LISPRO 100 [IU]/ML
0-4 INJECTION, SOLUTION INTRAVENOUS; SUBCUTANEOUS
Status: DISCONTINUED | OUTPATIENT
Start: 2024-12-02 | End: 2024-12-05 | Stop reason: HOSPADM

## 2024-12-02 RX ADMIN — ACETAMINOPHEN 650 MG: 325 TABLET ORAL at 04:41

## 2024-12-02 RX ADMIN — GABAPENTIN 100 MG: 100 CAPSULE ORAL at 09:50

## 2024-12-02 RX ADMIN — SODIUM BICARBONATE 650 MG: 650 TABLET ORAL at 21:13

## 2024-12-02 RX ADMIN — Medication 1 CAPSULE: at 17:45

## 2024-12-02 RX ADMIN — FUROSEMIDE 40 MG: 10 INJECTION, SOLUTION INTRAMUSCULAR; INTRAVENOUS at 17:46

## 2024-12-02 RX ADMIN — POLYETHYLENE GLYCOL 3350 17 G: 17 POWDER, FOR SOLUTION ORAL at 09:50

## 2024-12-02 RX ADMIN — SODIUM BICARBONATE 650 MG: 650 TABLET ORAL at 09:50

## 2024-12-02 RX ADMIN — INSULIN LISPRO 1 UNITS: 100 INJECTION, SOLUTION INTRAVENOUS; SUBCUTANEOUS at 21:14

## 2024-12-02 RX ADMIN — INSULIN LISPRO 1 UNITS: 100 INJECTION, SOLUTION INTRAVENOUS; SUBCUTANEOUS at 17:45

## 2024-12-02 RX ADMIN — GABAPENTIN 100 MG: 100 CAPSULE ORAL at 21:14

## 2024-12-02 RX ADMIN — CARVEDILOL 12.5 MG: 6.25 TABLET, FILM COATED ORAL at 09:50

## 2024-12-02 RX ADMIN — HEPARIN SODIUM 5000 UNITS: 5000 INJECTION INTRAVENOUS; SUBCUTANEOUS at 05:51

## 2024-12-02 RX ADMIN — HYDRALAZINE HYDROCHLORIDE 25 MG: 25 TABLET ORAL at 09:51

## 2024-12-02 RX ADMIN — ROSUVASTATIN CALCIUM 10 MG: 10 TABLET, FILM COATED ORAL at 17:45

## 2024-12-02 RX ADMIN — Medication 1 CAPSULE: at 09:50

## 2024-12-02 RX ADMIN — ISOSORBIDE MONONITRATE 30 MG: 30 TABLET, EXTENDED RELEASE ORAL at 09:50

## 2024-12-02 RX ADMIN — WATER 1000 MG: 1 INJECTION INTRAMUSCULAR; INTRAVENOUS; SUBCUTANEOUS at 21:15

## 2024-12-02 RX ADMIN — Medication 10 ML: at 21:14

## 2024-12-02 RX ADMIN — FUROSEMIDE 40 MG: 10 INJECTION, SOLUTION INTRAMUSCULAR; INTRAVENOUS at 09:51

## 2024-12-02 RX ADMIN — ASPIRIN 81 MG: 81 TABLET, COATED ORAL at 09:50

## 2024-12-02 RX ADMIN — INSULIN LISPRO 1 UNITS: 100 INJECTION, SOLUTION INTRAVENOUS; SUBCUTANEOUS at 11:43

## 2024-12-02 RX ADMIN — CARVEDILOL 12.5 MG: 6.25 TABLET, FILM COATED ORAL at 21:13

## 2024-12-02 RX ADMIN — HYDRALAZINE HYDROCHLORIDE 25 MG: 25 TABLET ORAL at 21:14

## 2024-12-02 RX ADMIN — HEPARIN SODIUM 5000 UNITS: 5000 INJECTION INTRAVENOUS; SUBCUTANEOUS at 14:03

## 2024-12-02 RX ADMIN — INSULIN LISPRO 1 UNITS: 100 INJECTION, SOLUTION INTRAVENOUS; SUBCUTANEOUS at 04:43

## 2024-12-02 RX ADMIN — Medication 10 ML: at 09:51

## 2024-12-02 RX ADMIN — CLOPIDOGREL BISULFATE 75 MG: 75 TABLET ORAL at 09:51

## 2024-12-02 RX ADMIN — HEPARIN SODIUM 5000 UNITS: 5000 INJECTION INTRAVENOUS; SUBCUTANEOUS at 21:14

## 2024-12-02 ASSESSMENT — PAIN SCALES - GENERAL
PAINLEVEL_OUTOF10: 0
PAINLEVEL_OUTOF10: 0
PAINLEVEL_OUTOF10: 4

## 2024-12-02 ASSESSMENT — PAIN DESCRIPTION - LOCATION: LOCATION: LEG;NECK

## 2024-12-02 NOTE — CARE COORDINATION
Case Management Assessment  Initial Evaluation    Date/Time of Evaluation: 12/2/2024 2:07 PM  Assessment Completed by: TYLER DEMPSEY RN    If patient is discharged prior to next notation, then this note serves as note for discharge by case management.    Patient Name: Cely Espitia                   YOB: 1940  Diagnosis: Pyelonephritis [N12]  Elevated troponin [R79.89]  Fall, initial encounter [W19.XXXA]  Chronic kidney disease, unspecified CKD stage [N18.9]  Acute on chronic congestive heart failure, unspecified heart failure type (HCC) [I50.9]                   Date / Time: 12/1/2024  4:05 PM    Patient Admission Status: Inpatient   Readmission Risk (Low < 19, Mod (19-27), High > 27): Readmission Risk Score: 22.9    Current PCP: Marcus Shukla MD  PCP verified by ? Yes    Chart Reviewed: Yes      History Provided by: Patient  Patient Orientation: Alert and Oriented    Patient Cognition: Alert    Hospitalization in the last 30 days (Readmission):  Yes    If yes, Readmission Assessment in  Navigator will be completed.    Advance Directives:      Code Status: Full Code   Patient's Primary Decision Maker is: Legal Next of Kin    Primary Decision Maker: Vivian Maguire - Child - [phone]    Primary Decision Maker: Nigel Gardner - Child - [phone]    Primary Decision Maker: Garo Agee - Child - [phone]    Discharge Planning:    Patient lives with: Alone Type of Home: Assisted living  Primary Care Giver: Self  Patient Support Systems include: Children, Other (Comment) (AL staff)   Current Financial resources: Medicare  Current community resources: Assisted Living, Other (Comment) ([facility])  Current services prior to admission: Durable Medical Equipment            Current DME: Other (Comment) (Motorized scooter and ostomy supplies from [employer])            Type of Home Care services:  None    ADLS  Prior functional level: Assistance with the following:, Housework, Cooking, Shopping ([facility]

## 2024-12-02 NOTE — DISCHARGE INSTR - COC
ADLs:784719846}  Bathing  {CHP DME ADLs:532593531}  Dressing  {CHP DME ADLs:814282657}  Toileting  {CHP DME ADLs:022375216}  Feeding  {CHP DME ADLs:039189542}  Med Admin  {CHP DME ADLs:122741206}  Med Delivery   { KIMBERLI MED Delivery:227622139}    Wound Care Documentation and Therapy:  Pressure Ulcer 10/25/17 Sacrum (Active)   Number of days: 2594       Wound 09/12/17 Other (Comment) Other (Comment) Medial 0.5 cm (Active)   Number of days: 2638       Incision 11/06/19 Hip Left (Active)   Number of days: 1853        Elimination:  Continence:   Bowel: {YES / NO:19727}  Bladder: {YES / NO:19727}  Urinary Catheter: {Urinary Catheter:215636126}   Colostomy/Ileostomy/Ileal Conduit: {YES / NO:19727}  Urostomy Ileal conduit RLQ-Stomal Appliance: 2 piece, Convex, Changed  Urostomy Ileal conduit RLQ-Flange Size (inches): 2.25 Inches (stoma measures 25mm)  Urostomy Ileal conduit RLQ-Stoma  Assessment: Pink, Moist, Protrudes  Urostomy Ileal conduit RLQ-Mucocutaneous Junction: Intact  Urostomy Ileal conduit RLQ-Peristomal Assessment: Clean, dry & intact  Urostomy Ileal conduit RLQ-Treatment: Site care, Pouch change    Date of Last BM: ***    Intake/Output Summary (Last 24 hours) at 12/2/2024 1549  Last data filed at 12/2/2024 0950  Gross per 24 hour   Intake 10 ml   Output 200 ml   Net -190 ml     I/O last 3 completed shifts:  In: -   Out: 200 [Urine:200]    Safety Concerns:     { KIMBERLI Safety Concerns:948934463}    Impairments/Disabilities:      { KIMBERLI Impairments/Disabilities:363785694}    Nutrition Therapy:  Current Nutrition Therapy:   { KIMBERLI Diet List:388084263}    Routes of Feeding: {CHP DME Other Feedings:348857516}  Liquids: {Slp liquid thickness:64629}  Daily Fluid Restriction: {CHP DME Yes amt example:530857268}  Last Modified Barium Swallow with Video (Video Swallowing Test): {Done Not Done Date:304088012}    Treatments at the Time of Hospital Discharge:   Respiratory Treatments: ***  Oxygen Therapy:  {Therapy; copd

## 2024-12-02 NOTE — CARE COORDINATION
12/02/24 1401   Readmission Assessment   Number of Days since last admission? 1-7 days   Previous Disposition Other (comment)  (Back to [facility])   Who is being Interviewed Patient   What was the patient's/caregiver's perception as to why they think they needed to return back to the hospital? Other (Comment)  (\" I turned and slid down in my bathroom.\")   Did you visit your Primary Care Physician after you left the hospital, before you returned this time? Yes   Did you see a specialist, such as Cardiac, Pulmonary, Orthopedic Physician, etc. after you left the hospital? No   Who advised the patient to return to the hospital? Other (Comment);Self-referral  ([facility])   Does the patient report anything that got in the way of taking their medications? No   In our efforts to provide the best possible care to you and others like you, can you think of anything that we could have done to help you after you left the hospital the first time, so that you might not have needed to return so soon? Other (Comment);Teach back instructions regarding management of illness;Discharge instructions that are concise, clear, and non contradictory;Improved written discharge instructions;Education on how to continue taking medications upon discharge;Teaching during hospitalization regarding your illness  (pt states \" was a lot of information.\")

## 2024-12-02 NOTE — ED PROVIDER NOTES
ED Attending Attestation Note    This patient was seen by the advanced practice provider.     I personally saw the patient. Management plan and care decisions have been discussed with me and I made/approved the management plan and take responsibility for patient management.     Briefly, 84 y.o. female presents with fall.   Fell 1-2 days ago, complaining of back and bilateral hip pain  Also has abdominal tenderness  Unclear if head trauma  LHC on 11/26 and had L circ stent placed; denies CP but reports SOB  Blurred vision this AM but negative NIH.    On my assessment, patient was encephalopathic stating that the police and FBI were following her.    Focused exam:   Gen: 84 y.o. female, NAD, nontoxic appearing  HEENT: NCAT. MMM, PERRL. EOMI.   CV: RRR w/o MRG  Vascular: intact and symmetric radial and DP pulses bilaterally  Lungs: CTAB. No incr WOB.   Abdomen: Soft, nontender, nondistended. No rebound/guarding.   Neuro: awake and alert, encephalopathic, speech clear w/o aphasia; intact and symmetric strength and sensation in all 4 extremities, CN 2-12 intact bilaterally    MDM:   This is an 84-year-old female presenting with fall.  On my assessment, patient did not have significant concerns of fall but appeared to be encephalopathic.  I suspect she was sundowning.  She has nitrite positive urine.  While she does have urostomy, given her mental status change, 4+ bacteria and nitrite positive and leukocytes, we will empirically treat with Rocephin.  Troponin is downtrending.  She has evidence of fluid overload on CTA and markedly elevated BNP.  We will diurese with Lasix.    Nursing notes, vital signs reviewed.     Records reviewed:   Cardiac catheterization 11/26/2022 with Dr. Velazquez, PCI placed     I independently interpreted the following studies   CT C-Spine W/O Contrast   Final Result   No acute abnormality of the cervical spine.         CTA Head Neck W/Contrast   Final Result   1. No large vessel occlusion within 
indicated.    Patient appears to have a urinary tract infection with nitrite positive urine, and signs of possible pyelonephritis on CT.  Will empirically treat with Rocephin, with initial dose given here.  Patient does not meet sepsis criteria.    Patient is markedly anxious, and did require low-dose of Zyprexa.  She also appears to be encephalopathic, likely sundowning.  She will be admitted for further evaluation and management.    Disposition Considerations (tests considered but not done, Admit vs D/C, Shared Decision Making, Pt Expectation of Test or Tx.):        I am the Primary Clinician of Record.  FINAL IMPRESSION      1. Fall, initial encounter    2. Elevated troponin    3. Acute on chronic congestive heart failure, unspecified heart failure type (HCC)    4. Pyelonephritis    5. Chronic kidney disease, unspecified CKD stage          DISPOSITION/PLAN     DISPOSITION Admitted 12/01/2024 09:03:54 PM           PATIENT REFERRED TO:  No follow-up provider specified.    DISCHARGE MEDICATIONS:  Current Discharge Medication List          DISCONTINUED MEDICATIONS:  Current Discharge Medication List                 (Please note that portions of this note were completed with a voice recognition program.  Efforts were made to edit the dictations but occasionally words are mis-transcribed.)    NIKKI Quezada (electronically signed)            Laurel Ordaz PA  12/01/24 9578

## 2024-12-02 NOTE — H&P
Hospital Medicine History & Physical        Name:  Cely Espitia /Age/Sex: 1940  (84 y.o. female)   MRN & CSN:  [id number] & [id number] Encounter Date/Time: 2024 9:04 PM EST   Location:  ED-0015/15 PCP: Marcus Shukla MD         CHIEF COMPLAINT:   Chief Complaint   Patient presents with    Fall     Brought in by [facility] from Assisted [facility]. Fell yesterday against the towel bar in the bathroom. Denies LOC or hitting her head. Reports pain in right lower leg (chronic). Had cardiac stents placed a couple of weeks ago. Rates back pain 8/10           HISTORY OF PRESENT ILLNESS:      History from: patient, EMR  Limitations to history : AMS     Cely Espitia is a 84 y.o. female who presented to ED via EMS from assisted living for evaluation of back pain.  Patient is a poor historian at this time due to AMS.  She reports she thinks she fell 1-2 days ago and hit her back on something.  She is unsure what but reports pain to her lower back and bilateral hips.  She denies hitting her head.  She denies loss of consciousness.  She reports abdominal pain and shortness of breath.  She denies dizziness, chest pain, cough, nausea, vomiting, diarrhea.      History supplemented by chart review.  Patient was recently admitted and underwent LHC with stent placement on .  Unfortunately according to med list sent from facility, it does not appear patient has been receiving several of her prescribed meds including plavix and imdur.  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        REVIEW OF SYSTEMS:   Pertinent positives as noted in the HPI. All other systems reviewed and negative.      PHYSICAL EXAM PERFORMED:  BP (!) 130/109   Pulse 96   Temp 98.7 °F (37.1 °C) (Oral)   Resp 30   Ht 1.727 m (5' 8\")   Wt 64.9 kg (143 lb)   SpO2 96%   BMI 21.74 kg/m²     General appearance:  Awake, alert, no apparent

## 2024-12-02 NOTE — ACP (ADVANCE CARE PLANNING)
Advanced Care Planning Note.    Purpose of Encounter: Advanced care planning in light of acute on chronic combined CHF  Parties In Attendance: Patient, daughter  Decisional Capacity: Limited  Subjective: Patient is SOB  Objective: Cr 2.3  Goals of Care Determination: Patient wants full support (CPR, vent, surgery, HD, no trach or PEG)  Plan:  IV Lasix, IV Abx, PT/OT, Cardio and Renal consults  Code Status: Full code   Time spent on Advanced care Plannin minutes  Advanced Care Planning Documents: Completed advanced directives on chart, daughter and son share the POA.    Ozzie Ramirez MD  2024 3:48 PM

## 2024-12-02 NOTE — DISCHARGE INSTRUCTIONS
Guidelines for Heart Failure home management:    1. Continue to monitor weight first thing each morning. You should weigh yourself after using the bathroom and before you eat breakfast.    2. Report to your doctor any significant weight change. Remember that weight change of 2-3 lbs. in 1 day or 5 lbs in a week is \"significant\" and likely represents changes in \"fluid\" status.  If you are experiencing any swelling in your feet, ankles or abdomen, or shortness of breath, call your doctor.     3. You should restrict all sodium intake to 3000 milligrams (3 grams) a day. Depending on your status, you may also be asked to restrict fluid intake to no more that 64 oz/2 Liters a day. If uncertain, ask the nurse or physician.     4. Regular aerobic exercise is encouraged 30 minutes a day (walking, bike, swimming, etc.). For specific exercise recommendations, ask your physician.     5. Report to your doctor any change in symptoms (chest pain, worsening shortness of breath, increased dizziness or passing out, increased palpitations or ICD shock, trouble catching breath while lying down, increased edema or abdominal bloating). Remember that even \"minor\" changes in symptoms may be important. Also report any changes in medications including \"over the counter\" medications.     6. DO NOT take NSAID's for pain (i.e, Advil, Aleve, Motrin, ibuprofen, and many more) since these may cause serious problems in those with a history of CHF. If uncertain about the medication, call your doctor.    7. If you have new significant or ongoing diarrhea or vomiting, please call your doctor for further instructions. Taking a diuretic (water pill) with these symptoms can worsen dehydration.     8. If you have any questions or concerns you can always call the CHF  Resource Line( [phone].  It is available Monday thru Friday 8 am- 5 pm. Please leave a message and your call will be returned shortly.     Extra Heart Failure

## 2024-12-02 NOTE — CARE COORDINATION
Ostomy Follow-up Progress Note      NAME:  Cely Espitia  MEDICAL RECORD NUMBER:  [id number]  AGE: 84 y.o.   GENDER:  female  :  1940  TODAY'S DATE:  2024    Subjective:    Cely Espitia is a 84 y.o. female referred by:   Provider and Nursing    Established    Objective    /65   Pulse 77   Temp 98.1 °F (36.7 °C) (Temporal)   Resp 18   Ht 1.727 m (5' 8\")   Wt 71.5 kg (157 lb 10.1 oz)   SpO2 97%   BMI 23.97 kg/m²     Mark Risk Score Mark Scale Score: 19    Assessment   Surgeon               Urostomy Ileal conduit RLQ (Active)   Stomal Appliance 2 piece;Convex;Changed 24 1259   Flange Size (inches) 2.25 Inches 24 1259   Stoma  Assessment Pink;Moist;Protrudes 24 1259   Peristomal Assessment Clean, dry & intact 24 1259   Mucocutaneous Junction Intact 24 1259   Collection Container Standard 24 1259   Securement Method Securing device (Describe) 24 1259   Treatment Site care;Pouch change 24 1259   Urine Color Yellow 24 1259   Urine Appearance Clear 24 1259   Urine Odor Malodorous 24 0947   Output (ml) 200 ml 24 0155   Number of days: 1782   Stoma, pink, moist , os in the middle. Patient does have hernia. Urostomy is pre-existing. Urostomy pouch changed and connected to drainage bag with adapter.       Intake/Output Summary (Last 24 hours) at 2024 1308  Last data filed at 2024 0950  Gross per 24 hour   Intake 10 ml   Output 200 ml   Net -190 ml       Plan:   Plan for Ostomy Care:    UROSTOMY CARE   Empty pouch when it is 1/3 to 1/2 full of urine, this will be approximately 6 to 8 times daily.   Change urostomy dressing every 3 to 4 days.    Change urostomy dressing whenever it leaks to prevent skin damage.        UROSTOMY DRESSING CHANGE   1. Gather all supplies for dressing change; scissors, coloplast wafer #[id number]  or #[id number]  & pouch #76028, (2) elastic strips #[id number] (optional).   2. Empty pouch.   3.

## 2024-12-02 NOTE — ED NOTES
Patient Name: Cely Espitia  : 1940 84 y.o.  MRN: [id number]  ED Room #: ED-0015/15     Chief complaint:   Chief Complaint   Patient presents with    Fall     Brought in by [facility] from [facility]isted [facility]. Fell yesterday against the towel bar in the bathroom. Denies LOC or hitting her head. Reports pain in right lower leg (chronic). Had cardiac stents placed a couple of weeks ago. Rates back pain 8/10       Hospital Problem/Diagnosis: Principal Problem:    Acute on chronic congestive heart failure, unspecified heart failure type (HCC)  Resolved Problems:    * No resolved hospital problems. *      O2 Flow Rate:O2 Device: None (Room air)   (if applicable)  Cardiac Rhythm:   (if applicable)  Active LDA's:   Peripheral IV 24 Right Wrist (Active)   Site Assessment Clean, dry & intact 24 1742   Line Status Normal saline locked 24 174   Phlebitis Assessment No symptoms 24 174   Infiltration Assessment 0 24 1742   Dressing Status Clean, dry & intact 24 1742   Dressing Type Transparent 24 1742   Dressing Intervention New 24 1634      Urostomy Ileal conduit RLQ (Active)          How does patient ambulate? Front Wheeled Walker    2. How does patient take pills? Whole with Water    3. Is patient alert? Alert    4. Is patient oriented? To Person, To Place, To Time, To Situation, and Follows Commands    5.   Patient arrived from:  nursing home  Facility Name: [facility]     6. If patient is disoriented or from a Skill Nursing Facility has family been notified of admission? Yes    7. Patient belongings? Clothing    Disposition of belongings? Kept with Patient     8. Any specific patient or family belongings/needs/dynamics?   a. Daughter Vivian aware of admission. Will be in to see patient in the morning. Recommends giving her mom home dose of xanax to help with her anxiousness.     9. Miscellaneous comments/pending orders?  a. All ED orders complete.

## 2024-12-03 LAB
ANION GAP SERPL CALCULATED.3IONS-SCNC: 11 MMOL/L (ref 3–16)
BACTERIA UR CULT: ABNORMAL
BASOPHILS # BLD: 0 K/UL (ref 0–0.2)
BASOPHILS NFR BLD: 0.4 %
BUN SERPL-MCNC: 43 MG/DL (ref 7–20)
CALCIUM SERPL-MCNC: 8.8 MG/DL (ref 8.3–10.6)
CHLORIDE SERPL-SCNC: 105 MMOL/L (ref 99–110)
CO2 SERPL-SCNC: 25 MMOL/L (ref 21–32)
CREAT SERPL-MCNC: 2.9 MG/DL (ref 0.6–1.2)
DEPRECATED RDW RBC AUTO: 17.9 % (ref 12.4–15.4)
EOSINOPHIL # BLD: 0.1 K/UL (ref 0–0.6)
EOSINOPHIL NFR BLD: 2.9 %
GFR SERPLBLD CREATININE-BSD FMLA CKD-EPI: 15 ML/MIN/{1.73_M2}
GLUCOSE BLD-MCNC: 146 MG/DL (ref 70–99)
GLUCOSE BLD-MCNC: 235 MG/DL (ref 70–99)
GLUCOSE BLD-MCNC: 254 MG/DL (ref 70–99)
GLUCOSE BLD-MCNC: 321 MG/DL (ref 70–99)
GLUCOSE SERPL-MCNC: 126 MG/DL (ref 70–99)
HCT VFR BLD AUTO: 25.7 % (ref 36–48)
HGB BLD-MCNC: 8 G/DL (ref 12–16)
IRON SATN MFR SERPL: 4 % (ref 15–50)
IRON SERPL-MCNC: 13 UG/DL (ref 37–145)
LYMPHOCYTES # BLD: 0.9 K/UL (ref 1–5.1)
LYMPHOCYTES NFR BLD: 17.1 %
MAGNESIUM SERPL-MCNC: 2.03 MG/DL (ref 1.8–2.4)
MCH RBC QN AUTO: 25.5 PG (ref 26–34)
MCHC RBC AUTO-ENTMCNC: 31.3 G/DL (ref 31–36)
MCV RBC AUTO: 81.4 FL (ref 80–100)
MONOCYTES # BLD: 0.6 K/UL (ref 0–1.3)
MONOCYTES NFR BLD: 11.1 %
NEUTROPHILS # BLD: 3.6 K/UL (ref 1.7–7.7)
NEUTROPHILS NFR BLD: 68.5 %
NT-PROBNP SERPL-MCNC: ABNORMAL PG/ML (ref 0–449)
ORGANISM: ABNORMAL
PERFORMED ON: ABNORMAL
PLATELET # BLD AUTO: 248 K/UL (ref 135–450)
PMV BLD AUTO: 7.8 FL (ref 5–10.5)
POTASSIUM SERPL-SCNC: 4.5 MMOL/L (ref 3.5–5.1)
RBC # BLD AUTO: 3.15 M/UL (ref 4–5.2)
SODIUM SERPL-SCNC: 141 MMOL/L (ref 136–145)
TIBC SERPL-MCNC: 336 UG/DL (ref 260–445)
WBC # BLD AUTO: 5.2 K/UL (ref 4–11)

## 2024-12-03 PROCEDURE — 6370000000 HC RX 637 (ALT 250 FOR IP): Performed by: PHYSICIAN ASSISTANT

## 2024-12-03 PROCEDURE — 83540 ASSAY OF IRON: CPT

## 2024-12-03 PROCEDURE — 2580000003 HC RX 258: Performed by: PHYSICIAN ASSISTANT

## 2024-12-03 PROCEDURE — 6360000002 HC RX W HCPCS: Performed by: PHYSICIAN ASSISTANT

## 2024-12-03 PROCEDURE — 97165 OT EVAL LOW COMPLEX 30 MIN: CPT

## 2024-12-03 PROCEDURE — 94761 N-INVAS EAR/PLS OXIMETRY MLT: CPT

## 2024-12-03 PROCEDURE — 83550 IRON BINDING TEST: CPT

## 2024-12-03 PROCEDURE — 85025 COMPLETE CBC W/AUTO DIFF WBC: CPT

## 2024-12-03 PROCEDURE — 1200000000 HC SEMI PRIVATE

## 2024-12-03 PROCEDURE — 83735 ASSAY OF MAGNESIUM: CPT

## 2024-12-03 PROCEDURE — 97116 GAIT TRAINING THERAPY: CPT

## 2024-12-03 PROCEDURE — 97161 PT EVAL LOW COMPLEX 20 MIN: CPT

## 2024-12-03 PROCEDURE — 36415 COLL VENOUS BLD VENIPUNCTURE: CPT

## 2024-12-03 PROCEDURE — 83880 ASSAY OF NATRIURETIC PEPTIDE: CPT

## 2024-12-03 PROCEDURE — 97530 THERAPEUTIC ACTIVITIES: CPT

## 2024-12-03 PROCEDURE — 80048 BASIC METABOLIC PNL TOTAL CA: CPT

## 2024-12-03 RX ADMIN — CARVEDILOL 12.5 MG: 6.25 TABLET, FILM COATED ORAL at 10:12

## 2024-12-03 RX ADMIN — ASPIRIN 81 MG: 81 TABLET, COATED ORAL at 10:12

## 2024-12-03 RX ADMIN — ALPRAZOLAM 0.25 MG: 0.25 TABLET ORAL at 00:36

## 2024-12-03 RX ADMIN — GABAPENTIN 100 MG: 100 CAPSULE ORAL at 22:03

## 2024-12-03 RX ADMIN — POLYETHYLENE GLYCOL 3350 17 G: 17 POWDER, FOR SOLUTION ORAL at 10:13

## 2024-12-03 RX ADMIN — HEPARIN SODIUM 5000 UNITS: 5000 INJECTION INTRAVENOUS; SUBCUTANEOUS at 15:15

## 2024-12-03 RX ADMIN — ISOSORBIDE MONONITRATE 30 MG: 30 TABLET, EXTENDED RELEASE ORAL at 10:13

## 2024-12-03 RX ADMIN — HYDRALAZINE HYDROCHLORIDE 25 MG: 25 TABLET ORAL at 10:13

## 2024-12-03 RX ADMIN — Medication 10 ML: at 10:22

## 2024-12-03 RX ADMIN — CLOPIDOGREL BISULFATE 75 MG: 75 TABLET ORAL at 10:14

## 2024-12-03 RX ADMIN — WATER 1000 MG: 1 INJECTION INTRAMUSCULAR; INTRAVENOUS; SUBCUTANEOUS at 22:03

## 2024-12-03 RX ADMIN — HEPARIN SODIUM 5000 UNITS: 5000 INJECTION INTRAVENOUS; SUBCUTANEOUS at 22:03

## 2024-12-03 RX ADMIN — INSULIN LISPRO 3 UNITS: 100 INJECTION, SOLUTION INTRAVENOUS; SUBCUTANEOUS at 22:07

## 2024-12-03 RX ADMIN — GABAPENTIN 100 MG: 100 CAPSULE ORAL at 10:13

## 2024-12-03 RX ADMIN — Medication 10 ML: at 22:03

## 2024-12-03 RX ADMIN — Medication 1 CAPSULE: at 10:19

## 2024-12-03 RX ADMIN — Medication 1 CAPSULE: at 17:12

## 2024-12-03 RX ADMIN — HYDRALAZINE HYDROCHLORIDE 25 MG: 25 TABLET ORAL at 15:15

## 2024-12-03 RX ADMIN — CARVEDILOL 12.5 MG: 6.25 TABLET, FILM COATED ORAL at 22:03

## 2024-12-03 RX ADMIN — INSULIN LISPRO 2 UNITS: 100 INJECTION, SOLUTION INTRAVENOUS; SUBCUTANEOUS at 12:59

## 2024-12-03 RX ADMIN — INSULIN LISPRO 1 UNITS: 100 INJECTION, SOLUTION INTRAVENOUS; SUBCUTANEOUS at 17:12

## 2024-12-03 RX ADMIN — HEPARIN SODIUM 5000 UNITS: 5000 INJECTION INTRAVENOUS; SUBCUTANEOUS at 05:32

## 2024-12-03 RX ADMIN — HYDRALAZINE HYDROCHLORIDE 25 MG: 25 TABLET ORAL at 22:03

## 2024-12-03 RX ADMIN — ROSUVASTATIN CALCIUM 10 MG: 10 TABLET, FILM COATED ORAL at 17:12

## 2024-12-03 ASSESSMENT — PAIN SCALES - GENERAL: PAINLEVEL_OUTOF10: 0

## 2024-12-03 NOTE — DISCHARGE SUMMARY
The [place] Sleepiness Scale       The [place] Sleepiness Scale is widely used in the field of sleep medicine as a subjective measure of a patient's sleepiness. The test is a list of eight situations in which you rate your tendency to become sleepy on a scale of 0, no chance to 3, high chance of dozing. Your score is based on a scale of 0 to 24. The scale estimates whether you are experiencing excessive sleepiness that possibly requires medical attention.     How Sleepy Are You?  How sleepy are you to doze off or fall asleep in the following situations? You should rate your chances of dozing off, not just feeling tired. Even if you have not done some of these things recently try to determine how they would have affected you. For each situation, decide whether or not you would have:     0 = No chance of dozing 1 = Slight chance of dozing   2 = Moderate chance of  dozing 3 = High change of dozing       Situation                                                                                     Chance of Dozing    Sitting and reading  0 =  [x]  1 =    [] 2 =    [] 3 =    []    Watching TV  0 =  []  1 =    [x] 2 =    [] 3 =    []      Sitting inactive in public place (e.g., a theater or a meeting)  0 =  []  1 =    [x] 2 =    [] 3 =    []    As a passenger in a car for an hour without a break          0  =  [x]  1 =    [] 2 =    [] 3 =    []    Lying down to rest in the afternoon when circumstances permit    0 =  [x]  1 =    [] 2 =    [] 3 =    []    Sitting and talking to someone  0 =  [x]  1 =    [] 2 =    [] 3 =    []      Sitting quietly after a lunch without alcohol  0 =  [x]  1 =    [] 2 =    [] 3 =    []    In a car, while stopped for a few minutes in traffic                                                                      0 =  [x]  1 =    [] 2 =    [] 3 =    []    Total Score = 2    If your total score is 10 or greater, you are experiencing excessive sleepiness and should consider seeking a medical

## 2024-12-03 NOTE — CONSULTS
[facility]   Nephrology progress Note  [phone]  [phone]   [employer]         Reason for Consult:  CATARINA on CKD    HISTORY OF PRESENT ILLNESS:      The patient is a 84 y.o. female with significant past medical history of CKD, Ureteral cancer s/p right Nephrectomy and radical Cystectomy and ileal conduit 2017, Hypertension, DM II, CAD/CHF, Atrial fibrillation, Bladder Cancer, Carotid artery stenosis, Hyperlipidemia, Depression  who presents with fall. Received multiple CT scans on admission, one with iodinated contrast. Noted to have mild CATARINA post this. She follows with Dr. Lassiter  CKD dates back to atleast 2010  Baseline creatinine appears to be low 2s  Creatinine on admission was 2.3 and today its 2.9.         Past Medical History:        Diagnosis Date    Anxiety     Atrial fibrillation (HCC)     CAD (coronary artery disease)     Cancer (HCC)     bladder right kidney     Cardiac arrest 03/27/2016    Carotid artery stenosis     CHF (congestive heart failure) (HCC)     Chronic kidney disease     cancer of kidney and bladder    Depression     Diabetes mellitus (HCC)     IDDM    GERD (gastroesophageal reflux disease)     Hip pain, chronic     HYPERCHOLESTERAEMIA     Hypertension     Irritable bowel syndrome     Laceration of head 03/25/2016    fall with cardiac arrest    MI, old     Multiple drug resistant organism (MDRO) culture positive 10/28/2021    urine    Neuropathy     Osteoarthritis     Presence of combination internal cardiac defibrillator (ICD) and pacemaker     Staph infection     PT. STATES SHE HAS HAD SEVERAL BOILS WITH STAPH LAST ONE 20 YEARS AGO.    Type 2 diabetes mellitus without complication (HCC)     Urinary incontinence        Past Surgical History:        Procedure Laterality Date    ANGIOPLASTY      x3  2009    BLADDER REMOVAL      BLADDER SUSPENSION      CARDIAC PROCEDURE N/A 9/25/2024    Right heart cath performed by Maury Velazquez MD at [facility] 
CHF Nutrition Education    Consult received for heart failure diet education.  Nutrition therapy included low sodium diet guidelines.  She lives in an assisted living facility, eats breakfast in her room and both lunch & dinner meals are in the dining room.  She states the facility does not have her on a low sodium diet, however she does not add salt to her food.  Encouraged low sodium snacks if pt keeps snacks in her room.  Recommend low sodium diet upon return to assisted living unit..  All questions answered and patient voiced understanding.  Time spent with patient: 5 minutes      Electronically signed by KAITLYNN BERTRAND RD, LD on 12/3/2024 at 10:35 AM    
  Equipment used at home: N/A  [place] Screen ordered: Yes    DM History: Yes  Consult for DM educator: Yes, hgb A1c 8.4 on 12/2/2024      Last Hospital Admission: 11/26/2024 at [facility] for chest pain  Code Status: full  Discharge plans: [facility]    Family Present: None    Cely Espitia [facility] and found to have acute on chronic combined CHF. Pt states she does not have a scale and does not weigh herself daily. She does not follow a specific diet or fluid restriction. Pt is compliant with taking medications as prescribed.      Discussed importance of lifestyle changes: Pt agreeable to obtaining a scale and weighing daily, sodium and fluid restriction.     PATIENT/CAREGIVER TEACHING:    Level of patient/caregiver understanding able to:   [X] Verbalize understanding [ ] Demonstrate understanding [ ] Teach back   [X] Needs reinforcement [ ] Other:       Time spent teaching: 15 mins    1. WEIGHT: Admit Weight - Scale: 64.9 kg (143 lb)      Today  Weight - Scale: 71.5 kg (157 lb 10.1 oz)   2. I/O   Intake/Output Summary (Last 24 hours) at 12/2/2024 1346  Last data filed at 12/2/2024 0950  Gross per 24 hour   Intake 10 ml   Output 200 ml   Net -190 ml       Recommendations:   1. Patient will need a one week or less hospital follow up scheduled before discharge.   2. Educate further on fluid restriction 48 oz- 64 oz during inpatient stay so they can understand how to measure intake at home.   3. Continue to educate on S/S.   4. Emphasize daily weights, diet, and knowing when and who to call  5. Provided patient with CHF Resource Line for questions and concerns.       Stella Mariano RN 12/2/2024 1:46 PM    
Regular rate, S1/ S2 normal. No murmur, rub, or gallop.  2+ radial and pedal pulses, symmetric  Carotid  Femoral   Abdomen and Gastrointestinal:   Soft, non-tender, bowel sounds active.  Liver and Spleen  Masses   Musculoskeletal: No muscle wasting  Back  Gait   Extremities: Extremities normal, atraumatic. No cyanosis or edema. No cyanosis clubbing       Skin: Inspection and palpation performed, no rashes or lesions.       Neurologic: Normal gross motor and sensory exam.       Labs     All labs have been reviewed    Lab Results   Component Value Date/Time    WBC 6.6 12/02/2024 04:26 AM    RBC 3.30 12/02/2024 04:26 AM    HGB 8.5 12/02/2024 04:26 AM    HCT 27.2 12/02/2024 04:26 AM    MCV 82.4 12/02/2024 04:26 AM    RDW 17.9 12/02/2024 04:26 AM     12/02/2024 04:26 AM     Lab Results   Component Value Date/Time     12/02/2024 04:26 AM    K 5.1 12/02/2024 04:26 AM    K 5.5 12/01/2024 05:23 PM     12/02/2024 04:26 AM    CO2 18 12/02/2024 04:26 AM    BUN 38 12/02/2024 04:26 AM    CREATININE 2.3 12/02/2024 04:26 AM    GFRAA 40 11/02/2021 05:34 AM    GFRAA 51 05/28/2013 06:17 AM    AGRATIO 1.2 12/01/2024 05:23 PM    LABGLOM 20 12/02/2024 04:26 AM    LABGLOM 25 02/06/2024 12:57 PM    GLUCOSE 181 12/02/2024 04:26 AM    CALCIUM 9.1 12/02/2024 04:26 AM    BILITOT 0.6 12/01/2024 05:23 PM    ALKPHOS 52 12/01/2024 05:23 PM    AST 40 12/01/2024 05:23 PM    ALT 88 12/01/2024 05:23 PM     No results found for: \"PTINR\"  Lab Results   Component Value Date    LABA1C 7.3 10/29/2021     Lab Results   Component Value Date    CKTOTAL 78 01/08/2024    CKMB <0.2 06/06/2010    TROPONINI <0.01 10/28/2021       Cardiac, Vascular and Imaging Data all Personally Reviewed in Detail by Myself      EKG:     Echocardiogram:   ZACH 10/2/2024  Right Atrium: Lead present in the right atrium with a possible mobile linear thrombus vs vegetation.  Intraprocedural ZACH guidance for removal of fragment of mobile structure with Alphavac, which

## 2024-12-03 NOTE — PLAN OF CARE
Problem: Pain  Goal: Verbalizes/displays adequate comfort level or baseline comfort level  Outcome: Progressing     
intact  Outcome: Progressing  Goal: Incisions, wounds, or drain sites healing without S/S of infection  Outcome: Progressing  Goal: Oral mucous membranes remain intact  Outcome: Progressing     Problem: Musculoskeletal - Adult  Goal: Return mobility to safest level of function  Outcome: Progressing  Goal: Maintain proper alignment of affected body part  Outcome: Progressing  Goal: Return ADL status to a safe level of function  Outcome: Progressing

## 2024-12-04 ENCOUNTER — HOSPITAL ENCOUNTER (OUTPATIENT)
Dept: ONCOLOGY | Age: 84
Setting detail: INFUSION SERIES
End: 2024-12-04

## 2024-12-04 LAB
ANION GAP SERPL CALCULATED.3IONS-SCNC: 10 MMOL/L (ref 3–16)
BUN SERPL-MCNC: 39 MG/DL (ref 7–20)
CALCIUM SERPL-MCNC: 8.4 MG/DL (ref 8.3–10.6)
CHLORIDE SERPL-SCNC: 104 MMOL/L (ref 99–110)
CO2 SERPL-SCNC: 25 MMOL/L (ref 21–32)
CREAT SERPL-MCNC: 2.6 MG/DL (ref 0.6–1.2)
GFR SERPLBLD CREATININE-BSD FMLA CKD-EPI: 17 ML/MIN/{1.73_M2}
GLUCOSE BLD-MCNC: 211 MG/DL (ref 70–99)
GLUCOSE BLD-MCNC: 255 MG/DL (ref 70–99)
GLUCOSE BLD-MCNC: 259 MG/DL (ref 70–99)
GLUCOSE BLD-MCNC: 344 MG/DL (ref 70–99)
GLUCOSE SERPL-MCNC: 177 MG/DL (ref 70–99)
MAGNESIUM SERPL-MCNC: 1.94 MG/DL (ref 1.8–2.4)
PERFORMED ON: ABNORMAL
POTASSIUM SERPL-SCNC: 4.1 MMOL/L (ref 3.5–5.1)
SODIUM SERPL-SCNC: 139 MMOL/L (ref 136–145)

## 2024-12-04 PROCEDURE — 2580000003 HC RX 258: Performed by: PHYSICIAN ASSISTANT

## 2024-12-04 PROCEDURE — 6360000002 HC RX W HCPCS: Performed by: PHYSICIAN ASSISTANT

## 2024-12-04 PROCEDURE — 6360000002 HC RX W HCPCS: Performed by: HOSPITALIST

## 2024-12-04 PROCEDURE — 2580000003 HC RX 258: Performed by: HOSPITALIST

## 2024-12-04 PROCEDURE — 1200000000 HC SEMI PRIVATE

## 2024-12-04 PROCEDURE — 6370000000 HC RX 637 (ALT 250 FOR IP): Performed by: HOSPITALIST

## 2024-12-04 PROCEDURE — 6370000000 HC RX 637 (ALT 250 FOR IP): Performed by: PHYSICIAN ASSISTANT

## 2024-12-04 PROCEDURE — 36415 COLL VENOUS BLD VENIPUNCTURE: CPT

## 2024-12-04 PROCEDURE — 80048 BASIC METABOLIC PNL TOTAL CA: CPT

## 2024-12-04 PROCEDURE — 83735 ASSAY OF MAGNESIUM: CPT

## 2024-12-04 RX ORDER — SENNOSIDES A AND B 8.6 MG/1
1 TABLET, FILM COATED ORAL NIGHTLY
Status: DISCONTINUED | OUTPATIENT
Start: 2024-12-04 | End: 2024-12-05 | Stop reason: HOSPADM

## 2024-12-04 RX ORDER — SODIUM CHLORIDE 9 MG/ML
INJECTION, SOLUTION INTRAVENOUS
Status: DISPENSED
Start: 2024-12-04 | End: 2024-12-05

## 2024-12-04 RX ORDER — HALOPERIDOL 5 MG/ML
2 INJECTION INTRAMUSCULAR EVERY 6 HOURS PRN
Status: DISCONTINUED | OUTPATIENT
Start: 2024-12-04 | End: 2024-12-05 | Stop reason: HOSPADM

## 2024-12-04 RX ORDER — TRAZODONE HYDROCHLORIDE 50 MG/1
25 TABLET, FILM COATED ORAL NIGHTLY
Status: DISCONTINUED | OUTPATIENT
Start: 2024-12-04 | End: 2024-12-05 | Stop reason: HOSPADM

## 2024-12-04 RX ORDER — HALOPERIDOL 1 MG/1
1 TABLET ORAL 3 TIMES DAILY
Status: DISCONTINUED | OUTPATIENT
Start: 2024-12-04 | End: 2024-12-04

## 2024-12-04 RX ADMIN — ISOSORBIDE MONONITRATE 30 MG: 30 TABLET, EXTENDED RELEASE ORAL at 09:37

## 2024-12-04 RX ADMIN — SODIUM CHLORIDE 187.5 MG: 9 INJECTION, SOLUTION INTRAVENOUS at 15:39

## 2024-12-04 RX ADMIN — Medication 10 ML: at 09:38

## 2024-12-04 RX ADMIN — HEPARIN SODIUM 5000 UNITS: 5000 INJECTION INTRAVENOUS; SUBCUTANEOUS at 13:07

## 2024-12-04 RX ADMIN — HYDRALAZINE HYDROCHLORIDE 25 MG: 25 TABLET ORAL at 13:07

## 2024-12-04 RX ADMIN — Medication 1 CAPSULE: at 09:38

## 2024-12-04 RX ADMIN — CARVEDILOL 12.5 MG: 6.25 TABLET, FILM COATED ORAL at 09:37

## 2024-12-04 RX ADMIN — HEPARIN SODIUM 5000 UNITS: 5000 INJECTION INTRAVENOUS; SUBCUTANEOUS at 05:41

## 2024-12-04 RX ADMIN — WATER 1000 MG: 1 INJECTION INTRAMUSCULAR; INTRAVENOUS; SUBCUTANEOUS at 22:08

## 2024-12-04 RX ADMIN — INSULIN LISPRO 1 UNITS: 100 INJECTION, SOLUTION INTRAVENOUS; SUBCUTANEOUS at 09:37

## 2024-12-04 RX ADMIN — HEPARIN SODIUM 5000 UNITS: 5000 INJECTION INTRAVENOUS; SUBCUTANEOUS at 22:00

## 2024-12-04 RX ADMIN — TRAZODONE HYDROCHLORIDE 25 MG: 50 TABLET ORAL at 22:00

## 2024-12-04 RX ADMIN — Medication 10 ML: at 22:04

## 2024-12-04 RX ADMIN — GABAPENTIN 100 MG: 100 CAPSULE ORAL at 09:37

## 2024-12-04 RX ADMIN — GABAPENTIN 100 MG: 100 CAPSULE ORAL at 22:00

## 2024-12-04 RX ADMIN — CARVEDILOL 12.5 MG: 6.25 TABLET, FILM COATED ORAL at 22:00

## 2024-12-04 RX ADMIN — INSULIN LISPRO 2 UNITS: 100 INJECTION, SOLUTION INTRAVENOUS; SUBCUTANEOUS at 22:00

## 2024-12-04 RX ADMIN — CLOPIDOGREL BISULFATE 75 MG: 75 TABLET ORAL at 09:37

## 2024-12-04 RX ADMIN — Medication 1 CAPSULE: at 17:21

## 2024-12-04 RX ADMIN — ASPIRIN 81 MG: 81 TABLET, COATED ORAL at 09:38

## 2024-12-04 RX ADMIN — POLYETHYLENE GLYCOL 3350 17 G: 17 POWDER, FOR SOLUTION ORAL at 09:38

## 2024-12-04 RX ADMIN — INSULIN LISPRO 2 UNITS: 100 INJECTION, SOLUTION INTRAVENOUS; SUBCUTANEOUS at 17:21

## 2024-12-04 RX ADMIN — SENNOSIDES 8.6 MG: 8.6 TABLET, FILM COATED ORAL at 22:00

## 2024-12-04 RX ADMIN — HYDRALAZINE HYDROCHLORIDE 25 MG: 25 TABLET ORAL at 09:38

## 2024-12-04 RX ADMIN — ROSUVASTATIN CALCIUM 10 MG: 10 TABLET, FILM COATED ORAL at 17:21

## 2024-12-04 RX ADMIN — INSULIN LISPRO 3 UNITS: 100 INJECTION, SOLUTION INTRAVENOUS; SUBCUTANEOUS at 13:07

## 2024-12-04 RX ADMIN — HYDRALAZINE HYDROCHLORIDE 25 MG: 25 TABLET ORAL at 21:59

## 2024-12-05 VITALS
HEIGHT: 68 IN | RESPIRATION RATE: 16 BRPM | WEIGHT: 151.46 LBS | BODY MASS INDEX: 22.95 KG/M2 | SYSTOLIC BLOOD PRESSURE: 126 MMHG | HEART RATE: 86 BPM | TEMPERATURE: 98.2 F | OXYGEN SATURATION: 96 % | DIASTOLIC BLOOD PRESSURE: 70 MMHG

## 2024-12-05 LAB
ANION GAP SERPL CALCULATED.3IONS-SCNC: 11 MMOL/L (ref 3–16)
BUN SERPL-MCNC: 33 MG/DL (ref 7–20)
CALCIUM SERPL-MCNC: 8.6 MG/DL (ref 8.3–10.6)
CHLORIDE SERPL-SCNC: 107 MMOL/L (ref 99–110)
CO2 SERPL-SCNC: 23 MMOL/L (ref 21–32)
CREAT SERPL-MCNC: 2.3 MG/DL (ref 0.6–1.2)
GFR SERPLBLD CREATININE-BSD FMLA CKD-EPI: 20 ML/MIN/{1.73_M2}
GLUCOSE BLD-MCNC: 182 MG/DL (ref 70–99)
GLUCOSE BLD-MCNC: 293 MG/DL (ref 70–99)
GLUCOSE BLD-MCNC: 310 MG/DL (ref 70–99)
GLUCOSE SERPL-MCNC: 157 MG/DL (ref 70–99)
MAGNESIUM SERPL-MCNC: 1.98 MG/DL (ref 1.8–2.4)
NT-PROBNP SERPL-MCNC: ABNORMAL PG/ML (ref 0–449)
PERFORMED ON: ABNORMAL
POTASSIUM SERPL-SCNC: 4.5 MMOL/L (ref 3.5–5.1)
SODIUM SERPL-SCNC: 141 MMOL/L (ref 136–145)

## 2024-12-05 PROCEDURE — 2580000003 HC RX 258: Performed by: HOSPITALIST

## 2024-12-05 PROCEDURE — 36415 COLL VENOUS BLD VENIPUNCTURE: CPT

## 2024-12-05 PROCEDURE — 94760 N-INVAS EAR/PLS OXIMETRY 1: CPT

## 2024-12-05 PROCEDURE — 6360000002 HC RX W HCPCS: Performed by: PHYSICIAN ASSISTANT

## 2024-12-05 PROCEDURE — 2580000003 HC RX 258: Performed by: PHYSICIAN ASSISTANT

## 2024-12-05 PROCEDURE — 80048 BASIC METABOLIC PNL TOTAL CA: CPT

## 2024-12-05 PROCEDURE — 97116 GAIT TRAINING THERAPY: CPT

## 2024-12-05 PROCEDURE — 97530 THERAPEUTIC ACTIVITIES: CPT

## 2024-12-05 PROCEDURE — 6360000002 HC RX W HCPCS: Performed by: HOSPITALIST

## 2024-12-05 PROCEDURE — 83735 ASSAY OF MAGNESIUM: CPT

## 2024-12-05 PROCEDURE — 83880 ASSAY OF NATRIURETIC PEPTIDE: CPT

## 2024-12-05 PROCEDURE — 97535 SELF CARE MNGMENT TRAINING: CPT

## 2024-12-05 PROCEDURE — 6370000000 HC RX 637 (ALT 250 FOR IP): Performed by: PHYSICIAN ASSISTANT

## 2024-12-05 RX ORDER — FERROUS SULFATE 325(65) MG
325 TABLET ORAL 2 TIMES DAILY
Qty: 60 TABLET | Refills: 0 | Status: SHIPPED | OUTPATIENT
Start: 2024-12-05 | End: 2025-01-04

## 2024-12-05 RX ORDER — CEPHALEXIN 500 MG/1
500 CAPSULE ORAL 3 TIMES DAILY
Qty: 9 CAPSULE | Refills: 0 | Status: SHIPPED | OUTPATIENT
Start: 2024-12-05 | End: 2024-12-08

## 2024-12-05 RX ORDER — CEPHALEXIN 500 MG/1
500 CAPSULE ORAL 3 TIMES DAILY
Qty: 9 CAPSULE | Refills: 0 | Status: SHIPPED | OUTPATIENT
Start: 2024-12-05 | End: 2024-12-05

## 2024-12-05 RX ORDER — FERROUS SULFATE 325(65) MG
325 TABLET ORAL 2 TIMES DAILY
Qty: 60 TABLET | Refills: 0 | Status: SHIPPED | OUTPATIENT
Start: 2024-12-05 | End: 2024-12-05

## 2024-12-05 RX ADMIN — SODIUM CHLORIDE 187.5 MG: 9 INJECTION, SOLUTION INTRAVENOUS at 14:33

## 2024-12-05 RX ADMIN — POLYETHYLENE GLYCOL 3350 17 G: 17 POWDER, FOR SOLUTION ORAL at 09:43

## 2024-12-05 RX ADMIN — Medication 10 ML: at 09:44

## 2024-12-05 RX ADMIN — HEPARIN SODIUM 5000 UNITS: 5000 INJECTION INTRAVENOUS; SUBCUTANEOUS at 05:52

## 2024-12-05 RX ADMIN — Medication 1 CAPSULE: at 09:43

## 2024-12-05 RX ADMIN — CARVEDILOL 12.5 MG: 6.25 TABLET, FILM COATED ORAL at 09:43

## 2024-12-05 RX ADMIN — ISOSORBIDE MONONITRATE 30 MG: 30 TABLET, EXTENDED RELEASE ORAL at 09:43

## 2024-12-05 RX ADMIN — HYDRALAZINE HYDROCHLORIDE 25 MG: 25 TABLET ORAL at 09:43

## 2024-12-05 RX ADMIN — CLOPIDOGREL BISULFATE 75 MG: 75 TABLET ORAL at 09:43

## 2024-12-05 RX ADMIN — HYDRALAZINE HYDROCHLORIDE 25 MG: 25 TABLET ORAL at 13:11

## 2024-12-05 RX ADMIN — ASPIRIN 81 MG: 81 TABLET, COATED ORAL at 09:43

## 2024-12-05 RX ADMIN — GABAPENTIN 100 MG: 100 CAPSULE ORAL at 09:43

## 2024-12-05 RX ADMIN — INSULIN LISPRO 2 UNITS: 100 INJECTION, SOLUTION INTRAVENOUS; SUBCUTANEOUS at 13:11

## 2024-12-05 RX ADMIN — HEPARIN SODIUM 5000 UNITS: 5000 INJECTION INTRAVENOUS; SUBCUTANEOUS at 13:11

## 2024-12-05 RX ADMIN — CEFTRIAXONE SODIUM 1000 MG: 1 INJECTION, POWDER, FOR SOLUTION INTRAMUSCULAR; INTRAVENOUS at 13:10

## 2024-12-05 NOTE — DISCHARGE SUMMARY
Discharge Summary    Name:  Cely Espitia /Age/Sex: 1940 (84 y.o. female)   Admit Date: 2024  Discharge Date: 24    MRN & CSN:  [id number] & [id number] Encounter Date and Time 24    Attending:  Chris Ramirez MD Discharging Provider: Chris Ramirez MD     Hospital Course:   REASON FOR ADMISSION/CHIEF COMPLAINT:   Chief Complaint   Patient presents with    Fall     Brought in by [facility] from [facility]. Fell yesterday against the towel bar in the bathroom. Denies LOC or hitting her head. Reports pain in right lower leg (chronic). Had cardiac stents placed a couple of weeks ago. Rates back pain 8/10       Acute on chronic congestive heart failure, unspecified heart failure type (HCC)    PRINCIPAL DIAGNOSIS* AND HOSPITAL PROBLEM LIST:  Pyelonephritis [N12]  Elevated troponin [R79.89]  Fall, initial encounter [W19.XXXA]  Chronic kidney disease, unspecified CKD stage [N18.9]  Acute on chronic congestive heart failure, unspecified heart failure type (HCC) [I50.9]    CONSULTS DURING THE ADMISSION:  IP CONSULT TO CARDIOLOGY  IP CONSULT TO HEART FAILURE NURSE/COORDINATOR  IP CONSULT TO DIETITIAN  IP CONSULT TO NEPHROLOGY  IP CONSULT TO DIABETES EDUCATOR  IP CONSULT TO HOME CARE NEEDS    BRIEF HPI & SUMMARY OF HOSPITAL COURSE:   83 yo F with history of CAD s/p LCx MONICA on 24 by Dr Velazquez, S/P ICD, ureteral cancer s/p right nephrectomy and radical cystectomy and ileal conduit, chronic combined CHF (EF 35-40% on Echo 24), DM2, CKD 4 was sent from [facility] with AMS. Admitted for further evaluation and management. Please review H&P and Epic Chart for full details. In summary, during the course of hospitalization patient was found to have following problems including  Altered mental status/acute metabolic/toxic encephalopathy likely due to delirium: improving status  Acute UTI due to E. Coli: Received IV ceftriaxone per 4 days.  Advised to continue Keflex for

## 2024-12-05 NOTE — PROGRESS NOTES
The [place] Sleepiness Scale       The [place] Sleepiness Scale is widely used in the field of sleep medicine as a subjective measure of a patient's sleepiness. The test is a list of eight situations in which you rate your tendency to become sleepy on a scale of 0, no chance to 3, high chance of dozing. Your score is based on a scale of 0 to 24. The scale estimates whether you are experiencing excessive sleepiness that possibly requires medical attention.     How Sleepy Are You?  How sleepy are you to doze off or fall asleep in the following situations? You should rate your chances of dozing off, not just feeling tired. Even if you have not done some of these things recently try to determine how they would have affected you. For each situation, decide whether or not you would have:     0 = No chance of dozing 1 = Slight chance of dozing   2 = Moderate chance of  dozing 3 = High change of dozing       Situation                                                                                     Chance of Dozing    Sitting and reading  0 =  [x]  1 =    [] 2 =    [] 3 =    []    Watching TV  0 =  []  1 =    [x] 2 =    [] 3 =    []      Sitting inactive in public place (e.g., a theater or a meeting)  0 =  []  1 =    [x] 2 =    [] 3 =    []    As a passenger in a car for an hour without a break          0  =  [x]  1 =    [] 2 =    [] 3 =    []    Lying down to rest in the afternoon when circumstances permit    0 =  [x]  1 =    [] 2 =    [] 3 =    []    Sitting and talking to someone  0 =  [x]  1 =    [] 2 =    [] 3 =    []      Sitting quietly after a lunch without alcohol  0 =  [x]  1 =    [] 2 =    [] 3 =    []    In a car, while stopped for a few minutes in traffic                                                                      0 =  [x]  1 =    [] 2 =    [] 3 =    []    Total Score = 2    If your total score is 10 or greater, you are experiencing excessive sleepiness and should consider seeking a medical 
      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  Admitted as inpatient for acute metabolic encephalopathy, UTI and acute on chronic combined CHF.  Started on IV Lasix and IV Abx.  Followed by Cardiology and Renal.    Subjective:  Patient feels SOB.  Has some problems with urostomy care.  No CP, HA or abdominal pain.  No fevers.       Medications:  Reviewed    Infusion Medications    dextrose      sodium chloride       Scheduled Medications    insulin lispro  0-4 Units SubCUTAneous 4x Daily AC & HS    aspirin  81 mg Oral Daily    clopidogrel  75 mg Oral Daily    gabapentin  100 mg Oral BID    carvedilol  12.5 mg Oral BID    isosorbide mononitrate  30 mg Oral Daily    hydrALAZINE  25 mg Oral TID    rosuvastatin  10 mg Oral QPM    sodium bicarbonate  650 mg Oral BID    polyethylene glycol  17 g Oral Daily    sodium chloride flush  5-40 mL IntraVENous 2 times per day    furosemide  40 mg IntraVENous BID    cefTRIAXone (ROCEPHIN) IV  1,000 mg IntraVENous Q24H    lactobacillus  1 capsule Oral BID WC    heparin (porcine)  5,000 Units SubCUTAneous 3 times per day     PRN Meds: dextrose bolus **OR** dextrose bolus, glucagon (rDNA), dextrose, sulfur hexafluoride microspheres, sodium chloride flush, sodium chloride, acetaminophen **OR** acetaminophen, ALPRAZolam, senna, ondansetron      Intake/Output Summary (Last 24 hours) at 12/2/2024 1540  Last data filed at 12/2/2024 0950  Gross per 24 hour   Intake 10 ml   Output 200 ml   Net -190 ml       Physical Exam Performed:    BP (!) 107/52   Pulse 77   Temp 98.1 °F (36.7 °C) (Temporal)   Resp 18   Ht 1.727 m (5' 8\")   Wt 71.5 kg (157 lb 
  [facility] - Inpatient Rehabilitation Department   Phone: [phone]    Physical Therapy    [] Initial Evaluation            [x] Daily Treatment Note         [] Discharge Summary      Patient: Cely Espitia   : 1940   MRN: [id number]   Date of Service:  2024  Admitting Diagnosis: Acute on chronic congestive heart failure, unspecified heart failure type (HCC)  Current Admission Summary: Cely Espitia is a 84 y.o. female with extensive past medical history including cardiac catheterization 2024 with stent placement in the left circumflex who presents to the emergency department 1 to 2 days after a fall with complaint of back pain.  Patient is from [facility].  She is a very poor historian.  Patient believes she fell either 1 or 2 days ago and hit her back on something.  She is not exactly sure what.  She is reporting back pain down her back.  She denies neck pain.  Patient states she then tripped over a laundry yesterday and also fell within.  Patient is denying any head injury, but states her head \"rubbed up against the wall\".  She denies loss of consciousness or vomiting.  Patient is complaining of back pain and bilateral hip pain.  She is otherwise denying acute complaints, but does have abdominal tenderness to palpation on exam. Patient is reporting blurry vision intermittently since this morning.  She is also reporting shortness of breath, but is distractible and normoxic with tachycardia or tachypnea.  Patient is very anxious, and is just repeating over and over that she would like some water and that she is in pain.  She is a poor historian overall.   Past Medical History:  has a past medical history of Anxiety, Atrial fibrillation ([facility]), CAD (coronary artery disease), Cancer ([facility]), Cardiac arrest, Carotid artery stenosis, CHF (congestive heart failure) ([facility]), Chronic kidney disease, Depression, Diabetes mellitus ([facility]), GERD (gastroesophageal reflux disease), Hip pain, 
  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  Admitted as inpatient for acute metabolic encephalopathy, UTI and acute on chronic combined CHF.  Started on IV Lasix and IV Abx.  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  Past Surgical History:  has a past surgical history that includes angioplasty; Hysterectomy; bladder suspension; Cystoscopy (2013); Pacemaker insertion; Kidney removal; Bladder removal; Total hip arthroplasty (Left, 2019); Skin cancer excision; Cardiac procedure (N/A, 2024); Cardiac procedure (N/A, 2024); Upper gastrointestinal endoscopy (N/A, 10/16/2024); Colonoscopy (N/A, 10/16/2024); Cardiac 
  [facility]   Nephrology progress Note  [phone]   [employer]         Reason for Consult:  CATARINA on CKD    HISTORY OF PRESENT ILLNESS:      The patient is a 84 y.o. female with significant past medical history of CKD, Ureteral cancer s/p right Nephrectomy and radical Cystectomy and ileal conduit 2017, Hypertension, DM II, CAD/CHF, Atrial fibrillation, Bladder Cancer, Carotid artery stenosis, Hyperlipidemia, Depression  who presents with fall. Received multiple CT scans on admission, one with iodinated contrast. Noted to have mild CATARINA post this. She follows with Dr. Lassiter  CKD dates back to atleast 2010  Baseline creatinine appears to be low 2s  Creatinine on admission was 2.3 and today its 2.9.     No complains today  Sitter at bedside    Current Medications:    Scheduled Meds:   iron sucrose  300 mg IntraVENous Q24H    insulin lispro  0-4 Units SubCUTAneous 4x Daily AC & HS    aspirin  81 mg Oral Daily    clopidogrel  75 mg Oral Daily    gabapentin  100 mg Oral BID    carvedilol  12.5 mg Oral BID    isosorbide mononitrate  30 mg Oral Daily    hydrALAZINE  25 mg Oral TID    rosuvastatin  10 mg Oral QPM    polyethylene glycol  17 g Oral Daily    sodium chloride flush  5-40 mL IntraVENous 2 times per day    [Held by provider] furosemide  40 mg IntraVENous BID    cefTRIAXone (ROCEPHIN) IV  1,000 mg IntraVENous Q24H    lactobacillus  1 capsule Oral BID WC    heparin (porcine)  5,000 Units SubCUTAneous 3 times per day     Continuous Infusions:   dextrose      sodium chloride       PRN Meds:.dextrose bolus **OR** dextrose bolus, glucagon (rDNA), dextrose, sulfur hexafluoride microspheres, sodium chloride flush, sodium chloride, acetaminophen **OR** acetaminophen, ALPRAZolam, senna, ondansetron       PHYSICAL EXAM:    Vitals:    /60   Pulse 87   Temp 98.1 °F (36.7 °C) (Oral)   Resp 16   Ht 1.727 m (5' 8\")   Wt 68.9 kg (151 lb 14.4 oz)   SpO2 97%   BMI 23.10 kg/m² 
  [facility]   Nephrology progress Note  [phone]   [employer]         Reason for Consult:  CATARINA on CKD    HISTORY OF PRESENT ILLNESS:      The patient is a 84 y.o. female with significant past medical history of CKD, Ureteral cancer s/p right Nephrectomy and radical Cystectomy and ileal conduit 2017, Hypertension, DM II, CAD/CHF, Atrial fibrillation, Bladder Cancer, Carotid artery stenosis, Hyperlipidemia, Depression  who presents with fall. Received multiple CT scans on admission, one with iodinated contrast. Noted to have mild CATARINA post this. She follows with Dr. Lassiter  CKD dates back to atleast 2010  Baseline creatinine appears to be low 2s  Creatinine on admission was 2.3 and today its 2.9.     No complains today  Sitter at bedside  Wants to go home    Current Medications:    Scheduled Meds:   ferric gluconate  187.5 mg IntraVENous Daily    senna  1 tablet Oral Nightly    traZODone  25 mg Oral Nightly    insulin lispro  0-4 Units SubCUTAneous 4x Daily AC & HS    aspirin  81 mg Oral Daily    clopidogrel  75 mg Oral Daily    gabapentin  100 mg Oral BID    carvedilol  12.5 mg Oral BID    isosorbide mononitrate  30 mg Oral Daily    hydrALAZINE  25 mg Oral TID    rosuvastatin  10 mg Oral QPM    polyethylene glycol  17 g Oral Daily    sodium chloride flush  5-40 mL IntraVENous 2 times per day    [Held by provider] furosemide  40 mg IntraVENous BID    lactobacillus  1 capsule Oral BID WC    heparin (porcine)  5,000 Units SubCUTAneous 3 times per day     Continuous Infusions:   dextrose      sodium chloride       PRN Meds:.haloperidol lactate, dextrose bolus **OR** dextrose bolus, glucagon (rDNA), dextrose, sulfur hexafluoride microspheres, sodium chloride flush, sodium chloride, acetaminophen **OR** acetaminophen, senna, ondansetron       PHYSICAL EXAM:    Vitals:    /70   Pulse 87   Temp 98 °F (36.7 °C) (Oral)   Resp 16   Ht 1.727 m (5' 8\")   Wt 68.7 kg (151 
  [facility] - Inpatient Rehabilitation Department   Phone: [phone]    Physical Therapy    [x] Initial Evaluation            [] Daily Treatment Note         [] Discharge Summary      Patient: Cely Espitia   : 1940   MRN: [id number]   Date of Service:  12/3/2024  Admitting Diagnosis: Acute on chronic congestive heart failure, unspecified heart failure type (HCC)  Current Admission Summary: Cely Espitia is a 84 y.o. female with extensive past medical history including cardiac catheterization 2024 with stent placement in the left circumflex who presents to the emergency department 1 to 2 days after a fall with complaint of back pain.  Patient is from [facility].  She is a very poor historian.  Patient believes she fell either 1 or 2 days ago and hit her back on something.  She is not exactly sure what.  She is reporting back pain down her back.  She denies neck pain.  Patient states she then tripped over a laundry yesterday and also fell within.  Patient is denying any head injury, but states her head \"rubbed up against the wall\".  She denies loss of consciousness or vomiting.  Patient is complaining of back pain and bilateral hip pain.  She is otherwise denying acute complaints, but does have abdominal tenderness to palpation on exam. Patient is reporting blurry vision intermittently since this morning.  She is also reporting shortness of breath, but is distractible and normoxic with tachycardia or tachypnea.  Patient is very anxious, and is just repeating over and over that she would like some water and that she is in pain.  She is a poor historian overall.   Past Medical History:  has a past medical history of Anxiety, Atrial fibrillation ([facility]), CAD (coronary artery disease), Cancer ([facility]), Cardiac arrest, Carotid artery stenosis, CHF (congestive heart failure) ([facility]), Chronic kidney disease, Depression, Diabetes mellitus ([facility]), GERD (gastroesophageal reflux disease), Hip pain, 
Initial assessment completed- see doc flowsheets. VSS. Agitated and anxious upon assessment but calmed down after spending time with pt and explaining her situation. New PIV inserted. A&O x3, moderate confusion noted. Able to make needs known. No complaints of pain/discomfort. No S/S of respiratory distress/SOB. Care ongoing.  
Initial assessment completed- see doc flowsheets. VSS. Agitated, anxious, impulsive upon entering room. Stated that she was \"confused\" about the situation and does not understand what was going on and why there was a camera in room. Pt also stated that she felt \"afraid\" and \"in danger\". Did 1 on 1 with patient and explained to her that it is for her safety. Pt verbalized understanding and was cooperative with care. Reassured pt that she is in a safe place and that she is here to get better and we are here to help her do that. No complaints of pain/discomfort. No S/S of respiratory distress/SOB. A&O x4 with some confusion. Able to make needs known. SBA in room with walker. Care ongoing.  
Patient transferred to  from  at 1800. This RN accessed patient, who is AxO4. Patient is very agitated, requesting to return to previous room. Patient educated. Family member called this rn asking to give patient prn xanax. Patient refused prn.    1830:  Patient is still agitated, attempted to reorient. MD notified.  1834:  Code violent called. Patient through tele monitor at this nurse. Charge nurse aware. MD aware.  
Pharmacy Home Medication Reconciliation Note    A medication reconciliation has been completed for Cely Espitia 1940    Pharmacy: [facility] [address], [place], [place]  Information provided by: facility    The patient's home medication list is as follows:  No current facility-administered medications on file prior to encounter.     Current Outpatient Medications on File Prior to Encounter   Medication Sig Dispense Refill    hydrALAZINE (APRESOLINE) 25 MG tablet Take 1 tablet by mouth 3 times daily      insulin lispro, 1 Unit Dial, (HUMALOG KWIKPEN) 100 UNIT/ML SOPN Inject 0-12 Units into the skin 3 times daily (with meals)      insulin lispro, 1 Unit Dial, (HUMALOG KWIKPEN) 100 UNIT/ML SOPN Inject 0-6 Units into the skin nightly      polyethylene glycol (GLYCOLAX) 17 GM/SCOOP powder Take 17 g by mouth daily      carvedilol (COREG) 12.5 MG tablet Take 1.5 tablets by mouth 2 times daily (Patient taking differently: Take 1 tablet by mouth 2 times daily) 270 tablet 3    furosemide (LASIX) 40 MG tablet Take 1 tablet by mouth daily      aspirin 81 MG EC tablet Take 1 tablet by mouth daily HOLD for 30 days after hip surgery. See Eliquis order (Patient taking differently: Take 1 tablet by mouth daily Give one tablet by mouth daily.) 30 tablet 3    sodium bicarbonate 650 MG tablet Take 1 tablet by mouth 2 times daily      rosuvastatin (CRESTOR) 10 MG tablet Take 1 tablet by mouth every evening      Coenzyme Q10 (CO Q 10) 100 MG CAPS Take 1 capsule by mouth daily       therapeutic multivitamin-minerals (THERAGRAN-M) tablet Take 1 tablet by mouth daily Centrum silver      vitamin D (CHOLECALCIFEROL) 1000 UNIT TABS tablet Take 1 tablet by mouth every evening      ondansetron (ZOFRAN) 4 MG tablet Take 1 tablet by mouth every 12 hours as needed for Nausea or Vomiting      clopidogrel (PLAVIX) 75 MG tablet Take 1 tablet by mouth daily (Patient not taking: Reported on 12/1/2024) 30 tablet 3    
Pt picked up by irene Manning. D/c medications sent to home pharmacy. VSS. No needs at this time. Iv out without complications.   
Report called to 5T. All questions answered  
Unable to perform [place] screening, patient asleep.   
CONSULT TO HEART FAILURE NURSE/COORDINATOR  IP CONSULT TO DIETITIAN  IP CONSULT TO NEPHROLOGY  IP CONSULT TO DIABETES EDUCATOR    Assessment/Plan:    Active Hospital Problems    Diagnosis     Fall [W19.XXXA]     Acute on chronic congestive heart failure, unspecified heart failure type (HCC) [I50.9]      Acute on chronic combined CHF  Worsening creat, hold Lasix 40 mg IV bid  Telemetry to r/o arrhythmia  Strict I/O  Daily weight on standing scale  CHF RN consult  FR 1.5 L daily  F/u Cardio consult   E Coli UTI  Continue Rocephin IV  F/U UCx sensitivity  CAD  Continue ASA, Coreg, Plavix, Imdur, Crestor  DM 2 with hyperglycemia:  HbA1c 8.4 on 12/2/24  Add Lantus, cont premeal humalog  SSI  Hypoglycemia orders  CKD 4  Baseline Cr 1.9-2.4  Worsening status of S.creat 2.9   Renal consult to follow    DVT Prophylaxis: Hep SQ  Diet: ADULT DIET; Regular; 4 carb choices (60 gm/meal); No Added Salt (3-4 gm); 1500 ml  Code Status: Full Code  PT/OT Eval Status: Requested    Dispo - [facility] vs SNF    Chris Ramirez MD    
notified    Plan  Frequency: 3-5 x/per week  Current Treatment Recommendations: strengthening, balance training, functional mobility training, transfer training, endurance training, patient/caregiver education, ADL/self-care training, safety education, and equipment evaluation/education    Goals  Patient Goals: to go home    Short Term Goals:  Time Frame: Discharge   Patient will complete upper body ADL at modified independent   Patient will complete lower body ADL at [facility] independent   Patient will complete toileting at modified independent   Patient will complete functional transfers at [facility] independent   Patient will increase [facility] ADL score = to or > than 21/24    Above goals reviewed on 12/3/2024.  All goals are ongoing at this time unless indicated above.       Therapy Session Time     Individual Group Co-treatment   Time In    0926   Time Out    0958   Minutes    32        Timed Code Treatment Minutes:  Timed Code Treatment Minutes: 17 Minutes  Total Treatment Minutes:  32 minutes        Electronically Signed By: GLENDY Gutierrez MOT OTR/L [id number]          
Escherichia coli 12/01/2024 07:00 PM       Urine Cultures: Reviewed.  E. coli.      DISCLAIMER: Please note that some or all of this record was generated using voice recognition software. Efforts were made by me to ensure accuracy, however some errors may be present due to limitations of this technology and occasionally words are not transcribed correctly. If there are any questions about the content of this document, please contact the author.    Electronically signed by: Electronically signed by Chris Ramirez MD on 12/4/2024 at 1:43 PM, 12/4/2024 1:43 PM

## 2024-12-05 NOTE — CARE COORDINATION
12/05/24 1424   IMM Letter   IMM Letter given to Patient/Family/Significant other/Guardian/POA/by: Case Management   IMM Letter date given: 12/05/24   IMM Letter time given: 1405     Electronically signed by LISA Brand, LSW on 12/5/2024 at 2:24 PM

## 2024-12-05 NOTE — CARE COORDINATION
Case Management -  Discharge Note      Patient Name: Cely Espitia                   YOB: 1940  Room: [unfilled]            Readmission Risk (Low < 19, Mod (19-27), High > 27): Readmission Risk Score: 23.8    Current PCP: Marcus Shukla MD      (IMM) Important Message from Medicare:    Has pt received appropriate compliance notices before being discharged if required: yes  Compliance doc:  [x] 2nd IMM; [] Code 44 [] Ross  Date: 12/05/24    PT AM-PAC: 17 /24  OT AM-PAC: 19 /24    Patient/patient representative has been educated on the benefits of HHC as well as the possible risks of declining recommended services. Patient/patient representative has acknowledged the information provided and decided on the following discharge plan. Patient/ patient representative has been provided freedom of choice regarding service provider, supported by basic dialogue that supports the patient's individualized plan of care/goals.    [facility]  [address] [place] [address]  Phone: [phone]  Fax: [phone]       HHC agency notified of discharge:  [x] Yes [] No  [] NA    Family notified of discharge:  [x] Yes  [] No  [] NA    Facility notified of discharge:  [] Yes  [] No  [x] NA     Financial    Payor: AETNA MEDICARE / Plan: AETNA MEDICARE-ADVANTAGE PPO / Product Type: Medicare /     Pharmacy:  Potential assistance Purchasing Medications: No  Meds-to-Beds request: Yes      [id number] - [place], [place] - [address] - P [phone] - F [phone]  [address] [address]  Phone: [phone] Fax: [phone]    [facility] - [place], [place] - [address] - P [phone] - F [phone]  [address] [address]  Phone: [phone] Fax: [phone]      Notes:    Additional Case Management Notes:  SW met with patient to discuss discharge plans and recommendations.  Pt was recommended [facility] and agreeable to this

## 2024-12-06 ENCOUNTER — TELEPHONE (OUTPATIENT)
Dept: OTHER | Age: 84
End: 2024-12-06

## 2024-12-06 NOTE — TELEPHONE ENCOUNTER
[facility]  HEART FAILURE PROGRAM  TELEPHONE ENCOUNTER FORM    Cely Espitia 1940    Attempted to call patient for HF follow-up x 3. No answer at this time.      Next MD/ Clinic appointment: tomorrow with PCP and 12/10 with Dr. Nivia DOBBINS, RN 12/6/2024 10:35 AM       I will SWITCH the dose or number of times a day I take the medications listed below when I get home from the hospital:  None

## 2024-12-09 ENCOUNTER — HOSPITAL ENCOUNTER (INPATIENT)
Age: 84
LOS: 7 days | Discharge: HOME HEALTH | DRG: 291 | End: 2024-12-17
Attending: STUDENT IN AN ORGANIZED HEALTH CARE EDUCATION/TRAINING PROGRAM | Admitting: INTERNAL MEDICINE
Payer: MEDICARE

## 2024-12-09 ENCOUNTER — APPOINTMENT (OUTPATIENT)
Dept: GENERAL RADIOLOGY | Age: 84
DRG: 291 | End: 2024-12-09
Payer: MEDICARE

## 2024-12-09 DIAGNOSIS — R06.89 DYSPNEA AND RESPIRATORY ABNORMALITIES: Primary | ICD-10-CM

## 2024-12-09 DIAGNOSIS — R09.A2 GLOBUS SENSATION: ICD-10-CM

## 2024-12-09 DIAGNOSIS — R06.00 DYSPNEA AND RESPIRATORY ABNORMALITIES: Primary | ICD-10-CM

## 2024-12-09 DIAGNOSIS — F41.8 MIXED ANXIETY DEPRESSIVE DISORDER: ICD-10-CM

## 2024-12-09 DIAGNOSIS — J81.0 ACUTE PULMONARY EDEMA: ICD-10-CM

## 2024-12-09 DIAGNOSIS — I50.43 CHF (CONGESTIVE HEART FAILURE), NYHA CLASS I, ACUTE ON CHRONIC, COMBINED (HCC): ICD-10-CM

## 2024-12-09 DIAGNOSIS — I25.83 CORONARY ATHEROSCLEROSIS DUE TO LIPID RICH PLAQUE: ICD-10-CM

## 2024-12-09 PROBLEM — J90 PLEURAL EFFUSION: Status: ACTIVE | Noted: 2024-12-09

## 2024-12-09 LAB
ALBUMIN SERPL-MCNC: 3.6 G/DL (ref 3.4–5)
ALBUMIN SERPL-MCNC: 3.7 G/DL (ref 3.4–5)
ALBUMIN/GLOB SERPL: 1.3 {RATIO} (ref 1.1–2.2)
ALP SERPL-CCNC: 47 U/L (ref 40–129)
ALP SERPL-CCNC: 48 U/L (ref 40–129)
ALT SERPL-CCNC: 33 U/L (ref 10–40)
ALT SERPL-CCNC: 33 U/L (ref 10–40)
ANION GAP SERPL CALCULATED.3IONS-SCNC: 12 MMOL/L (ref 3–16)
AST SERPL-CCNC: 35 U/L (ref 15–37)
AST SERPL-CCNC: 36 U/L (ref 15–37)
BASOPHILS # BLD: 0 K/UL (ref 0–0.2)
BASOPHILS NFR BLD: 0.9 %
BILIRUB DIRECT SERPL-MCNC: 0.2 MG/DL (ref 0–0.3)
BILIRUB INDIRECT SERPL-MCNC: 0.2 MG/DL (ref 0–1)
BILIRUB SERPL-MCNC: 0.4 MG/DL (ref 0–1)
BILIRUB SERPL-MCNC: 0.4 MG/DL (ref 0–1)
BUN SERPL-MCNC: 34 MG/DL (ref 7–20)
CALCIUM SERPL-MCNC: 9 MG/DL (ref 8.3–10.6)
CHLORIDE SERPL-SCNC: 105 MMOL/L (ref 99–110)
CO2 SERPL-SCNC: 22 MMOL/L (ref 21–32)
CREAT SERPL-MCNC: 2.2 MG/DL (ref 0.6–1.2)
DEPRECATED RDW RBC AUTO: 18.1 % (ref 12.4–15.4)
EKG ATRIAL RATE: 79 BPM
EKG DIAGNOSIS: NORMAL
EKG P AXIS: 55 DEGREES
EKG P-R INTERVAL: 170 MS
EKG Q-T INTERVAL: 412 MS
EKG QRS DURATION: 114 MS
EKG QTC CALCULATION (BAZETT): 435 MS
EKG R AXIS: 49 DEGREES
EKG T AXIS: 3 DEGREES
EKG VENTRICULAR RATE: 67 BPM
EOSINOPHIL # BLD: 0.1 K/UL (ref 0–0.6)
EOSINOPHIL NFR BLD: 2.3 %
FERRITIN SERPL IA-MCNC: 27.4 NG/ML (ref 15–150)
FLUAV RNA RESP QL NAA+PROBE: NOT DETECTED
FLUBV RNA RESP QL NAA+PROBE: NOT DETECTED
GFR SERPLBLD CREATININE-BSD FMLA CKD-EPI: 21 ML/MIN/{1.73_M2}
GLUCOSE BLD-MCNC: 181 MG/DL (ref 70–99)
GLUCOSE BLD-MCNC: 227 MG/DL (ref 70–99)
GLUCOSE BLD-MCNC: 247 MG/DL (ref 70–99)
GLUCOSE BLD-MCNC: 280 MG/DL (ref 70–99)
GLUCOSE SERPL-MCNC: 167 MG/DL (ref 70–99)
HCT VFR BLD AUTO: 27.8 % (ref 36–48)
HGB BLD-MCNC: 8.7 G/DL (ref 12–16)
IRON SATN MFR SERPL: 5 % (ref 15–50)
IRON SERPL-MCNC: 21 UG/DL (ref 37–145)
LYMPHOCYTES # BLD: 0.8 K/UL (ref 1–5.1)
LYMPHOCYTES NFR BLD: 14.6 %
MAGNESIUM SERPL-MCNC: 2.29 MG/DL (ref 1.8–2.4)
MCH RBC QN AUTO: 25.9 PG (ref 26–34)
MCHC RBC AUTO-ENTMCNC: 31.2 G/DL (ref 31–36)
MCV RBC AUTO: 83.3 FL (ref 80–100)
MONOCYTES # BLD: 0.5 K/UL (ref 0–1.3)
MONOCYTES NFR BLD: 9.5 %
NEUTROPHILS # BLD: 4 K/UL (ref 1.7–7.7)
NEUTROPHILS NFR BLD: 72.7 %
NT-PROBNP SERPL-MCNC: ABNORMAL PG/ML (ref 0–449)
PERFORMED ON: ABNORMAL
PLATELET # BLD AUTO: 236 K/UL (ref 135–450)
PMV BLD AUTO: 8 FL (ref 5–10.5)
POTASSIUM SERPL-SCNC: 4.9 MMOL/L (ref 3.5–5.1)
PROT SERPL-MCNC: 6.5 G/DL (ref 6.4–8.2)
PROT SERPL-MCNC: 6.6 G/DL (ref 6.4–8.2)
RBC # BLD AUTO: 3.34 M/UL (ref 4–5.2)
SARS-COV-2 RNA RESP QL NAA+PROBE: NOT DETECTED
SODIUM SERPL-SCNC: 139 MMOL/L (ref 136–145)
TIBC SERPL-MCNC: 425 UG/DL (ref 260–445)
TROPONIN, HIGH SENSITIVITY: 64 NG/L (ref 0–14)
TROPONIN, HIGH SENSITIVITY: 73 NG/L (ref 0–14)
WBC # BLD AUTO: 5.5 K/UL (ref 4–11)

## 2024-12-09 PROCEDURE — 93005 ELECTROCARDIOGRAM TRACING: CPT | Performed by: STUDENT IN AN ORGANIZED HEALTH CARE EDUCATION/TRAINING PROGRAM

## 2024-12-09 PROCEDURE — 83880 ASSAY OF NATRIURETIC PEPTIDE: CPT

## 2024-12-09 PROCEDURE — 2580000003 HC RX 258: Performed by: INTERNAL MEDICINE

## 2024-12-09 PROCEDURE — 93010 ELECTROCARDIOGRAM REPORT: CPT | Performed by: INTERNAL MEDICINE

## 2024-12-09 PROCEDURE — G0378 HOSPITAL OBSERVATION PER HR: HCPCS

## 2024-12-09 PROCEDURE — 83550 IRON BINDING TEST: CPT

## 2024-12-09 PROCEDURE — 80053 COMPREHEN METABOLIC PANEL: CPT

## 2024-12-09 PROCEDURE — 82728 ASSAY OF FERRITIN: CPT

## 2024-12-09 PROCEDURE — 6360000002 HC RX W HCPCS: Performed by: PEDIATRICS

## 2024-12-09 PROCEDURE — 85025 COMPLETE CBC W/AUTO DIFF WBC: CPT

## 2024-12-09 PROCEDURE — 36415 COLL VENOUS BLD VENIPUNCTURE: CPT

## 2024-12-09 PROCEDURE — 6370000000 HC RX 637 (ALT 250 FOR IP): Performed by: INTERNAL MEDICINE

## 2024-12-09 PROCEDURE — 71046 X-RAY EXAM CHEST 2 VIEWS: CPT

## 2024-12-09 PROCEDURE — 84484 ASSAY OF TROPONIN QUANT: CPT

## 2024-12-09 PROCEDURE — 99223 1ST HOSP IP/OBS HIGH 75: CPT | Performed by: INTERNAL MEDICINE

## 2024-12-09 PROCEDURE — 83540 ASSAY OF IRON: CPT

## 2024-12-09 PROCEDURE — 6360000002 HC RX W HCPCS: Performed by: INTERNAL MEDICINE

## 2024-12-09 PROCEDURE — 87636 SARSCOV2 & INF A&B AMP PRB: CPT

## 2024-12-09 PROCEDURE — 83735 ASSAY OF MAGNESIUM: CPT

## 2024-12-09 PROCEDURE — 99285 EMERGENCY DEPT VISIT HI MDM: CPT

## 2024-12-09 RX ORDER — POLYETHYLENE GLYCOL 3350 17 G/17G
17 POWDER, FOR SOLUTION ORAL DAILY PRN
Status: DISCONTINUED | OUTPATIENT
Start: 2024-12-09 | End: 2024-12-09

## 2024-12-09 RX ORDER — INSULIN LISPRO 100 [IU]/ML
0-8 INJECTION, SOLUTION INTRAVENOUS; SUBCUTANEOUS
Status: DISCONTINUED | OUTPATIENT
Start: 2024-12-09 | End: 2024-12-10

## 2024-12-09 RX ORDER — ACETAMINOPHEN 325 MG/1
650 TABLET ORAL EVERY 6 HOURS PRN
Status: DISCONTINUED | OUTPATIENT
Start: 2024-12-09 | End: 2024-12-17 | Stop reason: HOSPADM

## 2024-12-09 RX ORDER — CARVEDILOL 6.25 MG/1
18.68 TABLET ORAL 2 TIMES DAILY
Status: DISCONTINUED | OUTPATIENT
Start: 2024-12-09 | End: 2024-12-09

## 2024-12-09 RX ORDER — CLOPIDOGREL BISULFATE 75 MG/1
75 TABLET ORAL DAILY
Status: DISCONTINUED | OUTPATIENT
Start: 2024-12-09 | End: 2024-12-17 | Stop reason: HOSPADM

## 2024-12-09 RX ORDER — ONDANSETRON 2 MG/ML
4 INJECTION INTRAMUSCULAR; INTRAVENOUS EVERY 6 HOURS PRN
Status: DISCONTINUED | OUTPATIENT
Start: 2024-12-09 | End: 2024-12-17 | Stop reason: HOSPADM

## 2024-12-09 RX ORDER — FUROSEMIDE 10 MG/ML
20 INJECTION INTRAMUSCULAR; INTRAVENOUS ONCE
Status: COMPLETED | OUTPATIENT
Start: 2024-12-09 | End: 2024-12-09

## 2024-12-09 RX ORDER — HYDRALAZINE HYDROCHLORIDE 25 MG/1
25 TABLET, FILM COATED ORAL 3 TIMES DAILY
Status: DISCONTINUED | OUTPATIENT
Start: 2024-12-09 | End: 2024-12-09

## 2024-12-09 RX ORDER — SODIUM CHLORIDE 0.9 % (FLUSH) 0.9 %
5-40 SYRINGE (ML) INJECTION EVERY 12 HOURS SCHEDULED
Status: DISCONTINUED | OUTPATIENT
Start: 2024-12-09 | End: 2024-12-12

## 2024-12-09 RX ORDER — ROSUVASTATIN CALCIUM 10 MG/1
10 TABLET, COATED ORAL EVERY EVENING
Status: DISCONTINUED | OUTPATIENT
Start: 2024-12-09 | End: 2024-12-17 | Stop reason: HOSPADM

## 2024-12-09 RX ORDER — POLYETHYLENE GLYCOL 3350 17 G/17G
17 POWDER, FOR SOLUTION ORAL DAILY PRN
Status: DISCONTINUED | OUTPATIENT
Start: 2024-12-09 | End: 2024-12-17 | Stop reason: HOSPADM

## 2024-12-09 RX ORDER — ENOXAPARIN SODIUM 100 MG/ML
30 INJECTION SUBCUTANEOUS DAILY
Status: DISCONTINUED | OUTPATIENT
Start: 2024-12-09 | End: 2024-12-10

## 2024-12-09 RX ORDER — ALPRAZOLAM 0.25 MG/1
0.25 TABLET ORAL NIGHTLY PRN
Status: ON HOLD | COMMUNITY
End: 2024-12-13

## 2024-12-09 RX ORDER — ACETAMINOPHEN 650 MG/1
650 SUPPOSITORY RECTAL EVERY 6 HOURS PRN
Status: DISCONTINUED | OUTPATIENT
Start: 2024-12-09 | End: 2024-12-17 | Stop reason: HOSPADM

## 2024-12-09 RX ORDER — FLUTICASONE PROPIONATE 50 MCG
1 SPRAY, SUSPENSION (ML) NASAL DAILY PRN
Status: DISCONTINUED | OUTPATIENT
Start: 2024-12-09 | End: 2024-12-17 | Stop reason: HOSPADM

## 2024-12-09 RX ORDER — DEXTROSE MONOHYDRATE 100 MG/ML
INJECTION, SOLUTION INTRAVENOUS CONTINUOUS PRN
Status: DISCONTINUED | OUTPATIENT
Start: 2024-12-09 | End: 2024-12-17 | Stop reason: HOSPADM

## 2024-12-09 RX ORDER — SODIUM CHLORIDE 0.9 % (FLUSH) 0.9 %
5-40 SYRINGE (ML) INJECTION PRN
Status: DISCONTINUED | OUTPATIENT
Start: 2024-12-09 | End: 2024-12-17 | Stop reason: HOSPADM

## 2024-12-09 RX ORDER — SODIUM CHLORIDE 9 MG/ML
INJECTION, SOLUTION INTRAVENOUS PRN
Status: DISCONTINUED | OUTPATIENT
Start: 2024-12-09 | End: 2024-12-17 | Stop reason: HOSPADM

## 2024-12-09 RX ORDER — HYDRALAZINE HYDROCHLORIDE 25 MG/1
25 TABLET, FILM COATED ORAL 3 TIMES DAILY
Status: DISCONTINUED | OUTPATIENT
Start: 2024-12-09 | End: 2024-12-16

## 2024-12-09 RX ORDER — ONDANSETRON 4 MG/1
4 TABLET, ORALLY DISINTEGRATING ORAL EVERY 12 HOURS PRN
COMMUNITY

## 2024-12-09 RX ORDER — ONDANSETRON 4 MG/1
4 TABLET, ORALLY DISINTEGRATING ORAL EVERY 8 HOURS PRN
Status: DISCONTINUED | OUTPATIENT
Start: 2024-12-09 | End: 2024-12-17 | Stop reason: HOSPADM

## 2024-12-09 RX ORDER — CLOPIDOGREL BISULFATE 75 MG/1
75 TABLET ORAL DAILY
Status: DISCONTINUED | OUTPATIENT
Start: 2024-12-10 | End: 2024-12-09

## 2024-12-09 RX ORDER — SODIUM CHLORIDE 0.9 % (FLUSH) 0.9 %
5-40 SYRINGE (ML) INJECTION EVERY 12 HOURS SCHEDULED
Status: DISCONTINUED | OUTPATIENT
Start: 2024-12-09 | End: 2024-12-17 | Stop reason: HOSPADM

## 2024-12-09 RX ORDER — FUROSEMIDE 40 MG/1
40 TABLET ORAL DAILY
Status: ON HOLD | COMMUNITY
End: 2024-12-12 | Stop reason: HOSPADM

## 2024-12-09 RX ORDER — GLUCAGON 1 MG/ML
1 KIT INJECTION PRN
Status: DISCONTINUED | OUTPATIENT
Start: 2024-12-09 | End: 2024-12-17 | Stop reason: HOSPADM

## 2024-12-09 RX ORDER — VITAMIN B COMPLEX
1000 TABLET ORAL EVERY EVENING
Status: DISCONTINUED | OUTPATIENT
Start: 2024-12-09 | End: 2024-12-17 | Stop reason: HOSPADM

## 2024-12-09 RX ORDER — M-VIT,TX,IRON,MINS/CALC/FOLIC 27MG-0.4MG
1 TABLET ORAL DAILY
Status: DISCONTINUED | OUTPATIENT
Start: 2024-12-09 | End: 2024-12-17 | Stop reason: HOSPADM

## 2024-12-09 RX ORDER — ISOSORBIDE MONONITRATE 30 MG/1
30 TABLET, EXTENDED RELEASE ORAL DAILY
Status: DISCONTINUED | OUTPATIENT
Start: 2024-12-09 | End: 2024-12-17 | Stop reason: HOSPADM

## 2024-12-09 RX ORDER — ASPIRIN 81 MG/1
81 TABLET ORAL DAILY
Status: DISCONTINUED | OUTPATIENT
Start: 2024-12-09 | End: 2024-12-17 | Stop reason: HOSPADM

## 2024-12-09 RX ORDER — FUROSEMIDE 10 MG/ML
40 INJECTION INTRAMUSCULAR; INTRAVENOUS DAILY
Status: DISCONTINUED | OUTPATIENT
Start: 2024-12-09 | End: 2024-12-10

## 2024-12-09 RX ORDER — FERROUS SULFATE 325(65) MG
325 TABLET ORAL
Status: DISCONTINUED | OUTPATIENT
Start: 2024-12-09 | End: 2024-12-17 | Stop reason: HOSPADM

## 2024-12-09 RX ORDER — CARVEDILOL 6.25 MG/1
18.68 TABLET ORAL 2 TIMES DAILY
Status: DISCONTINUED | OUTPATIENT
Start: 2024-12-09 | End: 2024-12-10

## 2024-12-09 RX ORDER — POLYETHYLENE GLYCOL 3350 17 G/17G
17 POWDER, FOR SOLUTION ORAL DAILY
Status: DISCONTINUED | OUTPATIENT
Start: 2024-12-09 | End: 2024-12-17 | Stop reason: HOSPADM

## 2024-12-09 RX ADMIN — CARVEDILOL 18.68 MG: 6.25 TABLET, FILM COATED ORAL at 20:39

## 2024-12-09 RX ADMIN — HYDRALAZINE HYDROCHLORIDE 25 MG: 25 TABLET ORAL at 16:14

## 2024-12-09 RX ADMIN — SODIUM CHLORIDE, PRESERVATIVE FREE 10 ML: 5 INJECTION INTRAVENOUS at 16:16

## 2024-12-09 RX ADMIN — FERROUS SULFATE TAB 325 MG (65 MG ELEMENTAL FE) 325 MG: 325 (65 FE) TAB at 16:14

## 2024-12-09 RX ADMIN — Medication 1 TABLET: at 16:14

## 2024-12-09 RX ADMIN — SODIUM CHLORIDE, PRESERVATIVE FREE 10 ML: 5 INJECTION INTRAVENOUS at 20:40

## 2024-12-09 RX ADMIN — Medication 1000 UNITS: at 20:39

## 2024-12-09 RX ADMIN — INSULIN LISPRO 2 UNITS: 100 INJECTION, SOLUTION INTRAVENOUS; SUBCUTANEOUS at 20:39

## 2024-12-09 RX ADMIN — ISOSORBIDE MONONITRATE 30 MG: 30 TABLET, EXTENDED RELEASE ORAL at 16:14

## 2024-12-09 RX ADMIN — POLYETHYLENE GLYCOL 3350 17 G: 17 POWDER, FOR SOLUTION ORAL at 16:15

## 2024-12-09 RX ADMIN — FUROSEMIDE 20 MG: 10 INJECTION, SOLUTION INTRAMUSCULAR; INTRAVENOUS at 07:25

## 2024-12-09 RX ADMIN — ENOXAPARIN SODIUM 30 MG: 100 INJECTION SUBCUTANEOUS at 16:15

## 2024-12-09 RX ADMIN — ROSUVASTATIN CALCIUM 10 MG: 10 TABLET, FILM COATED ORAL at 20:39

## 2024-12-09 RX ADMIN — FUROSEMIDE 40 MG: 10 INJECTION, SOLUTION INTRAMUSCULAR; INTRAVENOUS at 16:15

## 2024-12-09 RX ADMIN — HYDRALAZINE HYDROCHLORIDE 25 MG: 25 TABLET ORAL at 20:39

## 2024-12-09 RX ADMIN — ASPIRIN 81 MG: 81 TABLET, COATED ORAL at 16:19

## 2024-12-09 RX ADMIN — CLOPIDOGREL BISULFATE 75 MG: 75 TABLET ORAL at 16:19

## 2024-12-09 ASSESSMENT — PAIN SCALES - GENERAL: PAINLEVEL_OUTOF10: 0

## 2024-12-09 ASSESSMENT — PAIN - FUNCTIONAL ASSESSMENT: PAIN_FUNCTIONAL_ASSESSMENT: NONE - DENIES PAIN

## 2024-12-09 NOTE — CONSULTS
[facility]      CONSULTATION  [phone]      Chief Complaint   Patient presents with    Other     Pt to ED from [facility] states sometimes when she falls asleep she wakes up feeling like she is \"gasping\" and has been having trouble with swallowing all for about the past three months. NAD, resp e/u on RA.  per EMS.        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  Severe mitral regurgitation  Iron deficiency anemia  CKD stage 4 - Cr 2.2 today (recently ~2.5)  Type 2 DM  Elevated troponin, downtrending  S/P ICD      Symptoms, EKG and troponin trend are reassuring and not consistent with ACS  Patient appears euvolemic despite markedly elevated BNP levels  Chest X-ray findings are overall fairly mild and patient is stable on room air and not complaining of dyspnea  She was markedly iron deficient a few days ago and might benefit from IV iron  Her mitral regurgitation has been severe but may have improved with her recent PCI      Defer diuretic management to nephrology  GDMT   Will avoid ACE/ARB/ARNI/MRA at the moment due to tenuous renal function.  Continue Imdur/hydralazine in lieu of RAAS inhibitors  Continue cardvedilol 12.5 mg po BID  Strict I/O, daily weights  Check iron studies, replete with IV iron if ferritin < 100, or if FeSat is < 20% and ferritin < 300.   Ischemic workup  - just had cath and stent to Lcx, other vessels nonobstructive.  No angina, troponins are downtrending, EKG is nonischemic - no indication for additional cath or stress testing at this time  No need to check further troponins unless new symptoms concerning for ischemia  Repeat COMPLETE echo to re-evaluate LVEF, wall motion, and degree of MR (had complete study 8/2024 at which time LVEF ~35% but this was prior to revasscularization).  If she is otherwise stable for

## 2024-12-09 NOTE — ED NOTES
Went in to draw repeat troponin.  Pt refusing stating she does not want any of that stuff in her.  She also is refusing admission to the hospital.  MD informed they are in to speak with pt

## 2024-12-09 NOTE — H&P
GFRAA 40 11/02/2021 05:34 AM    GFRAA 51 05/28/2013 06:17 AM    LABGLOM 21 12/09/2024 05:21 AM    LABGLOM 25 02/06/2024 12:57 PM    GLUCOSE 167 12/09/2024 05:21 AM     XR CHEST (2 VW)   Preliminary Result   Mild edema with bilateral pleural effusions.             Discussed case  with ED physician    Problem List  Principal Problem:    Pleural effusion  Active Problems:    CHF (congestive heart failure), NYHA class I, acute on chronic, combined (HCC)  Resolved Problems:    * No resolved hospital problems. *        Assessment/Plan:     Acute on chronic combined heart failure; p/w dyspnea  proBNP> 90K on admission.  CXR with pulmonary congestion and bilateral pleural effusions.    Last echo on 8/24 showed EF of 35 to 40%.  Grade 3 diastolic dysfunction  Cardiology on board; continue Lasix, Coreg, hydralazine and Imdur.    No ACE/ARB/Arni due to CKD.  Daily weights.  Fluid and salt restriction  Monitor electrolytes closely while on Lasix and replace as needed    Hx of CAD; s/p complex PCI and stent to OM on 11/26/24.  Continue aspirin, Plavix and statin    Elevated troponin; likely 2/2 CHF exacerbation.   HST 73-64 EKG negative for acute ischemic pathology.  Monitor on telemetry    CKD stage IV; creatinine baseline (low 2's)   Nephrology consulted; continue IV Lasix.    Avoid nephrotoxin, daily weights, strict I&O's.  Monitor renal function closely    Hypertension; continue current regimen and monitor BP closely    Diabetes mellitus; HgbA1c 8.4 on 12/2/2014.  Continue Lantus and SSI and monitor BG closely    Iron deficiency anemia; Hgb 8.7 on admission  Iron studies on 12/3/2024 suggestive of iron deficiency.  Continue iron supplement.  Monitor CBC closely    Hyperlipidemia; continue statins      DVT prophylaxis: Lovenox  Code status: Full    Admit as inpatient.  I anticipate hospitalization spanning less than two midnights for investigation and treatment of the above medically necessary diagnoses.    Please note

## 2024-12-09 NOTE — ED NOTES
ED TO INPATIENT SBAR HANDOFF    Patient Name: Cely Espitia   :  1940  84 y.o.   MRN:  [id number]  Preferred Name  Cely  ED Room #:  ED-0011/11  Family/Caregiver Present no   Restraints no   Sitter no   Sepsis Risk Score      Situation  Code Status: Full Code No additional code details.    Allergies: Amlodipine besylate, Aspirin, Calcium channel blockers, Citalopram hydrobromide, Losartan, Pneumococcal vaccine, Simvastatin, Valsartan-hydrochlorothiazide, Venlafaxine, Verelan [verapamil], Flagyl [metronidazole], Hydrocodone-acetaminophen, and Nitrofurantoin  Weight: Patient Vitals for the past 96 hrs (Last 3 readings):   Weight   24 0349 64.9 kg (143 lb)     Arrived from: [facility]  Chief Complaint:   Chief Complaint   Patient presents with    Other     Pt to ED from [facility] states sometimes when she falls asleep she wakes up feeling like she is \"gasping\" and has been having trouble with swallowing all for about the past three months. NAD, resp e/u on RA.  per EMS.     Hospital Problem/Diagnosis:  Principal Problem:    Pleural effusion  Active Problems:    CHF (congestive heart failure), NYHA class I, acute on chronic, combined (HCC)  Resolved Problems:    * No resolved hospital problems. *    Imaging:   XR CHEST (2 VW)   Preliminary Result   Mild edema with bilateral pleural effusions.           Abnormal labs:   Abnormal Labs Reviewed   CBC WITH AUTO DIFFERENTIAL - Abnormal; Notable for the following components:       Result Value    RBC 3.34 (*)     Hemoglobin 8.7 (*)     Hematocrit 27.8 (*)     MCH 25.9 (*)     RDW 18.1 (*)     Lymphocytes Absolute 0.8 (*)     All other components within normal limits   TROPONIN - Abnormal; Notable for the following components:    Troponin, High Sensitivity 73 (*)     All other components within normal limits   TROPONIN - Abnormal; Notable for the following components:    Troponin, High Sensitivity

## 2024-12-09 NOTE — ED PROVIDER NOTES
showing any acute ischemic changes and she is currently chest pain-free.  High-sensitivity troponin is elevated in the 70s but this has downtrended from last month when she did undergo left circumflex stenting.  Low suspicion for acute ACS, some of this troponin elevation is likely secondary to her CKD which is stable today.  Chest x-ray showing some pulmonary edema and pleural effusions which may explain her symptoms.  I given her small dose of IV Lasix.  COVID and flu swabs are negative.  CBC with some stable anemia.  Last echocardiogram from April reviewed with global hypokinesis, EF of 35 to 40%.  I recommend admission for ACS rule out and heart failure consultation, gentle diuresis at this point.      She is very frustrated that she has these findings on her workup today as she was just admitted to the hospital last month. After discussing risks of leaving AMA she is agreeable to stay in the hospital today for further treatment.  Disposition Considerations (Tests not ordered but considered, Shared Decision Making, Pt Expectation of Test or Tx.):  MRI not deemed clinically necessary in the ED setting  Social Determinants : None          PROCEDURES:  Unless otherwise noted below, none     Procedures      FINAL IMPRESSION      1. Dyspnea and respiratory abnormalities    2. Coronary atherosclerosis due to lipid rich plaque    3. Globus sensation    4. Acute pulmonary edema          DISPOSITION/PLAN   DISPOSITION Admitted 12/09/2024 06:25:06 AM      PATIENT REFERRED TO:  No follow-up provider specified.    DISCHARGE MEDICATIONS:      New Prescriptions    No medications on file       Controlled Substances Monitoring:    If the patient was prescribed a controlled substance today, the PDMP was reviewed as documented below.    RX Monitoring Attestation   6/18/2017   6:58 AM The Prescription Monitoring Report for this patient was reviewed today.       (Please note that portions of this note were completed with a voice

## 2024-12-10 ENCOUNTER — APPOINTMENT (OUTPATIENT)
Age: 84
DRG: 291 | End: 2024-12-10
Attending: INTERNAL MEDICINE
Payer: MEDICARE

## 2024-12-10 LAB
ANION GAP SERPL CALCULATED.3IONS-SCNC: 12 MMOL/L (ref 3–16)
BASOPHILS # BLD: 0 K/UL (ref 0–0.2)
BASOPHILS NFR BLD: 0.7 %
BUN SERPL-MCNC: 38 MG/DL (ref 7–20)
CALCIUM SERPL-MCNC: 9 MG/DL (ref 8.3–10.6)
CHLORIDE SERPL-SCNC: 102 MMOL/L (ref 99–110)
CO2 SERPL-SCNC: 24 MMOL/L (ref 21–32)
CREAT SERPL-MCNC: 2.3 MG/DL (ref 0.6–1.2)
DEPRECATED RDW RBC AUTO: 17.9 % (ref 12.4–15.4)
ECHO AO ASC DIAM: 2.7 CM
ECHO AO ASCENDING AORTA INDEX: 1.53 CM/M2
ECHO AO ROOT DIAM: 2.7 CM
ECHO AO ROOT INDEX: 1.53 CM/M2
ECHO AR MAX VEL PISA: 3.6 M/S
ECHO AV AREA PEAK VELOCITY: 1.5 CM2
ECHO AV AREA VTI: 1.4 CM2
ECHO AV AREA/BSA PEAK VELOCITY: 0.9 CM2/M2
ECHO AV AREA/BSA VTI: 0.8 CM2/M2
ECHO AV MEAN GRADIENT: 5 MMHG
ECHO AV MEAN VELOCITY: 1.1 M/S
ECHO AV PEAK GRADIENT: 9 MMHG
ECHO AV PEAK VELOCITY: 1.5 M/S
ECHO AV REGURGITANT PHT: 281 MS
ECHO AV VELOCITY RATIO: 0.53
ECHO AV VTI: 31.5 CM
ECHO BSA: 1.75 M2
ECHO EST RA PRESSURE: 15 MMHG
ECHO LA AREA 2C: 29.5 CM2
ECHO LA AREA 4C: 30.9 CM2
ECHO LA DIAMETER INDEX: 2.84 CM/M2
ECHO LA DIAMETER: 5 CM
ECHO LA MAJOR AXIS: 6.8 CM
ECHO LA MINOR AXIS: 6.6 CM
ECHO LA TO AORTIC ROOT RATIO: 1.85
ECHO LA VOL BP: 112 ML (ref 22–52)
ECHO LA VOL MOD A2C: 110 ML (ref 22–52)
ECHO LA VOL MOD A4C: 110 ML (ref 22–52)
ECHO LA VOL/BSA BIPLANE: 64 ML/M2 (ref 16–34)
ECHO LA VOLUME INDEX MOD A2C: 63 ML/M2 (ref 16–34)
ECHO LA VOLUME INDEX MOD A4C: 63 ML/M2 (ref 16–34)
ECHO LV EDV A2C: 174 ML
ECHO LV EDV A4C: 166 ML
ECHO LV EDV INDEX A4C: 94 ML/M2
ECHO LV EDV NDEX A2C: 99 ML/M2
ECHO LV EJECTION FRACTION A2C: 26 %
ECHO LV EJECTION FRACTION A4C: 31 %
ECHO LV EJECTION FRACTION BIPLANE: 28 % (ref 55–100)
ECHO LV ESV A2C: 128 ML
ECHO LV ESV A4C: 115 ML
ECHO LV ESV INDEX A2C: 73 ML/M2
ECHO LV ESV INDEX A4C: 65 ML/M2
ECHO LV FRACTIONAL SHORTENING: 10 % (ref 28–44)
ECHO LV INTERNAL DIMENSION DIASTOLE INDEX: 3.52 CM/M2
ECHO LV INTERNAL DIMENSION DIASTOLIC: 6.2 CM (ref 3.9–5.3)
ECHO LV INTERNAL DIMENSION SYSTOLIC INDEX: 3.18 CM/M2
ECHO LV INTERNAL DIMENSION SYSTOLIC: 5.6 CM
ECHO LV IVSD: 1 CM (ref 0.6–0.9)
ECHO LV MASS 2D: 212.5 G (ref 67–162)
ECHO LV MASS INDEX 2D: 120.7 G/M2 (ref 43–95)
ECHO LV POSTERIOR WALL DIASTOLIC: 0.7 CM (ref 0.6–0.9)
ECHO LV RELATIVE WALL THICKNESS RATIO: 0.23
ECHO LVOT AREA: 2.8 CM2
ECHO LVOT AV VTI INDEX: 0.49
ECHO LVOT DIAM: 1.9 CM
ECHO LVOT MEAN GRADIENT: 1 MMHG
ECHO LVOT PEAK GRADIENT: 2 MMHG
ECHO LVOT PEAK VELOCITY: 0.8 M/S
ECHO LVOT STROKE VOLUME INDEX: 24.6 ML/M2
ECHO LVOT SV: 43.4 ML
ECHO LVOT VTI: 15.3 CM
ECHO MV A VELOCITY: 0.7 M/S
ECHO MV AREA VTI: 1.4 CM2
ECHO MV E VELOCITY: 1.22 M/S
ECHO MV E/A RATIO: 1.74
ECHO MV LVOT VTI INDEX: 2
ECHO MV MAX VELOCITY: 1.7 M/S
ECHO MV MEAN GRADIENT: 2 MMHG
ECHO MV MEAN VELOCITY: 0.6 M/S
ECHO MV PEAK GRADIENT: 11 MMHG
ECHO MV REGURGITANT ALIASING (NYQUIST) VELOCITY: 31 CM/S
ECHO MV REGURGITANT PEAK GRADIENT: 174 MMHG
ECHO MV REGURGITANT PEAK VELOCITY: 6.6 M/S
ECHO MV REGURGITANT RADIUS PISA: 0.9 CM
ECHO MV REGURGITANT VTIA: 198 CM
ECHO MV VTI: 30.6 CM
ECHO PULMONARY ARTERY END DIASTOLIC PRESSURE: 8 MMHG
ECHO PV MAX VELOCITY: 0.8 M/S
ECHO PV PEAK GRADIENT: 3 MMHG
ECHO PV REGURGITANT MAX VELOCITY: 1.4 M/S
ECHO RA AREA 4C: 18.3 CM2
ECHO RA END SYSTOLIC VOLUME APICAL 4 CHAMBER INDEX BSA: 29 ML/M2
ECHO RA VOLUME: 51 ML
ECHO RIGHT VENTRICULAR SYSTOLIC PRESSURE (RVSP): 67 MMHG
ECHO RV BASAL DIMENSION: 4.4 CM
ECHO RV FRACTIONAL AREA CHANGE: 21 %
ECHO RV FREE WALL PEAK S': 8.2 CM/S
ECHO RV LONGITUDINAL DIMENSION: 8.2 CM
ECHO RV MID DIMENSION: 3.9 CM
ECHO RV TAPSE: 1.3 CM (ref 1.7–?)
ECHO TV ALIASING VELOCITY (NYQUIST): 23 CM/S
ECHO TV REGURGITANT MAX VELOCITY: 3.61 M/S
ECHO TV REGURGITANT PEAK GRADIENT: 52 MMHG
ECHO TV REGURGITANT RADIUS PISA: 1.1 CM
EOSINOPHIL # BLD: 0.1 K/UL (ref 0–0.6)
EOSINOPHIL NFR BLD: 3 %
EST. AVERAGE GLUCOSE BLD GHB EST-MCNC: 194.4 MG/DL
GFR SERPLBLD CREATININE-BSD FMLA CKD-EPI: 20 ML/MIN/{1.73_M2}
GLUCOSE BLD-MCNC: 139 MG/DL (ref 70–99)
GLUCOSE BLD-MCNC: 229 MG/DL (ref 70–99)
GLUCOSE BLD-MCNC: 261 MG/DL (ref 70–99)
GLUCOSE BLD-MCNC: 294 MG/DL (ref 70–99)
GLUCOSE SERPL-MCNC: 136 MG/DL (ref 70–99)
HBA1C MFR BLD: 8.4 %
HCT VFR BLD AUTO: 25.3 % (ref 36–48)
HGB BLD-MCNC: 8 G/DL (ref 12–16)
LYMPHOCYTES # BLD: 0.7 K/UL (ref 1–5.1)
LYMPHOCYTES NFR BLD: 13.7 %
MAGNESIUM SERPL-MCNC: 2.17 MG/DL (ref 1.8–2.4)
MCH RBC QN AUTO: 25.9 PG (ref 26–34)
MCHC RBC AUTO-ENTMCNC: 31.8 G/DL (ref 31–36)
MCV RBC AUTO: 81.5 FL (ref 80–100)
MONOCYTES # BLD: 0.6 K/UL (ref 0–1.3)
MONOCYTES NFR BLD: 12.2 %
NEUTROPHILS # BLD: 3.4 K/UL (ref 1.7–7.7)
NEUTROPHILS NFR BLD: 70.4 %
PERFORMED ON: ABNORMAL
PLATELET # BLD AUTO: 235 K/UL (ref 135–450)
PMV BLD AUTO: 8 FL (ref 5–10.5)
POTASSIUM SERPL-SCNC: 4.4 MMOL/L (ref 3.5–5.1)
RBC # BLD AUTO: 3.1 M/UL (ref 4–5.2)
SODIUM SERPL-SCNC: 138 MMOL/L (ref 136–145)
WBC # BLD AUTO: 4.8 K/UL (ref 4–11)

## 2024-12-10 PROCEDURE — 83036 HEMOGLOBIN GLYCOSYLATED A1C: CPT

## 2024-12-10 PROCEDURE — 85025 COMPLETE CBC W/AUTO DIFF WBC: CPT

## 2024-12-10 PROCEDURE — 36415 COLL VENOUS BLD VENIPUNCTURE: CPT

## 2024-12-10 PROCEDURE — 93306 TTE W/DOPPLER COMPLETE: CPT | Performed by: INTERNAL MEDICINE

## 2024-12-10 PROCEDURE — 83735 ASSAY OF MAGNESIUM: CPT

## 2024-12-10 PROCEDURE — 99233 SBSQ HOSP IP/OBS HIGH 50: CPT

## 2024-12-10 PROCEDURE — 76376 3D RENDER W/INTRP POSTPROCES: CPT | Performed by: INTERNAL MEDICINE

## 2024-12-10 PROCEDURE — 6370000000 HC RX 637 (ALT 250 FOR IP): Performed by: INTERNAL MEDICINE

## 2024-12-10 PROCEDURE — 6360000002 HC RX W HCPCS: Performed by: INTERNAL MEDICINE

## 2024-12-10 PROCEDURE — 2580000003 HC RX 258: Performed by: INTERNAL MEDICINE

## 2024-12-10 PROCEDURE — 76376 3D RENDER W/INTRP POSTPROCES: CPT

## 2024-12-10 PROCEDURE — G0378 HOSPITAL OBSERVATION PER HR: HCPCS

## 2024-12-10 PROCEDURE — 97162 PT EVAL MOD COMPLEX 30 MIN: CPT

## 2024-12-10 PROCEDURE — 97161 PT EVAL LOW COMPLEX 20 MIN: CPT

## 2024-12-10 PROCEDURE — 97166 OT EVAL MOD COMPLEX 45 MIN: CPT

## 2024-12-10 PROCEDURE — 97116 GAIT TRAINING THERAPY: CPT

## 2024-12-10 PROCEDURE — 80048 BASIC METABOLIC PNL TOTAL CA: CPT

## 2024-12-10 PROCEDURE — 6360000002 HC RX W HCPCS: Performed by: PEDIATRICS

## 2024-12-10 PROCEDURE — 97530 THERAPEUTIC ACTIVITIES: CPT

## 2024-12-10 RX ORDER — HEPARIN SODIUM 5000 [USP'U]/ML
5000 INJECTION, SOLUTION INTRAVENOUS; SUBCUTANEOUS 2 TIMES DAILY
Status: DISCONTINUED | OUTPATIENT
Start: 2024-12-11 | End: 2024-12-17 | Stop reason: HOSPADM

## 2024-12-10 RX ORDER — CARVEDILOL 6.25 MG/1
18.75 TABLET ORAL 2 TIMES DAILY
Status: DISCONTINUED | OUTPATIENT
Start: 2024-12-10 | End: 2024-12-11

## 2024-12-10 RX ORDER — FUROSEMIDE 40 MG/1
40 TABLET ORAL 2 TIMES DAILY
Status: DISCONTINUED | OUTPATIENT
Start: 2024-12-10 | End: 2024-12-15

## 2024-12-10 RX ORDER — HEPARIN SODIUM 5000 [USP'U]/ML
5000 INJECTION, SOLUTION INTRAVENOUS; SUBCUTANEOUS 2 TIMES DAILY
Status: DISCONTINUED | OUTPATIENT
Start: 2024-12-10 | End: 2024-12-10

## 2024-12-10 RX ORDER — INSULIN LISPRO 100 [IU]/ML
0-16 INJECTION, SOLUTION INTRAVENOUS; SUBCUTANEOUS
Status: DISCONTINUED | OUTPATIENT
Start: 2024-12-10 | End: 2024-12-17 | Stop reason: HOSPADM

## 2024-12-10 RX ORDER — CARVEDILOL 6.25 MG/1
18.75 TABLET ORAL 2 TIMES DAILY
Status: DISCONTINUED | OUTPATIENT
Start: 2024-12-11 | End: 2024-12-10

## 2024-12-10 RX ADMIN — ISOSORBIDE MONONITRATE 30 MG: 30 TABLET, EXTENDED RELEASE ORAL at 08:44

## 2024-12-10 RX ADMIN — INSULIN LISPRO 4 UNITS: 100 INJECTION, SOLUTION INTRAVENOUS; SUBCUTANEOUS at 17:32

## 2024-12-10 RX ADMIN — SODIUM CHLORIDE, PRESERVATIVE FREE 10 ML: 5 INJECTION INTRAVENOUS at 08:45

## 2024-12-10 RX ADMIN — FERROUS SULFATE TAB 325 MG (65 MG ELEMENTAL FE) 325 MG: 325 (65 FE) TAB at 17:33

## 2024-12-10 RX ADMIN — FERROUS SULFATE TAB 325 MG (65 MG ELEMENTAL FE) 325 MG: 325 (65 FE) TAB at 06:34

## 2024-12-10 RX ADMIN — CLOPIDOGREL BISULFATE 75 MG: 75 TABLET ORAL at 08:44

## 2024-12-10 RX ADMIN — HYDRALAZINE HYDROCHLORIDE 25 MG: 25 TABLET ORAL at 13:08

## 2024-12-10 RX ADMIN — ASPIRIN 81 MG: 81 TABLET, COATED ORAL at 08:44

## 2024-12-10 RX ADMIN — FUROSEMIDE 40 MG: 40 TABLET ORAL at 17:33

## 2024-12-10 RX ADMIN — ROSUVASTATIN CALCIUM 10 MG: 10 TABLET, FILM COATED ORAL at 17:33

## 2024-12-10 RX ADMIN — HYDRALAZINE HYDROCHLORIDE 25 MG: 25 TABLET ORAL at 08:44

## 2024-12-10 RX ADMIN — CARVEDILOL 18.75 MG: 6.25 TABLET, FILM COATED ORAL at 22:00

## 2024-12-10 RX ADMIN — SODIUM CHLORIDE 125 MG: 9 INJECTION, SOLUTION INTRAVENOUS at 13:07

## 2024-12-10 RX ADMIN — FUROSEMIDE 40 MG: 10 INJECTION, SOLUTION INTRAMUSCULAR; INTRAVENOUS at 08:44

## 2024-12-10 RX ADMIN — Medication 1000 UNITS: at 17:33

## 2024-12-10 RX ADMIN — HYDRALAZINE HYDROCHLORIDE 25 MG: 25 TABLET ORAL at 20:24

## 2024-12-10 RX ADMIN — CARVEDILOL 18.68 MG: 6.25 TABLET, FILM COATED ORAL at 08:50

## 2024-12-10 RX ADMIN — INSULIN LISPRO 8 UNITS: 100 INJECTION, SOLUTION INTRAVENOUS; SUBCUTANEOUS at 20:30

## 2024-12-10 RX ADMIN — ENOXAPARIN SODIUM 30 MG: 100 INJECTION SUBCUTANEOUS at 08:44

## 2024-12-10 RX ADMIN — Medication 1 TABLET: at 08:44

## 2024-12-10 ASSESSMENT — PAIN SCALES - GENERAL: PAINLEVEL_OUTOF10: 0

## 2024-12-10 NOTE — CONSULTS
[facility]  HEART FAILURE PROGRAM      Cely Espitia 1940    History:  Past Medical History:   Diagnosis Date    Anxiety     Atrial fibrillation (HCC)     CAD (coronary artery disease)     Cancer (HCC)     bladder right kidney     Cardiac arrest 03/27/2016    Carotid artery stenosis     CHF (congestive heart failure) (HCC)     Chronic kidney disease     cancer of kidney and bladder    Depression     Diabetes mellitus (HCC)     IDDM    GERD (gastroesophageal reflux disease)     Hip pain, chronic     HYPERCHOLESTERAEMIA     Hypertension     Idiopathic ventricular tachycardia (HCC)     Irritable bowel syndrome     Laceration of head 03/25/2016    fall with cardiac arrest    MI, old     Multiple drug resistant organism (MDRO) culture positive 10/28/2021    urine    Neuropathy     Osteoarthritis     Presence of combination internal cardiac defibrillator (ICD) and pacemaker     Spinal stenosis     Staph infection     PT. STATES SHE HAS HAD SEVERAL BOILS WITH STAPH LAST ONE 20 YEARS AGO.    Type 2 diabetes mellitus without complication (HCC)     Urinary incontinence        ECHO:--- echo pending  8/21/24    Left Ventricle: Moderately reduced left ventricular systolic function with a visually estimated EF of 35 - 40%. Left ventricle is mildly dilated. Mildly increased wall thickness. Global hypokinesis present. Grade III diastolic dysfunction with increased LAP.    Right Ventricle: Right ventricle is mildly dilated. Lead present in the right ventricle.    Aortic Valve: Trace regurgitation.    Mitral Valve: Severe regurgitation with impingement on pulmonary veins. Consider ZACH    Tricuspid Valve: Mild regurgitation. Moderately elevated RVSP, consistent with moderate pulmonary hypertension. The estimated RVSP is 52 mmHg.    Pulmonic Valve: Moderate regurgitation.    Left Atrium: Left atrium is severely dilated.    Image quality is adequate.       ACE/ARB/ARNi: not on with CATARINA-- on hydralazine/imdur  BB:

## 2024-12-10 NOTE — CARE COORDINATION
12/10/24 1549   Readmission Assessment   Number of Days since last admission? 1-7 days   Previous Disposition Other (comment)  ([facility] and [facility])   Who is being Interviewed Patient   What was the patient's/caregiver's perception as to why they think they needed to return back to the hospital? Other (Comment)  (New issue)   Did you visit your Primary Care Physician after you left the hospital, before you returned this time? Yes   Did you see a specialist, such as Cardiac, Pulmonary, Orthopedic Physician, etc. after you left the hospital? No   Who advised the patient to return to the hospital? Self-referral   Does the patient report anything that got in the way of taking their medications? No   In our efforts to provide the best possible care to you and others like you, can you think of anything that we could have done to help you after you left the hospital the first time, so that you might not have needed to return so soon? Arrange for more help when leaving the hospital;Discharge instructions that are concise, clear, and non contradictory;Teaching during hospitalization regarding your illness;Teach back instructions regarding management of illness     Electronically signed by LISA Friedman on 12/10/24 at 3:50 PM EST

## 2024-12-10 NOTE — DISCHARGE INSTR - COC
Continuity of Care Form  HF Pathway  Systolic/Diastolic    EF 35%    _x_ Daily Visits x 3     _x_Cardiovascular Assessment. Titrate O2 to keep SaO2 greater than 90%    _x_ Daily Weights- Baseline Wt:130 lb   Call MD if:   3 pound weight gain or loss in one day OR 5 pound weight gain in one week    _x_No added salt diet (3-4 G sodium) and 64 oz fluid restriction         _x_ Med List attached:   Hold Coreg/Metoprolol if HR less than 45 or patient symptomatic*   Hold ACE/ARB if SBP less than 85 or patient symptomatic*   Do not hold Spironolactone (aldactone) for hypotension/bradycardia   Call MD for questions: [facility] [phone]    _x_Follow up appointment with cardiology: date/time:  with Vivian Camilo      Patient Name: Cely Espitia   :  1940  MRN:  [id number]    Admit date:  2024  Discharge date:      Code Status Order: Full Code   Advance Directives:   Advance Care Flowsheet Documentation             Admitting Physician:  Patricia Sosa MD  PCP: Marcus Shukla MD    Discharging Nurse: AE  Discharging Hospital Unit/Room#: 3AN-3311/3311-01  Discharging Unit Phone Number: [phone]    Emergency Contact:   Extended Emergency Contact Information  Primary Emergency Contact: AndrezVivian  Home Phone: [phone]  Mobile Phone: [phone]  Relation: Child  Secondary Emergency Contact: Nigel Gardner  Home Phone: [phone]  Mobile Phone: [phone]  Relation: Child    Past Surgical History:  Past Surgical History:   Procedure Laterality Date    ANGIOPLASTY      x3  2009    BLADDER REMOVAL      with Urostomy 2016    BLADDER SUSPENSION      CARDIAC PROCEDURE N/A 2024    Right heart cath performed by Maury Velazquez MD at [facility] CARDIAC CATH LAB    CARDIAC PROCEDURE N/A 2024    Thrombectomy pulmonary artery performed by Maury Velazquez MD at [facility] CARDIAC CATH LAB    CARDIAC PROCEDURE N/A 2024    Left and right heart cath / coronary angiography performed by

## 2024-12-10 NOTE — CARE COORDINATION
12/10/24 1033   IMM Letter   Observation Status Letter date given: 12/10/24   Observation Status Letter time given: 1010   Observation Status Letter given to Patient/Family/Significant other/Guardian/POA/by: IMM Given to patient

## 2024-12-11 LAB
ANION GAP SERPL CALCULATED.3IONS-SCNC: 18 MMOL/L (ref 3–16)
BASOPHILS # BLD: 0 K/UL (ref 0–0.2)
BASOPHILS NFR BLD: 0.4 %
BUN SERPL-MCNC: 38 MG/DL (ref 7–20)
CALCIUM SERPL-MCNC: 9.4 MG/DL (ref 8.3–10.6)
CHLORIDE SERPL-SCNC: 100 MMOL/L (ref 99–110)
CO2 SERPL-SCNC: 21 MMOL/L (ref 21–32)
CREAT SERPL-MCNC: 2.2 MG/DL (ref 0.6–1.2)
DEPRECATED RDW RBC AUTO: 18.3 % (ref 12.4–15.4)
EOSINOPHIL # BLD: 0.1 K/UL (ref 0–0.6)
EOSINOPHIL NFR BLD: 2.3 %
GFR SERPLBLD CREATININE-BSD FMLA CKD-EPI: 21 ML/MIN/{1.73_M2}
GLUCOSE BLD-MCNC: 117 MG/DL (ref 70–99)
GLUCOSE BLD-MCNC: 342 MG/DL (ref 70–99)
GLUCOSE SERPL-MCNC: 97 MG/DL (ref 70–99)
HCT VFR BLD AUTO: 29.5 % (ref 36–48)
HGB BLD-MCNC: 9.3 G/DL (ref 12–16)
LYMPHOCYTES # BLD: 0.5 K/UL (ref 1–5.1)
LYMPHOCYTES NFR BLD: 10.5 %
MAGNESIUM SERPL-MCNC: 2.28 MG/DL (ref 1.8–2.4)
MCH RBC QN AUTO: 25.9 PG (ref 26–34)
MCHC RBC AUTO-ENTMCNC: 31.5 G/DL (ref 31–36)
MCV RBC AUTO: 82.2 FL (ref 80–100)
MONOCYTES # BLD: 0.4 K/UL (ref 0–1.3)
MONOCYTES NFR BLD: 8.8 %
NEUTROPHILS # BLD: 3.8 K/UL (ref 1.7–7.7)
NEUTROPHILS NFR BLD: 78 %
NT-PROBNP SERPL-MCNC: ABNORMAL PG/ML (ref 0–449)
PERFORMED ON: ABNORMAL
PERFORMED ON: ABNORMAL
PLATELET # BLD AUTO: 264 K/UL (ref 135–450)
PMV BLD AUTO: 7.8 FL (ref 5–10.5)
POTASSIUM SERPL-SCNC: 4.3 MMOL/L (ref 3.5–5.1)
RBC # BLD AUTO: 3.59 M/UL (ref 4–5.2)
SODIUM SERPL-SCNC: 139 MMOL/L (ref 136–145)
WBC # BLD AUTO: 4.9 K/UL (ref 4–11)

## 2024-12-11 PROCEDURE — 85025 COMPLETE CBC W/AUTO DIFF WBC: CPT

## 2024-12-11 PROCEDURE — 6370000000 HC RX 637 (ALT 250 FOR IP): Performed by: INTERNAL MEDICINE

## 2024-12-11 PROCEDURE — 83735 ASSAY OF MAGNESIUM: CPT

## 2024-12-11 PROCEDURE — 6370000000 HC RX 637 (ALT 250 FOR IP): Performed by: PEDIATRICS

## 2024-12-11 PROCEDURE — 6360000002 HC RX W HCPCS: Performed by: INTERNAL MEDICINE

## 2024-12-11 PROCEDURE — G0378 HOSPITAL OBSERVATION PER HR: HCPCS

## 2024-12-11 PROCEDURE — 83880 ASSAY OF NATRIURETIC PEPTIDE: CPT

## 2024-12-11 PROCEDURE — 2580000003 HC RX 258: Performed by: INTERNAL MEDICINE

## 2024-12-11 PROCEDURE — 1200000000 HC SEMI PRIVATE

## 2024-12-11 PROCEDURE — 36415 COLL VENOUS BLD VENIPUNCTURE: CPT

## 2024-12-11 PROCEDURE — 80048 BASIC METABOLIC PNL TOTAL CA: CPT

## 2024-12-11 RX ORDER — CARVEDILOL 6.25 MG/1
12.5 TABLET ORAL 2 TIMES DAILY WITH MEALS
Status: DISCONTINUED | OUTPATIENT
Start: 2024-12-11 | End: 2024-12-17 | Stop reason: HOSPADM

## 2024-12-11 RX ADMIN — HEPARIN SODIUM 5000 UNITS: 5000 INJECTION INTRAVENOUS; SUBCUTANEOUS at 20:58

## 2024-12-11 RX ADMIN — SODIUM CHLORIDE, PRESERVATIVE FREE 10 ML: 5 INJECTION INTRAVENOUS at 10:19

## 2024-12-11 RX ADMIN — INSULIN LISPRO 12 UNITS: 100 INJECTION, SOLUTION INTRAVENOUS; SUBCUTANEOUS at 21:56

## 2024-12-11 RX ADMIN — POLYETHYLENE GLYCOL 3350 17 G: 17 POWDER, FOR SOLUTION ORAL at 10:17

## 2024-12-11 RX ADMIN — CARVEDILOL 18.75 MG: 6.25 TABLET, FILM COATED ORAL at 10:18

## 2024-12-11 RX ADMIN — ASPIRIN 81 MG: 81 TABLET, COATED ORAL at 10:17

## 2024-12-11 RX ADMIN — Medication 1 TABLET: at 10:17

## 2024-12-11 RX ADMIN — ACETAMINOPHEN 650 MG: 325 TABLET ORAL at 16:54

## 2024-12-11 RX ADMIN — ROSUVASTATIN CALCIUM 10 MG: 10 TABLET, FILM COATED ORAL at 16:54

## 2024-12-11 RX ADMIN — FERROUS SULFATE TAB 325 MG (65 MG ELEMENTAL FE) 325 MG: 325 (65 FE) TAB at 06:51

## 2024-12-11 RX ADMIN — POLYETHYLENE GLYCOL 3350 17 G: 17 POWDER, FOR SOLUTION ORAL at 16:55

## 2024-12-11 RX ADMIN — SODIUM CHLORIDE 125 MG: 9 INJECTION, SOLUTION INTRAVENOUS at 14:15

## 2024-12-11 RX ADMIN — HYDRALAZINE HYDROCHLORIDE 25 MG: 25 TABLET ORAL at 10:18

## 2024-12-11 RX ADMIN — Medication 1000 UNITS: at 16:54

## 2024-12-11 RX ADMIN — FUROSEMIDE 40 MG: 40 TABLET ORAL at 10:18

## 2024-12-11 RX ADMIN — SODIUM CHLORIDE, PRESERVATIVE FREE 10 ML: 5 INJECTION INTRAVENOUS at 20:59

## 2024-12-11 RX ADMIN — HEPARIN SODIUM 5000 UNITS: 5000 INJECTION INTRAVENOUS; SUBCUTANEOUS at 10:18

## 2024-12-11 RX ADMIN — CARVEDILOL 12.5 MG: 6.25 TABLET, FILM COATED ORAL at 16:54

## 2024-12-11 RX ADMIN — CLOPIDOGREL BISULFATE 75 MG: 75 TABLET ORAL at 10:18

## 2024-12-11 RX ADMIN — ISOSORBIDE MONONITRATE 30 MG: 30 TABLET, EXTENDED RELEASE ORAL at 10:18

## 2024-12-11 RX ADMIN — HYDRALAZINE HYDROCHLORIDE 25 MG: 25 TABLET ORAL at 20:58

## 2024-12-11 NOTE — CARE COORDINATION
Discharge Planning Note:     (SW) spoke with [facility] Stein @ [facility] [phone], SW provided PT/OT recommendations.     Sarita reports that patient if PT/OT is recommending skilled placement, patient should be informed that an onsite assessment would have to be completed if patient is not agreeable to skilled nursing placement.    SW followed up with patient, who reports uncertainty with skilled nursing but would be open to facilities in the [facility] area. Patient preferences included:    [facility]  [address] [place]  [address]  Phone: [phone]  Fax:[phone]    [facility] at [facility]  [address] [place]  [address]  Report: [phone]  Fax: [phone]    [address] [address]  Phone: [phone]  Fax: [phone]  Electronically signed by LISA Frideman on 12/11/24 at 2:34 PM EST            .

## 2024-12-11 NOTE — CARE COORDINATION
Case Management Assessment  Initial Evaluation    Date/Time of Evaluation: 12/11/2024 1:51 PM  Assessment Completed by: LISA Friedman    If patient is discharged prior to next notation, then this note serves as note for discharge by case management.    Patient Name: Cely Espitia                   YOB: 1940  Diagnosis: Acute pulmonary edema [J81.0]  Pleural effusion [J90]  Globus sensation [R09.A2]  Dyspnea and respiratory abnormalities [R06.00, R06.89]  Coronary atherosclerosis due to lipid rich plaque [I25.83]  CHF (congestive heart failure), NYHA class I, acute on chronic, combined (HCC) [I50.43]                   Date / Time: 12/9/2024  3:44 AM    Patient Admission Status: Observation   Readmission Risk (Low < 19, Mod (19-27), High > 27): Readmission Risk Score: 27.3    Current PCP: Marcus Shukla MD  PCP verified by CM? Yes    Chart Reviewed: Yes      History Provided by: Patient  Patient Orientation: Alert and Oriented    Patient Cognition: Alert    Hospitalization in the last 30 days (Readmission):  Yes    If yes, Readmission Assessment in CM Navigator will be completed.    Advance Directives:      Code Status: Full Code   Patient's Primary Decision Maker is: Legal Next of Kin    Primary Decision Maker: Vivian Maguire - Child - [phone]    Primary Decision Maker: Nigel Gardnre - Child - [phone]    Primary Decision Maker: Garo Agee - Child - [phone]    Discharge Planning:    Patient lives with: Alone Type of Home: Assisted living ([facility])  Primary Care Giver: Family (Home Health / [facility])  Patient Support Systems include: Children   Current Financial resources: None  Current community resources: None  Current services prior to admission: Durable Medical Equipment            Current DME: Other (Comment) (Electric scooter, ostomy supples from [employer])            Type of Home Care services:  OT, PT    ADLS  Prior functional level: Assistance with the

## 2024-12-12 PROBLEM — Z90.5 SINGLE KIDNEY: Status: ACTIVE | Noted: 2024-12-12

## 2024-12-12 PROBLEM — N18.9 ACUTE KIDNEY INJURY SUPERIMPOSED ON CKD (HCC): Status: ACTIVE | Noted: 2021-10-28

## 2024-12-12 LAB
ANION GAP SERPL CALCULATED.3IONS-SCNC: 12 MMOL/L (ref 3–16)
BASOPHILS # BLD: 0 K/UL (ref 0–0.2)
BASOPHILS NFR BLD: 0.3 %
BUN SERPL-MCNC: 36 MG/DL (ref 7–20)
CALCIUM SERPL-MCNC: 9 MG/DL (ref 8.3–10.6)
CHLORIDE SERPL-SCNC: 101 MMOL/L (ref 99–110)
CO2 SERPL-SCNC: 26 MMOL/L (ref 21–32)
CREAT SERPL-MCNC: 2.3 MG/DL (ref 0.6–1.2)
DEPRECATED RDW RBC AUTO: 18.1 % (ref 12.4–15.4)
EOSINOPHIL # BLD: 0.1 K/UL (ref 0–0.6)
EOSINOPHIL NFR BLD: 2.4 %
GFR SERPLBLD CREATININE-BSD FMLA CKD-EPI: 20 ML/MIN/{1.73_M2}
GLUCOSE BLD-MCNC: 224 MG/DL (ref 70–99)
GLUCOSE BLD-MCNC: 226 MG/DL (ref 70–99)
GLUCOSE BLD-MCNC: 327 MG/DL (ref 70–99)
GLUCOSE SERPL-MCNC: 93 MG/DL (ref 70–99)
HCT VFR BLD AUTO: 27.2 % (ref 36–48)
HGB BLD-MCNC: 8.6 G/DL (ref 12–16)
LYMPHOCYTES # BLD: 0.5 K/UL (ref 1–5.1)
LYMPHOCYTES NFR BLD: 10.9 %
MAGNESIUM SERPL-MCNC: 2.15 MG/DL (ref 1.8–2.4)
MCH RBC QN AUTO: 25.1 PG (ref 26–34)
MCHC RBC AUTO-ENTMCNC: 31.4 G/DL (ref 31–36)
MCV RBC AUTO: 79.7 FL (ref 80–100)
MONOCYTES # BLD: 0.7 K/UL (ref 0–1.3)
MONOCYTES NFR BLD: 16 %
NEUTROPHILS # BLD: 3 K/UL (ref 1.7–7.7)
NEUTROPHILS NFR BLD: 70.4 %
PERFORMED ON: ABNORMAL
PLATELET # BLD AUTO: 259 K/UL (ref 135–450)
PMV BLD AUTO: 7.9 FL (ref 5–10.5)
POTASSIUM SERPL-SCNC: 4 MMOL/L (ref 3.5–5.1)
RBC # BLD AUTO: 3.42 M/UL (ref 4–5.2)
SODIUM SERPL-SCNC: 139 MMOL/L (ref 136–145)
WBC # BLD AUTO: 4.3 K/UL (ref 4–11)

## 2024-12-12 PROCEDURE — 2580000003 HC RX 258: Performed by: CLINICAL NURSE SPECIALIST

## 2024-12-12 PROCEDURE — 83735 ASSAY OF MAGNESIUM: CPT

## 2024-12-12 PROCEDURE — 1200000000 HC SEMI PRIVATE

## 2024-12-12 PROCEDURE — 36415 COLL VENOUS BLD VENIPUNCTURE: CPT

## 2024-12-12 PROCEDURE — 6360000002 HC RX W HCPCS: Performed by: CLINICAL NURSE SPECIALIST

## 2024-12-12 PROCEDURE — 6370000000 HC RX 637 (ALT 250 FOR IP): Performed by: INTERNAL MEDICINE

## 2024-12-12 PROCEDURE — 6370000000 HC RX 637 (ALT 250 FOR IP): Performed by: PEDIATRICS

## 2024-12-12 PROCEDURE — 99232 SBSQ HOSP IP/OBS MODERATE 35: CPT | Performed by: CLINICAL NURSE SPECIALIST

## 2024-12-12 PROCEDURE — 2580000003 HC RX 258: Performed by: PEDIATRICS

## 2024-12-12 PROCEDURE — 85025 COMPLETE CBC W/AUTO DIFF WBC: CPT

## 2024-12-12 PROCEDURE — 80048 BASIC METABOLIC PNL TOTAL CA: CPT

## 2024-12-12 PROCEDURE — 6360000002 HC RX W HCPCS: Performed by: INTERNAL MEDICINE

## 2024-12-12 RX ORDER — FUROSEMIDE 40 MG/1
40 TABLET ORAL 2 TIMES DAILY
Qty: 60 TABLET | Refills: 3 | Status: SHIPPED | OUTPATIENT
Start: 2024-12-12 | End: 2024-12-17

## 2024-12-12 RX ORDER — CARVEDILOL 12.5 MG/1
12.5 TABLET ORAL 2 TIMES DAILY WITH MEALS
Qty: 60 TABLET | Refills: 3 | Status: SHIPPED | OUTPATIENT
Start: 2024-12-12 | End: 2024-12-17

## 2024-12-12 RX ORDER — ALPRAZOLAM 0.25 MG/1
0.25 TABLET ORAL 2 TIMES DAILY PRN
Status: DISCONTINUED | OUTPATIENT
Start: 2024-12-12 | End: 2024-12-17 | Stop reason: HOSPADM

## 2024-12-12 RX ADMIN — CARVEDILOL 12.5 MG: 6.25 TABLET, FILM COATED ORAL at 17:09

## 2024-12-12 RX ADMIN — INSULIN LISPRO 4 UNITS: 100 INJECTION, SOLUTION INTRAVENOUS; SUBCUTANEOUS at 17:10

## 2024-12-12 RX ADMIN — INSULIN LISPRO 4 UNITS: 100 INJECTION, SOLUTION INTRAVENOUS; SUBCUTANEOUS at 20:59

## 2024-12-12 RX ADMIN — ROSUVASTATIN CALCIUM 10 MG: 10 TABLET, FILM COATED ORAL at 17:10

## 2024-12-12 RX ADMIN — FUROSEMIDE 40 MG: 40 TABLET ORAL at 17:10

## 2024-12-12 RX ADMIN — HYDRALAZINE HYDROCHLORIDE 25 MG: 25 TABLET ORAL at 20:58

## 2024-12-12 RX ADMIN — ACETAMINOPHEN 650 MG: 325 TABLET ORAL at 13:19

## 2024-12-12 RX ADMIN — ALPRAZOLAM 0.12 MG: 0.25 TABLET ORAL at 20:55

## 2024-12-12 RX ADMIN — HEPARIN SODIUM 5000 UNITS: 5000 INJECTION INTRAVENOUS; SUBCUTANEOUS at 20:56

## 2024-12-12 RX ADMIN — Medication 1000 UNITS: at 17:09

## 2024-12-12 RX ADMIN — INSULIN LISPRO 12 UNITS: 100 INJECTION, SOLUTION INTRAVENOUS; SUBCUTANEOUS at 11:51

## 2024-12-12 RX ADMIN — SODIUM CHLORIDE 125 MG: 9 INJECTION, SOLUTION INTRAVENOUS at 11:42

## 2024-12-12 RX ADMIN — Medication 10 ML: at 21:12

## 2024-12-12 ASSESSMENT — PAIN DESCRIPTION - ORIENTATION: ORIENTATION: LEFT;MID

## 2024-12-12 ASSESSMENT — PAIN SCALES - GENERAL
PAINLEVEL_OUTOF10: 9
PAINLEVEL_OUTOF10: 7

## 2024-12-12 ASSESSMENT — PAIN DESCRIPTION - LOCATION: LOCATION: FLANK

## 2024-12-12 ASSESSMENT — PAIN DESCRIPTION - DESCRIPTORS: DESCRIPTORS: SHARP;SHOOTING

## 2024-12-12 NOTE — DISCHARGE INSTRUCTIONS
Follow up with your PCP within 4-5 days of discharge.  Follow up with cardiology as instructed   Follow up with nephrology as instructed   Take all your medications as prescribed.

## 2024-12-12 NOTE — DISCHARGE SUMMARY
Hospital Medicine Discharge Summary    Patient ID: Cely Espitia      Patient's PCP: Marcus Shukla MD    Admit Date: 12/9/2024     Discharge Date:  12/12/2024    Admitting Physician: Patricia Norton MD     Discharge Physician: PATRICIA NORTON MD     Hospital Course: This 84 y.o. female  with PMHx of combined CHF, s/p ICD, CAD; s/p complex PCI to OM1 on 11/26/24, ureter cancer; s/p right nephrectomy, cystectomy and ileal conduit, CKD stage IV and diabetes mellitus who presented with dyspnea.  Of note, patient lives at home alone and not compliant with medication.  PT OT recommended SNF but patient refused      Acute on chronic combined heart failure; appears compensated  Elevated proBNP on admission. CXR showed pulmonary congestion and bilateral pleural effusions.    Cardiology consulted; underwent echo on 12/11/24 which showed EF of 25 to 30%.  Severe global hypokinesis, mild to moderate AR, severe MR, severe TR.  Patient was diuresed with IV Lasix during hospital stay and discharged on p.o. Lasix.  Continue Lasix, Coreg, hydralazine and Imdur.  No ACE/ARB/Arni due to CKD.       Hx of CAD; s/p complex PCI and stent to OM on 11/26/24.  Continue aspirin, Plavix and statin     Elevated troponin; likely 2/2 CHF exacerbation.   HST 73-64 EKG negative for acute ischemic pathology.      CKD stage IV; creatinine baseline (low 2's) creatinine stable  Nephrology input appreciated.  Continue p.o. Lasix per the recs     Hypertension; continue current regimen and monitor BP closely     Diabetes mellitus; HgbA1c 8.4 on 12/2/2014.  Continue current regimen and monitor BG closely     Iron deficiency anemia; Iron studies on 12/3/2024 suggestive of iron deficiency.  Continue iron supplement     Hyperlipidemia; continue statins        Physical Exam Performed:     /66   Pulse 85   Temp 97.6 °F (36.4 °C) (Oral)   Resp 16   Ht 1.727 m (5' 8\")   Wt 64.5 kg (142 lb 4.8 oz)   SpO2 96%   BMI 21.64 kg/m²     General

## 2024-12-12 NOTE — CARE COORDINATION
Discharge Planning Note:     (PAUL) met with patient and family at bedside. Patient is now agreeable to SNF placement. SW completed referral, awaiting acceptance. SW sent referrals to additional facilities.     [facility]-reviewing patient  [address] [place]  [address]  Phone: [phone]  Fax:[phone]     [facility] at [facility]-reviewing patient  [address] [place]  [address]  Report: [phone]  Fax: [phone]    Electronically signed by LISA Friedman on 12/12/24 at 2:15 PM EST    UPDATE 5770  [facility]- declined patient  [facility] at [facility]-out of network    PAUL spoke with [facility], and she is able to accept patient. Family is agreeable. Pre-cert started.   Electronically signed by LISA Friedman on 12/12/24 at 3:02 PM EST

## 2024-12-12 NOTE — CARE COORDINATION
12/12/24 1031   IMM Letter   IMM Letter given to Patient/Family/Significant other/Guardian/POA/by: IMM GIVEN   IMM Letter date given: 12/12/24   IMM Letter time given: 1025

## 2024-12-13 LAB
ANION GAP SERPL CALCULATED.3IONS-SCNC: 15 MMOL/L (ref 3–16)
BASOPHILS # BLD: 0 K/UL (ref 0–0.2)
BASOPHILS NFR BLD: 0.6 %
BUN SERPL-MCNC: 38 MG/DL (ref 7–20)
CALCIUM SERPL-MCNC: 9.2 MG/DL (ref 8.3–10.6)
CHLORIDE SERPL-SCNC: 101 MMOL/L (ref 99–110)
CO2 SERPL-SCNC: 23 MMOL/L (ref 21–32)
CREAT SERPL-MCNC: 2.3 MG/DL (ref 0.6–1.2)
DEPRECATED RDW RBC AUTO: 18.7 % (ref 12.4–15.4)
EOSINOPHIL # BLD: 0.1 K/UL (ref 0–0.6)
EOSINOPHIL NFR BLD: 2.6 %
GFR SERPLBLD CREATININE-BSD FMLA CKD-EPI: 20 ML/MIN/{1.73_M2}
GLUCOSE BLD-MCNC: 183 MG/DL (ref 70–99)
GLUCOSE BLD-MCNC: 185 MG/DL (ref 70–99)
GLUCOSE BLD-MCNC: 354 MG/DL (ref 70–99)
GLUCOSE BLD-MCNC: 90 MG/DL (ref 70–99)
GLUCOSE SERPL-MCNC: 162 MG/DL (ref 70–99)
HCT VFR BLD AUTO: 29.8 % (ref 36–48)
HGB BLD-MCNC: 9.3 G/DL (ref 12–16)
LYMPHOCYTES # BLD: 0.6 K/UL (ref 1–5.1)
LYMPHOCYTES NFR BLD: 11 %
MAGNESIUM SERPL-MCNC: 2.19 MG/DL (ref 1.8–2.4)
MCH RBC QN AUTO: 25.7 PG (ref 26–34)
MCHC RBC AUTO-ENTMCNC: 31.3 G/DL (ref 31–36)
MCV RBC AUTO: 81.9 FL (ref 80–100)
MONOCYTES # BLD: 0.8 K/UL (ref 0–1.3)
MONOCYTES NFR BLD: 16.5 %
NEUTROPHILS # BLD: 3.6 K/UL (ref 1.7–7.7)
NEUTROPHILS NFR BLD: 69.3 %
NT-PROBNP SERPL-MCNC: ABNORMAL PG/ML (ref 0–449)
PERFORMED ON: ABNORMAL
PERFORMED ON: NORMAL
PLATELET # BLD AUTO: 287 K/UL (ref 135–450)
PMV BLD AUTO: 7.9 FL (ref 5–10.5)
POTASSIUM SERPL-SCNC: 3.9 MMOL/L (ref 3.5–5.1)
RBC # BLD AUTO: 3.64 M/UL (ref 4–5.2)
REASON FOR REJECTION: NORMAL
REJECTED TEST: NORMAL
SODIUM SERPL-SCNC: 139 MMOL/L (ref 136–145)
WBC # BLD AUTO: 5.2 K/UL (ref 4–11)

## 2024-12-13 PROCEDURE — 6370000000 HC RX 637 (ALT 250 FOR IP): Performed by: INTERNAL MEDICINE

## 2024-12-13 PROCEDURE — 6360000002 HC RX W HCPCS: Performed by: INTERNAL MEDICINE

## 2024-12-13 PROCEDURE — 6370000000 HC RX 637 (ALT 250 FOR IP): Performed by: PEDIATRICS

## 2024-12-13 PROCEDURE — 1200000000 HC SEMI PRIVATE

## 2024-12-13 PROCEDURE — 6360000002 HC RX W HCPCS: Performed by: CLINICAL NURSE SPECIALIST

## 2024-12-13 PROCEDURE — 36415 COLL VENOUS BLD VENIPUNCTURE: CPT

## 2024-12-13 PROCEDURE — 2580000003 HC RX 258: Performed by: CLINICAL NURSE SPECIALIST

## 2024-12-13 PROCEDURE — 2580000003 HC RX 258: Performed by: INTERNAL MEDICINE

## 2024-12-13 PROCEDURE — 80048 BASIC METABOLIC PNL TOTAL CA: CPT

## 2024-12-13 PROCEDURE — 83735 ASSAY OF MAGNESIUM: CPT

## 2024-12-13 PROCEDURE — 2580000003 HC RX 258: Performed by: PEDIATRICS

## 2024-12-13 PROCEDURE — 83880 ASSAY OF NATRIURETIC PEPTIDE: CPT

## 2024-12-13 PROCEDURE — 85025 COMPLETE CBC W/AUTO DIFF WBC: CPT

## 2024-12-13 RX ORDER — ALPRAZOLAM 0.25 MG/1
0.25 TABLET ORAL NIGHTLY PRN
Qty: 3 TABLET | Refills: 0 | Status: SHIPPED | OUTPATIENT
Start: 2024-12-13 | End: 2024-12-16

## 2024-12-13 RX ADMIN — ZIPRASIDONE MESYLATE 20 MG: 20 INJECTION, POWDER, LYOPHILIZED, FOR SOLUTION INTRAMUSCULAR at 10:48

## 2024-12-13 RX ADMIN — Medication 1000 UNITS: at 16:52

## 2024-12-13 RX ADMIN — ACETAMINOPHEN 650 MG: 325 TABLET ORAL at 08:19

## 2024-12-13 RX ADMIN — HEPARIN SODIUM 5000 UNITS: 5000 INJECTION INTRAVENOUS; SUBCUTANEOUS at 21:15

## 2024-12-13 RX ADMIN — Medication 10 ML: at 08:16

## 2024-12-13 RX ADMIN — INSULIN LISPRO 12 UNITS: 100 INJECTION, SOLUTION INTRAVENOUS; SUBCUTANEOUS at 12:11

## 2024-12-13 RX ADMIN — ALPRAZOLAM 0.25 MG: 0.25 TABLET ORAL at 12:11

## 2024-12-13 RX ADMIN — Medication 1 TABLET: at 08:16

## 2024-12-13 RX ADMIN — ACETAMINOPHEN 650 MG: 325 TABLET ORAL at 16:52

## 2024-12-13 RX ADMIN — SODIUM CHLORIDE 125 MG: 9 INJECTION, SOLUTION INTRAVENOUS at 14:48

## 2024-12-13 RX ADMIN — FUROSEMIDE 40 MG: 40 TABLET ORAL at 16:53

## 2024-12-13 RX ADMIN — CARVEDILOL 12.5 MG: 6.25 TABLET, FILM COATED ORAL at 08:16

## 2024-12-13 RX ADMIN — FUROSEMIDE 40 MG: 40 TABLET ORAL at 08:15

## 2024-12-13 RX ADMIN — Medication 10 ML: at 21:15

## 2024-12-13 RX ADMIN — HYDRALAZINE HYDROCHLORIDE 25 MG: 25 TABLET ORAL at 08:15

## 2024-12-13 RX ADMIN — POLYETHYLENE GLYCOL 3350 17 G: 17 POWDER, FOR SOLUTION ORAL at 08:14

## 2024-12-13 RX ADMIN — HEPARIN SODIUM 5000 UNITS: 5000 INJECTION INTRAVENOUS; SUBCUTANEOUS at 08:16

## 2024-12-13 RX ADMIN — ISOSORBIDE MONONITRATE 30 MG: 30 TABLET, EXTENDED RELEASE ORAL at 08:15

## 2024-12-13 RX ADMIN — ASPIRIN 81 MG: 81 TABLET, COATED ORAL at 08:16

## 2024-12-13 RX ADMIN — CARVEDILOL 12.5 MG: 6.25 TABLET, FILM COATED ORAL at 16:53

## 2024-12-13 RX ADMIN — CLOPIDOGREL BISULFATE 75 MG: 75 TABLET ORAL at 08:16

## 2024-12-13 RX ADMIN — INSULIN LISPRO 4 UNITS: 100 INJECTION, SOLUTION INTRAVENOUS; SUBCUTANEOUS at 17:12

## 2024-12-13 RX ADMIN — ROSUVASTATIN CALCIUM 10 MG: 10 TABLET, FILM COATED ORAL at 16:52

## 2024-12-13 ASSESSMENT — PAIN SCALES - GENERAL
PAINLEVEL_OUTOF10: 5
PAINLEVEL_OUTOF10: 4
PAINLEVEL_OUTOF10: 4
PAINLEVEL_OUTOF10: 10
PAINLEVEL_OUTOF10: 0

## 2024-12-13 ASSESSMENT — PAIN DESCRIPTION - LOCATION
LOCATION: BACK;GENERALIZED
LOCATION: BACK

## 2024-12-13 ASSESSMENT — PAIN SCALES - WONG BAKER: WONGBAKER_NUMERICALRESPONSE: NO HURT

## 2024-12-13 ASSESSMENT — PAIN DESCRIPTION - DESCRIPTORS: DESCRIPTORS: ACHING

## 2024-12-13 ASSESSMENT — PAIN DESCRIPTION - ORIENTATION: ORIENTATION: LOWER;LEFT

## 2024-12-13 NOTE — CARE COORDINATION
Discharge Planning Note:     (SW) followed up with, pre-cert still pending for SNF placement.  Electronically signed by LISA Friedman on 12/13/24 at 1:24 PM EST

## 2024-12-14 LAB
GLUCOSE BLD-MCNC: 106 MG/DL (ref 70–99)
GLUCOSE BLD-MCNC: 156 MG/DL (ref 70–99)
GLUCOSE BLD-MCNC: 199 MG/DL (ref 70–99)
GLUCOSE BLD-MCNC: 200 MG/DL (ref 70–99)
GLUCOSE BLD-MCNC: 363 MG/DL (ref 70–99)
PERFORMED ON: ABNORMAL

## 2024-12-14 PROCEDURE — 6360000002 HC RX W HCPCS: Performed by: CLINICAL NURSE SPECIALIST

## 2024-12-14 PROCEDURE — 2580000003 HC RX 258: Performed by: PEDIATRICS

## 2024-12-14 PROCEDURE — 6360000002 HC RX W HCPCS: Performed by: INTERNAL MEDICINE

## 2024-12-14 PROCEDURE — 6370000000 HC RX 637 (ALT 250 FOR IP): Performed by: INTERNAL MEDICINE

## 2024-12-14 PROCEDURE — 2580000003 HC RX 258: Performed by: CLINICAL NURSE SPECIALIST

## 2024-12-14 PROCEDURE — 1200000000 HC SEMI PRIVATE

## 2024-12-14 RX ADMIN — CARVEDILOL 12.5 MG: 6.25 TABLET, FILM COATED ORAL at 08:14

## 2024-12-14 RX ADMIN — Medication 10 ML: at 08:17

## 2024-12-14 RX ADMIN — Medication 1 TABLET: at 08:14

## 2024-12-14 RX ADMIN — ROSUVASTATIN CALCIUM 10 MG: 10 TABLET, FILM COATED ORAL at 16:53

## 2024-12-14 RX ADMIN — SODIUM CHLORIDE 125 MG: 9 INJECTION, SOLUTION INTRAVENOUS at 13:32

## 2024-12-14 RX ADMIN — FUROSEMIDE 40 MG: 40 TABLET ORAL at 08:14

## 2024-12-14 RX ADMIN — Medication 1000 UNITS: at 16:53

## 2024-12-14 RX ADMIN — HYDRALAZINE HYDROCHLORIDE 25 MG: 25 TABLET ORAL at 22:03

## 2024-12-14 RX ADMIN — HEPARIN SODIUM 5000 UNITS: 5000 INJECTION INTRAVENOUS; SUBCUTANEOUS at 22:02

## 2024-12-14 RX ADMIN — ASPIRIN 81 MG: 81 TABLET, COATED ORAL at 08:14

## 2024-12-14 RX ADMIN — INSULIN LISPRO 4 UNITS: 100 INJECTION, SOLUTION INTRAVENOUS; SUBCUTANEOUS at 08:14

## 2024-12-14 RX ADMIN — Medication 10 ML: at 22:03

## 2024-12-14 RX ADMIN — INSULIN LISPRO 16 UNITS: 100 INJECTION, SOLUTION INTRAVENOUS; SUBCUTANEOUS at 16:53

## 2024-12-14 RX ADMIN — ISOSORBIDE MONONITRATE 30 MG: 30 TABLET, EXTENDED RELEASE ORAL at 08:14

## 2024-12-14 RX ADMIN — HYDRALAZINE HYDROCHLORIDE 25 MG: 25 TABLET ORAL at 08:14

## 2024-12-14 RX ADMIN — CLOPIDOGREL BISULFATE 75 MG: 75 TABLET ORAL at 08:14

## 2024-12-14 NOTE — CARE COORDINATION
ANTONIA called [facility] with [facility] [phone] and Pre cert is still pending, she will call if she gets approval today.      ANTONIA updated RN.       Dipti Block RN, BSN  [phone]

## 2024-12-14 NOTE — ACP (ADVANCE CARE PLANNING)
Advanced Care Planning Note.    Purpose of Encounter: Advanced care planning in light of CAD  Parties In Attendance: Patient, son  Decisional Capacity: Yes  Subjective: Patient denies CP and SOB  Objective: Cr 2.3  Goals of Care Determination: Patient wants full support (CPR, vent,  surgery, HD, trach, PEG)  Plan:  IV Lasix. Cardio and Renal consults.  PT/OT.  SNF placement  Code Status: Full code   Time spent on Advanced care Plannin minutes  Advanced Care Planning Documents: Completed advanced directives on chart, daughter is the POA.    Ozzie Ramirez MD  2024 7:10 PM

## 2024-12-15 LAB
ANION GAP SERPL CALCULATED.3IONS-SCNC: 19 MMOL/L (ref 3–16)
BASOPHILS # BLD: 0 K/UL (ref 0–0.2)
BASOPHILS NFR BLD: 0.3 %
BUN SERPL-MCNC: 54 MG/DL (ref 7–20)
CALCIUM SERPL-MCNC: 9.6 MG/DL (ref 8.3–10.6)
CHLORIDE SERPL-SCNC: 97 MMOL/L (ref 99–110)
CO2 SERPL-SCNC: 21 MMOL/L (ref 21–32)
CREAT SERPL-MCNC: 2.9 MG/DL (ref 0.6–1.2)
DEPRECATED RDW RBC AUTO: 19.4 % (ref 12.4–15.4)
EOSINOPHIL # BLD: 0 K/UL (ref 0–0.6)
EOSINOPHIL NFR BLD: 0.4 %
GFR SERPLBLD CREATININE-BSD FMLA CKD-EPI: 15 ML/MIN/{1.73_M2}
GLUCOSE BLD-MCNC: 234 MG/DL (ref 70–99)
GLUCOSE BLD-MCNC: 240 MG/DL (ref 70–99)
GLUCOSE BLD-MCNC: 242 MG/DL (ref 70–99)
GLUCOSE BLD-MCNC: 273 MG/DL (ref 70–99)
GLUCOSE SERPL-MCNC: 215 MG/DL (ref 70–99)
HCT VFR BLD AUTO: 33.5 % (ref 36–48)
HGB BLD-MCNC: 10.4 G/DL (ref 12–16)
LYMPHOCYTES # BLD: 0.4 K/UL (ref 1–5.1)
LYMPHOCYTES NFR BLD: 5.9 %
MCH RBC QN AUTO: 26.2 PG (ref 26–34)
MCHC RBC AUTO-ENTMCNC: 31.2 G/DL (ref 31–36)
MCV RBC AUTO: 84.1 FL (ref 80–100)
MONOCYTES # BLD: 1 K/UL (ref 0–1.3)
MONOCYTES NFR BLD: 13.9 %
NEUTROPHILS # BLD: 5.5 K/UL (ref 1.7–7.7)
NEUTROPHILS NFR BLD: 79.5 %
NT-PROBNP SERPL-MCNC: ABNORMAL PG/ML (ref 0–449)
PERFORMED ON: ABNORMAL
PLATELET # BLD AUTO: 276 K/UL (ref 135–450)
PMV BLD AUTO: 7.7 FL (ref 5–10.5)
POTASSIUM SERPL-SCNC: 4.8 MMOL/L (ref 3.5–5.1)
RBC # BLD AUTO: 3.99 M/UL (ref 4–5.2)
SODIUM SERPL-SCNC: 137 MMOL/L (ref 136–145)
WBC # BLD AUTO: 7 K/UL (ref 4–11)

## 2024-12-15 PROCEDURE — 85025 COMPLETE CBC W/AUTO DIFF WBC: CPT

## 2024-12-15 PROCEDURE — 36415 COLL VENOUS BLD VENIPUNCTURE: CPT

## 2024-12-15 PROCEDURE — 83880 ASSAY OF NATRIURETIC PEPTIDE: CPT

## 2024-12-15 PROCEDURE — 6370000000 HC RX 637 (ALT 250 FOR IP): Performed by: INTERNAL MEDICINE

## 2024-12-15 PROCEDURE — 80048 BASIC METABOLIC PNL TOTAL CA: CPT

## 2024-12-15 PROCEDURE — 1200000000 HC SEMI PRIVATE

## 2024-12-15 PROCEDURE — 2580000003 HC RX 258: Performed by: PEDIATRICS

## 2024-12-15 PROCEDURE — 6360000002 HC RX W HCPCS: Performed by: INTERNAL MEDICINE

## 2024-12-15 RX ADMIN — Medication 1000 UNITS: at 16:37

## 2024-12-15 RX ADMIN — INSULIN LISPRO 4 UNITS: 100 INJECTION, SOLUTION INTRAVENOUS; SUBCUTANEOUS at 11:47

## 2024-12-15 RX ADMIN — INSULIN LISPRO 4 UNITS: 100 INJECTION, SOLUTION INTRAVENOUS; SUBCUTANEOUS at 16:44

## 2024-12-15 RX ADMIN — INSULIN LISPRO 4 UNITS: 100 INJECTION, SOLUTION INTRAVENOUS; SUBCUTANEOUS at 08:54

## 2024-12-15 RX ADMIN — Medication 10 ML: at 21:02

## 2024-12-15 RX ADMIN — ALPRAZOLAM 0.25 MG: 0.25 TABLET ORAL at 08:55

## 2024-12-15 RX ADMIN — Medication 1 TABLET: at 08:55

## 2024-12-15 RX ADMIN — ROSUVASTATIN CALCIUM 10 MG: 10 TABLET, FILM COATED ORAL at 16:37

## 2024-12-15 RX ADMIN — CARVEDILOL 12.5 MG: 6.25 TABLET, FILM COATED ORAL at 16:37

## 2024-12-15 RX ADMIN — CLOPIDOGREL BISULFATE 75 MG: 75 TABLET ORAL at 08:55

## 2024-12-15 RX ADMIN — HEPARIN SODIUM 5000 UNITS: 5000 INJECTION INTRAVENOUS; SUBCUTANEOUS at 20:59

## 2024-12-15 RX ADMIN — ALPRAZOLAM 0.25 MG: 0.25 TABLET ORAL at 18:15

## 2024-12-15 RX ADMIN — ASPIRIN 81 MG: 81 TABLET, COATED ORAL at 08:55

## 2024-12-15 RX ADMIN — Medication 10 ML: at 08:58

## 2024-12-15 RX ADMIN — INSULIN LISPRO 8 UNITS: 100 INJECTION, SOLUTION INTRAVENOUS; SUBCUTANEOUS at 20:59

## 2024-12-15 NOTE — CARE COORDINATION
left for [facility] with [facility] [phone] checking on pre cert.    Dipti Block RN, BSN  [phone]

## 2024-12-16 LAB
ANION GAP SERPL CALCULATED.3IONS-SCNC: 14 MMOL/L (ref 3–16)
BASOPHILS # BLD: 0 K/UL (ref 0–0.2)
BASOPHILS NFR BLD: 0.3 %
BUN SERPL-MCNC: 60 MG/DL (ref 7–20)
CALCIUM SERPL-MCNC: 9.6 MG/DL (ref 8.3–10.6)
CHLORIDE SERPL-SCNC: 99 MMOL/L (ref 99–110)
CO2 SERPL-SCNC: 22 MMOL/L (ref 21–32)
CREAT SERPL-MCNC: 2.8 MG/DL (ref 0.6–1.2)
DEPRECATED RDW RBC AUTO: 19.6 % (ref 12.4–15.4)
EOSINOPHIL # BLD: 0.1 K/UL (ref 0–0.6)
EOSINOPHIL NFR BLD: 0.9 %
GFR SERPLBLD CREATININE-BSD FMLA CKD-EPI: 16 ML/MIN/{1.73_M2}
GLUCOSE BLD-MCNC: 181 MG/DL (ref 70–99)
GLUCOSE BLD-MCNC: 187 MG/DL (ref 70–99)
GLUCOSE BLD-MCNC: 218 MG/DL (ref 70–99)
GLUCOSE BLD-MCNC: 240 MG/DL (ref 70–99)
GLUCOSE SERPL-MCNC: 195 MG/DL (ref 70–99)
HCT VFR BLD AUTO: 33.7 % (ref 36–48)
HGB BLD-MCNC: 10.6 G/DL (ref 12–16)
LYMPHOCYTES # BLD: 0.6 K/UL (ref 1–5.1)
LYMPHOCYTES NFR BLD: 7.1 %
MCH RBC QN AUTO: 26.1 PG (ref 26–34)
MCHC RBC AUTO-ENTMCNC: 31.3 G/DL (ref 31–36)
MCV RBC AUTO: 83.3 FL (ref 80–100)
MONOCYTES # BLD: 1.2 K/UL (ref 0–1.3)
MONOCYTES NFR BLD: 15.3 %
NEUTROPHILS # BLD: 5.9 K/UL (ref 1.7–7.7)
NEUTROPHILS NFR BLD: 76.4 %
PERFORMED ON: ABNORMAL
PLATELET # BLD AUTO: 292 K/UL (ref 135–450)
PMV BLD AUTO: 7.9 FL (ref 5–10.5)
POTASSIUM SERPL-SCNC: 4.5 MMOL/L (ref 3.5–5.1)
RBC # BLD AUTO: 4.05 M/UL (ref 4–5.2)
SODIUM SERPL-SCNC: 135 MMOL/L (ref 136–145)
WBC # BLD AUTO: 7.7 K/UL (ref 4–11)

## 2024-12-16 PROCEDURE — 6370000000 HC RX 637 (ALT 250 FOR IP): Performed by: INTERNAL MEDICINE

## 2024-12-16 PROCEDURE — 80048 BASIC METABOLIC PNL TOTAL CA: CPT

## 2024-12-16 PROCEDURE — 36415 COLL VENOUS BLD VENIPUNCTURE: CPT

## 2024-12-16 PROCEDURE — 1200000000 HC SEMI PRIVATE

## 2024-12-16 PROCEDURE — 6360000002 HC RX W HCPCS: Performed by: INTERNAL MEDICINE

## 2024-12-16 PROCEDURE — 97530 THERAPEUTIC ACTIVITIES: CPT

## 2024-12-16 PROCEDURE — 2580000003 HC RX 258: Performed by: PEDIATRICS

## 2024-12-16 PROCEDURE — 85025 COMPLETE CBC W/AUTO DIFF WBC: CPT

## 2024-12-16 RX ORDER — INSULIN GLARGINE 100 [IU]/ML
10 INJECTION, SOLUTION SUBCUTANEOUS
Status: DISCONTINUED | OUTPATIENT
Start: 2024-12-16 | End: 2024-12-17 | Stop reason: HOSPADM

## 2024-12-16 RX ORDER — INSULIN GLARGINE 100 [IU]/ML
10 INJECTION, SOLUTION SUBCUTANEOUS
Qty: 10 ML | Refills: 0
Start: 2024-12-17 | End: 2024-12-17 | Stop reason: HOSPADM

## 2024-12-16 RX ADMIN — Medication 1 TABLET: at 08:41

## 2024-12-16 RX ADMIN — INSULIN GLARGINE 10 UNITS: 100 INJECTION, SOLUTION SUBCUTANEOUS at 12:10

## 2024-12-16 RX ADMIN — INSULIN LISPRO 4 UNITS: 100 INJECTION, SOLUTION INTRAVENOUS; SUBCUTANEOUS at 21:44

## 2024-12-16 RX ADMIN — CARVEDILOL 12.5 MG: 6.25 TABLET, FILM COATED ORAL at 08:41

## 2024-12-16 RX ADMIN — HEPARIN SODIUM 5000 UNITS: 5000 INJECTION INTRAVENOUS; SUBCUTANEOUS at 21:44

## 2024-12-16 RX ADMIN — INSULIN LISPRO 4 UNITS: 100 INJECTION, SOLUTION INTRAVENOUS; SUBCUTANEOUS at 12:10

## 2024-12-16 RX ADMIN — ASPIRIN 81 MG: 81 TABLET, COATED ORAL at 08:41

## 2024-12-16 RX ADMIN — Medication 10 ML: at 08:42

## 2024-12-16 RX ADMIN — ROSUVASTATIN CALCIUM 10 MG: 10 TABLET, FILM COATED ORAL at 18:03

## 2024-12-16 RX ADMIN — HEPARIN SODIUM 5000 UNITS: 5000 INJECTION INTRAVENOUS; SUBCUTANEOUS at 08:42

## 2024-12-16 RX ADMIN — POLYETHYLENE GLYCOL 3350 17 G: 17 POWDER, FOR SOLUTION ORAL at 08:40

## 2024-12-16 RX ADMIN — INSULIN LISPRO 4 UNITS: 100 INJECTION, SOLUTION INTRAVENOUS; SUBCUTANEOUS at 08:41

## 2024-12-16 RX ADMIN — FERROUS SULFATE TAB 325 MG (65 MG ELEMENTAL FE) 325 MG: 325 (65 FE) TAB at 18:03

## 2024-12-16 RX ADMIN — INSULIN LISPRO 4 UNITS: 100 INJECTION, SOLUTION INTRAVENOUS; SUBCUTANEOUS at 17:30

## 2024-12-16 RX ADMIN — ISOSORBIDE MONONITRATE 30 MG: 30 TABLET, EXTENDED RELEASE ORAL at 08:41

## 2024-12-16 RX ADMIN — CARVEDILOL 12.5 MG: 6.25 TABLET, FILM COATED ORAL at 18:03

## 2024-12-16 RX ADMIN — Medication 1000 UNITS: at 18:03

## 2024-12-16 RX ADMIN — CLOPIDOGREL BISULFATE 75 MG: 75 TABLET ORAL at 08:41

## 2024-12-16 NOTE — CARE COORDINATION
12/16/24 1557   IMM Letter   IMM Letter given to Patient/Family/Significant other/Guardian/POA/by: verbal with patient daughter Vivian, patient confused   IMM Letter date given: 12/16/24   IMM Letter time given: 1556     Electronically signed by LISA Friedman on 12/16/24 at 3:57 PM EST

## 2024-12-16 NOTE — CARE COORDINATION
Discharge Planning Note:     (PAUL) followed up with pre-cert.   Status: PENDING  Electronically signed by LISA Friedman on 12/16/24 at 1:28 PM EST    UPDATE 0664  SW left message for patient's daughterVivian in regards to pre-cert.   Electronically signed by LISA Friedman on 12/16/24 at 3:57 PM EST

## 2024-12-17 VITALS
OXYGEN SATURATION: 97 % | RESPIRATION RATE: 16 BRPM | WEIGHT: 126.4 LBS | TEMPERATURE: 97.5 F | SYSTOLIC BLOOD PRESSURE: 107 MMHG | HEART RATE: 65 BPM | HEIGHT: 68 IN | BODY MASS INDEX: 19.16 KG/M2 | DIASTOLIC BLOOD PRESSURE: 67 MMHG

## 2024-12-17 PROBLEM — E44.0 MODERATE MALNUTRITION (HCC): Chronic | Status: ACTIVE | Noted: 2017-08-17

## 2024-12-17 PROBLEM — E44.0 MODERATE MALNUTRITION (HCC): Status: ACTIVE | Noted: 2017-08-17

## 2024-12-17 LAB
ALBUMIN SERPL-MCNC: 3.3 G/DL (ref 3.4–5)
ANION GAP SERPL CALCULATED.3IONS-SCNC: 15 MMOL/L (ref 3–16)
BUN SERPL-MCNC: 60 MG/DL (ref 7–20)
CALCIUM SERPL-MCNC: 9.2 MG/DL (ref 8.3–10.6)
CHLORIDE SERPL-SCNC: 96 MMOL/L (ref 99–110)
CO2 SERPL-SCNC: 22 MMOL/L (ref 21–32)
CREAT SERPL-MCNC: 2.8 MG/DL (ref 0.6–1.2)
GFR SERPLBLD CREATININE-BSD FMLA CKD-EPI: 16 ML/MIN/{1.73_M2}
GLUCOSE BLD-MCNC: 187 MG/DL (ref 70–99)
GLUCOSE BLD-MCNC: 278 MG/DL (ref 70–99)
GLUCOSE SERPL-MCNC: 179 MG/DL (ref 70–99)
PERFORMED ON: ABNORMAL
PERFORMED ON: ABNORMAL
PHOSPHATE SERPL-MCNC: 3.9 MG/DL (ref 2.5–4.9)
POTASSIUM SERPL-SCNC: 4.8 MMOL/L (ref 3.5–5.1)
REASON FOR REJECTION: NORMAL
REJECTED TEST: NORMAL
SODIUM SERPL-SCNC: 133 MMOL/L (ref 136–145)

## 2024-12-17 PROCEDURE — 6370000000 HC RX 637 (ALT 250 FOR IP): Performed by: INTERNAL MEDICINE

## 2024-12-17 PROCEDURE — 2580000003 HC RX 258: Performed by: PEDIATRICS

## 2024-12-17 PROCEDURE — 6370000000 HC RX 637 (ALT 250 FOR IP): Performed by: PEDIATRICS

## 2024-12-17 PROCEDURE — 6360000002 HC RX W HCPCS: Performed by: INTERNAL MEDICINE

## 2024-12-17 PROCEDURE — 80069 RENAL FUNCTION PANEL: CPT

## 2024-12-17 PROCEDURE — 36415 COLL VENOUS BLD VENIPUNCTURE: CPT

## 2024-12-17 RX ORDER — PEN NEEDLE, DIABETIC 31 GX5/16"
1 NEEDLE, DISPOSABLE MISCELLANEOUS DAILY
Qty: 100 EACH | Refills: 0 | Status: SHIPPED | OUTPATIENT
Start: 2024-12-17

## 2024-12-17 RX ORDER — CARVEDILOL 12.5 MG/1
12.5 TABLET ORAL 2 TIMES DAILY WITH MEALS
Qty: 60 TABLET | Refills: 0 | Status: SHIPPED | OUTPATIENT
Start: 2024-12-17

## 2024-12-17 RX ORDER — INSULIN GLARGINE 100 [IU]/ML
10 INJECTION, SOLUTION SUBCUTANEOUS
Qty: 5 ADJUSTABLE DOSE PRE-FILLED PEN SYRINGE | Refills: 0 | Status: SHIPPED | OUTPATIENT
Start: 2024-12-17

## 2024-12-17 RX ORDER — FUROSEMIDE 40 MG/1
20 TABLET ORAL DAILY
Qty: 30 TABLET | Refills: 0
Start: 2024-12-17 | End: 2024-12-17

## 2024-12-17 RX ORDER — FUROSEMIDE 20 MG/1
20 TABLET ORAL DAILY
Qty: 30 TABLET | Refills: 0 | Status: SHIPPED | OUTPATIENT
Start: 2024-12-17

## 2024-12-17 RX ADMIN — INSULIN GLARGINE 10 UNITS: 100 INJECTION, SOLUTION SUBCUTANEOUS at 09:20

## 2024-12-17 RX ADMIN — POLYETHYLENE GLYCOL 3350 17 G: 17 POWDER, FOR SOLUTION ORAL at 09:34

## 2024-12-17 RX ADMIN — ASPIRIN 81 MG: 81 TABLET, COATED ORAL at 09:34

## 2024-12-17 RX ADMIN — INSULIN LISPRO 4 UNITS: 100 INJECTION, SOLUTION INTRAVENOUS; SUBCUTANEOUS at 09:20

## 2024-12-17 RX ADMIN — CARVEDILOL 12.5 MG: 6.25 TABLET, FILM COATED ORAL at 09:34

## 2024-12-17 RX ADMIN — ISOSORBIDE MONONITRATE 30 MG: 30 TABLET, EXTENDED RELEASE ORAL at 09:34

## 2024-12-17 RX ADMIN — Medication 1 TABLET: at 09:34

## 2024-12-17 RX ADMIN — FERROUS SULFATE TAB 325 MG (65 MG ELEMENTAL FE) 325 MG: 325 (65 FE) TAB at 06:54

## 2024-12-17 RX ADMIN — HEPARIN SODIUM 5000 UNITS: 5000 INJECTION INTRAVENOUS; SUBCUTANEOUS at 09:35

## 2024-12-17 RX ADMIN — INSULIN LISPRO 8 UNITS: 100 INJECTION, SOLUTION INTRAVENOUS; SUBCUTANEOUS at 12:45

## 2024-12-17 RX ADMIN — CLOPIDOGREL BISULFATE 75 MG: 75 TABLET ORAL at 09:34

## 2024-12-17 RX ADMIN — ACETAMINOPHEN 650 MG: 325 TABLET ORAL at 06:54

## 2024-12-17 RX ADMIN — Medication 10 ML: at 09:36

## 2024-12-17 ASSESSMENT — PAIN SCALES - GENERAL
PAINLEVEL_OUTOF10: 0
PAINLEVEL_OUTOF10: 3

## 2024-12-17 ASSESSMENT — PAIN DESCRIPTION - ORIENTATION: ORIENTATION: RIGHT

## 2024-12-17 ASSESSMENT — PAIN DESCRIPTION - LOCATION: LOCATION: LEG

## 2024-12-17 NOTE — DISCHARGE SUMMARY
Hospital Medicine Discharge Summary    Patient: Cely Espitia     Gender: female  : 1940   Age: 84 y.o.  MRN: [id number]    Admitting Physician: Patricia Sosa MD  Discharge Physician: Ozzie Ramirez MD     Code Status: Full Code     Admit Date: 2024   Discharge Date:   24    Disposition:  [place] [facility]     Discharge Diagnoses:    Active Hospital Problems    Diagnosis Date Noted    Severe mitral regurgitation [I34.0] 2024     Priority: High    Single kidney [Z90.5] 2024    Pleural effusion [J90] 2024    Acute on chronic combined systolic and diastolic heart failure (HCC) [I50.43] 2024    Acute kidney injury superimposed on CKD (HCC) [N17.9, N18.9] 10/28/2021    Iron deficiency anemia [D50.9] 2017    Moderate malnutrition (HCC) [E44.0] 2017    Dyspnea and respiratory abnormalities [R06.00, R06.89] 2014    Coronary artery disease due to calcified coronary lesion [I25.10, I25.84] 2013       Follow-up appointments:  one week    Outpatient to do list: F/U with PCP, Renal, Cardio    Condition at Discharge:  Stable    Hospital Course:   84 y.o. female  with PMHx of combined CHF, s/p ICD, CAD; s/p complex PCI to OM1 on 24, ureter cancer; s/p right nephrectomy, cystectomy and ileal conduit, CKD stage IV and diabetes mellitus who presented with dyspnea.   Admitted as inpatient for acute on chronic combined CHF.  Diuresed with IV Lasix, -9.4 L since admission.  Followed by Cardio and Renal.  Discharged to SNF on 24.  Denied SNF by Dr Melendez at [facility].  Will go home with home PT/OT/VNS       Acute on chronic combined heart failure; p/w dyspnea  proBNP> 19 K on admission.  CXR with pulmonary congestion and bilateral pleural effusions.    Last echo on  showed EF of 35 to 40%.  Grade 3 diastolic dysfunction  Cardiology on board; continue PO Lasix, Coreg, hydralazine and Imdur.    No ACE/ARB/Arni due to CKD.  Daily weights.    S/P IV

## 2024-12-17 NOTE — CARE COORDINATION
Discharge Planning Note:     (PAUL) was informed that patient was denied SNF placement.    SW informed Kristofer Stein [phone], and she was agreeable to patient's return with home health.    PAUL spoke with patient's son Nigel, who agreed to provide transport to [facility] for patient.     PAUL contacted [facility] and faxed home health order with face sheet.     [employer] [facility] [address] [place] [address]  Phone: [phone]  Fax: [phone]    PAUL spoke with Cathryn @ [facility] to complete referral.     PAUL completed referral to [facility].     Electronically signed by LISA Friedman on 12/17/24 at 2:57 PM EST

## 2024-12-17 NOTE — CARE COORDINATION
Case Management -  Discharge Note      Patient Name: Cely Espitia                   YOB: 1940  Room: [unfilled]            Readmission Risk (Low < 19, Mod (19-27), High > 27): Readmission Risk Score: 26.9    Current PCP: Marcus Shukla MD      (IMM) Important Message from Medicare:    Has pt received appropriate compliance notices before being discharged if required: yes  Compliance doc:  [x] 2nd IMM; [] Code 44 [] Ross  Date: 12/16/24    PT AM-PAC: 17 /24  OT AM-PAC: 17 /24    Patient/patient representative has been educated on the benefits of HHC as well as the possible risks of declining recommended services. Patient/patient representative has acknowledged the information provided and decided on the following discharge plan. Patient/ patient representative has been provided freedom of choice regarding service provider, supported by basic dialogue that supports the patient's individualized plan of care/goals.    [facility] -[address] [address]  Phone: [phone]  Fax: [phone]     C agency notified of discharge:  [x] Yes [] No  [] NA    Family notified of discharge:  [x] Yes  [] No  [] NA    Facility notified of discharge:  [] Yes  [] No  [x] NA     Financial    Payor: [employer] MEDICARE / Plan: [employer] MEDICARE-ADVANTAGE PPO / Product Type: Medicare /     Pharmacy:  Potential assistance Purchasing Medications: No  Meds-to-Beds request: Yes      [id number] - [place], [place] - [address] -  [phone] - F [phone]  [address] [address]  Phone: [phone] Fax: [phone]    [facility] - [place], [place] - [address] - P [phone] - F [phone]  [address] [address]  Phone: [phone] Fax: [phone]      Notes:    Additional Case Management Notes: See above.  Electronically signed by LISA Friedman on 12/17/24 at 3:53 PM EST

## 2024-12-17 NOTE — FLOWSHEET NOTE
Discharge instructions and paperwork given to patient. Patient verbalized understanding and denied further needs at this time. IV removed. All belongings sent with patient. Patient to lobby for pickup.

## 2024-12-17 NOTE — PLAN OF CARE
Problem: Chronic Conditions and Co-morbidities  Goal: Patient's chronic conditions and co-morbidity symptoms are monitored and maintained or improved  12/10/2024 2203 by Joshua Esposito RN  Outcome: Progressing  12/10/2024 2202 by Joshua Esposito RN  Outcome: Progressing     Problem: Discharge Planning  Goal: Discharge to home or other facility with appropriate resources  12/10/2024 2203 by Joshua Esposito RN  Outcome: Progressing  12/10/2024 2202 by Joshua Esposito RN  Outcome: Progressing     Problem: Safety - Adult  Goal: Free from fall injury  12/10/2024 2203 by Joshua Esposito RN  Outcome: Progressing  12/10/2024 2202 by Joshua Esposito RN  Outcome: Progressing     Problem: ABCDS Injury Assessment  Goal: Absence of physical injury  Outcome: Progressing     Problem: Skin/Tissue Integrity  Goal: Absence of new skin breakdown  Description: 1.  Monitor for areas of redness and/or skin breakdown  2.  Assess vascular access sites hourly  3.  Every 4-6 hours minimum:  Change oxygen saturation probe site  4.  Every 4-6 hours:  If on nasal continuous positive airway pressure, respiratory therapy assess nares and determine need for appliance change or resting period.  Outcome: Progressing     Problem: Neurosensory - Adult  Goal: Achieves maximal functionality and self care  Outcome: Progressing     Problem: Respiratory - Adult  Goal: Achieves optimal ventilation and oxygenation  Outcome: Progressing     Problem: Cardiovascular - Adult  Goal: Maintains optimal cardiac output and hemodynamic stability  Outcome: Progressing  Goal: Absence of cardiac dysrhythmias or at baseline  Outcome: Progressing     
  Problem: Chronic Conditions and Co-morbidities  Goal: Patient's chronic conditions and co-morbidity symptoms are monitored and maintained or improved  Outcome: Progressing     Problem: Discharge Planning  Goal: Discharge to home or other facility with appropriate resources  12/12/2024 0057 by Shahida Guerin RN  Outcome: Progressing  12/11/2024 1421 by Jennifer Zuluaga RN  Outcome: Progressing     Problem: Safety - Adult  Goal: Free from fall injury  12/12/2024 0057 by Shahida Guerin RN  Outcome: Progressing  12/11/2024 1421 by Jennifer Zuluaga RN  Outcome: Progressing     Problem: ABCDS Injury Assessment  Goal: Absence of physical injury  Outcome: Progressing     Problem: Skin/Tissue Integrity  Goal: Absence of new skin breakdown  Description: 1.  Monitor for areas of redness and/or skin breakdown  2.  Assess vascular access sites hourly  3.  Every 4-6 hours minimum:  Change oxygen saturation probe site  4.  Every 4-6 hours:  If on nasal continuous positive airway pressure, respiratory therapy assess nares and determine need for appliance change or resting period.  Outcome: Progressing     Problem: Neurosensory - Adult  Goal: Achieves maximal functionality and self care  12/12/2024 0057 by Shahida Guerin RN  Outcome: Progressing  12/11/2024 1421 by Jennifer Zuluaga RN  Outcome: Progressing     Problem: Respiratory - Adult  Goal: Achieves optimal ventilation and oxygenation  12/12/2024 0057 by Shahida Guerin RN  Outcome: Progressing  12/11/2024 1421 by Jennifer Zuluaga RN  Outcome: Progressing     Problem: Cardiovascular - Adult  Goal: Maintains optimal cardiac output and hemodynamic stability  12/12/2024 0057 by Shahida Guerin RN  Outcome: Progressing  12/11/2024 1421 by Jennifer Zuluaga RN  Outcome: Progressing  Goal: Absence of cardiac dysrhythmias or at baseline  12/12/2024 0057 by Shahida Guerin RN  Outcome: Progressing  12/11/2024 1421 by Jennifer Zuluaga RN  Outcome: Progressing     
  Problem: Chronic Conditions and Co-morbidities  Goal: Patient's chronic conditions and co-morbidity symptoms are monitored and maintained or improved  Outcome: Progressing     Problem: Discharge Planning  Goal: Discharge to home or other facility with appropriate resources  Outcome: Progressing     Problem: Safety - Adult  Goal: Free from fall injury  Outcome: Progressing     Problem: ABCDS Injury Assessment  Goal: Absence of physical injury  Outcome: Progressing     Problem: Skin/Tissue Integrity  Goal: Absence of new skin breakdown  Description: 1.  Monitor for areas of redness and/or skin breakdown  2.  Assess vascular access sites hourly  3.  Every 4-6 hours minimum:  Change oxygen saturation probe site  4.  Every 4-6 hours:  If on nasal continuous positive airway pressure, respiratory therapy assess nares and determine need for appliance change or resting period.  Outcome: Progressing     
  Problem: Discharge Planning  Goal: Discharge to home or other facility with appropriate resources  Outcome: Progressing     Problem: Safety - Adult  Goal: Free from fall injury  Outcome: Progressing     Problem: Neurosensory - Adult  Goal: Achieves maximal functionality and self care  Outcome: Progressing     Problem: Respiratory - Adult  Goal: Achieves optimal ventilation and oxygenation  Outcome: Progressing     Problem: Cardiovascular - Adult  Goal: Maintains optimal cardiac output and hemodynamic stability  Outcome: Progressing  Goal: Absence of cardiac dysrhythmias or at baseline  Outcome: Progressing     
  Problem: Pain  Goal: Verbalizes/displays adequate comfort level or baseline comfort level  Outcome: Progressing     Problem: Genitourinary - Adult  Goal: Urinary catheter remains patent  Outcome: Progressing     Problem: Musculoskeletal - Adult  Goal: Return ADL status to a safe level of function  Outcome: Progressing     Problem: Musculoskeletal - Adult  Goal: Return mobility to safest level of function  Outcome: Progressing     
  Problem: Skin/Tissue Integrity - Adult  Goal: Skin integrity remains intact  12/14/2024 0910 by Jeffrey Eric RN  Outcome: Progressing  12/14/2024 0302 by Davina Rush RN  Outcome: Progressing  Goal: Oral mucous membranes remain intact  12/14/2024 0910 by Jeffrey Eric RN  Outcome: Progressing  12/14/2024 0302 by Davina Rush RN  Outcome: Progressing     Problem: Musculoskeletal - Adult  Goal: Return mobility to safest level of function  12/14/2024 0910 by Jeffrey Eric RN  Outcome: Progressing  12/14/2024 0302 by Davina Rush RN  Outcome: Progressing  Goal: Return ADL status to a safe level of function  12/14/2024 0910 by Jeffrey Eric RN  Outcome: Progressing  12/14/2024 0302 by Davina Rush RN  Outcome: Progressing     Problem: Genitourinary - Adult  Goal: Urinary catheter remains patent  12/14/2024 0910 by Jeffrey Eric RN  Outcome: Progressing  12/14/2024 0302 by Davina Rush RN  Outcome: Progressing     Problem: Infection - Adult  Goal: Absence of infection at discharge  12/14/2024 0910 by Jeffrey Eric RN  Outcome: Progressing  12/14/2024 0302 by Davina Rush RN  Outcome: Progressing     Problem: Metabolic/Fluid and Electrolytes - Adult  Goal: Electrolytes maintained within normal limits  12/14/2024 0910 by Jeffrey Eric RN  Outcome: Progressing  12/14/2024 0302 by Davina Rush RN  Outcome: Progressing     Problem: Pain  Goal: Verbalizes/displays adequate comfort level or baseline comfort level  12/14/2024 0910 by Jeffrey Eric RN  Outcome: Progressing  12/14/2024 0302 by Davina Rush RN  Outcome: Progressing     Problem: Anxiety  Goal: Will report anxiety at manageable levels  Description: INTERVENTIONS:  1. Administer medication as ordered  2. Teach and rehearse alternative coping skills  3. Provide emotional support with 1:1 interaction with staff  Outcome: Progressing     Problem: Coping  Goal: Pt/Family able to verbalize concerns and demonstrate effective coping 
  Problem: Skin/Tissue Integrity - Adult  Goal: Skin integrity remains intact  Outcome: Progressing     Problem: Skin/Tissue Integrity - Adult  Goal: Oral mucous membranes remain intact  Outcome: Progressing     Problem: Musculoskeletal - Adult  Goal: Return mobility to safest level of function  Outcome: Progressing     Problem: Musculoskeletal - Adult  Goal: Return ADL status to a safe level of function  Outcome: Progressing     Problem: Genitourinary - Adult  Goal: Urinary catheter remains patent  Outcome: Progressing     Problem: Infection - Adult  Goal: Absence of infection at discharge  Outcome: Progressing     Problem: Metabolic/Fluid and Electrolytes - Adult  Goal: Electrolytes maintained within normal limits  Outcome: Progressing     Problem: Pain  Goal: Verbalizes/displays adequate comfort level or baseline comfort level  Outcome: Progressing     
  Problem: Skin/Tissue Integrity - Adult  Goal: Skin integrity remains intact  Outcome: Progressing  Flowsheets (Taken 12/16/2024 1007 by Dori Lott RN)  Skin Integrity Remains Intact: Monitor for areas of redness and/or skin breakdown     Problem: Musculoskeletal - Adult  Goal: Return mobility to safest level of function  Outcome: Progressing  Goal: Return ADL status to a safe level of function  Outcome: Progressing     Problem: Genitourinary - Adult  Goal: Urinary catheter remains patent  Outcome: Progressing     Problem: Pain  Goal: Verbalizes/displays adequate comfort level or baseline comfort level  Outcome: Progressing     Problem: Anxiety  Goal: Will report anxiety at manageable levels  Description: INTERVENTIONS:  1. Administer medication as ordered  2. Teach and rehearse alternative coping skills  3. Provide emotional support with 1:1 interaction with staff  Outcome: Progressing     Problem: Coping  Goal: Pt/Family able to verbalize concerns and demonstrate effective coping strategies  Description: INTERVENTIONS:  1. Assist patient/family to identify coping skills, available support systems and cultural and spiritual values  2. Provide emotional support, including active listening and acknowledgement of concerns of patient and caregivers  3. Reduce environmental stimuli, as able  4. Instruct patient/family in relaxation techniques, as appropriate  5. Assess for spiritual pain/suffering and initiate Spiritual Care, Psychosocial Clinical Specialist consults as needed  Outcome: Progressing     Problem: Decision Making  Goal: Pt/Family able to effectively weigh alternatives and participate in decision making related to treatment and care  Description: INTERVENTIONS:  1. Determine when there are differences between patient's view, family's view, and healthcare provider's view of condition  2. Facilitate patient and family articulation of goals for care  3. Help patient and family identify pros/cons of 
  Problem: Skin/Tissue Integrity - Adult  Goal: Skin integrity remains intact  Outcome: Progressing  Goal: Oral mucous membranes remain intact  Outcome: Progressing     Problem: Musculoskeletal - Adult  Goal: Return mobility to safest level of function  Outcome: Progressing  Goal: Return ADL status to a safe level of function  Outcome: Progressing     Problem: Genitourinary - Adult  Goal: Urinary catheter remains patent  Outcome: Progressing     Problem: Infection - Adult  Goal: Absence of infection at discharge  Outcome: Progressing     Problem: Metabolic/Fluid and Electrolytes - Adult  Goal: Electrolytes maintained within normal limits  Outcome: Progressing     
appropriate manner  4. Utilize positive, consistent limit setting strategies supporting safety of patient, staff and others  5. Encourage participation in the decision making process about the behavioral management agreement  6. If a visitor's behavior poses a threat to safety call refer to organization policy.  7. Initiate consult with , Psychosocial CNS, Spiritual Care as appropriate  Outcome: Progressing     
indicated  Outcome: Progressing     Problem: Behavior  Goal: Pt/Family maintain appropriate behavior and adhere to behavioral management agreement, if implemented  Description: INTERVENTIONS:  1. Assess patient/family's coping skills and  non-compliant behavior (including use of illegal substances)  2. Notify security of behavior or suspected illegal substances which indicate the need for search of the family and/or belongings  3. Encourage verbalization of thoughts and concerns in a socially appropriate manner  4. Utilize positive, consistent limit setting strategies supporting safety of patient, staff and others  5. Encourage participation in the decision making process about the behavioral management agreement  6. If a visitor's behavior poses a threat to safety call refer to organization policy.  7. Initiate consult with , Psychosocial CNS, Spiritual Care as appropriate  Outcome: Progressing

## 2024-12-18 NOTE — PROGRESS NOTES
HOSPITALISTS PROGRESS NOTE    12/10/2024 8:52 AM        Name: Cely Espitia .              Admitted: 12/9/2024  Primary Care Provider: Marcus Shukla MD (Tel: [phone])      Brief Course: This 84 y.o. female  with PMHx of combined CHF, s/p ICD, CAD; s/p complex PCI to OM1 on 11/26/24, ureter cancer; s/p right nephrectomy, cystectomy and ileal conduit, CKD stage IV and diabetes mellitus who presented with dyspnea.     Interval history:   Pt seen and examined today   Overnight events noted and interval ancillary notes reviewed.  On RA satting well.Hemodynamically stable  Diuresing IV Lasix; negative 2.5 L since admission.  Creatinine 2.3 this morning  Pt resting in bed; denied any fever, chills, chest pain, palpitation, SOB, bowel bladder dysfunction  Repeat echo pending      Assessment & Plan:     Acute on chronic combined heart failure; p/w dyspnea  proBNP> 19 K on admission.  CXR with pulmonary congestion and bilateral pleural effusions.    Last echo on 8/24 showed EF of 35 to 40%.  Grade 3 diastolic dysfunction  Cardiology on board; continue Lasix, Coreg, hydralazine and Imdur.    No ACE/ARB/Arni due to CKD.  Daily weights.  Fluid and salt restriction  Monitor electrolytes closely while on Lasix and replace as needed     Hx of CAD; s/p complex PCI and stent to OM on 11/26/24.  Continue aspirin, Plavix and statin     Elevated troponin; likely 2/2 CHF exacerbation.   HST 73-64 EKG negative for acute ischemic pathology.  Monitor on telemetry     CKD stage IV; creatinine baseline (low 2's)   Nephrology consulted; continue IV Lasix.    Avoid nephrotoxin, daily weights, strict I&O's.  Monitor renal function closely     Hypertension; continue current regimen and monitor BP closely     Diabetes mellitus; HgbA1c 8.4 on 12/2/2014.  Continue Lantus and SSI and monitor BG closely     Iron deficiency anemia; Hgb 8.7 on admission  Iron studies on 12/3/2024 
                                                                      HOSPITALISTS PROGRESS NOTE    12/14/2024 3:13 PM        Name: Cely Espitia .              Admitted: 12/9/2024  Primary Care Provider: Marcus Shukla MD (Tel: [phone])      Brief Course: This 84 y.o. female  with PMHx of combined CHF, s/p ICD, CAD; s/p complex PCI to OM1 on 11/26/24, ureter cancer; s/p right nephrectomy, cystectomy and ileal conduit, CKD stage IV and diabetes mellitus who presented with dyspnea.   Admitted as inpatient for acute on chronic combined CHF.  Diuresed with IV Lasix, -9.4 L since admission.  Followed by Cardio and Renal.  Discharged to SNF on 12/13/24.    Interval history:   Feels well  No new complaints no chest pain no shortness of      Assessment & Plan:     Acute on chronic combined heart failure; p/w dyspnea  proBNP> 19 K on admission.  CXR with pulmonary congestion and bilateral pleural effusions.    Last echo on 8/24 showed EF of 35 to 40%.  Grade 3 diastolic dysfunction  Cardiology on board; continue PO Lasix, Coreg, hydralazine and Imdur.    No ACE/ARB/Arni due to CKD.  Daily weights.  Fluid and salt restriction  Monitor electrolytes closely while on Lasix and replace as needed     Hx of CAD; s/p complex PCI and stent to OM on 11/26/24.  Continue aspirin, Plavix and Crestor     Elevated troponin; likely 2/2 CHF exacerbation.   HST 73-64 EKG negative for acute ischemic pathology.  Monitor on telemetry     CKD stage IV; creatinine baseline (low 2's)   Nephrology consulted; continue PO Lasix    Avoid nephrotoxin, daily weights, strict I&O's.  Monitor renal function closely     Hypertension; continue Coreg, hydralazine, imdur     Diabetes mellitus; HgbA1c 8.4 on 12/2/2014.  Continue SSI and hypoglycemia protocol     Iron deficiency anemia; Hgb 8.7 on admission  Iron studies on 12/3/2024 suggestive of iron deficiency.  Continue IV iron supplement.  Monitor CBC closely     Hyperlipidemia; continue statins    DVT 
                                                                      HOSPITALISTS PROGRESS NOTE    12/16/2024 2:07 PM        Name: Cely Espitia .              Admitted: 12/9/2024  Primary Care Provider: Marcus Shukla MD (Tel: [phone])      Brief Course: This 84 y.o. female  with PMHx of combined CHF, s/p ICD, CAD; s/p complex PCI to OM1 on 11/26/24, ureter cancer; s/p right nephrectomy, cystectomy and ileal conduit, CKD stage IV and diabetes mellitus who presented with dyspnea.   Admitted as inpatient for acute on chronic combined CHF.  Diuresed with IV Lasix, -9.4 L since admission.  Followed by Cardio and Renal.  Discharged to SNF on 12/13/24.    Interval history:   Patient has no complaints.  She denies CP, SOB, HA or abdominal pain.  No fevers.      Assessment & Plan:     Acute on chronic combined heart failure; p/w dyspnea  proBNP> 19 K on admission.  CXR with pulmonary congestion and bilateral pleural effusions.    Last echo on 8/24 showed EF of 35 to 40%.  Grade 3 diastolic dysfunction  Cardiology on board; continue PO Lasix, Coreg, hydralazine and Imdur.    No ACE/ARB/Arni due to CKD.  Daily weights.    S/P IV Lasix, resume Lasix upon DC     Hx of CAD; s/p complex PCI and stent to OM on 11/26/24.  Continue aspirin, Plavix and Crestor     Elevated troponin; likely 2/2 CHF exacerbation.   HST 73-64 EKG negative for acute ischemic pathology.  Monitor on telemetry     CKD stage IV; creatinine baseline (low 2's)   Nephrology consulted; off PO Lasix.  Resume upon DC per Renal  Avoid nephrotoxin, daily weights, strict I&O's.  Monitor renal function closely     Hypertension; continue Coreg, hydralazine, imdur     Diabetes mellitus; HgbA1c 8.4 on 12/2/2014.  Start Lantus 10 U daily.  Continue SSI and hypoglycemia protocol     Iron deficiency anemia; Hgb 8.7 on admission  Iron studies on 12/3/2024 suggestive of iron deficiency.  S/P IV iron supplement.  Resume PO FeSO4.  Monitor CBC closely     Hyperlipidemia; 
      [facility]   Daily Progress Note      Admit Date:  12/9/2024    HPI:    Ms. Espitia is an 84 year old female with history of CAD, s/ PCI to , ICD (prior cardiac arrest), lives at [facility] living, one kidney     She presented to the hospital from nursing home for dyspnea, cxr with pulmonary edema and had not had any diuretics.  She has had 2 other admissions since her [facility] 2 weeks ago.        Subjective:  Patient is being seen for acute on chronic combined s/diastolic heart failure. There were no acute overnight cardiac events. She is sitting up in the bed awaiting her daughter to take her home.  No chest pain, palpitations, lightheadedness or edema.  She starts telling me how unhappy she is about her hospitalization (offered floor charge nurse, PCA in the room).  She is being discharged today   She has received 2 IV dose of iron  Creat 2.3 bnp 19K-20K    Objective:   /75   Pulse 84   Temp 97.7 °F (36.5 °C) (Oral)   Resp 16   Ht 1.727 m (5' 8\")   Wt 64.5 kg (142 lb 4.8 oz)   SpO2 93%   BMI 21.64 kg/m²     Intake/Output Summary (Last 24 hours) at 12/12/2024 0931  Last data filed at 12/11/2024 2157  Gross per 24 hour   Intake 240 ml   Output 2150 ml   Net -1910 ml          Physical Exam:  General:  Awake, alert, oriented in NAD  Skin:  Warm and dry.  No unusual bruising or rash  Neck:  Supple.  No JVD or carotid bruit appreciated  Chest:  Normal effort.  Clear to auscultation, no wheezes/rhonchi/rales  Cardiovascular:  RRR, S1/S2, no murmur/gallop/rub  Abdomen:  Soft, nontender, +bowel sounds  Extremities:  No edema  Neurological: No focal deficits  Psychological: Normal mood and affect      Medications:    ferric gluconate  125 mg IntraVENous Daily    carvedilol  12.5 mg Oral BID with meals    heparin (porcine)  5,000 Units SubCUTAneous BID    furosemide  40 mg Oral BID    insulin lispro  0-16 Units SubCUTAneous 4x Daily AC & HS    sodium chloride flush  5-40 mL IntraVENous 2 
  [facility] - Inpatient Rehabilitation Department   Phone: [phone]    Physical Therapy    [x] Initial Evaluation            [] Daily Treatment Note         [] Discharge Summary      Patient: Cely Espitia   : 1940   MRN: [id number]   Date of Service:  12/10/2024  Admitting Diagnosis: Pleural effusion    Current Admission Summary:   CHIEF COMPLAINT             Chief Complaint   Patient presents with    Other       Pt to ED from [facility] by [facility] states sometimes when she falls asleep she wakes up feeling like she is \"gasping\" and has been having trouble with swallowing all for about the past three months. NAD, resp e/u on RA.  per EMS.            HISTORY OF PRESENT ILLNESS   (Location/Symptom, Timing/Onset, Context/Setting, Quality, Duration, Modifying Factors, Severity)  Note limiting factors.   Cely Espitia is a 84 y.o. female who presents to the emergency department for episode of dyspnea upon awakening today.  She woke up about an hour prior to arrival and felt like she could not catch her breath.  She felt herself gasping for air.  When the symptoms did not improve for several minutes she called EMS.  She denies any chest pain.  She does still feel mildly dyspneic.  She does report a globus sensation in her throat ongoing for about 4 months.  She does not think she has ever had an EGD for this.  Currently no difficulty with swallowing and able to drink water.  No sore throat.     Chart reviewed: Admission 2024 reviewed.  Patient has a history of CAD status post drug-eluting stent to left circumflex on 2024 with Dr. Velazquez. Has ICD in place.  History of CHF, ureteral cancer status post nephrectomy with radical cystectomy and ileal conduit.  She did have a UTI during that admission due to E. coli treated with Rocephin inpatient, Keflex outpatient.    Past Medical History:  has a past medical history of Anxiety, Atrial fibrillation 
  [facility] - Inpatient Rehabilitation Department   Phone: [phone]    Physical Therapy    [] Initial Evaluation            [x] Daily Treatment Note         [] Discharge Summary      Patient: Cely Espitia   : 1940   MRN: [id number]   Date of Service:  2024  Admitting Diagnosis: Pleural effusion    Current Admission Summary:   CHIEF COMPLAINT             Chief Complaint   Patient presents with    Other       Pt to ED from [facility] by [facility] states sometimes when she falls asleep she wakes up feeling like she is \"gasping\" and has been having trouble with swallowing all for about the past three months. NAD, resp e/u on RA.  per EMS.            HISTORY OF PRESENT ILLNESS   (Location/Symptom, Timing/Onset, Context/Setting, Quality, Duration, Modifying Factors, Severity)  Note limiting factors.   Cely Espitia is a 84 y.o. female who presents to the emergency department for episode of dyspnea upon awakening today.  She woke up about an hour prior to arrival and felt like she could not catch her breath.  She felt herself gasping for air.  When the symptoms did not improve for several minutes she called EMS.  She denies any chest pain.  She does still feel mildly dyspneic.  She does report a globus sensation in her throat ongoing for about 4 months.  She does not think she has ever had an EGD for this.  Currently no difficulty with swallowing and able to drink water.  No sore throat.     Chart reviewed: Admission 2024 reviewed.  Patient has a history of CAD status post drug-eluting stent to left circumflex on 2024 with Dr. Velazquez. Has ICD in place.  History of CHF, ureteral cancer status post nephrectomy with radical cystectomy and ileal conduit.  She did have a UTI during that admission due to E. coli treated with Rocephin inpatient, Keflex outpatient.    Past Medical History:  has a past medical history of Anxiety, Atrial fibrillation 
  [facility] - Inpatient Rehabilitation Department   Phone: [phone]    Occupational Therapy    [x] Initial Evaluation            [] Daily Treatment Note         [x] Discharge Summary      Patient: Cely Espitia   : 1940   MRN: [id number]   Date of Service:  12/10/2024    Admitting Diagnosis:  Pleural effusion  Current Admission Summary:   Pt to ED from [facility] by [facility] states sometimes when she falls asleep she wakes up feeling like she is \"gasping\" and has been having trouble with swallowing all for about the past three months. NAD, resp e/u on RA.  per EMS.            HISTORY OF PRESENT ILLNESS   (Location/Symptom, Timing/Onset, Context/Setting, Quality, Duration, Modifying Factors, Severity)  Note limiting factors.   Cely Espitia is a 84 y.o. female who presents to the emergency department for episode of dyspnea upon awakening today.  She woke up about an hour prior to arrival and felt like she could not catch her breath.  She felt herself gasping for air.  When the symptoms did not improve for several minutes she called EMS.  She denies any chest pain.  She does still feel mildly dyspneic.  She does report a globus sensation in her throat ongoing for about 4 months.  She does not think she has ever had an EGD for this.  Currently no difficulty with swallowing and able to drink water.  No sore throat.     Chart reviewed: Admission 2024 reviewed.  Patient has a history of CAD status post drug-eluting stent to left circumflex on 2024 with Dr. Velazquez. Has ICD in place.  History of CHF, ureteral cancer status post nephrectomy with radical cystectomy and ileal conduit.  She did have a UTI during that admission due to E. coli treated with Rocephin inpatient, Keflex outpatient.  Nursing Notes were reviewed.  Past Medical History:  has a past medical history of Anxiety, Atrial fibrillation (HCC), CAD (coronary artery disease), Cancer (HCC), 
  Nephrology progress Note  [phone]  [phone]   [email or web address]       Patient:  Cely Espitia   : 1940    Brief HPI    84-year-old woman with history of CKD, ureteral cancer s/p right nephrectomy and radical cystectomy, ileal conduit , hypertension, DM2, CAD/CHF, atrial fibrillation, carotid artery stenosis, hyperlipidemia, depression came in from ECF with complaints of shortness of breath.  The  Chest x-ray with mild edema and bilateral pleural effusions  A TTE is pending at this time  She had a ZACH in 2024 that showed moderate to severe mitral regurgitation  TTE in 2024 showed LVEF of 35 to 40%, grade 3 diastolic dysfunction, moderate pulmonary hypertension, moderate pulmonary regurgitation  Of note Lasix 40 mg p.o. daily listed in lab prior to admission medications    Recent hospitalization in 2024 when she had PCI/MONICA  Followed by hospitalization in early December after a fall, seen by us for CATARINA, presumed to be secondary to contrast associated nephropathy, baseline creatinine low 2s, peak creatinine at that time was 2.9, improved to baseline on discharge      We are consulted for CKD  She follows with Dr. Lassiter  CKD dates back to atleast   Creatinine appears to be at baseline at this time    Subjective/interval history    Patient seen and examined  Not very conversive  On room air  Blood pressure is controlled  Has been afebrile  Denies chest pains        Meds:  Scheduled Meds:   sodium chloride flush  5-40 mL IntraVENous 2 times per day    enoxaparin  30 mg SubCUTAneous Daily    ferrous sulfate  325 mg Oral BID    isosorbide mononitrate  30 mg Oral Daily    polyethylene glycol  17 g Oral Daily    rosuvastatin  10 mg Oral QPM    therapeutic multivitamin-minerals  1 tablet Oral Daily    vitamin D  1,000 Units Oral QPM    sodium chloride flush  5-40 mL IntraVENous 2 times per day    carvedilol  18.675 mg Oral BID    hydrALAZINE  25 mg Oral TID    aspirin  81 mg 
  Nephrology progress Note  [phone]  [phone]   [email or web address]       Patient:  Cely Espitia   : 1940    Brief HPI    84-year-old woman with history of CKD, ureteral cancer s/p right nephrectomy and radical cystectomy, ileal conduit , hypertension, DM2, CAD/CHF, atrial fibrillation, carotid artery stenosis, hyperlipidemia, depression came in from ECF with complaints of shortness of breath.   Chest x-ray with mild edema and bilateral pleural effusions  A TTE is pending at this time  She had a ZACH in 2024 that showed moderate to severe mitral regurgitation  TTE in 2024 showed LVEF of 35 to 40%, grade 3 diastolic dysfunction, moderate pulmonary hypertension, moderate pulmonary regurgitation  Of note Lasix 40 mg p.o. daily listed in lab prior to admission medications    Recent hospitalization in 2024 when she had PCI/MONICA  Followed by hospitalization in early December after a fall, seen by us for CATARINA, presumed to be secondary to contrast associated nephropathy, baseline creatinine low 2s, peak creatinine at that time was 2.9, improved to baseline on discharge      We are consulted for CKD  She follows with Dr. Lassiter  CKD dates back to atleast   Creatinine appears to be at baseline at this time    Subjective/interval history    Patient seen and examined  Creatinine stable  BPs controlled  Has been afebrile  On room air   Denies chest pains or sob  No fevers/chills      Meds:  Scheduled Meds:   [START ON 2024] heparin (porcine)  5,000 Units SubCUTAneous BID    sodium chloride flush  5-40 mL IntraVENous 2 times per day    ferrous sulfate  325 mg Oral BID AC    isosorbide mononitrate  30 mg Oral Daily    polyethylene glycol  17 g Oral Daily    rosuvastatin  10 mg Oral QPM    therapeutic multivitamin-minerals  1 tablet Oral Daily    Vitamin D  1,000 Units Oral QPM    sodium chloride flush  5-40 mL IntraVENous 2 times per day    carvedilol  18.675 mg Oral BID    
  Nephrology progress Note  [phone]  [phone]   [email or web address]       Patient:  Cely Espitia   : 1940    Brief HPI    84-year-old woman with history of CKD, ureteral cancer s/p right nephrectomy and radical cystectomy, ileal conduit , hypertension, DM2, CAD/CHF, atrial fibrillation, carotid artery stenosis, hyperlipidemia, depression came in from ECF with complaints of shortness of breath.   Chest x-ray with mild edema and bilateral pleural effusions  A TTE is pending at this time  She had a ZACH in 2024 that showed moderate to severe mitral regurgitation  TTE in 2024 showed LVEF of 35 to 40%, grade 3 diastolic dysfunction, moderate pulmonary hypertension, moderate pulmonary regurgitation  Of note Lasix 40 mg p.o. daily listed in lab prior to admission medications    Recent hospitalization in 2024 when she had PCI/MONICA  Followed by hospitalization in early December after a fall, seen by us for CATARINA, presumed to be secondary to contrast associated nephropathy, baseline creatinine low 2s, peak creatinine at that time was 2.9, improved to baseline on discharge      We are consulted for CKD  She follows with Dr. Lassiter  CKD dates back to atleast   Creatinine appears to be at baseline at this time    Subjective/interval history    Patient seen and examined  Creatinine stable  BPs well controlled  Has been afebrile  On room air   Denies chest pains or sob  No fevers/chills      Meds:  Scheduled Meds:   ferric gluconate  125 mg IntraVENous Daily    heparin (porcine)  5,000 Units SubCUTAneous BID    furosemide  40 mg Oral BID    insulin lispro  0-16 Units SubCUTAneous 4x Daily AC & HS    carvedilol  18.75 mg Oral BID    sodium chloride flush  5-40 mL IntraVENous 2 times per day    [Held by provider] ferrous sulfate  325 mg Oral BID AC    isosorbide mononitrate  30 mg Oral Daily    polyethylene glycol  17 g Oral Daily    rosuvastatin  10 mg Oral QPM    therapeutic 
  Nephrology progress Note  [phone]  [phone]   [employer]       Patient:  Cely Espitia   : 1940    Brief HPI    84-year-old woman with history of CKD, ureteral cancer s/p right nephrectomy and radical cystectomy, ileal conduit , hypertension, DM2, CAD/CHF, atrial fibrillation, carotid artery stenosis, hyperlipidemia, depression came in from ECF with complaints of shortness of breath.   Chest x-ray with mild edema and bilateral pleural effusions  She had a ZACH in 2024 that showed moderate to severe mitral regurgitation  TTE in 2024 showed LVEF of 35 to 40%, grade 3 diastolic dysfunction, moderate pulmonary hypertension, moderate pulmonary regurgitation  Of note Lasix 40 mg p.o. daily listed in lab prior to admission medications    Recent hospitalization in 2024 when she had PCI/MONICA  Followed by hospitalization in early December after a fall, seen by us for CATARINA, presumed to be secondary to contrast associated nephropathy, baseline creatinine low 2s, peak creatinine at that time was 2.9, improved to baseline on discharge      We are consulted for CKD  She follows with Dr. Lassiter  CKD dates back to atleast   Creatinine appears to be at baseline at this time    Subjective/interval history    Patient seen and examined  Creatinine stable  Blood pressures controlled  Has been afebrile  On room air    Meds:  Scheduled Meds:   ferric gluconate  125 mg IntraVENous Q24H    carvedilol  12.5 mg Oral BID with meals    heparin (porcine)  5,000 Units SubCUTAneous BID    furosemide  40 mg Oral BID    insulin lispro  0-16 Units SubCUTAneous 4x Daily AC & HS    sodium chloride flush  5-40 mL IntraVENous 2 times per day    [Held by provider] ferrous sulfate  325 mg Oral BID AC    isosorbide mononitrate  30 mg Oral Daily    polyethylene glycol  17 g Oral Daily    rosuvastatin  10 mg Oral QPM    therapeutic multivitamin-minerals  1 tablet Oral Daily    Vitamin D  1,000 Units Oral 
  Nephrology progress Note  [phone]  [phone]   [employer]       Patient:  Cely Espitia   : 1940    Brief HPI    84-year-old woman with history of CKD, ureteral cancer s/p right nephrectomy and radical cystectomy, ileal conduit , hypertension, DM2, CAD/CHF, atrial fibrillation, carotid artery stenosis, hyperlipidemia, depression came in from ECF with complaints of shortness of breath.   Chest x-ray with mild edema and bilateral pleural effusions  She had a ZACH in 2024 that showed moderate to severe mitral regurgitation  TTE in 2024 showed LVEF of 35 to 40%, grade 3 diastolic dysfunction, moderate pulmonary hypertension, moderate pulmonary regurgitation  Of note Lasix 40 mg p.o. daily listed in lab prior to admission medications    Recent hospitalization in 2024 when she had PCI/MONICA  Followed by hospitalization in early December after a fall, seen by us for CATARINA, presumed to be secondary to contrast associated nephropathy, baseline creatinine low 2s, peak creatinine at that time was 2.9, improved to baseline on discharge      We are consulted for CKD  She follows with Dr. Lassiter  CKD dates back to atleast   Creatinine appears to be at baseline at this time    Subjective/interval history    Patient seen and examined  Creatinine stable  Events noted from this a.m.  Blood pressures maintained  Has been afebrile  On room air  Received Haldol earlier    Meds:  Scheduled Meds:   ferric gluconate  125 mg IntraVENous Q24H    carvedilol  12.5 mg Oral BID with meals    heparin (porcine)  5,000 Units SubCUTAneous BID    furosemide  40 mg Oral BID    insulin lispro  0-16 Units SubCUTAneous 4x Daily AC & HS    sodium chloride flush  5-40 mL IntraVENous 2 times per day    [Held by provider] ferrous sulfate  325 mg Oral BID AC    isosorbide mononitrate  30 mg Oral Daily    polyethylene glycol  17 g Oral Daily    rosuvastatin  10 mg Oral QPM    therapeutic multivitamin-minerals  
  Physician Progress Note      PATIENT:               DESMOND MARCUM  CSN #:                  [id number]  :                       1940  ADMIT DATE:       2024 3:44 AM  DISCH DATE:  RESPONDING  PROVIDER #:        RERE HOWARD          QUERY TEXT:    Pt admitted with dyspnea. Pt noted to have troponin 73 with repeat being 64 .   If possible, please document in the progress notes and discharge summary if   you are evaluating and/or treating any of the following:    The medical record reflects the following:  Risk Factors: Acute on chronic combined s/d heart failure, CKD  Clinical Indicators: Troponin 73> 64, Per DC summary, \"Elevated troponin;   likely 2/2 CHF exacerbation\"  Treatment: ECHO, cxray, cardiology consult, lasix  Options provided:  -- Non-ischemic myocardial injury due to Acute on chronic combined systolic   and diastolic Heart failure.  -- Non-ischemic myocardial injury due to other, Please specify cause.  -- Other - I will add my own diagnosis  -- Disagree - Not applicable / Not valid  -- Disagree - Clinically unable to determine / Unknown  -- Refer to Clinical Documentation Reviewer    PROVIDER RESPONSE TEXT:    This patient has non-ischemic myocardial injury due to Acute on chronic   combined systolic and diastolic Heart failure.    Query created by: Torie King on 2024 9:43 AM      Electronically signed by:  RERE HOWARD 2024 3:38 PM          
  Physician Progress Note      PATIENT:               DESMOND MARCUM  [facility] #:                  [id number]  :                       1940  ADMIT DATE:       2024 3:44 AM  DISCH DATE:        2024 5:00 PM  RESPONDING  PROVIDER #:        Hector Garcia MD          QUERY TEXT:    Noted documentation of Acute Kidney Injury in nephrology note on 12/15  In   order to support the diagnosis of CATARINA, please include additional clinical   indicators in your documentation.? Or please document if the diagnosis of CATARINA   has been ruled out after further study.    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  Options provided:  -- Acute kidney injury evidenced by, Please document evidence as well as a   numerical baseline creatinine, if known.  -- Currently resolved acute kidney injury was evidenced by, Please document   evidence as well as a numerical baseline creatinine, if known.  -- Acute kidney injury ruled out after study  -- Other - I will add my own diagnosis  -- Disagree - Not applicable / Not valid  -- Disagree - Clinically unable to determine / Unknown  -- Refer to Clinical Documentation Reviewer    PROVIDER RESPONSE TEXT:    This patient has acute kidney injury as evidenced by Creat of 2.9 an incrase   of 0.7 over baseline    Query created by: Torie King on 2024 9:47 AM      Electronically signed by:  Hector Garcia MD 2024 11:09 AM          
  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  Chest x-ray showed pulmonary edema and bilateral pleural effusions.  A ZACH done  showed severe MR with LVEF of 35 to 40% and grade 3 diastolic dysfunction with moderate pulmonary hypertension.   she had admission and had PCI with stent placement.  There was also CATARINA at that time.      SUBJECTIVE:   Patient progress reviewed.   24: The patient, feels weak  12/15/24: O > I .  No dyspnea.  No cough no phlegm or hemoptysis.  No fever ,lying flat  24: O > I , no dyspnea    Physical Exam:    VITALS:  BP (!) 112/56   Pulse 87   Temp 98.8 °F (37.1 °C) (Oral)   Resp 16   Ht 1.727 m (5' 8\")   Wt 59.3 kg (130 lb 11 oz)   SpO2 96%   BMI 19.87 kg/m²   BLOOD PRESSURE RANGE:  Systolic (24hrs), Av , Min:100 , Max:119   ; Diastolic (24hrs), Av, Min:56, Max:66    24HR INTAKE/OUTPUT:    Intake/Output Summary (Last 24 hours) at 2024 1154  Last data filed at 12/15/2024 1707  Gross per 24 hour   Intake 60 ml   Output 500 ml   Net -440 ml       Gen:  alert, oriented x 3  PERRL , EOM +  Pallor +, No icterus  JVP not raised   CV: RRR no murmur or rub .  Lungs:B/ L air entry, Normal breath sounds   Abd: soft, bowel sounds + , No organomegaly , urostomy, scars   Ext: No edema, no cyanosis  Skin: Warm.  No rash    DATA:    CBC with Differential:    Lab Results   Component Value Date/Time    WBC 7.7 2024 05:27 AM    RBC 4.05 2024 05:27 AM    HGB 10.6 2024 
  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  Chest x-ray showed pulmonary edema and bilateral pleural effusions.  A AZCH done  showed severe MR with LVEF of 35 to 40% and grade 3 diastolic dysfunction with moderate pulmonary hypertension.   she had admission and had PCI with stent placement.  There was also CATARINA at that time.      SUBJECTIVE:   Patient progress reviewed.   24: The patient, feels weak  12/15/24: O > I .  No dyspnea.  No cough no phlegm or hemoptysis.  No fever ,lying flat  24: O > I , no dyspnea  1`24: Lying flat, denies dyspnea, no dysuria    Physical Exam:    VITALS:  /67   Pulse 65   Temp 97.5 °F (36.4 °C) (Oral)   Resp 16   Ht 1.727 m (5' 7.99\")   Wt 57.3 kg (126 lb 6.4 oz)   SpO2 97%   BMI 19.22 kg/m²   BLOOD PRESSURE RANGE:  Systolic (24hrs), Av , Min:86 , Max:111   ; Diastolic (24hrs), Av, Min:44, Max:67    24HR INTAKE/OUTPUT:    Intake/Output Summary (Last 24 hours) at 2024 1156  Last data filed at 2024 0437  Gross per 24 hour   Intake 220 ml   Output 1300 ml   Net -1080 ml       Gen:  alert, oriented x 3  PERRL , EOM +  Pallor +, No icterus  JVP not raised   CV: RRR no murmur or rub .  Lungs:B/ L air entry, Normal breath sounds   Abd: soft, bowel sounds + , No organomegaly , urostomy, scars   Ext: No edema, no cyanosis  Skin: Warm.  No rash    DATA:    CBC with Differential:    Lab Results   Component Value Date/Time    WBC 7.7 2024 05:27 AM    
Admission and assessment completed. Vital signs stable. Fall precautions in place. Call light and bedside table within reach. Nonskid footwear on.   
Assessment complete. Pt drowsy but easily awakened. Follows some commands but irritated. Evening hydralazine held d/t /56. Bed locked, in lowest position, with bed alarm on. Bedside table and call light within reach. Plan of care ongoing.    0230- blood sugar of 90 @2100. Rechecked d/t protocol. Blood sugar 200. Pt very irritated when awakened    0500-pt refused morning labs  
CLINICAL PHARMACY NOTE: MEDS TO BEDS    Total # of Prescriptions Filled: 1   The following medications were delivered to the patient:  FUROSEMIDE 40MG TABS    Additional Documentation: Jeffrey LOUISE approved to deliver medications to patient room. Pt refused the medication.  
CLINICAL PHARMACY NOTE: MEDS TO BEDS    Total # of Prescriptions Filled: 1   The following medications were delivered to the patient:  FUROSEMIDE 40MG TABS    Additional Documentation: Patient son picked up=signed  Carvedilol too soon 12/22  [facility]  
CLINICAL PHARMACY NOTE: MEDS TO BEDS    Total # of Prescriptions Filled: 2   The following medications were delivered to the patient:  FUROSEMIDE 20MG TABS  PEN NEEDLES 31G X 8 MM MISC    Additional Documentation: Dori LOUISE approved to deliver medications to patient room=signed  Carvedilol due 12/22  Rosi 12/26  [facility]  
[facility]   Cardiology Hospital Progress Note     Date: 12/9/2024  Admit Date: 12/9/2024     Reason for consultation:     Chief Complaint   Patient presents with    Other     Pt to ED from [facility] by [facility] states sometimes when she falls asleep she wakes up feeling like she is \"gasping\" and has been having trouble with swallowing all for about the past three months. NAD, resp e/u on RA.  per EMS.       History of Present Illness: History obtained from patient and medical record.     Cely Espitia is a 84 y.o. female withwith numerous cardiac and noncardiac medical problems, CAD, ischemic CM, s/p complex PC to OM1 about 2 weeks ago. S/P ICD (prior cardiac arrest). Lives at a nursing home and was brought to by EMS to ER for dyspnea upon waking up. Patient denies any current symptoms. CXR reportedly showed pulmonary edema, however patient hasn't had any diuretics and states she feels well, and is currently on room air. She states she is unclear why she is here. She has had 2 other admissions since she had her cath 2 weeks ago, one of which was for a fall at home.  (per consult note)      Interval Hx: Today, she is resting in bed. No complaints offered. Telemetry stable.     Patient seen and examined. Clinical notes reviewed. Telemetry reviewed.  No new complaints today. No major events overnight.   Denies having chest pain, palpitations, shortness of breath, orthopnea/PND, cough, or dizziness at the time of this visit.      Past Medical History:  Past Medical History:   Diagnosis Date    Anxiety     Atrial fibrillation (HCC)     CAD (coronary artery disease)     Cancer (HCC)     bladder right kidney     Cardiac arrest 03/27/2016    Carotid artery stenosis     CHF (congestive heart failure) (HCC)     Chronic kidney disease     cancer of kidney and bladder    Depression     Diabetes mellitus (HCC)     IDDM    GERD (gastroesophageal reflux disease)     Hip pain, chronic  
Nurse was called to room by another staff member r/t patient and her son \"fighting.\" Upon entering room it appeared that son was trying to restrain patient stating she was throwing things at him and trying to get OOB unassisted. Patient was yelling for help and get him out of here. Son was ask to leave the room. IM Geodn given in right Deltoid.   
Nutrition Education    Pt triggered for heart failure diet education. Denied need for education as familiar with guidelines. Stated eats in the cafeteria at her facility so she is unable to follow a specific sodium restriction, does not add salt to foods, and does not feel she drinks over 64 fluid oz.     Tati Tao, MS, RD, LD    
Nutrition Note    RECOMMENDATIONS  PO Diet: CCC-4 / Cardiac / 2gm Na+  ONS: Glucerna or Ensure High Protein based on availability of supply       ASSESSMENT   LOS nutrition assessment.  Pt on a CCC-4 / cardiac / 2gm Na+ diet.  Observed breakfast tray, pt consumed 100%.  She reports appetite has been so-so but better today.  Most meals consumed are documented at 1-25%.  Pt would benefit from oral nutrition supplement. Admission wt 141 lb and  lb, indicating an 11% loss over 8 days; pt with hx of CHF and down 12.3 liters.  Will begin Glucerna or equivalent BID.       Malnutrition Status: Moderate malnutrition  Acute Illness  Findings of the 6 clinical characteristics of malnutrition:  Energy Intake:  75% or less of estimated energy requirements for 7 or more days  Weight Loss:   (11% over 8 day; related to fluid loss)     Body Fat Loss:  Mild body fat loss Orbital, Buccal region   Muscle Mass Loss:  Mild muscle mass loss Clavicles (pectoralis & deltoids)  Fluid Accumulation:  No fluid accumulation     Strength:  Not Performed      NUTRITION DIAGNOSIS   Inadequate oral intake related to decreased appetite as evidenced by intake 0-25%    Goals: PO intake 50% or greater, prior to discharge     NUTRITION RELATED FINDINGS  Objective: Na+ 133, POCG 187; 12/13 LBM; I/O's: -12 liters  Wounds: None    CURRENT NUTRITION THERAPIES  ADULT DIET; Regular; 4 carb choices (60 gm/meal); Low Fat/Low Chol/High Fiber/2 gm Na       PO Intake: 1-25% (most meals)   PO Supplement Intake:None Ordered      ANTHROPOMETRICS  Current Height: 172.7 cm (5' 7.99\")  Current Weight - Scale: 57.3 kg (126 lb 6.4 oz)    Admission weight: 64.3 kg (141 lb 12.1 oz)  Ideal Body Weight (IBW): 140 lbs  (64 kg)        BMI: 19.2      COMPARATIVE STANDARDS  Total Energy Requirements (kcals/day): 1433 - 1719     Protein (g):  69 - 80       Fluid (mL/day):  1719    EDUCATION  Education/Counseling not appropriate (pt lives on ECF)     The patient will be 
Patient called police department at the beginning of the shift, stating that she was not being cared for and needed a ride home. Vitals were already obtained and morning medicine administered but patient states she forgot. Patient was encouraged to call her son Nigel, I called her daughter Vivian and ask if it was possible that any family can come visit r/t increased agitation and anxiety. At this time family was unable to visit. Patient refused PRN Xanax at this time. Of note patient will curse and throw items at staff but then several seconds later apologize and be tearful. This has occurred several times already this shift. Currently she is bed with call light within reach.   
Patient's noon blood pressure is 105/63.  Hydralazine held.  Message sent to MD.  
Pt A&O x4 with some confusion. VSSA with no c/o pain or discomfort. All evening medications given. All needs addressed. Bed locked, in lowest position with bed alarm on. Bedside table and call light within reach. Plan of care ongoing.  
Pt agitated demanding to leave. Pt refusing all care at this time. MD notified, coming to bedside to assess.    Jeffrey Eric RN, BSN    
Pt partial denture plate found removed and placed on bedside table. RN placed plate in labeled denture cup at bedside. Pt refused PO hydralazine at this time despite education. States \"No I want to rest.\"    Jeffrey Eric RN, BSN    
Shift assessment completed, see flowsheets.  Medications administered, see MAR. Vital signs stable.  Plan of care discussed with patient. Fall precautions in place. Call light and bedside table within reach.  Pt denies further needs at this time.  Will continue to monitor.  Dori Lott RN     
Shift assessment completed, see flowsheets.  Medications administered, see MAR. Vital signs stable.  Plan of care discussed with patient. Fall precautions in place. Call light and bedside table within reach.  Pt denies further needs at this time.  Will continue to monitor.  Dori Lott RN     
Shift assessment completed, see flowsheets.  Medications administered, see MAR. Vital signs stable.  Plan of care discussed with patient. Fall precautions in place. Call light and bedside table within reach. Nonskid footwear on.   Pt denies further needs at this time.  Will continue to monitor.  Dori Lott RN     
Shift assessment completed. Routine vitals stable. Scheduled iron infusion started, pt still refusing all PO medications. Patient is awake, alert and oriented x4 with intermittent agitation. Respirations are easy and unlabored. Patient does not appear to be in distress, resting comfortably at this time. Call light within reach. Denies needs at this time. Pain 0/10 per patient. POC discussed with patient, pt agreeable at this time. Awaiting daughter Vivian to pick-up patient and transport home. Discharge instructions reviewed with patient, verbalizes understanding, PIV to be removed when iron infusion completed.    Jeffrey Eric RN, BSN     
Shift assessment completed. Routine vitals stable. Scheduled medications given. Patient is awake but drowsy, alert and oriented x4, irritable when awoken but cooperative at this time. Respirations are easy and unlabored. Patient does not appear to be in distress, resting comfortably at this time. Call light within reach. Denies needs at this time. Pain 0/10 per patient.    Jeffrey Eric RN, BSN     
Shift assessment completed. Routine vitals stable. Scheduled medications given. Patient is awake, alert and oriented x4. Respirations are easy and unlabored. Patient does not appear to be in distress, resting comfortably at this time. Call light within reach. Denies needs at this time. Pain 0/10 per patient. Urostomy site care completed. Refused bath, gown change, and hygiene care at this time. States \"I just want to rest.\"    Jeffrey Eric RN, BSN     
Son is now at bedside.   
[facility]   Cardiology Hospital Progress Note     Date: 12/11/2024  Admit Date: 12/9/2024     Reason for consultation:     Chief Complaint   Patient presents with    Other     Pt to ED from [facility] by [facility] states sometimes when she falls asleep she wakes up feeling like she is \"gasping\" and has been having trouble with swallowing all for about the past three months. NAD, resp e/u on RA.  per EMS.       History of Present Illness: History obtained from patient and medical record.     Cely Espitia is a 84 y.o. female withwith numerous cardiac and noncardiac medical problems, CAD, ischemic CM, s/p complex PC to OM1 about 2 weeks ago. S/P ICD (prior cardiac arrest). Lives at a nursing home and was brought to by EMS to ER for dyspnea upon waking up. Patient denies any current symptoms. CXR reportedly showed pulmonary edema, however patient hasn't had any diuretics and states she feels well, and is currently on room air. She states she is unclear why she is here. She has had 2 other admissions since she had her cath 2 weeks ago, one of which was for a fall at home.  (per consult note)      Interval Hx: Today, she is resting in bed. No complaints offered. Telemetry stable. No complaints of shortness of breath.     Patient seen and examined. Clinical notes reviewed. Telemetry reviewed.  No new complaints today. No major events overnight.   Denies having chest pain, palpitations, shortness of breath, orthopnea/PND, cough, or dizziness at the time of this visit.      Past Medical History:  Past Medical History:   Diagnosis Date    Anxiety     Atrial fibrillation (HCC)     CAD (coronary artery disease)     Cancer (HCC)     bladder right kidney     Cardiac arrest 03/27/2016    Carotid artery stenosis     CHF (congestive heart failure) (HCC)     Chronic kidney disease     cancer of kidney and bladder    Depression     Diabetes mellitus (HCC)     IDDM    GERD (gastroesophageal 
Throughout the shift patient has continued to have short episodes of increased agitation and or tearfulness and demanding to \"get out of here.\" For the most part she was easily redirected with reassurance, emotional support, and or snacks. Patient is currently resting quietly with both eyes closed, call light within reach and bed alarm on.   
PPX: S/C heparin  Code:Full Code    Disposition: [facility]    Discussed with patient, nursing and CM.    Medically stable for DC to SNF.  DC delayed due to insurance issues.    Discussed with son.      Current Medications  ziprasidone (GEODON) 20 mg in sterile water 1 mL injection, Q12H PRN  melatonin tablet 3 mg, Nightly PRN  ALPRAZolam (XANAX) tablet 0.25 mg, BID PRN  ferric gluconate (FERRLECIT) 125 mg in sodium chloride 0.9 % 100 mL IVPB, Q24H  carvedilol (COREG) tablet 12.5 mg, BID with meals  heparin (porcine) injection 5,000 Units, BID  furosemide (LASIX) tablet 40 mg, BID  insulin lispro (HUMALOG,ADMELOG) injection vial 0-16 Units, 4x Daily AC & HS  sodium chloride flush 0.9 % injection 5-40 mL, 2 times per day  sodium chloride flush 0.9 % injection 5-40 mL, PRN  0.9 % sodium chloride infusion, PRN  ondansetron (ZOFRAN-ODT) disintegrating tablet 4 mg, Q8H PRN   Or  ondansetron (ZOFRAN) injection 4 mg, Q6H PRN  acetaminophen (TYLENOL) tablet 650 mg, Q6H PRN   Or  acetaminophen (TYLENOL) suppository 650 mg, Q6H PRN  [Held by provider] ferrous sulfate (IRON 325) tablet 325 mg, BID AC  fluticasone (FLONASE) 50 MCG/ACT nasal spray 1 spray, Daily PRN  isosorbide mononitrate (IMDUR) extended release tablet 30 mg, Daily  polyethylene glycol (GLYCOLAX) packet 17 g, Daily  rosuvastatin (CRESTOR) tablet 10 mg, QPM  therapeutic multivitamin-minerals 1 tablet, Daily  Vitamin D (CHOLECALCIFEROL) tablet 1,000 Units, QPM  sodium chloride flush 0.9 % injection 5-40 mL, PRN  0.9 % sodium chloride infusion, PRN  polyethylene glycol (GLYCOLAX) packet 17 g, Daily PRN  hydrALAZINE (APRESOLINE) tablet 25 mg, TID  aspirin EC tablet 81 mg, Daily  sulfur hexafluoride microspheres (LUMASON) 60.7-25 MG injection 2 mL, ONCE PRN  clopidogrel (PLAVIX) tablet 75 mg, Daily  dextrose bolus 10% 125 mL, PRN   Or  dextrose bolus 10% 250 mL, PRN  glucagon injection 1 mg, PRN  dextrose 10 % infusion, Continuous 
monitor BP closely     Diabetes mellitus; HgbA1c 8.4 on 12/2/2014.  Continue Lantus and SSI and monitor BG closely     Iron deficiency anemia; Hgb 8.7 on admission  Iron studies on 12/3/2024 suggestive of iron deficiency.  Continue iron supplement.  Monitor CBC closely     Hyperlipidemia; continue statins    DVT PPX: S/C heparin  Code:Full Code    Disposition: Once acute medical issues have resolved    Current Medications  heparin (porcine) injection 5,000 Units, BID  furosemide (LASIX) tablet 40 mg, BID  insulin lispro (HUMALOG,ADMELOG) injection vial 0-16 Units, 4x Daily AC & HS  carvedilol (COREG) tablet 18.75 mg, BID  sodium chloride flush 0.9 % injection 5-40 mL, 2 times per day  sodium chloride flush 0.9 % injection 5-40 mL, PRN  0.9 % sodium chloride infusion, PRN  ondansetron (ZOFRAN-ODT) disintegrating tablet 4 mg, Q8H PRN   Or  ondansetron (ZOFRAN) injection 4 mg, Q6H PRN  acetaminophen (TYLENOL) tablet 650 mg, Q6H PRN   Or  acetaminophen (TYLENOL) suppository 650 mg, Q6H PRN  ferrous sulfate (IRON 325) tablet 325 mg, BID AC  fluticasone (FLONASE) 50 MCG/ACT nasal spray 1 spray, Daily PRN  isosorbide mononitrate (IMDUR) extended release tablet 30 mg, Daily  polyethylene glycol (GLYCOLAX) packet 17 g, Daily  rosuvastatin (CRESTOR) tablet 10 mg, QPM  therapeutic multivitamin-minerals 1 tablet, Daily  Vitamin D (CHOLECALCIFEROL) tablet 1,000 Units, QPM  sodium chloride flush 0.9 % injection 5-40 mL, 2 times per day  sodium chloride flush 0.9 % injection 5-40 mL, PRN  0.9 % sodium chloride infusion, PRN  polyethylene glycol (GLYCOLAX) packet 17 g, Daily PRN  hydrALAZINE (APRESOLINE) tablet 25 mg, TID  aspirin EC tablet 81 mg, Daily  sulfur hexafluoride microspheres (LUMASON) 60.7-25 MG injection 2 mL, ONCE PRN  clopidogrel (PLAVIX) tablet 75 mg, Daily  dextrose bolus 10% 125 mL, PRN   Or  dextrose bolus 10% 250 mL, PRN  glucagon injection 1 mg, PRN  dextrose 10 % infusion, Continuous PRN        Objective:  BP 
07:07 PM    BLOODU TRACE 12/01/2024 07:07 PM    GLUCOSEU Negative 12/01/2024 07:07 PM    GLUCOSEU NEGATIVE 12/23/2011 10:18 PM    AMORPHOUS 1+ 06/09/2020 10:16 AM         IMPRESSION/RECOMMENDATIONS:      Diagnosis and Plan     CATARINA on CKD  CATARINA was prerenal creatinine 2.2 at baseline  CKD stage IV  Renal privic ,  hypertension plus age  Hypertension  Anemia  Has received IV iron      Tad Garcia MD  
12/01/2024 07:07 PM    UROBILINOGEN 1.0 12/01/2024 07:07 PM    BILIRUBINUR Negative 12/01/2024 07:07 PM    BLOODU TRACE 12/01/2024 07:07 PM    GLUCOSEU Negative 12/01/2024 07:07 PM    GLUCOSEU NEGATIVE 12/23/2011 10:18 PM    AMORPHOUS 1+ 06/09/2020 10:16 AM         IMPRESSION/RECOMMENDATIONS:      Diagnosis and Plan     CATARINA on CKD  CATARINA was prerenal creatinine 2.2 at baseline  Cr   worse at 2.9 ( 2.3)   Hold furosemide, did receive am dose     CKD stage IV  Renal privic ( Rt nephrectomy ),  hypertension ,  age    Hypertension  Anemia  Has received IV iron      Tad Garcia MD  
mitral regurgitation    Coronary artery disease due to calcified coronary lesion    Dyspnea and respiratory abnormalities    Iron deficiency anemia    Acute kidney injury superimposed on CKD (HCC)    Acute on chronic combined systolic and diastolic heart failure (HCC)    Single kidney  Resolved Problems:    * No resolved hospital problems. *       Kylah Dodd MD   12/15/2024 9:22 AM         
chronic combined systolic and diastolic heart failure (HCC)    Single kidney  Resolved Problems:    * No resolved hospital problems. *       Ozzie Ramirez MD   12/17/2024 1:08 PM         
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    Anxiety, generalized F41.1    Gastro-esophageal reflux disease without esophagitis K21.9    Generalized OA M15.9    Hydroureteronephrosis N13.30    Hyperlipidemia E78.5    Anemia, normocytic normochromic D64.9    Depression, 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    Hyperkalemia E87.5    Pyelonephritis N12    Acute pyelonephritis N10    Chronic kidney disease, stage III (moderate) ([facility]) N18.30    Encounter for ostomy care education Z71.89    Arthritis of left hip M16.12    Kidney stone N20.0    Hydroureter on left N13.4    Acute renal failure (ARF) ([facility]) N17.9    Suspected COVID-19 virus infection Z20.822    CKD (chronic kidney disease) stage 4, GFR 15-29 ml/min ([facility]) N18.4    Anemia in stage 4 chronic kidney disease ([facility]) N18.4, D63.1    Nonrheumatic mitral valve regurgitation I34.0    Abnormal echocardiogram R93.1    Atrial thrombus I51.3    Lower GI bleed K92.2    Chest pain R07.9    Non-rheumatic mitral regurgitation I34.0    Unstable angina ([facility]) I20.0    Acute on chronic congestive heart failure, unspecified heart failure type ([facility]) I50.9    Fall W19.XXXA    Pleural effusion J90    CHF (congestive heart failure), NYHA class I, acute on chronic, combined ([facility]) I50.43       Assessment                     Urostomy Ileal conduit RLQ (Active)   Stomal Appliance 2 piece;Convex;Changed 12/09/24 1712   Flange Size (inches) 2.25 Inches 12/09/24 1712   Stoma  Assessment Pink;Protrudes;Moist;Other (Comment) 12/09/24 1712   Peristomal Assessment Clean, dry & intact 12/09/24 1712   Mucocutaneous Junction Intact 12/09/24 1712   Collection Container Standard 12/09/24 1712   Securement

## 2024-12-19 ENCOUNTER — TELEPHONE (OUTPATIENT)
Dept: OTHER | Age: 84
End: 2024-12-19

## 2024-12-19 NOTE — TELEPHONE ENCOUNTER
[facility]  HEART FAILURE PROGRAM  TELEPHONE ENCOUNTER FORM    Cely Espitia 1940    Attempted to call patient for HF follow-up. No answer at this time.      Next MD/ Clinic appointment: 1/7 with Vivian DOBBINS RN 12/19/2024 9:49 AM

## 2024-12-20 ENCOUNTER — TELEPHONE (OUTPATIENT)
Dept: OTHER | Age: 84
End: 2024-12-20
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
## 2025-01-12 NOTE — PROGRESS NOTES
RECOMMENDATION:    - Staged PCI of the Lcx, nothing performed today given her renal insufficiency and contrast concerns  - Start Plavix  - Hydration and monitoring of renal function      11/26/2024  C  SUMMARY:   IVUS guided PCI of the Lcx with DESx1  Pre - 90% stenosis, CASANDRA 3 flow  Post - 0% stenosis, CASANDRA 3 flow        RECOMMENDATION:    1. Recommend beta blockade, high potency statin, aspirin and plavix for 12 months  2. Referral to cardiac rehab placed  3. Patient has been advised on the importance of regular exercise of at least 20-30 minutes daily.   4. Patient counseled about and offered assistance for smoking cessation   5. Follow up ECHO in 6 weeks  6. Follow up post PCI in 2 weeks    ECHO 12/10/2024    Left Ventricle: Severely reduced left ventricular systolic function with a visually estimated EF of 25 - 30%. EF by 2D Simpsons Biplane is 28%. Left ventricle is mildly dilated. Normal wall thickness. Severe global hypokinesis present.    Right Ventricle: Right ventricle is dilated. Lead present in the right ventricle. Moderately reduced systolic function. TAPSE is 1.3 cm. Fractional area change (FAC) is 21%.    Aortic Valve: Thickened cusps. Mild to moderate regurgitation.    Mitral Valve: Annular calcification. Thickened leaflets. Restricted motion. Severe regurgitation with impingement on pulmonary veins.    Tricuspid Valve: Severe regurgitation. The estimated RVSP is 67 mmHg.    Pulmonic Valve: Moderate regurgitation.    Left Atrium: Left atrium is severely dilated.    Right Atrium: Right atrium is dilated.    Pericardium: Trivial circumferential pericardial effusion present. No indication of cardiac tamponade.    IVC/SVC: IVC diameter is greater than 21 mm and decreases less than 50% during inspiration; therefore the estimated right atrial pressure is elevated (~15 mmHg).    Image quality is adequate.    Assessment/Plan:      1.) Chronic systolic heart failure  NYHA Class IV  EF 20-25% 2016

## 2025-01-13 ENCOUNTER — TELEPHONE (OUTPATIENT)
Dept: CARDIOLOGY CLINIC | Age: 85
End: 2025-01-13

## 2025-01-13 ENCOUNTER — OFFICE VISIT (OUTPATIENT)
Dept: CARDIOLOGY CLINIC | Age: 85
End: 2025-01-13
Payer: MEDICARE

## 2025-01-13 VITALS
OXYGEN SATURATION: 98 % | HEIGHT: 67 IN | HEART RATE: 82 BPM | BODY MASS INDEX: 19.8 KG/M2 | SYSTOLIC BLOOD PRESSURE: 110 MMHG | DIASTOLIC BLOOD PRESSURE: 50 MMHG

## 2025-01-13 DIAGNOSIS — R93.1 ABNORMAL FINDINGS ON DIAGNOSTIC IMAGING OF HEART AND CORONARY CIRCULATION: Primary | ICD-10-CM

## 2025-01-13 DIAGNOSIS — I50.22 CHRONIC SYSTOLIC HEART FAILURE (HCC): ICD-10-CM

## 2025-01-13 PROCEDURE — 1159F MED LIST DOCD IN RCRD: CPT | Performed by: NURSE PRACTITIONER

## 2025-01-13 PROCEDURE — 1123F ACP DISCUSS/DSCN MKR DOCD: CPT | Performed by: NURSE PRACTITIONER

## 2025-01-13 PROCEDURE — 93000 ELECTROCARDIOGRAM COMPLETE: CPT | Performed by: NURSE PRACTITIONER

## 2025-01-13 PROCEDURE — 99214 OFFICE O/P EST MOD 30 MIN: CPT | Performed by: NURSE PRACTITIONER

## 2025-01-14 LAB
ALBUMIN SERPL-MCNC: 3.8 G/DL (ref 3.4–5)
ALBUMIN/GLOB SERPL: 1.2 {RATIO} (ref 1.1–2.2)
ALP SERPL-CCNC: 79 U/L (ref 40–129)
ALT SERPL-CCNC: 13 U/L (ref 10–40)
ANION GAP SERPL CALCULATED.3IONS-SCNC: 12 MMOL/L (ref 3–16)
AST SERPL-CCNC: 18 U/L (ref 15–37)
BILIRUB SERPL-MCNC: <0.2 MG/DL (ref 0–1)
BUN SERPL-MCNC: 36 MG/DL (ref 7–20)
CALCIUM SERPL-MCNC: 9.7 MG/DL (ref 8.3–10.6)
CHLORIDE SERPL-SCNC: 109 MMOL/L (ref 99–110)
CO2 SERPL-SCNC: 19 MMOL/L (ref 21–32)
CREAT SERPL-MCNC: 1.7 MG/DL (ref 0.6–1.2)
DEPRECATED RDW RBC AUTO: 22.9 % (ref 12.4–15.4)
GFR SERPLBLD CREATININE-BSD FMLA CKD-EPI: 29 ML/MIN/{1.73_M2}
GLUCOSE SERPL-MCNC: 263 MG/DL (ref 70–99)
HCT VFR BLD AUTO: 36.8 % (ref 36–48)
HGB BLD-MCNC: 11.3 G/DL (ref 12–16)
MCH RBC QN AUTO: 26.9 PG (ref 26–34)
MCHC RBC AUTO-ENTMCNC: 30.8 G/DL (ref 31–36)
MCV RBC AUTO: 87.4 FL (ref 80–100)
NT-PROBNP SERPL-MCNC: ABNORMAL PG/ML (ref 0–449)
PLATELET # BLD AUTO: 372 K/UL (ref 135–450)
PMV BLD AUTO: 8.1 FL (ref 5–10.5)
POTASSIUM SERPL-SCNC: 5.6 MMOL/L (ref 3.5–5.1)
PROT SERPL-MCNC: 7 G/DL (ref 6.4–8.2)
RBC # BLD AUTO: 4.21 M/UL (ref 4–5.2)
SODIUM SERPL-SCNC: 140 MMOL/L (ref 136–145)
WBC # BLD AUTO: 4.8 K/UL (ref 4–11)

## 2025-01-15 ENCOUNTER — TELEPHONE (OUTPATIENT)
Dept: CARDIOLOGY CLINIC | Age: 85
End: 2025-01-15

## 2025-01-15 RX ORDER — ALPRAZOLAM 0.25 MG/1
0.25 TABLET ORAL NIGHTLY PRN
COMMUNITY

## 2025-01-15 RX ORDER — SODIUM BICARBONATE 650 MG/1
650 TABLET ORAL 2 TIMES DAILY
COMMUNITY

## 2025-01-15 RX ORDER — GABAPENTIN 100 MG/1
100 CAPSULE ORAL 2 TIMES DAILY
COMMUNITY
Start: 2025-01-02

## 2025-01-15 NOTE — TELEPHONE ENCOUNTER
Eloisa from [facility] states that they are needing the medication order for the increase in Lasix for 3 days due to the increase of potassium. Eloisa shares that pcp used to do her meds but they are taking care of this currently.     They are needing this faxed to [phone] ATTN: Holden Terrell    Phone: [phone] for Cely's nurse.    Please assist.

## 2025-01-15 NOTE — TELEPHONE ENCOUNTER
Patient's daughter sent over list of medications patient is on.   I updated med list in chart.     Scanned in chart.

## 2025-01-16 DIAGNOSIS — I05.9 MITRAL VALVE DISEASE: Primary | ICD-10-CM

## 2025-01-16 RX ORDER — FUROSEMIDE 20 MG/1
TABLET ORAL
Qty: 93 TABLET | Refills: 3 | Status: SHIPPED | OUTPATIENT
Start: 2025-01-16

## 2025-01-16 NOTE — TELEPHONE ENCOUNTER
VVO Dr. Velazquez   referral     Dr. Dejan Toro at [facility] for severe mitral valve regurgitation.    Family called regarding referral sent.

## 2025-01-16 NOTE — TELEPHONE ENCOUNTER
Orders faxed to number listed in message.   Sarita at [facility] notified that bisi was sent.

## 2025-02-01 ENCOUNTER — HOSPITAL ENCOUNTER (INPATIENT)
Age: 85
LOS: 3 days | Discharge: HOME | DRG: 689 | End: 2025-02-04
Attending: EMERGENCY MEDICINE | Admitting: INTERNAL MEDICINE
Payer: MEDICARE

## 2025-02-01 DIAGNOSIS — N39.0 COMPLICATED URINARY TRACT INFECTION: Primary | ICD-10-CM

## 2025-02-01 DIAGNOSIS — Z60.9 HIGH RISK SOCIAL SITUATION: ICD-10-CM

## 2025-02-01 DIAGNOSIS — R45.1 AGITATION: ICD-10-CM

## 2025-02-01 PROBLEM — R41.82 ALTERED MENTAL STATE: Status: ACTIVE | Noted: 2025-02-01

## 2025-02-01 LAB
ALBUMIN SERPL-MCNC: 3.5 G/DL (ref 3.4–5)
ALBUMIN/GLOB SERPL: 1 {RATIO} (ref 1.1–2.2)
ALP SERPL-CCNC: 59 U/L (ref 40–129)
ALT SERPL-CCNC: 8 U/L (ref 10–40)
ANION GAP SERPL CALCULATED.3IONS-SCNC: 11 MMOL/L (ref 3–16)
AST SERPL-CCNC: 18 U/L (ref 15–37)
BACTERIA URNS QL MICRO: ABNORMAL /HPF
BASOPHILS # BLD: 0 K/UL (ref 0–0.2)
BASOPHILS NFR BLD: 0.5 %
BILIRUB SERPL-MCNC: <0.2 MG/DL (ref 0–1)
BILIRUB UR QL STRIP.AUTO: NEGATIVE
BUN SERPL-MCNC: 33 MG/DL (ref 7–20)
CALCIUM SERPL-MCNC: 9.2 MG/DL (ref 8.3–10.6)
CHLORIDE SERPL-SCNC: 103 MMOL/L (ref 99–110)
CLARITY UR: ABNORMAL
CO2 SERPL-SCNC: 25 MMOL/L (ref 21–32)
COLOR UR: YELLOW
CREAT SERPL-MCNC: 1.9 MG/DL (ref 0.6–1.2)
DEPRECATED RDW RBC AUTO: 23.5 % (ref 12.4–15.4)
EOSINOPHIL # BLD: 0.2 K/UL (ref 0–0.6)
EOSINOPHIL NFR BLD: 3.1 %
EPI CELLS #/AREA URNS AUTO: 2 /HPF (ref 0–5)
GFR SERPLBLD CREATININE-BSD FMLA CKD-EPI: 25 ML/MIN/{1.73_M2}
GLUCOSE BLD-MCNC: 293 MG/DL (ref 70–99)
GLUCOSE SERPL-MCNC: 203 MG/DL (ref 70–99)
GLUCOSE UR STRIP.AUTO-MCNC: NEGATIVE MG/DL
HCT VFR BLD AUTO: 33.8 % (ref 36–48)
HGB BLD-MCNC: 10.8 G/DL (ref 12–16)
HGB UR QL STRIP.AUTO: ABNORMAL
HYALINE CASTS #/AREA URNS AUTO: 3 /LPF (ref 0–8)
KETONES UR STRIP.AUTO-MCNC: NEGATIVE MG/DL
LEUKOCYTE ESTERASE UR QL STRIP.AUTO: ABNORMAL
LYMPHOCYTES # BLD: 0.7 K/UL (ref 1–5.1)
LYMPHOCYTES NFR BLD: 10.6 %
MCH RBC QN AUTO: 26.6 PG (ref 26–34)
MCHC RBC AUTO-ENTMCNC: 32 G/DL (ref 31–36)
MCV RBC AUTO: 83 FL (ref 80–100)
MONOCYTES # BLD: 0.7 K/UL (ref 0–1.3)
MONOCYTES NFR BLD: 10.1 %
NEUTROPHILS # BLD: 4.9 K/UL (ref 1.7–7.7)
NEUTROPHILS NFR BLD: 75.7 %
NITRITE UR QL STRIP.AUTO: POSITIVE
PERFORMED ON: ABNORMAL
PH UR STRIP.AUTO: 8 [PH] (ref 5–8)
PLATELET # BLD AUTO: 321 K/UL (ref 135–450)
PMV BLD AUTO: 7 FL (ref 5–10.5)
POTASSIUM SERPL-SCNC: 4.5 MMOL/L (ref 3.5–5.1)
PROT SERPL-MCNC: 6.9 G/DL (ref 6.4–8.2)
PROT UR STRIP.AUTO-MCNC: 30 MG/DL
RBC # BLD AUTO: 4.07 M/UL (ref 4–5.2)
RBC CLUMPS #/AREA URNS AUTO: 1 /HPF (ref 0–4)
SODIUM SERPL-SCNC: 139 MMOL/L (ref 136–145)
SP GR UR STRIP.AUTO: 1.01 (ref 1–1.03)
UA COMPLETE W REFLEX CULTURE PNL UR: YES
UA DIPSTICK W REFLEX MICRO PNL UR: YES
URN SPEC COLLECT METH UR: ABNORMAL
UROBILINOGEN UR STRIP-ACNC: 0.2 E.U./DL
WBC # BLD AUTO: 6.5 K/UL (ref 4–11)
WBC #/AREA URNS AUTO: 42 /HPF (ref 0–5)

## 2025-02-01 PROCEDURE — 6360000002 HC RX W HCPCS: Performed by: PHYSICIAN ASSISTANT

## 2025-02-01 PROCEDURE — 87086 URINE CULTURE/COLONY COUNT: CPT

## 2025-02-01 PROCEDURE — 6370000000 HC RX 637 (ALT 250 FOR IP)

## 2025-02-01 PROCEDURE — 96374 THER/PROPH/DIAG INJ IV PUSH: CPT

## 2025-02-01 PROCEDURE — 6360000002 HC RX W HCPCS: Performed by: INTERNAL MEDICINE

## 2025-02-01 PROCEDURE — 85025 COMPLETE CBC W/AUTO DIFF WBC: CPT

## 2025-02-01 PROCEDURE — 6370000000 HC RX 637 (ALT 250 FOR IP): Performed by: INTERNAL MEDICINE

## 2025-02-01 PROCEDURE — 2580000003 HC RX 258: Performed by: INTERNAL MEDICINE

## 2025-02-01 PROCEDURE — 81001 URINALYSIS AUTO W/SCOPE: CPT

## 2025-02-01 PROCEDURE — 2500000003 HC RX 250 WO HCPCS: Performed by: PHYSICIAN ASSISTANT

## 2025-02-01 PROCEDURE — 87077 CULTURE AEROBIC IDENTIFY: CPT

## 2025-02-01 PROCEDURE — 99285 EMERGENCY DEPT VISIT HI MDM: CPT

## 2025-02-01 PROCEDURE — 87186 SC STD MICRODIL/AGAR DIL: CPT

## 2025-02-01 PROCEDURE — 80053 COMPREHEN METABOLIC PANEL: CPT

## 2025-02-01 PROCEDURE — 1200000000 HC SEMI PRIVATE

## 2025-02-01 RX ORDER — DEXTROSE MONOHYDRATE 100 MG/ML
INJECTION, SOLUTION INTRAVENOUS CONTINUOUS PRN
Status: DISCONTINUED | OUTPATIENT
Start: 2025-02-01 | End: 2025-02-04 | Stop reason: HOSPADM

## 2025-02-01 RX ORDER — ISOSORBIDE MONONITRATE 30 MG/1
30 TABLET, EXTENDED RELEASE ORAL DAILY
Status: DISCONTINUED | OUTPATIENT
Start: 2025-02-01 | End: 2025-02-04 | Stop reason: HOSPADM

## 2025-02-01 RX ORDER — INSULIN GLARGINE 100 [IU]/ML
10 INJECTION, SOLUTION SUBCUTANEOUS
Status: DISCONTINUED | OUTPATIENT
Start: 2025-02-02 | End: 2025-02-04 | Stop reason: HOSPADM

## 2025-02-01 RX ORDER — ONDANSETRON 2 MG/ML
4 INJECTION INTRAMUSCULAR; INTRAVENOUS EVERY 6 HOURS PRN
Status: DISCONTINUED | OUTPATIENT
Start: 2025-02-01 | End: 2025-02-04 | Stop reason: HOSPADM

## 2025-02-01 RX ORDER — SODIUM CHLORIDE 0.9 % (FLUSH) 0.9 %
5-40 SYRINGE (ML) INJECTION EVERY 12 HOURS SCHEDULED
Status: DISCONTINUED | OUTPATIENT
Start: 2025-02-01 | End: 2025-02-04 | Stop reason: HOSPADM

## 2025-02-01 RX ORDER — FLUTICASONE PROPIONATE 50 MCG
1 SPRAY, SUSPENSION (ML) NASAL PRN
Status: DISCONTINUED | OUTPATIENT
Start: 2025-02-01 | End: 2025-02-04 | Stop reason: HOSPADM

## 2025-02-01 RX ORDER — ACETAMINOPHEN 650 MG/1
650 SUPPOSITORY RECTAL EVERY 6 HOURS PRN
Status: DISCONTINUED | OUTPATIENT
Start: 2025-02-01 | End: 2025-02-04 | Stop reason: HOSPADM

## 2025-02-01 RX ORDER — ACETAMINOPHEN 500 MG
TABLET ORAL
Status: COMPLETED
Start: 2025-02-01 | End: 2025-02-01

## 2025-02-01 RX ORDER — GLUCAGON 1 MG/ML
1 KIT INJECTION PRN
Status: DISCONTINUED | OUTPATIENT
Start: 2025-02-01 | End: 2025-02-04 | Stop reason: HOSPADM

## 2025-02-01 RX ORDER — ONDANSETRON 4 MG/1
4 TABLET, ORALLY DISINTEGRATING ORAL EVERY 8 HOURS PRN
Status: DISCONTINUED | OUTPATIENT
Start: 2025-02-01 | End: 2025-02-04 | Stop reason: HOSPADM

## 2025-02-01 RX ORDER — ASPIRIN 81 MG/1
81 TABLET ORAL DAILY
Status: DISCONTINUED | OUTPATIENT
Start: 2025-02-01 | End: 2025-02-04 | Stop reason: HOSPADM

## 2025-02-01 RX ORDER — ENOXAPARIN SODIUM 100 MG/ML
30 INJECTION SUBCUTANEOUS DAILY
Status: DISCONTINUED | OUTPATIENT
Start: 2025-02-01 | End: 2025-02-04 | Stop reason: HOSPADM

## 2025-02-01 RX ORDER — ACETAMINOPHEN 500 MG
1000 TABLET ORAL EVERY 6 HOURS PRN
Status: DISCONTINUED | OUTPATIENT
Start: 2025-02-01 | End: 2025-02-04 | Stop reason: HOSPADM

## 2025-02-01 RX ORDER — ROSUVASTATIN CALCIUM 10 MG/1
10 TABLET, COATED ORAL EVERY EVENING
Status: DISCONTINUED | OUTPATIENT
Start: 2025-02-01 | End: 2025-02-04 | Stop reason: HOSPADM

## 2025-02-01 RX ORDER — FUROSEMIDE 20 MG/1
20 TABLET ORAL DAILY
Status: DISCONTINUED | OUTPATIENT
Start: 2025-02-01 | End: 2025-02-04 | Stop reason: HOSPADM

## 2025-02-01 RX ORDER — CLOPIDOGREL BISULFATE 75 MG/1
75 TABLET ORAL DAILY
Status: DISCONTINUED | OUTPATIENT
Start: 2025-02-01 | End: 2025-02-04 | Stop reason: HOSPADM

## 2025-02-01 RX ORDER — SODIUM CHLORIDE 0.9 % (FLUSH) 0.9 %
5-40 SYRINGE (ML) INJECTION PRN
Status: DISCONTINUED | OUTPATIENT
Start: 2025-02-01 | End: 2025-02-04 | Stop reason: HOSPADM

## 2025-02-01 RX ORDER — HYDRALAZINE HYDROCHLORIDE 20 MG/ML
10 INJECTION INTRAMUSCULAR; INTRAVENOUS EVERY 6 HOURS PRN
Status: DISCONTINUED | OUTPATIENT
Start: 2025-02-01 | End: 2025-02-04 | Stop reason: HOSPADM

## 2025-02-01 RX ORDER — POTASSIUM CHLORIDE 7.45 MG/ML
10 INJECTION INTRAVENOUS PRN
Status: DISCONTINUED | OUTPATIENT
Start: 2025-02-01 | End: 2025-02-04 | Stop reason: HOSPADM

## 2025-02-01 RX ORDER — SODIUM CHLORIDE 9 MG/ML
INJECTION, SOLUTION INTRAVENOUS PRN
Status: DISCONTINUED | OUTPATIENT
Start: 2025-02-01 | End: 2025-02-04 | Stop reason: HOSPADM

## 2025-02-01 RX ORDER — INSULIN LISPRO 100 [IU]/ML
0-4 INJECTION, SOLUTION INTRAVENOUS; SUBCUTANEOUS
Status: DISCONTINUED | OUTPATIENT
Start: 2025-02-01 | End: 2025-02-02 | Stop reason: DRUGHIGH

## 2025-02-01 RX ORDER — SODIUM CHLORIDE 9 MG/ML
INJECTION, SOLUTION INTRAVENOUS CONTINUOUS
Status: ACTIVE | OUTPATIENT
Start: 2025-02-01 | End: 2025-02-02

## 2025-02-01 RX ORDER — ALPRAZOLAM 0.25 MG/1
0.25 TABLET ORAL NIGHTLY PRN
Status: DISCONTINUED | OUTPATIENT
Start: 2025-02-01 | End: 2025-02-04 | Stop reason: HOSPADM

## 2025-02-01 RX ORDER — 0.9 % SODIUM CHLORIDE 0.9 %
500 INTRAVENOUS SOLUTION INTRAVENOUS PRN
Status: DISCONTINUED | OUTPATIENT
Start: 2025-02-01 | End: 2025-02-04 | Stop reason: HOSPADM

## 2025-02-01 RX ORDER — POTASSIUM CHLORIDE 1500 MG/1
40 TABLET, EXTENDED RELEASE ORAL PRN
Status: DISCONTINUED | OUTPATIENT
Start: 2025-02-01 | End: 2025-02-04 | Stop reason: HOSPADM

## 2025-02-01 RX ORDER — ACETAMINOPHEN 325 MG/1
650 TABLET ORAL EVERY 6 HOURS PRN
Status: DISCONTINUED | OUTPATIENT
Start: 2025-02-01 | End: 2025-02-04 | Stop reason: HOSPADM

## 2025-02-01 RX ORDER — CARVEDILOL 6.25 MG/1
18.75 TABLET ORAL 2 TIMES DAILY WITH MEALS
Status: DISCONTINUED | OUTPATIENT
Start: 2025-02-01 | End: 2025-02-04 | Stop reason: HOSPADM

## 2025-02-01 RX ORDER — M-VIT,TX,IRON,MINS/CALC/FOLIC 27MG-0.4MG
1 TABLET ORAL DAILY
Status: DISCONTINUED | OUTPATIENT
Start: 2025-02-01 | End: 2025-02-04 | Stop reason: HOSPADM

## 2025-02-01 RX ORDER — GABAPENTIN 100 MG/1
100 CAPSULE ORAL 2 TIMES DAILY
Status: DISCONTINUED | OUTPATIENT
Start: 2025-02-01 | End: 2025-02-04 | Stop reason: HOSPADM

## 2025-02-01 RX ORDER — VITAMIN B COMPLEX
1000 TABLET ORAL EVERY EVENING
Status: DISCONTINUED | OUTPATIENT
Start: 2025-02-01 | End: 2025-02-04 | Stop reason: HOSPADM

## 2025-02-01 RX ORDER — SODIUM BICARBONATE 650 MG/1
650 TABLET ORAL 2 TIMES DAILY
Status: DISCONTINUED | OUTPATIENT
Start: 2025-02-01 | End: 2025-02-04 | Stop reason: HOSPADM

## 2025-02-01 RX ADMIN — FUROSEMIDE 20 MG: 20 TABLET ORAL at 18:12

## 2025-02-01 RX ADMIN — ENOXAPARIN SODIUM 30 MG: 100 INJECTION SUBCUTANEOUS at 18:13

## 2025-02-01 RX ADMIN — ASPIRIN 81 MG: 81 TABLET, COATED ORAL at 18:12

## 2025-02-01 RX ADMIN — ACETAMINOPHEN 1000 MG: 500 TABLET ORAL at 13:28

## 2025-02-01 RX ADMIN — SODIUM CHLORIDE: 9 INJECTION, SOLUTION INTRAVENOUS at 18:18

## 2025-02-01 RX ADMIN — ISOSORBIDE MONONITRATE 30 MG: 30 TABLET, EXTENDED RELEASE ORAL at 18:12

## 2025-02-01 RX ADMIN — GABAPENTIN 100 MG: 100 CAPSULE ORAL at 18:12

## 2025-02-01 RX ADMIN — ROSUVASTATIN CALCIUM 10 MG: 10 TABLET, FILM COATED ORAL at 18:12

## 2025-02-01 RX ADMIN — GABAPENTIN 100 MG: 100 CAPSULE ORAL at 21:17

## 2025-02-01 RX ADMIN — WATER 1000 MG: 1 INJECTION INTRAMUSCULAR; INTRAVENOUS; SUBCUTANEOUS at 10:46

## 2025-02-01 RX ADMIN — CLOPIDOGREL BISULFATE 75 MG: 75 TABLET ORAL at 18:12

## 2025-02-01 RX ADMIN — Medication 1000 UNITS: at 18:12

## 2025-02-01 RX ADMIN — ALPRAZOLAM 0.25 MG: 0.25 TABLET ORAL at 21:21

## 2025-02-01 RX ADMIN — SODIUM BICARBONATE 650 MG: 650 TABLET ORAL at 21:17

## 2025-02-01 RX ADMIN — Medication 1000 MG: at 13:28

## 2025-02-01 RX ADMIN — CARVEDILOL 18.75 MG: 6.25 TABLET, FILM COATED ORAL at 18:12

## 2025-02-01 SDOH — SOCIAL STABILITY - SOCIAL INSECURITY: PROBLEM RELATED TO SOCIAL ENVIRONMENT, UNSPECIFIED: Z60.9

## 2025-02-01 ASSESSMENT — PAIN DESCRIPTION - LOCATION: LOCATION: BACK;BUTTOCKS

## 2025-02-01 ASSESSMENT — PAIN SCALES - GENERAL
PAINLEVEL_OUTOF10: 6
PAINLEVEL_OUTOF10: 5

## 2025-02-01 NOTE — ED NOTES
This RN talked to social work about the [facility] medication situation. Social work stated she would call jennifer and ask about medications being given.

## 2025-02-01 NOTE — ED NOTES
Patient Name: Cely Espitia  : 1940 84 y.o.  MRN: [id number]  ED Room #: ED-0007/07     Chief complaint:   Chief Complaint   Patient presents with    pt feels like she is getting the care she needs at [facility]     Pt to ED via [facility] from [facility]. Pt called EMS herself because she does not know what to do. PT alert x4. PT sts she fell last month and since then the facility has been managing her medications and sometimes is not getting her insulin or vitamins.      Hospital Problem/Diagnosis:   Hospital Problems             Last Modified POA    * (Principal) Complicated UTI (urinary tract infection) 2025 Yes    CATARINA (acute kidney injury) ([facility]) 2025 Yes    Essential hypertension 2025 Yes    DM2 (diabetes mellitus, type 2) ([facility]) 2025 Yes    Anemia, normocytic normochromic 2025 Yes    Altered mental state 2025 Yes         O2 Flow Rate:    (if applicable)  Cardiac Rhythm:   (if applicable)  Active LDA's:   Peripheral IV 25 Right Hand (Active)      Urostomy Ileal conduit RLQ (Active)          How does patient ambulate? Front Wheeled Walker    2. How does patient take pills? Whole with Water    3. Is patient alert? Alert    4. Is patient oriented? To Person, To Place, To Time, To Situation, Follows Commands, and Confused    5.   Patient arrived from:  [facility]   Facility Name: ___________________________________________    6. If patient is disoriented or from a Sk[facility] Nursing Facility has family been notified of admission? Yes    7. Patient belongings? Belongings: Clothing    Disposition of belongings? No Belongings     8. Any specific patient or family belongings/needs/dynamics?   a. na    9. Miscellaneous comments/pending orders?  a. na      If there are any additional questions please reach out to the Emergency Department.

## 2025-02-01 NOTE — VIRTUAL HEALTH
[place] Consult to Tele-Psych  Consult performed by: Keysha Godfrey APRN - CNP  Consult ordered by: Jenn Cartagena PA-C  Reason for consult: Unable to Care for Self/ Meet Basic Needs      Reason for Cancel: TelePsych Reason for Cancel: Patient impaired.      Received request for Telepsychiatry evaluation. Reviewed EMR. Patient currently in triage. Consult will be delayed until patient can be assessed in private area and "[employer]c is available for use.     1005: Called ED and was informed by ED staff that patient is still waiting for a room. Asked this writer to call back in 15 minutes.    1025: Received message patient ready in room. Connected to [employer] and staff on camera notified this writer that the ED provider was speaking to the patient and asked to reconnect in a few more minutes.    1030: Reconnected to [employer] which was facing a wall in the hallway.    1033: Called ED and spoke to ASPEN Jarvis who reports patient is not appropriate for the consult and that Telepsych consult is not needed anymore per Dr. Espitia.    Attending provider, Dr. Espitia, made aware of order cancellation via PS message at 1043. Advised to reconsult for any new changes or psychiatric concerns.         --RIZWAN Fairchild CNP on 2/1/2025 at 9:12 AM    An electronic signature was used to authenticate this note.

## 2025-02-01 NOTE — H&P
TOTAL HIP ARTHROPLASTY Left 11/06/2019    LEFT ANTERIOR TOTAL HIP REPLACEMENT WITH C-ARM performed by Avni Shepard MD at [facility] OR    UPPER GASTROINTESTINAL ENDOSCOPY N/A 10/16/2024    ESOPHAGOGASTRODUODENOSCOPY BIOPSY performed by Jose Lui MD at [facility] ASC ENDOSCOPY       Social History:   Social History       Tobacco History       Smoking Status  Never      Smokeless Tobacco Use  Never              Alcohol History       Alcohol Use Status  No              Drug Use       Drug Use Status  No              Sexual Activity       Sexually Active  Not Currently                    Fam History:   Family History   Problem Relation Age of Onset    Diabetes Mother     Heart Disease Father     Cancer Father        PFSH: The above PMHx, PSHx, SocHx, FamHx has been reviewed by myself. (1 area for detailed, 2-3 for comprehensive)      Code Status: Prior    Meds - following list of home medications fromelectronic chart has been reviewed by myself  Prior to Admission medications    Medication Sig Start Date End Date Taking? Authorizing Provider   furosemide (LASIX) 20 MG tablet Lasix 2 tabs for 3 days then 1 tab daily 1/16/25   Vivian Camilo, APRN - CNP   ALPRAZolam (XANAX) 0.25 MG tablet Take 1 tablet by mouth nightly as needed for Sleep. Max Daily Amount: 0.25 mg    Bolivar Orantes MD   gabapentin (NEURONTIN) 100 MG capsule Take 1 capsule by mouth 2 times daily. 1/2/25   Bolivar Orantes MD   sodium bicarbonate 650 MG tablet Take 1 tablet by mouth 2 times daily    Bolivar Orantes MD   carvedilol (COREG) 12.5 MG tablet Take 1 tablet by mouth with breakfast and with evening meal 12/17/24   Ozzie Ramirez MD   insulin glargine (LANTUS SOLOSTAR) 100 UNIT/ML injection pen Inject 10 Units into the skin daily (with breakfast) 12/17/24   Ozzie Ramirez MD   Insulin Pen Needle ([employer] PEN NEEDLES 31G) 31G X 8 MM MISC 1 each by Does not apply route daily 12/17/24   Ozzie Ramirez MD   ondansetron (ZOFRAN-ODT) 4 MG

## 2025-02-01 NOTE — ED NOTES
Pt walked out to desk, stating she would not \"stay in that little tiny room to die\"  Pt is obviously confused, pt is agitated  Attempted to redirect pt, pt will not be redirected, pt is verbally aggressive and abusive with her language towards staff  I told pt she could have a different, room offered her a walker, she declined, pt placed in a different room, family sent pt in for evaluation of change in her behavior  Dr Espitia in to see pt.

## 2025-02-01 NOTE — ED PROVIDER NOTES
[facility] EMERGENCY DEPARTMENT  EMERGENCY DEPARTMENT ENCOUNTER        Pt Name: Cely Espitia  MRN: [id number]  Birthdate 1940  Date of evaluation: 2/1/2025  Provider: Jenn Cartagena PA-C  PCP: Marcus Shukla MD  Note Started: 8:24 AM EST 2/1/25       I have seen and evaluated this patient with my supervising physician João Espitia MD.      CHIEF COMPLAINT       Chief Complaint   Patient presents with    pt feels like she is getting the care she needs at [facility]     Pt to ED via [facility] from [facility]. Pt called EMS herself because she does not know what to do. PT alert x4. PT sts she fell last month and since then the facility has been managing her medications and sometimes is not getting her insulin or vitamins.        HISTORY OF PRESENT ILLNESS: 1 or more Elements     History From: patient   Limitations to history : None    Cely Espitia is a 84 y.o. female who presents secondary to complaints regarding assisted-living facility.  Patient came from [facility].  States that the facility involuntarily took away her medications about a month ago.  She is concerned as she states that she is not getting all of her medications like she is supposed to.  She has no physical complaints.  No additional information is able to be obtained at this time.  Will attempt to contact daughter, Vivian.    Nursing Notes were all reviewed and agreed with or any disagreements were addressed in the HPI.    REVIEW OF SYSTEMS :      Review of Systems   Constitutional:  Negative for appetite change, chills and fever.   HENT:  Negative for congestion and rhinorrhea.    Respiratory:  Negative for cough, shortness of breath and wheezing.    Cardiovascular:  Negative for chest pain.   Gastrointestinal:  Negative for abdominal pain, diarrhea, nausea and vomiting.   Genitourinary:  Negative for difficulty urinating, dysuria and hematuria.   Musculoskeletal:  Negative for neck pain and neck

## 2025-02-01 NOTE — CARE COORDINATION
Discharge Planning:     (CM) informed that pt is reporting that her AL facility[facility] is not providing the pt with their medication. Pt's daughter is reporting that the pt often refuses her  medication. Pt's nurse reports that the pt has a UTI and is being admitted.     CM team to follow up on concerns as pt is treated for UTI. Uti may be causing some confusion.        .Electronically signed by LISA Nash on 2/1/2025 at 10:57 AM

## 2025-02-02 LAB
ANION GAP SERPL CALCULATED.3IONS-SCNC: 11 MMOL/L (ref 3–16)
BASOPHILS # BLD: 0 K/UL (ref 0–0.2)
BASOPHILS NFR BLD: 0.7 %
BUN SERPL-MCNC: 34 MG/DL (ref 7–20)
CALCIUM SERPL-MCNC: 8.9 MG/DL (ref 8.3–10.6)
CHLORIDE SERPL-SCNC: 105 MMOL/L (ref 99–110)
CO2 SERPL-SCNC: 23 MMOL/L (ref 21–32)
CREAT SERPL-MCNC: 1.7 MG/DL (ref 0.6–1.2)
DEPRECATED RDW RBC AUTO: 23.6 % (ref 12.4–15.4)
EOSINOPHIL # BLD: 0.2 K/UL (ref 0–0.6)
EOSINOPHIL NFR BLD: 3.3 %
GFR SERPLBLD CREATININE-BSD FMLA CKD-EPI: 29 ML/MIN/{1.73_M2}
GLUCOSE BLD-MCNC: 119 MG/DL (ref 70–99)
GLUCOSE BLD-MCNC: 149 MG/DL (ref 70–99)
GLUCOSE BLD-MCNC: 207 MG/DL (ref 70–99)
GLUCOSE BLD-MCNC: 217 MG/DL (ref 70–99)
GLUCOSE BLD-MCNC: 354 MG/DL (ref 70–99)
GLUCOSE SERPL-MCNC: 159 MG/DL (ref 70–99)
HCT VFR BLD AUTO: 32.5 % (ref 36–48)
HGB BLD-MCNC: 10.4 G/DL (ref 12–16)
LYMPHOCYTES # BLD: 0.9 K/UL (ref 1–5.1)
LYMPHOCYTES NFR BLD: 16.1 %
MCH RBC QN AUTO: 26.6 PG (ref 26–34)
MCHC RBC AUTO-ENTMCNC: 32 G/DL (ref 31–36)
MCV RBC AUTO: 83.2 FL (ref 80–100)
MONOCYTES # BLD: 0.6 K/UL (ref 0–1.3)
MONOCYTES NFR BLD: 11.3 %
NEUTROPHILS # BLD: 3.6 K/UL (ref 1.7–7.7)
NEUTROPHILS NFR BLD: 68.6 %
PERFORMED ON: ABNORMAL
PLATELET # BLD AUTO: 309 K/UL (ref 135–450)
PMV BLD AUTO: 6.8 FL (ref 5–10.5)
POTASSIUM SERPL-SCNC: 4.4 MMOL/L (ref 3.5–5.1)
PROCALCITONIN SERPL IA-MCNC: 0.07 NG/ML (ref 0–0.15)
RBC # BLD AUTO: 3.91 M/UL (ref 4–5.2)
SODIUM SERPL-SCNC: 139 MMOL/L (ref 136–145)
WBC # BLD AUTO: 5.3 K/UL (ref 4–11)

## 2025-02-02 PROCEDURE — 6370000000 HC RX 637 (ALT 250 FOR IP)

## 2025-02-02 PROCEDURE — 2500000003 HC RX 250 WO HCPCS: Performed by: INTERNAL MEDICINE

## 2025-02-02 PROCEDURE — 85025 COMPLETE CBC W/AUTO DIFF WBC: CPT

## 2025-02-02 PROCEDURE — 6370000000 HC RX 637 (ALT 250 FOR IP): Performed by: INTERNAL MEDICINE

## 2025-02-02 PROCEDURE — 80048 BASIC METABOLIC PNL TOTAL CA: CPT

## 2025-02-02 PROCEDURE — 6360000002 HC RX W HCPCS: Performed by: INTERNAL MEDICINE

## 2025-02-02 PROCEDURE — 93005 ELECTROCARDIOGRAM TRACING: CPT | Performed by: INTERNAL MEDICINE

## 2025-02-02 PROCEDURE — 36415 COLL VENOUS BLD VENIPUNCTURE: CPT

## 2025-02-02 PROCEDURE — 84145 PROCALCITONIN (PCT): CPT

## 2025-02-02 PROCEDURE — 1200000000 HC SEMI PRIVATE

## 2025-02-02 RX ORDER — INSULIN LISPRO 100 [IU]/ML
0-16 INJECTION, SOLUTION INTRAVENOUS; SUBCUTANEOUS
Status: DISCONTINUED | OUTPATIENT
Start: 2025-02-02 | End: 2025-02-04 | Stop reason: HOSPADM

## 2025-02-02 RX ADMIN — SODIUM CHLORIDE, PRESERVATIVE FREE 10 ML: 5 INJECTION INTRAVENOUS at 09:24

## 2025-02-02 RX ADMIN — ENOXAPARIN SODIUM 30 MG: 100 INJECTION SUBCUTANEOUS at 09:25

## 2025-02-02 RX ADMIN — WATER 1000 MG: 1 INJECTION INTRAMUSCULAR; INTRAVENOUS; SUBCUTANEOUS at 09:29

## 2025-02-02 RX ADMIN — Medication 1 TABLET: at 09:32

## 2025-02-02 RX ADMIN — ALPRAZOLAM 0.25 MG: 0.25 TABLET ORAL at 21:31

## 2025-02-02 RX ADMIN — INSULIN LISPRO 8 UNITS: 100 INJECTION, SOLUTION INTRAVENOUS; SUBCUTANEOUS at 11:50

## 2025-02-02 RX ADMIN — INSULIN GLARGINE 10 UNITS: 100 INJECTION, SOLUTION SUBCUTANEOUS at 09:24

## 2025-02-02 RX ADMIN — Medication 1000 UNITS: at 17:04

## 2025-02-02 RX ADMIN — SODIUM BICARBONATE 650 MG: 650 TABLET ORAL at 09:24

## 2025-02-02 RX ADMIN — CARVEDILOL 18.75 MG: 6.25 TABLET, FILM COATED ORAL at 17:04

## 2025-02-02 RX ADMIN — CARVEDILOL 18.75 MG: 6.25 TABLET, FILM COATED ORAL at 09:24

## 2025-02-02 RX ADMIN — CLOPIDOGREL BISULFATE 75 MG: 75 TABLET ORAL at 09:24

## 2025-02-02 RX ADMIN — FUROSEMIDE 20 MG: 20 TABLET ORAL at 09:24

## 2025-02-02 RX ADMIN — SODIUM CHLORIDE, PRESERVATIVE FREE 10 ML: 5 INJECTION INTRAVENOUS at 21:28

## 2025-02-02 RX ADMIN — ROSUVASTATIN CALCIUM 10 MG: 10 TABLET, FILM COATED ORAL at 17:04

## 2025-02-02 RX ADMIN — INSULIN LISPRO 1 UNITS: 100 INJECTION, SOLUTION INTRAVENOUS; SUBCUTANEOUS at 00:22

## 2025-02-02 RX ADMIN — SODIUM BICARBONATE 650 MG: 650 TABLET ORAL at 21:27

## 2025-02-02 RX ADMIN — GABAPENTIN 100 MG: 100 CAPSULE ORAL at 21:27

## 2025-02-02 RX ADMIN — ASPIRIN 81 MG: 81 TABLET, COATED ORAL at 09:24

## 2025-02-02 RX ADMIN — ISOSORBIDE MONONITRATE 30 MG: 30 TABLET, EXTENDED RELEASE ORAL at 09:24

## 2025-02-02 ASSESSMENT — PAIN DESCRIPTION - FREQUENCY
FREQUENCY: INTERMITTENT
FREQUENCY: INTERMITTENT

## 2025-02-02 ASSESSMENT — PAIN DESCRIPTION - ORIENTATION
ORIENTATION: RIGHT
ORIENTATION: LEFT

## 2025-02-02 ASSESSMENT — PAIN SCALES - GENERAL
PAINLEVEL_OUTOF10: 0
PAINLEVEL_OUTOF10: 6
PAINLEVEL_OUTOF10: 0
PAINLEVEL_OUTOF10: 10

## 2025-02-02 ASSESSMENT — PAIN DESCRIPTION - LOCATION
LOCATION: HEAD
LOCATION: LEG

## 2025-02-02 ASSESSMENT — PAIN DESCRIPTION - ONSET
ONSET: SUDDEN
ONSET: ON-GOING

## 2025-02-02 ASSESSMENT — PAIN - FUNCTIONAL ASSESSMENT
PAIN_FUNCTIONAL_ASSESSMENT: ACTIVITIES ARE NOT PREVENTED
PAIN_FUNCTIONAL_ASSESSMENT: ACTIVITIES ARE NOT PREVENTED

## 2025-02-02 ASSESSMENT — PAIN DESCRIPTION - DESCRIPTORS
DESCRIPTORS: ACHING;DISCOMFORT
DESCRIPTORS: ACHING

## 2025-02-02 ASSESSMENT — PAIN DESCRIPTION - PAIN TYPE
TYPE: ACUTE PAIN
TYPE: ACUTE PAIN

## 2025-02-02 NOTE — PLAN OF CARE
Problem: Metabolic/Fluid and Electrolytes - Adult  Goal: Electrolytes maintained within normal limits  Outcome: Progressing  Goal: Hemodynamic stability and optimal renal function maintained  Outcome: Progressing     Problem: Infection - Adult  Goal: Absence of infection at discharge  Outcome: Progressing  Goal: Absence of infection during hospitalization  Outcome: Progressing     Problem: Genitourinary - Adult  Goal: Absence of urinary retention  Outcome: Progressing     Problem: Cardiovascular - Adult  Goal: Maintains optimal cardiac output and hemodynamic stability  Outcome: Progressing  Goal: Absence of cardiac dysrhythmias or at baseline  Outcome: Progressing     Problem: Risk for Elopement  Goal: Patient will not exit the unit/facility without proper excort  2/1/2025 2337 by Philly Torres, RN  Outcome: Progressing     Problem: ABCDS Injury Assessment  Goal: Absence of physical injury  2/1/2025 2337 by Philly Torres, RN  Outcome: Progressing     Problem: Pain  Goal: Verbalizes/displays adequate comfort level or baseline comfort level  2/1/2025 2337 by Philly Torres, RN  Outcome: Progressing

## 2025-02-02 NOTE — PLAN OF CARE
Problem: Chronic Conditions and Co-morbidities  Goal: Patient's chronic conditions and co-morbidity symptoms are monitored and maintained or improved  Outcome: Progressing     Problem: Discharge Planning  Goal: Discharge to home or other facility with appropriate resources  Outcome: Progressing     Problem: Pain  Goal: Verbalizes/displays adequate comfort level or baseline comfort level  Outcome: Progressing     Problem: ABCDS Injury Assessment  Goal: Absence of physical injury  Outcome: Progressing     Problem: Risk for Elopement  Goal: Patient will not exit the unit/facility without proper excort  Outcome: Progressing

## 2025-02-03 LAB
ANION GAP SERPL CALCULATED.3IONS-SCNC: 12 MMOL/L (ref 3–16)
BACTERIA UR CULT: ABNORMAL
BACTERIA UR CULT: ABNORMAL
BASOPHILS # BLD: 0 K/UL (ref 0–0.2)
BASOPHILS NFR BLD: 0.5 %
BUN SERPL-MCNC: 34 MG/DL (ref 7–20)
CALCIUM SERPL-MCNC: 8.1 MG/DL (ref 8.3–10.6)
CHLORIDE SERPL-SCNC: 106 MMOL/L (ref 99–110)
CO2 SERPL-SCNC: 23 MMOL/L (ref 21–32)
CREAT SERPL-MCNC: 1.7 MG/DL (ref 0.6–1.2)
DEPRECATED RDW RBC AUTO: 23.4 % (ref 12.4–15.4)
EKG ATRIAL RATE: 85 BPM
EKG DIAGNOSIS: NORMAL
EKG P AXIS: 55 DEGREES
EKG P-R INTERVAL: 146 MS
EKG Q-T INTERVAL: 406 MS
EKG QRS DURATION: 108 MS
EKG QTC CALCULATION (BAZETT): 483 MS
EKG R AXIS: -6 DEGREES
EKG T AXIS: 48 DEGREES
EKG VENTRICULAR RATE: 85 BPM
EOSINOPHIL # BLD: 0.2 K/UL (ref 0–0.6)
EOSINOPHIL NFR BLD: 3.1 %
GFR SERPLBLD CREATININE-BSD FMLA CKD-EPI: 29 ML/MIN/{1.73_M2}
GLUCOSE BLD-MCNC: 106 MG/DL (ref 70–99)
GLUCOSE BLD-MCNC: 242 MG/DL (ref 70–99)
GLUCOSE BLD-MCNC: 283 MG/DL (ref 70–99)
GLUCOSE BLD-MCNC: 371 MG/DL (ref 70–99)
GLUCOSE SERPL-MCNC: 107 MG/DL (ref 70–99)
HCT VFR BLD AUTO: 31.2 % (ref 36–48)
HGB BLD-MCNC: 10 G/DL (ref 12–16)
LYMPHOCYTES # BLD: 0.8 K/UL (ref 1–5.1)
LYMPHOCYTES NFR BLD: 13.4 %
MCH RBC QN AUTO: 27 PG (ref 26–34)
MCHC RBC AUTO-ENTMCNC: 32.1 G/DL (ref 31–36)
MCV RBC AUTO: 84.3 FL (ref 80–100)
MONOCYTES # BLD: 0.6 K/UL (ref 0–1.3)
MONOCYTES NFR BLD: 10.8 %
NEUTROPHILS # BLD: 4.2 K/UL (ref 1.7–7.7)
NEUTROPHILS NFR BLD: 72.2 %
ORGANISM: ABNORMAL
PERFORMED ON: ABNORMAL
PLATELET # BLD AUTO: 297 K/UL (ref 135–450)
PMV BLD AUTO: 6.8 FL (ref 5–10.5)
POTASSIUM SERPL-SCNC: 4.1 MMOL/L (ref 3.5–5.1)
PROCALCITONIN SERPL IA-MCNC: 0.07 NG/ML (ref 0–0.15)
RBC # BLD AUTO: 3.7 M/UL (ref 4–5.2)
SODIUM SERPL-SCNC: 141 MMOL/L (ref 136–145)
WBC # BLD AUTO: 5.8 K/UL (ref 4–11)

## 2025-02-03 PROCEDURE — 97162 PT EVAL MOD COMPLEX 30 MIN: CPT

## 2025-02-03 PROCEDURE — 1200000000 HC SEMI PRIVATE

## 2025-02-03 PROCEDURE — 6370000000 HC RX 637 (ALT 250 FOR IP): Performed by: INTERNAL MEDICINE

## 2025-02-03 PROCEDURE — 97530 THERAPEUTIC ACTIVITIES: CPT

## 2025-02-03 PROCEDURE — 6360000002 HC RX W HCPCS: Performed by: INTERNAL MEDICINE

## 2025-02-03 PROCEDURE — 97165 OT EVAL LOW COMPLEX 30 MIN: CPT

## 2025-02-03 PROCEDURE — 84145 PROCALCITONIN (PCT): CPT

## 2025-02-03 PROCEDURE — 80048 BASIC METABOLIC PNL TOTAL CA: CPT

## 2025-02-03 PROCEDURE — 97535 SELF CARE MNGMENT TRAINING: CPT

## 2025-02-03 PROCEDURE — 2500000003 HC RX 250 WO HCPCS: Performed by: INTERNAL MEDICINE

## 2025-02-03 PROCEDURE — 93010 ELECTROCARDIOGRAM REPORT: CPT | Performed by: INTERNAL MEDICINE

## 2025-02-03 PROCEDURE — 97116 GAIT TRAINING THERAPY: CPT

## 2025-02-03 PROCEDURE — 85025 COMPLETE CBC W/AUTO DIFF WBC: CPT

## 2025-02-03 PROCEDURE — 36415 COLL VENOUS BLD VENIPUNCTURE: CPT

## 2025-02-03 RX ORDER — SODIUM CHLORIDE 9 MG/ML
INJECTION, SOLUTION INTRAVENOUS CONTINUOUS
Status: ACTIVE | OUTPATIENT
Start: 2025-02-03 | End: 2025-02-03

## 2025-02-03 RX ORDER — CIPROFLOXACIN 2 MG/ML
400 INJECTION, SOLUTION INTRAVENOUS EVERY 12 HOURS
Status: DISCONTINUED | OUTPATIENT
Start: 2025-02-03 | End: 2025-02-03 | Stop reason: DRUGHIGH

## 2025-02-03 RX ORDER — CIPROFLOXACIN 2 MG/ML
400 INJECTION, SOLUTION INTRAVENOUS EVERY 24 HOURS
Status: DISCONTINUED | OUTPATIENT
Start: 2025-02-03 | End: 2025-02-04 | Stop reason: HOSPADM

## 2025-02-03 RX ADMIN — INSULIN LISPRO 8 UNITS: 100 INJECTION, SOLUTION INTRAVENOUS; SUBCUTANEOUS at 21:08

## 2025-02-03 RX ADMIN — ENOXAPARIN SODIUM 30 MG: 100 INJECTION SUBCUTANEOUS at 09:00

## 2025-02-03 RX ADMIN — ASPIRIN 81 MG: 81 TABLET, COATED ORAL at 08:59

## 2025-02-03 RX ADMIN — SODIUM BICARBONATE 650 MG: 650 TABLET ORAL at 09:09

## 2025-02-03 RX ADMIN — CARVEDILOL 18.75 MG: 6.25 TABLET, FILM COATED ORAL at 17:44

## 2025-02-03 RX ADMIN — ALPRAZOLAM 0.25 MG: 0.25 TABLET ORAL at 21:16

## 2025-02-03 RX ADMIN — ISOSORBIDE MONONITRATE 30 MG: 30 TABLET, EXTENDED RELEASE ORAL at 08:59

## 2025-02-03 RX ADMIN — CLOPIDOGREL BISULFATE 75 MG: 75 TABLET ORAL at 08:59

## 2025-02-03 RX ADMIN — CARVEDILOL 18.75 MG: 6.25 TABLET, FILM COATED ORAL at 08:59

## 2025-02-03 RX ADMIN — GABAPENTIN 100 MG: 100 CAPSULE ORAL at 08:59

## 2025-02-03 RX ADMIN — CIPROFLOXACIN 400 MG: 2 INJECTION, SOLUTION INTRAVENOUS at 09:17

## 2025-02-03 RX ADMIN — SODIUM BICARBONATE 650 MG: 650 TABLET ORAL at 21:08

## 2025-02-03 RX ADMIN — FUROSEMIDE 20 MG: 20 TABLET ORAL at 08:59

## 2025-02-03 RX ADMIN — Medication 1 TABLET: at 09:09

## 2025-02-03 RX ADMIN — ROSUVASTATIN CALCIUM 10 MG: 10 TABLET, FILM COATED ORAL at 17:44

## 2025-02-03 RX ADMIN — GABAPENTIN 100 MG: 100 CAPSULE ORAL at 21:08

## 2025-02-03 RX ADMIN — Medication 1000 UNITS: at 17:44

## 2025-02-03 RX ADMIN — SODIUM CHLORIDE, PRESERVATIVE FREE 10 ML: 5 INJECTION INTRAVENOUS at 21:09

## 2025-02-03 NOTE — ED PROVIDER NOTES
EMERGENCY DEPARTMENT SUPERVISING PHYSICIAN NOTE    I have seen this patient & have reviewed history and findings with the PA, NP, or resident physician and provided direct supervision. I saw the patient and performed a substantive portion of the visit. I was present for key portions of any procedures performed. I've participated in medical management, monitoring, and treatment of this patient with the provider. I have reviewed currently available documentation, test results, and laboratory results in the interim. Care plan has been developed collaboratively. I take responsibility for the patient's management from when I was asked to get involved in this patient's care. Jenn and YOMI are the primary clinicians of record.    Brief HPI:  84F coming from [facility]  States that she is very frustrated that she is not receiving her antihyperglycemic medications as often as they are prescribed  She reports that [facility] staff are also withholding glucose testing supplies from her so she does not know if her diabetes has been well-controlled as of late    Pertinent Exam Findings:  Awake, alert, not acutely ill-appearing, not distressed  Angry and somewhat hyperactive but not aggressive or combative  CTAB, RRR, 2+ radial pulse  Abdomen soft, NT, ND  Ambulatory independently  Oriented to self, age, year, place, situation  Speech is clear and fluent    Plan:  She does not appear acutely ill clinically  Exam findings are largely reassuring  Urinalysis is highly suspicious for urinary tract infection  As she is elderly this certainly could be contributing to some agitation  As she has a urostomy this is a complicated UTI  Urine sent for culture, began empiric antimicrobial therapy  Placed consult for social work staff to contact ombudsman and/or adult protective services regarding patient's concerns for medical neglect    Final Impression(s)  1. Complicated urinary tract infection    2. Agitation    3. High risk social situation

## 2025-02-03 NOTE — PLAN OF CARE
Problem: Metabolic/Fluid and Electrolytes - Adult  Goal: Electrolytes maintained within normal limits  Outcome: Progressing  Goal: Hemodynamic stability and optimal renal function maintained  Outcome: Progressing     Problem: Infection - Adult  Goal: Absence of infection at discharge  Outcome: Progressing  Goal: Absence of infection during hospitalization  Outcome: Progressing     Problem: Genitourinary - Adult  Goal: Absence of urinary retention  Outcome: Progressing     Problem: Cardiovascular - Adult  Goal: Maintains optimal cardiac output and hemodynamic stability  Outcome: Progressing  Goal: Absence of cardiac dysrhythmias or at baseline  Outcome: Progressing

## 2025-02-04 VITALS
WEIGHT: 125.66 LBS | HEIGHT: 68 IN | HEART RATE: 80 BPM | SYSTOLIC BLOOD PRESSURE: 100 MMHG | OXYGEN SATURATION: 95 % | RESPIRATION RATE: 16 BRPM | BODY MASS INDEX: 19.05 KG/M2 | DIASTOLIC BLOOD PRESSURE: 53 MMHG | TEMPERATURE: 97.3 F

## 2025-02-04 LAB
ANION GAP SERPL CALCULATED.3IONS-SCNC: 11 MMOL/L (ref 3–16)
BASOPHILS # BLD: 0 K/UL (ref 0–0.2)
BASOPHILS NFR BLD: 0.5 %
BUN SERPL-MCNC: 26 MG/DL (ref 7–20)
CALCIUM SERPL-MCNC: 8.2 MG/DL (ref 8.3–10.6)
CHLORIDE SERPL-SCNC: 105 MMOL/L (ref 99–110)
CO2 SERPL-SCNC: 23 MMOL/L (ref 21–32)
CREAT SERPL-MCNC: 1.6 MG/DL (ref 0.6–1.2)
DEPRECATED RDW RBC AUTO: 23.7 % (ref 12.4–15.4)
EOSINOPHIL # BLD: 0.2 K/UL (ref 0–0.6)
EOSINOPHIL NFR BLD: 3.3 %
GFR SERPLBLD CREATININE-BSD FMLA CKD-EPI: 31 ML/MIN/{1.73_M2}
GLUCOSE BLD-MCNC: 172 MG/DL (ref 70–99)
GLUCOSE SERPL-MCNC: 155 MG/DL (ref 70–99)
HCT VFR BLD AUTO: 31.1 % (ref 36–48)
HGB BLD-MCNC: 10 G/DL (ref 12–16)
LYMPHOCYTES # BLD: 0.7 K/UL (ref 1–5.1)
LYMPHOCYTES NFR BLD: 13.3 %
MCH RBC QN AUTO: 26.9 PG (ref 26–34)
MCHC RBC AUTO-ENTMCNC: 32.2 G/DL (ref 31–36)
MCV RBC AUTO: 83.7 FL (ref 80–100)
MONOCYTES # BLD: 0.6 K/UL (ref 0–1.3)
MONOCYTES NFR BLD: 9.8 %
NEUTROPHILS # BLD: 4.1 K/UL (ref 1.7–7.7)
NEUTROPHILS NFR BLD: 73.1 %
PERFORMED ON: ABNORMAL
PLATELET # BLD AUTO: 286 K/UL (ref 135–450)
PMV BLD AUTO: 7.1 FL (ref 5–10.5)
POTASSIUM SERPL-SCNC: 4 MMOL/L (ref 3.5–5.1)
PROCALCITONIN SERPL IA-MCNC: 0.06 NG/ML (ref 0–0.15)
RBC # BLD AUTO: 3.72 M/UL (ref 4–5.2)
SODIUM SERPL-SCNC: 139 MMOL/L (ref 136–145)
WBC # BLD AUTO: 5.6 K/UL (ref 4–11)

## 2025-02-04 PROCEDURE — 6370000000 HC RX 637 (ALT 250 FOR IP): Performed by: INTERNAL MEDICINE

## 2025-02-04 PROCEDURE — 6360000002 HC RX W HCPCS: Performed by: INTERNAL MEDICINE

## 2025-02-04 PROCEDURE — 84145 PROCALCITONIN (PCT): CPT

## 2025-02-04 PROCEDURE — 80048 BASIC METABOLIC PNL TOTAL CA: CPT

## 2025-02-04 PROCEDURE — 85025 COMPLETE CBC W/AUTO DIFF WBC: CPT

## 2025-02-04 PROCEDURE — 36415 COLL VENOUS BLD VENIPUNCTURE: CPT

## 2025-02-04 RX ORDER — CIPROFLOXACIN 500 MG/1
500 TABLET, FILM COATED ORAL 2 TIMES DAILY
Qty: 10 TABLET | Refills: 0 | Status: SHIPPED | OUTPATIENT
Start: 2025-02-04 | End: 2025-02-09

## 2025-02-04 RX ADMIN — ASPIRIN 81 MG: 81 TABLET, COATED ORAL at 08:44

## 2025-02-04 RX ADMIN — CLOPIDOGREL BISULFATE 75 MG: 75 TABLET ORAL at 08:45

## 2025-02-04 RX ADMIN — ENOXAPARIN SODIUM 30 MG: 100 INJECTION SUBCUTANEOUS at 08:46

## 2025-02-04 RX ADMIN — SODIUM BICARBONATE 650 MG: 650 TABLET ORAL at 08:45

## 2025-02-04 RX ADMIN — CIPROFLOXACIN 400 MG: 2 INJECTION, SOLUTION INTRAVENOUS at 09:14

## 2025-02-04 RX ADMIN — GABAPENTIN 100 MG: 100 CAPSULE ORAL at 08:45

## 2025-02-04 NOTE — DISCHARGE INSTR - COC
Continuity of Care Form    Patient Name: Cely Espitia   :  1940  MRN:  [id number]    Admit date:  2025  Discharge date:  ***    Code Status Order: Full Code   Advance Directives:   Advance Care Flowsheet Documentation             Admitting Physician:  Pedro Luis Bolanos MD  PCP: Marcsu Shukla MD    Discharging Nurse: ***  Discharging Hospital Unit/Room#: 4TN-4469/4469-01  Discharging Unit Phone Number: ***    Emergency Contact:   Extended Emergency Contact Information  Primary Emergency Contact: Vivian Maguire  Home Phone: [phone]  Mobile Phone: [phone]  Relation: Child  Secondary Emergency Contact: Nigel Gardner  Home Phone: [phone]  Mobile Phone: [phone]  Relation: Child    Past Surgical History:  Past Surgical History:   Procedure Laterality Date    ANGIOPLASTY      x3  2009    BLADDER REMOVAL      with Urostomy 2016    BLADDER SUSPENSION      CARDIAC PROCEDURE N/A 2024    Right heart cath performed by Maury Velazquez MD at [facility] CARDIAC CATH LAB    CARDIAC PROCEDURE N/A 2024    Thrombectomy pulmonary artery performed by Maury Velazquez MD at [facility] CARDIAC CATH LAB    CARDIAC PROCEDURE N/A 2024    Left and right heart cath / coronary angiography performed by Maury Velazquez MD at [facility] CARDIAC CATH LAB    CARDIAC PROCEDURE N/A 2024    Percutaneous coronary intervention performed by Maury Velazquez MD at [facility] CARDIAC CATH LAB    CARDIAC PROCEDURE N/A 2024    Intravascular ultrasound performed by Maury Velazquez MD at [facility] CARDIAC CATH LAB    COLONOSCOPY N/A 10/16/2024    COLONOSCOPY DIAGNOSTIC performed by Jose Lui MD at [facility] ENDOSCOPY    CYSTOSCOPY  2013    with dilitation    HYSTERECTOMY (CERVIX STATUS UNKNOWN)      KIDNEY REMOVAL      right    PACEMAKER INSERTION      SKIN CANCER EXCISION      TOTAL HIP ARTHROPLASTY Left 2019    LEFT ANTERIOR TOTAL HIP REPLACEMENT WITH C-ARM performed by Avni Shepard,

## 2025-02-04 NOTE — CARE COORDINATION
ANTONIA spoke to ehsan Park this am and she has no worries about pt returning to her AL facility and states the staff does give her her medications and sometimes pt refuses them (this was also verified by SW on admission.    Daughter states she will be here 11-11:30 to pick pt up and take her back to AL.    ANTONIA notified call [facility]  [phone] and verified pt is active with them and then received a  Lisha with [facility] that I returned [phone] making her aware that orders are in epic and pt discharging. They have epic access.    ANTONIA then called [facility] this am [phone] and was transferred to nursing and spoke to Astrid Marlin she is aware of discharge.     Case Management -  Discharge Note      Patient Name: Cely Espitia                   YOB: 1940  Room: [address] [place] [address]            Readmission Risk (Low < 19, Mod (19-27), High > 27): Readmission Risk Score: 28.8    Current PCP: Marcus Shukla MD      (IMM) Important Message from Medicare:    Has pt received appropriate compliance notices before being discharged if required: yes  Compliance doc:  [x] 2nd IMM; [] Code 44 [] Ross  Date Given: 2/4/2025  Given By: Dipti Block RN, CM    PT AM-PAC: 18 /24  OT AM-PAC: 21 /24    Patient/patient representative has been educated on the benefits of home care as well as the possible risks of declining recommended services. Patient/patient representative has acknowledged the information provided and decided on the following discharge plan. Patient/ patient representative has been provided freedom of choice regarding service provider, supported by basic dialogue that supports the patient's individualized plan of care/goals.    (Add Smart Phrase Here/Delete)      [facility] agency notified of discharge:  [x] Yes [] No  [] NA    Family notified of discharge:  [x] Yes  [] No  [] NA    Facility notified of discharge:  [] Yes  [] No  [x] NA    Pt is being discharged with Outpt IV Antibiotics

## 2025-02-04 NOTE — CARE COORDINATION
Case Management Assessment  Initial Evaluation    Date/Time of Evaluation: 2/4/2025 11:20 AM  Assessment Completed by: TYLER DEMPSEY RN    If patient is discharged prior to next notation, then this note serves as note for discharge by case management.    Patient Name: Cely Espitia                   YOB: 1940  Diagnosis: Agitation [R45.1]  Complicated urinary tract infection [N39.0]  High risk social situation [Z60.9]  Complicated UTI (urinary tract infection) [N39.0]                   Date / Time: 2/1/2025  7:52 AM    Patient Admission Status: Inpatient   Readmission Risk (Low < 19, Mod (19-27), High > 27): Readmission Risk Score: 28.8    Current PCP: Marcus Shukla MD  PCP verified by CM? Yes    Chart Reviewed: Yes      History Provided by: Medical Record, Child/Family, Other (see comment) ([facility] staff/ and spoke to daughter)  Patient Orientation: Alert and Oriented    Patient Cognition: Alert    Hospitalization in the last 30 days (Readmission):  No    If yes, Readmission Assessment in  Navigator will be completed.    Advance Directives:      Code Status: Full Code   Patient's Primary Decision Maker is: Legal Next of Kin    Primary Decision Maker: Vivian Maguire - Child - [phone]    Primary Decision Maker: Nigel Gardner - Child - [phone]    Primary Decision Maker: AnuelGaro - Child - [phone]    Discharge Planning:    Patient lives with: Alone Type of Home: Assisted living  Primary Care Giver: Other (Comment) ([facility] staff)  Patient Support Systems include: Other (Comment), Children ([facility] staff)   Current Financial resources: Medicare  Current community resources: Assisted Living  Current services prior to admission: Other (Comment), Home Care (pt lives at [facility])            Current DME:              Type of Home Care services:  OT, PT, Nursing Services    ADLS  Prior functional level: Other (see comment), Assistance with the following:, Cooking,

## 2025-02-04 NOTE — PLAN OF CARE
Problem: Chronic Conditions and Co-morbidities  Goal: Patient's chronic conditions and co-morbidity symptoms are monitored and maintained or improved  2/4/2025 0940 by Adina Mobley, RN  Outcome: Progressing  Flowsheets (Taken 2/4/2025 0843)  Care Plan - Patient's Chronic Conditions and Co-Morbidity Symptoms are Monitored and Maintained or Improved: Monitor and assess patient's chronic conditions and comorbid symptoms for stability, deterioration, or improvement     Problem: Discharge Planning  Goal: Discharge to home or other facility with appropriate resources  2/4/2025 0940 by Adina Mobley, RN  Outcome: Progressing  Flowsheets (Taken 2/4/2025 0843)  Discharge to home or other facility with appropriate resources: Identify barriers to discharge with patient and caregiver     Problem: Pain  Goal: Verbalizes/displays adequate comfort level or baseline comfort level  2/4/2025 0940 by Adina Mobley, RN  Outcome: Progressing     Problem: ABCDS Injury Assessment  Goal: Absence of physical injury  2/4/2025 0940 by Adina Mobley, RN  Outcome: Progressing

## 2025-02-04 NOTE — PLAN OF CARE
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    Procedure: Right vitrectomy, endophotocoagulation, avastin right eye    Surgeon:  Franko Nascimento MD    Assistants: angelita    Anesthesia Staff: CRNA: (Unknown)  Anesthesiologist: (Unknown)    Anesthesia Type: GETA    Findings: TRD    Estimated Blood Loss: none    Complications: none    Specimens Removed: none     Problem: Chronic Conditions and Co-morbidities  Goal: Patient's chronic conditions and co-morbidity symptoms are monitored and maintained or improved  2/3/2025 2150 by Denny Kent RN  Outcome: Progressing     Problem: Discharge Planning  Goal: Discharge to home or other facility with appropriate resources  2/3/2025 2150 by Denny Kent RN  Outcome: Progressing     Problem: Pain  Goal: Verbalizes/displays adequate comfort level or baseline comfort level  2/3/2025 2150 by Denny Kent RN  Outcome: Progressing     Problem: ABCDS Injury Assessment  Goal: Absence of physical injury  2/3/2025 2150 by Denny Kent RN  Outcome: Progressing     Problem: Risk for Elopement  Goal: Patient will not exit the unit/facility without proper excort  Outcome: Progressing     Problem: Safety - Adult  Goal: Free from fall injury  Outcome: Progressing

## 2025-02-04 NOTE — DISCHARGE SUMMARY
[facility] -- Physician Discharge Summary     Cely Espitia  1940  MRN: [id number]    Admit Date: 2/1/2025  Discharge Date: No discharge date for patient encounter.    Attending MD: Pedro Luis Bolanos MD  Discharging MD: Pedro Luis Bolanos MD  PCP: Marcus Shukla MD [address] [facility] / [facility] [address] [phone]    Admission Diagnosis: Agitation [R45.1]  Complicated urinary tract infection [N39.0]  High risk social situation [Z60.9]  Complicated UTI (urinary tract infection) [N39.0]  DISCHARGE DIAGNOSIS: same      Full Hospital Problem List:  Active Hospital Problems    Diagnosis Date Noted    Complicated UTI (urinary tract infection) [N39.0] 02/01/2025    Altered mental state [R41.82] 02/01/2025    DM2 (diabetes mellitus, type 2) ([facility]) [E11.9] 04/27/2017    Essential hypertension [I10]     CATARINA (acute kidney injury) ([facility]) [N17.9]     Anemia, normocytic normochromic [D64.9] 05/02/2014           Hospital Course:  84 y.o. female who presents secondary to complaints regarding assisted-living facility.  Patient came from [facility].  States that the facility involuntarily took away her medications about a month ago.  She is concerned as she states that she is not getting all of her medications like she is supposed to.  She has no physical complaints.  No additional information is able to be obtained from patient on initial conversation     CBC CMP are remarkable for CKD with a creat of 1.9.  There is no leukocytosis.  Urinalysis is positive for nitrites, large leukocytes with 4+ bacteria and 42 white blood cells.  Patient does have a history of MRDO.  Concern for complicated urinary tract infection as likely source of increasing agitation versus possible new onset dementia.  She will be admitted for further medical management         Agitation may be 2/2 UTI.  It appears that psych consult was initially pursued by ER staff but then this was canceled when infection found.   She is

## 2025-02-04 NOTE — CARE COORDINATION
02/04/25 1054   IMM Letter   IMM Letter given to Patient/Family/Significant other/Guardian/POA/by: Dipti Block RN CM   IMM Letter date given: 02/04/25   IMM Letter time given: 1010  (copy made for hard chart)

## 2025-02-05 PROCEDURE — 93297 REM INTERROG DEV EVAL ICPMS: CPT | Performed by: NURSE PRACTITIONER

## 2025-02-05 NOTE — PROGRESS NOTES
Pharmacy Note - Renal dose adjustment made per P/T protocol    Original order:  Cipro 400 mg IV Q12H x 5 days    Estimated Creatinine Clearance: 22 mL/min (A) (based on SCr of 1.7 mg/dL (H)).    Recent Labs     02/01/25  0842 02/02/25  0615 02/03/25  0523   BUN 33* 34* 34*   CREATININE 1.9* 1.7* 1.7*       Renally adjusted order:  Cipro 400 mg IV Q24H  x 5 days    Please call pharmacy with any questions.    Thank you,  Vivian Trevino [facility]  2/3/2025 8:30 AM   
  [facility] - Inpatient Rehabilitation Department   Phone: [phone]    Physical Therapy    [x] Initial Evaluation            [] Daily Treatment Note         [] Discharge Summary      Patient: Cely Espitia   : 1940   MRN: [id number]   Date of Service:  2/3/2025  Admitting Diagnosis: Complicated UTI (urinary tract infection)  Current Admission Summary: Cely Espitia is a 84 y.o. female who presents secondary to complaints regarding assisted-living facility.  Patient came from [facility].  States that the facility involuntarily took away her medications about a month ago.  She is concerned as she states that she is not getting all of her medications like she is supposed to.  She has no physical complaints.   Past Medical History:  has a past medical history of Anxiety, Atrial fibrillation ([facility]), CAD (coronary artery disease), Cancer ([facility]), Cardiac arrest, Carotid artery stenosis, CHF (congestive heart failure) ([facility]), Chronic kidney disease, Depression, Diabetes mellitus ([facility]), GERD (gastroesophageal reflux disease), Hip pain, chronic, HYPERCHOLESTERAEMIA, Hypertension, Idiopathic ventricular tachycardia ([facility]), Irritable bowel syndrome, Laceration of head, MI, old, 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  Past Surgical History:  has a past surgical history that includes angioplasty; Hysterectomy; bladder suspension; Cystoscopy (2013); Pacemaker insertion; Kidney removal; Bladder removal; Total hip arthroplasty (Left, 2019); Skin cancer excision; Cardiac procedure (N/A, 2024); Cardiac procedure (N/A, 2024); Upper gastrointestinal endoscopy (N/A, 10/16/2024); Colonoscopy (N/A, 10/16/2024); Cardiac procedure (N/A, 2024); Cardiac 
  Physician Progress Note      PATIENT:               DESMOND MARCUM  CSN #:                  [id number]  :                       1940  ADMIT DATE:       2025 7:52 AM  DISCH DATE:        2025 12:05 PM  RESPONDING  PROVIDER #:        Pedro Luis MARCIAL MD          QUERY TEXT:    Pt admitted with UTI. Pt noted to have AMS. If possible, please document in   the progress notes and discharge summary if you are evaluating and / or   treating any of the following:    The medical record reflects the following:  Risk Factors: AMS, agitation, UTI    Clinical Indicators: 2/ HP-\"Altered mental state -New Problem to me.  Likely   2/2 UTI. would treat UTI first. If pt still altered after clearance of   infection, consider psych eval or other workup.\"   DCS- \"Agitation may be 2/2 UTI.  It appears that psych consult was   initially pursued by ER staff but then this was canceled when infection found.   Agitation is much improved, so it is felt this was 2/2 UTI. Discharged home   to complete oral abx course.\"    Treatment: Labs, IV abx, DC home w/oral abx  Options provided:  -- Metabolic encephalopathy  -- Other - I will add my own diagnosis  -- Disagree - Not applicable / Not valid  -- Disagree - Clinically unable to determine / Unknown  -- Refer to Clinical Documentation Reviewer    PROVIDER RESPONSE TEXT:    This patient has metabolic encephalopathy.    Query created by: Noni Fu on 2025 7:51 AM      Electronically signed by:  Pedro Luis MARCIAL MD 2025 11:09 AM          
CLINICAL PHARMACY NOTE: MEDS TO BEDS    Total # of Prescriptions Filled: 1   The following medications were delivered to the patient:  CIPROFLOXACIN HCL 500MG    Additional Documentation:  Patients daughter picked up in outpatient pharmacy = signed  Sera Pope CPhT  
CLINICAL PHARMACY NOTE: MEDS TO BEDS    Total # of Prescriptions Filled: 1   The following medications were delivered to the patient:  CIPROFLOXACIN HCL 500MG TABS    Additional Documentation: Patient daughter picked up=signed  Elizabeth Camarillols Pharmacy Tech  
IV removed prior dc. The care plan and education has been resolved and completed. Discharge instructions and medications reviewed with patient. Patient voices understanding and denies further concerns at this time. Patient discharged back to Assisted Living  per order. Pt daughter to  home meds from Op Pharmacy.    
Patient brought up to the unit from the ED. Patient is A&O x 4. VSS.   
Patient is agitated, patient states she does not feel that she needs to be here. She can not understand why she can not just get her medicine for the UTI and leave.   
Progress Note - Dr. Bolanos - Internal Medicine  PCP: Marcus Shukla MD [address] [facility]. / [facility] [address] [phone]    Hospital Day: 2  Code Status: Full Code  Current Diet: ADULT DIET; Regular        CC: follow up on medical issues    Subjective:   Cely Espitia is a 84 y.o. female.    Pt seen and examined  Chart reviewed since last visit, labs and imaging below      Pt still confused    Creat stable,1.7      Urine cx shows  Component    Urine Culture, Routine <50,000 CFU/ml mixed skin/urogenital miguel. No further workup Abnormal    Organism Citrobacter amalonaticus Abnormal    Urine Culture, Routine >100,000 CFU/ml   Resulting Agency [facility]        Susceptibility    Citrobacter amalonaticus (1)    Antibiotic Interpretation OTILIO  Method Status    ceFAZolin Resistant >=64 mcg/mL BACTERIAL SUSCEPTIBILITY PANEL BY OTILIO     cefepime Sensitive <=0.12 mcg/mL BACTERIAL SUSCEPTIBILITY PANEL BY OTILIO     cefTRIAXone Resistant 32 mcg/mL BACTERIAL SUSCEPTIBILITY PANEL BY OTILIO     ciprofloxacin Sensitive <=0.25 mcg/mL BACTERIAL SUSCEPTIBILITY PANEL BY OTILIO     ertapenem Sensitive <=0.12 mcg/mL BACTERIAL SUSCEPTIBILITY PANEL BY OTILIO     gentamicin Sensitive <=1 mcg/mL BACTERIAL SUSCEPTIBILITY PANEL BY OTILIO     levofloxacin Sensitive <=0.12 mcg/mL BACTERIAL SUSCEPTIBILITY PANEL BY OTILIO     nitrofurantoin Intermediate 64 mcg/mL BACTERIAL SUSCEPTIBILITY PANEL BY OTILIO     piperacillin-tazobactam Sensitive <=4 mcg/mL BACTERIAL SUSCEPTIBILITY PANEL BY OTILIO     trimethoprim-sulfamethoxazole Sensitive <=20 mcg/mL BACTERIAL SUSCEPTIBILITY PANEL BY OTILIO                 Abx changed to cipro      Review of Systems: (1 system for EPF, 2-9 for detailed, 10+ for comprehensive)  Constitutional: Negative for chills and fever.     HENT: Negative for dental problem, nosebleeds and rhinorrhea.      Eyes: Negative for photophobia and visual disturbance.     Respiratory: Negative for cough, chest tightness and shortness of 
Progress Note - Dr. Bolanos - Internal Medicine  PCP: Marcus Shukla MD [address] [facility]. / [facility] [address] [phone]    Hospital Day: 1  Code Status: Full Code  Current Diet: ADULT DIET; Regular        CC: follow up on medical issues    Subjective:   Cely Espitia is a 84 y.o. female.    Pt seen and examined  Chart reviewed since last visit, labs and imaging below      Pt still confused  A little agitated  Creat better ,1.7  Ngtd on cx      Review of Systems: (1 system for EPF, 2-9 for detailed, 10+ for comprehensive)  Constitutional: Negative for chills and fever.     HENT: Negative for dental problem, nosebleeds and rhinorrhea.      Eyes: Negative for photophobia and visual disturbance.     Respiratory: Negative for cough, chest tightness and shortness of breath.      Cardiovascular: Negative for chest pain and leg swelling.     Gastrointestinal: Negative for diarrhea, nausea and vomiting.     Endocrine: Negative for polydipsia and polyphagia.     Genitourinary: Negative for frequency, hematuria and urgency.     Musculoskeletal: Negative for back pain and myalgias.     Skin: Negative for rash.     Allergic/Immunologic: Negative for food allergies.     Neurological: Negative for dizziness, seizures, syncope and facial asymmetry.     Hematological: Negative for adenopathy.     Psychiatric/Behavioral: Negative for dysphoric mood. The patient is not nervous/anxious.        I have reviewed the patient's medical and social history in detail and updated the computerized patient record.  To recap: She  has a past medical history of Anxiety, Atrial fibrillation ([facility]), CAD (coronary artery disease), Cancer ([facility]), Cardiac arrest, Carotid artery stenosis, CHF (congestive heart failure) ([facility]), Chronic kidney disease, Depression, Diabetes mellitus ([facility]), GERD (gastroesophageal reflux disease), Hip pain, chronic, HYPERCHOLESTERAEMIA, Hypertension, Idiopathic ventricular tachycardia ([facility]), Irritable bowel 
Pt admitted for uti, alteration    Full h+p to follow    Active Hospital Problems    Diagnosis Date Noted    Complicated UTI (urinary tract infection) [N39.0] 02/01/2025    Altered mental state [R41.82] 02/01/2025    DM2 (diabetes mellitus, type 2) ([facility]) [E11.9] 04/27/2017    Essential hypertension [I10]     CATARINA (acute kidney injury) ([facility]) [N17.9]     Anemia, normocytic normochromic [D64.9] 05/02/2014       Please use Perfect[employer]erve to contact me with any questions during the day.   The hospitalist service will provide cross-coverage for this patient from 7pm to 7am.    During those hours, contact the on-call hospitalist MD/GILBERTO for questions.    
Shift assessment completed. Neuro WNL. Denies any pain at this time. AM meds administered per MAR. The care plan and education has been reviewed and mutually agreed upon with the patient. Standard safety measures in place.    
Shift assessment completed. Neuro WNL. Denies any pain at this time. AM meds administered per MAR. Urostomy remains in place; draining well. The care plan and education has been reviewed and mutually agreed upon with the patient. Standard safety measures in place.       2:42 pm  Pt reports irregular heartbeat; MD notified. EKG and continuous tele monitoring ordered.  
transfer training, gait training, stair training, endurance training, ADL/self-care training, IADL training, and equipment evaluation/education    Goals  Patient Goals: Return to home/MIGUEL   Short Term Goals:  Time Frame: Discharge  Patient will complete upper body ADL at Independent   Patient will complete lower body ADL at Independent   Patient will complete toileting at modified independent   Patient will complete grooming at Independent   Patient will complete functional transfers at modified independent     Above goals reviewed on 2/3/2025.  All goals are ongoing at this time unless indicated above.       Therapy Session Time     Individual Group Co-treatment   Time In    0840   Time Out    0942   Minutes    62          Timed Code Treatment Minutes: 47 Minutes  Total Treatment Minutes:  62 minutes       Electronically Signed By:Ramon CONTI/KARY  [id number]

## 2025-03-26 ENCOUNTER — TELEPHONE (OUTPATIENT)
Dept: CARDIOLOGY CLINIC | Age: 85
End: 2025-03-26

## 2025-03-26 NOTE — TELEPHONE ENCOUNTER
Please review Murj for: \"Possible OptiVol fluid accumulation: 22-Mar-2025 -- ongoing, elevated slightly above 60 threshold w correlating decreased TI trend below reference line. Triage™ Heart Failure Risk Status on 26-Mar-2025 is Medium*.\"

## 2025-04-16 ENCOUNTER — TELEPHONE (OUTPATIENT)
Dept: CARDIOLOGY CLINIC | Age: 85
End: 2025-04-16

## 2025-04-16 NOTE — TELEPHONE ENCOUNTER
Remote device check completed. Optivol reviewed. Thoracic impedence decreased indicating fluid accumulation. Please call patient and assess for S/S of HF.    Please ask:  Worsening SOB above baseline? What specifically is making them SOB? When did they first notice increased SOB?    Orthopnea/unable to lay flat without getting SOB? Do they wake up SOB?    PND (Paroxysmal nocturnal dyspnea)/ dyspnea that wakes them up at night?    Chest pain/pressure? What causes the chest discomfort?     Weight gain?:     What is their Dry weight:       Today' weight:    Edema/ swelling worse than their baseline?    Abdominal Distention/bloating? Can they button their pants?     Activity level/ not tolerating typical activity? What specific activity can they not do that they were able to do 1 week ago?    Lightheaded/syncope?    Follows 2gm Na Diet?    Follows Fluid Restriction 64 oz?     What diuretic are they currently taking (dose, frequency, skipped any doses or taken any extra?)    Med list shows pt is taking lasix 20mg daily. IF pt is having HF symptoms as above, and they are taking lasix 20mg daily, advise them to take lasix 40mg daily for 3 days then resume 20mg daily.   If something different then let  me know and can make a different plan

## 2025-04-16 NOTE — TELEPHONE ENCOUNTER
Remote device check completed. Optivol reviewed. Thoracic impedence decreased indicating fluid accumulation. Please call patient and assess for S/S of HF.     Please ask:  Worsening SOB above baseline? What specifically is making them SOB? When did they first notice increased SOB?     Orthopnea/unable to lay flat without getting SOB? Do they wake up SOB?     PND (Paroxysmal nocturnal dyspnea)/ dyspnea that wakes them up at night?     Chest pain/pressure? What causes the chest discomfort?      Weight gain?: no      What is their Dry weight:           Today' weight:      Edema/ swelling worse than their baseline?     Abdominal Distention/bloating? Can they button their pants?      Activity level/ not tolerating typical activity? What specific activity can they not do that they were able to do 1 week ago?     Lightheaded/syncope?     Follows 2gm Na Diet?     Follows Fluid Restriction 64 oz?      What diuretic are they currently taking (dose, frequency, skipped any doses or taken any extra?)       Called pts daughter lawrence below but she last saw pt Monday. Pts daughter Vivian advised that I called her mother [phone].      Called number provided below but no response. Could not leave  because it has not been set up.

## 2025-04-16 NOTE — TELEPHONE ENCOUNTER
Please review Murj for: \"Please review Murj for: \"Possible OptiVol fluid accumulation: 22-Mar-2025 -- ongoing, elevated up to 160 w correlating TI trend below reference line. Triage™ Heart Failure Risk Status on 16-Apr-2025 is Medium*.\"

## 2025-04-17 NOTE — TELEPHONE ENCOUNTER
Spoke with pts daughter Vivian at [phone]. Was instructed to contact pt at [phone] (home) . Kristofer Rashid  forwarded me to her room but her mailbox is full and unable to leave message. Called Vivian back for her to relay message to Cely to call us back. She will have pt return our call. While on the phone with Vivian she scheduled a follow up appt with Nivia.

## 2025-04-18 RX ORDER — CARVEDILOL 12.5 MG/1
12.5 TABLET ORAL 2 TIMES DAILY
Qty: 180 TABLET | Refills: 3 | Status: SHIPPED | OUTPATIENT
Start: 2025-04-18

## 2025-04-18 NOTE — TELEPHONE ENCOUNTER
PT has been called three times with no success at speaking with her. Mailed \"unable to contact\" letter to PT's address on file.

## 2025-04-18 NOTE — TELEPHONE ENCOUNTER
Last OV: 1/14/25  Next OV: 5/114/25  Last refill: 12/17/24  Most recent Labs: BMP 2/4/25  Last EKG (if needed):

## 2025-05-14 ENCOUNTER — OFFICE VISIT (OUTPATIENT)
Dept: CARDIOLOGY CLINIC | Age: 85
End: 2025-05-14
Payer: MEDICARE

## 2025-05-14 VITALS
SYSTOLIC BLOOD PRESSURE: 138 MMHG | DIASTOLIC BLOOD PRESSURE: 60 MMHG | OXYGEN SATURATION: 97 % | WEIGHT: 134 LBS | BODY MASS INDEX: 20.37 KG/M2 | HEART RATE: 74 BPM

## 2025-05-14 DIAGNOSIS — Z95.810 AUTOMATIC IMPLANTABLE CARDIOVERTER-DEFIBRILLATOR IN SITU: ICD-10-CM

## 2025-05-14 DIAGNOSIS — R01.1 HEART MURMUR: ICD-10-CM

## 2025-05-14 DIAGNOSIS — I25.10 CORONARY ARTERY DISEASE INVOLVING NATIVE CORONARY ARTERY OF NATIVE HEART WITHOUT ANGINA PECTORIS: ICD-10-CM

## 2025-05-14 DIAGNOSIS — I50.22 CHRONIC SYSTOLIC HEART FAILURE (HCC): ICD-10-CM

## 2025-05-14 DIAGNOSIS — E78.5 HYPERLIPIDEMIA, UNSPECIFIED HYPERLIPIDEMIA TYPE: ICD-10-CM

## 2025-05-14 DIAGNOSIS — I05.9 MITRAL VALVE DISEASE: Primary | ICD-10-CM

## 2025-05-14 DIAGNOSIS — I10 ESSENTIAL HYPERTENSION: ICD-10-CM

## 2025-05-14 PROCEDURE — 99214 OFFICE O/P EST MOD 30 MIN: CPT | Performed by: INTERNAL MEDICINE

## 2025-05-14 PROCEDURE — G2211 COMPLEX E/M VISIT ADD ON: HCPCS | Performed by: INTERNAL MEDICINE

## 2025-05-14 PROCEDURE — 1159F MED LIST DOCD IN RCRD: CPT | Performed by: INTERNAL MEDICINE

## 2025-05-14 PROCEDURE — 1123F ACP DISCUSS/DSCN MKR DOCD: CPT | Performed by: INTERNAL MEDICINE

## 2025-05-14 NOTE — PATIENT INSTRUCTIONS
1) please get with your PCP to figure out medications, especially diabetic meds.     2) We will complete an echo/US heart with labs few days prior to next visit with Dr. Gonzáles in 3 months.     3) Keep working on a low sodium diet daily, stay active to keep your strength.

## 2025-05-14 NOTE — PROGRESS NOTES
[facility]  Cardiology Progress Note    Cely Espitia  1940    May 14, 2025      CC: \"    HPI:  The patient is 85 y.o. female with a past medical history significant for CAD, cardiomyopathy/systolic heart failure with prior PCI to LAD and RCA.     She was hospitalized 3/26/16-4/5/16 after suffering Vfib cardiac arrest. Bystanders initiated CPR and when the EMS arrived she was in Vfib and subsequently shocked multiple times. Echo revealed EF 25%. C showed patient stents. Underwent hypothermia protocol and has had a significant neurological recovery. An ICD was placed on 4/1/16 for secondary prevention. We referred her to Dr. Colón for carotid stenosis but never followed up and now managed per Dr. Reynoso.  vascular surgeon at [facility] Dr. EAMON Reynoso with repeat carotid duplex 1/15/21. She also reports that she had nausea and constipation on Vascepa. Hospitalization for rectal bleed 1/2023.     [facility]     Spring 2024:  She does have issues with sinus issues, back and leg pian with use of walker with tingling due to her diabetes, sometimes hears her heart beat in her left ear.     --ZACH 8/7/2024 revealing severe MR with mobile structure on her device with possible infection. Need MitraClip but she also has evidence of mobile structure on her pacemaker/ICD lead raising the possibility of infection. Blood cultures negative. Lost weight over 8 to 12 months ~40#.     -9/22/24 successful Aphavac aspiration thrombectomy/ZACH   -11/19/24 L/RHC Severe stenosis of the Lcx artery, compensated valvular, cardiomyopathy, plans for staged PCI of the Lcx, nothing performed today given her renal insufficiency and contrast concerns, start Plavix.   -11/26/24 per Dr. Velazquez s/p IVUS guided PCI LCx with MONICA x1.     Admitted 12/9/24-12/17/24 with acute on chronic diastolic heart failure. BETH-s/p iron infusion. Follow up with [facility] for HFU 1/13/25. Living in assisted

## 2025-07-31 ENCOUNTER — TELEPHONE (OUTPATIENT)
Dept: CARDIOLOGY CLINIC | Age: 85
End: 2025-07-31

## 2025-07-31 NOTE — TELEPHONE ENCOUNTER
Patient is cleared for surgery from cardiac standpoint okay to hold aspirin and Plavix for 5 days as requested

## 2025-07-31 NOTE — TELEPHONE ENCOUNTER
CARDIAC CLEARANCE     What type of procedure are you having? Lumbar puncture     Which physician is performing your procedure? Dr. Dejan Resendez    When is your procedure scheduled? TBD    Where are you having this procedure? [facility]    Are you taking Blood Thinners? yes   If so what? (Name/dose/frequesncy) ASA 81 mg, Plavix 75 mg     Does the surgeon want you to stop your blood thinner? Yes If so for how long? 5 days prior     Are you having any new or worsening cardiac symptoms? N/A    Phone Number and Contact Name for Physicians office: [phone]    Fax number to send information: [phone]    Cardiac History:  Last office visit with Cardiologist:05/14/25    Cardiac Procedures:    Cardiac Testing:

## 2025-07-31 NOTE — TELEPHONE ENCOUNTER
Dr. Resendez is requesting advisement on Cely Espitia to undergo lumbar puncture procedure.     They have requested to hold Aspirin and Plavix for 5 days prior to procedure.  Procedure not scheduled as of yet.     Patient last seen in office 5/14/25  Next visit scheduled 8/19/25    History of CAD, cardiomyopathy systolic heart failure.    ICD placed on 4/1/2016  ZACH 8/7/24 9/22/24 successful Aphavac aspiration thrombectomy/ZACH   11/19/24 L/RHC Severe stenosis of the Lcx artery, compensated valvular, cardiomyopathy, plans for staged PCI of the Lcx, nothing performed today given her renal insufficiency and contrast concerns, start Plavix.   11/26/24 per Dr. Velazquez s/p IVUS guided PCI LCx with MONICA x1.     9/25/24 Rt Heart cath by Dr. Velazquez    Echo-12/10/24 EF 25 - 30%    Upcoming Echo scheduled for 8/14/25

## 2025-08-14 ENCOUNTER — HOSPITAL ENCOUNTER (OUTPATIENT)
Age: 85
Discharge: HOME | End: 2025-08-16
Attending: INTERNAL MEDICINE
Payer: MEDICARE

## 2025-08-14 VITALS
WEIGHT: 134 LBS | HEIGHT: 68 IN | SYSTOLIC BLOOD PRESSURE: 132 MMHG | BODY MASS INDEX: 20.31 KG/M2 | DIASTOLIC BLOOD PRESSURE: 64 MMHG

## 2025-08-14 DIAGNOSIS — R01.1 HEART MURMUR: ICD-10-CM

## 2025-08-14 LAB
ECHO AO ASC DIAM: 2.5 CM
ECHO AO ASCENDING AORTA INDEX: 1.45 CM/M2
ECHO AO ROOT DIAM: 3 CM
ECHO AO ROOT INDEX: 1.74 CM/M2
ECHO AR MAX VEL PISA: 3.2 M/S
ECHO AV PEAK GRADIENT: 7 MMHG
ECHO AV PEAK VELOCITY: 1.3 M/S
ECHO AV REGURGITANT PHT: 500 MS
ECHO AV VELOCITY RATIO: 0.54
ECHO BSA: 1.71 M2
ECHO EST RA PRESSURE: 3 MMHG
ECHO LA AREA 2C: 23.3 CM2
ECHO LA AREA 4C: 22.1 CM2
ECHO LA DIAMETER INDEX: 2.38 CM/M2
ECHO LA DIAMETER: 4.1 CM
ECHO LA MAJOR AXIS: 5.7 CM
ECHO LA MINOR AXIS: 5.8 CM
ECHO LA TO AORTIC ROOT RATIO: 1.37
ECHO LA VOL BP: 74 ML (ref 22–52)
ECHO LA VOL MOD A2C: 79 ML (ref 22–52)
ECHO LA VOL MOD A4C: 67 ML (ref 22–52)
ECHO LA VOL/BSA BIPLANE: 43 ML/M2 (ref 16–34)
ECHO LA VOLUME INDEX MOD A2C: 46 ML/M2 (ref 16–34)
ECHO LA VOLUME INDEX MOD A4C: 39 ML/M2 (ref 16–34)
ECHO LV E' LATERAL VELOCITY: 3.15 CM/S
ECHO LV E' SEPTAL VELOCITY: 4.33 CM/S
ECHO LV EF PHYSICIAN: 48 %
ECHO LV FRACTIONAL SHORTENING: 10 % (ref 28–44)
ECHO LV INTERNAL DIMENSION DIASTOLE INDEX: 3.37 CM/M2
ECHO LV INTERNAL DIMENSION DIASTOLIC: 5.8 CM (ref 3.9–5.3)
ECHO LV INTERNAL DIMENSION SYSTOLIC INDEX: 3.02 CM/M2
ECHO LV INTERNAL DIMENSION SYSTOLIC: 5.2 CM
ECHO LV IVSD: 1 CM (ref 0.6–0.9)
ECHO LV MASS 2D: 248.5 G (ref 67–162)
ECHO LV MASS INDEX 2D: 144.5 G/M2 (ref 43–95)
ECHO LV POSTERIOR WALL DIASTOLIC: 1.1 CM (ref 0.6–0.9)
ECHO LV RELATIVE WALL THICKNESS RATIO: 0.38
ECHO LVOT PEAK GRADIENT: 2 MMHG
ECHO LVOT PEAK VELOCITY: 0.7 M/S
ECHO MV A VELOCITY: 1.22 M/S
ECHO MV E DECELERATION TIME (DT): 195 MS
ECHO MV E VELOCITY: 1.03 M/S
ECHO MV E/A RATIO: 0.84
ECHO MV E/E' LATERAL: 32.7
ECHO MV E/E' RATIO (AVERAGED): 28.24
ECHO MV E/E' SEPTAL: 23.79
ECHO MV EFFECTIVE ORIFICE AREA (EOA): 0.1 CM2
ECHO MV MAX VELOCITY: 3 M/S
ECHO MV MEAN GRADIENT: 60 MMHG
ECHO MV MEAN VELOCITY: 3.7 M/S
ECHO MV PEAK GRADIENT: 36 MMHG
ECHO MV REGURGITANT ALIASING (NYQUIST) VELOCITY: 15 CM/S
ECHO MV REGURGITANT PEAK GRADIENT: 104 MMHG
ECHO MV REGURGITANT PEAK VELOCITY: 5.1 M/S
ECHO MV REGURGITANT RADIUS PISA: 0.76 CM
ECHO MV REGURGITANT VTIA: 189 CM
ECHO MV VENA CONTRACTA AREA: 4.2 CM2
ECHO MV VENA CONTRACTA/LEFT ATRIUM AREA: 0.19
ECHO MV VENA CONTRACTA: 1.3 CM
ECHO MV VTI: 178 CM
ECHO PULMONARY ARTERY END DIASTOLIC PRESSURE: 7 MMHG
ECHO PV MAX VELOCITY: 0.7 M/S
ECHO PV PEAK GRADIENT: 2 MMHG
ECHO PV REGURGITANT MAX VELOCITY: 1.4 M/S
ECHO RA AREA 4C: 12.8 CM2
ECHO RA END SYSTOLIC VOLUME APICAL 4 CHAMBER INDEX BSA: 18 ML/M2
ECHO RA VOLUME: 31 ML
ECHO RIGHT VENTRICULAR SYSTOLIC PRESSURE (RVSP): 29 MMHG
ECHO RV BASAL DIMENSION: 4.1 CM
ECHO RV FREE WALL PEAK S': 6.9 CM/S
ECHO RV INTERNAL DIMENSION: 3.4 CM
ECHO RV TAPSE: 1.6 CM (ref 1.7–?)
ECHO TV REGURGITANT MAX VELOCITY: 2.54 M/S
ECHO TV REGURGITANT PEAK GRADIENT: 26 MMHG
Lab: 0.1 CM2/M2

## 2025-08-14 PROCEDURE — 93306 TTE W/DOPPLER COMPLETE: CPT

## 2025-08-19 ENCOUNTER — OFFICE VISIT (OUTPATIENT)
Dept: CARDIOLOGY CLINIC | Age: 85
End: 2025-08-19
Payer: MEDICARE

## 2025-08-19 VITALS
SYSTOLIC BLOOD PRESSURE: 128 MMHG | WEIGHT: 141 LBS | OXYGEN SATURATION: 97 % | HEART RATE: 64 BPM | HEIGHT: 68 IN | DIASTOLIC BLOOD PRESSURE: 62 MMHG | BODY MASS INDEX: 21.37 KG/M2

## 2025-08-19 DIAGNOSIS — Z95.810 AUTOMATIC IMPLANTABLE CARDIOVERTER-DEFIBRILLATOR IN SITU: ICD-10-CM

## 2025-08-19 DIAGNOSIS — E78.5 HYPERLIPIDEMIA, UNSPECIFIED HYPERLIPIDEMIA TYPE: ICD-10-CM

## 2025-08-19 DIAGNOSIS — I50.22 CHRONIC SYSTOLIC HEART FAILURE (HCC): ICD-10-CM

## 2025-08-19 DIAGNOSIS — I25.10 CORONARY ARTERY DISEASE INVOLVING NATIVE CORONARY ARTERY OF NATIVE HEART WITHOUT ANGINA PECTORIS: ICD-10-CM

## 2025-08-19 DIAGNOSIS — Z90.5 SINGLE KIDNEY: ICD-10-CM

## 2025-08-19 DIAGNOSIS — N18.9 CHRONIC KIDNEY DISEASE, UNSPECIFIED CKD STAGE: ICD-10-CM

## 2025-08-19 DIAGNOSIS — R01.1 HEART MURMUR: Primary | ICD-10-CM

## 2025-08-19 DIAGNOSIS — I05.9 MITRAL VALVE DISEASE: ICD-10-CM

## 2025-08-19 DIAGNOSIS — I65.23 BILATERAL CAROTID ARTERY STENOSIS: ICD-10-CM

## 2025-08-19 DIAGNOSIS — I10 ESSENTIAL HYPERTENSION: ICD-10-CM

## 2025-08-19 PROCEDURE — 1123F ACP DISCUSS/DSCN MKR DOCD: CPT | Performed by: INTERNAL MEDICINE

## 2025-08-19 PROCEDURE — G2211 COMPLEX E/M VISIT ADD ON: HCPCS | Performed by: INTERNAL MEDICINE

## 2025-08-19 PROCEDURE — 99214 OFFICE O/P EST MOD 30 MIN: CPT | Performed by: INTERNAL MEDICINE

## 2025-08-19 PROCEDURE — 1159F MED LIST DOCD IN RCRD: CPT | Performed by: INTERNAL MEDICINE

## 2025-08-28 ENCOUNTER — TELEPHONE (OUTPATIENT)
Dept: CARDIOLOGY CLINIC | Age: 85
End: 2025-08-28

## (undated) DEVICE — DUAL CUT SAGITTAL BLADE

## (undated) DEVICE — BASIC SINGLE BASIN 1-LF: Brand: MEDLINE INDUSTRIES, INC.

## (undated) DEVICE — SINGLE USE AIR/WATER, SUCTION AND BIOPSY VALVES SET: Brand: ORCAPOD™

## (undated) DEVICE — GUIDEWIRE VASC L150CM DIA0.025IN TIP L7CM J RAD 3MM PTFE

## (undated) DEVICE — GUIDEWIRE VASC L150CM DIA0.035IN FLX END L7CM J 3MM PTFE

## (undated) DEVICE — SUTURE STRATAFIX SPRL SZ 2 0 L14IN ABSRB UD MH L36MM 1 2 CIR SXMD2B401

## (undated) DEVICE — PADDING UNDERCAST W4INXL4YD 100% COT CRIMPED FINISH WBRL II

## (undated) DEVICE — GLOVE,SURG,SENSICARE SLT,LF,PF,8: Brand: MEDLINE

## (undated) DEVICE — PERCLOSE™ PROSTYLE™ SUTURE-MEDIATED CLOSURE AND REPAIR SYSTEM: Brand: PERCLOSE™ PROSTYLE™

## (undated) DEVICE — AMPLATZ EXTRA STIFF WIRE GUIDE: Brand: AMPLATZ

## (undated) DEVICE — SOLUTION IV IRRIG POUR BRL 0.9% SODIUM CHL 2F7124

## (undated) DEVICE — CHLORAPREP 26ML ORANGE

## (undated) DEVICE — COVER LT HNDL BLU PLAS

## (undated) DEVICE — HYPODERMIC SAFETY NEEDLE: Brand: MAGELLAN

## (undated) DEVICE — SYRINGE IRRIG 60ML SFT PLIABLE BLB EZ TO GRP 1 HND USE W/

## (undated) DEVICE — SOLUTION IV 1000ML 0.9% SOD CHL FOR IRRIG PLAS CONT

## (undated) DEVICE — GLOVE SURG SZ 85 L12IN FNGR THK13MIL WHT ISOLEX POLYISOPRENE

## (undated) DEVICE — DEVICE US SLED PUL BK DISP ILAB

## (undated) DEVICE — CANISTER, RIGID, 1200CC: Brand: MEDLINE INDUSTRIES, INC.

## (undated) DEVICE — 1010 S-DRAPE TOWEL DRAPE 10/BX: Brand: STERI-DRAPE™

## (undated) DEVICE — FORCEPS BX L240CM WRK CHN 2.8MM STD CAP W/ NDL MIC MESH

## (undated) DEVICE — COVER,TABLE,77X90,STERILE: Brand: MEDLINE

## (undated) DEVICE — PINNACLE INTRODUCER SHEATH: Brand: PINNACLE

## (undated) DEVICE — SOLUTION PREP POVIDONE IOD FOR SKIN MUCOUS MEM PRIOR TO

## (undated) DEVICE — 3M™ COBAN™ NL STERILE NON-LATEX SELF-ADHERENT WRAP, 2083S, 3 IN X 5 YD (7,5 CM X 4,5 M), 24 ROLLS/CASE: Brand: 3M™ COBAN™

## (undated) DEVICE — PAD DRY FLOOR ABS 32X58IN GRN

## (undated) DEVICE — RUNTHROUGH NS EXTRA FLOPPY PTCA GUIDEWIRE: Brand: RUNTHROUGH

## (undated) DEVICE — CATHETER GUID AD 6FR L100CM COR PERIPH GRN NYL PTFE XB L 3

## (undated) DEVICE — BIPOLAR SEALER 23-112-1 AQM 6.0: Brand: AQUAMANTYS ®

## (undated) DEVICE — RADIFOCUS OPTITORQUE ANGIOGRAPHIC CATHETER: Brand: OPTITORQUE

## (undated) DEVICE — CATH BLLN ANGIO 2.50X12MM SC EUPHORA RX

## (undated) DEVICE — CATHETER THRMDIL 7FR L110CM STD PULM ART 4 INFUS LUMN SWN

## (undated) DEVICE — Device

## (undated) DEVICE — SNARE COLD DIAMOND 15MM THIN

## (undated) DEVICE — TR BAND RADIAL ARTERY COMPRESSION DEVICE: Brand: TR BAND

## (undated) DEVICE — MOUTHPIECE ENDOSCP L CTRL OPN AND SIDE PORTS DISP

## (undated) DEVICE — GLOVE,SURG,SENSICARE SLT,LF,PF,8.5: Brand: MEDLINE

## (undated) DEVICE — TRAP SPEC RETRV CLR PLAS POLYP IN LN SUCT QUIK CTCH

## (undated) DEVICE — DRAPE C ARM UNIV W41XL74IN CLR PLAS XR VELC CLSR POLY STRP

## (undated) DEVICE — GLIDESHEATH SLENDER NITINOL HYDROPHILIC COATED INTRODUCER SHEATH: Brand: GLIDESHEATH SLENDER

## (undated) DEVICE — SHEET,DRAPE,53X77,STERILE: Brand: MEDLINE

## (undated) DEVICE — TOTAL BASIC PK

## (undated) DEVICE — SUTURE ABSORBABLE MONOFILAMENT 1-0 OS8 14 IN STRATAFIX SPRL SXPD2B202

## (undated) DEVICE — SHEATH INTRO 26FR L33CM OD9.5MM ID8.7MM HYDRPHLC KINK

## (undated) DEVICE — ENDOSCOPIC KIT 6X3/16 FT COLON W/ 1.1 OZ 2 GWN W/O BRSH

## (undated) DEVICE — DECANTER BAG 9": Brand: MEDLINE INDUSTRIES, INC.

## (undated) DEVICE — ELECTRODE PT RET AD L9FT HI MOIST COND ADH HYDRGEL CORDED

## (undated) DEVICE — SYSTEM SKIN CLSR 22CM DERMBND PRINEO

## (undated) DEVICE — 6619 2 PTNT ISO SYS INCISE AREA&LT;(&GT;&&LT;)&GT;P: Brand: STERI-DRAPE™ IOBAN™ 2

## (undated) DEVICE — GOWN,REINF,POLY,AURORA,XLNG/XXL,STRL: Brand: MEDLINE

## (undated) DEVICE — PAD,NON-ADHERENT,3X8,STERILE,LF,1/PK: Brand: MEDLINE

## (undated) DEVICE — NEEDLE HYPO 18GA L1.5IN THN WALL PIVOTING SHLD BVL ORIENTED

## (undated) DEVICE — MERCY HEALTH WEST TURNOVER: Brand: MEDLINE INDUSTRIES, INC.

## (undated) DEVICE — BW-412T DISP COMBO CLEANING BRUSH: Brand: SINGLE USE COMBINATION CLEANING BRUSH

## (undated) DEVICE — Z CONVERTED USE 2276088 BANDAGE ADH W4INXL5YD WHT E AIR PERM TENSOPLAST

## (undated) DEVICE — GUIDEWIRE VASC L260CM DIA0.035IN RAD 3MM J TIP L7CM PTFE

## (undated) DEVICE — SYRINGE MED 3ML CLR PLAS STD N CTRL LUERLOCK TIP DISP

## (undated) DEVICE — Z DISCONTINUED USE 2716304 SUTURE STRATAFIX SPRL SZ 3-0 L12IN ABSRB UD FS-1 L24MM 3/8

## (undated) DEVICE — SYRINGE MED 30ML STD CLR PLAS LUERLOCK TIP N CTRL DISP

## (undated) DEVICE — SOLUTION IRRIG 500ML STRL H2O NONPYROGENIC

## (undated) DEVICE — CORONARY IMAGING CATHETER: Brand: OPTICROSS™ 6 HD

## (undated) DEVICE — AIR/WATER CLEANING ADAPTER FOR OLYMPUS® GI ENDOSCOPE: Brand: BULLDOG®

## (undated) DEVICE — KIT POS FOAM HANA TBL

## (undated) DEVICE — RETRACTOR SURG WIDE FLAT LO PROF LTWT PHOTONGUIDE

## (undated) DEVICE — BANDAGE COMPR W6INXL5YD WHT E H2O REPELLANT AIR PERM

## (undated) DEVICE — E-Z CLEAN, NON-STICK, PTFE COATED, ELECTROSURGICAL BLADE ELECTRODE, 4 INCH (10.2 CM): Brand: MEGADYNE

## (undated) DEVICE — CATH BLLN ANGIO 3X12MM NC EUPHORIA RX